# Patient Record
Sex: MALE | Race: WHITE | NOT HISPANIC OR LATINO | Employment: OTHER | URBAN - METROPOLITAN AREA
[De-identification: names, ages, dates, MRNs, and addresses within clinical notes are randomized per-mention and may not be internally consistent; named-entity substitution may affect disease eponyms.]

---

## 2017-01-07 ENCOUNTER — NURSE TRIAGE (OUTPATIENT)
Dept: ADMINISTRATIVE | Facility: CLINIC | Age: 82
End: 2017-01-07

## 2017-01-07 NOTE — TELEPHONE ENCOUNTER
"    Reason for Disposition   [1] Blood glucose > 240 mg/dl (13 mmol/l) AND [2] uses insulin (e.g., insulin-dependent, all type 1 diabetics)   (all triage questions negative)    Answer Assessment - Initial Assessment Questions  1. BLOOD GLUCOSE: "What is your blood glucose level?"       264  2. ONSET: "When did you check the blood glucose?"      11:00 am today  3. USUAL RANGE: "What is your glucose level usually?" (e.g., usual fasting morning value, usual evening value)    100-180  4. KETONES: "Do you check for ketones (urine or blood test strips)?" If yes, ask: "What does the test show now?"       no  5. TYPE 1 or 2:  "Do you know what type of diabetes you have?"  (e.g., Type 1, Type 2, Gestational; doesn't know)       Type 2   6. INSULIN: "Do you take insulin?" If yes, ask: "Have you missed any shots recently?"      Lantus @  7. DIABETES PILLS: "Do you take any pills for your diabetes?" If yes, ask: "Have you missed taking any pills recently?"      Metformin  8. OTHER SYMPTOMS: "Do you have any symptoms?" (e.g., fever, frequent urination, difficulty breathing, dizziness, weakness, vomiting)      none  9. PREGNANCY: "Is there any chance you are pregnant?" "When was your last menstrual period?"      n/a    Protocols used:  DIABETES - HIGH BLOOD SUGAR-[facility]    Patient fasting blood glucose 190, 10AM-303, and .  Changes made to patient's medication regimen for diabetes.  Instructed daughter to check the patient's glucose @ 2pm and 6pm and this nurse will call them back @6:00pm to check the patient's blood glucose readings.    "

## 2017-01-08 NOTE — TELEPHONE ENCOUNTER
2:00 pm blood glucose = 128 and 6:00pm blood glucose = 102.      Reiterated to daughter the importance of including fresh fruit and some carbohydrates in the patient's meals to prevent hypoglycemia.  Daughter verbalized understanding.

## 2017-01-12 RX ORDER — INSULIN GLARGINE 100 [IU]/ML
INJECTION, SOLUTION SUBCUTANEOUS
Qty: 15 ML | Refills: 11 | Status: SHIPPED | OUTPATIENT
Start: 2017-01-12 | End: 2019-02-20 | Stop reason: SDUPTHER

## 2017-01-12 RX ORDER — METFORMIN HYDROCHLORIDE 500 MG/1
TABLET ORAL
Qty: 60 TABLET | Refills: 6 | Status: SHIPPED | OUTPATIENT
Start: 2017-01-12 | End: 2018-12-26 | Stop reason: SINTOL

## 2017-01-12 RX ORDER — INSULIN LISPRO 100 [IU]/ML
INJECTION, SOLUTION INTRAVENOUS; SUBCUTANEOUS
Qty: 15 ML | Refills: 1 | Status: SHIPPED | OUTPATIENT
Start: 2017-01-12 | End: 2017-02-03 | Stop reason: CLARIF

## 2017-01-12 NOTE — TELEPHONE ENCOUNTER
Spoke with Liat and she said that since the pt started trulicity that he has had nausea , even thrown up. He has a lot of gas and when he belches it smells like . This all started after the Trulicity injection on Saturday. They are requesting to go back on the medication they were on before or be changed to something else.

## 2017-01-12 NOTE — TELEPHONE ENCOUNTER
----- Message from Donaldo Martell sent at 1/11/2017  2:07 PM CST -----  Contact: Pt Daughter/Liat  Caller request call from nurse regarding pt having problems with the new insulin and wants to see if he can switch back to his last one, caller does not know the names of the medications, please contact caller at [phone] or [phone]    [employer], [facility] [place], [place] - [address] [place] [id number]  Phone: [phone] Fax: [phone]

## 2017-01-12 NOTE — TELEPHONE ENCOUNTER
Okay to stop trulicity and get back on humalong and lantus as before and metformin and work on diet.

## 2017-02-03 ENCOUNTER — PATIENT MESSAGE (OUTPATIENT)
Dept: INTERNAL MEDICINE | Facility: CLINIC | Age: 82
End: 2017-02-03

## 2017-02-03 RX ORDER — INSULIN ASPART 100 [IU]/ML
INJECTION, SOLUTION INTRAVENOUS; SUBCUTANEOUS
Qty: 1 BOX | Refills: 11 | Status: SHIPPED | OUTPATIENT
Start: 2017-02-03 | End: 2018-02-05 | Stop reason: SDUPTHER

## 2017-02-03 NOTE — TELEPHONE ENCOUNTER
----- Message from Shagufta Zimmerman sent at 2/3/2017 11:32 AM CST -----  Contact: tanisha-daughter  ins isn't paying for humalog, can it be changed to novalog...[phone]

## 2017-02-03 NOTE — TELEPHONE ENCOUNTER
Pt's daughter called to say that insurance not paying for Humalog but will pay Novolog , he injects 5 units before meals    Please look and make sure this is correct

## 2017-02-09 ENCOUNTER — OFFICE VISIT (OUTPATIENT)
Dept: CARDIOLOGY | Facility: CLINIC | Age: 82
End: 2017-02-09
Payer: MEDICARE

## 2017-02-09 VITALS
SYSTOLIC BLOOD PRESSURE: 130 MMHG | WEIGHT: 188.69 LBS | DIASTOLIC BLOOD PRESSURE: 64 MMHG | HEART RATE: 60 BPM | BODY MASS INDEX: 27.95 KG/M2 | HEIGHT: 69 IN

## 2017-02-09 DIAGNOSIS — I77.9 CAROTID ARTERY DISEASE WITHOUT CEREBRAL INFARCTION: Chronic | ICD-10-CM

## 2017-02-09 DIAGNOSIS — R60.0 EDEMA OF BOTH LEGS: Primary | ICD-10-CM

## 2017-02-09 DIAGNOSIS — R94.31 ABNORMAL ECG: ICD-10-CM

## 2017-02-09 DIAGNOSIS — I25.810 CORONARY ARTERY DISEASE INVOLVING CORONARY BYPASS GRAFT OF NATIVE HEART WITHOUT ANGINA PECTORIS: ICD-10-CM

## 2017-02-09 DIAGNOSIS — I48.0 PAROXYSMAL ATRIAL FIBRILLATION: Chronic | ICD-10-CM

## 2017-02-09 DIAGNOSIS — I73.9 PERIPHERAL VASCULAR DISEASE: Chronic | ICD-10-CM

## 2017-02-09 DIAGNOSIS — E78.2 MIXED HYPERLIPIDEMIA: Chronic | ICD-10-CM

## 2017-02-09 DIAGNOSIS — I10 ESSENTIAL HYPERTENSION: ICD-10-CM

## 2017-02-09 DIAGNOSIS — I65.23 OCCLUSION AND STENOSIS OF BILATERAL CAROTID ARTERIES: ICD-10-CM

## 2017-02-09 DIAGNOSIS — I73.9 CLAUDICATION IN PERIPHERAL VASCULAR DISEASE: Chronic | ICD-10-CM

## 2017-02-09 DIAGNOSIS — Z95.0 PACEMAKER: Chronic | ICD-10-CM

## 2017-02-09 DIAGNOSIS — I36.1 NON-RHEUMATIC TRICUSPID VALVE INSUFFICIENCY: ICD-10-CM

## 2017-02-09 PROCEDURE — 99999 PR PBB SHADOW E&M-EST. PATIENT-LVL III: CPT | Mod: PBBFAC,,, | Performed by: INTERNAL MEDICINE

## 2017-02-09 PROCEDURE — 93000 ELECTROCARDIOGRAM COMPLETE: CPT | Mod: S$GLB,,, | Performed by: INTERNAL MEDICINE

## 2017-02-09 PROCEDURE — 1157F ADVNC CARE PLAN IN RCRD: CPT | Mod: S$GLB,,, | Performed by: INTERNAL MEDICINE

## 2017-02-09 PROCEDURE — 99214 OFFICE O/P EST MOD 30 MIN: CPT | Mod: S$GLB,,, | Performed by: INTERNAL MEDICINE

## 2017-02-09 PROCEDURE — 1159F MED LIST DOCD IN RCRD: CPT | Mod: S$GLB,,, | Performed by: INTERNAL MEDICINE

## 2017-02-09 PROCEDURE — 99499 UNLISTED E&M SERVICE: CPT | Mod: S$GLB,,, | Performed by: INTERNAL MEDICINE

## 2017-02-09 PROCEDURE — 1160F RVW MEDS BY RX/DR IN RCRD: CPT | Mod: S$GLB,,, | Performed by: INTERNAL MEDICINE

## 2017-02-09 PROCEDURE — 3078F DIAST BP <80 MM HG: CPT | Mod: S$GLB,,, | Performed by: INTERNAL MEDICINE

## 2017-02-09 PROCEDURE — 3075F SYST BP GE 130 - 139MM HG: CPT | Mod: S$GLB,,, | Performed by: INTERNAL MEDICINE

## 2017-02-09 RX ORDER — MELOXICAM 15 MG/1
1 TABLET ORAL DAILY
Refills: 2 | COMMUNITY
Start: 2016-12-16 | End: 2018-04-26

## 2017-02-09 RX ORDER — AMLODIPINE BESYLATE 5 MG/1
5 TABLET ORAL DAILY
Qty: 30 TABLET | Refills: 11
Start: 2017-02-09 | End: 2017-03-31 | Stop reason: SDUPTHER

## 2017-02-09 NOTE — MR AVS SNAPSHOT
[facility]  [address] [facility] [address]  Phone: [phone]  Fax: [phone]                  Anthony Blair   2017 1:40 PM   Office Visit    Description:  Male : 1933   Provider:  Seven Frazier MD   Department:  [facility]           Reason for Visit     Hyperlipidemia     Hypertension     Coronary Artery Disease     Peripheral Arterial Disease     Atrial Fibrillation     Carotid Artery Disease     Dizziness           Diagnoses this Visit        Comments    Edema of both legs    -  Primary     Non-rheumatic tricuspid valve insufficiency         Paroxysmal atrial fibrillation         Pacemaker         Mixed hyperlipidemia         Peripheral vascular disease         Carotid artery disease without cerebral infarction         Claudication in peripheral vascular disease         Uncontrolled type 2 diabetes mellitus with diabetic peripheral angiopathy without gangrene, with long-term current use of insulin         Coronary artery disease involving coronary bypass graft of native heart without angina pectoris         Essential hypertension         Abnormal ECG         Occlusion and stenosis of bilateral carotid arteries                To Do List           Goals (5 Years of Data)     None      Follow-Up and Disposition     Return in about 6 months (around 2017).       These Medications        Disp Refills Start End    amlodipine (NORVASC) 5 MG tablet 30 tablet 11 2017    Take 1 tablet (5 mg total) by mouth once daily. - Oral    Pharmacy: [facility], [address] #: [phone]         [facility] On Call     [facility] Care Line -  Assistance  Registered nurses in the [facility] Center provide clinical advisement, health education, appointment booking, and other advisory services.  Call for this free service at [phone].             Medications           Message regarding  "Medications     Verify the changes and/or additions to your medication regime listed below are the same as discussed with your clinician today.  If any of these changes or additions are incorrect, please notify your healthcare provider.        START taking these NEW medications        Refills    amlodipine (NORVASC) 5 MG tablet 11    Sig: Take 1 tablet (5 mg total) by mouth once daily.    Class: No Print    Route: Oral      STOP taking these medications     aspirin (ECOTRIN) 81 MG EC tablet Take 1 tablet by mouth every other day.            Verify that the below list of medications is an accurate representation of the medications you are currently taking.  If none reported, the list may be blank. If incorrect, please contact your healthcare provider. Carry this list with you in case of emergency.           Current Medications     BD INSULIN PEN NEEDLE UF SHORT 31 gauge x 5/16" Ndle USE FOUR TIMES DAILY.    blood sugar diagnostic (ACCU-CHEK TOMAS PLUS TEST STRP) Strp 1 strip by Other route 6 (six) times daily.    CARBOXYMETHYLCELLULOSE SODIUM (REFRESH OPHT) Apply to eye.    clopidogrel (PLAVIX) 75 mg tablet Take 1 tablet (75 mg total) by mouth once daily.    fluticasone (FLONASE) 50 mcg/actuation nasal spray 2 sprays by Each Nare route once daily.    furosemide (LASIX) 20 MG tablet TAKE ONE TABLET BY MOUTH DAILY    insulin aspart (NOVOLOG) 100 unit/mL InPn pen Inject 5 units 3 times a day before meals    insulin glargine (LANTUS SOLOSTAR) 100 unit/mL (3 mL) InPn pen INJECT 16 UNITS SUB-Q AT BEDTIME    ipratropium (ATROVENT) 0.06 % nasal spray 2 sprays by Nasal route 4 (four) times daily.    isosorbide mononitrate (IMDUR) 30 MG 24 hr tablet ONE BY MOUTH DAILY    lactobacillus acidophilus (PROBIOTIC) 10 billion cell Cap Take by mouth. 1 Capsule Oral Every day    lancets (ACCU-CHEK MULTICLIX LANCET) Misc TEST 3 TIMES A DAY    lisinopril-hydrochlorothiazide (PRINZIDE,ZESTORETIC) 20-12.5 mg per tablet Take 1 tablet by " "mouth once daily.    meloxicam (MOBIC) 15 MG tablet Take 1 tablet by mouth once daily.    metformin (GLUCOPHAGE) 500 MG tablet TAKE ONE BY MOUTH TWICE A DAY WITH FOOD.    metoprolol succinate (TOPROL-XL) 100 MG 24 hr tablet TAKE ONE TABLET BY MOUTH TWICE DAILY    nitroGLYCERIN (NITROSTAT) 0.4 MG SL tablet Place 1 tablet (0.4 mg total) under the tongue every 5 (five) minutes as needed for Chest pain.    potassium chloride SA (K-DUR,KLOR-CON) 20 MEQ tablet TAKE ONE TABLET BY MOUTH EVERY DAY    ranitidine (ZANTAC) 75 MG tablet Take 75 mg by mouth nightly.    saw palmetto fruit 450 mg Cap Take 1 capsule by mouth Twice daily.    tramadol (ULTRAM) 50 mg tablet Take 1 tablet (50 mg total) by mouth every 6 (six) hours as needed for Pain.    amlodipine (NORVASC) 5 MG tablet Take 1 tablet (5 mg total) by mouth once daily.           Clinical Reference Information           Your Vitals Were     BP Pulse Height Weight BMI    130/64 (BP Location: Left arm, Patient Position: Sitting) 60 5' 9" (1.753 m) 85.6 kg (188 lb 11.4 oz) 27.87 kg/m2      Blood Pressure          Most Recent Value    Right Arm BP - Sitting  138/62    Left Arm BP - Sitting  130/84    BP  130/64      Allergies as of 2/9/2017     Atorvastatin    Codeine    Norvasc [Amlodipine]    Trulicity [Dulaglutide]    Adhesive      Immunizations Administered on Date of Encounter - 2/9/2017     None      Orders Placed During Today's Visit      Normal Orders This Visit    IN OFFICE EKG 12-LEAD (to [place])     Future Labs/Procedures Expected by Expires    2D echo with color flow doppler  8/9/2017 2/9/2018    CAR Ultrasound doppler carotid bliateral  8/9/2017 2/9/2018      Language Assistance Services     ATTENTION: Language assistance services are available, free of charge. Please call [phone].      ATENCIÓN: Si margie wolf, tiene a clifford disposición servicios gratuitos de asistencia lingüística. Llame al [phone].     CHÚ Ý: N?u b?n nói Ti?ng Vi?t, có các d?ch v? h? " tr? diana mejia? mi?n phí dành cho b?n. G?i s? [phone].         [facility] complies with applicable Federal civil rights laws and does not discriminate on the basis of race, color, national origin, age, disability, or sex.

## 2017-02-09 NOTE — PROGRESS NOTES
Subjective:    Patient ID:  Anthony Blair is a 83 y.o. male who presents for evaluation of Hyperlipidemia; Hypertension; Coronary Artery Disease; Peripheral Arterial Disease; Atrial Fibrillation; Carotid Artery Disease; and Dizziness      HPI Mr. Blair returns for fu.  His current medical conditions include PAF/PSVT, CAD (PTCA 1980's), HTN, PPM, DM, atrial septal aneurysm, PAD, dyslipidemia. Nonsmoker.   s/p CABG 7/10 (LIMA-LAD, SVG-OM1, SVG-OM3, SVG-PDA) for increasing OLIVARES and multivessel CAD on [facility].   S/p PPM 10/10 for SSS/PAF. Was hospitalized 1/11 for PSVT (Atrial tachycardia) and was started on Amiodarone but was stopped for GI discomfort. He also did not tolerate Multaq and has not tolerated multiple statins.   s/p EPS/ablation of his atrial tachycardia 6/11.   S/p abd w runoff March 2015 and had significant B LE infrapopliteal disease. Atherectomy/pta performed of critical R tibeoperoneal trunk stenosis. Also has L tibeoperoneal trunk stenosis and his PTA/MIGUEL are occluded bilaterally.  Now here for f/u.  Has had some B LE edema on norvasc, stopped it and improved, restarted and recurred.  No angina.  No unusual dyspnea, no pnd/orthopnea.  Some occasional lightheadedness/dizziness. No syncope.  ecg today shows a-v pacing.  PPM last interrogation showed normal device function.  DM worsening.  Lipids are stable.  No abnormal bleeding on asa, plavix.  No bothersome claudication sxs.     Patient Active Problem List   Diagnosis    Uncontrolled type 2 diabetes mellitus with diabetic peripheral angiopathy without gangrene, with long-term current use of insulin    Coronary artery disease involving coronary bypass graft without angina pectoris    GERD (gastroesophageal reflux disease)    Paroxysmal atrial fibrillation    Pacemaker    Mixed hyperlipidemia    Essential hypertension    Abnormal ECG    Peripheral vascular disease    Carotid artery disease without cerebral infarction    Lumbosacral spondylosis  "   Claudication in peripheral vascular disease    Diabetes mellitus type 2 without retinopathy    Pseudophakia    Right epiretinal membrane    Bilateral dry eyes    Brow ptosis    Dermatochalasis    Arthritis of knee, left    Arthritis of knee, right    Corneal rust ring of right eye    Long-term insulin use    Tricuspid regurgitation    Serum calcium elevated    Blepharitis    Edema of both legs     Past Medical History   Diagnosis Date    A-fib     Anemia     AP (angina pectoris) 1/23/2014    CAD (coronary artery disease)     Carotid artery disease without cerebral infarction 8/22/2014    Cataract     Diabetes mellitus type II      BS didn't check today 09/08/2016  A1C  7.4    GERD (gastroesophageal reflux disease)     GERD (gastroesophageal reflux disease) 7/11/2013    Hyperlipidemia     Hypertension     Lumbosacral spondylosis 12/24/2014    Mixed hyperlipidemia 1/23/2014    Pacemaker 1/23/2014    Paroxysmal atrial fibrillation 1/23/2014    Peripheral vascular disease 8/22/2014    S/P CABG (coronary artery bypass graft) 1/23/2014    Tobacco dependence      resolved     Review of Systems   Constitution: Positive for malaise/fatigue.   HENT: Negative.    Eyes: Negative.    Cardiovascular: Positive for leg swelling.   Respiratory: Negative.    Endocrine: Negative.    Hematologic/Lymphatic: Negative.    Skin: Negative.    Musculoskeletal: Positive for arthritis.   Gastrointestinal: Negative.    Genitourinary: Negative.    Neurological: Positive for light-headedness.   Psychiatric/Behavioral: Negative.    Allergic/Immunologic: Negative.          Visit Vitals    /64 (BP Location: Left arm, Patient Position: Sitting)    Pulse 60    Ht 5' 9" (1.753 m)    Wt 85.6 kg (188 lb 11.4 oz)    BMI 27.87 kg/m2       Wt Readings from Last 3 Encounters:   02/09/17 85.6 kg (188 lb 11.4 oz)   12/14/16 85.4 kg (188 lb 4.4 oz)   09/08/16 84.7 kg (186 lb 11.7 oz)     Temp Readings from Last 3 " Encounters:   12/14/16 97.9 °F (36.6 °C) (Tympanic)   09/08/16 97.6 °F (36.4 °C) (Tympanic)   06/01/16 96.4 °F (35.8 °C) (Tympanic)     BP Readings from Last 3 Encounters:   02/09/17 130/64   12/14/16 138/70   09/08/16 (!) 147/67     Pulse Readings from Last 3 Encounters:   02/09/17 60   12/14/16 65   09/08/16 90       Objective:    Physical Exam   Constitutional: He is oriented to person, place, and time. He appears well-developed and well-nourished.   HENT:   Head: Normocephalic.   Neck: Normal range of motion. Neck supple. Normal carotid pulses, no hepatojugular reflux and no JVD present. Carotid bruit is not present. No thyromegaly present.   Cardiovascular: Normal rate, regular rhythm, S1 normal and S2 normal.  PMI is not displaced.  Exam reveals no S3, no S4, no distant heart sounds, no friction rub, no midsystolic click and no opening snap.    No murmur heard.  Pulses:       Radial pulses are 2+ on the right side, and 2+ on the left side.   Pulmonary/Chest: Effort normal and breath sounds normal. He has no wheezes. He has no rales.   Abdominal: Soft. Bowel sounds are normal. He exhibits no distension, no abdominal bruit, no ascites and no mass. There is no tenderness.   Musculoskeletal: He exhibits no edema.   Neurological: He is alert and oriented to person, place, and time.   Skin: Skin is warm.   Psychiatric: He has a normal mood and affect. His behavior is normal.   Nursing note and vitals reviewed.      I have reviewed all pertinent labs and cardiac studies.    Lab Results   Component Value Date    HGBA1C 7.9 (H) 12/02/2016     Lab Results   Component Value Date    WBC 5.80 12/02/2016    HGB 12.8 (L) 12/02/2016    HCT 40.0 12/02/2016    MCV 91 12/02/2016     12/02/2016     Lab Results   Component Value Date    CHOL 188 12/02/2016    CHOL 178 09/01/2016    CHOL 204 (H) 05/24/2016     Lab Results   Component Value Date    HDL 50 12/02/2016    HDL 50 09/01/2016    HDL 47 05/24/2016     Lab Results    Component Value Date    LDLCALC 119.2 12/02/2016    LDLCALC 111.0 09/01/2016    LDLCALC 131.0 05/24/2016     Lab Results   Component Value Date    TRIG 94 12/02/2016    TRIG 85 09/01/2016    TRIG 130 05/24/2016     Lab Results   Component Value Date    CHOLHDL 26.6 12/02/2016    CHOLHDL 28.1 09/01/2016    CHOLHDL 23.0 05/24/2016       Chemistry        Component Value Date/Time     12/02/2016 0749    K 4.3 12/02/2016 0749     12/02/2016 0749    CO2 27 12/02/2016 0749    BUN 16 12/02/2016 0749    CREATININE 0.9 12/02/2016 0749     (H) 12/02/2016 0749        Component Value Date/Time    CALCIUM 10.5 12/02/2016 0749    ALKPHOS 114 12/02/2016 0749    AST 29 12/02/2016 0749    ALT 20 12/02/2016 0749    BILITOT 0.8 12/02/2016 0749          Lab Results   Component Value Date    WBC 5.80 12/02/2016    HGB 12.8 (L) 12/02/2016    HCT 40.0 12/02/2016    MCV 91 12/02/2016     12/02/2016           Assessment:       1. Edema of both legs    2. Non-rheumatic tricuspid valve insufficiency    3. Paroxysmal atrial fibrillation    4. Pacemaker    5. Mixed hyperlipidemia    6. Peripheral vascular disease    7. Carotid artery disease without cerebral infarction    8. Claudication in peripheral vascular disease    9. Uncontrolled type 2 diabetes mellitus with diabetic peripheral angiopathy without gangrene, with long-term current use of insulin    10. Coronary artery disease involving coronary bypass graft of native heart without angina pectoris    11. Essential hypertension    12. Abnormal ECG         Plan:             STABLE CV CONDITIONS OVERALL BUT HAS HAD ISSUES WITH LE EDEMA DUE TO CALCIUM CHANNEL BLOCKER.  CUT AMLODIPINE BACK TO 5 MG DAILY FROM 10 MG DUE TO SIDE EFFECTS INVOLVING ANKLE EDEMA.  CHRONIC DIZZINESS/LIGHTHEADEDNESS STABLE.  STABLE CHRONIC CAD -- CONTINUE MEDICAL TX.  STABLE CHRONIC PAD -- CONTINUE EXERCISE, MEDICAL TX.  PT COUNSELED ON NEED TO LOWER DM TO GOAL.  CONTINUE OTHER MEDS.  CARDIAC  DIET  EXERCISE  F/U 6 MONTHS WITH CAROTID US, ECHO.

## 2017-02-14 ENCOUNTER — OFFICE VISIT (OUTPATIENT)
Dept: INTERNAL MEDICINE | Facility: CLINIC | Age: 82
End: 2017-02-14
Payer: MEDICARE

## 2017-02-14 ENCOUNTER — HOSPITAL ENCOUNTER (OUTPATIENT)
Dept: RADIOLOGY | Facility: HOSPITAL | Age: 82
Discharge: HOME | End: 2017-02-14
Attending: FAMILY MEDICINE
Payer: MEDICARE

## 2017-02-14 VITALS
DIASTOLIC BLOOD PRESSURE: 76 MMHG | HEIGHT: 69 IN | OXYGEN SATURATION: 98 % | RESPIRATION RATE: 18 BRPM | BODY MASS INDEX: 27.43 KG/M2 | WEIGHT: 185.19 LBS | SYSTOLIC BLOOD PRESSURE: 138 MMHG | TEMPERATURE: 98 F | HEART RATE: 94 BPM

## 2017-02-14 DIAGNOSIS — G89.29 CHRONIC NECK PAIN: ICD-10-CM

## 2017-02-14 DIAGNOSIS — M54.2 CHRONIC NECK PAIN: Primary | ICD-10-CM

## 2017-02-14 DIAGNOSIS — M54.2 CHRONIC NECK PAIN: ICD-10-CM

## 2017-02-14 DIAGNOSIS — G89.29 CHRONIC NECK PAIN: Primary | ICD-10-CM

## 2017-02-14 PROCEDURE — 1157F ADVNC CARE PLAN IN RCRD: CPT | Mod: S$GLB,,, | Performed by: FAMILY MEDICINE

## 2017-02-14 PROCEDURE — 3075F SYST BP GE 130 - 139MM HG: CPT | Mod: S$GLB,,, | Performed by: FAMILY MEDICINE

## 2017-02-14 PROCEDURE — 1160F RVW MEDS BY RX/DR IN RCRD: CPT | Mod: S$GLB,,, | Performed by: FAMILY MEDICINE

## 2017-02-14 PROCEDURE — 1159F MED LIST DOCD IN RCRD: CPT | Mod: S$GLB,,, | Performed by: FAMILY MEDICINE

## 2017-02-14 PROCEDURE — 99213 OFFICE O/P EST LOW 20 MIN: CPT | Mod: S$GLB,,, | Performed by: FAMILY MEDICINE

## 2017-02-14 PROCEDURE — 99999 PR PBB SHADOW E&M-EST. PATIENT-LVL III: CPT | Mod: PBBFAC,,, | Performed by: FAMILY MEDICINE

## 2017-02-14 PROCEDURE — 72050 X-RAY EXAM NECK SPINE 4/5VWS: CPT | Mod: TC,PO

## 2017-02-14 PROCEDURE — 72050 X-RAY EXAM NECK SPINE 4/5VWS: CPT | Mod: 26,,, | Performed by: RADIOLOGY

## 2017-02-14 PROCEDURE — 3078F DIAST BP <80 MM HG: CPT | Mod: S$GLB,,, | Performed by: FAMILY MEDICINE

## 2017-02-14 PROCEDURE — 1125F AMNT PAIN NOTED PAIN PRSNT: CPT | Mod: S$GLB,,, | Performed by: FAMILY MEDICINE

## 2017-02-14 RX ORDER — DICLOFENAC SODIUM 10 MG/G
2 GEL TOPICAL DAILY
Qty: 1 TUBE | Refills: 2 | Status: SHIPPED | OUTPATIENT
Start: 2017-02-14 | End: 2017-10-02

## 2017-02-14 NOTE — PROGRESS NOTES
Chief Complaint:    Chief Complaint   Patient presents with    Neck Pain       History of Present Illness:    Patient presents today complaining of neck pain which is a chronic problem going on for months this worse off and on with cold weather pain mostly localized in the neck and shoulder region does not radiate no tingling numbness or weakness.  ROS:  Review of Systems   Constitutional: Negative for activity change, chills, fatigue, fever and unexpected weight change.   HENT: Negative for congestion, ear discharge, ear pain, hearing loss, postnasal drip and rhinorrhea.    Eyes: Negative for pain and visual disturbance.   Respiratory: Negative for cough, chest tightness and shortness of breath.    Cardiovascular: Negative for chest pain and palpitations.   Gastrointestinal: Negative for abdominal pain, diarrhea and vomiting.   Endocrine: Negative for heat intolerance.   Genitourinary: Negative for dysuria, flank pain, frequency and hematuria.   Musculoskeletal: Negative for back pain, gait problem and neck pain.   Skin: Negative for color change and rash.   Neurological: Negative for dizziness, tremors, seizures, numbness and headaches.   Psychiatric/Behavioral: Negative for agitation, hallucinations, self-injury, sleep disturbance and suicidal ideas. The patient is not nervous/anxious.        Past Medical History   Diagnosis Date    A-fib     Anemia     AP (angina pectoris) 1/23/2014    CAD (coronary artery disease)     Carotid artery disease without cerebral infarction 8/22/2014    Cataract     Diabetes mellitus type II      BS didn't check today 09/08/2016  A1C  7.4    GERD (gastroesophageal reflux disease)     GERD (gastroesophageal reflux disease) 7/11/2013    Hyperlipidemia     Hypertension     Lumbosacral spondylosis 12/24/2014    Mixed hyperlipidemia 1/23/2014    Pacemaker 1/23/2014    Paroxysmal atrial fibrillation 1/23/2014    Peripheral vascular disease 8/22/2014    S/P CABG  "(coronary artery bypass graft) 1/23/2014    Tobacco dependence      resolved       Social History:  Social History     Social History    Marital status:      Spouse name: N/A    Number of children: N/A    Years of education: N/A     Social History Main Topics    Smoking status: Former Smoker     Packs/day: 2.00     Years: 13.00     Types: Cigarettes     Start date: 11/25/1954     Quit date: 6/7/1967    Smokeless tobacco: Never Used    Alcohol use No    Drug use: No    Sexual activity: No     Other Topics Concern    None     Social History Narrative    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       Family History:   family history includes Alcohol abuse in his paternal uncle; Arthritis in his father and mother; Cancer in his daughter and sister; Diabetes in his brother, daughter, father, mother, sister, son, and son; Heart disease in his brother and mother; Kidney disease in his brother and mother; Miscarriages / Stillbirths in his daughter. There is no history of Stroke.    Health Maintenance   Topic Date Due    Hemoglobin A1c  06/02/2017    Foot Exam  06/28/2017    Eye Exam  09/08/2017    Lipid Panel  12/02/2017    TETANUS VACCINE  01/23/2024    Zoster Vaccine  Completed    Pneumococcal (65+)  Completed    Influenza Vaccine  Completed       Physical Exam:    Vital Signs  Temp: 97.9 °F (36.6 °C)  Temp src: Tympanic  Pulse: 94  Resp: 18  SpO2: 98 %  BP: 138/76  BP Location: Left arm  BP Method: Manual  Patient Position: Sitting  Pain Score:   3  Pain Loc: Neck  Height and Weight  Height: 5' 9" (175.3 cm)  Weight: 84 kg (185 lb 3 oz)  BSA (Calculated - sq m): 2.02 sq meters  BMI (Calculated): 27.4  Weight in (lb) to have BMI = 25: 168.9]    Body mass index is 27.35 kg/(m^2).    Physical Exam   HENT:   Left Ear: External ear normal.   Mouth/Throat: Oropharynx is clear and moist.   Neck: Decreased range of motion present.           Lab Results   Component Value Date    CHOL " "188 12/02/2016    CHOL 178 09/01/2016    CHOL 204 (H) 05/24/2016    TRIG 94 12/02/2016    TRIG 85 09/01/2016    TRIG 130 05/24/2016    HDL 50 12/02/2016    HDL 50 09/01/2016    HDL 47 05/24/2016    TOTALCHOLEST 3.8 12/02/2016    TOTALCHOLEST 3.6 09/01/2016    TOTALCHOLEST 4.3 05/24/2016    NONHDLCHOL 138 12/02/2016    NONHDLCHOL 128 09/01/2016    NONHDLCHOL 157 05/24/2016       Lab Results   Component Value Date    HGBA1C 7.9 (H) 12/02/2016       Assessment:      ICD-10-CM ICD-9-CM   1. Chronic neck pain M54.2 723.1    G89.29 338.29         Plan:  Appears to have pain mostly related to the DJD of the cervical spine his range of motion is very limited.  Recommend physical therapy to improve range of motion and alleviate pain.  Orders Placed This Encounter   Procedures    X-Ray Cervical Spine Complete 5 view    Ambulatory Referral to Physical/Occupational Therapy       Current Outpatient Prescriptions   Medication Sig Dispense Refill    amlodipine (NORVASC) 5 MG tablet Take 1 tablet (5 mg total) by mouth once daily. 30 tablet 11    BD INSULIN PEN NEEDLE UF SHORT 31 gauge x 5/16" Ndle USE FOUR TIMES DAILY. 100 each 6    blood sugar diagnostic (ACCU-CHEK TOMAS PLUS TEST STRP) Strp 1 strip by Other route 6 (six) times daily. 300 each 6    CARBOXYMETHYLCELLULOSE SODIUM (REFRESH OPHT) Apply to eye.      clopidogrel (PLAVIX) 75 mg tablet Take 1 tablet (75 mg total) by mouth once daily. 30 tablet 11    fluticasone (FLONASE) 50 mcg/actuation nasal spray 2 sprays by Each Nare route once daily. 16 g 11    furosemide (LASIX) 20 MG tablet TAKE ONE TABLET BY MOUTH DAILY 30 tablet 12    insulin aspart (NOVOLOG) 100 unit/mL InPn pen Inject 5 units 3 times a day before meals 1 Box 11    insulin glargine (LANTUS SOLOSTAR) 100 unit/mL (3 mL) InPn pen INJECT 16 UNITS SUB-Q AT BEDTIME 15 mL 11    ipratropium (ATROVENT) 0.06 % nasal spray 2 sprays by Nasal route 4 (four) times daily. 15 mL 0    isosorbide mononitrate (IMDUR) " 30 MG 24 hr tablet ONE BY MOUTH DAILY 30 tablet 12    lactobacillus acidophilus (PROBIOTIC) 10 billion cell Cap Take by mouth. 1 Capsule Oral Every day      lancets (ACCU-CHEK MULTICLIX LANCET) Misc TEST 3 TIMES A  each 4    lisinopril-hydrochlorothiazide (PRINZIDE,ZESTORETIC) 20-12.5 mg per tablet Take 1 tablet by mouth once daily. 90 tablet 3    meloxicam (MOBIC) 15 MG tablet Take 1 tablet by mouth once daily.  2    metformin (GLUCOPHAGE) 500 MG tablet TAKE ONE BY MOUTH TWICE A DAY WITH FOOD. 60 tablet 6    metoprolol succinate (TOPROL-XL) 100 MG 24 hr tablet TAKE ONE TABLET BY MOUTH TWICE DAILY 60 tablet 12    nitroGLYCERIN (NITROSTAT) 0.4 MG SL tablet Place 1 tablet (0.4 mg total) under the tongue every 5 (five) minutes as needed for Chest pain. 20 tablet 12    potassium chloride SA (K-DUR,KLOR-CON) 20 MEQ tablet TAKE ONE TABLET BY MOUTH EVERY DAY 30 tablet 12    ranitidine (ZANTAC) 75 MG tablet Take 75 mg by mouth nightly.      saw palmetto fruit 450 mg Cap Take 1 capsule by mouth Twice daily.      tramadol (ULTRAM) 50 mg tablet Take 1 tablet (50 mg total) by mouth every 6 (six) hours as needed for Pain. 60 tablet 0    diclofenac sodium (VOLTAREN) 1 % Gel Apply 2 g topically once daily. 1 Tube 2     No current facility-administered medications for this visit.        There are no discontinued medications.    No Follow-up on file.      Dr Abdulaziz Mccabe MD    Disclaimer: This note is prepared using voice recognition system and as such is likely to have errors and is not proof read.

## 2017-02-22 ENCOUNTER — HOSPITAL ENCOUNTER (EMERGENCY)
Facility: HOSPITAL | Age: 82
Discharge: HOME | End: 2017-02-22
Attending: EMERGENCY MEDICINE
Payer: MEDICARE

## 2017-02-22 ENCOUNTER — OFFICE VISIT (OUTPATIENT)
Dept: URGENT CARE | Facility: CLINIC | Age: 82
End: 2017-02-22
Payer: MEDICARE

## 2017-02-22 VITALS
OXYGEN SATURATION: 98 % | SYSTOLIC BLOOD PRESSURE: 154 MMHG | RESPIRATION RATE: 20 BRPM | HEIGHT: 70 IN | TEMPERATURE: 98 F | BODY MASS INDEX: 26.55 KG/M2 | DIASTOLIC BLOOD PRESSURE: 70 MMHG | HEART RATE: 74 BPM | WEIGHT: 185.44 LBS

## 2017-02-22 VITALS
DIASTOLIC BLOOD PRESSURE: 64 MMHG | WEIGHT: 186 LBS | SYSTOLIC BLOOD PRESSURE: 128 MMHG | HEIGHT: 70 IN | OXYGEN SATURATION: 97 % | HEART RATE: 63 BPM | RESPIRATION RATE: 14 BRPM | TEMPERATURE: 98 F | BODY MASS INDEX: 26.63 KG/M2

## 2017-02-22 DIAGNOSIS — R07.9 CHEST PAIN, UNSPECIFIED TYPE: Primary | ICD-10-CM

## 2017-02-22 DIAGNOSIS — R06.02 SOB (SHORTNESS OF BREATH): ICD-10-CM

## 2017-02-22 DIAGNOSIS — R42 DIZZINESS: ICD-10-CM

## 2017-02-22 DIAGNOSIS — I25.10 CORONARY ARTERY DISEASE, ANGINA PRESENCE UNSPECIFIED, UNSPECIFIED VESSEL OR LESION TYPE, UNSPECIFIED WHETHER NATIVE OR TRANSPLANTED HEART: ICD-10-CM

## 2017-02-22 LAB
ALBUMIN SERPL BCP-MCNC: 3.6 G/DL
ALP SERPL-CCNC: 123 U/L
ALT SERPL W/O P-5'-P-CCNC: 23 U/L
ANION GAP SERPL CALC-SCNC: 8 MMOL/L
AST SERPL-CCNC: 29 U/L
BASOPHILS # BLD AUTO: 0.03 K/UL
BASOPHILS NFR BLD: 0.6 %
BILIRUB SERPL-MCNC: 0.3 MG/DL
BNP SERPL-MCNC: 107 PG/ML
BUN SERPL-MCNC: 15 MG/DL
CALCIUM SERPL-MCNC: 10.3 MG/DL
CHLORIDE SERPL-SCNC: 101 MMOL/L
CO2 SERPL-SCNC: 28 MMOL/L
CREAT SERPL-MCNC: 0.8 MG/DL
DIFFERENTIAL METHOD: ABNORMAL
EOSINOPHIL # BLD AUTO: 0.2 K/UL
EOSINOPHIL NFR BLD: 4.1 %
ERYTHROCYTE [DISTWIDTH] IN BLOOD BY AUTOMATED COUNT: 14 %
EST. GFR  (AFRICAN AMERICAN): >60 ML/MIN/1.73 M^2
EST. GFR  (NON AFRICAN AMERICAN): >60 ML/MIN/1.73 M^2
GLUCOSE SERPL-MCNC: 185 MG/DL
HCT VFR BLD AUTO: 35.9 %
HGB BLD-MCNC: 12.2 G/DL
INR PPP: 1.1
LYMPHOCYTES # BLD AUTO: 1.9 K/UL
LYMPHOCYTES NFR BLD: 35.4 %
MCH RBC QN AUTO: 30.2 PG
MCHC RBC AUTO-ENTMCNC: 34 %
MCV RBC AUTO: 89 FL
MONOCYTES # BLD AUTO: 0.4 K/UL
MONOCYTES NFR BLD: 7.7 %
NEUTROPHILS # BLD AUTO: 2.8 K/UL
NEUTROPHILS NFR BLD: 52.2 %
PLATELET # BLD AUTO: 182 K/UL
PMV BLD AUTO: 8.9 FL
POTASSIUM SERPL-SCNC: 4 MMOL/L
PROT SERPL-MCNC: 7 G/DL
PROTHROMBIN TIME: 11.4 SEC
RBC # BLD AUTO: 4.04 M/UL
SODIUM SERPL-SCNC: 137 MMOL/L
TROPONIN I SERPL DL<=0.01 NG/ML-MCNC: <0.006 NG/ML
WBC # BLD AUTO: 5.34 K/UL

## 2017-02-22 PROCEDURE — 99212 OFFICE O/P EST SF 10 MIN: CPT | Mod: S$GLB,,, | Performed by: NURSE PRACTITIONER

## 2017-02-22 PROCEDURE — 93010 ELECTROCARDIOGRAM REPORT: CPT | Mod: ,,, | Performed by: INTERNAL MEDICINE

## 2017-02-22 PROCEDURE — 1160F RVW MEDS BY RX/DR IN RCRD: CPT | Mod: S$GLB,,, | Performed by: NURSE PRACTITIONER

## 2017-02-22 PROCEDURE — 1159F MED LIST DOCD IN RCRD: CPT | Mod: S$GLB,,, | Performed by: NURSE PRACTITIONER

## 2017-02-22 PROCEDURE — 3077F SYST BP >= 140 MM HG: CPT | Mod: S$GLB,,, | Performed by: NURSE PRACTITIONER

## 2017-02-22 PROCEDURE — 1126F AMNT PAIN NOTED NONE PRSNT: CPT | Mod: S$GLB,,, | Performed by: NURSE PRACTITIONER

## 2017-02-22 PROCEDURE — 3078F DIAST BP <80 MM HG: CPT | Mod: S$GLB,,, | Performed by: NURSE PRACTITIONER

## 2017-02-22 PROCEDURE — 99499 UNLISTED E&M SERVICE: CPT | Mod: S$GLB,,, | Performed by: NURSE PRACTITIONER

## 2017-02-22 PROCEDURE — 83880 ASSAY OF NATRIURETIC PEPTIDE: CPT

## 2017-02-22 PROCEDURE — 99999 PR PBB SHADOW E&M-EST. PATIENT-LVL III: CPT | Mod: PBBFAC,,, | Performed by: NURSE PRACTITIONER

## 2017-02-22 PROCEDURE — 99284 EMERGENCY DEPT VISIT MOD MDM: CPT | Mod: 25

## 2017-02-22 PROCEDURE — 85025 COMPLETE CBC W/AUTO DIFF WBC: CPT

## 2017-02-22 PROCEDURE — 93005 ELECTROCARDIOGRAM TRACING: CPT

## 2017-02-22 PROCEDURE — 1157F ADVNC CARE PLAN IN RCRD: CPT | Mod: S$GLB,,, | Performed by: NURSE PRACTITIONER

## 2017-02-22 PROCEDURE — 85610 PROTHROMBIN TIME: CPT

## 2017-02-22 PROCEDURE — 80053 COMPREHEN METABOLIC PANEL: CPT

## 2017-02-22 PROCEDURE — 84484 ASSAY OF TROPONIN QUANT: CPT

## 2017-02-22 RX ORDER — ASPIRIN 325 MG
325 TABLET ORAL
Status: COMPLETED | OUTPATIENT
Start: 2017-02-22 | End: 2017-02-22

## 2017-02-22 RX ADMIN — Medication 325 MG: at 07:02

## 2017-02-22 NOTE — ED AVS SNAPSHOT
[facility]  [id number] [place] [id number]               Anthony Blair   2017  8:21 PM   ED    Description:  Male : 1933   Department:  [facility]           Your Care was Coordinated By:     Provider Role From To    Lizzie Funez MD Attending Provider 17 --      Reason for Visit     Chest Pain           Diagnoses this Visit        Comments    Chest pain, unspecified type    -  Primary       ED Disposition     ED Disposition Condition Comment    Discharge             To Do List           Follow-up Information     Follow up with Abdulaziz Mccabe MD In 2 days.    Specialty:  Family Medicine    Contact information:    [address] [address] [address]  [phone]          Follow up with [facility].    Specialty:  Emergency Medicine    Why:  As needed, If symptoms worsen    Contact information:    [address] [place] [id number] [phone]      [facility]     [facility] -  Assistance  Registered nurses in the [facility] provide clinical advisement, health education, appointment booking, and other advisory services.  Call for this free service at [phone].             Medications           Message regarding Medications     Verify the changes and/or additions to your medication regime listed below are the same as discussed with your clinician today.  If any of these changes or additions are incorrect, please notify your healthcare provider.             Verify that the below list of medications is an accurate representation of the medications you are currently taking.  If none reported, the list may be blank. If incorrect, please contact your healthcare provider. Carry this list with you in case of emergency.           Current Medications     amlodipine (NORVASC) 5 MG tablet Take 1 tablet (5 mg total) by mouth once daily.    BD  "INSULIN PEN NEEDLE UF SHORT 31 gauge x 5/16" Ndle USE FOUR TIMES DAILY.    blood sugar diagnostic (ACCU-CHEK TOMAS PLUS TEST STRP) Strp 1 strip by Other route 6 (six) times daily.    CARBOXYMETHYLCELLULOSE SODIUM (REFRESH OPHT) Apply to eye.    clopidogrel (PLAVIX) 75 mg tablet Take 1 tablet (75 mg total) by mouth once daily.    diclofenac sodium (VOLTAREN) 1 % Gel Apply 2 g topically once daily.    fluticasone (FLONASE) 50 mcg/actuation nasal spray 2 sprays by Each Nare route once daily.    furosemide (LASIX) 20 MG tablet TAKE ONE TABLET BY MOUTH DAILY    insulin aspart (NOVOLOG) 100 unit/mL InPn pen Inject 5 units 3 times a day before meals    insulin glargine (LANTUS SOLOSTAR) 100 unit/mL (3 mL) InPn pen INJECT 16 UNITS SUB-Q AT BEDTIME    ipratropium (ATROVENT) 0.06 % nasal spray 2 sprays by Nasal route 4 (four) times daily.    isosorbide mononitrate (IMDUR) 30 MG 24 hr tablet ONE BY MOUTH DAILY    lactobacillus acidophilus (PROBIOTIC) 10 billion cell Cap Take by mouth. 1 Capsule Oral Every day    lancets (ACCU-CHEK MULTICLIX LANCET) Misc TEST 3 TIMES A DAY    lisinopril-hydrochlorothiazide (PRINZIDE,ZESTORETIC) 20-12.5 mg per tablet Take 1 tablet by mouth once daily.    meloxicam (MOBIC) 15 MG tablet Take 1 tablet by mouth once daily.    metformin (GLUCOPHAGE) 500 MG tablet TAKE ONE BY MOUTH TWICE A DAY WITH FOOD.    metoprolol succinate (TOPROL-XL) 100 MG 24 hr tablet TAKE ONE TABLET BY MOUTH TWICE DAILY    nitroGLYCERIN (NITROSTAT) 0.4 MG SL tablet Place 1 tablet (0.4 mg total) under the tongue every 5 (five) minutes as needed for Chest pain.    potassium chloride SA (K-DUR,KLOR-CON) 20 MEQ tablet TAKE ONE TABLET BY MOUTH EVERY DAY    ranitidine (ZANTAC) 75 MG tablet Take 75 mg by mouth nightly.    saw palmetto fruit 450 mg Cap Take 1 capsule by mouth Twice daily.    tramadol (ULTRAM) 50 mg tablet Take 1 tablet (50 mg total) by mouth every 6 (six) hours as needed for Pain.           Clinical Reference " "Information           Your Vitals Were     BP Pulse Temp Resp Height Weight    135/68 79 98.3 °F (36.8 °C) (Oral) 21 5' 10" (1.778 m) 84.4 kg (186 lb)    SpO2 BMI             97% 26.69 kg/m2         Allergies as of 2/22/2017        Reactions    Atorvastatin     Other reaction(s): Muscle pain  Other reaction(s): Muscle weakness  Other reaction(s): Muscle pain    Codeine     Other reaction(s): Headache  Other reaction(s): Headache    Norvasc [Amlodipine] Edema    Trulicity [Dulaglutide] Nausea And Vomiting    Adhesive Rash    Other reaction(s): rash      Immunizations Administered on Date of Encounter - 2/22/2017     None      ED Micro, Lab, POCT     Start Ordered       Status Ordering Provider    02/22/17 2040 02/22/17 2040  CBC auto differential  STAT      Final result     02/22/17 2040 02/22/17 2040  Comprehensive metabolic panel  STAT      Final result     02/22/17 2040 02/22/17 2040  Protime-INR  STAT      Final result     02/22/17 2040 02/22/17 2040  Troponin I  Now then every 3 hours     Comments:  PLEASE REVIEW ORDER START TIME BEFORE MARKING SPECIMEN COLLECTED.   Start Status   02/22/17 2040 Final result   02/22/17 2340 Scheduled       Acknowledged     02/22/17 2040 02/22/17 2040  B-Type natriuretic peptide (BNP)  STAT      Final result       ED Imaging Orders     Start Ordered       Status Ordering Provider    02/22/17 2040 02/22/17 2040  X-Ray Chest AP Portable  1 time imaging      Final result         Discharge Instructions         Uncertain Causes of Chest Pain    Chest pain can happen for a number of reasons. Sometimes the cause can't be determined. If your condition does not seem serious, and your pain does not appear to be coming from your heart, your healthcare provider may recommend watching it closely. Sometimes the signs of a serious problem take more time to appear. Many problems not related to your heart can cause chest pain.These include:  · Musculoskeletal. Costochondritis, an inflammation of " the tissues around the ribs that can occur from trauma or overuse injuries  · Respiratory. Pneumonia, pneumothorax, or pneumonitis (inflammation of the lining of the chest and lungs)  · Gastrointestinal. Esophageal reflux, heartburn, or gallbladder disease  · Anxiety and panic disorders  · Nerve compression and neuritis  · Miscellaneous problems such as aortic aneurysm or pulmonary embolism (a blood clot in the lungs)  Home care  After your visit, follow these recommendations:  · Rest today and avoid strenuous activity.  · Take any prescribed medicine as directed.  · Be aware of any recurrent chest pain and notice any changes  Follow-up care  Follow up with your healthcare provider if you do not start to feel better within 24 hours, or as advised.  Call 911  Call 911 if any of these occur:  · A change in the type of pain: if it feels different, becomes more severe, lasts longer, or begins to spread into your shoulder, arm, neck, jaw or back  · Shortness of breath or increased pain with breathing  · Weakness, dizziness, or fainting  · Rapid heart beat  · Crushing sensation in your chest  When to seek medical advice  Call your healthcare provider right away if any of the following occur:  · Cough with dark colored sputum (phlegm) or blood  · Fever of 100.4ºF (38ºC) or higher, or as directed by your healthcare provider  · Swelling, pain or redness in one leg  · Shortness of breath  Date Last Reviewed: 12/30/2015  © 6378-4442 [employer]. [address] [place] [address]. All rights reserved. This information is not intended as a substitute for professional medical care. Always follow your healthcare professional's instructions.          Smoking Cessation     If you would like to quit smoking:   You may be eligible for free services if you are a [place] resident and started smoking cigarettes before September 1, 1988.  Call the [employer]oking [employer]essation [employer] ([employer]) toll free at (974) 428-7150 or (461)  881-6410.   Call 1-800-QUIT-NOW if you do not meet the above criteria.             [facility] complies with applicable Federal civil rights laws and does not discriminate on the basis of race, color, national origin, age, disability, or sex.        Language Assistance Services     ATTENTION: Language assistance services are available, free of charge. Please call [phone].      ATENCIÓN: Si habla español, tiene a clifford disposición servicios gratuitos de asistencia lingüística. Llame al [phone].     CHÚ Ý: N?u b?n nói Ti?ng Vi?t, có các d?ch v? h? tr? ngôn ng? mi?n phí dành cho b?n. G?i s? [phone].

## 2017-02-23 NOTE — PROGRESS NOTES
Subjective:       Patient ID: Anthony Blair is a 83 y.o. male.    Chief Complaint: Chest Pain and Facial Pain    Chest Pain    This is a new problem. The current episode started today. The pain is present in the lateral region. The quality of the pain is described as tightness. The pain radiates to the left jaw. Associated symptoms include dizziness, malaise/fatigue and shortness of breath. Pertinent negatives include no abdominal pain, cough, diaphoresis, fever, headaches, irregular heartbeat, nausea, numbness, palpitations, vomiting or weakness.   His past medical history is significant for CAD.   Pertinent negatives for past medical history include no seizures.   Facial Pain   Associated symptoms include chest pain. Pertinent negatives include no abdominal pain, chills, coughing, diaphoresis, fatigue, fever, headaches, nausea, numbness, vomiting or weakness.     Review of Systems   Constitutional: Positive for malaise/fatigue. Negative for chills, diaphoresis, fatigue and fever.   Eyes: Negative for visual disturbance.   Respiratory: Positive for shortness of breath. Negative for cough, chest tightness and wheezing.    Cardiovascular: Positive for chest pain. Negative for palpitations and leg swelling.   Gastrointestinal: Negative for abdominal pain, nausea and vomiting.   Neurological: Positive for dizziness. Negative for tremors, seizures, syncope, facial asymmetry, speech difficulty, weakness, light-headedness, numbness and headaches.   Psychiatric/Behavioral: Positive for confusion. The patient is not nervous/anxious.        Objective:      Physical Exam   Constitutional: He appears well-developed and well-nourished.   Pulmonary/Chest:   Labored breathing    Neurological: No cranial nerve deficit or sensory deficit.   Slight confusion, slow thinking process   Skin: He is not diaphoretic.       Assessment:       1. Chest pain, unspecified type    2. SOB (shortness of breath)    3. Dizziness    4. Coronary  artery disease, angina presence unspecified, unspecified vessel or lesion type, unspecified whether native or transplanted heart        Plan:   Anthony was seen today for chest pain and facial pain.    Diagnoses and all orders for this visit:    Chest pain, unspecified type  -     aspirin tablet 325 mg; Take 1 tablet (325 mg total) by mouth one time.  -     EKG 12-lead; Future    SOB (shortness of breath)    Dizziness    Coronary artery disease, angina presence unspecified, unspecified vessel or lesion type, unspecified whether native or transplanted heart    Patient referred to ER immediately due to need for a higher level of care. Transported via [facility].

## 2017-02-23 NOTE — ED PROVIDER NOTES
SCRIBE #1 NOTE: IStephanie, am scribing for, and in the presence of, Lizzie Funez MD. I have scribed the entire note.      History      Chief Complaint   Patient presents with    Chest Pain       Review of patient's allergies indicates:   Allergen Reactions    Atorvastatin      Other reaction(s): Muscle pain  Other reaction(s): Muscle weakness  Other reaction(s): Muscle pain    Codeine      Other reaction(s): Headache  Other reaction(s): Headache    Norvasc [amlodipine] Edema    Trulicity [dulaglutide] Nausea And Vomiting    Adhesive Rash     Other reaction(s): rash        HPI   HPI    2/22/2017, 8:39 PM   History obtained from the patient      History of Present Illness: Anthony Blair is a 83 y.o. male patient with A-fib, HTN, AP, who presents to the Emergency Department for chest tightness which onset gradually around 11 am this morning while sitting in his recliner. Symptoms are intermittent and moderate in severity. Patient states that he did not feel normal after experiencing a brief episode of L sided facial numbness this morning. At 11 am, he had an episode of SOB that was followed by intermittent R-sided chest tightness. He presented to Urgent Care for the sxs and was told to come here. Patient reports no relief with Nitro Sprays given by EMS. No mitigating or exacerbating factors reported. Pt has mild SOB currently. Patient denies fever, long travel, chills, cough, extremity weakness, palpitations, N/V, diaphoresis, calf pain, dizziness, and all other sxs at this time. No further complaints or concerns at this time.     Arrival mode: EMS    PCP: Abdulaziz Mccabe MD       Past Medical History:  Past Medical History   Diagnosis Date    A-fib     Anemia     AP (angina pectoris) 1/23/2014    CAD (coronary artery disease)     Carotid artery disease without cerebral infarction 8/22/2014    Cataract     Diabetes mellitus type II      BS didn't check today 09/08/2016  A1C  7.4    GERD  (gastroesophageal reflux disease)     GERD (gastroesophageal reflux disease) 7/11/2013    Hyperlipidemia     Hypertension     Lumbosacral spondylosis 12/24/2014    Mixed hyperlipidemia 1/23/2014    Pacemaker 1/23/2014    Paroxysmal atrial fibrillation 1/23/2014    Peripheral vascular disease 8/22/2014    S/P CABG (coronary artery bypass graft) 1/23/2014    Tobacco dependence      resolved       Past Surgical History:  Past Surgical History   Procedure Laterality Date    Vein bypass surgery      Pace maker surgrey  10/2010     reposition in 2011/2012 (approx)    Eye surgery      Cataract extraction      Cataract extraction w/ intraocular lens implant Right     Cataract extraction w/ intraocular lens implant Left     Angioplasty      Coronary artery bypass graft  07/2010     x5         Family History:  Family History   Problem Relation Age of Onset    Arthritis Mother     Diabetes Mother     Kidney disease Mother     Heart disease Mother     Arthritis Father     Diabetes Father     Diabetes Sister     Cancer Sister      breast    Diabetes Brother     Kidney disease Brother     Heart disease Brother     Cancer Daughter      breast    Diabetes Daughter     Miscarriages / Stillbirths Daughter     Diabetes Son     Diabetes Son     Alcohol abuse Paternal Uncle     Stroke Neg Hx        Social History:  Social History     Social History Main Topics    Smoking status: Former Smoker     Packs/day: 2.00     Years: 13.00     Types: Cigarettes     Start date: 11/25/1954     Quit date: 6/7/1967    Smokeless tobacco: Never Used    Alcohol use No    Drug use: No    Sexual activity: No       ROS   Review of Systems   Constitutional: Negative for chills, diaphoresis and fever.        (-) long travel   HENT: Negative for sore throat.    Respiratory: Positive for chest tightness and shortness of breath. Negative for cough.    Cardiovascular: Negative for chest pain and palpitations.        (-)  "leg pain   Gastrointestinal: Negative for nausea and vomiting.   Genitourinary: Negative for dysuria.   Musculoskeletal: Negative for back pain.   Skin: Negative for rash.   Neurological: Positive for numbness (L-sided facial). Negative for dizziness and weakness.   Hematological: Does not bruise/bleed easily.   All other systems reviewed and are negative.      Physical Exam    Initial Vitals   BP Pulse Resp Temp SpO2   02/22/17 2020 02/22/17 2020 02/22/17 2020 02/22/17 2020 02/22/17 2020   142/68 65 15 98.3 °F (36.8 °C) 98 %      Physical Exam  Nursing Notes and Vital Signs Reviewed.  Constitutional: Patient is in no acute distress. Awake and alert. Well-developed and well-nourished.  Head: Atraumatic. Normocephalic.  Eyes: PERRL. EOM intact. Conjunctivae are not pale. No scleral icterus.  ENT: Mucous membranes are moist. Oropharynx is clear and symmetric.    Neck: Supple. Full ROM. No lymphadenopathy.  Cardiovascular: Regular rate. Regular rhythm. No murmurs, rubs, or gallops. Distal pulses are 2+ and symmetric.  Pulmonary/Chest: No respiratory distress. Clear to auscultation bilaterally. No wheezing, rales, or rhonchi.  Abdominal: Soft and non-distended.  There is no tenderness.  No rebound, guarding, or rigidity.    Musculoskeletal: Moves all extremities. No obvious deformities. Trace BLE edema. No calf tenderness.  Skin: Warm and dry.  Neurological:  Alert, awake, and appropriate.  Normal speech.  No acute focal neurological deficits are appreciated.  Psychiatric: Normal affect. Good eye contact. Appropriate in content.    ED Course    Procedures  ED Vital Signs:  Vitals:    02/22/17 2020 02/22/17 2102 02/22/17 2132   BP: (!) 142/68 135/68 128/64   Pulse: 65 79 63   Resp: 15 (!) 21 14   Temp: 98.3 °F (36.8 °C)     TempSrc: Oral     SpO2: 98% 97% 97%   Weight: 84.4 kg (186 lb)     Height: 5' 10" (1.778 m)         Abnormal Lab Results:  Labs Reviewed   CBC W/ AUTO DIFFERENTIAL - Abnormal; Notable for the " following:        Result Value    RBC 4.04 (*)     Hemoglobin 12.2 (*)     Hematocrit 35.9 (*)     MPV 8.9 (*)     All other components within normal limits    Narrative:     PLEASE REVIEW ORDER START TIME BEFORE MARKING SPECIMEN  COLLECTED.   COMPREHENSIVE METABOLIC PANEL - Abnormal; Notable for the following:     Glucose 185 (*)     All other components within normal limits    Narrative:     PLEASE REVIEW ORDER START TIME BEFORE MARKING SPECIMEN  COLLECTED.   B-TYPE NATRIURETIC PEPTIDE - Abnormal; Notable for the following:      (*)     All other components within normal limits    Narrative:     PLEASE REVIEW ORDER START TIME BEFORE MARKING SPECIMEN  COLLECTED.   PROTIME-INR    Narrative:     PLEASE REVIEW ORDER START TIME BEFORE MARKING SPECIMEN  COLLECTED.   TROPONIN I    Narrative:     PLEASE REVIEW ORDER START TIME BEFORE MARKING SPECIMEN  COLLECTED.        All Lab Results:  Results for orders placed or performed during the hospital encounter of 02/22/17   CBC auto differential   Result Value Ref Range    WBC 5.34 3.90 - 12.70 K/uL    RBC 4.04 (L) 4.60 - 6.20 M/uL    Hemoglobin 12.2 (L) 14.0 - 18.0 g/dL    Hematocrit 35.9 (L) 40.0 - 54.0 %    MCV 89 82 - 98 fL    MCH 30.2 27.0 - 31.0 pg    MCHC 34.0 32.0 - 36.0 %    RDW 14.0 11.5 - 14.5 %    Platelets 182 150 - 350 K/uL    MPV 8.9 (L) 9.2 - 12.9 fL    Gran # 2.8 1.8 - 7.7 K/uL    Lymph # 1.9 1.0 - 4.8 K/uL    Mono # 0.4 0.3 - 1.0 K/uL    Eos # 0.2 0.0 - 0.5 K/uL    Baso # 0.03 0.00 - 0.20 K/uL    Gran% 52.2 38.0 - 73.0 %    Lymph% 35.4 18.0 - 48.0 %    Mono% 7.7 4.0 - 15.0 %    Eosinophil% 4.1 0.0 - 8.0 %    Basophil% 0.6 0.0 - 1.9 %    Differential Method Automated    Comprehensive metabolic panel   Result Value Ref Range    Sodium 137 136 - 145 mmol/L    Potassium 4.0 3.5 - 5.1 mmol/L    Chloride 101 95 - 110 mmol/L    CO2 28 23 - 29 mmol/L    Glucose 185 (H) 70 - 110 mg/dL    BUN, Bld 15 8 - 23 mg/dL    Creatinine 0.8 0.5 - 1.4 mg/dL    Calcium 10.3  8.7 - 10.5 mg/dL    Total Protein 7.0 6.0 - 8.4 g/dL    Albumin 3.6 3.5 - 5.2 g/dL    Total Bilirubin 0.3 0.1 - 1.0 mg/dL    Alkaline Phosphatase 123 55 - 135 U/L    AST 29 10 - 40 U/L    ALT 23 10 - 44 U/L    Anion Gap 8 8 - 16 mmol/L    eGFR if African American >60 >60 mL/min/1.73 m^2    eGFR if non African American >60 >60 mL/min/1.73 m^2   Protime-INR   Result Value Ref Range    Prothrombin Time 11.4 9.0 - 12.5 sec    INR 1.1 0.8 - 1.2   Troponin I   Result Value Ref Range    Troponin I <0.006 0.000 - 0.026 ng/mL   B-Type natriuretic peptide (BNP)   Result Value Ref Range     (H) 0 - 99 pg/mL       Imaging Results:  Imaging Results         X-Ray Chest AP Portable (Final result) Result time:  02/22/17 21:13:10    Final result by Deacon Sanchez Jr., MD (02/22/17 21:13:10)    Impression:          No acute findings.  No significant change.      Electronically signed by: DEACON SANCHEZ MD  Date:     02/22/17  Time:    21:13     Narrative:    EXAM:   XR CHEST AP PORTABLE    CLINICAL HISTORY:  Chest Pain , unspecified     COMPARISON:  12/27/2015    FINDINGS:   Heart size and pulmonary vascularity appear normal. Lungs are well aerated and appear clear. No suspicious mass, infiltrate or effusion.  Dual-chamber cardiac pacemaker.  CABG changes.             The EKG was ordered, reviewed, and independently interpreted by the ED provider.  Interpretation time: 20:38  Rate: 94 BPM  Rhythm: atrial-sensed ventricular-paced rhythm  Interpretation: No acute ST changes. No STEMI.    The Emergency Provider reviewed the vital signs and test results, which are outlined above.    ED Discussion     10:03 PM: Reassessed pt at this time.  Pt states his condition has improved at this time. Discussed with pt all pertinent ED information and results. Discussed pt dx and plan of tx. Gave pt all f/u and return to the ED instructions. All questions and concerns were addressed at this time. Pt expresses understanding of information  and instructions, and is comfortable with plan to discharge. Pt is stable for discharge.    I discussed with patient and/or family/caretaker that evaluation in the ED does not suggest any emergent or life threatening medical conditions requiring immediate intervention beyond what was provided in the ED, and I believe patient is safe for discharge.  Regardless, an unremarkable evaluation in the ED does not preclude the development or presence of a serious of life threatening condition. As such, patient was instructed to return immediately for any worsening or change in current symptoms.      ED Medication(s):  Medications - No data to display    Discharge Medication List as of 2/22/2017 10:02 PM          Follow-up Information     Follow up with Abdulaziz Mccabe MD In 2 days.    Specialty:  Family Medicine    Contact information:    [address] [address] [address] [phone]          Follow up with [facility] - .    Specialty:  Emergency Medicine    Why:  As needed, If symptoms worsen    Contact information:    [address]  [facility] [address] [phone]           Medical Decision Making    Medical Decision Making:   Clinical Tests:   Lab Tests: Ordered and Reviewed  Radiological Study: Ordered and Reviewed  Medical Tests: Ordered and Reviewed           Scribe Attestation:   Scribe #1: I performed the above scribed service and the documentation accurately describes the services I performed. I attest to the accuracy of the note.    Attending:   Physician Attestation Statement for Scribe #1: I, Lizzie Funez MD, personally performed the services described in this documentation, as scribed by Stephanie Bond, in my presence, and it is both accurate and complete.          Clinical Impression       ICD-10-CM ICD-9-CM   1. Chest pain, unspecified type R07.9 786.50       Disposition:   Disposition: Discharged  Condition: Stable         Lizzie Funez MD  02/23/17 0454

## 2017-02-23 NOTE — ED TRIAGE NOTES
Pt presents with chest tightness and sob that began today. Pt states pta he was given nitro without relief

## 2017-02-23 NOTE — DISCHARGE INSTRUCTIONS

## 2017-02-24 ENCOUNTER — PATIENT MESSAGE (OUTPATIENT)
Dept: CARDIOLOGY | Facility: CLINIC | Age: 82
End: 2017-02-24

## 2017-03-02 RX ORDER — CLOPIDOGREL BISULFATE 75 MG/1
TABLET ORAL
Qty: 30 TABLET | Refills: 11 | Status: SHIPPED | OUTPATIENT
Start: 2017-03-02 | End: 2017-03-15 | Stop reason: ALTCHOICE

## 2017-03-03 ENCOUNTER — TELEPHONE (OUTPATIENT)
Dept: CARDIOLOGY | Facility: CLINIC | Age: 82
End: 2017-03-03

## 2017-03-03 ENCOUNTER — OFFICE VISIT (OUTPATIENT)
Dept: CARDIOLOGY | Facility: CLINIC | Age: 82
End: 2017-03-03
Payer: MEDICARE

## 2017-03-03 VITALS
DIASTOLIC BLOOD PRESSURE: 64 MMHG | HEIGHT: 70 IN | BODY MASS INDEX: 26.61 KG/M2 | SYSTOLIC BLOOD PRESSURE: 144 MMHG | HEART RATE: 66 BPM | WEIGHT: 185.88 LBS

## 2017-03-03 DIAGNOSIS — R06.02 SOB (SHORTNESS OF BREATH): ICD-10-CM

## 2017-03-03 DIAGNOSIS — Z95.0 PACEMAKER: Chronic | ICD-10-CM

## 2017-03-03 DIAGNOSIS — I25.810 CORONARY ARTERY DISEASE INVOLVING CORONARY BYPASS GRAFT OF NATIVE HEART WITHOUT ANGINA PECTORIS: Primary | ICD-10-CM

## 2017-03-03 DIAGNOSIS — E78.2 MIXED HYPERLIPIDEMIA: Chronic | ICD-10-CM

## 2017-03-03 DIAGNOSIS — I48.0 PAROXYSMAL ATRIAL FIBRILLATION: Chronic | ICD-10-CM

## 2017-03-03 DIAGNOSIS — I77.9 CAROTID ARTERY DISEASE WITHOUT CEREBRAL INFARCTION: Chronic | ICD-10-CM

## 2017-03-03 DIAGNOSIS — R94.31 ABNORMAL ECG: ICD-10-CM

## 2017-03-03 DIAGNOSIS — I73.9 PERIPHERAL VASCULAR DISEASE: Chronic | ICD-10-CM

## 2017-03-03 DIAGNOSIS — I73.9 CLAUDICATION IN PERIPHERAL VASCULAR DISEASE: Chronic | ICD-10-CM

## 2017-03-03 DIAGNOSIS — E11.9 DIABETES MELLITUS TYPE 2 WITHOUT RETINOPATHY: Chronic | ICD-10-CM

## 2017-03-03 DIAGNOSIS — Z79.4 LONG-TERM INSULIN USE: Chronic | ICD-10-CM

## 2017-03-03 PROCEDURE — 3077F SYST BP >= 140 MM HG: CPT | Mod: S$GLB,,, | Performed by: PHYSICIAN ASSISTANT

## 2017-03-03 PROCEDURE — 99214 OFFICE O/P EST MOD 30 MIN: CPT | Mod: S$GLB,,, | Performed by: PHYSICIAN ASSISTANT

## 2017-03-03 PROCEDURE — 99999 PR PBB SHADOW E&M-EST. PATIENT-LVL IV: CPT | Mod: PBBFAC,,, | Performed by: PHYSICIAN ASSISTANT

## 2017-03-03 PROCEDURE — 1159F MED LIST DOCD IN RCRD: CPT | Mod: S$GLB,,, | Performed by: PHYSICIAN ASSISTANT

## 2017-03-03 PROCEDURE — 3078F DIAST BP <80 MM HG: CPT | Mod: S$GLB,,, | Performed by: PHYSICIAN ASSISTANT

## 2017-03-03 PROCEDURE — 1157F ADVNC CARE PLAN IN RCRD: CPT | Mod: S$GLB,,, | Performed by: PHYSICIAN ASSISTANT

## 2017-03-03 PROCEDURE — 99499 UNLISTED E&M SERVICE: CPT | Mod: S$GLB,,, | Performed by: PHYSICIAN ASSISTANT

## 2017-03-03 PROCEDURE — 1160F RVW MEDS BY RX/DR IN RCRD: CPT | Mod: S$GLB,,, | Performed by: PHYSICIAN ASSISTANT

## 2017-03-03 PROCEDURE — 1126F AMNT PAIN NOTED NONE PRSNT: CPT | Mod: S$GLB,,, | Performed by: PHYSICIAN ASSISTANT

## 2017-03-03 RX ORDER — ISOSORBIDE MONONITRATE 30 MG/1
30 TABLET, EXTENDED RELEASE ORAL 2 TIMES DAILY
COMMUNITY
End: 2018-03-15

## 2017-03-03 NOTE — MR AVS SNAPSHOT
"    [facility]  [address]  [place] [address]  Phone: [phone]  Fax: [phone]                  Anthony Blair   3/3/2017 9:00 AM   Office Visit    Description:  Male : 1933   Provider:  DANGELO Olmos   Department:  [facility]           Diagnoses this Visit        Comments    Coronary artery disease involving coronary bypass graft of native heart without angina pectoris    -  Primary     Carotid artery disease without cerebral infarction         Claudication in peripheral vascular disease         Paroxysmal atrial fibrillation         Peripheral vascular disease         Diabetes mellitus type 2 without retinopathy         Mixed hyperlipidemia         Long-term insulin use         Pacemaker         Abnormal ECG         SOB (shortness of breath)                To Do List           Goals (5 Years of Data)     None      [facility]     [facility] -  Assistance  Registered nurses in the [facility] provide clinical advisement, health education, appointment booking, and other advisory services.  Call for this free service at [phone].             Medications           Message regarding Medications     Verify the changes and/or additions to your medication regime listed below are the same as discussed with your clinician today.  If any of these changes or additions are incorrect, please notify your healthcare provider.             Verify that the below list of medications is an accurate representation of the medications you are currently taking.  If none reported, the list may be blank. If incorrect, please contact your healthcare provider. Carry this list with you in case of emergency.           Current Medications     amlodipine (NORVASC) 5 MG tablet Take 1 tablet (5 mg total) by mouth once daily.    BD INSULIN PEN NEEDLE UF SHORT 31 gauge x 5/16" Ndle USE FOUR TIMES DAILY.    blood sugar diagnostic (ACCU-CHEK " TOMAS PLUS TEST STRP) Strp 1 strip by Other route 6 (six) times daily.    CARBOXYMETHYLCELLULOSE SODIUM (REFRESH OPHT) Apply to eye.    clopidogrel (PLAVIX) 75 mg tablet TAKE ONE TABLET BY MOUTH EVERY DAY    diclofenac sodium (VOLTAREN) 1 % Gel Apply 2 g topically once daily.    fluticasone (FLONASE) 50 mcg/actuation nasal spray 2 sprays by Each Nare route once daily.    furosemide (LASIX) 20 MG tablet TAKE ONE TABLET BY MOUTH DAILY    insulin aspart (NOVOLOG) 100 unit/mL InPn pen Inject 5 units 3 times a day before meals    insulin glargine (LANTUS SOLOSTAR) 100 unit/mL (3 mL) InPn pen INJECT 16 UNITS SUB-Q AT BEDTIME    ipratropium (ATROVENT) 0.06 % nasal spray 2 sprays by Nasal route 4 (four) times daily.    isosorbide mononitrate (IMDUR) 30 MG 24 hr tablet ONE BY MOUTH DAILY    lactobacillus acidophilus (PROBIOTIC) 10 billion cell Cap Take by mouth. 1 Capsule Oral Every day    lancets (ACCU-CHEK MULTICLIX LANCET) Misc TEST 3 TIMES A DAY    lisinopril-hydrochlorothiazide (PRINZIDE,ZESTORETIC) 20-12.5 mg per tablet Take 1 tablet by mouth once daily.    meloxicam (MOBIC) 15 MG tablet Take 1 tablet by mouth once daily.    metformin (GLUCOPHAGE) 500 MG tablet TAKE ONE BY MOUTH TWICE A DAY WITH FOOD.    metoprolol succinate (TOPROL-XL) 100 MG 24 hr tablet TAKE ONE TABLET BY MOUTH TWICE DAILY    nitroGLYCERIN (NITROSTAT) 0.4 MG SL tablet Place 1 tablet (0.4 mg total) under the tongue every 5 (five) minutes as needed for Chest pain.    potassium chloride SA (K-DUR,KLOR-CON) 20 MEQ tablet TAKE ONE TABLET BY MOUTH EVERY DAY    ranitidine (ZANTAC) 75 MG tablet Take 75 mg by mouth nightly.    saw palmetto fruit 450 mg Cap Take 1 capsule by mouth Twice daily.    tramadol (ULTRAM) 50 mg tablet Take 1 tablet (50 mg total) by mouth every 6 (six) hours as needed for Pain.           Clinical Reference Information           Your Vitals Were     BP Pulse Height Weight BMI    144/64 (BP Location: Right arm, Patient Position:  "Sitting, BP Method: Manual) 66 5' 10" (1.778 m) 84.3 kg (185 lb 13.6 oz) 26.67 kg/m2      Blood Pressure          Most Recent Value    BP  (!)  144/64      Allergies as of 3/3/2017     Atorvastatin    Codeine    Norvasc [Amlodipine]    Trulicity [Dulaglutide]    Adhesive      Immunizations Administered on Date of Encounter - 3/3/2017     None      Orders Placed During Today's Visit     Future Labs/Procedures Expected by Expires    NM Myocardial Perfusion Spect Multi Pharmacologic  3/3/2017 3/3/2018    NM Multi Pharm Stress Cardiac Component  As directed 3/3/2018      Language Assistance Services     ATTENTION: Language assistance services are available, free of charge. Please call [phone].      ATENCIÓN: Si leela maryann, tiene a clifford disposición servicios gratuitos de asistencia lingüística. Llame al [phone].     CHÚ Ý: N?u b?n nói Ti?ng Vi?t, có các d?ch v? h? tr? ngôn ng? mi?n phí dành cho b?n. G?i s? [phone].         [facility] complies with applicable Federal civil rights laws and does not discriminate on the basis of race, color, national origin, age, disability, or sex.        "

## 2017-03-03 NOTE — TELEPHONE ENCOUNTER
Spoke with pt and samy  notified both pt to take Imdur 30 mg BID. Pt and samy repeated back and verbalized understanding.

## 2017-03-03 NOTE — PROGRESS NOTES
Subjective:    Patient ID:  Anthony Blair is a 83 y.o. male who presents for follow-up of ED follow-up    HPI  Mr. Blair is an 83 year old male patient with a PMHx of PAF/PSVTs/p ablation of atrial tachycardia, CAD (PTCA 1980s, CABG 7/10), HTN, PPM, DM, atrial septal aneurysm, PAD, and dyslipidemia who presents today for ED follow-up. Patient with recent ED visit for chest pain. Troponin and EKG were negative and he was subsequently discharged. He returns today and states he is doing ok. He reports symptoms have somewhat improved since ED visit. He still complains of intermittent epigastric tightness associated with intermittent SOB. Other associated symptoms include tingling/stinging sensation of his face. Denies any associated nausea, vomiting, diaphoresis. States this can occur at anytime, not precipitated by exertion. Reports he received nitroglycerin en route to ED without any improvement in symptoms. Other than the above issue, patient seems to be stable. Denies any palpitations, lightheadedness, dizziness, near syncope, or syncope. BP ok. Patient reports compliance with his medications. EKG from ED reviewed, stable. Troponin stable.     Review of Systems   Constitution: Negative for chills, decreased appetite, fever, weakness and malaise/fatigue.   HENT: Negative for congestion, headaches, hoarse voice and sore throat.    Eyes: Negative for blurred vision and discharge.   Cardiovascular: Positive for dyspnea on exertion. Negative for chest pain, claudication, cyanosis, irregular heartbeat, leg swelling, near-syncope, orthopnea, palpitations and paroxysmal nocturnal dyspnea.   Respiratory: Positive for shortness of breath. Negative for cough, hemoptysis, snoring, sputum production and wheezing.    Endocrine: Negative for cold intolerance and heat intolerance.   Hematologic/Lymphatic: Negative for bleeding problem. Does not bruise/bleed easily.   Skin: Negative for rash.   Musculoskeletal: Negative for  "arthritis, back pain, joint pain, joint swelling, muscle cramps, muscle weakness and myalgias.   Gastrointestinal: Negative for abdominal pain, constipation, diarrhea, heartburn, melena and nausea.        Epigastric tightness     Genitourinary: Negative for hematuria.   Neurological: Positive for dizziness. Negative for focal weakness, light-headedness, loss of balance, numbness, paresthesias and seizures.        Tingling     Psychiatric/Behavioral: Negative for memory loss. The patient does not have insomnia.    Allergic/Immunologic: Negative for hives.       BP (!) 144/64 (BP Location: Right arm, Patient Position: Sitting, BP Method: Manual)  Pulse 66 Comment: radial  Ht 5' 10" (1.778 m)  Wt 84.3 kg (185 lb 13.6 oz)  BMI 26.67 kg/m2    Objective:    Physical Exam   Constitutional: He is oriented to person, place, and time. He appears well-developed and well-nourished. No distress.   HENT:   Head: Normocephalic and atraumatic.   Eyes: Pupils are equal, round, and reactive to light. Right eye exhibits no discharge. Left eye exhibits no discharge.   Neck: Neck supple. No JVD present. No tracheal deviation present. No thyromegaly present.   Cardiovascular: Normal rate, regular rhythm, S1 normal, S2 normal and normal heart sounds.  PMI is not displaced.  Exam reveals no gallop, no S3, no S4 and no friction rub.    No murmur heard.  Pulses:       Radial pulses are 2+ on the right side, and 2+ on the left side.   Pulmonary/Chest: Effort normal and breath sounds normal. No respiratory distress. He has no wheezes. He has no rales.   Abdominal: Soft. He exhibits no distension. There is no tenderness. There is no rebound.   Musculoskeletal: He exhibits no edema.   Neurological: He is alert and oriented to person, place, and time.   Skin: Skin is warm and dry. He is not diaphoretic. No erythema.   Psychiatric: He has a normal mood and affect. His behavior is normal. Thought content normal.   Nursing note and vitals " reviewed.      Impression: NORMAL MYOCARDIAL PERFUSION  1. The perfusion scan is free of evidence for myocardial ischemia or injury.   2. Resting wall motion is physiologic.   3. Resting LV function is normal.   4. The ventricular volumes are normal at rest and stress.   5. The extracardiac distribution of radioactivity is normal.     Assessment:       1. Coronary artery disease involving coronary bypass graft of native heart without angina pectoris    2. Carotid artery disease without cerebral infarction    3. Claudication in peripheral vascular disease    4. Paroxysmal atrial fibrillation    5. Peripheral vascular disease    6. Diabetes mellitus type 2 without retinopathy    7. Mixed hyperlipidemia    8. Long-term insulin use    9. Pacemaker    10. Abnormal ECG    11. SOB (shortness of breath)        Patient with history of CAD with recent episode of epigastric/chest discomfort and SOB, non-exertional, atypical for ischemic pain however given his history and risk factors, will repeat MPI stress test and 2D echo for further evaluation. Will also repeat carotid U/S given intermittent tingling sensation, unclear etiology ?neuropathy. Increase Imdur to 30 mg BID. Continue other meds. ED if severe symptoms.   Plan:     -2D echo, carotid U/S, stress test  -Increase Imdur to 30 mg BID  -Continue other meds  -ED if severe symptoms  -Follow-up with Dr. Frazier afterward    Chart reviewed. Dr. Ramos agrees with plan as outlined above.

## 2017-03-03 NOTE — TELEPHONE ENCOUNTER
----- Message from Olesya Bond sent at 3/3/2017 10:10 AM CST -----  Pt was calling Erendira camcaho. Please call pt back at [phone].

## 2017-03-03 NOTE — TELEPHONE ENCOUNTER
----- Message from DANGELO Olmos sent at 3/3/2017  9:36 AM CST -----  Please update med card and make sure patient knows to take Imdur to 30 mg BID    Thanks

## 2017-03-14 ENCOUNTER — CLINICAL SUPPORT (OUTPATIENT)
Dept: CARDIOLOGY | Facility: CLINIC | Age: 82
End: 2017-03-14
Payer: MEDICARE

## 2017-03-14 ENCOUNTER — HOSPITAL ENCOUNTER (OUTPATIENT)
Dept: RADIOLOGY | Facility: HOSPITAL | Age: 82
Discharge: HOME | End: 2017-03-14
Attending: INTERNAL MEDICINE
Payer: MEDICARE

## 2017-03-14 ENCOUNTER — TELEPHONE (OUTPATIENT)
Dept: CARDIOLOGY | Facility: CLINIC | Age: 82
End: 2017-03-14

## 2017-03-14 ENCOUNTER — HOSPITAL ENCOUNTER (OUTPATIENT)
Dept: RADIOLOGY | Facility: HOSPITAL | Age: 82
Discharge: HOME | End: 2017-03-14
Attending: NURSE PRACTITIONER
Payer: MEDICARE

## 2017-03-14 ENCOUNTER — HOSPITAL ENCOUNTER (OUTPATIENT)
Dept: PULMONOLOGY | Facility: HOSPITAL | Age: 82
Discharge: HOME | End: 2017-03-14
Attending: INTERNAL MEDICINE
Payer: MEDICARE

## 2017-03-14 DIAGNOSIS — I82.890 JUGULAR VEIN THROMBOSIS: ICD-10-CM

## 2017-03-14 DIAGNOSIS — I77.9 CAROTID ARTERY DISEASE WITHOUT CEREBRAL INFARCTION: Chronic | ICD-10-CM

## 2017-03-14 DIAGNOSIS — R60.0 EDEMA OF BOTH LEGS: ICD-10-CM

## 2017-03-14 DIAGNOSIS — R93.89 ABNORMAL CAROTID ULTRASOUND: ICD-10-CM

## 2017-03-14 DIAGNOSIS — E78.2 MIXED HYPERLIPIDEMIA: Chronic | ICD-10-CM

## 2017-03-14 DIAGNOSIS — E11.9 DIABETES MELLITUS TYPE 2 WITHOUT RETINOPATHY: Chronic | ICD-10-CM

## 2017-03-14 DIAGNOSIS — R06.02 SOB (SHORTNESS OF BREATH): ICD-10-CM

## 2017-03-14 DIAGNOSIS — I25.810 CORONARY ARTERY DISEASE INVOLVING CORONARY BYPASS GRAFT OF NATIVE HEART WITHOUT ANGINA PECTORIS: ICD-10-CM

## 2017-03-14 DIAGNOSIS — I36.1 NON-RHEUMATIC TRICUSPID VALVE INSUFFICIENCY: ICD-10-CM

## 2017-03-14 DIAGNOSIS — I10 ESSENTIAL HYPERTENSION: ICD-10-CM

## 2017-03-14 DIAGNOSIS — I48.0 PAROXYSMAL ATRIAL FIBRILLATION: Chronic | ICD-10-CM

## 2017-03-14 DIAGNOSIS — I73.9 CLAUDICATION IN PERIPHERAL VASCULAR DISEASE: Chronic | ICD-10-CM

## 2017-03-14 DIAGNOSIS — I65.23 OCCLUSION AND STENOSIS OF BILATERAL CAROTID ARTERIES: ICD-10-CM

## 2017-03-14 DIAGNOSIS — R94.31 ABNORMAL ECG: ICD-10-CM

## 2017-03-14 LAB
AORTIC VALVE REGURGITATION: NORMAL
DIASTOLIC DYSFUNCTION: NO
DIASTOLIC DYSFUNCTION: NO
ESTIMATED PA SYSTOLIC PRESSURE: 35.95
INTERNAL CAROTID STENOSIS: ABNORMAL
MITRAL VALVE REGURGITATION: NORMAL
RETIRED EF AND QEF - SEE NOTES: 55 (ref 55–65)
TRICUSPID VALVE REGURGITATION: NORMAL

## 2017-03-14 PROCEDURE — 93018 CV STRESS TEST I&R ONLY: CPT | Mod: ,,, | Performed by: NUCLEAR MEDICINE

## 2017-03-14 PROCEDURE — 93306 TTE W/DOPPLER COMPLETE: CPT | Mod: S$GLB,,, | Performed by: INTERNAL MEDICINE

## 2017-03-14 PROCEDURE — 78452 HT MUSCLE IMAGE SPECT MULT: CPT | Mod: 26,,, | Performed by: NUCLEAR MEDICINE

## 2017-03-14 PROCEDURE — 93880 EXTRACRANIAL BILAT STUDY: CPT | Mod: S$GLB,,, | Performed by: INTERNAL MEDICINE

## 2017-03-14 PROCEDURE — 93016 CV STRESS TEST SUPVJ ONLY: CPT | Mod: ,,, | Performed by: NUCLEAR MEDICINE

## 2017-03-14 RX ORDER — REGADENOSON 0.08 MG/ML
0.4 INJECTION, SOLUTION INTRAVENOUS ONCE
Status: COMPLETED | OUTPATIENT
Start: 2017-03-14 | End: 2017-03-14

## 2017-03-14 RX ADMIN — REGADENOSON 0.4 MG: 0.08 INJECTION, SOLUTION INTRAVENOUS at 04:03

## 2017-03-15 DIAGNOSIS — I48.0 PAF (PAROXYSMAL ATRIAL FIBRILLATION): Primary | ICD-10-CM

## 2017-03-15 NOTE — TELEPHONE ENCOUNTER
----- Message from DANGELO Olmos sent at 3/15/2017  9:37 AM CDT -----  Please phone patient. Stress test negative. Please make sure he has f/u with Dr. Frazier in 2 months    Thanks

## 2017-03-15 NOTE — TELEPHONE ENCOUNTER
Spoke with pt daughter Liat notified her the information pt is already scheduled to see Dr. Frazier next in April.

## 2017-03-31 DIAGNOSIS — I25.10 CAD (CORONARY ARTERY DISEASE): ICD-10-CM

## 2017-03-31 RX ORDER — AMLODIPINE BESYLATE 10 MG/1
TABLET ORAL
Qty: 30 TABLET | Refills: 5 | Status: SHIPPED | OUTPATIENT
Start: 2017-03-31 | End: 2017-06-23

## 2017-03-31 RX ORDER — NITROGLYCERIN 0.4 MG/1
TABLET SUBLINGUAL
Qty: 25 TABLET | Refills: 12 | Status: SHIPPED | OUTPATIENT
Start: 2017-03-31 | End: 2018-12-31 | Stop reason: SDUPTHER

## 2017-04-11 ENCOUNTER — OFFICE VISIT (OUTPATIENT)
Dept: FAMILY MEDICINE | Facility: CLINIC | Age: 82
End: 2017-04-11
Payer: MEDICARE

## 2017-04-11 VITALS
DIASTOLIC BLOOD PRESSURE: 72 MMHG | SYSTOLIC BLOOD PRESSURE: 163 MMHG | HEART RATE: 65 BPM | TEMPERATURE: 98 F | HEIGHT: 70 IN | BODY MASS INDEX: 25.15 KG/M2 | RESPIRATION RATE: 18 BRPM | OXYGEN SATURATION: 96 % | WEIGHT: 175.69 LBS

## 2017-04-11 DIAGNOSIS — M50.30 DEGENERATIVE DISC DISEASE, CERVICAL: ICD-10-CM

## 2017-04-11 DIAGNOSIS — I10 ESSENTIAL HYPERTENSION: Primary | ICD-10-CM

## 2017-04-11 DIAGNOSIS — E78.2 MIXED HYPERLIPIDEMIA: Chronic | ICD-10-CM

## 2017-04-11 DIAGNOSIS — E13.51 PERIPHERAL VASCULAR DISEASE DUE TO SECONDARY DIABETES MELLITUS: ICD-10-CM

## 2017-04-11 DIAGNOSIS — I77.9 CAROTID ARTERY DISEASE WITHOUT CEREBRAL INFARCTION: Chronic | ICD-10-CM

## 2017-04-11 DIAGNOSIS — I48.0 PAROXYSMAL ATRIAL FIBRILLATION: Chronic | ICD-10-CM

## 2017-04-11 DIAGNOSIS — I73.9 CLAUDICATION IN PERIPHERAL VASCULAR DISEASE: Chronic | ICD-10-CM

## 2017-04-11 DIAGNOSIS — M17.0 OSTEOARTHRITIS OF BOTH KNEES, UNSPECIFIED OSTEOARTHRITIS TYPE: ICD-10-CM

## 2017-04-11 PROBLEM — R93.89 ABNORMAL CAROTID ULTRASOUND: Status: RESOLVED | Noted: 2017-03-14 | Resolved: 2017-04-11

## 2017-04-11 PROCEDURE — 99999 PR PBB SHADOW E&M-EST. PATIENT-LVL V: CPT | Mod: PBBFAC,,, | Performed by: NURSE PRACTITIONER

## 2017-04-11 PROCEDURE — 96160 PT-FOCUSED HLTH RISK ASSMT: CPT | Mod: S$GLB,,, | Performed by: NURSE PRACTITIONER

## 2017-04-11 RX ORDER — AMOXICILLIN 500 MG
2 CAPSULE ORAL DAILY
COMMUNITY
End: 2017-10-05 | Stop reason: ALTCHOICE

## 2017-04-11 NOTE — MR AVS SNAPSHOT
[facility]  [address]  [facility] [address]  Phone: [phone]  Fax: [phone]                  Anthony Blair   2017 10:00 AM   Office Visit    Description:  Male : 1933   Provider:  AGNES Fillmore   Department:  [facility] Medicine           Reason for Visit     Annual Exam           Diagnoses this Visit        Comments    Essential hypertension    -  Primary     Osteoarthritis of both knees, unspecified osteoarthritis type         Degenerative disc disease, cervical         Carotid artery disease without cerebral infarction         Claudication in peripheral vascular disease         Mixed hyperlipidemia         Peripheral vascular disease due to secondary diabetes mellitus                To Do List           Future Appointments        Provider Department Dept Phone    2017 2:40 PM Seven Frazier MD [facility] [phone]      Goals (5 Years of Data)     None      [facility]     [facility] On Call Nurse Care Line -  Assistance  Unless otherwise directed by your provider, please contact [facility], our nurse care line that is available for  assistance.     Registered nurses in the [facility] On Call Center provide: appointment scheduling, clinical advisement, health education, and other advisory services.  Call: [phone] (toll free)               Medications           Message regarding Medications     Verify the changes and/or additions to your medication regime listed below are the same as discussed with your clinician today.  If any of these changes or additions are incorrect, please notify your healthcare provider.             Verify that the below list of medications is an accurate representation of the medications you are currently taking.  If none reported, the list may be blank. If incorrect, please contact your healthcare provider. Carry this list with you in case of emergency.           Current  "Medications     amlodipine (NORVASC) 10 MG tablet ONE BY MOUTH EVERY DAY    apixaban 5 mg Tab Take 1 tablet (5 mg total) by mouth 2 (two) times daily.    BD INSULIN PEN NEEDLE UF SHORT 31 gauge x 5/16" Ndle USE FOUR TIMES DAILY.    blood sugar diagnostic (ACCU-CHEK TOMAS PLUS TEST STRP) Strp 1 strip by Other route 6 (six) times daily.    CARBOXYMETHYLCELLULOSE SODIUM (REFRESH OPHT) Apply to eye.    diclofenac sodium (VOLTAREN) 1 % Gel Apply 2 g topically once daily.    fish oil-omega-3 fatty acids 300-1,000 mg capsule Take 2 g by mouth once daily.    fluticasone (FLONASE) 50 mcg/actuation nasal spray 2 sprays by Each Nare route once daily.    furosemide (LASIX) 20 MG tablet TAKE ONE TABLET BY MOUTH DAILY    insulin aspart (NOVOLOG) 100 unit/mL InPn pen Inject 5 units 3 times a day before meals    insulin glargine (LANTUS SOLOSTAR) 100 unit/mL (3 mL) InPn pen INJECT 16 UNITS SUB-Q AT BEDTIME    ipratropium (ATROVENT) 0.06 % nasal spray 2 sprays by Nasal route 4 (four) times daily.    isosorbide mononitrate (IMDUR) 30 MG 24 hr tablet Take 30 mg by mouth 2 (two) times daily.    lactobacillus acidophilus (PROBIOTIC) 10 billion cell Cap Take by mouth. 1 Capsule Oral Every day    lancets (ACCU-CHEK MULTICLIX LANCET) Misc TEST 3 TIMES A DAY    lisinopril-hydrochlorothiazide (PRINZIDE,ZESTORETIC) 20-12.5 mg per tablet Take 1 tablet by mouth once daily.    meloxicam (MOBIC) 15 MG tablet Take 1 tablet by mouth once daily.    metformin (GLUCOPHAGE) 500 MG tablet TAKE ONE BY MOUTH TWICE A DAY WITH FOOD.    metoprolol succinate (TOPROL-XL) 100 MG 24 hr tablet TAKE ONE TABLET BY MOUTH TWICE DAILY    NITROSTAT 0.4 mg SL tablet Place 1 tablet (0.4 mg total) under the tongue every 5 (five) minutes as needed for Chest pain.    potassium chloride SA (K-DUR,KLOR-CON) 20 MEQ tablet TAKE ONE TABLET BY MOUTH EVERY DAY    ranitidine (ZANTAC) 75 MG tablet Take 75 mg by mouth nightly.    saw palmetto fruit 450 mg Cap Take 1 capsule by mouth " "Twice daily.    VIT C/VIT E ACETATE/LUTEIN/MIN (OCUVITE LUTEIN ORAL) Take by mouth.    tramadol (ULTRAM) 50 mg tablet Take 1 tablet (50 mg total) by mouth every 6 (six) hours as needed for Pain.           Clinical Reference Information           Your Vitals Were     BP Pulse Temp Resp Height Weight    163/72 65 98 °F (36.7 °C) (Oral) 18 5' 10" (1.778 m) 79.7 kg (175 lb 11.3 oz)    SpO2 BMI             96% 25.21 kg/m2         Blood Pressure          Most Recent Value    BP  (!)  163/72      Allergies as of 4/11/2017     Atorvastatin    Codeine    Norvasc [Amlodipine]    Trulicity [Dulaglutide]    Adhesive      Immunizations Administered on Date of Encounter - 4/11/2017     None      Language Assistance Services     ATTENTION: Language assistance services are available, free of charge. Please call [phone].      ATENCIÓN: Si habla español, tiene a clifford disposición servicios gratuitos de asistencia lingüística. Llame al [phone].     ELIDIA Ý: N?u b?n nói Ti?ng Vi?t, có các d?ch v? h? tr? ngôn ng? mi?n phí dành cho b?n. G?i s? [phone].         [facility] complies with applicable Federal civil rights laws and does not discriminate on the basis of race, color, national origin, age, disability, or sex.        "

## 2017-04-12 DIAGNOSIS — I65.23 ASYMPTOMATIC STENOSIS OF BOTH CAROTID ARTERIES WITHOUT INFARCTION: ICD-10-CM

## 2017-04-12 DIAGNOSIS — I77.9 BILATERAL CAROTID ARTERY DISEASE: Primary | ICD-10-CM

## 2017-04-13 NOTE — PROGRESS NOTES
Subjective:       Patient ID: Anthony Blair is a 84 y.o. male.    Chief Complaint: Annual Exam  Anthony Blair presented for a commprehensive Health Risk Assessment today. The following components were reviewed and updated:     · Medical history  · Family History  · Social history  · Allergies and Current Medications  · Health Risk Assessment  · Health Maintenance  · Care Team      ** See Completed Assessments for Annual Wellness Visit within the encounter summary.**        The following assessments were completed:  · Living Situation  · CAGE  · Depression Screening  · Timed Get Up and Go  · Whisper Test  · Cognitive Function Screening  · Nutrition Screening  · ADL Screening  PAQ Screening  HPI  Review of Systems    Objective:      Physical Exam   Constitutional: He is oriented to person, place, and time. He appears well-developed and well-nourished.   HENT:   Head: Normocephalic.   Eyes: EOM are normal.   Neck: Neck supple.   Cardiovascular: Normal rate.  An irregularly irregular rhythm present.   Pulses:       Radial pulses are 2+ on the right side, and 2+ on the left side.        Dorsalis pedis pulses are 2+ on the right side, and 2+ on the left side.   Pulmonary/Chest: Effort normal and breath sounds normal.   Musculoskeletal:        Right knee: He exhibits bony tenderness.        Left knee: He exhibits bony tenderness.   Neurological: He is alert and oriented to person, place, and time.   Skin: Skin is warm and dry.   Psychiatric: He has a normal mood and affect.   Vitals reviewed.      Assessment:       1. Essential hypertension    2. Osteoarthritis of both knees, unspecified osteoarthritis type    3. Degenerative disc disease, cervical    4. Carotid artery disease without cerebral infarction    5. Claudication in peripheral vascular disease    6. Mixed hyperlipidemia    7. Peripheral vascular disease due to secondary diabetes mellitus    8. Paroxysmal atrial fibrillation        Plan:   Essential  hypertension  Stable. Manual gqrhniosj=303/78. Continue current treatment plan  Osteoarthritis of both knees, unspecified osteoarthritis type  Follow up with ortho  Degenerative disc disease, cervical  stable  Carotid artery disease without cerebral infarction  Internal Carotid Stenosis 40-49% (A)   Resulting Agency [facility]   Narrative   Date of Procedure: 03/14/2017    PRE-TEST DATA   RIGHT             (PSV/EDV)  ICA                 87/24  CCA                 88/15  ECA                 119/9  VERTEBRAL           47/11  BULB                56/11    LEFT             (PSV/EDV)  ICA                 126/23  CCA                 90/14  ECA                 156/14  VERTEBRAL           38/3  BULB                116/23    The right BP is 150/65      TEST DESCRIPTION     RIGHT  The right Proximal Common Carotid Artery is visualized, associated with heterogeneous plaq.   The right carotid bulb artery is visualized, associated with homogeneous plaq.   The right Mid Internal Carotid Artery has 20 - 39% stenosis.   There is acceleration in the right external carotid artery.   The right vertebral artery is visualized, associated with anterograde flow.   The right ICA/CCA ratio is: .99    LEFT  The left Distal Common Carotid Artery is visualized.   There is acceleration in the left carotid bulb artery, associated with homogeneous plaq.   The left Proximal Internal Carotid Artery has 40 - 49% stenosis, associated with homogeneous plaq.   There is acceleration in the left external carotid artery.   The left vertebral artery is visualized, associated with anterograde flow.   The left ICA/CCA ratio is: 1.40      CONCLUSIONS   There is 20 - 39% right Internal Carotid stenosis.  There is 40 - 49% left Internal Carotid stenosis.    Lt jugular vein thrombus noted.          Pt is scheduled for repeat carotid us this week. Follow up with card    Claudication in peripheral vascular disease  Continue antiplatelet therapy with apiaban  Mixed  hyperlipidemia  Low fat diet  Peripheral vascular disease due to secondary diabetes mellitus    Paroxysmal atrial fibrillation  Rate controlled. Continue beta blockers and anticoagulation therapy.     No Follow-up on file.

## 2017-05-16 ENCOUNTER — OFFICE VISIT (OUTPATIENT)
Dept: OPHTHALMOLOGY | Facility: CLINIC | Age: 82
End: 2017-05-16
Payer: MEDICARE

## 2017-05-16 DIAGNOSIS — H52.7 REFRACTIVE ERROR: ICD-10-CM

## 2017-05-16 DIAGNOSIS — Z96.1 PSEUDOPHAKIA: Primary | ICD-10-CM

## 2017-05-16 DIAGNOSIS — E11.9 DIABETES MELLITUS TYPE 2 WITHOUT RETINOPATHY: ICD-10-CM

## 2017-05-16 PROCEDURE — 92014 COMPRE OPH EXAM EST PT 1/>: CPT | Mod: S$GLB,,, | Performed by: OPHTHALMOLOGY

## 2017-05-16 PROCEDURE — 99999 PR PBB SHADOW E&M-EST. PATIENT-LVL II: CPT | Mod: PBBFAC,,, | Performed by: OPHTHALMOLOGY

## 2017-05-16 RX ORDER — PEN NEEDLE, DIABETIC 31 GX5/16"
NEEDLE, DISPOSABLE MISCELLANEOUS
OUTPATIENT
Start: 2017-05-16

## 2017-05-16 NOTE — MR AVS SNAPSHOT
[facility]  [address] [facility] [address]  Phone: [phone]  Fax: [phone]                  Gloriaolivia LAWRENCE Johnnie   2017 8:15 AM   Office Visit    Description:  Male : 1933   Provider:  Peewee Patino MD   Department:  [facility]           Reason for Visit     Diabetic Eye Exam           Diagnoses this Visit        Comments    Pseudophakia    -  Primary     Diabetes mellitus type 2 without retinopathy         Refractive error                To Do List           Future Appointments        Provider Department Dept Phone    2017 11:00 AM [facility]ARDIOVASCULAR, [facility] [facility] Card[facility] [phone]    2017 9:40 AM Seven Frazier MD [facility] Cardiology [phone]      Goals (5 Years of Data)     None      [facility]     [facility] On Call Nurse Care Line -  Assistance  Unless otherwise directed by your provider, please contact [facility], our nurse care line that is available for  assistance.     Registered nurses in the [facility] On Call Center provide: appointment scheduling, clinical advisement, health education, and other advisory services.  Call: [phone] (toll free)               Medications           Message regarding Medications     Verify the changes and/or additions to your medication regime listed below are the same as discussed with your clinician today.  If any of these changes or additions are incorrect, please notify your healthcare provider.             Verify that the below list of medications is an accurate representation of the medications you are currently taking.  If none reported, the list may be blank. If incorrect, please contact your healthcare provider. Carry this list with you in case of emergency.           Current Medications     amlodipine (NORVASC) 10 MG tablet ONE BY MOUTH EVERY DAY    apixaban 5 mg Tab Take 1 tablet (5 mg total) by mouth 2 (two) times daily.    BD INSULIN PEN NEEDLE  "UF SHORT 31 gauge x 5/16" Ndle USE FOUR TIMES DAILY.    blood sugar diagnostic (ACCU-CHEK TOMAS PLUS TEST STRP) Strp 1 strip by Other route 6 (six) times daily.    CARBOXYMETHYLCELLULOSE SODIUM (REFRESH OPHT) Apply to eye.    fish oil-omega-3 fatty acids 300-1,000 mg capsule Take 2 g by mouth once daily.    furosemide (LASIX) 20 MG tablet TAKE ONE TABLET BY MOUTH DAILY    insulin aspart (NOVOLOG) 100 unit/mL InPn pen Inject 5 units 3 times a day before meals    insulin glargine (LANTUS SOLOSTAR) 100 unit/mL (3 mL) InPn pen INJECT 16 UNITS SUB-Q AT BEDTIME    ipratropium (ATROVENT) 0.06 % nasal spray 2 sprays by Nasal route 4 (four) times daily.    isosorbide mononitrate (IMDUR) 30 MG 24 hr tablet Take 30 mg by mouth 2 (two) times daily.    lactobacillus acidophilus (PROBIOTIC) 10 billion cell Cap Take by mouth. 1 Capsule Oral Every day    lancets (ACCU-CHEK MULTICLIX LANCET) Misc TEST 3 TIMES A DAY    metformin (GLUCOPHAGE) 500 MG tablet TAKE ONE BY MOUTH TWICE A DAY WITH FOOD.    metoprolol succinate (TOPROL-XL) 100 MG 24 hr tablet TAKE ONE TABLET BY MOUTH TWICE DAILY    NITROSTAT 0.4 mg SL tablet Place 1 tablet (0.4 mg total) under the tongue every 5 (five) minutes as needed for Chest pain.    potassium chloride SA (K-DUR,KLOR-CON) 20 MEQ tablet TAKE ONE TABLET BY MOUTH EVERY DAY    ranitidine (ZANTAC) 75 MG tablet Take 75 mg by mouth nightly.    saw palmetto fruit 450 mg Cap Take 1 capsule by mouth Twice daily.    VIT C/VIT E ACETATE/LUTEIN/MIN (OCUVITE LUTEIN ORAL) Take by mouth.    diclofenac sodium (VOLTAREN) 1 % Gel Apply 2 g topically once daily.    fluticasone (FLONASE) 50 mcg/actuation nasal spray 2 sprays by Each Nare route once daily.    lisinopril-hydrochlorothiazide (PRINZIDE,ZESTORETIC) 20-12.5 mg per tablet Take 1 tablet by mouth once daily.    meloxicam (MOBIC) 15 MG tablet Take 1 tablet by mouth once daily.    tramadol (ULTRAM) 50 mg tablet Take 1 tablet (50 mg total) by mouth every 6 (six) hours " as needed for Pain.           Clinical Reference Information           Allergies as of 5/16/2017     Atorvastatin    Codeine    Norvasc [Amlodipine]    Trulicity [Dulaglutide]    Adhesive      Immunizations Administered on Date of Encounter - 5/16/2017     None      Language Assistance Services     ATTENTION: Language assistance services are available, free of charge. Please call [phone].      ATENCIÓN: Si habla español, tiene a clifford disposición servicios gratuitos de asistencia lingüística. Llame al [phone].     CHÚ Ý: N?u b?n nói Ti?ng Vi?t, có các d?ch v? h? tr? ngôn ng? mi?n phí dành cho b?n. G?i s? [phone].         [facility] complies with applicable Federal civil rights laws and does not discriminate on the basis of race, color, national origin, age, disability, or sex.

## 2017-05-16 NOTE — PROGRESS NOTES
SUBJECTIVE:   Anthony Blair is a 84 y.o. male   Corrected distance visual acuity was 20/40 in the right eye and 20/30 -1 in the left eye.   Chief Complaint   Patient presents with    Diabetic Eye Exam     pt here for yearly DM exam pt states va has been up and down also  having headaches with glasses        HPI:  HPI     Diabetic Eye Exam    Additional comments: pt here for yearly DM exam pt states va has been up   and down also  having headaches with glasses           Comments   1. PCIOL OU  YAG OD 2014 w/[facility]  YAG OS 2013 w/ [facility], 12/11/14 w/[facility]  2. DM x 1969  3. Dermatochalasis  4. Dry Eyes    Refresh BID OU       Last edited by Simran Bernal MA on 5/16/2017  8:09 AM. (History)        Assessment /Plan :  No diagnosis found.

## 2017-06-01 RX ORDER — PEN NEEDLE, DIABETIC 31 GX5/16"
NEEDLE, DISPOSABLE MISCELLANEOUS
Qty: 100 EACH | Refills: 6 | Status: SHIPPED | OUTPATIENT
Start: 2017-06-01 | End: 2018-01-08 | Stop reason: SDUPTHER

## 2017-06-16 ENCOUNTER — CLINICAL SUPPORT (OUTPATIENT)
Dept: CARDIOLOGY | Facility: CLINIC | Age: 82
End: 2017-06-16
Payer: MEDICARE

## 2017-06-16 DIAGNOSIS — I65.23 ASYMPTOMATIC STENOSIS OF BOTH CAROTID ARTERIES WITHOUT INFARCTION: ICD-10-CM

## 2017-06-16 PROCEDURE — 93880 EXTRACRANIAL BILAT STUDY: CPT | Mod: S$GLB,,, | Performed by: INTERNAL MEDICINE

## 2017-06-17 LAB — INTERNAL CAROTID STENOSIS: NORMAL

## 2017-06-22 ENCOUNTER — PATIENT MESSAGE (OUTPATIENT)
Dept: FAMILY MEDICINE | Facility: CLINIC | Age: 82
End: 2017-06-22

## 2017-06-22 ENCOUNTER — TELEPHONE (OUTPATIENT)
Dept: FAMILY MEDICINE | Facility: CLINIC | Age: 82
End: 2017-06-22

## 2017-06-22 DIAGNOSIS — K21.9 GASTROESOPHAGEAL REFLUX DISEASE, ESOPHAGITIS PRESENCE NOT SPECIFIED: Primary | ICD-10-CM

## 2017-06-23 ENCOUNTER — OFFICE VISIT (OUTPATIENT)
Dept: CARDIOLOGY | Facility: CLINIC | Age: 82
End: 2017-06-23
Payer: MEDICARE

## 2017-06-23 VITALS
OXYGEN SATURATION: 98 % | SYSTOLIC BLOOD PRESSURE: 150 MMHG | HEIGHT: 70 IN | HEART RATE: 74 BPM | DIASTOLIC BLOOD PRESSURE: 76 MMHG | BODY MASS INDEX: 26.92 KG/M2 | WEIGHT: 188.06 LBS

## 2017-06-23 DIAGNOSIS — R06.02 SOB (SHORTNESS OF BREATH): ICD-10-CM

## 2017-06-23 DIAGNOSIS — E78.2 MIXED HYPERLIPIDEMIA: Chronic | ICD-10-CM

## 2017-06-23 DIAGNOSIS — I48.0 PAROXYSMAL ATRIAL FIBRILLATION: Chronic | ICD-10-CM

## 2017-06-23 DIAGNOSIS — I73.9 PERIPHERAL VASCULAR DISEASE: Chronic | ICD-10-CM

## 2017-06-23 DIAGNOSIS — I10 ESSENTIAL HYPERTENSION: ICD-10-CM

## 2017-06-23 DIAGNOSIS — R60.0 EDEMA OF BOTH LEGS: ICD-10-CM

## 2017-06-23 DIAGNOSIS — Z95.0 PACEMAKER: Chronic | ICD-10-CM

## 2017-06-23 DIAGNOSIS — E11.9 DIABETES MELLITUS TYPE 2 WITHOUT RETINOPATHY: Chronic | ICD-10-CM

## 2017-06-23 DIAGNOSIS — I65.23 ASYMPTOMATIC STENOSIS OF BOTH CAROTID ARTERIES WITHOUT INFARCTION: ICD-10-CM

## 2017-06-23 DIAGNOSIS — I36.1 NON-RHEUMATIC TRICUSPID VALVE INSUFFICIENCY: ICD-10-CM

## 2017-06-23 DIAGNOSIS — I73.9 CLAUDICATION IN PERIPHERAL VASCULAR DISEASE: Chronic | ICD-10-CM

## 2017-06-23 DIAGNOSIS — I77.9 CAROTID ARTERY DISEASE WITHOUT CEREBRAL INFARCTION: Chronic | ICD-10-CM

## 2017-06-23 DIAGNOSIS — I25.810 CORONARY ARTERY DISEASE INVOLVING CORONARY BYPASS GRAFT OF NATIVE HEART WITHOUT ANGINA PECTORIS: Primary | ICD-10-CM

## 2017-06-23 PROCEDURE — 1157F ADVNC CARE PLAN IN RCRD: CPT | Mod: S$GLB,,, | Performed by: INTERNAL MEDICINE

## 2017-06-23 PROCEDURE — 1159F MED LIST DOCD IN RCRD: CPT | Mod: S$GLB,,, | Performed by: INTERNAL MEDICINE

## 2017-06-23 PROCEDURE — 1126F AMNT PAIN NOTED NONE PRSNT: CPT | Mod: S$GLB,,, | Performed by: INTERNAL MEDICINE

## 2017-06-23 PROCEDURE — 99214 OFFICE O/P EST MOD 30 MIN: CPT | Mod: S$GLB,,, | Performed by: INTERNAL MEDICINE

## 2017-06-23 PROCEDURE — 99499 UNLISTED E&M SERVICE: CPT | Mod: S$GLB,,, | Performed by: INTERNAL MEDICINE

## 2017-06-23 PROCEDURE — 99999 PR PBB SHADOW E&M-EST. PATIENT-LVL III: CPT | Mod: PBBFAC,,, | Performed by: INTERNAL MEDICINE

## 2017-06-23 RX ORDER — ASPIRIN 81 MG/1
81 TABLET ORAL DAILY
COMMUNITY
End: 2017-10-02

## 2017-06-23 NOTE — PROGRESS NOTES
"Subjective:    Patient ID:  Anthony Blair is a 84 y.o. male who presents for evaluation of Shortness of Breath; Coronary Artery Disease; Atrial Fibrillation; Hyperlipidemia; Hypertension; Carotid Artery Disease; and Peripheral Arterial Disease      HPI Mr. Blair returns for fu.  His current medical conditions include PAF/PSVT, CAD (PTCA 1980's), HTN, PPM, DM, atrial septal aneurysm, PAD, dyslipidemia. Nonsmoker.  Past details pertinent for following:   s/p CABG 7/10 (LIMA-LAD, SVG-OM1, SVG-OM3, SVG-PDA) for increasing OLIVARES and multivessel CAD on [facility].   S/p PPM 10/10 for SSS/PAF. Was hospitalized 1/11 for PSVT (Atrial tachycardia) and was started on Amiodarone but was stopped for GI discomfort. He also did not tolerate Multaq and has not tolerated multiple statins.   s/p EPS/ablation of his atrial tachycardia 6/11.   S/p abd w runoff March 2015 and had significant B LE infrapopliteal disease. Atherectomy/pta performed of critical R tibeoperoneal trunk stenosis. Also has L tibeoperoneal trunk stenosis and his PTA/MIGUEL are occluded bilaterally.  Now here for f/u.  Started on Eliquis Feb 2017 for suspicious left internal jugular vein thrombus.  Stopped amlodipine few weeks ago, had fatigue, and "little worms" on skin.   Stopped it and feels much better.  Less fatigue.  No cp sxs to suggest angina.  Stress MPI 3/17 shows no ischemia.  Dyspnea bending over.  No pnd/orthopnea.  No LE edema at present time.  Echo 3/17 shows normal LV function, left-right atrial shunt.   Carotid u/s 6/17 shows mild bilateral disease, known L IJ thrombus noted.  No tia/cva sxs.  No abnl bleeding on Eliquis.  Lipids controlled.  DM above goal.  He does not seem bothered by claudication sxs right now.  BP overall controlled on current medical tx.    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, with long-term current use of insulin    Coronary artery disease involving coronary " "bypass graft without angina pectoris    GERD (gastroesophageal reflux disease)    Paroxysmal atrial fibrillation    Pacemaker    Mixed hyperlipidemia    Essential hypertension    Peripheral vascular disease    Carotid artery disease without cerebral infarction    Lumbosacral spondylosis    Claudication in peripheral vascular disease    Diabetes mellitus type 2 without retinopathy    Pseudophakia    Right epiretinal membrane    Brow ptosis    Dermatochalasis    Corneal rust ring of right eye    Tricuspid regurgitation    Blepharitis    Edema of both legs    SOB (shortness of breath)    Jugular vein thrombosis    Osteoarthritis of knees, bilateral    Degenerative disc disease, cervical    Peripheral vascular disease due to secondary diabetes mellitus    Asymptomatic stenosis of both carotid arteries without infarction    Refractive error     Past Medical History:   Diagnosis Date    A-fib     Anemia     AP (angina pectoris) 1/23/2014    CAD (coronary artery disease)     Carotid artery disease without cerebral infarction 8/22/2014    Cataract     Diabetes mellitus type II     BS didn't check today 09/08/2016  A1C  7.4    GERD (gastroesophageal reflux disease)     GERD (gastroesophageal reflux disease) 7/11/2013    Hyperlipidemia     Hypertension     Lumbosacral spondylosis 12/24/2014    Mixed hyperlipidemia 1/23/2014    Pacemaker 1/23/2014    Paroxysmal atrial fibrillation 1/23/2014    Peripheral vascular disease 8/22/2014    S/P CABG (coronary artery bypass graft) 1/23/2014    Tobacco dependence     resolved       Current Outpatient Prescriptions:     apixaban 5 mg Tab, Take 1 tablet (5 mg total) by mouth 2 (two) times daily., Disp: 180 tablet, Rfl: 3    aspirin (ECOTRIN) 81 MG EC tablet, Take 81 mg by mouth once daily., Disp: , Rfl:     BD ULTRA-FINE DIRK PEN NEEDLES 32 gauge x 5/32" Ndle, USE FOUR TIMES DAILY., Disp: 100 each, Rfl: 6    blood sugar diagnostic (ACCU-CHEK " TOMAS PLUS TEST STRP) Strp, 1 strip by Other route 6 (six) times daily., Disp: 300 each, Rfl: 6    CARBOXYMETHYLCELLULOSE SODIUM (REFRESH OPHT), Apply to eye., Disp: , Rfl:     diclofenac sodium (VOLTAREN) 1 % Gel, Apply 2 g topically once daily., Disp: 1 Tube, Rfl: 2    fish oil-omega-3 fatty acids 300-1,000 mg capsule, Take 2 g by mouth once daily., Disp: , Rfl:     fluticasone (FLONASE) 50 mcg/actuation nasal spray, 2 sprays by Each Nare route once daily., Disp: 16 g, Rfl: 11    furosemide (LASIX) 20 MG tablet, TAKE ONE TABLET BY MOUTH DAILY, Disp: 30 tablet, Rfl: 12    insulin aspart (NOVOLOG) 100 unit/mL InPn pen, Inject 5 units 3 times a day before meals, Disp: 1 Box, Rfl: 11    insulin glargine (LANTUS SOLOSTAR) 100 unit/mL (3 mL) InPn pen, INJECT 16 UNITS SUB-Q AT BEDTIME, Disp: 15 mL, Rfl: 11    ipratropium (ATROVENT) 0.06 % nasal spray, 2 sprays by Nasal route 4 (four) times daily., Disp: 15 mL, Rfl: 0    isosorbide mononitrate (IMDUR) 30 MG 24 hr tablet, Take 30 mg by mouth 2 (two) times daily., Disp: , Rfl:     lactobacillus acidophilus (PROBIOTIC) 10 billion cell Cap, Take by mouth. 1 Capsule Oral Every day, Disp: , Rfl:     lancets (ACCU-CHEK MULTICLIX LANCET) Misc, TEST 3 TIMES A DAY, Disp: 300 each, Rfl: 4    meloxicam (MOBIC) 15 MG tablet, Take 1 tablet by mouth once daily., Disp: , Rfl: 2    metformin (GLUCOPHAGE) 500 MG tablet, TAKE ONE BY MOUTH TWICE A DAY WITH FOOD., Disp: 60 tablet, Rfl: 6    metoprolol succinate (TOPROL-XL) 100 MG 24 hr tablet, TAKE ONE TABLET BY MOUTH TWICE DAILY, Disp: 60 tablet, Rfl: 12    NITROSTAT 0.4 mg SL tablet, Place 1 tablet (0.4 mg total) under the tongue every 5 (five) minutes as needed for Chest pain., Disp: 25 tablet, Rfl: 12    potassium chloride SA (K-DUR,KLOR-CON) 20 MEQ tablet, TAKE ONE TABLET BY MOUTH EVERY DAY, Disp: 30 tablet, Rfl: 12    ranitidine (ZANTAC) 75 MG tablet, Take 75 mg by mouth nightly., Disp: , Rfl:     saw palmetto fruit  "450 mg Cap, Take 1 capsule by mouth Twice daily., Disp: , Rfl:     tramadol (ULTRAM) 50 mg tablet, Take 1 tablet (50 mg total) by mouth every 6 (six) hours as needed for Pain., Disp: 60 tablet, Rfl: 0    VIT C/VIT E ACETATE/LUTEIN/MIN (OCUVITE LUTEIN ORAL), Take by mouth., Disp: , Rfl:     lisinopril-hydrochlorothiazide (PRINZIDE,ZESTORETIC) 20-12.5 mg per tablet, Take 1 tablet by mouth once daily., Disp: 90 tablet, Rfl: 3      Review of Systems   Constitution: Positive for malaise/fatigue.   HENT: Negative.    Eyes: Negative.    Cardiovascular: Positive for claudication and dyspnea on exertion.   Respiratory: Positive for shortness of breath.    Endocrine: Negative.    Hematologic/Lymphatic: Negative.    Skin: Negative.    Musculoskeletal: Positive for arthritis and joint pain.   Gastrointestinal: Negative.    Genitourinary: Negative.    Neurological: Negative.    Psychiatric/Behavioral: Negative.    Allergic/Immunologic: Negative.        BP (!) 150/76 (BP Location: Right arm, Patient Position: Sitting, BP Method: Manual)   Pulse 74   Ht 5' 10" (1.778 m)   Wt 85.3 kg (188 lb 0.8 oz)   SpO2 98%   BMI 26.98 kg/m²     Wt Readings from Last 3 Encounters:   06/23/17 85.3 kg (188 lb 0.8 oz)   04/11/17 79.7 kg (175 lb 11.3 oz)   03/03/17 84.3 kg (185 lb 13.6 oz)     Temp Readings from Last 3 Encounters:   04/11/17 98 °F (36.7 °C) (Oral)   02/22/17 98.3 °F (36.8 °C) (Oral)   02/22/17 98.4 °F (36.9 °C) (Tympanic)     BP Readings from Last 3 Encounters:   06/23/17 (!) 150/76   04/11/17 (!) 163/72   03/03/17 (!) 144/64     Pulse Readings from Last 3 Encounters:   06/23/17 74   04/11/17 65   03/03/17 66          Objective:    Physical Exam   Constitutional: He is oriented to person, place, and time. He appears well-developed and well-nourished.   HENT:   Head: Normocephalic.   Neck: Normal range of motion. Neck supple. Normal carotid pulses, no hepatojugular reflux and no JVD present. Carotid bruit is not present. No " thyromegaly present.   Cardiovascular: Normal rate, regular rhythm, S1 normal and S2 normal.  PMI is not displaced.  Exam reveals no S3, no S4, no distant heart sounds, no friction rub, no midsystolic click and no opening snap.    No murmur heard.  Pulses:       Radial pulses are 2+ on the right side, and 2+ on the left side.   Pulmonary/Chest: Effort normal and breath sounds normal. He has no wheezes. He has no rales.   Abdominal: Soft. Bowel sounds are normal. He exhibits no distension, no abdominal bruit, no ascites and no mass. There is no tenderness.   Musculoskeletal: He exhibits no edema.   Neurological: He is alert and oriented to person, place, and time.   Skin: Skin is warm.   Psychiatric: He has a normal mood and affect. His behavior is normal.   Nursing note and vitals reviewed.      I have reviewed all pertinent labs and cardiac studies.        Chemistry        Component Value Date/Time     02/22/2017 2048    K 4.0 02/22/2017 2048     02/22/2017 2048    CO2 28 02/22/2017 2048    BUN 15 02/22/2017 2048    CREATININE 0.8 02/22/2017 2048     (H) 02/22/2017 2048        Component Value Date/Time    CALCIUM 10.3 02/22/2017 2048    ALKPHOS 123 02/22/2017 2048    AST 29 02/22/2017 2048    ALT 23 02/22/2017 2048    BILITOT 0.3 02/22/2017 2048        Lab Results   Component Value Date    WBC 5.34 02/22/2017    HGB 12.2 (L) 02/22/2017    HCT 35.9 (L) 02/22/2017    MCV 89 02/22/2017     02/22/2017     Lab Results   Component Value Date    HGBA1C 7.9 (H) 12/02/2016     Lab Results   Component Value Date    CHOL 188 12/02/2016    CHOL 178 09/01/2016    CHOL 204 (H) 05/24/2016     Lab Results   Component Value Date    HDL 50 12/02/2016    HDL 50 09/01/2016    HDL 47 05/24/2016     Lab Results   Component Value Date    LDLCALC 119.2 12/02/2016    LDLCALC 111.0 09/01/2016    LDLCALC 131.0 05/24/2016     Lab Results   Component Value Date    TRIG 94 12/02/2016    TRIG 85 09/01/2016    TRIG 130  05/24/2016     Lab Results   Component Value Date    CHOLHDL 26.6 12/02/2016    CHOLHDL 28.1 09/01/2016    CHOLHDL 23.0 05/24/2016     RIGHT  There is acceleration in the right Proximal Common Carotid Artery.   The right carotid bulb artery is visualized, associated with homogeneous plaque.   The right Mid Internal Carotid Artery has 20 - 39% stenosis.   The right external carotid artery is visualized.   The right vertebral artery is visualized, associated with heterogeneous plaque and anterograde flow.   The right ICA/CCA ratio is: .49    LEFT  The left Distal Common Carotid Artery is visualized.   The left carotid bulb artery is visualized, associated with homogeneous plaque.   The left Proximal Internal Carotid Artery has 20 - 39% stenosis, associated with homogeneous plaque.   There is acceleration in the left external carotid artery, associated with homogeneous plaque.   The left vertebral artery is visualized, associated with anterograde flow.   The left ICA/CCA ratio is: 1.52      CONCLUSIONS   There is 20 - 39% right Internal Carotid stenosis.  There is 20 - 39% left Internal Carotid stenosis.    Known LT jugular thrombus noted, from March 2016 scan.         This document has been electronically    SIGNED BY: Mingo Jenkins MD On: 06/17/2017 16:25      The patient received 0.4 mg of Regadenoson as an IV bolus. Peak heart rate was 82 bpm, which is 60% of the age predicted maximum heart rate. .     EKG Conclusions:    1. The EKG portion of this study is negative for ischemia at a peak heart rate of 82 bpm (60% of predicted).   2. Blood pressure remained stable throughout the protocol  (Presenting BP: 159/75 Peak BP: 159/75).   3. No significant arrhythmias were present.   4. There were no symptoms of chest discomfort or significant dyspnea throughout the protocol.     Nuclear Procedure:  Following a single isotope protocol, 10 mCi of Tc99 labeled Tetrofosmin was given at rest and tomographic imaging was performed.  Regadenoson pharmacologic stress testing was performed as described above. Immediately following the IV bolus of regadenoson,   30 mCi of Tc99 labeled Tetrofosmin was given and tomographic imaging was performed. The site of the IV injection was the left forearm. Images were obtained on a Stitcher camera.     Comments:  This is a technically adequate study. Inspection of the transaxial images demonstrated no significant cranial, caudal, or lateral patient motion in the camera between rest and stress acquisitions. There is homogeneous uptake of radiotracer in all walls   of the myocardium on stress and rest images. The extracardiac distribution of radioactivity is normal. The left ventricular cavity is normal in size and does not increase with stress. On gated SPECT, left ventricular motion is normal at rest.     Nuclear Quantitative Functional Analysis:   LVEF: 60 %  LVED Volume: 84 ml  LVES Volume: 42 ml    Impression: NORMAL MYOCARDIAL PERFUSION  1. The perfusion scan is free of evidence for myocardial ischemia or injury.   2. Resting wall motion is physiologic.   3. Resting LV function is normal.   4. The ventricular volumes are normal at rest and stress.   5. The extracardiac distribution of radioactivity is normal.           This document has been electronically    SIGNED BY: Velasquez Woodson MD On: 03/14/2017 17:10    Date of Procedure: 03/14/2017        TEST DESCRIPTION   Technical Quality: This is a technically challenging study. There is poor endocardial definition.     Aorta: The aortic root is normal in size, measuring 2.6 cm at sinotubular junction and 3.2 cm at Sinuses of Valsalva. The proximal ascending aorta is upper limit of normal, measuring 3.5 cm across.     Left Atrium: The left atrial volume index is normal, measuring 27.74 cc/m2.     Left Ventricle: The left ventricle is normal in size, with an end-diastolic diameter of 4.0 cm, and an end-systolic diameter of 2.5 cm. Septal wall thickness  is increased, with the septum measuring 1.6 cm and the posterior wall measuring 1.1 cm across.   Relative wall thickness was increased at 0.55, and the LV mass index was 115.2 g/m2 consistent with concentric remodeling. Global left ventricular systolic function appears normal. Visually estimated ejection fraction is 55-60%. The LV Doppler derived   stroke volume equals 74.0 ccs.   The E/e'(lat) is 9, consistent with normal diastolic function.     Right Atrium: The right atrium is normal in size, measuring 5.2 cm in length and 3.5 cm in width in the apical view.     Right Ventricle: The right ventricle is normal in size measuring 3.5 cm at the base in the apical right ventricle-focused view. Global right ventricular systolic function appears normal. The estimated PA systolic pressure is 36 mmHg.     Aortic Valve:  The aortic valve is moderately sclerotic with normal leaflet mobility. Additionally, there is mild aortic regurgitation.     Mitral Valve:  The mitral valve is normal in structure. There is mild mitral regurgitation. There is mitral annular calcification.     Tricuspid Valve:  The tricuspid valve is normal in structure. There is mild to moderate tricuspid regurgitation.     Pulmonary Valve:  The pulmonic valve is normal in structure.     IVC: IVC is normal in size and collapses > 50% with a sniff, suggesting normal right atrial pressure of 3 mmHg.     Shunt: By color flow Doppler, there is evidence of a left to right shunt across the atrial septum.     Intracavitary: There is no evidence of pericardial effusion, intracavity mass, thrombi, or vegetation.         CONCLUSIONS     1 - Normal left ventricular systolic function (EF 55-60%).     2 - Normal left ventricular diastolic function.     3 - Normal right ventricular systolic function .     4 - There is evidence of a left to right shunt across the atrial septum.             This document has been electronically    SIGNED BY: Seven Frazier MD On: 03/14/2017  16:49      Assessment:       1. Coronary artery disease involving coronary bypass graft of native heart without angina pectoris    2. Non-rheumatic tricuspid valve insufficiency    3. SOB (shortness of breath)    4. Peripheral vascular disease    5. Paroxysmal atrial fibrillation    6. Pacemaker    7. Mixed hyperlipidemia    8. Edema of both legs    9. Essential hypertension    10. Asymptomatic stenosis of both carotid arteries without infarction    11. Carotid artery disease without cerebral infarction    12. Claudication in peripheral vascular disease    13. Diabetes mellitus type 2 without retinopathy         Plan:             STABLE CV CONDITIONS.  WILL CONTINUE ELIQUIS FOR NOW, REPEAT CAROTID US NEXT APPT IN 6 MONTHS TO REASSESS JUGULAR THROMBUS.  CONTINUE OPTIMAL MEDICAL TX FOR CAD, NO ISCHEMIA ON STRESS MPI.  CONTINUE MEDICAL TX FOR PAD, DAILY WALKING EXERCISES.  NEEDS TO WORK AND GET HIS DM HGAIC TO GOAL.    ALL TEST RESULTS REVIEWED IN DETAIL WITH PT.  CARDIAC DIET  F/U IN [facility] CLIN[facility]    F/U 6 MONTHS WITH CAROTID U/S.

## 2017-09-05 ENCOUNTER — TELEPHONE (OUTPATIENT)
Dept: CARDIOLOGY | Facility: CLINIC | Age: 82
End: 2017-09-05

## 2017-09-05 NOTE — TELEPHONE ENCOUNTER
Spoke with patient--states stopped taking Eliquis a couple weeks ago due to experiencing diarrhea, weakness, and bloating--states also stopped taking Amlodipine one and a half months ago due to experiencing leg weakness sleepiness and tiredness--states symptoms have subsided since he stopped taking medications

## 2017-09-06 NOTE — TELEPHONE ENCOUNTER
Patient notified okay to stay off Amlodipine and Eliquis, but to continue Aspirin 81mg by mouth daily.    Patient repeated instructions back correctly.

## 2017-09-06 NOTE — TELEPHONE ENCOUNTER
Ok for pt to stay off Eliquis and Amlodipine.  Stay on 81 mg ASA daily.  Nursing staff to update epic med list.    Dr Frazier

## 2017-09-14 RX ORDER — POTASSIUM CHLORIDE 20 MEQ/1
TABLET, EXTENDED RELEASE ORAL
Qty: 30 TABLET | Refills: 12 | Status: SHIPPED | OUTPATIENT
Start: 2017-09-14 | End: 2021-11-24

## 2017-09-14 RX ORDER — METOPROLOL SUCCINATE 100 MG/1
TABLET, EXTENDED RELEASE ORAL
Qty: 60 TABLET | Refills: 12 | Status: SHIPPED | OUTPATIENT
Start: 2017-09-14 | End: 2018-09-15 | Stop reason: SDUPTHER

## 2017-09-25 ENCOUNTER — PATIENT MESSAGE (OUTPATIENT)
Dept: FAMILY MEDICINE | Facility: CLINIC | Age: 82
End: 2017-09-25

## 2017-09-28 ENCOUNTER — TELEPHONE (OUTPATIENT)
Dept: FAMILY MEDICINE | Facility: CLINIC | Age: 82
End: 2017-09-28

## 2017-09-28 DIAGNOSIS — Z95.0 CARDIAC PACEMAKER IN SITU: Primary | ICD-10-CM

## 2017-09-28 DIAGNOSIS — I48.0 PAF (PAROXYSMAL ATRIAL FIBRILLATION): ICD-10-CM

## 2017-09-28 DIAGNOSIS — I49.5 SSS (SICK SINUS SYNDROME): ICD-10-CM

## 2017-09-28 NOTE — TELEPHONE ENCOUNTER
----- Message from Rula Kelley sent at 9/28/2017 11:17 AM CDT -----  Contact: tanisha/daughter [phone]  States that pt had a stroke and that pt needs to follow up with Dr Mccabe within 7 days. Does not want to see another provider. Please call back at [phone]//thank you acc

## 2017-10-02 ENCOUNTER — OFFICE VISIT (OUTPATIENT)
Dept: FAMILY MEDICINE | Facility: CLINIC | Age: 82
End: 2017-10-02
Payer: MEDICARE

## 2017-10-02 VITALS
DIASTOLIC BLOOD PRESSURE: 68 MMHG | SYSTOLIC BLOOD PRESSURE: 132 MMHG | OXYGEN SATURATION: 98 % | HEIGHT: 70 IN | HEART RATE: 89 BPM | TEMPERATURE: 98 F | WEIGHT: 179.88 LBS | BODY MASS INDEX: 25.75 KG/M2

## 2017-10-02 DIAGNOSIS — I61.9 NONTRAUMATIC ACUTE HEMORRHAGE OF BASAL GANGLIA: Primary | ICD-10-CM

## 2017-10-02 DIAGNOSIS — I71.21 ANEURYSM OF ASCENDING AORTA: ICD-10-CM

## 2017-10-02 DIAGNOSIS — E11.9 DIABETES MELLITUS TYPE 2 WITHOUT RETINOPATHY: Chronic | ICD-10-CM

## 2017-10-02 PROCEDURE — 3008F BODY MASS INDEX DOCD: CPT | Mod: S$GLB,,, | Performed by: FAMILY MEDICINE

## 2017-10-02 PROCEDURE — 3075F SYST BP GE 130 - 139MM HG: CPT | Mod: S$GLB,,, | Performed by: FAMILY MEDICINE

## 2017-10-02 PROCEDURE — 99214 OFFICE O/P EST MOD 30 MIN: CPT | Mod: S$GLB,,, | Performed by: FAMILY MEDICINE

## 2017-10-02 PROCEDURE — 3078F DIAST BP <80 MM HG: CPT | Mod: S$GLB,,, | Performed by: FAMILY MEDICINE

## 2017-10-02 PROCEDURE — 1159F MED LIST DOCD IN RCRD: CPT | Mod: S$GLB,,, | Performed by: FAMILY MEDICINE

## 2017-10-02 PROCEDURE — 99499 UNLISTED E&M SERVICE: CPT | Mod: S$GLB,,, | Performed by: FAMILY MEDICINE

## 2017-10-02 PROCEDURE — 99999 PR PBB SHADOW E&M-EST. PATIENT-LVL III: CPT | Mod: PBBFAC,,, | Performed by: FAMILY MEDICINE

## 2017-10-02 PROCEDURE — 1126F AMNT PAIN NOTED NONE PRSNT: CPT | Mod: S$GLB,,, | Performed by: FAMILY MEDICINE

## 2017-10-02 PROCEDURE — 1157F ADVNC CARE PLAN IN RCRD: CPT | Mod: S$GLB,,, | Performed by: FAMILY MEDICINE

## 2017-10-02 NOTE — PROGRESS NOTES
Chief Complaint:    Chief Complaint   Patient presents with    Hospital Follow Up       History of Present Illness:  Patient presents today status post hospitalization for an acute hemorrhage of the basal ganglia on the right side thereby causing weakness in the left upper and lower extremity.  He did not have any swallowing deficit.  Patient is able to walk with a cane is currently home undergoing physical therapy.  He was taking his aspirin on a daily basis when he had the CVA.  He had a CTA of the neck done which showed aneurysmal dilatation of the ascending aorta at 4.1 cm, also had mild to moderate stenosis of the right proximal internal carotid artery about 60% in about 20% on the left.  CTA of the neck also showed that there was atherosclerotic disease of the cerebellar artery.  Patient's blood pressure is normal and his diabetes is also doing that his metformin was increased to thousand milligrams in the hospital.  He's been asked to hold his aspirin for 2 weeks.  He will be seeing a neurosurgeon also.  He was discharged after having 3 CAT scans and was found when the bleeding was not getting any worse.      ROS:  Review of Systems   Constitutional: Negative for activity change, chills, fatigue, fever and unexpected weight change.   HENT: Negative for congestion, ear discharge, ear pain, hearing loss, postnasal drip and rhinorrhea.    Eyes: Negative for pain and visual disturbance.   Respiratory: Negative for cough, chest tightness and shortness of breath.    Cardiovascular: Negative for chest pain and palpitations.   Gastrointestinal: Negative for abdominal pain, diarrhea and vomiting.   Endocrine: Negative for heat intolerance.   Genitourinary: Negative for dysuria, flank pain, frequency and hematuria.   Musculoskeletal: Negative for back pain, gait problem and neck pain.   Skin: Negative for color change and rash.   Neurological: Negative for dizziness, tremors, seizures, numbness and headaches.    Psychiatric/Behavioral: Negative for agitation, hallucinations, self-injury, sleep disturbance and suicidal ideas. The patient is not nervous/anxious.        Past Medical History:   Diagnosis Date    A-fib     Anemia     AP (angina pectoris) 1/23/2014    CAD (coronary artery disease)     Carotid artery disease without cerebral infarction 8/22/2014    Cataract     Diabetes mellitus type II     BS didn't check today 09/08/2016  A1C  7.4    GERD (gastroesophageal reflux disease)     GERD (gastroesophageal reflux disease) 7/11/2013    Hyperlipidemia     Hypertension     Lumbosacral spondylosis 12/24/2014    Mixed hyperlipidemia 1/23/2014    Pacemaker 1/23/2014    Paroxysmal atrial fibrillation 1/23/2014    Peripheral vascular disease 8/22/2014    S/P CABG (coronary artery bypass graft) 1/23/2014    Tobacco dependence     resolved       Social History:  Social History     Social History    Marital status:      Spouse name: N/A    Number of children: N/A    Years of education: N/A     Social History Main Topics    Smoking status: Former Smoker     Packs/day: 2.00     Years: 13.00     Types: Cigarettes     Start date: 11/25/1954     Quit date: 6/7/1967    Smokeless tobacco: Never Used    Alcohol use No    Drug use: No    Sexual activity: No     Other Topics Concern    None     Social History Narrative    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       Family History:   family history includes Alcohol abuse in his paternal uncle; Arthritis in his father and mother; Cancer in his daughter and sister; Diabetes in his brother, daughter, father, mother, sister, son, and son; Fibromyalgia in his daughter; Heart disease in his brother, mother, and sister; Kidney disease in his brother and mother; Miscarriages / Stillbirths in his daughter.    Health Maintenance   Topic Date Due    Urine Microalbumin  05/24/2017    Hemoglobin A1c  06/02/2017    Foot Exam  06/28/2017     "Influenza Vaccine  08/01/2017    Lipid Panel  12/02/2017    Eye Exam  05/16/2018    TETANUS VACCINE  01/23/2024    Zoster Vaccine  Completed    Pneumococcal (65+)  Completed       Physical Exam:    Vital Signs  Temp: 97.6 °F (36.4 °C)  Temp src: Tympanic  Pulse: 89  SpO2: 98 %  BP: 132/68  BP Location: Left arm  Patient Position: Sitting  Pain Score: 0-No pain  Height and Weight  Height: 5' 10" (177.8 cm)  Weight: 81.6 kg (179 lb 14.3 oz)  BSA (Calculated - sq m): 2.01 sq meters  BMI (Calculated): 25.9  Weight in (lb) to have BMI = 25: 173.9]    Body mass index is 25.81 kg/m².    Physical Exam   Constitutional: He is oriented to person, place, and time. He appears well-developed.   HENT:   Mouth/Throat: Oropharynx is clear and moist.   Eyes: Conjunctivae are normal. Pupils are equal, round, and reactive to light.   Neck: Normal range of motion. Neck supple.   Cardiovascular: Normal rate, regular rhythm and normal heart sounds.    No murmur heard.  Pulmonary/Chest: Effort normal and breath sounds normal. No respiratory distress. He has no wheezes. He has no rales. He exhibits no tenderness.   Abdominal: Soft. He exhibits no distension and no mass. There is no tenderness. There is no guarding.   Musculoskeletal: He exhibits no edema or tenderness.   Lymphadenopathy:     He has no cervical adenopathy.   Neurological: He is alert and oriented to person, place, and time. He has normal reflexes. Coordination and gait normal.   There is weakness involving the left hand  and the foot plantarflexion and knee extension on the left.   Skin: Skin is warm and dry.   Psychiatric: He has a normal mood and affect. His behavior is normal. Judgment and thought content normal.       Lab Results   Component Value Date    CHOL 188 12/02/2016    CHOL 178 09/01/2016    CHOL 204 (H) 05/24/2016    TRIG 94 12/02/2016    TRIG 85 09/01/2016    TRIG 130 05/24/2016    HDL 50 12/02/2016    HDL 50 09/01/2016    HDL 47 05/24/2016    " "TOTALCHOLEST 3.8 12/02/2016    TOTALCHOLEST 3.6 09/01/2016    TOTALCHOLEST 4.3 05/24/2016    NONHDLCHOL 138 12/02/2016    NONHDLCHOL 128 09/01/2016    NONHDLCHOL 157 05/24/2016       Lab Results   Component Value Date    HGBA1C 7.9 (H) 12/02/2016       Assessment:      ICD-10-CM ICD-9-CM   1. Nontraumatic acute hemorrhage of basal ganglia I61.9 431   2. Aneurysm of ascending aorta I71.2 441.2   3. Diabetes mellitus type 2 without retinopathy E11.9 250.00         Plan:  At this time patient will need active rehabilitation to restore function on the left upper and lower extremity.  He has been asked to discuss with Dr. Frazier about limiting the possible use of aspirin to 2 times a week.  He has been advised to establish care with Dr. Srinivas Sanderson for continued surveillance of the aneurysm of the ascending aorta.  No orders of the defined types were placed in this encounter.      Current Outpatient Prescriptions   Medication Sig Dispense Refill    BD ULTRA-FINE DIRK PEN NEEDLES 32 gauge x 5/32" Ndle USE FOUR TIMES DAILY. 100 each 6    blood sugar diagnostic (ACCU-CHEK TOMAS PLUS TEST STRP) Strp 1 strip by Other route 6 (six) times daily. 300 each 6    CARBOXYMETHYLCELLULOSE SODIUM (REFRESH OPHT) Apply to eye.      fish oil-omega-3 fatty acids 300-1,000 mg capsule Take 2 g by mouth once daily.      fluticasone (FLONASE) 50 mcg/actuation nasal spray 2 sprays by Each Nare route once daily. 16 g 11    furosemide (LASIX) 20 MG tablet TAKE ONE TABLET BY MOUTH DAILY 30 tablet 12    insulin aspart (NOVOLOG) 100 unit/mL InPn pen Inject 5 units 3 times a day before meals 1 Box 11    insulin glargine (LANTUS SOLOSTAR) 100 unit/mL (3 mL) InPn pen INJECT 16 UNITS SUB-Q AT BEDTIME 15 mL 11    isosorbide mononitrate (IMDUR) 30 MG 24 hr tablet Take 30 mg by mouth 2 (two) times daily.      lancets (ACCU-CHEK MULTICLIX LANCET) Misc TEST 3 TIMES A  each 4    meloxicam (MOBIC) 15 MG tablet Take 1 tablet by mouth once " daily.  2    metformin (GLUCOPHAGE) 500 MG tablet TAKE ONE BY MOUTH TWICE A DAY WITH FOOD. (Patient taking differently: Take 1,000 mg by mouth 2 (two) times daily with meals. TAKE ONE BY MOUTH TWICE A DAY WITH FOOD.) 60 tablet 6    metoprolol succinate (TOPROL-XL) 100 MG 24 hr tablet TAKE ONE TABLET BY MOUTH TWICE DAILY 60 tablet 12    NITROSTAT 0.4 mg SL tablet Place 1 tablet (0.4 mg total) under the tongue every 5 (five) minutes as needed for Chest pain. 25 tablet 12    potassium chloride SA (K-DUR,KLOR-CON) 20 MEQ tablet TAKE ONE TABLET BY MOUTH EVERY DAY 30 tablet 12    ranitidine (ZANTAC) 75 MG tablet Take 75 mg by mouth nightly.      saw palmetto fruit 450 mg Cap Take 1 capsule by mouth Twice daily.      tramadol (ULTRAM) 50 mg tablet Take 1 tablet (50 mg total) by mouth every 6 (six) hours as needed for Pain. 60 tablet 0     No current facility-administered medications for this visit.        Medications Discontinued During This Encounter   Medication Reason    aspirin (ECOTRIN) 81 MG EC tablet Patient no longer taking    diclofenac sodium (VOLTAREN) 1 % Gel Patient no longer taking    ipratropium (ATROVENT) 0.06 % nasal spray Patient no longer taking    lactobacillus acidophilus (PROBIOTIC) 10 billion cell Cap Patient no longer taking    lisinopril-hydrochlorothiazide (PRINZIDE,ZESTORETIC) 20-12.5 mg per tablet Patient no longer taking    VIT C/VIT E ACETATE/LUTEIN/MIN (OCUVITE LUTEIN ORAL) Patient no longer taking       No Follow-up on file.      Dr Abdulaziz Mccabe MD    Disclaimer: This note is prepared using voice recognition system and as such is likely to have errors and is not proof read.

## 2017-10-04 RX ORDER — METFORMIN HYDROCHLORIDE 1000 MG/1
TABLET ORAL
Qty: 60 TABLET | Refills: 4 | Status: SHIPPED | OUTPATIENT
Start: 2017-10-04 | End: 2018-02-07 | Stop reason: SDUPTHER

## 2017-10-05 ENCOUNTER — OFFICE VISIT (OUTPATIENT)
Dept: CARDIOLOGY | Facility: CLINIC | Age: 82
End: 2017-10-05
Payer: MEDICARE

## 2017-10-05 VITALS
DIASTOLIC BLOOD PRESSURE: 72 MMHG | SYSTOLIC BLOOD PRESSURE: 110 MMHG | HEIGHT: 71 IN | BODY MASS INDEX: 24.83 KG/M2 | HEART RATE: 72 BPM | WEIGHT: 177.38 LBS

## 2017-10-05 DIAGNOSIS — E13.51 PERIPHERAL VASCULAR DISEASE DUE TO SECONDARY DIABETES MELLITUS: ICD-10-CM

## 2017-10-05 DIAGNOSIS — I25.810 CORONARY ARTERY DISEASE INVOLVING CORONARY BYPASS GRAFT OF NATIVE HEART WITHOUT ANGINA PECTORIS: ICD-10-CM

## 2017-10-05 DIAGNOSIS — I73.9 CLAUDICATION IN PERIPHERAL VASCULAR DISEASE: Chronic | ICD-10-CM

## 2017-10-05 DIAGNOSIS — Z95.0 PACEMAKER: Chronic | ICD-10-CM

## 2017-10-05 DIAGNOSIS — I36.1 NON-RHEUMATIC TRICUSPID VALVE INSUFFICIENCY: ICD-10-CM

## 2017-10-05 DIAGNOSIS — I65.23 ASYMPTOMATIC STENOSIS OF BOTH CAROTID ARTERIES WITHOUT INFARCTION: ICD-10-CM

## 2017-10-05 DIAGNOSIS — I73.9 PERIPHERAL VASCULAR DISEASE: Chronic | ICD-10-CM

## 2017-10-05 DIAGNOSIS — I77.9 CAROTID ARTERY DISEASE WITHOUT CEREBRAL INFARCTION: Chronic | ICD-10-CM

## 2017-10-05 DIAGNOSIS — I71.21 ANEURYSM OF ASCENDING AORTA: ICD-10-CM

## 2017-10-05 DIAGNOSIS — I10 ESSENTIAL HYPERTENSION: ICD-10-CM

## 2017-10-05 DIAGNOSIS — E78.2 MIXED HYPERLIPIDEMIA: Chronic | ICD-10-CM

## 2017-10-05 DIAGNOSIS — I48.0 PAROXYSMAL ATRIAL FIBRILLATION: Primary | Chronic | ICD-10-CM

## 2017-10-05 DIAGNOSIS — I82.890 JUGULAR VEIN THROMBOSIS: ICD-10-CM

## 2017-10-05 PROCEDURE — 99499 UNLISTED E&M SERVICE: CPT | Mod: S$GLB,,, | Performed by: INTERNAL MEDICINE

## 2017-10-05 PROCEDURE — 99999 PR PBB SHADOW E&M-EST. PATIENT-LVL III: CPT | Mod: PBBFAC,,, | Performed by: INTERNAL MEDICINE

## 2017-10-05 PROCEDURE — 99214 OFFICE O/P EST MOD 30 MIN: CPT | Mod: S$GLB,,, | Performed by: INTERNAL MEDICINE

## 2017-10-05 RX ORDER — AMOXICILLIN 500 MG
1 CAPSULE ORAL DAILY
COMMUNITY
Start: 2017-10-05 | End: 2017-11-17

## 2017-10-05 NOTE — PROGRESS NOTES
Subjective:    Patient ID:  Anthony Blair is a 84 y.o. male who presents for evaluation of Peripheral Vascular Disease; Carotid Artery Disease; Atrial Fibrillation; Hypertension; Coronary Artery Disease; and Hyperlipidemia      HPI Mr. Blair returns for fu.  His current medical conditions include PAF/PSVT, CAD (PTCA 1980's), HTN, PPM, DM, atrial septal aneurysm/pfo, PAD, dyslipidemia. Nonsmoker.  Past details pertinent for following:   s/p CABG 7/10 (LIMA-LAD, SVG-OM1, SVG-OM3, SVG-PDA) for increasing OLIVARES and multivessel CAD on [facility].   S/p PPM 10/10 for SSS/PAF. Was hospitalized 1/11 for PSVT (Atrial tachycardia) and was started on Amiodarone but was stopped for GI discomfort. He also did not tolerate Multaq and has not tolerated multiple statins.   s/p EPS/ablation of his atrial tachycardia 6/11.   S/p abd w runoff March 2015 and had significant B LE infrapopliteal disease. Atherectomy/pta performed of critical R tibeoperoneal trunk stenosis. Also has L tibeoperoneal trunk stenosis and his PTA/MIGUEL are occluded bilaterally.  Started on Eliquis Feb 2017 for suspicious left internal jugular vein thrombus.  Stress MPI 3/17 showed no ischemia.  Echo 3/17 showed normal LV function, left-right atrial shunt.   Carotid u/s 6/17 showed mild bilateral disease, known L IJ thrombus noted.  Pt here for f/u.   Pt called clinic Sept 2017 stated he had stopped Eliquis couple weeks before his call due to diarrhea, weakness, bloating and also off Amlodipine due to sleepiness.  He stated sxs subsided when he stopped the meds.   He was advised by cardiology on 9/6/17 to take 81 mg ASA since he stopped his Eliquis.  He recently had acute hemorrhage of basal ganglia right side, causing weakness on left side.  Tx at [facility] 9/24/17. He was on ASA daily when CVA occurred.  Also noted to have mild thoracic aneurysm 4.1 cm, 60% R ICA stenosis, 30% LICA stenosis, patent vertebral arteries but mod-severe distal left vertebral disease,  by CTA  per PCP note.  He was taken off his asa.  Followed by Dr. Weaver, neurosurgery. No surgery was needed.  Echo showed normal LVEF.  Denies cp sxs.  No unusual dyspnea or pnd/orthopnea.  Doing physical therapy s/p cva, some residual deficits improving.  BP stable.  On fish oil for eye condition per pt.  No obvious claudication sxs.  Some fatigue, more sleepy since his CVA.      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, with long-term current use of insulin    Coronary artery disease involving coronary bypass graft without angina pectoris    GERD (gastroesophageal reflux disease)    Paroxysmal atrial fibrillation    Pacemaker    Mixed hyperlipidemia    Essential hypertension    Peripheral vascular disease    Carotid artery disease without cerebral infarction    Lumbosacral spondylosis    Claudication in peripheral vascular disease    Diabetes mellitus type 2 without retinopathy    Pseudophakia    Right epiretinal membrane    Brow ptosis    Dermatochalasis    Corneal rust ring of right eye    Tricuspid regurgitation    Blepharitis    Edema of both legs    SOB (shortness of breath)    Jugular vein thrombosis    Osteoarthritis of knees, bilateral    Degenerative disc disease, cervical    Peripheral vascular disease due to secondary diabetes mellitus    Asymptomatic stenosis of both carotid arteries without infarction    Refractive error    Aneurysm of ascending aorta     Past Medical History:   Diagnosis Date    A-fib     Anemia     AP (angina pectoris) 1/23/2014    CAD (coronary artery disease)     Carotid artery disease without cerebral infarction 8/22/2014    Cataract     Diabetes mellitus type II     BS didn't check today 09/08/2016  A1C  7.4    GERD (gastroesophageal reflux disease)     GERD (gastroesophageal reflux disease) 7/11/2013    Hyperlipidemia     Hypertension     Lumbosacral spondylosis 12/24/2014     Mixed hyperlipidemia 1/23/2014    Pacemaker 1/23/2014    Paroxysmal atrial fibrillation 1/23/2014    Peripheral vascular disease 8/22/2014    S/P CABG (coronary artery bypass graft) 1/23/2014    Tobacco dependence     resolved       Current Outpatient Prescriptions:     CARBOXYMETHYLCELLULOSE SODIUM (REFRESH OPHT), Apply to eye., Disp: , Rfl:     fish oil-omega-3 fatty acids 300-1,000 mg capsule, Take 1 capsule (1 g total) by mouth once daily., Disp: , Rfl:     fluticasone (FLONASE) 50 mcg/actuation nasal spray, 2 sprays by Each Nare route once daily., Disp: 16 g, Rfl: 11    furosemide (LASIX) 20 MG tablet, TAKE ONE TABLET BY MOUTH DAILY, Disp: 30 tablet, Rfl: 12    insulin aspart (NOVOLOG) 100 unit/mL InPn pen, Inject 5 units 3 times a day before meals, Disp: 1 Box, Rfl: 11    insulin glargine (LANTUS SOLOSTAR) 100 unit/mL (3 mL) InPn pen, INJECT 16 UNITS SUB-Q AT BEDTIME, Disp: 15 mL, Rfl: 11    isosorbide mononitrate (IMDUR) 30 MG 24 hr tablet, Take 30 mg by mouth 2 (two) times daily., Disp: , Rfl:     lancets (ACCU-CHEK MULTICLIX LANCET) Misc, TEST 3 TIMES A DAY, Disp: 300 each, Rfl: 4    meloxicam (MOBIC) 15 MG tablet, Take 1 tablet by mouth once daily., Disp: , Rfl: 2    metformin (GLUCOPHAGE) 1000 MG tablet, Take 1 tablet by mouth 2 (two) times daily before meals for 30 days., Disp: 60 tablet, Rfl: 4    metformin (GLUCOPHAGE) 500 MG tablet, TAKE ONE BY MOUTH TWICE A DAY WITH FOOD. (Patient taking differently: Take 1,000 mg by mouth 2 (two) times daily with meals. TAKE ONE BY MOUTH TWICE A DAY WITH FOOD.), Disp: 60 tablet, Rfl: 6    metoprolol succinate (TOPROL-XL) 100 MG 24 hr tablet, TAKE ONE TABLET BY MOUTH TWICE DAILY, Disp: 60 tablet, Rfl: 12    NITROSTAT 0.4 mg SL tablet, Place 1 tablet (0.4 mg total) under the tongue every 5 (five) minutes as needed for Chest pain., Disp: 25 tablet, Rfl: 12    potassium chloride SA (K-DUR,KLOR-CON) 20 MEQ tablet, TAKE ONE TABLET BY MOUTH EVERY  "DAY, Disp: 30 tablet, Rfl: 12    ranitidine (ZANTAC) 75 MG tablet, Take 75 mg by mouth nightly., Disp: , Rfl:     saw palmetto fruit 450 mg Cap, Take 1 capsule by mouth Twice daily., Disp: , Rfl:     tramadol (ULTRAM) 50 mg tablet, Take 1 tablet (50 mg total) by mouth every 6 (six) hours as needed for Pain., Disp: 60 tablet, Rfl: 0    BD ULTRA-FINE DIRK PEN NEEDLES 32 gauge x 5/32" Ndle, USE FOUR TIMES DAILY., Disp: 100 each, Rfl: 6    blood sugar diagnostic (ACCU-CHEK TOMAS PLUS TEST STRP) Strp, 1 strip by Other route 6 (six) times daily., Disp: 300 each, Rfl: 6      Review of Systems   Constitution: Positive for weakness and malaise/fatigue.   HENT: Negative.    Eyes: Negative.    Cardiovascular: Negative.    Respiratory: Negative.    Endocrine: Negative.    Hematologic/Lymphatic: Negative.    Skin: Negative.    Musculoskeletal: Positive for arthritis and joint pain.   Gastrointestinal: Negative.    Genitourinary: Negative.    Neurological: Positive for loss of balance.   Psychiatric/Behavioral: Negative.    Allergic/Immunologic: Negative.            /72 (BP Location: Left arm, Patient Position: Sitting, BP Method: Large (Manual))   Pulse 72   Ht 5' 11" (1.803 m)   Wt 80.4 kg (177 lb 5.8 oz)   BMI 24.74 kg/m²     Wt Readings from Last 3 Encounters:   10/05/17 80.4 kg (177 lb 5.8 oz)   10/02/17 81.6 kg (179 lb 14.3 oz)   06/23/17 85.3 kg (188 lb 0.8 oz)     Temp Readings from Last 3 Encounters:   10/02/17 97.6 °F (36.4 °C) (Tympanic)   04/11/17 98 °F (36.7 °C) (Oral)   02/22/17 98.3 °F (36.8 °C) (Oral)     BP Readings from Last 3 Encounters:   10/05/17 110/72   10/02/17 132/68   06/23/17 (!) 150/76     Pulse Readings from Last 3 Encounters:   10/05/17 72   10/02/17 89   06/23/17 74       Objective:    Physical Exam   Constitutional: He is oriented to person, place, and time. He appears well-developed and well-nourished.   HENT:   Head: Normocephalic.   Neck: Normal range of motion. Neck supple. " Normal carotid pulses, no hepatojugular reflux and no JVD present. Carotid bruit is not present. No thyromegaly present.   Cardiovascular: Normal rate, regular rhythm, S1 normal and S2 normal.  PMI is not displaced.  Exam reveals no S3, no S4, no distant heart sounds, no friction rub, no midsystolic click and no opening snap.    No murmur heard.  Pulses:       Radial pulses are 2+ on the right side, and 2+ on the left side.   Pulmonary/Chest: Effort normal and breath sounds normal. He has no wheezes. He has no rales.   Abdominal: Soft. Bowel sounds are normal. He exhibits no distension, no abdominal bruit, no ascites and no mass. There is no tenderness.   Musculoskeletal: He exhibits no edema.   Neurological: He is alert and oriented to person, place, and time.   Skin: Skin is warm.   Psychiatric: He has a normal mood and affect. His behavior is normal.   Nursing note and vitals reviewed.        I have reviewed all pertinent labs and cardiac studies.      Chemistry        Component Value Date/Time     02/22/2017 2048    K 4.0 02/22/2017 2048     02/22/2017 2048    CO2 28 02/22/2017 2048    BUN 15 02/22/2017 2048    CREATININE 0.8 02/22/2017 2048     (H) 02/22/2017 2048        Component Value Date/Time    CALCIUM 10.3 02/22/2017 2048    ALKPHOS 123 02/22/2017 2048    AST 29 02/22/2017 2048    ALT 23 02/22/2017 2048    BILITOT 0.3 02/22/2017 2048    ESTGFRAFRICA >60 02/22/2017 2048    EGFRNONAA >60 02/22/2017 2048        Lab Results   Component Value Date    WBC 5.34 02/22/2017    HGB 12.2 (L) 02/22/2017    HCT 35.9 (L) 02/22/2017    MCV 89 02/22/2017     02/22/2017     Lab Results   Component Value Date    HGBA1C 7.9 (H) 12/02/2016     Lab Results   Component Value Date    CHOL 188 12/02/2016    CHOL 178 09/01/2016    CHOL 204 (H) 05/24/2016     Lab Results   Component Value Date    HDL 50 12/02/2016    HDL 50 09/01/2016    HDL 47 05/24/2016     Lab Results   Component Value Date    LDLCALC  119.2 2016    LDLCALC 111.0 2016    LDLCALC 131.0 2016     Lab Results   Component Value Date    TRIG 94 2016    TRIG 85 2016    TRIG 130 2016     Lab Results   Component Value Date    CHOLHDL 26.6 2016    CHOLHDL 28.1 2016    CHOLHDL 23.0 2016   ;    Assessment:       1. Paroxysmal atrial fibrillation    2. Pacemaker    3. Mixed hyperlipidemia    4. Peripheral vascular disease    5. Carotid artery disease without cerebral infarction    6. Claudication in peripheral vascular disease    7. Coronary artery disease involving coronary bypass graft of native heart without angina pectoris    8. Essential hypertension    9. Aneurysm of ascending aorta    10. Asymptomatic stenosis of both carotid arteries without infarction    11. Peripheral vascular disease due to secondary diabetes mellitus    12. Jugular vein thrombosis    13. Non-rheumatic tricuspid valve insufficiency         Plan:             - S/p hemorrhagic CVA on asa 81 mg qd.  Pt is very fortunate that he stopped the Eliquis about a month before his hemorrhagic CVA or otherwise likely would have .  - Fu with Neurosurgery.    - Pt is not cleared to resume ASA, very concerning about ever restarting it due to what appears to be spontaneous hemorrhage on low dose ASA.  - Lower fish oil to 1 g daily.  - Continue all other meds.  - Cardiac diet  - F/u with vascular surgery on thoracic aneurysm, which just needs monitoring in future as it is not large enough to require operative repair.  - f/u carotid disease in future.      F/u 2 months.

## 2017-10-13 ENCOUNTER — TELEPHONE (OUTPATIENT)
Dept: FAMILY MEDICINE | Facility: CLINIC | Age: 82
End: 2017-10-13

## 2017-10-13 NOTE — TELEPHONE ENCOUNTER
----- Message from Isabel Shah sent at 10/13/2017  3:23 PM CDT -----  [facility]  Would like to speak to nurse. Please call back at [phone]. thanks

## 2017-10-16 NOTE — TELEPHONE ENCOUNTER
Patient c/o a headache , he did not know what he can take for this since he had a stroke. He did not want to take something that would interfere with his other medication.  Please advise

## 2017-10-18 ENCOUNTER — CLINICAL SUPPORT (OUTPATIENT)
Dept: CARDIOLOGY | Facility: CLINIC | Age: 82
End: 2017-10-18
Payer: MEDICARE

## 2017-10-18 DIAGNOSIS — I48.0 PAF (PAROXYSMAL ATRIAL FIBRILLATION): ICD-10-CM

## 2017-10-18 DIAGNOSIS — Z95.0 CARDIAC PACEMAKER IN SITU: ICD-10-CM

## 2017-10-18 DIAGNOSIS — I49.5 SSS (SICK SINUS SYNDROME): ICD-10-CM

## 2017-10-18 PROCEDURE — 93280 PM DEVICE PROGR EVAL DUAL: CPT | Mod: 26,,, | Performed by: INTERNAL MEDICINE

## 2017-10-26 ENCOUNTER — PATIENT MESSAGE (OUTPATIENT)
Dept: FAMILY MEDICINE | Facility: CLINIC | Age: 82
End: 2017-10-26

## 2017-10-26 DIAGNOSIS — M79.672 PAIN IN BOTH FEET: Primary | ICD-10-CM

## 2017-10-26 DIAGNOSIS — M79.671 PAIN IN BOTH FEET: Primary | ICD-10-CM

## 2017-11-07 ENCOUNTER — OFFICE VISIT (OUTPATIENT)
Dept: OTOLARYNGOLOGY | Facility: CLINIC | Age: 82
End: 2017-11-07
Payer: MEDICARE

## 2017-11-07 ENCOUNTER — CLINICAL SUPPORT (OUTPATIENT)
Dept: AUDIOLOGY | Facility: CLINIC | Age: 82
End: 2017-11-07
Payer: MEDICARE

## 2017-11-07 VITALS
SYSTOLIC BLOOD PRESSURE: 133 MMHG | WEIGHT: 180.75 LBS | DIASTOLIC BLOOD PRESSURE: 75 MMHG | HEART RATE: 95 BPM | TEMPERATURE: 98 F | BODY MASS INDEX: 25.21 KG/M2

## 2017-11-07 DIAGNOSIS — M27.8 JAW MASS: ICD-10-CM

## 2017-11-07 DIAGNOSIS — H81.12 BPPV (BENIGN PAROXYSMAL POSITIONAL VERTIGO), LEFT: Primary | ICD-10-CM

## 2017-11-07 DIAGNOSIS — H81.12 BENIGN PAROXYSMAL POSITIONAL VERTIGO OF LEFT EAR: Primary | ICD-10-CM

## 2017-11-07 PROCEDURE — 92541 SPONTANEOUS NYSTAGMUS TEST: CPT | Mod: S$GLB,,, | Performed by: AUDIOLOGIST-HEARING AID FITTER

## 2017-11-07 PROCEDURE — 92542 POSITIONAL NYSTAGMUS TEST: CPT | Mod: 59,S$GLB,, | Performed by: AUDIOLOGIST-HEARING AID FITTER

## 2017-11-07 PROCEDURE — 99203 OFFICE O/P NEW LOW 30 MIN: CPT | Mod: 25,S$GLB,, | Performed by: PHYSICIAN ASSISTANT

## 2017-11-07 PROCEDURE — 95992 CANALITH REPOSITIONING PROC: CPT | Mod: S$GLB,,, | Performed by: PHYSICIAN ASSISTANT

## 2017-11-07 PROCEDURE — 99999 PR PBB SHADOW E&M-EST. PATIENT-LVL IV: CPT | Mod: PBBFAC,,, | Performed by: PHYSICIAN ASSISTANT

## 2017-11-07 NOTE — PROGRESS NOTES
Anthony LAWRENCE Johnnie was seen 11/07/2017 for a vestibular screen prior to Qian Starkey PA-C.  Patient has history of left BPPV that resolved with repositioning.  He had a stroke in September.  About one week following, onset of dizziness described as lightheadedness rather than spinning.  It is provoked when lying in bed and as of today also upon arising from bed.  He has hearing loss and wears hearing aids.     VNG Screen:  Spontaneous test: Absent for nystagmus  Gaze test: Absent for nystagmus  Static Positional test: Absent for nystagmus  [place] Right: Negative for BPPV  [place] Left: Positive for BPPV - transient slight upward and leftward nystagmus was elicited; fatigued upon repeat.    Summary: BPPV, Left ear    Recommendations:  1. Left Epley maneuver, assisted ENT  2. Head restrictions for one week following Epley   3. Consider diagnostic VNG if dizziness persists due to history of recurrent BPPV and positional nystagmus

## 2017-11-07 NOTE — PROGRESS NOTES
"  Subjective:   Patient: Anthony Blair [id number], :1933   Visit date:2017 11:40 AM    Chief Complaint:  Dizziness (eval for repositioning )    HPI:  Anthony is a 84 y.o. male who is here to see me today for the first time for evaluation of dizziness.  He has had issues with BPPV in the past (here in ; again in 2014 with normal VNG).  He reports having a stroke on 17.  About 7-10 days after that, he started having dizziness again.  He says it feels like it did before.  He has "swimming of the head" that was occurring when he first laid down in bed.  He says it lasts few seconds.  He usually sleeps on his right side but says it happens when he's flat on his back.  This morning was the first time it happened when he got out of bed.  He said he had to hang on to something.  It was better after he laid down again.  Denies ear pain or drainage.  He has hearing aids AU; denies recent change in hearing; tinnitus at baseline.  Denies headaches; says his vision gets blurry when his blood sugar is low.  He quit smoking in .      Review of Systems:  Negative unless checked off.  Gen:  []fever   [x]fatigue (generalized weakness since CVA in 2017)  HENT:  []nosebleeds  []dental problem   Eyes:  []photophobia  [x]visual disturbance (only when sugar is low)  Resp:  []chest tightness []wheezing  Card:  []chest pain  []leg swelling  GI:  []abdominal pain [x]diarrhea  :  []dysuria  []hematuria  Musc:  []joint swelling  [x]gait problem - uses cane  Skin:  []color change  []pallor  Neuro:  []seizures  []numbness  Hem:  []bruise/bleed easily  Psych:  []hallucinations  []behavioral problems  Allergy/Imm: is allergic to atorvastatin; codeine; norvasc [amlodipine]; trulicity [dulaglutide]; and adhesive.    His meds, allergies, medical, surgical, social & family histories were reviewed & updated:  -     He has a current medication list which includes the following prescription(s): bd ultra-fine jeremías pen needles, " blood sugar diagnostic, carboxymethylcellulose sodium, fish oil-omega-3 fatty acids, fluticasone, furosemide, insulin aspart, insulin glargine, isosorbide mononitrate, lancets, meloxicam, metformin, metformin, metoprolol succinate, nitrostat, potassium chloride sa, ranitidine, saw palmetto fruit, and tramadol.  -     He  has a past medical history of A-fib; Anemia; AP (angina pectoris) (1/23/2014); CAD (coronary artery disease); Carotid artery disease without cerebral infarction (8/22/2014); Cataract; Diabetes mellitus type II; GERD (gastroesophageal reflux disease); GERD (gastroesophageal reflux disease) (7/11/2013); Hyperlipidemia; Hypertension; Lumbosacral spondylosis (12/24/2014); Mixed hyperlipidemia (1/23/2014); Pacemaker (1/23/2014); Paroxysmal atrial fibrillation (1/23/2014); Peripheral vascular disease (8/22/2014); S/P CABG (coronary artery bypass graft) (1/23/2014); and Tobacco dependence.   -     He  does not have any pertinent problems on file.   -     He  has a past surgical history that includes Vein bypass surgery; pace maker surgrey (10/2010); Eye surgery; Cataract extraction; Cataract extraction w/ intraocular lens implant (Right); Cataract extraction w/ intraocular lens implant (Left); Angioplasty; Coronary artery bypass graft (07/2010); Atrial ablation surgery (N/A); and Appendectomy.  -     He  reports that he quit smoking about 50 years ago. His smoking use included Cigarettes. He started smoking about 62 years ago. He has a 26.00 pack-year smoking history. He has never used smokeless tobacco. He reports that he does not drink alcohol or use drugs.  -     His family history includes Alcohol abuse in his paternal uncle; Arthritis in his father and mother; Cancer in his daughter and sister; Diabetes in his brother, daughter, father, mother, sister, son, and son; Fibromyalgia in his daughter; Heart disease in his brother, mother, and sister; Kidney disease in his brother and mother; Miscarriages /  Stillbirths in his daughter.  -     He is allergic to atorvastatin; codeine; norvasc [amlodipine]; trulicity [dulaglutide]; and adhesive.    Objective:     Physical Exam:  Vitals:  /75   Pulse 95   Temp 98.2 °F (36.8 °C) (Tympanic)   Wt 82 kg (180 lb 12.4 oz)   BMI 25.21 kg/m²   General appearance:  Well developed, well nourished.  Uses cane when ambulating    Eyes:  Extraocular motions intact, PERRL.  Wears eyeglasses    Communication:  no hoarseness, no dysphonia    Ears:  Otoscopy of external auditory canals and tympanic membranes was normal, clinical speech reception thresholds grossly intact, no mass/lesion of auricle.  Has hearing aids AU.  Nose:  No masses/lesions of external nose, nasal mucosa, septum, and turbinates were within normal limits.  Mouth:  No mass/lesion of lips, teeth, gums, hard/soft palate, tongue, tonsils, or oropharynx.  Firm 3 cm mass palpated along right mid-mandible; fixed; not tender, no erythema or fluctuance    Cardiovascular:  Radial Pulses +2     Neck & Lymphatics:  No cervical lymphadenopathy, no neck mass/crepitus/ asymmetry, trachea is midline, no thyroid enlargement/tenderness/mass.    Psych: Oriented x3,  Alert with normal mood and affect.     Respiration/Chest:  Symmetric expansion during respiration, normal respiratory effort.    Skin:  Warm and intact. No ulcerations of face, scalp, neck.        VNG Screen:  Spontaneous test: Absent for nystagmus  Gaze test: Absent for nystagmus  Static Positional test: Absent for nystagmus  Pinson-Hallpike Right: Negative for BPPV  Brayan-Hallpike Left: Positive for BPPV - transient slight upward and leftward nystagmus was elicited; fatigued upon repeat.     Summary: BPPV, Left ear            Anthony Blair was seen 11/07/2017 for Epley maneuver for BPPV in the left ear.      A 5-position Epley maneuver was performed for the left.  Patient tolerated the maneuver well and was asymptomatic upon discharge.  No nystagmus seen on  post-procedure examination.  Post-Epley home instructions were reviewed and given to the patient.  Understanding was voiced.          Assessment & Plan:   Anthony was seen today for dizziness.    Diagnoses and all orders for this visit:    Benign paroxysmal positional vertigo of left ear    Jaw mass      1.  BPPV, left:  We had a long discussion regarding the relevant anatomy and pathology relevant to BPPV.  We discussed that in the ear there are three semicircular canals that detect rotational movement.  BPPV occurs as a result of otoconia, tiny crystals of calcium carbonate that are a normal part of the inner ears anatomy, detaching from their normal anatomic position and collecting in one of the semicircular canals.  When the head moves, the otoconia shift. This stimulates the cupula to send false signals to the brain, producing vertigo and triggering nystagmus (involuntary eye movements).  He had a left Epley maneuver done today and was given the necessary post-procedure instructions at that time.  Discussed that he should continue head restrictions for one week following Epley.  Recommend diagnostic VNG if dizziness persists due to history of recurrent BPPV and positional nystagmus.  He will call with his progress next week and schedule VNG if still symptomatic.  2.  Right jaw mass:  He says the knot on his right mandible has been present for close to 20 years.  He reports previous workup by his dentist approximately 20 years ago.  He can't remember if biopsy was ever done.  He was told it was nothing to worry about.  He says it's not changed in size in all this time and he has no pain or swallowing difficulties associated with it.  He does not wish to pursue further evaluation at this time.        We discussed his medical conditions, treatments and plan.  Anthony should return to clinic if any issues arise (symptoms worsen or persist), otherwise we will see him back in the clinic only as needed.

## 2017-11-16 DIAGNOSIS — M79.672 BILATERAL FOOT PAIN: Primary | ICD-10-CM

## 2017-11-16 DIAGNOSIS — M79.671 BILATERAL FOOT PAIN: Primary | ICD-10-CM

## 2017-11-17 ENCOUNTER — OFFICE VISIT (OUTPATIENT)
Dept: PODIATRY | Facility: CLINIC | Age: 82
End: 2017-11-17
Payer: MEDICARE

## 2017-11-17 ENCOUNTER — HOSPITAL ENCOUNTER (OUTPATIENT)
Dept: RADIOLOGY | Facility: HOSPITAL | Age: 82
Discharge: HOME | End: 2017-11-17
Attending: PODIATRIST
Payer: MEDICARE

## 2017-11-17 VITALS
SYSTOLIC BLOOD PRESSURE: 156 MMHG | WEIGHT: 181.44 LBS | HEART RATE: 93 BPM | HEIGHT: 69 IN | DIASTOLIC BLOOD PRESSURE: 86 MMHG | BODY MASS INDEX: 26.87 KG/M2

## 2017-11-17 DIAGNOSIS — E11.9 COMPREHENSIVE DIABETIC FOOT EXAMINATION, TYPE 2 DM, ENCOUNTER FOR: ICD-10-CM

## 2017-11-17 DIAGNOSIS — M77.41 METATARSALGIA OF BOTH FEET: Primary | ICD-10-CM

## 2017-11-17 DIAGNOSIS — M79.671 BILATERAL FOOT PAIN: ICD-10-CM

## 2017-11-17 DIAGNOSIS — M79.672 BILATERAL FOOT PAIN: ICD-10-CM

## 2017-11-17 DIAGNOSIS — M77.42 METATARSALGIA OF BOTH FEET: Primary | ICD-10-CM

## 2017-11-17 PROCEDURE — 99999 PR PBB SHADOW E&M-EST. PATIENT-LVL IV: CPT | Mod: PBBFAC,,, | Performed by: PODIATRIST

## 2017-11-17 PROCEDURE — 99499 UNLISTED E&M SERVICE: CPT | Mod: S$GLB,,, | Performed by: PODIATRIST

## 2017-11-17 PROCEDURE — 73630 X-RAY EXAM OF FOOT: CPT | Mod: 50,TC

## 2017-11-17 PROCEDURE — 99203 OFFICE O/P NEW LOW 30 MIN: CPT | Mod: S$GLB,,, | Performed by: PODIATRIST

## 2017-11-17 PROCEDURE — 73630 X-RAY EXAM OF FOOT: CPT | Mod: 26,50,, | Performed by: RADIOLOGY

## 2017-11-17 NOTE — LETTER
November 17, 2017      Abdulaziz Mccabe MD  [id number] [address] [address]           [facility]  [id number] [place] [id number]  Phone: [phone]  Fax: [phone]          Patient: Anthony Blair   MR Number: [id number]   YOB: 1933   Date of Visit: 11/17/2017       Dear Dr. Abdulaziz Mccabe:    Thank you for referring Anthony Blair to me for evaluation. Attached you will find relevant portions of my assessment and plan of care.    If you have questions, please do not hesitate to call me. I look forward to following Anthony Blair along with you.    Sincerely,    Mary Carlton DPM    Enclosure  CC:  No Recipients    If you would like to receive this communication electronically, please contact [email or web address] or [phone] to request more information on [employer] Link access.    For providers and/or their staff who would like to refer a patient to [facility], please contact us through our one-stop-shop provider referral line, [facility], at [phone].    If you feel you have received this communication in error or would no longer like to receive these types of communications, please e-mail [email or web address]

## 2017-11-17 NOTE — PATIENT INSTRUCTIONS
Metatarsalgia     Metarsalgia is irritation/inflammation and/or damage to the supportive connective tissues (i.e. bone, joint capsule, tendon, and/or ligaments) located at the ball of the foot (the metatarsal heads, which may also include the metatarsoophalangeal joints where the toes connect to the foot). This is caused by accumulation of slightly increased but repetitive weightbearing pressure to the ball of the foot, or a one time activity of significant overuse to the foot.  Once this develops, it may take more than 6 weeks and up to 3 months to resolve with diligent treatment measures on a daily basis.  Unfortunately, this is usually not a quick fix type of problem, especially if it's been going on for a month or more.    The goal of treatment is to decrease the weight bearing pressures being transferred to the ball of the foot (the heads of the 2nd, 3rd and 4th metatarsals just behind the respective toes).  This can be done over a shorter period of time (6-8 weeks) by wearing an orthopedic boot daily.  These boots have a rocker bottom to them and transfer weightbearing pressures away from the foot significantly.  Over 1-2 months, the decreased weightbearing pressures allow the connective tissues to heal and regain some structural integrity.    Wearing a metatarsal pad will also help redistribute forces along the ball of your foot and decrease inflammation. These are pads that typically have a tear-drop type of shape and are placed just behind the ball of the foot in a shoe. Often they are made out of felt or silicon. Cushioning style over-the-counter inserts with a built-in metatarsal pad will also help. You may try to wear a motion-control shoes for a couple months or a rocker-bottom shoe ('s Shape Ups or similar). Similar to the orthopedic boot, the rocker-bottom shoe reduces motion of the toe joints and helps control the inflammation.    Examples of good motion-control shoes    Navjot:  Chris  Addiction  New Balance: 587, 927, 1123, 1012  Asics Gel Evolution  [place]: ProGrid Stabil, ProGrid [place]  Mizuno: Wave Alchemy    Shoes in these categories are athletic-type shoes that are well-cushioned, structurally supportive, limit over-pronation, provide a good forefoot rocker, and stabilize the midfoot.  These properties work together to help a lot of foot conditions, including ball of foot pain, plantar fasciitis, midfoot arthritis, and symptomatic flatfeet. Patients are recommended to wear motion-control shoes as their regular footwear for 2-3 months.  It is advisable to have a couple pairs of these and wear them indoors as well.    Because the Achilles tendon is often tight in people with similar foot problems, a daily Achilles stretching regimen is recommended.  This can be done by looping a towel around the ball of your foot and gently pulling back for 30-40 seconds.  This should be done 5 times per day.  When you don't have a towel available, it is helpful to bend your foot back towards your shin to help stretch the Achilles when initiating weight bearing after a period of rest or sitting.    It is also important to rest from any excessive weightbearing activities: long walks, hikes, dancing, high-impact sports (basketball, tennis, racquetball, etc).  Instead, if you regularly exercise, consider low-impact exercises, such as swimming or a stationary bike. Avoid walking barefooted on hard surfaces.  Use of good slippers at home will help reduce the pressures to the ball of your feet.  Avoid use of ladders. Avoid excessive use of stairs    You may also take an anti-inflammatory medication such as Ibuprofen 600 mg three times a day with food for 3 weeks, but only if your stomach can tolerate it and if you are not taking a blood thinner or have a history of stomach / gastrointestinal or significant kidney problems.    This condition may take 8-12 weeks to resolve.  If no improvement is found after 4  weeks, then you may need to consider wearing a short leg walking cast, orthopedic boot, or discuss other treatment options with your doctor.

## 2017-11-17 NOTE — PROGRESS NOTES
[facility]  PODIATRIC MEDICINE AND SURGERY  PROGRESS NOTE  11/17/2017    PODIATRY NOTE  PCP: Dr. Abdulaziz Mccabe MD    CHIEF COMPLAINT   Chief Complaint   Patient presents with    Foot Pain     Ball of Foot pain on both feet. Xrays Prior. Rates pain 2/10    Diabetic Foot Exam     Last visit with PCP Dr. Mccabe 10/02/17       HPI  Anthony Blair is a 84 y.o. male who has a past medical history of A-fib; Anemia; AP (angina pectoris) (1/23/2014); CAD (coronary artery disease); Carotid artery disease without cerebral infarction (8/22/2014); Cataract; Diabetes mellitus type II; GERD (gastroesophageal reflux disease); GERD (gastroesophageal reflux disease) (7/11/2013); Hyperlipidemia; Hypertension; Lumbosacral spondylosis (12/24/2014); Mixed hyperlipidemia (1/23/2014); Pacemaker (1/23/2014); Paroxysmal atrial fibrillation (1/23/2014); Peripheral vascular disease (8/22/2014); S/P CABG (coronary artery bypass graft) (1/23/2014); and Tobacco dependence.   Anthony presents to clinic today complaining of bilateral ball of foot pain. Pain is worst early in the morning. Also C/O pain on toes 2 and 3 on both feet, only at night.    Patient describes pain as:   Location:bilateral  Quality: throbbing, intermittent pain  Severity:2/10  Duration:3 months  Modifying Factors (Aggravating): Walking with certain shoes/ barefoot walking with first step in the morning  Modifying Factors (Alleviating): Hard supportive shoes    Patient denies other pedal complaints at this time.     Hemoglobin A1C   Date Value Ref Range Status   12/02/2016 7.9 (H) 4.5 - 6.2 % Final     Comment:     According to ADA guidelines, hemoglobin A1C <7.0% represents  optimal control in non-pregnant diabetic patients.  Different  metrics may apply to specific populations.   Standards of Medical Care in Diabetes - 2016.  For the purpose of screening for the presence of diabetes:  <5.7%     Consistent with the absence of diabetes  5.7-6.4%  Consistent  with increasing risk for diabetes   (prediabetes)  >or=6.5%  Consistent with diabetes  Currently no consensus exists for use of hemoglobin A1C  for diagnosis of diabetes for children.     09/01/2016 7.4 (H) 4.5 - 6.2 % Final     Comment:     According to ADA guidelines, hemoglobin A1C <7.0% represents  optimal control in non-pregnant diabetic patients.  Different  metrics may apply to specific populations.   Standards of Medical Care in Diabetes - 2016.  For the purpose of screening for the presence of diabetes:  <5.7%     Consistent with the absence of diabetes  5.7-6.4%  Consistent with increasing risk for diabetes   (prediabetes)  >or=6.5%  Consistent with diabetes  Currently no consensus exists for use of hemoglobin A1C  for diagnosis of diabetes for children.     05/24/2016 7.2 (H) 4.5 - 6.2 % Final       PMH  Past Medical History:   Diagnosis Date    A-fib     Anemia     AP (angina pectoris) 1/23/2014    CAD (coronary artery disease)     Carotid artery disease without cerebral infarction 8/22/2014    Cataract     Diabetes mellitus type II     BS didn't check today 09/08/2016  A1C  7.4    GERD (gastroesophageal reflux disease)     GERD (gastroesophageal reflux disease) 7/11/2013    Hyperlipidemia     Hypertension     Lumbosacral spondylosis 12/24/2014    Mixed hyperlipidemia 1/23/2014    Pacemaker 1/23/2014    Paroxysmal atrial fibrillation 1/23/2014    Peripheral vascular disease 8/22/2014    S/P CABG (coronary artery bypass graft) 1/23/2014    Tobacco dependence     resolved       PROBLEM LIST  Patient Active Problem List    Diagnosis Date Noted    Aneurysm of ascending aorta 10/02/2017    Refractive error 05/16/2017    Asymptomatic stenosis of both carotid arteries without infarction 04/12/2017    Osteoarthritis of knees, bilateral 04/11/2017    Degenerative disc disease, cervical 04/11/2017    Peripheral vascular disease due to secondary diabetes mellitus 04/11/2017    Jugular vein  "thrombosis 03/14/2017    SOB (shortness of breath) 03/03/2017    Edema of both legs 02/09/2017    Blepharitis 07/08/2016    Tricuspid regurgitation 06/28/2016    Corneal rust ring of right eye 06/03/2016    Diabetes mellitus type 2 without retinopathy 01/21/2016    Pseudophakia 01/21/2016    Right epiretinal membrane 01/21/2016    Brow ptosis 01/21/2016    Dermatochalasis 01/21/2016    Claudication in peripheral vascular disease 03/25/2015    Lumbosacral spondylosis 12/24/2014     Class: Chronic    Peripheral vascular disease 08/22/2014    Carotid artery disease without cerebral infarction 08/22/2014    Paroxysmal atrial fibrillation 01/23/2014    Pacemaker 01/23/2014    Mixed hyperlipidemia 01/23/2014    Essential hypertension 01/23/2014    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 100 each 6    blood sugar diagnostic (ACCU-CHEK TOMAS PLUS TEST STRP) Strp 1 strip by Other route 6 (six) times daily. 300 each 6    CARBOXYMETHYLCELLULOSE SODIUM (REFRESH OPHT) Apply to eye.      fluticasone (FLONASE) 50 mcg/actuation nasal spray 2 sprays by Each Nare route once daily. 16 g 11    furosemide (LASIX) 20 MG tablet TAKE ONE TABLET BY MOUTH DAILY 30 tablet 12    insulin aspart (NOVOLOG) 100 unit/mL InPn pen Inject 5 units 3 times a day before meals 1 Box 11    insulin glargine (LANTUS SOLOSTAR) 100 unit/mL (3 mL) InPn pen INJECT 16 UNITS SUB-Q AT BEDTIME 15 mL 11    isosorbide mononitrate (IMDUR) 30 MG 24 hr tablet Take 30 mg by mouth 2 (two) times daily.      lancets (ACCU-CHEK MULTICLIX LANCET) Misc " "TEST 3 TIMES A  each 4    meloxicam (MOBIC) 15 MG tablet Take 1 tablet by mouth once daily.  2    metformin (GLUCOPHAGE) 1000 MG tablet Take 1 tablet by mouth 2 (two) times daily before meals for 30 days. 60 tablet 4    metformin (GLUCOPHAGE) 500 MG tablet TAKE ONE BY MOUTH TWICE A DAY WITH FOOD. (Patient taking differently: Take 1,000 mg by mouth 2 (two) times daily with meals. TAKE ONE BY MOUTH TWICE A DAY WITH FOOD.) 60 tablet 6    metoprolol succinate (TOPROL-XL) 100 MG 24 hr tablet TAKE ONE TABLET BY MOUTH TWICE DAILY 60 tablet 12    NITROSTAT 0.4 mg SL tablet Place 1 tablet (0.4 mg total) under the tongue every 5 (five) minutes as needed for Chest pain. 25 tablet 12    potassium chloride SA (K-DUR,KLOR-CON) 20 MEQ tablet TAKE ONE TABLET BY MOUTH EVERY DAY 30 tablet 12    ranitidine (ZANTAC) 75 MG tablet Take 75 mg by mouth nightly.      saw palmetto fruit 450 mg Cap Take 1 capsule by mouth Twice daily.      [DISCONTINUED] fish oil-omega-3 fatty acids 300-1,000 mg capsule Take 1 capsule (1 g total) by mouth once daily.      [DISCONTINUED] tramadol (ULTRAM) 50 mg tablet Take 1 tablet (50 mg total) by mouth every 6 (six) hours as needed for Pain. 60 tablet 0     No current facility-administered medications on file prior to visit.        Medication List with Changes/Refills   Current Medications    BD ULTRA-FINE DIRK PEN NEEDLES 32 GAUGE X 5/32" NDLE    USE FOUR TIMES DAILY.    BLOOD SUGAR DIAGNOSTIC (ACCU-CHEK TOMAS PLUS TEST STRP) STRP    1 strip by Other route 6 (six) times daily.    CARBOXYMETHYLCELLULOSE SODIUM (REFRESH OPHT)    Apply to eye.    FLUTICASONE (FLONASE) 50 MCG/ACTUATION NASAL SPRAY    2 sprays by Each Nare route once daily.    FUROSEMIDE (LASIX) 20 MG TABLET    TAKE ONE TABLET BY MOUTH DAILY    INSULIN ASPART (NOVOLOG) 100 UNIT/ML INPN PEN    Inject 5 units 3 times a day before meals    INSULIN GLARGINE (LANTUS SOLOSTAR) 100 UNIT/ML (3 ML) INPN PEN    INJECT 16 UNITS SUB-Q AT " BEDTIME    ISOSORBIDE MONONITRATE (IMDUR) 30 MG 24 HR TABLET    Take 30 mg by mouth 2 (two) times daily.    LANCETS (ACCU-CHEK MULTICLIX LANCET) MISC    TEST 3 TIMES A DAY    MELOXICAM (MOBIC) 15 MG TABLET    Take 1 tablet by mouth once daily.    METFORMIN (GLUCOPHAGE) 1000 MG TABLET    Take 1 tablet by mouth 2 (two) times daily before meals for 30 days.    METFORMIN (GLUCOPHAGE) 500 MG TABLET    TAKE ONE BY MOUTH TWICE A DAY WITH FOOD.    METOPROLOL SUCCINATE (TOPROL-XL) 100 MG 24 HR TABLET    TAKE ONE TABLET BY MOUTH TWICE DAILY    NITROSTAT 0.4 MG SL TABLET    Place 1 tablet (0.4 mg total) under the tongue every 5 (five) minutes as needed for Chest pain.    POTASSIUM CHLORIDE SA (K-DUR,KLOR-CON) 20 MEQ TABLET    TAKE ONE TABLET BY MOUTH EVERY DAY    RANITIDINE (ZANTAC) 75 MG TABLET    Take 75 mg by mouth nightly.    SAW PALMETTO FRUIT 450 MG CAP    Take 1 capsule by mouth Twice daily.   Discontinued Medications    FISH OIL-OMEGA-3 FATTY ACIDS 300-1,000 MG CAPSULE    Take 1 capsule (1 g total) by mouth once daily.    TRAMADOL (ULTRAM) 50 MG TABLET    Take 1 tablet (50 mg total) by mouth every 6 (six) hours as needed for Pain.       PSH     Past Surgical History:   Procedure Laterality Date    ANGIOPLASTY      APPENDECTOMY      ATRIAL ABLATION SURGERY N/A     CATARACT EXTRACTION      CATARACT EXTRACTION W/ INTRAOCULAR LENS IMPLANT Right     CATARACT EXTRACTION W/ INTRAOCULAR LENS IMPLANT Left     CORONARY ARTERY BYPASS GRAFT  07/2010    x5    EYE SURGERY      pace maker surgrey  10/2010    reposition in 2011/2012 (approx)    VEIN BYPASS SURGERY          ALL  Review of patient's allergies indicates:   Allergen Reactions    Atorvastatin      Other reaction(s): Muscle pain  Other reaction(s): Muscle weakness  Other reaction(s): Muscle pain    Codeine      Other reaction(s): Headache  Other reaction(s): Headache    Norvasc [amlodipine] Edema    Trulicity [dulaglutide] Nausea And Vomiting    Adhesive Rash  "    Other reaction(s): rash       SOC     Social History   Substance Use Topics    Smoking status: Former Smoker     Packs/day: 2.00     Years: 13.00     Types: Cigarettes     Start date: 11/25/1954     Quit date: 6/7/1967    Smokeless tobacco: Never Used    Alcohol use No         FAMILY HX    Family History   Problem Relation Age of Onset    Arthritis Mother     Diabetes Mother     Kidney disease Mother     Heart disease Mother     Arthritis Father     Diabetes Father     Diabetes Sister     Cancer Sister      breast    Heart disease Sister     Diabetes Brother     Kidney disease Brother     Heart disease Brother     Cancer Daughter      breast    Diabetes Daughter     Miscarriages / Stillbirths Daughter     Fibromyalgia Daughter     Diabetes Son     Diabetes Son     Alcohol abuse Paternal Uncle     Stroke Neg Hx             REVIEW OF SYSTEMS  Constitutional: Negative for chills and fever.   Respiratory: Negative for shortness of breath.    Cardiovascular: Negative for chest pain, palpitations, orthopnea, claudication and leg swelling.   Gastrointestinal: Negative for diarrhea, nausea and vomiting.   Musculoskeletal: positive for bilateral foot pain  Skin: Negative for rash.   Neurological: Negative for dizziness, tingling, sensory change, focal weakness and weakness.   Psychiatric/Behavioral: Negative.    PHYSICAL EXAM  Vitals:    11/17/17 1017   BP: (!) 156/86   Pulse: 93   Weight: 82.3 kg (181 lb 7 oz)   Height: 5' 9" (1.753 m)   PainSc:   2   PainLoc: Foot       General: This patient is well-developed, well-nourished and appears stated age, well-oriented to person, place and time, and cooperative and pleasant on today's visit    LOWER EXTREMITY  Vascular exam:   · Dorsalis pedis and posterior tibial pulses palpable 0/4 bilaterally.   · Capillary refill time immediate to the toes.   · Feet are warm to the touch. Skin temperature warm to warm from proximally to distally   · There are " varicosities, telangiectasias noted to bilateral foot and ankle regions.   · There are no ecchymoses noted to bilateral foot and ankle regions.   · There is no gross lower extremity edema.    Dermatologic exam:   · Skin moist with healthy texture and turgor.  · There are no open ulcerations, lacerations, or fissures to bilateral foot and ankle regions. There are no signs of infection as there is no erythema, no proximal-extending lymphangiitis, no fluctuance, or crepitus noted on palpation to bilateral foot and ankle regions.   · There is no interdigital maceration.   · There are hyperkeratotic lesions noted to feet. Nails are thickened and discolored well-trimmed.    Neurologic exam:  · Epicritic sensation is intact as the patient is able to sense light touch to bilateral foot and ankle regions.   · Achilles and patellar deep tendon reflexes intact  · Babinski reflex absent    Musculoskeletal/Orthopedic exam:   · Muscle strength AT/EHL/EDL/PT: 5/5; Achilles/Gastroc/Soleus: 5/5; PB/PL: 5/5 Muscle tone is normal.  · Ankle joint ROM is decreased with knee extended and flexed b/l with mild crepitus  · STJ ROM decreased inv/ev, non crepitus   · MTPJ b/l decreased DF/PF, non crepitus  · Foot type: Pes cavus foot type, no pain with metatarsal heads 1-5 bilateral  · Digital contractures noted 2-4 bilateral    IMAGING   Reviewed by me and I agree with radiologist findings, 3 views of foot/ankle, reveal:  Results for orders placed during the hospital encounter of 11/17/17   X-Ray Foot Complete Bilateral    Narrative Bilateral feet three views.    Findings: There is mild bilateral multi-articular degenerative change.  Calcaneal spurring on both sides.  No periarticular erosions.  No acute fracture or dislocation.  Atherosclerotic vascular calcifications.    Impression  As above.      Electronically signed by: DELMI CANSECO MD  Date:     11/17/17  Time:    10:27            Results for orders placed during the hospital  encounter of 11/17/17   X-Ray Foot Complete Bilateral    Narrative Bilateral feet three views.    Findings: There is mild bilateral multi-articular degenerative change.  Calcaneal spurring on both sides.  No periarticular erosions.  No acute fracture or dislocation.  Atherosclerotic vascular calcifications.    Impression  As above.      Electronically signed by: DELMI CANSECO MD  Date:     11/17/17  Time:    10:27        No results found for this or any previous visit.     No results found for this or any previous visit.        ASSESSMENT  Encounter Diagnoses   Name Primary?    Comprehensive diabetic foot examination, type 2 DM, encounter for     Metatarsalgia of both feet Yes         PLAN  1. Patient was educated about clinical and imaging findings, and verbalizes understanding of above.     Diagnoses and all orders for this visit:  Metatarsalgia of both feet    Comprehensive diabetic foot examination, type 2 DM, encounter for      2. Treatment plan: I discussed xray findings with patient and recommend   Stiff sole shoes  Orthotics Rx   Topical pain cream    Achilles tendon stretching exercises to help alleviate FF pressure   3. RTC  for follow up/evaluation as scheduled       Future Appointments  Date Time Provider Department Center   11/22/2017 9:30 AM SPECIMEN, [facility] SPECLAB [facility]   [facility]   12/22/2017 9:40 AM Seven Frazier MD [facility]       Report Electronically Signed By:  Mary Carlton DPM   Podiatric Medicine & Surgery  [facility]  11/17/2017

## 2017-11-19 ENCOUNTER — NURSE TRIAGE (OUTPATIENT)
Dept: ADMINISTRATIVE | Facility: CLINIC | Age: 82
End: 2017-11-19

## 2017-11-19 NOTE — TELEPHONE ENCOUNTER
"    Reason for Disposition   [1] Tingling in hand (e.g., pins and needles) AND [2] after prolonged laying on arm AND [3]  brief (now gone)    Answer Assessment - Initial Assessment Questions  1. SYMPTOM: "What is the main symptom you are concerned about?" (e.g., weakness, numbness)      Numbness of left arm  2. ONSET: "When did this start?" (minutes, hours, days; while sleeping)      Tonight   3. LAST NORMAL: "When was the last time you were normal (no symptoms)?"      Yesterday  4. PATTERN "Does this come and go, or has it been constant since it started?"  "Is it present now?"      Comes and goes, not present now  5. CARDIAC SYMPTOMS: "Have you had any of the following symptoms: chest pain, difficulty breathing, palpitations?"      Denies  6. NEUROLOGIC SYMPTOMS: "Have you had any of the following symptoms: headache, dizziness, vision loss, double vision, changes in speech, unsteady on your feet?"      Headache 1/10, CVA 9/2017  7. OTHER SYMPTOMS: "Do you have any other symptoms?"      Urinary frequency  8. PREGNANCY: "Is there any chance you are pregnant?" "When was your last menstrual period?"      Na    Protocols used: ST NEUROLOGIC DEFICIT-A-AH      "

## 2017-11-22 ENCOUNTER — LAB VISIT (OUTPATIENT)
Dept: LAB | Facility: HOSPITAL | Age: 82
End: 2017-11-22
Attending: FAMILY MEDICINE
Payer: MEDICARE

## 2017-11-22 DIAGNOSIS — I25.810 CORONARY ARTERY DISEASE INVOLVING CORONARY BYPASS GRAFT OF NATIVE HEART WITHOUT ANGINA PECTORIS: ICD-10-CM

## 2017-11-22 DIAGNOSIS — I48.0 PAROXYSMAL ATRIAL FIBRILLATION: Chronic | ICD-10-CM

## 2017-11-22 DIAGNOSIS — E78.2 MIXED HYPERLIPIDEMIA: Chronic | ICD-10-CM

## 2017-11-22 LAB
ALBUMIN SERPL BCP-MCNC: 3.7 G/DL
ALP SERPL-CCNC: 108 U/L
ALT SERPL W/O P-5'-P-CCNC: 16 U/L
ANION GAP SERPL CALC-SCNC: 9 MMOL/L
AST SERPL-CCNC: 26 U/L
BILIRUB SERPL-MCNC: 0.8 MG/DL
BUN SERPL-MCNC: 17 MG/DL
CALCIUM SERPL-MCNC: 10.8 MG/DL
CHLORIDE SERPL-SCNC: 99 MMOL/L
CHOLEST SERPL-MCNC: 190 MG/DL
CHOLEST/HDLC SERPL: 3.8 {RATIO}
CO2 SERPL-SCNC: 30 MMOL/L
CREAT SERPL-MCNC: 0.9 MG/DL
EST. GFR  (AFRICAN AMERICAN): >60 ML/MIN/1.73 M^2
EST. GFR  (NON AFRICAN AMERICAN): >60 ML/MIN/1.73 M^2
ESTIMATED AVG GLUCOSE: 169 MG/DL
GLUCOSE SERPL-MCNC: 114 MG/DL
HBA1C MFR BLD HPLC: 7.5 %
HDLC SERPL-MCNC: 50 MG/DL
HDLC SERPL: 26.3 %
LDLC SERPL CALC-MCNC: 119.8 MG/DL
NONHDLC SERPL-MCNC: 140 MG/DL
POTASSIUM SERPL-SCNC: 4.3 MMOL/L
PROT SERPL-MCNC: 7.5 G/DL
SODIUM SERPL-SCNC: 138 MMOL/L
TRIGL SERPL-MCNC: 101 MG/DL

## 2017-11-22 PROCEDURE — 36415 COLL VENOUS BLD VENIPUNCTURE: CPT | Mod: PO

## 2017-11-22 PROCEDURE — 80061 LIPID PANEL: CPT

## 2017-11-22 PROCEDURE — 83036 HEMOGLOBIN GLYCOSYLATED A1C: CPT

## 2017-11-22 PROCEDURE — 80053 COMPREHEN METABOLIC PANEL: CPT

## 2017-12-06 RX ORDER — FUROSEMIDE 20 MG/1
TABLET ORAL
Qty: 30 TABLET | Refills: 12 | Status: SHIPPED | OUTPATIENT
Start: 2017-12-06 | End: 2018-11-30 | Stop reason: SDUPTHER

## 2017-12-22 ENCOUNTER — OFFICE VISIT (OUTPATIENT)
Dept: CARDIOLOGY | Facility: CLINIC | Age: 82
End: 2017-12-22
Payer: MEDICARE

## 2017-12-22 VITALS — HEART RATE: 76 BPM | DIASTOLIC BLOOD PRESSURE: 68 MMHG | RESPIRATION RATE: 16 BRPM | SYSTOLIC BLOOD PRESSURE: 130 MMHG

## 2017-12-22 DIAGNOSIS — E78.2 MIXED HYPERLIPIDEMIA: Chronic | ICD-10-CM

## 2017-12-22 DIAGNOSIS — I71.21 ANEURYSM OF ASCENDING AORTA: ICD-10-CM

## 2017-12-22 DIAGNOSIS — E08.51 DIABETES MELLITUS DUE TO UNDERLYING CONDITION WITH DIABETIC PERIPHERAL ANGIOPATHY WITHOUT GANGRENE, WITHOUT LONG-TERM CURRENT USE OF INSULIN: ICD-10-CM

## 2017-12-22 DIAGNOSIS — I36.1 NON-RHEUMATIC TRICUSPID VALVE INSUFFICIENCY: ICD-10-CM

## 2017-12-22 DIAGNOSIS — E13.51 PERIPHERAL VASCULAR DISEASE DUE TO SECONDARY DIABETES MELLITUS: ICD-10-CM

## 2017-12-22 DIAGNOSIS — I73.9 CLAUDICATION IN PERIPHERAL VASCULAR DISEASE: Chronic | ICD-10-CM

## 2017-12-22 DIAGNOSIS — I10 ESSENTIAL HYPERTENSION: ICD-10-CM

## 2017-12-22 DIAGNOSIS — Z95.0 PACEMAKER: Chronic | ICD-10-CM

## 2017-12-22 DIAGNOSIS — I48.0 PAROXYSMAL ATRIAL FIBRILLATION: Primary | Chronic | ICD-10-CM

## 2017-12-22 DIAGNOSIS — I73.9 PERIPHERAL VASCULAR DISEASE: Chronic | ICD-10-CM

## 2017-12-22 DIAGNOSIS — I77.9 CAROTID ARTERY DISEASE WITHOUT CEREBRAL INFARCTION: Chronic | ICD-10-CM

## 2017-12-22 DIAGNOSIS — Z78.9 STATIN INTOLERANCE: ICD-10-CM

## 2017-12-22 DIAGNOSIS — I25.810 CORONARY ARTERY DISEASE INVOLVING CORONARY BYPASS GRAFT OF NATIVE HEART WITHOUT ANGINA PECTORIS: ICD-10-CM

## 2017-12-22 DIAGNOSIS — I65.23 ASYMPTOMATIC STENOSIS OF BOTH CAROTID ARTERIES WITHOUT INFARCTION: ICD-10-CM

## 2017-12-22 PROCEDURE — 99499 UNLISTED E&M SERVICE: CPT | Mod: S$GLB,,, | Performed by: INTERNAL MEDICINE

## 2017-12-22 PROCEDURE — 99999 PR PBB SHADOW E&M-EST. PATIENT-LVL II: CPT | Mod: PBBFAC,,, | Performed by: INTERNAL MEDICINE

## 2017-12-22 PROCEDURE — 99214 OFFICE O/P EST MOD 30 MIN: CPT | Mod: S$GLB,,, | Performed by: INTERNAL MEDICINE

## 2017-12-22 NOTE — PROGRESS NOTES
Subjective:    Patient ID:  Anthony Blair is a 84 y.o. male who presents for evaluation of Hyperlipidemia; Hypertension; Peripheral Arterial Disease; Coronary Artery Disease; Atrial Fibrillation; Carotid Artery Disease; and Risk Factor Management For Atherosclerosis      HPI Mr. Blair returns for fu.  His current medical conditions include PAF/PSVT, CAD (PTCA 1980's), HTN, PPM, DM, atrial septal aneurysm/pfo, PAD, dyslipidemia. Nonsmoker.  Past details pertinent for following:   Statin intolerant.  s/p CABG 7/10 (LIMA-LAD, SVG-OM1, SVG-OM3, SVG-PDA) for increasing OLIVARES and multivessel CAD on [facility].   S/p PPM 10/10 for SSS/PAF. Was hospitalized 1/11 for PSVT (Atrial tachycardia) and was started on Amiodarone but was stopped for GI discomfort. He also did not tolerate Multaq and has not tolerated multiple statins.   s/p EPS/ablation of his atrial tachycardia 6/11.   S/p abd w runoff March 2015 and had significant B LE infrapopliteal disease. Atherectomy/pta performed of critical R tibeoperoneal trunk stenosis. Also has L tibeoperoneal trunk stenosis and his PTA/MIGUEL are occluded bilaterally.  Started on Eliquis Feb 2017 for suspicious left internal jugular vein thrombus.  Stress MPI 3/17 showed no ischemia.  Echo 3/17 showed normal LV function, left-right atrial shunt.   Carotid u/s 6/17 showed mild bilateral disease, known L IJ thrombus noted.  Pt called clinic Sept 2017 stated he had stopped Eliquis couple weeks before his call due to diarrhea, weakness, bloating and also off Amlodipine due to sleepiness.  He stated sxs subsided when he stopped the meds.   He was advised by cardiology on 9/6/17 to take 81 mg ASA since he stopped his Eliquis.  He had acute hemorrhage of basal ganglia right side Sept 2017, causing weakness on left side.  Tx at [facility] 9/24/17. He was on ASA daily when CVA occurred and was not on Eliquis. Also noted to have mild thoracic aneurysm 4.1 cm, 60% R ICA stenosis, 30% LICA stenosis, patent  vertebral arteries but mod-severe distal left vertebral disease,  by CTA per PCP note.  He was taken off his asa.  Followed by Dr. Weaver, neurosurgery. No surgery was needed.  Echo showed normal LVEF.  Now here for f/u.  S/p CVA recovering.   Has residual weakness left hand.  PAD is stable.  He has leg weakness walking, more notable since his CVA.  No rest pain sxs.  Limited on walking after CVA.  No chest pain sxs.  Some occasional OLIVARES.  No pnd/orthopnea.  Not on asa.  He stopped his fish oil.     No neurological sxs to suggest recurrent tia/cva.  BP controlled on current meds, no associated sxs.  Lipids are stable.  Statin intolerant.  DM is controlled, HGAIC trending better.  PPM stable, normal device function on last interrogation.  Compliant with meds.       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, with long-term current use of insulin    Coronary artery disease involving coronary bypass graft without angina pectoris    GERD (gastroesophageal reflux disease)    Paroxysmal atrial fibrillation    Pacemaker    Mixed hyperlipidemia    Essential hypertension    Peripheral vascular disease    Carotid artery disease without cerebral infarction    Lumbosacral spondylosis    Claudication in peripheral vascular disease    Diabetes mellitus type 2 without retinopathy    Pseudophakia    Right epiretinal membrane    Brow ptosis    Dermatochalasis    Corneal rust ring of right eye    Tricuspid regurgitation    Blepharitis    Edema of both legs    SOB (shortness of breath)    Jugular vein thrombosis    Osteoarthritis of knees, bilateral    Degenerative disc disease, cervical    Peripheral vascular disease due to secondary diabetes mellitus    Asymptomatic stenosis of both carotid arteries without infarction    Refractive error    Aneurysm of ascending aorta    Statin intolerance    Diabetes mellitus due to underlying  "condition with diabetic peripheral angiopathy without gangrene       Current Outpatient Prescriptions:     BD ULTRA-FINE DIRK PEN NEEDLES 32 gauge x 5/32" Ndle, USE FOUR TIMES DAILY., Disp: 100 each, Rfl: 6    blood sugar diagnostic (ACCU-CHEK TOMAS PLUS TEST STRP) Strp, 1 strip by Other route 6 (six) times daily., Disp: 300 each, Rfl: 6    CARBOXYMETHYLCELLULOSE SODIUM (REFRESH OPHT), Apply to eye., Disp: , Rfl:     fluticasone (FLONASE) 50 mcg/actuation nasal spray, 2 sprays by Each Nare route once daily., Disp: 16 g, Rfl: 11    furosemide (LASIX) 20 MG tablet, TAKE ONE TABLET BY MOUTH DAILY, Disp: 30 tablet, Rfl: 12    insulin aspart (NOVOLOG) 100 unit/mL InPn pen, Inject 5 units 3 times a day before meals, Disp: 1 Box, Rfl: 11    insulin glargine (LANTUS SOLOSTAR) 100 unit/mL (3 mL) InPn pen, INJECT 16 UNITS SUB-Q AT BEDTIME, Disp: 15 mL, Rfl: 11    isosorbide mononitrate (IMDUR) 30 MG 24 hr tablet, Take 30 mg by mouth 2 (two) times daily., Disp: , Rfl:     lancets (ACCU-CHEK MULTICLIX LANCET) Misc, TEST 3 TIMES A DAY, Disp: 300 each, Rfl: 4    meloxicam (MOBIC) 15 MG tablet, Take 1 tablet by mouth once daily., Disp: , Rfl: 2    metformin (GLUCOPHAGE) 1000 MG tablet, Take 1 tablet by mouth 2 (two) times daily before meals for 30 days., Disp: 60 tablet, Rfl: 4    metformin (GLUCOPHAGE) 500 MG tablet, TAKE ONE BY MOUTH TWICE A DAY WITH FOOD. (Patient taking differently: Take 1,000 mg by mouth 2 (two) times daily with meals. TAKE ONE BY MOUTH TWICE A DAY WITH FOOD.), Disp: 60 tablet, Rfl: 6    metoprolol succinate (TOPROL-XL) 100 MG 24 hr tablet, TAKE ONE TABLET BY MOUTH TWICE DAILY, Disp: 60 tablet, Rfl: 12    NITROSTAT 0.4 mg SL tablet, Place 1 tablet (0.4 mg total) under the tongue every 5 (five) minutes as needed for Chest pain., Disp: 25 tablet, Rfl: 12    potassium chloride SA (K-DUR,KLOR-CON) 20 MEQ tablet, TAKE ONE TABLET BY MOUTH EVERY DAY, Disp: 30 tablet, Rfl: 12    ranitidine (ZANTAC) 75 " MG tablet, Take 75 mg by mouth nightly., Disp: , Rfl:     saw palmetto fruit 450 mg Cap, Take 1 capsule by mouth Twice daily., Disp: , Rfl:       Review of Systems   Constitution: Positive for weakness and malaise/fatigue.   HENT: Negative.    Eyes: Negative.    Cardiovascular: Positive for claudication and dyspnea on exertion.   Respiratory: Positive for shortness of breath.    Endocrine: Negative.    Hematologic/Lymphatic: Negative.    Skin: Negative.    Musculoskeletal: Positive for arthritis.   Gastrointestinal: Negative.    Genitourinary: Negative.    Neurological: Positive for focal weakness.   Psychiatric/Behavioral: Negative.    Allergic/Immunologic: Negative.        /68   Pulse 76   Resp 16     Wt Readings from Last 3 Encounters:   11/17/17 82.3 kg (181 lb 7 oz)   11/07/17 82 kg (180 lb 12.4 oz)   10/05/17 80.4 kg (177 lb 5.8 oz)     Temp Readings from Last 3 Encounters:   11/07/17 98.2 °F (36.8 °C) (Tympanic)   10/02/17 97.6 °F (36.4 °C) (Tympanic)   04/11/17 98 °F (36.7 °C) (Oral)     BP Readings from Last 3 Encounters:   12/22/17 130/68   11/17/17 (!) 156/86   11/07/17 133/75     Pulse Readings from Last 3 Encounters:   12/22/17 76   11/17/17 93   11/07/17 95          Objective:    Physical Exam   Constitutional: He is oriented to person, place, and time. He appears well-developed and well-nourished.   HENT:   Head: Normocephalic.   Neck: Normal range of motion. Neck supple. Normal carotid pulses, no hepatojugular reflux and no JVD present. Carotid bruit is not present. No thyromegaly present.   Cardiovascular: Normal rate, regular rhythm, S1 normal and S2 normal.  PMI is not displaced.  Exam reveals no S3, no S4, no distant heart sounds, no friction rub, no midsystolic click and no opening snap.    No murmur heard.  Pulses:       Radial pulses are 2+ on the right side, and 2+ on the left side.   Pulmonary/Chest: Effort normal and breath sounds normal. He has no wheezes. He has no rales.    Abdominal: Soft. Bowel sounds are normal. He exhibits no distension, no abdominal bruit, no ascites and no mass. There is no tenderness.   Musculoskeletal: He exhibits no edema.   Neurological: He is alert and oriented to person, place, and time.   Skin: Skin is warm.   Psychiatric: He has a normal mood and affect. His behavior is normal.   Nursing note and vitals reviewed.      I have reviewed all pertinent labs and cardiac studies.          Lab Results   Component Value Date    CHOL 190 11/22/2017    CHOL 188 12/02/2016    CHOL 178 09/01/2016     Lab Results   Component Value Date    HDL 50 11/22/2017    HDL 50 12/02/2016    HDL 50 09/01/2016     Lab Results   Component Value Date    LDLCALC 119.8 11/22/2017    LDLCALC 119.2 12/02/2016    LDLCALC 111.0 09/01/2016     Lab Results   Component Value Date    TRIG 101 11/22/2017    TRIG 94 12/02/2016    TRIG 85 09/01/2016     Lab Results   Component Value Date    CHOLHDL 26.3 11/22/2017    CHOLHDL 26.6 12/02/2016    CHOLHDL 28.1 09/01/2016       Chemistry        Component Value Date/Time     11/22/2017 0822    K 4.3 11/22/2017 0822    CL 99 11/22/2017 0822    CO2 30 (H) 11/22/2017 0822    BUN 17 11/22/2017 0822    CREATININE 0.9 11/22/2017 0822     (H) 11/22/2017 0822        Component Value Date/Time    CALCIUM 10.8 (H) 11/22/2017 0822    ALKPHOS 108 11/22/2017 0822    AST 26 11/22/2017 0822    ALT 16 11/22/2017 0822    BILITOT 0.8 11/22/2017 0822    ESTGFRAFRICA >60.0 11/22/2017 0822    EGFRNONAA >60.0 11/22/2017 0822        Lab Results   Component Value Date    WBC 5.34 02/22/2017    HGB 12.2 (L) 02/22/2017    HCT 35.9 (L) 02/22/2017    MCV 89 02/22/2017     02/22/2017     Lab Results   Component Value Date    HGBA1C 7.5 (H) 11/22/2017       Assessment:       1. Paroxysmal atrial fibrillation    2. Pacemaker    3. Mixed hyperlipidemia    4. Peripheral vascular disease    5. Carotid artery disease without cerebral infarction    6. Claudication in  peripheral vascular disease    7. Coronary artery disease involving coronary bypass graft of native heart without angina pectoris    8. Essential hypertension    9. Asymptomatic stenosis of both carotid arteries without infarction    10. Peripheral vascular disease due to secondary diabetes mellitus    11. Non-rheumatic tricuspid valve insufficiency    12. Aneurysm of ascending aorta    13. Statin intolerance    14. Diabetes mellitus due to underlying condition with diabetic peripheral angiopathy without gangrene, without long-term current use of insulin         Plan:             - Stable extensive/complex CV conditions on current medical tx.  - CAD: Stable..  No ischemia on stress MPI this year.  Continue medical tx.   - PAD:  Chronic stable claudication sxs.  Exercise as much as possible.  Not candidate for revascularization as pt cannot take antiplatelet tx now.  - HTN:  Stable.  Continue current HTN meds.  - DM: Stable.  Continue current DM meds.  Pt counseled on need to get HGAIC to goal of < 7.0%  - Lipids:  Statin intolerant.  Will need to consider Repatha if affordable for pt.  - Thoracic aneurysm:  Stable.  F/u imaging in future.   - PAF:  No known recent recurrence.  Not candidate for anticoagulation.  - Carotid disease:  Stable and asymptomatic. F/u carotid u/s in future.  - Continue current medical tx.  - Not candidate for ASA or other anticoagulation due to cerebral hemorrhage on ASA.  - Cardiac diet  - Exercise as tolerated.    F/u 4 months.

## 2018-01-04 RX ORDER — ISOSORBIDE MONONITRATE 30 MG/1
TABLET, EXTENDED RELEASE ORAL
Qty: 30 TABLET | Refills: 12 | Status: SHIPPED | OUTPATIENT
Start: 2018-01-04 | End: 2019-01-07 | Stop reason: SDUPTHER

## 2018-01-08 RX ORDER — PEN NEEDLE, DIABETIC 32GX 5/32"
NEEDLE, DISPOSABLE MISCELLANEOUS
Qty: 100 EACH | Refills: 12 | Status: ON HOLD | OUTPATIENT
Start: 2018-01-08 | End: 2018-10-16 | Stop reason: HOSPADM

## 2018-02-05 RX ORDER — INSULIN ASPART 100 [IU]/ML
INJECTION, SOLUTION INTRAVENOUS; SUBCUTANEOUS
Qty: 15 ML | Refills: 3 | Status: SHIPPED | OUTPATIENT
Start: 2018-02-05 | End: 2019-03-07 | Stop reason: SDUPTHER

## 2018-02-07 RX ORDER — METFORMIN HYDROCHLORIDE 1000 MG/1
TABLET ORAL
Qty: 60 TABLET | Refills: 5 | Status: SHIPPED | OUTPATIENT
Start: 2018-02-07 | End: 2018-03-15

## 2018-02-16 ENCOUNTER — PES CALL (OUTPATIENT)
Dept: ADMINISTRATIVE | Facility: CLINIC | Age: 83
End: 2018-02-16

## 2018-02-19 ENCOUNTER — TELEPHONE (OUTPATIENT)
Dept: CARDIOLOGY | Facility: HOSPITAL | Age: 83
End: 2018-02-19

## 2018-02-19 ENCOUNTER — PATIENT MESSAGE (OUTPATIENT)
Dept: CARDIOLOGY | Facility: CLINIC | Age: 83
End: 2018-02-19

## 2018-02-28 ENCOUNTER — PES CALL (OUTPATIENT)
Dept: ADMINISTRATIVE | Facility: CLINIC | Age: 83
End: 2018-02-28

## 2018-03-07 RX ORDER — BLOOD SUGAR DIAGNOSTIC
STRIP MISCELLANEOUS
Qty: 300 STRIP | Refills: 5 | Status: SHIPPED | OUTPATIENT
Start: 2018-03-07 | End: 2020-04-24

## 2018-03-08 ENCOUNTER — OFFICE VISIT (OUTPATIENT)
Dept: FAMILY MEDICINE | Facility: CLINIC | Age: 83
End: 2018-03-08
Payer: MEDICARE

## 2018-03-08 VITALS
HEART RATE: 98 BPM | OXYGEN SATURATION: 97 % | WEIGHT: 183.56 LBS | DIASTOLIC BLOOD PRESSURE: 86 MMHG | SYSTOLIC BLOOD PRESSURE: 138 MMHG | TEMPERATURE: 97 F | BODY MASS INDEX: 27.19 KG/M2 | HEIGHT: 69 IN

## 2018-03-08 DIAGNOSIS — S05.01XA ABRASION OF RIGHT CORNEA, INITIAL ENCOUNTER: Primary | ICD-10-CM

## 2018-03-08 PROCEDURE — 99999 PR PBB SHADOW E&M-EST. PATIENT-LVL III: CPT | Mod: PBBFAC,,, | Performed by: FAMILY MEDICINE

## 2018-03-08 PROCEDURE — 3075F SYST BP GE 130 - 139MM HG: CPT | Mod: S$GLB,,, | Performed by: FAMILY MEDICINE

## 2018-03-08 PROCEDURE — 3079F DIAST BP 80-89 MM HG: CPT | Mod: S$GLB,,, | Performed by: FAMILY MEDICINE

## 2018-03-08 PROCEDURE — 99213 OFFICE O/P EST LOW 20 MIN: CPT | Mod: S$GLB,,, | Performed by: FAMILY MEDICINE

## 2018-03-08 RX ORDER — SULFACETAMIDE SODIUM 100 MG/ML
1 SOLUTION/ DROPS OPHTHALMIC
Qty: 5 ML | Refills: 0 | Status: SHIPPED | OUTPATIENT
Start: 2018-03-08 | End: 2018-04-23

## 2018-03-08 NOTE — PROGRESS NOTES
Subjective:       Patient ID: Anthony Blair is a 84 y.o. male.    Chief Complaint: Eye Pain (right)      HPI Comments:       Current Outpatient Prescriptions:     ACCU-CHEK TOMAS PLUS TEST STRP Strp, TEST BLOOD SUGAR SIX TIMES DAILY, Disp: 300 strip, Rfl: 5    ACCU-CHEK MULTICLIX LANCET lancets, TEST BLOOD SUGAR THREE TIMES DAILY, Disp: 200 each, Rfl: 5    CARBOXYMETHYLCELLULOSE SODIUM (REFRESH OPHT), Apply to eye., Disp: , Rfl:     fluticasone (FLONASE) 50 mcg/actuation nasal spray, 2 sprays by Each Nare route once daily., Disp: 16 g, Rfl: 11    furosemide (LASIX) 20 MG tablet, TAKE ONE TABLET BY MOUTH DAILY, Disp: 30 tablet, Rfl: 12    insulin glargine (LANTUS SOLOSTAR) 100 unit/mL (3 mL) InPn pen, INJECT 16 UNITS SUB-Q AT BEDTIME, Disp: 15 mL, Rfl: 11    isosorbide mononitrate (IMDUR) 30 MG 24 hr tablet, Take 30 mg by mouth 2 (two) times daily., Disp: , Rfl:     isosorbide mononitrate (IMDUR) 30 MG 24 hr tablet, TAKE ONE TABLET BY MOUTH DAILY, Disp: 30 tablet, Rfl: 12    meloxicam (MOBIC) 15 MG tablet, Take 1 tablet by mouth once daily., Disp: , Rfl: 2    metFORMIN (GLUCOPHAGE) 1000 MG tablet, Take 1 tablet by mouth 2 (two) times daily before meals for 30 days., Disp: 60 tablet, Rfl: 5    metformin (GLUCOPHAGE) 500 MG tablet, TAKE ONE BY MOUTH TWICE A DAY WITH FOOD. (Patient taking differently: Take 1,000 mg by mouth 2 (two) times daily with meals. TAKE ONE BY MOUTH TWICE A DAY WITH FOOD.), Disp: 60 tablet, Rfl: 6    metoprolol succinate (TOPROL-XL) 100 MG 24 hr tablet, TAKE ONE TABLET BY MOUTH TWICE DAILY, Disp: 60 tablet, Rfl: 12    NITROSTAT 0.4 mg SL tablet, Place 1 tablet (0.4 mg total) under the tongue every 5 (five) minutes as needed for Chest pain., Disp: 25 tablet, Rfl: 12    NOVOLOG FLEXPEN 100 unit/mL InPn pen, Inject 5 units 3 times a day before meals, Disp: 15 mL, Rfl: 3    potassium chloride SA (K-DUR,KLOR-CON) 20 MEQ tablet, TAKE ONE TABLET BY MOUTH EVERY DAY, Disp: 30 tablet, Rfl:  "12    ranitidine (ZANTAC) 75 MG tablet, Take 75 mg by mouth nightly., Disp: , Rfl:     saw palmetto fruit 450 mg Cap, Take 1 capsule by mouth Twice daily., Disp: , Rfl:     SURE COMFORT PEN NEEDLE 32 gauge x 5/32" Ndle, USE FOUR TIMES DAILY., Disp: 100 each, Rfl: 12    sulfacetamide sodium 10% (BLEPH-10) 10 % ophthalmic solution, Place 1 drop into the right eye every 3 (three) hours., Disp: 5 mL, Rfl: 0      Was working on [employer] yesterday when he got some sawdust in his right eye.  Felt the foreign body sensation immediately and rinsed his eyes out copiously with eyedrops.  Felt better last night and this morning as well, but started aching around noon today.  Feels like to still might be something in it. Discomfort localized to upper/outer aspect of upper lid on right.  Has not been squinting.        Eye Pain    The right eye is affected. This is a new problem. The current episode started yesterday. The problem occurs intermittently. The problem has been gradually worsening. The injury mechanism was a foreign body. The pain is moderate. There is no known exposure to pink eye. He does not wear contacts. Associated symptoms include a foreign body sensation. Pertinent negatives include no blurred vision, eye discharge, double vision, eye redness, fever, itching, nausea, photophobia, recent URI or vomiting. He has tried eye drops for the symptoms. The treatment provided significant relief.     Review of Systems   Constitutional: Negative for activity change, appetite change and fever.   HENT: Negative for sore throat.    Eyes: Positive for pain. Negative for blurred vision, double vision, photophobia, discharge, redness and itching.   Respiratory: Negative for cough and shortness of breath.    Cardiovascular: Negative for chest pain.   Gastrointestinal: Negative for abdominal pain, diarrhea, nausea and vomiting.   Genitourinary: Negative for difficulty urinating.   Musculoskeletal: Negative for arthralgias and " "myalgias.   Neurological: Negative for dizziness and headaches.       Objective:      Vitals:    03/08/18 1516   BP: 138/86   Pulse: 98   Temp: 97.4 °F (36.3 °C)   TempSrc: Tympanic   SpO2: 97%   Weight: 83.3 kg (183 lb 8.5 oz)   Height: 5' 9" (1.753 m)     Physical Exam   Constitutional: He is oriented to person, place, and time. He appears well-developed and well-nourished. No distress.   HENT:   Head: Normocephalic.   Mouth/Throat: No oropharyngeal exudate.   Eyes: Conjunctivae and EOM are normal. Pupils are equal, round, and reactive to light. Lids are everted and swept, no foreign bodies found. Right eye exhibits chemosis. Right eye exhibits no discharge, no exudate and no hordeolum. No foreign body present in the right eye. Right conjunctiva is not injected. Right conjunctiva has no hemorrhage.   Able to open right eye without blepharospasm.  No FB visualized with lid eversion or sweep.   Neck: Neck supple. No thyromegaly present.   Cardiovascular: Normal rate, regular rhythm and normal heart sounds.    No murmur heard.  Pulmonary/Chest: Effort normal and breath sounds normal. He has no wheezes. He has no rales.   Abdominal: Soft. He exhibits no distension.   Musculoskeletal: He exhibits no edema.   Lymphadenopathy:     He has no cervical adenopathy.   Neurological: He is alert and oriented to person, place, and time.   Skin: Skin is warm and dry. He is not diaphoretic.   Psychiatric: He has a normal mood and affect. His behavior is normal. Judgment and thought content normal.   Nursing note and vitals reviewed.      Assessment:       1. Abrasion of right cornea, initial encounter        Plan:   Abrasion of right cornea, initial encounter  Comments:  Likely due to Previous foreign body and/or vigorous rinsing/rubbing.  No evidence of FB currently.  Bleph-10 for 3 or 4 days.  Follow-up if recurrent symptoms  Orders:  -     sulfacetamide sodium 10% (BLEPH-10) 10 % ophthalmic solution; Place 1 drop into the right " eye every 3 (three) hours.  Dispense: 5 mL; Refill: 0

## 2018-03-15 ENCOUNTER — OFFICE VISIT (OUTPATIENT)
Dept: FAMILY MEDICINE | Facility: CLINIC | Age: 83
End: 2018-03-15
Payer: MEDICARE

## 2018-03-15 ENCOUNTER — PATIENT MESSAGE (OUTPATIENT)
Dept: FAMILY MEDICINE | Facility: CLINIC | Age: 83
End: 2018-03-15

## 2018-03-15 ENCOUNTER — LAB VISIT (OUTPATIENT)
Dept: LAB | Facility: HOSPITAL | Age: 83
End: 2018-03-15
Attending: FAMILY MEDICINE
Payer: MEDICARE

## 2018-03-15 VITALS
WEIGHT: 181.13 LBS | DIASTOLIC BLOOD PRESSURE: 62 MMHG | OXYGEN SATURATION: 98 % | HEART RATE: 66 BPM | TEMPERATURE: 98 F | HEIGHT: 69 IN | SYSTOLIC BLOOD PRESSURE: 137 MMHG | BODY MASS INDEX: 26.83 KG/M2

## 2018-03-15 DIAGNOSIS — I48.0 PAROXYSMAL ATRIAL FIBRILLATION: Chronic | ICD-10-CM

## 2018-03-15 DIAGNOSIS — I10 ESSENTIAL HYPERTENSION: ICD-10-CM

## 2018-03-15 DIAGNOSIS — E08.51 DIABETES MELLITUS DUE TO UNDERLYING CONDITION WITH DIABETIC PERIPHERAL ANGIOPATHY WITHOUT GANGRENE, WITHOUT LONG-TERM CURRENT USE OF INSULIN: ICD-10-CM

## 2018-03-15 DIAGNOSIS — R20.0 ARM NUMBNESS LEFT: Primary | ICD-10-CM

## 2018-03-15 LAB
ALBUMIN SERPL BCP-MCNC: 4.1 G/DL
ALP SERPL-CCNC: 100 U/L
ALT SERPL W/O P-5'-P-CCNC: 19 U/L
ANION GAP SERPL CALC-SCNC: 11 MMOL/L
AST SERPL-CCNC: 27 U/L
BILIRUB SERPL-MCNC: 0.9 MG/DL
BUN SERPL-MCNC: 13 MG/DL
CALCIUM SERPL-MCNC: 10.8 MG/DL
CHLORIDE SERPL-SCNC: 98 MMOL/L
CHOLEST SERPL-MCNC: 224 MG/DL
CHOLEST/HDLC SERPL: 4.2 {RATIO}
CO2 SERPL-SCNC: 27 MMOL/L
CREAT SERPL-MCNC: 0.9 MG/DL
EST. GFR  (AFRICAN AMERICAN): >60 ML/MIN/1.73 M^2
EST. GFR  (NON AFRICAN AMERICAN): >60 ML/MIN/1.73 M^2
ESTIMATED AVG GLUCOSE: 157 MG/DL
GLUCOSE SERPL-MCNC: 200 MG/DL
HBA1C MFR BLD HPLC: 7.1 %
HDLC SERPL-MCNC: 53 MG/DL
HDLC SERPL: 23.7 %
LDLC SERPL CALC-MCNC: 136 MG/DL
NONHDLC SERPL-MCNC: 171 MG/DL
POTASSIUM SERPL-SCNC: 3.7 MMOL/L
PROT SERPL-MCNC: 7.9 G/DL
SODIUM SERPL-SCNC: 136 MMOL/L
TRIGL SERPL-MCNC: 175 MG/DL

## 2018-03-15 PROCEDURE — 83036 HEMOGLOBIN GLYCOSYLATED A1C: CPT

## 2018-03-15 PROCEDURE — 36415 COLL VENOUS BLD VENIPUNCTURE: CPT | Mod: PO

## 2018-03-15 PROCEDURE — 99999 PR PBB SHADOW E&M-EST. PATIENT-LVL III: CPT | Mod: PBBFAC,,, | Performed by: FAMILY MEDICINE

## 2018-03-15 PROCEDURE — 99499 UNLISTED E&M SERVICE: CPT | Mod: S$GLB,,, | Performed by: FAMILY MEDICINE

## 2018-03-15 PROCEDURE — 99214 OFFICE O/P EST MOD 30 MIN: CPT | Mod: S$GLB,,, | Performed by: FAMILY MEDICINE

## 2018-03-15 PROCEDURE — 80061 LIPID PANEL: CPT

## 2018-03-15 PROCEDURE — 3075F SYST BP GE 130 - 139MM HG: CPT | Mod: CPTII,S$GLB,, | Performed by: FAMILY MEDICINE

## 2018-03-15 PROCEDURE — 80053 COMPREHEN METABOLIC PANEL: CPT

## 2018-03-15 PROCEDURE — 3078F DIAST BP <80 MM HG: CPT | Mod: CPTII,S$GLB,, | Performed by: FAMILY MEDICINE

## 2018-03-18 ENCOUNTER — PATIENT MESSAGE (OUTPATIENT)
Dept: FAMILY MEDICINE | Facility: CLINIC | Age: 83
End: 2018-03-18

## 2018-03-29 ENCOUNTER — PATIENT MESSAGE (OUTPATIENT)
Dept: FAMILY MEDICINE | Facility: CLINIC | Age: 83
End: 2018-03-29

## 2018-04-12 ENCOUNTER — OFFICE VISIT (OUTPATIENT)
Dept: GASTROENTEROLOGY | Facility: CLINIC | Age: 83
End: 2018-04-12
Payer: MEDICARE

## 2018-04-12 VITALS
HEIGHT: 69 IN | BODY MASS INDEX: 27.39 KG/M2 | WEIGHT: 184.94 LBS | SYSTOLIC BLOOD PRESSURE: 124 MMHG | DIASTOLIC BLOOD PRESSURE: 66 MMHG | HEART RATE: 77 BPM

## 2018-04-12 DIAGNOSIS — R19.8 IRREGULAR BOWEL HABITS: ICD-10-CM

## 2018-04-12 DIAGNOSIS — R13.14 DYSPHAGIA, PHARYNGOESOPHAGEAL: Primary | ICD-10-CM

## 2018-04-12 PROCEDURE — 99999 PR PBB SHADOW E&M-EST. PATIENT-LVL IV: CPT | Mod: PBBFAC,,, | Performed by: PHYSICIAN ASSISTANT

## 2018-04-12 PROCEDURE — 99214 OFFICE O/P EST MOD 30 MIN: CPT | Mod: S$GLB,,, | Performed by: PHYSICIAN ASSISTANT

## 2018-04-12 PROCEDURE — 3074F SYST BP LT 130 MM HG: CPT | Mod: CPTII,S$GLB,, | Performed by: PHYSICIAN ASSISTANT

## 2018-04-12 PROCEDURE — 3078F DIAST BP <80 MM HG: CPT | Mod: CPTII,S$GLB,, | Performed by: PHYSICIAN ASSISTANT

## 2018-04-12 PROCEDURE — 99499 UNLISTED E&M SERVICE: CPT | Mod: S$PBB,,, | Performed by: PHYSICIAN ASSISTANT

## 2018-04-12 NOTE — PROGRESS NOTES
GI OUTPATIENT NOTE    PCP: Abdulaziz Mccabe [id number] [address] / [facility] [id number]    Chief Complaint   Patient presents with    Dysphagia       HISTORY OF PRESENT ILLNESS:  85 y.o. male presents to the GI clinic today for initial evaluation. The patient complains of dysphagia. He has had this problem a long time but it has gotten worse in the last year or two. It occurs with dry food. No dysphagia with liquids. It stops in his chest. It feels like it takes his breath. He denies nausea or vomiting. He reports acid indigestion but denies heartburn. He treats it with Zantac nightly. He denies weight loss. He has never had an EGD.     He has never had a colonoscopy and doesn't want one. He has at least one BM a day, but sometimes as much as 4. Half the time it is diarrhea and half the time it is formed. He reports urgency. The need to have a BM doesn't wake him. He treats the diarrhea with Pepto bismol. Most of the time, the diarrhea follows a formed. He denies obvious blood in the stool, but it is sometimes dark. Not sure if related to the Pepto.     Past Medical History:   Diagnosis Date    A-fib     Anemia     AP (angina pectoris) 1/23/2014    Carotid artery disease without cerebral infarction 8/22/2014    Cataract     Diabetes mellitus type II     BS didn't check today 09/08/2016  A1C  7.4    GERD (gastroesophageal reflux disease) 7/11/2013    Hyperlipidemia     Hypertension     Lumbosacral spondylosis 12/24/2014    Pacemaker 1/23/2014    Paroxysmal atrial fibrillation 1/23/2014    Peripheral vascular disease 8/22/2014    S/P CABG (coronary artery bypass graft) 1/23/2014    Tobacco dependence     resolved       Past Surgical History:   Procedure Laterality Date    ANGIOPLASTY      APPENDECTOMY      ATRIAL ABLATION SURGERY N/A     CATARACT EXTRACTION      CATARACT EXTRACTION W/ INTRAOCULAR LENS IMPLANT Right     CATARACT EXTRACTION W/ INTRAOCULAR LENS IMPLANT Left     CORONARY ARTERY BYPASS  GRAFT  07/2010    x5    EYE SURGERY      pace maker surgkerry  10/2010    reposition in 2011/2012 (approx)    VEIN BYPASS SURGERY         Social History     Social History    Marital status:      Spouse name: N/A    Number of children: N/A    Years of education: N/A     Occupational History    Not on file.     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       Family History   Problem Relation Age of Onset    Arthritis Mother     Diabetes Mother     Kidney disease Mother     Heart disease Mother     Arthritis Father     Diabetes Father     Diabetes Sister     Cancer Sister      breast    Heart disease Sister     Diabetes Brother     Kidney disease Brother     Heart disease Brother     Cancer Daughter      breast    Diabetes Daughter     Miscarriages / Stillbirths Daughter     Fibromyalgia Daughter     Diabetes Son     Diabetes Son     Alcohol abuse Paternal Uncle     Stroke Neg Hx        MEDS/ALLERGY:  The patient's medications and allergies were reviewed and updated in the EPIC chart.     Review of Systems   Constitutional: Negative for fatigue and fever.   HENT: Positive for hearing loss.    Eyes: Positive for visual disturbance.   Respiratory: Positive for shortness of breath. Negative for cough.    Cardiovascular: Negative for chest pain and palpitations.   Gastrointestinal:        As per HPI.   Genitourinary: Negative for difficulty urinating, dysuria, frequency and hematuria.   Musculoskeletal: Negative for arthralgias and back pain.   Skin: Negative for color change and rash.   Neurological: Negative for dizziness, seizures,  syncope, weakness, numbness and headaches.   Hematological: Bruises/bleeds easily.   Psychiatric/Behavioral: The patient is not nervous/anxious.        Physical Exam   Constitutional: He is oriented to person, place, and time. He appears well-developed and well-nourished.   HENT:   Head: Normocephalic and atraumatic.   Eyes: EOM are normal.   Neck: Normal range of motion. Neck supple. No thyromegaly present.   Cardiovascular: Normal rate, regular rhythm and normal heart sounds.    No murmur heard.  Pulmonary/Chest: Effort normal and breath sounds normal. No respiratory distress. He has no wheezes.   Abdominal: Soft. Bowel sounds are normal. He exhibits no distension and no mass. There is no hepatomegaly. There is no tenderness.   Musculoskeletal: He exhibits no edema.   Neurological: He is alert and oriented to person, place, and time. No cranial nerve deficit. Gait normal.   Skin: Skin is warm and dry. No rash noted.   Psychiatric: He has a normal mood and affect. Thought content normal.       LABS/IMAGING:   Pertinent results were reviewed.     ASSESSMENT:  1. Dysphagia, pharyngoesophageal    2. Irregular bowel habits        PLAN:  We will address the primary complaint first. An EGD will be scheduled. He will likely need a change in reflux treatment but I will wait until EGD results come back. Take Novolog as prescribed the day before the procedure. Do not take the morning of the procedure. Ok to skip Lantus the evening before the procedure. Ok to use a clear liquid like apple juice or soda if blood glucose gets low.     We talked about doing a colonoscopy at the same time, but he really doesn't want to do one. His wife fell during her prep and sustained a fracture which resulted in a prolonged hospital stay. Other colon cancer screening options discussed. We will address it again in follow up. For now, I recommend he start a fiber supplement twice a day to help improve his bowel habits.     Follow-up in about 6  weeks (around 5/24/2018).     Thank you for the opportunity to participate in the care of this patient. This consult was designated to me by my supervising physician. He fully participated in the development of the assessment and recommendations.    Maxwell Altman PA-C.

## 2018-04-12 NOTE — PATIENT INSTRUCTIONS
Start a fiber supplement like Metamucil or Benefiber. Take one tablespoon twice a day.     Take Novolog as prescribed the day before the procedure. Do not take the morning of the procedure. Ok to skip Lantus the evening before the procedure. Ok to use a clear liquid like apple juice or soda if blood glucose gets low.

## 2018-04-12 NOTE — LETTER
April 12, 2018      Abdulaziz Mccabe MD  [address] [address] [address]           [facility]  [id number] [place] [id number]  Phone: [phone]  Fax: [phone]          Patient: Anthony Blair   MR Number: [id number]   YOB: 1933   Date of Visit: 4/12/2018       Dear Dr. Abdulaziz Mccabe:    Thank you for referring Anthony Blair to me for evaluation. Attached you will find relevant portions of my assessment and plan of care.    If you have questions, please do not hesitate to call me. I look forward to following Anthony Blair along with you.    Sincerely,    Maxwell Altman PA-C    Enclosure  CC:  No Recipients    If you would like to receive this communication electronically, please contact [email or web address] or [phone] to request more information on [email or web address] Link access.    For providers and/or their staff who would like to refer a patient to [facility], please contact us through our one-stop-shop provider referral line, [facility] , at [phone].    If you feel you have received this communication in error or would no longer like to receive these types of communications, please e-mail [email or web address]

## 2018-04-23 ENCOUNTER — HOSPITAL ENCOUNTER (OUTPATIENT)
Dept: RADIOLOGY | Facility: HOSPITAL | Age: 83
Discharge: HOME | End: 2018-04-23
Attending: NURSE PRACTITIONER
Payer: MEDICARE

## 2018-04-23 ENCOUNTER — OFFICE VISIT (OUTPATIENT)
Dept: FAMILY MEDICINE | Facility: CLINIC | Age: 83
End: 2018-04-23
Payer: MEDICARE

## 2018-04-23 VITALS
OXYGEN SATURATION: 97 % | HEART RATE: 90 BPM | BODY MASS INDEX: 27.81 KG/M2 | RESPIRATION RATE: 18 BRPM | WEIGHT: 187.75 LBS | SYSTOLIC BLOOD PRESSURE: 162 MMHG | DIASTOLIC BLOOD PRESSURE: 80 MMHG | HEIGHT: 69 IN | TEMPERATURE: 97 F

## 2018-04-23 DIAGNOSIS — M25.532 LEFT WRIST PAIN: Primary | ICD-10-CM

## 2018-04-23 DIAGNOSIS — I10 ESSENTIAL HYPERTENSION: ICD-10-CM

## 2018-04-23 DIAGNOSIS — M25.532 LEFT WRIST PAIN: ICD-10-CM

## 2018-04-23 PROCEDURE — 99499 UNLISTED E&M SERVICE: CPT | Mod: S$PBB,,, | Performed by: NURSE PRACTITIONER

## 2018-04-23 PROCEDURE — 3077F SYST BP >= 140 MM HG: CPT | Mod: CPTII,S$GLB,, | Performed by: NURSE PRACTITIONER

## 2018-04-23 PROCEDURE — 73110 X-RAY EXAM OF WRIST: CPT | Mod: 26,LT,, | Performed by: RADIOLOGY

## 2018-04-23 PROCEDURE — 73110 X-RAY EXAM OF WRIST: CPT | Mod: TC,PO,LT

## 2018-04-23 PROCEDURE — 99999 PR PBB SHADOW E&M-EST. PATIENT-LVL V: CPT | Mod: PBBFAC,,, | Performed by: NURSE PRACTITIONER

## 2018-04-23 PROCEDURE — 99214 OFFICE O/P EST MOD 30 MIN: CPT | Mod: S$GLB,,, | Performed by: NURSE PRACTITIONER

## 2018-04-23 PROCEDURE — 3079F DIAST BP 80-89 MM HG: CPT | Mod: CPTII,S$GLB,, | Performed by: NURSE PRACTITIONER

## 2018-04-23 RX ORDER — DICLOFENAC SODIUM 30 MG/G
GEL TOPICAL
Qty: 100 G | Refills: 0 | Status: SHIPPED | OUTPATIENT
Start: 2018-04-23 | End: 2018-04-26

## 2018-04-23 RX ORDER — VALSARTAN 40 MG/1
40 TABLET ORAL DAILY
Qty: 90 TABLET | Refills: 0 | Status: SHIPPED | OUTPATIENT
Start: 2018-04-23 | End: 2018-07-02 | Stop reason: SDUPTHER

## 2018-04-23 NOTE — PROGRESS NOTES
"Subjective:       Patient ID: Anthony Blair is a 85 y.o. male.    Chief Complaint: Wrist Pain (left)  Pt reports to clinic with chief complaint of left wrist pain.  Onset approx 4 weeks ago. Pt reports falling, "tried to catch fall".  States joint has been swelling intermittently.  Notes difficulty with closing hand. Has not taken anything for pain relief.       Bp noted to be elevated. Pt reports compliance with medication regimen. Denies chest pain, dizziness or palpitations.  Notes having "tiny headache".    Wrist Pain    The pain is present in the left wrist. This is a new problem. The current episode started 1 to 4 weeks ago. There has been a history of trauma. The problem occurs daily. The problem has been unchanged. The quality of the pain is described as aching. The pain is at a severity of 4/10. The symptoms are aggravated by activity. He has tried nothing for the symptoms. His past medical history is significant for osteoarthritis.     Review of Systems   Constitutional: Negative.    HENT: Negative.    Respiratory: Negative.    Cardiovascular: Negative.    Musculoskeletal: Positive for arthralgias and joint swelling.       Objective:      Physical Exam   Constitutional: He is oriented to person, place, and time. He appears well-developed and well-nourished.   HENT:   Head: Normocephalic.   Eyes: EOM are normal.   Neck: Neck supple.   Cardiovascular: Normal rate and normal heart sounds.    Pulmonary/Chest: Effort normal and breath sounds normal.   Musculoskeletal: He exhibits no edema.        Left wrist: He exhibits normal range of motion, no tenderness, no bony tenderness and no swelling.   Pt pinpoints pain radial and ulnar tenderness, no pain at this time   Neurological: He is alert and oriented to person, place, and time.   Skin: Skin is warm and dry.   Vitals reviewed.      Assessment:       1. Left wrist pain    2. Essential hypertension        Plan:   Left wrist pain  -     X-Ray Wrist Complete 3 " views Left; Future; Expected date: 04/23/2018  -     diclofenac sodium (SOLARAZE) 3 % gel; Apply topically bid  Dispense: 100 g; Refill: 0  RICE  Essential hypertension  -     valsartan (DIOVAN) 40 MG tablet; Take 1 tablet (40 mg total) by mouth once daily.  Dispense: 90 tablet; Refill: 0  -uncontrolled.  Pt has appt with cardiologist in 1 week; ;will follow up then    No Follow-up on file.

## 2018-04-26 ENCOUNTER — OFFICE VISIT (OUTPATIENT)
Dept: FAMILY MEDICINE | Facility: CLINIC | Age: 83
End: 2018-04-26
Payer: MEDICARE

## 2018-04-26 VITALS
WEIGHT: 184.63 LBS | DIASTOLIC BLOOD PRESSURE: 55 MMHG | RESPIRATION RATE: 18 BRPM | HEART RATE: 92 BPM | SYSTOLIC BLOOD PRESSURE: 163 MMHG | BODY MASS INDEX: 27.35 KG/M2 | HEIGHT: 69 IN | OXYGEN SATURATION: 100 % | TEMPERATURE: 97 F

## 2018-04-26 DIAGNOSIS — E13.51 PERIPHERAL VASCULAR DISEASE DUE TO SECONDARY DIABETES MELLITUS: ICD-10-CM

## 2018-04-26 DIAGNOSIS — K21.9 GASTROESOPHAGEAL REFLUX DISEASE WITHOUT ESOPHAGITIS: ICD-10-CM

## 2018-04-26 DIAGNOSIS — Z79.4 TYPE 2 DIABETES MELLITUS WITH DIABETIC NEUROPATHY, WITH LONG-TERM CURRENT USE OF INSULIN: Primary | ICD-10-CM

## 2018-04-26 DIAGNOSIS — I71.21 ANEURYSM OF ASCENDING AORTA: ICD-10-CM

## 2018-04-26 DIAGNOSIS — M47.897 OTHER OSTEOARTHRITIS OF SPINE, LUMBOSACRAL REGION: ICD-10-CM

## 2018-04-26 DIAGNOSIS — I73.9 CLAUDICATION IN PERIPHERAL VASCULAR DISEASE: Chronic | ICD-10-CM

## 2018-04-26 DIAGNOSIS — E11.9 DIABETES MELLITUS TYPE 2 WITHOUT RETINOPATHY: Chronic | ICD-10-CM

## 2018-04-26 DIAGNOSIS — I25.810 CORONARY ARTERY DISEASE INVOLVING CORONARY BYPASS GRAFT OF NATIVE HEART WITHOUT ANGINA PECTORIS: ICD-10-CM

## 2018-04-26 DIAGNOSIS — I48.0 PAROXYSMAL ATRIAL FIBRILLATION: Chronic | ICD-10-CM

## 2018-04-26 DIAGNOSIS — E11.40 TYPE 2 DIABETES MELLITUS WITH DIABETIC NEUROPATHY, WITH LONG-TERM CURRENT USE OF INSULIN: Primary | ICD-10-CM

## 2018-04-26 DIAGNOSIS — E78.2 MIXED HYPERLIPIDEMIA: Chronic | ICD-10-CM

## 2018-04-26 DIAGNOSIS — I77.9 CAROTID ARTERY DISEASE WITHOUT CEREBRAL INFARCTION: Chronic | ICD-10-CM

## 2018-04-26 DIAGNOSIS — I73.9 PERIPHERAL VASCULAR DISEASE: Chronic | ICD-10-CM

## 2018-04-26 DIAGNOSIS — I65.23 ASYMPTOMATIC STENOSIS OF BOTH CAROTID ARTERIES WITHOUT INFARCTION: ICD-10-CM

## 2018-04-26 DIAGNOSIS — I10 ESSENTIAL HYPERTENSION: ICD-10-CM

## 2018-04-26 DIAGNOSIS — M50.30 DEGENERATIVE DISC DISEASE, CERVICAL: ICD-10-CM

## 2018-04-26 PROBLEM — I61.9 HEMORRHAGIC CEREBROVASCULAR ACCIDENT (CVA): Status: ACTIVE | Noted: 2018-04-26

## 2018-04-26 PROCEDURE — 99999 PR PBB SHADOW E&M-EST. PATIENT-LVL IV: CPT | Mod: PBBFAC,,, | Performed by: NURSE PRACTITIONER

## 2018-04-26 PROCEDURE — 96160 PT-FOCUSED HLTH RISK ASSMT: CPT | Mod: S$GLB,,, | Performed by: NURSE PRACTITIONER

## 2018-04-26 PROCEDURE — 99499 UNLISTED E&M SERVICE: CPT | Mod: S$PBB,,, | Performed by: NURSE PRACTITIONER

## 2018-04-26 RX ORDER — PERPHENAZINE/AMITRIPTYLINE HCL 4MG-10MG
TABLET ORAL
COMMUNITY
End: 2019-01-09

## 2018-04-26 RX ORDER — NAPROXEN SODIUM 220 MG
220 TABLET ORAL 2 TIMES DAILY WITH MEALS
COMMUNITY
End: 2019-01-09

## 2018-04-26 NOTE — PROGRESS NOTES
"Anthony Blair presented for a  Medicare AWV and comprehensive Health Risk Assessment today. The following components were reviewed and updated:    · Medical history  · Family History  · Social history  · Allergies and Current Medications  · Health Risk Assessment  · Health Maintenance  · Care Team     ** See Completed Assessments for Annual Wellness Visit within the encounter summary.**       The following assessments were completed:  · Living Situation  · CAGE  · Depression Screening  · Timed Get Up and Go  · Whisper Test  · Cognitive Function Screening  · Nutrition Screening  · ADL Screening  · PAQ Screening    Vitals:    04/26/18 1014   BP: (!) 163/55   Pulse: 92   Resp: 18   Temp: 97.3 °F (36.3 °C)   TempSrc: Tympanic   SpO2: 100%   Weight: 83.7 kg (184 lb 10.2 oz)   Height: 5' 9" (1.753 m)     Body mass index is 27.27 kg/m².  Physical Exam      Diagnoses and health risks identified today and associated recommendations/orders:    1. Type 2 diabetes mellitus with diabetic neuropathy, with long-term current use of insulin  3/15/18  Hemoglobin A1C 4.0 - 5.6 % 7.1      -stable. Continue current treatment plan     2. Paroxysmal atrial fibrillation  -stable. Continue current treatment    3. Mixed hyperlipidemia  Cholesterol 120 - 199 mg/dL 224        Triglycerides 30 - 150 mg/dL 175        HDL 40 - 75 mg/dL 53       LDL Cholesterol 63.0 - 159.0 mg/dL 136.0       HDL/Chol Ratio 20.0 - 50.0 % 23.7    Total Cholesterol/HDL Ratio 2.0 - 5.0 4.2    Non-HDL Cholesterol mg/dL 171      -uncontrolled. Can not tolerate statin therapy. Follow up with PCP    4. Peripheral vascular disease  01/04/2016   The right ankle brachial index was 1.36 which is abnormal and indicates non-compressible vessels.     5. Carotid artery disease without cerebral infarction  3/14/17 There is 40 - 49% left Internal Carotid stenosis      6. Claudication in peripheral vascular disease  -has appt with vascular surgeon next week    7. Diabetes mellitus " type 2 without retinopathy  Stable. DM eye exam due    8. Coronary artery disease involving coronary bypass graft of native heart without angina pectoris  -stable    9. Gastroesophageal reflux disease without esophagitis  -stable    10. Essential hypertension  -uncontrolled. Pt has appt with cardiology     11. Peripheral vascular disease due to secondary diabetes mellitus  -stable    12. Asymptomatic stenosis of both carotid arteries without infarction  -stable    13. Other osteoarthritis of spine, lumbosacral region  -stable    14. Degenerative disc disease, cervical  -stable    15. Aneurysm of ascending aorta        Provided [place] with a 5-10 year written screening schedule and personal prevention plan. Recommendations were developed using the USPSTF age appropriate recommendations. Education, counseling, and referrals were provided as needed. After Visit Summary printed and given to patient which includes a list of additional screenings\tests needed.    No Follow-up on file.    Tori Estrada NP  I offered to discuss end of life issues, including information on how to make advance directives that the patient could use to name someone who would make medical decisions on their behalf if they became too ill to make themselves.    _X_Patient declined  ___Patient is interested, I provided paper work and offered to discuss.

## 2018-04-27 ENCOUNTER — OFFICE VISIT (OUTPATIENT)
Dept: CARDIOLOGY | Facility: CLINIC | Age: 83
End: 2018-04-27
Payer: MEDICARE

## 2018-04-27 VITALS
SYSTOLIC BLOOD PRESSURE: 128 MMHG | HEIGHT: 69 IN | DIASTOLIC BLOOD PRESSURE: 72 MMHG | WEIGHT: 186.06 LBS | HEART RATE: 64 BPM | BODY MASS INDEX: 27.56 KG/M2

## 2018-04-27 DIAGNOSIS — I36.1 NON-RHEUMATIC TRICUSPID VALVE INSUFFICIENCY: ICD-10-CM

## 2018-04-27 DIAGNOSIS — I65.23 ASYMPTOMATIC STENOSIS OF BOTH CAROTID ARTERIES WITHOUT INFARCTION: ICD-10-CM

## 2018-04-27 DIAGNOSIS — I10 ESSENTIAL HYPERTENSION: ICD-10-CM

## 2018-04-27 DIAGNOSIS — R06.02 SOB (SHORTNESS OF BREATH): ICD-10-CM

## 2018-04-27 DIAGNOSIS — I25.810 CORONARY ARTERY DISEASE INVOLVING CORONARY BYPASS GRAFT OF NATIVE HEART WITHOUT ANGINA PECTORIS: ICD-10-CM

## 2018-04-27 DIAGNOSIS — I48.0 PAROXYSMAL ATRIAL FIBRILLATION: Chronic | ICD-10-CM

## 2018-04-27 DIAGNOSIS — E08.51 DIABETES MELLITUS DUE TO UNDERLYING CONDITION WITH DIABETIC PERIPHERAL ANGIOPATHY WITHOUT GANGRENE, WITHOUT LONG-TERM CURRENT USE OF INSULIN: ICD-10-CM

## 2018-04-27 DIAGNOSIS — Z78.9 STATIN INTOLERANCE: Primary | ICD-10-CM

## 2018-04-27 DIAGNOSIS — I73.9 CLAUDICATION IN PERIPHERAL VASCULAR DISEASE: Chronic | ICD-10-CM

## 2018-04-27 DIAGNOSIS — I61.9 HEMORRHAGIC CEREBROVASCULAR ACCIDENT (CVA): ICD-10-CM

## 2018-04-27 DIAGNOSIS — Z95.0 PACEMAKER: Chronic | ICD-10-CM

## 2018-04-27 DIAGNOSIS — I77.9 CAROTID ARTERY DISEASE WITHOUT CEREBRAL INFARCTION: Chronic | ICD-10-CM

## 2018-04-27 DIAGNOSIS — I73.9 PERIPHERAL VASCULAR DISEASE: Chronic | ICD-10-CM

## 2018-04-27 DIAGNOSIS — E78.2 MIXED HYPERLIPIDEMIA: Chronic | ICD-10-CM

## 2018-04-27 DIAGNOSIS — I71.21 ANEURYSM OF ASCENDING AORTA: ICD-10-CM

## 2018-04-27 PROCEDURE — 3074F SYST BP LT 130 MM HG: CPT | Mod: CPTII,S$GLB,, | Performed by: INTERNAL MEDICINE

## 2018-04-27 PROCEDURE — 99214 OFFICE O/P EST MOD 30 MIN: CPT | Mod: S$GLB,,, | Performed by: INTERNAL MEDICINE

## 2018-04-27 PROCEDURE — 3078F DIAST BP <80 MM HG: CPT | Mod: CPTII,S$GLB,, | Performed by: INTERNAL MEDICINE

## 2018-04-27 PROCEDURE — 99499 UNLISTED E&M SERVICE: CPT | Mod: S$PBB,,, | Performed by: INTERNAL MEDICINE

## 2018-04-27 PROCEDURE — 99999 PR PBB SHADOW E&M-EST. PATIENT-LVL III: CPT | Mod: PBBFAC,,, | Performed by: INTERNAL MEDICINE

## 2018-04-27 NOTE — PROGRESS NOTES
Subjective:    Patient ID:  Anthony Blair is a 85 y.o. male who presents for evaluation of Hypertension; Coronary Artery Disease; Hyperlipidemia; Peripheral Arterial Disease; Carotid Artery Disease; Atrial Fibrillation; and Risk Factor Management For Atherosclerosis        HPI Mr. Blair returns for fu.  His current medical conditions include PAF/PSVT, CAD (PTCA 1980's), HTN, PPM, DM, atrial septal aneurysm/pfo, PAD, dyslipidemia. Nonsmoker.  Past details pertinent for following:   Statin intolerant.  s/p CABG 7/10 (LIMA-LAD, SVG-OM1, SVG-OM3, SVG-PDA) for increasing OLIVARES and multivessel CAD on [facility].   S/p PPM 10/10 for SSS/PAF. Was hospitalized 1/11 for PSVT (Atrial tachycardia) and was started on Amiodarone but was stopped for GI discomfort. He also did not tolerate Multaq and has not tolerated multiple statins.   s/p EPS/ablation of his atrial tachycardia 6/11.   S/p abd w runoff March 2015 and had significant B LE infrapopliteal disease. Atherectomy/pta performed of critical R tibeoperoneal trunk stenosis. Also has L tibeoperoneal trunk stenosis and his PTA/MIGUEL are occluded bilaterally.  Started on Eliquis Feb 2017 for suspicious left internal jugular vein thrombus.  Stress MPI 3/17 showed no ischemia.  Echo 3/17 showed normal LV function, left-right atrial shunt.   Carotid u/s 6/17 showed mild bilateral disease, known L IJ thrombus noted.  Pt called clinic Sept 2017 stated he had stopped Eliquis couple weeks before his call due to diarrhea, weakness, bloating and also off Amlodipine due to sleepiness.  He stated sxs subsided when he stopped the meds.   He was advised by cardiology on 9/6/17 to take 81 mg ASA since he stopped his Eliquis.  He had acute hemorrhage of basal ganglia right side Sept 2017, causing weakness on left side.  Tx at [facility] 9/24/17. He was on ASA daily when CVA occurred and was not on Eliquis. 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  He was taken off his asa.  Followed by Dr. Weaver, neurosurgery. No surgery was needed.  Echo showed normal LVEF.  Now here.  BP has been running high.  PCP added Valsartan 40 mg daily.  He read side effects of Valsartan and was concerned about it.  BP improving and is good now.  He states his recovery from CVA has gone well and seems almost back to normal.  DM controlled, although HGAIC slightly above goal 7.1%  Lipids above goal, but stable.    Compliant with meds.  CAD is stable.  No active anginal sxs on current medical tx.  No active CHF sx on current medical tx.  PAD is stable.  No worsening claudication sxs.    No recurrent CVA since last visit.  He plans to see Dr. Lucero, Neurology, about hearing changes since CVA and his left arm goes to sleep at times.      Current Outpatient Prescriptions:     ACCU-CHEK TOMAS PLUS TEST STRP Strp, TEST BLOOD SUGAR SIX TIMES DAILY, Disp: 300 strip, Rfl: 5    ACCU-CHEK MULTICLIX LANCET lancets, TEST BLOOD SUGAR THREE TIMES DAILY, Disp: 200 each, Rfl: 5    CARBOXYMETHYLCELLULOSE SODIUM (REFRESH OPHT), Apply to eye., Disp: , Rfl:     coconut oil 1,000 mg Cap, Take by mouth., Disp: , Rfl:     fluticasone (FLONASE) 50 mcg/actuation nasal spray, 2 sprays by Each Nare route once daily., Disp: 16 g, Rfl: 11    furosemide (LASIX) 20 MG tablet, TAKE ONE TABLET BY MOUTH DAILY, Disp: 30 tablet, Rfl: 12    insulin glargine (LANTUS SOLOSTAR) 100 unit/mL (3 mL) InPn pen, INJECT 16 UNITS SUB-Q AT BEDTIME, Disp: 15 mL, Rfl: 11    isosorbide mononitrate (IMDUR) 30 MG 24 hr tablet, TAKE ONE TABLET BY MOUTH DAILY, Disp: 30 tablet, Rfl: 12    metformin (GLUCOPHAGE) 500 MG tablet, TAKE ONE BY MOUTH TWICE A DAY WITH FOOD. (Patient taking differently: Take 1,000 mg by mouth 2 (two) times daily with meals. TAKE ONE BY MOUTH TWICE A DAY WITH FOOD.), Disp: 60 tablet, Rfl: 6    metoprolol succinate  "(TOPROL-XL) 100 MG 24 hr tablet, TAKE ONE TABLET BY MOUTH TWICE DAILY, Disp: 60 tablet, Rfl: 12    naproxen sodium (ANAPROX) 220 MG tablet, Take 220 mg by mouth 2 (two) times daily with meals., Disp: , Rfl:     NITROSTAT 0.4 mg SL tablet, Place 1 tablet (0.4 mg total) under the tongue every 5 (five) minutes as needed for Chest pain., Disp: 25 tablet, Rfl: 12    NOVOLOG FLEXPEN 100 unit/mL InPn pen, Inject 5 units 3 times a day before meals, Disp: 15 mL, Rfl: 3    potassium chloride SA (K-DUR,KLOR-CON) 20 MEQ tablet, TAKE ONE TABLET BY MOUTH EVERY DAY, Disp: 30 tablet, Rfl: 12    ranitidine (ZANTAC) 75 MG tablet, Take 75 mg by mouth nightly., Disp: , Rfl:     saw palmetto fruit 450 mg Cap, Take 1 capsule by mouth Twice daily., Disp: , Rfl:     SURE COMFORT PEN NEEDLE 32 gauge x 5/32" Ndle, USE FOUR TIMES DAILY., Disp: 100 each, Rfl: 12    TURMERIC, BULK, MISC, by Misc.(Non-Drug; Combo Route) route., Disp: , Rfl:     valsartan (DIOVAN) 40 MG tablet, Take 1 tablet (40 mg total) by mouth once daily., Disp: 90 tablet, Rfl: 0      Review of Systems   Constitution: Positive for weakness and malaise/fatigue.   HENT: Negative.    Eyes: Negative.    Cardiovascular: Positive for claudication and dyspnea on exertion.   Respiratory: Positive for shortness of breath.    Endocrine: Negative.    Hematologic/Lymphatic: Negative.    Skin: Negative.    Musculoskeletal: Positive for arthritis and joint pain.   Gastrointestinal: Negative.    Genitourinary: Negative.    Neurological: Positive for numbness.   Psychiatric/Behavioral: Negative.    Allergic/Immunologic: Negative.        /72 (BP Location: Left arm)   Pulse 64   Ht 5' 9" (1.753 m)   Wt 84.4 kg (186 lb 1.1 oz)   BMI 27.48 kg/m²     Wt Readings from Last 3 Encounters:   04/27/18 84.4 kg (186 lb 1.1 oz)   04/26/18 83.7 kg (184 lb 10.2 oz)   04/23/18 85.2 kg (187 lb 11.6 oz)     Temp Readings from Last 3 Encounters:   04/26/18 97.3 °F (36.3 °C) (Tympanic) "   04/23/18 96.5 °F (35.8 °C) (Tympanic)   03/15/18 97.6 °F (36.4 °C) (Tympanic)     BP Readings from Last 3 Encounters:   04/27/18 128/72   04/26/18 (!) 163/55   04/23/18 (!) 162/80     Pulse Readings from Last 3 Encounters:   04/27/18 64   04/26/18 92   04/23/18 90          Objective:    Physical Exam   Constitutional: He is oriented to person, place, and time. He appears well-developed and well-nourished.   HENT:   Head: Normocephalic.   Neck: Normal range of motion. Neck supple. Normal carotid pulses, no hepatojugular reflux and no JVD present. Carotid bruit is not present. No thyromegaly present.   Cardiovascular: Normal rate, regular rhythm, S1 normal and S2 normal.  PMI is not displaced.  Exam reveals no S3, no S4, no distant heart sounds, no friction rub, no midsystolic click and no opening snap.    No murmur heard.  Pulses:       Radial pulses are 2+ on the right side, and 2+ on the left side.   Pulmonary/Chest: Effort normal and breath sounds normal. He has no wheezes. He has no rales.   Abdominal: Soft. Bowel sounds are normal. He exhibits no distension, no abdominal bruit, no ascites and no mass. There is no tenderness.   Musculoskeletal: He exhibits no edema.   Neurological: He is alert and oriented to person, place, and time.   Skin: Skin is warm.   Psychiatric: He has a normal mood and affect. His behavior is normal.   Nursing note and vitals reviewed.      I have reviewed all pertinent labs and cardiac studies.      Chemistry        Component Value Date/Time     03/15/2018 1354    K 3.7 03/15/2018 1354    CL 98 03/15/2018 1354    CO2 27 03/15/2018 1354    BUN 13 03/15/2018 1354    CREATININE 0.9 03/15/2018 1354     (H) 03/15/2018 1354        Component Value Date/Time    CALCIUM 10.8 (H) 03/15/2018 1354    ALKPHOS 100 03/15/2018 1354    AST 27 03/15/2018 1354    ALT 19 03/15/2018 1354    BILITOT 0.9 03/15/2018 1354    ESTGFRAFRICA >60.0 03/15/2018 1354    EGFRNONAA >60.0 03/15/2018 1356         Lab Results   Component Value Date    WBC 5.34 02/22/2017    HGB 12.2 (L) 02/22/2017    HCT 35.9 (L) 02/22/2017    MCV 89 02/22/2017     02/22/2017     Lab Results   Component Value Date    HGBA1C 7.1 (H) 03/15/2018     Lab Results   Component Value Date    CHOL 224 (H) 03/15/2018    CHOL 190 11/22/2017    CHOL 188 12/02/2016     Lab Results   Component Value Date    HDL 53 03/15/2018    HDL 50 11/22/2017    HDL 50 12/02/2016     Lab Results   Component Value Date    LDLCALC 136.0 03/15/2018    LDLCALC 119.8 11/22/2017    LDLCALC 119.2 12/02/2016     Lab Results   Component Value Date    TRIG 175 (H) 03/15/2018    TRIG 101 11/22/2017    TRIG 94 12/02/2016     Lab Results   Component Value Date    CHOLHDL 23.7 03/15/2018    CHOLHDL 26.3 11/22/2017    CHOLHDL 26.6 12/02/2016           Assessment:       1. Statin intolerance    2. Non-rheumatic tricuspid valve insufficiency    3. SOB (shortness of breath)    4. Peripheral vascular disease    5. Paroxysmal atrial fibrillation    6. Pacemaker    7. Essential hypertension    8. Mixed hyperlipidemia    9. Asymptomatic stenosis of both carotid arteries without infarction    10. Aneurysm of ascending aorta    11. Carotid artery disease without cerebral infarction    12. Claudication in peripheral vascular disease    13. Coronary artery disease involving coronary bypass graft of native heart without angina pectoris    14. Diabetes mellitus due to underlying condition with diabetic peripheral angiopathy without gangrene, without long-term current use of insulin    15. Hemorrhagic cerebrovascular accident (CVA)         Plan:             Stable CAD/CV conditions on current medical tx.  Continue medical tx for CAD.  Medical tx for PAD.  Stay off ASA due to hemorrhagic stroke.   Cardiac diet for lipids advised.  F/u with PPM clinic.  Lower DM HGAIC further.  Agree with Valsartan for HTN control, reassurance provided to pt.  F/u with Neurology for post CVA deficits.  No  changes in meds today.  Cardiac diet  Exercise as tolerated    F/u 4 months.

## 2018-05-07 ENCOUNTER — ANESTHESIA EVENT (OUTPATIENT)
Dept: ENDOSCOPY | Facility: HOSPITAL | Age: 83
End: 2018-05-07
Payer: MEDICARE

## 2018-05-07 ENCOUNTER — SURGERY (OUTPATIENT)
Age: 83
End: 2018-05-07

## 2018-05-07 ENCOUNTER — HOSPITAL ENCOUNTER (OUTPATIENT)
Facility: HOSPITAL | Age: 83
Discharge: HOME | End: 2018-05-07
Attending: INTERNAL MEDICINE | Admitting: INTERNAL MEDICINE
Payer: MEDICARE

## 2018-05-07 ENCOUNTER — ANESTHESIA (OUTPATIENT)
Dept: ENDOSCOPY | Facility: HOSPITAL | Age: 83
End: 2018-05-07
Payer: MEDICARE

## 2018-05-07 VITALS
RESPIRATION RATE: 18 BRPM | TEMPERATURE: 98 F | HEART RATE: 82 BPM | WEIGHT: 182 LBS | HEIGHT: 70 IN | DIASTOLIC BLOOD PRESSURE: 92 MMHG | SYSTOLIC BLOOD PRESSURE: 158 MMHG | BODY MASS INDEX: 26.05 KG/M2 | OXYGEN SATURATION: 98 %

## 2018-05-07 DIAGNOSIS — R13.10 DYSPHAGIA: ICD-10-CM

## 2018-05-07 DIAGNOSIS — K31.819 AVM (ARTERIOVENOUS MALFORMATION) OF STOMACH, ACQUIRED: ICD-10-CM

## 2018-05-07 DIAGNOSIS — R13.19 ESOPHAGEAL DYSPHAGIA: Primary | ICD-10-CM

## 2018-05-07 LAB — POCT GLUCOSE: 141 MG/DL (ref 70–110)

## 2018-05-07 PROCEDURE — C1726 CATH, BAL DIL, NON-VASCULAR: HCPCS | Performed by: INTERNAL MEDICINE

## 2018-05-07 PROCEDURE — 43255 EGD CONTROL BLEEDING ANY: CPT

## 2018-05-07 PROCEDURE — 37000008 HC ANESTHESIA 1ST 15 MINUTES: Performed by: INTERNAL MEDICINE

## 2018-05-07 PROCEDURE — 37000009 HC ANESTHESIA EA ADD 15 MINS: Performed by: INTERNAL MEDICINE

## 2018-05-07 PROCEDURE — 25000003 PHARM REV CODE 250: Performed by: NURSE ANESTHETIST, CERTIFIED REGISTERED

## 2018-05-07 PROCEDURE — 43249 ESOPH EGD DILATION <30 MM: CPT | Performed by: INTERNAL MEDICINE

## 2018-05-07 PROCEDURE — 63600175 PHARM REV CODE 636 W HCPCS: Performed by: NURSE ANESTHETIST, CERTIFIED REGISTERED

## 2018-05-07 PROCEDURE — 43270 EGD LESION ABLATION: CPT | Mod: ,,, | Performed by: INTERNAL MEDICINE

## 2018-05-07 PROCEDURE — 27200959 HC CATHETER, ERBE, ARGON PLASMA: Performed by: INTERNAL MEDICINE

## 2018-05-07 PROCEDURE — 25000003 PHARM REV CODE 250: Performed by: INTERNAL MEDICINE

## 2018-05-07 PROCEDURE — 43270 EGD LESION ABLATION: CPT | Performed by: INTERNAL MEDICINE

## 2018-05-07 PROCEDURE — 82962 GLUCOSE BLOOD TEST: CPT | Performed by: INTERNAL MEDICINE

## 2018-05-07 RX ORDER — LIDOCAINE HCL/PF 100 MG/5ML
SYRINGE (ML) INTRAVENOUS
Status: DISCONTINUED | OUTPATIENT
Start: 2018-05-07 | End: 2018-05-07

## 2018-05-07 RX ORDER — SODIUM CHLORIDE, SODIUM LACTATE, POTASSIUM CHLORIDE, CALCIUM CHLORIDE 600; 310; 30; 20 MG/100ML; MG/100ML; MG/100ML; MG/100ML
INJECTION, SOLUTION INTRAVENOUS CONTINUOUS
Status: DISCONTINUED | OUTPATIENT
Start: 2018-05-07 | End: 2018-05-07 | Stop reason: HOSPADM

## 2018-05-07 RX ORDER — PROPOFOL 10 MG/ML
INJECTION, EMULSION INTRAVENOUS
Status: DISCONTINUED | OUTPATIENT
Start: 2018-05-07 | End: 2018-05-07

## 2018-05-07 RX ORDER — GLYCOPYRROLATE 0.2 MG/ML
INJECTION INTRAMUSCULAR; INTRAVENOUS
Status: DISCONTINUED | OUTPATIENT
Start: 2018-05-07 | End: 2018-05-07

## 2018-05-07 RX ORDER — PANTOPRAZOLE SODIUM 40 MG/1
40 TABLET, DELAYED RELEASE ORAL DAILY
Qty: 30 TABLET | Refills: 5 | Status: SHIPPED | OUTPATIENT
Start: 2018-05-07 | End: 2018-10-02 | Stop reason: SDUPTHER

## 2018-05-07 RX ADMIN — PROPOFOL 20 MG: 10 INJECTION, EMULSION INTRAVENOUS at 08:05

## 2018-05-07 RX ADMIN — SODIUM CHLORIDE, SODIUM LACTATE, POTASSIUM CHLORIDE, AND CALCIUM CHLORIDE: .6; .31; .03; .02 INJECTION, SOLUTION INTRAVENOUS at 07:05

## 2018-05-07 RX ADMIN — LIDOCAINE HYDROCHLORIDE 50 MG: 20 INJECTION, SOLUTION INTRAVENOUS at 08:05

## 2018-05-07 RX ADMIN — GLYCOPYRROLATE 0.2 MG: 0.2 INJECTION INTRAMUSCULAR; INTRAVENOUS at 08:05

## 2018-05-07 RX ADMIN — PROPOFOL 30 MG: 10 INJECTION, EMULSION INTRAVENOUS at 08:05

## 2018-05-07 NOTE — ANESTHESIA POSTPROCEDURE EVALUATION
"Anesthesia Post Evaluation    Patient: Anthony Blair    Procedure(s) Performed: Procedure(s) (LRB):  ESOPHAGOGASTRODUODENOSCOPY (EGD) (N/A)    Final Anesthesia Type: MAC  Patient location during evaluation: PACU  Patient participation: Yes- Able to Participate  Level of consciousness: awake, awake and alert and oriented  Post-procedure vital signs: reviewed and stable  Pain management: adequate  Airway patency: patent  PONV status at discharge: No PONV  Anesthetic complications: no      Cardiovascular status: blood pressure returned to baseline  Respiratory status: spontaneous ventilation, unassisted and room air  Hydration status: euvolemic  Follow-up not needed.        Visit Vitals  BP (!) 180/99 (BP Location: Left arm, Patient Position: Lying)   Pulse 78   Temp 36.6 °C (97.8 °F) (Oral)   Resp 18   Ht 5' 10" (1.778 m)   Wt 82.6 kg (182 lb)   SpO2 97%   BMI 26.11 kg/m²       Pain/Josefa Score: Pain Assessment Performed: Yes (5/7/2018  7:50 AM)  Presence of Pain: denies (5/7/2018  7:50 AM)      "

## 2018-05-07 NOTE — PROVATION PATIENT INSTRUCTIONS
Discharge Summary/Instructions after an Endoscopic Procedure  Patient Name: Anthony Blair  Patient MRN: [id number]  Patient YOB: 1933  Monday, May 07, 2018 Betty Leonardo MD  RESTRICTIONS:  During your procedure today, you received medications for sedation.  These   medications may affect your judgment, balance and coordination.  Therefore,   for 24 hours, you have the following restrictions:   - DO NOT drive a car, operate machinery, make legal/financial decisions,   sign important papers or drink alcohol.    ACTIVITY:  The following day: return to full activity including work, except no heavy   lifting, straining or running for 3 days if polyps were removed.  DIET:  Eat and drink normally unless instructed otherwise.     TREATMENT FOR COMMON SIDE EFFECTS:  - Mild abdominal pain, nausea, belching, bloating or excessive gas:  rest,   eat lightly and use a heating pad.  - Sore Throat: treat with throat lozenges and/or gargle with warm salt   water.  - Because air was used during the procedure, expelling large amounts of air   from your rectum or belching is normal.  - If a bowel prep was taken, you may not have a bowel movement for 1-3 days.    This is normal.  SYMPTOMS TO WATCH FOR AND REPORT TO YOUR PHYSICIAN:  1. Abdominal pain or bloating, other than gas cramps.  2. Chest pain.  3. Back pain.  4. Signs of infection such as: chills or fever occurring within 24 hours   after the procedure.  5. Rectal bleeding, which would show as bright red, maroon, or black stools.   (A tablespoon of blood from the rectum is not serious, especially if   hemorrhoids are present.)  6. Vomiting.  7. Weakness or dizziness.  GO DIRECTLY TO THE NEAREST EMERGENCY ROOM IF YOU HAVE ANY OF THE FOLLOWING:      Difficulty breathing              Chills and/or fever over 101 F   Persistent vomiting and/or vomiting blood   Severe abdominal pain   Severe chest pain   Black, tarry stools   Bleeding- more than one tablespoon   Any other  symptom or condition that you feel may need urgent attention  Your doctor recommends these additional instructions:  If any biopsies were taken, your doctors clinic will contact you in 1 to 2   weeks with any results.  - Patient has a contact number available for emergencies.  The signs and   symptoms of potential delayed complications were discussed with the   patient.  Return to normal activities tomorrow.  Written discharge   instructions were provided to the patient.   - Resume previous diet.   - Continue present medications.   - Stop Zantac. Use Protonix (pantoprazole) 40 mg PO daily.   - Return to GI clinic in 2 months.   - Discharge patient to home.  For questions, problems or results please call your physician Betty Leonardo MD at Work:  [phone]  If you have any questions about the above instructions, call the GI   department at [phone] or call the endoscopy unit at [phone]   from 7am until 3 pm.  [facility], EMERGENCY ROOM PHONE NUMBER:   [phone]  IF A COMPLICATION OR EMERGENCY SITUATION ARISES AND YOU ARE UNABLE TO REACH   YOUR PHYSICIAN - GO DIRECTLY TO THE EMERGENCY ROOM.  I have read or have had read to me these discharge instructions for my   procedure and have received a written copy.  I understand these   instructions and will follow-up with my physician if I have any questions.     __________________________________       _____________________________________  Nurse Signature                                          Patient/Designated   Responsible Party Signature  Betty Leonardo MD  5/7/2018 9:04:22 AM  This report has been verified and signed electronically.

## 2018-05-07 NOTE — ANESTHESIA RELEASE NOTE
"Anesthesia Release from PACU Note    Patient: Anthony Blair    Procedure(s) Performed: Procedure(s) (LRB):  ESOPHAGOGASTRODUODENOSCOPY (EGD) (N/A)    Anesthesia type: MAC    Post pain: Adequate analgesia    Post assessment: no apparent anesthetic complications, tolerated procedure well and no evidence of recall    Last Vitals:   Visit Vitals  BP (!) 180/99 (BP Location: Left arm, Patient Position: Lying)   Pulse 78   Temp 36.6 °C (97.8 °F) (Oral)   Resp 18   Ht 5' 10" (1.778 m)   Wt 82.6 kg (182 lb)   SpO2 97%   BMI 26.11 kg/m²       Post vital signs: stable    Level of consciousness: responds to stimulation    Nausea/Vomiting: no nausea/no vomiting    Complications: none    Airway Patency: patent    Respiratory: unassisted    Cardiovascular: stable and blood pressure at baseline    Hydration: euvolemic       "

## 2018-05-07 NOTE — INTERVAL H&P NOTE
The patient has been examined and the H&P has been reviewed:    I concur with the findings and no changes have occurred since H&P was written.    Anesthesia/Surgery risks, benefits and alternative options discussed and understood by patient/family.          Active Hospital Problems    Diagnosis  POA    Dysphagia [R13.10]  Yes      Resolved Hospital Problems    Diagnosis Date Resolved POA   No resolved problems to display.       Proceed with EGD for dysphagia.

## 2018-05-07 NOTE — DISCHARGE INSTRUCTIONS
Esophageal Dilation     A balloon dilator may be used to widen a stricture in the esophagus.   An esophageal dilation is a procedure used to widen a narrowed section of your esophagus. This is the tube that leads from your throat to your stomach. Narrowing (stricture) of the esophagus can cause problems. These include trouble swallowing. This sheet explains what to expect with esophageal dilation.  Why esophageal dilation is needed  Several problems can be treated with esophageal dilation. They include:  · Peptic stricture. This is caused by reflux esophagitis. With this problem, the esophagus is irritated by acid reflux (heartburn). This occurs when acid from your stomach flows back up into the esophagus. Stomach acid damages the lining of the esophagus. This leads to a buildup of scar tissue. As a result, the esophagus is narrowed.  · Schatzkis ring. This is an abnormal ring of tissue. It forms where the esophagus meets the stomach. It can cause trouble swallowing. It can also cause food to get stuck in the esophagus. The cause of this condition is not known.  · Achalasia. This condition stops food and liquids from moving into your stomach from the esophagus. It affects the lower esophageal sphincter (LES). The LES is a muscular ring that opens (relaxes) when you swallow. With achalasia, the LES does not relax. This condition can also cause problems with peristalsis. This is the normal muscular action of the esophagus that moves food into the stomach.  · Eosinophilic esophagitis. This is a redness and swelling (inflammation) in the esophagus. It is caused by an environmental trigger such as a food allergy. It can lead to pain, trouble swallowing, and strictures.  · Less common causes of stricture. Other causes of stricture include radiation treatment and cancer.  Before you have esophageal dilation  · Tell your provider about any medicines you take. This includes prescription medicines, over-the-counter  medicines, herbs, vitamins, and other supplements. Be sure to mention aspirin or any blood thinners youre taking.  · Let your provider know if you need to take antibiotics before dental procedures. You may need to take them before esophageal dilation as well.  · Tell your provider about any health conditions you have, such as heart or lung disease. Also mention any allergies to medicines.  · Youll need to have an empty stomach for the procedure. Follow your providers instructions for not eating and drinking before the procedure.  · Arrange to have a family member or friend drive you home after the procedure.  During the procedure  · You may be given local anesthesia to numb your throat. Youll also likely be given medicine to relax you. The procedure takes about 15 minutes. It does not cause trouble breathing.  · A tube called an endoscope (scope) is used. This is a narrow tube with a tiny light and camera at the end. The scope is inserted through your mouth and into your esophagus. It lets your provider see inside your esophagus. To help guide your provider, an imaging method called fluoroscopy may also be used. This creates a moving X-ray image on a computer screen.   · Next, special tiny tools are carefully guided through your mouth and down into the esophagus. They widen the stricture and are then removed. Different types of instruments are used. The type used depends on the size and cause of the stricture. Types include:  ¨ Balloon dilator. A tiny empty balloon is put into the stricture using an endoscope. The balloon is slowly filled with air. The air is removed from the balloon when the stricture is widened to the right size. Balloon dilators are used to treat many types of strictures.  ¨ Guided wire dilator. A thin wire is eased down the esophagus. A small tube thats wider on one end is guided down the wire. It is put into the stricture to stretch it. These dilators are used to treat all kinds of  strictures.  ¨ Bougies. These are weighted, cone-shaped tubes. Starting with smaller cones, your provider uses increasingly larger cones until the stricture is stretched the right amount. Bougies are often used to treat strictures that are simple (short, straight, and not very narrow).  After the procedure  · Youll be watched closely until your provider says youre ready to go home. Youll need to have a friend or family member drive you home.  · You may have a sore throat for the rest of the day.  · You may have pain behind your breastbone for a short time afterwards.  · You can start drinking fluids again after the numbness in your throat goes away. You can resume eating the same day or the next day.  Risks and possible complications  Risks and possible complications for esophageal dilation include:  · Infection  · A tear or hole in the esophagus lining, causing bleeding and possibly needing surgery to fix  · Risks of anesthesia  Follow-up  You may need to have the procedure repeated one or more times. This depends on the cause and extent of the narrowing. Repeat procedures can allow the dilation to take place more slowly. This reduces the risks of the procedure.  If your stricture was caused by reflux esophagitis, youll likely need to take medicine to treat that condition. Your provider will tell you more.  When to call your provider  Call your healthcare provider right away if you have any of the following after the procedure:  · Fever of 100.4°F (38.0°C)  · Chest pain  · Trouble swallowing  · Vomiting blood or material that looks like coffee grounds  · Bleeding  · Black, tarry, or bloody stools   Date Last Reviewed: 7/1/2016 © 2000-2017 [employer]. [address] [place] [address], [place] [address]. All rights reserved. This information is not intended as a substitute for professional medical care. Always follow your healthcare professional's instructions.

## 2018-05-07 NOTE — H&P (VIEW-ONLY)
GI OUTPATIENT NOTE    PCP: Abdulaziz Mccabe [id number] [facility] [address] / [facility] [id number]    Chief Complaint   Patient presents with    Dysphagia       HISTORY OF PRESENT ILLNESS:  85 y.o. male presents to the GI clinic today for initial evaluation. The patient complains of dysphagia. He has had this problem a long time but it has gotten worse in the last year or two. It occurs with dry food. No dysphagia with liquids. It stops in his chest. It feels like it takes his breath. He denies nausea or vomiting. He reports acid indigestion but denies heartburn. He treats it with Zantac nightly. He denies weight loss. He has never had an EGD.     He has never had a colonoscopy and doesn't want one. He has at least one BM a day, but sometimes as much as 4. Half the time it is diarrhea and half the time it is formed. He reports urgency. The need to have a BM doesn't wake him. He treats the diarrhea with Pepto bismol. Most of the time, the diarrhea follows a formed. He denies obvious blood in the stool, but it is sometimes dark. Not sure if related to the Pepto.     Past Medical History:   Diagnosis Date    A-fib     Anemia     AP (angina pectoris) 1/23/2014    Carotid artery disease without cerebral infarction 8/22/2014    Cataract     Diabetes mellitus type II     BS didn't check today 09/08/2016  A1C  7.4    GERD (gastroesophageal reflux disease) 7/11/2013    Hyperlipidemia     Hypertension     Lumbosacral spondylosis 12/24/2014    Pacemaker 1/23/2014    Paroxysmal atrial fibrillation 1/23/2014    Peripheral vascular disease 8/22/2014    S/P CABG (coronary artery bypass graft) 1/23/2014    Tobacco dependence     resolved       Past Surgical History:   Procedure Laterality Date    ANGIOPLASTY      APPENDECTOMY      ATRIAL ABLATION SURGERY N/A     CATARACT EXTRACTION      CATARACT EXTRACTION W/ INTRAOCULAR LENS IMPLANT Right     CATARACT EXTRACTION W/ INTRAOCULAR LENS IMPLANT Left     CORONARY ARTERY BYPASS  GRAFT  07/2010    x5    EYE SURGERY      pace maker surgkerry  10/2010    reposition in 2011/2012 (approx)    VEIN BYPASS SURGERY         Social History     Social History    Marital status:      Spouse name: N/A    Number of children: N/A    Years of education: N/A     Occupational History    Not on file.     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       Family History   Problem Relation Age of Onset    Arthritis Mother     Diabetes Mother     Kidney disease Mother     Heart disease Mother     Arthritis Father     Diabetes Father     Diabetes Sister     Cancer Sister      breast    Heart disease Sister     Diabetes Brother     Kidney disease Brother     Heart disease Brother     Cancer Daughter      breast    Diabetes Daughter     Miscarriages / Stillbirths Daughter     Fibromyalgia Daughter     Diabetes Son     Diabetes Son     Alcohol abuse Paternal Uncle     Stroke Neg Hx        MEDS/ALLERGY:  The patient's medications and allergies were reviewed and updated in the EPIC chart.     Review of Systems   Constitutional: Negative for fatigue and fever.   HENT: Positive for hearing loss.    Eyes: Positive for visual disturbance.   Respiratory: Positive for shortness of breath. Negative for cough.    Cardiovascular: Negative for chest pain and palpitations.   Gastrointestinal:        As per HPI.   Genitourinary: Negative for difficulty urinating, dysuria, frequency and hematuria.   Musculoskeletal: Negative for arthralgias and back pain.   Skin: Negative for color change and rash.   Neurological: Negative for dizziness, seizures,  syncope, weakness, numbness and headaches.   Hematological: Bruises/bleeds easily.   Psychiatric/Behavioral: The patient is not nervous/anxious.        Physical Exam   Constitutional: He is oriented to person, place, and time. He appears well-developed and well-nourished.   HENT:   Head: Normocephalic and atraumatic.   Eyes: EOM are normal.   Neck: Normal range of motion. Neck supple. No thyromegaly present.   Cardiovascular: Normal rate, regular rhythm and normal heart sounds.    No murmur heard.  Pulmonary/Chest: Effort normal and breath sounds normal. No respiratory distress. He has no wheezes.   Abdominal: Soft. Bowel sounds are normal. He exhibits no distension and no mass. There is no hepatomegaly. There is no tenderness.   Musculoskeletal: He exhibits no edema.   Neurological: He is alert and oriented to person, place, and time. No cranial nerve deficit. Gait normal.   Skin: Skin is warm and dry. No rash noted.   Psychiatric: He has a normal mood and affect. Thought content normal.       LABS/IMAGING:   Pertinent results were reviewed.     ASSESSMENT:  1. Dysphagia, pharyngoesophageal    2. Irregular bowel habits        PLAN:  We will address the primary complaint first. An EGD will be scheduled. He will likely need a change in reflux treatment but I will wait until EGD results come back. Take Novolog as prescribed the day before the procedure. Do not take the morning of the procedure. Ok to skip Lantus the evening before the procedure. Ok to use a clear liquid like apple juice or soda if blood glucose gets low.     We talked about doing a colonoscopy at the same time, but he really doesn't want to do one. His wife fell during her prep and sustained a fracture which resulted in a prolonged hospital stay. Other colon cancer screening options discussed. We will address it again in follow up. For now, I recommend he start a fiber supplement twice a day to help improve his bowel habits.     Follow-up in about 6  weeks (around 5/24/2018).     Thank you for the opportunity to participate in the care of this patient. This consult was designated to me by my supervising physician. He fully participated in the development of the assessment and recommendations.    Maxwell Altman PA-C.

## 2018-05-07 NOTE — TRANSFER OF CARE
"Anesthesia Transfer of Care Note    Patient: Anthony Blair    Procedure(s) Performed: Procedure(s) (LRB):  ESOPHAGOGASTRODUODENOSCOPY (EGD) (N/A)    Patient location: PACU    Anesthesia Type: MAC    Transport from OR: Transported from OR on room air with adequate spontaneous ventilation    Post pain: adequate analgesia    Post assessment: no apparent anesthetic complications    Post vital signs: stable    Level of consciousness: responds to stimulation    Nausea/Vomiting: no nausea/vomiting    Complications: none    Transfer of care protocol was followed      Last vitals:   Visit Vitals  BP (!) 180/99 (BP Location: Left arm, Patient Position: Lying)   Pulse 78   Temp 36.6 °C (97.8 °F) (Oral)   Resp 18   Ht 5' 10" (1.778 m)   Wt 82.6 kg (182 lb)   SpO2 97%   BMI 26.11 kg/m²     "

## 2018-05-07 NOTE — ANESTHESIA PREPROCEDURE EVALUATION
05/07/2018  Anthony Blair is a 85 y.o., male.    Anesthesia Evaluation    I have reviewed the Patient Summary Reports.    I have reviewed the Nursing Notes.   I have reviewed the Medications.     Review of Systems  Anesthesia Hx:  No problems with previous Anesthesia Denies Hx of Anesthetic complications  History of prior surgery of interest to airway management or planning: Previous anesthesia: General Denies Family Hx of Anesthesia complications.   Denies Personal Hx of Anesthesia complications.   Social:  Former Smoker, No Alcohol Use    Hematology/Oncology:  Hematology Normal   Oncology Normal     EENT/Dental:EENT/Dental Normal   Cardiovascular:   Exercise tolerance: poor Pacemaker Hypertension, well controlled Denies MI. CAD  asymptomatic CABG/stent   Denies Angina. hyperlipidemia OLIVARES    Pulmonary:   Shortness of breath    Renal/:  Renal/ Normal     Hepatic/GI:   GERD, well controlled Denies Liver Disease. Denies Hepatitis.    Musculoskeletal:   Arthritis     Neurological:   CVA, residual symptoms    Endocrine:   Diabetes, well controlled, type 2, using insulin    Dermatological:  Skin Normal    Psych:  Psychiatric Normal           Physical Exam  General:  Well nourished    Airway/Jaw/Neck:  Airway Findings: Mouth Opening: Normal Tongue: Normal  General Airway Assessment: Adult  Mallampati: II  TM Distance: Normal, at least 6 cm  Jaw/Neck Findings:         Dental:  Dental Findings: In tact   Chest/Lungs:  Chest/Lungs Findings: Clear to auscultation, Normal Respiratory Rate     Heart/Vascular:  Heart Findings: Rate: Normal  Rhythm: Regular Rhythm  Sounds: Normal        Mental Status:  Mental Status Findings:  Cooperative, Alert and Oriented         Anesthesia Plan  Type of Anesthesia, risks & benefits discussed:  Anesthesia Type:  MAC  Patient's Preference:   Intra-op Monitoring Plan: standard ASA  monitors  Intra-op Monitoring Plan Comments:   Post Op Pain Control Plan:   Post Op Pain Control Plan Comments:   Induction:   IV  Beta Blocker:  Patient is on a Beta-Blocker and has received one dose within the past 24 hours (No further documentation required).       Informed Consent: Patient understands risks and agrees with Anesthesia plan.  Questions answered. Anesthesia consent signed with patient.  ASA Score: 3     Day of Surgery Review of History & Physical: I have interviewed and examined the patient. I have reviewed the patient's H&P dated:            Ready For Surgery From Anesthesia Perspective.

## 2018-05-08 ENCOUNTER — OFFICE VISIT (OUTPATIENT)
Dept: NEUROLOGY | Facility: CLINIC | Age: 83
End: 2018-05-08
Payer: MEDICARE

## 2018-05-08 VITALS
BODY MASS INDEX: 28.1 KG/M2 | HEART RATE: 60 BPM | HEIGHT: 68 IN | DIASTOLIC BLOOD PRESSURE: 62 MMHG | SYSTOLIC BLOOD PRESSURE: 130 MMHG | WEIGHT: 185.44 LBS

## 2018-05-08 DIAGNOSIS — I48.0 PAROXYSMAL ATRIAL FIBRILLATION: Chronic | ICD-10-CM

## 2018-05-08 DIAGNOSIS — R56.9 SEIZURE, LATE EFFECT OF STROKE: Primary | ICD-10-CM

## 2018-05-08 DIAGNOSIS — E13.51 PERIPHERAL VASCULAR DISEASE DUE TO SECONDARY DIABETES MELLITUS: ICD-10-CM

## 2018-05-08 DIAGNOSIS — I69.398 SEIZURE, LATE EFFECT OF STROKE: Primary | ICD-10-CM

## 2018-05-08 PROCEDURE — 99999 PR PBB SHADOW E&M-EST. PATIENT-LVL III: CPT | Mod: PBBFAC,,, | Performed by: PSYCHIATRY & NEUROLOGY

## 2018-05-08 PROCEDURE — 99204 OFFICE O/P NEW MOD 45 MIN: CPT | Mod: S$GLB,,, | Performed by: PSYCHIATRY & NEUROLOGY

## 2018-05-08 PROCEDURE — 99499 UNLISTED E&M SERVICE: CPT | Mod: S$PBB,,, | Performed by: PSYCHIATRY & NEUROLOGY

## 2018-05-08 PROCEDURE — 3075F SYST BP GE 130 - 139MM HG: CPT | Mod: CPTII,S$GLB,, | Performed by: PSYCHIATRY & NEUROLOGY

## 2018-05-08 PROCEDURE — 3078F DIAST BP <80 MM HG: CPT | Mod: CPTII,S$GLB,, | Performed by: PSYCHIATRY & NEUROLOGY

## 2018-05-08 RX ORDER — LEVETIRACETAM 250 MG/1
250 TABLET ORAL 2 TIMES DAILY
Qty: 60 TABLET | Refills: 11 | Status: SHIPPED | OUTPATIENT
Start: 2018-05-08 | End: 2019-04-30 | Stop reason: SDUPTHER

## 2018-05-08 NOTE — PROGRESS NOTES
This is an 85-year-old right-handed patient who has a long-standing history of diabetes mellitus type II with peripheral vascular disease.  In September, 2017, he was performing a [employer] service when he noted that his left leg suddenly felt numb and then rapidly became very heavy to the point that the patient could not stand and bear weight.  At the same time, he noticed the development of numbness and heaviness in the left arm.  He was taken emergently to [facility] [place] where a CT scan demonstrated a very small right basal ganglia hemorrhage.  The patient was hospitalized at that time for the full evaluation.    The patient was then discharged in about 3 weeks after discharge she began to experience intermittent episodes of what now have become more frequent.  The episodes started abruptly with a sensation of numbness in the left arm and left side of his face with associated heavy feeling in the left leg.  The episodes can occur during the day.  Have also awakened him from his sleep.  The patient does not notice any associated involuntary movement of the hand, face, or leg.  He does note that at times the sensation of numbness and tingling becomes very intense and quite painful.    The frequency of the episodes is variable from several in a single week but then sometimes going as long as a week without symptoms.  The patient is of the opinion that these episodes have become more frequent with time.  He feels that he has recovered most of the function of his left arm and left leg otherwise.    Since the stroke, he is also noticed a change in his hearing.  The patient states that it is very difficult for him to perceive sounds such as from his guitar.  He has a pre-existing hearing loss and has hearing aids in place.  Adjustment of the hearing aids has not helped his perception of the sound that he does hear.  He is able to discern voices and is able to understand although he states that he frequently  has to watch the person speaking to understand completely.      ROS:  GENERAL: No fever, chills,  or weight loss.  SKIN: No rashes, itching or changes in color or texture of skin.  HEAD: No headaches or recent head trauma.  EYES: Visual acuity fine. No photophobia, ocular pain or diplopia.  EARS: Denies ear pain, discharge or vertigo.  See also comments above.  NOSE: No loss of smell, no epistaxis or postnasal drip.  MOUTH & THROAT: No hoarseness or change in voice. No excessive gum bleeding.  NODES: Denies swollen glands.  CHEST: Denies OLIVARES, cyanosis, wheezing, cough and sputum production.  CARDIOVASCULAR: Denies chest pain, PND, orthopnea or reduced exercise tolerance.  ABDOMEN: Appetite fine. No weight loss. Denies diarrhea, abdominal pain, hematemesis or blood in stool.  URINARY: No flank pain, dysuria or hematuria.  PERIPHERAL VASCULAR: No claudication or cyanosis.  MUSCULOSKELETAL: No joint stiffness or swelling. Denies back pain.  NEUROLOGIC: See comments in present illness.    The patient's past history, family history and social history were reviewed with the patient and his electronic medical records.  [facility] also reviewed.    PE:   VITAL SIGNS: Blood pressure 130/62, pulse 68 and regular, weight 84.1 kg, height 5 foot 8 inches, BMI 28.19  APPEARANCE: Well nourished, well developed, in no acute distress.    HEAD: Normocephalic, atraumatic.  EYES: Patient is wearing glasses.  Corneas are clear.  EARS: The patient has bilateral hearing aids.  NOSE: Mucosa pink. Airway clear.  MOUTH & THROAT: No tonsillar enlargement. No pharyngeal erythema or exudate. No stridor.  NECK: Supple. No bruits.  CHEST: Lungs clear to auscultation.  CARDIOVASCULAR: Regular rhythm without significant murmurs.  ABDOMEN: Bowel sounds normal. Not distended.   MUSCULOSKELETAL:  No bony deformity seen.  Muscle tone and muscle mass are normal in both upper and both lower extremities.  NEUROLOGIC:    Mental Status:  The patient is well oriented to person, time, place, and situation.  The patient is attentive to the environment and cooperative for the exam.  Cranial Nerves: II-XII grossly intact. Fundoscopic exam is normal.  No hemorrhage, exudate or papilledema is present. The extraocular muscles are intact in the cardinal directions of gaze.  The patient reports that subjectively, cotton wisp is not as intense a sensation in the second and third divisions of the left fifth cranial nerve is compared to that on the right.  Temperature sensation is impaired in the same distribution.  Masseter function is equally strong.  No ptosis is present. Facial features are symmetrical.  Speech is normal in fluency, diction, and phrasing.  Tongue protrudes in the midline.    Gait and Station:  Romberg is negative.  Good alternate armswing with normal gait.  Motor:  No downdrift of either arm when held at shoulder level.  Manual muscle testing of proximal and distal muscles of both upper and lower extremities demonstrates a very slightly decreased  strength in the left hand compared to that on the right.  No other focal weakness is noted on the left however.  Sensory:  Intact both upper and lower extremities to pin prick, touch, and vibration.  Cerebellar:  Finger to nose done well.  Alternating movements intact.  No involuntary movements or tremor seen.  Reflexes:  Stretch reflexes are 2+ both upper and lower extremities.  Plantar stimulation is flexor bilaterally and no pathological reflexes are seen     DISCUSSION:  The history as given suggest that this is sensory seizure that occurs intermittently and would be a consequence of his hemorrhagic stroke.  It is difficult to interpret the changes in his hearing but I suspect that this is also a consequence of the stroke.     ASSESSMENT:  1.  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  Peripheral vascular disease  3.  Diabetes mellitus with peripheral vascular  disease    RECOMMENDATIONS:  1.  Trial of Keppra 250 mg twice a day.  The patient was instructed to monitor frequency of these sensory episodes and report them through the patient portal.  He was advised that Keppra may need to be increased but this will had to be done carefully because of his history of diabetes and mild renal insufficiency.  2.  Routine follow-up in 3 months    This was a 45 minute visit with the patient with over 50% of the time spent counseling the patient and his brother-in-law regarding the findings on examination.  This note is generated with speech recognition software and is subject to transcription error and sound alike phrases that may be missed by proofreading.

## 2018-05-18 ENCOUNTER — PATIENT MESSAGE (OUTPATIENT)
Dept: NEUROLOGY | Facility: CLINIC | Age: 83
End: 2018-05-18

## 2018-05-24 ENCOUNTER — OFFICE VISIT (OUTPATIENT)
Dept: GASTROENTEROLOGY | Facility: CLINIC | Age: 83
End: 2018-05-24
Payer: MEDICARE

## 2018-05-24 VITALS
DIASTOLIC BLOOD PRESSURE: 69 MMHG | WEIGHT: 186.75 LBS | BODY MASS INDEX: 27.66 KG/M2 | SYSTOLIC BLOOD PRESSURE: 122 MMHG | HEIGHT: 69 IN | HEART RATE: 77 BPM

## 2018-05-24 DIAGNOSIS — R13.14 DYSPHAGIA, PHARYNGOESOPHAGEAL: Primary | ICD-10-CM

## 2018-05-24 DIAGNOSIS — R19.8 IRREGULAR BOWEL HABITS: ICD-10-CM

## 2018-05-24 DIAGNOSIS — K31.819 AVM (ARTERIOVENOUS MALFORMATION) OF STOMACH, ACQUIRED: ICD-10-CM

## 2018-05-24 PROCEDURE — 99999 PR PBB SHADOW E&M-EST. PATIENT-LVL IV: CPT | Mod: PBBFAC,,, | Performed by: PHYSICIAN ASSISTANT

## 2018-05-24 PROCEDURE — 3074F SYST BP LT 130 MM HG: CPT | Mod: CPTII,S$GLB,, | Performed by: PHYSICIAN ASSISTANT

## 2018-05-24 PROCEDURE — 3078F DIAST BP <80 MM HG: CPT | Mod: CPTII,S$GLB,, | Performed by: PHYSICIAN ASSISTANT

## 2018-05-24 PROCEDURE — 99213 OFFICE O/P EST LOW 20 MIN: CPT | Mod: S$GLB,,, | Performed by: PHYSICIAN ASSISTANT

## 2018-05-24 NOTE — PROGRESS NOTES
Subjective:       Patient ID: Anthony Blair is a 85 y.o. male.    Chief Complaint: Dysphagia (follow up)    HPI   The patient has a history of dysphagia and irregular bowel habits. He is here today for a follow up visit. Recommendations last visit included EGD and fiber supplement. He was noted to have a web which was dilated and two AVMs which were treated. He reports doing better and only once had indigestion or dysphagia with really dry food. Hasn't started protonix yet. He would rather stay on Zantac. He tried the Metamucil likely discussed last visit. He said it helped the diarrhea but gave him more urgency. He also notes that he was eating bran flakes prior to starting the metamucil. He is now off both and reports improved bowel habits.     Review of Systems    As per HPI.     Objective:      Physical Exam   Constitutional: He is oriented to person, place, and time. He appears well-developed and well-nourished. No distress.   HENT:   Head: Normocephalic and atraumatic.   Eyes: EOM are normal.   Cardiovascular: Normal rate and regular rhythm.    Pulmonary/Chest: Effort normal and breath sounds normal. No respiratory distress. He has no wheezes.   Abdominal: Soft. Bowel sounds are normal. He exhibits no distension. There is no tenderness.   Neurological: He is alert and oriented to person, place, and time. No cranial nerve deficit.   Skin: He is not diaphoretic.   Psychiatric: His behavior is normal.       Assessment:       1. Dysphagia, pharyngoesophageal    2. Irregular bowel habits    3. AVM (arteriovenous malformation) of stomach, acquired        Plan:       Overall, the patient is doing better. Continue Zantac once daily. Alternate liquids with solids at meals. Discussed AVMs as seen on EGD. No further work up indicated at this time.     Follow-up if symptoms worsen or fail to improve.    Thank you for the opportunity to participate in the care of this patient.   Maxwell Altman PA-C.

## 2018-06-11 DIAGNOSIS — I10 ESSENTIAL HYPERTENSION: ICD-10-CM

## 2018-06-11 RX ORDER — VALSARTAN 40 MG/1
TABLET ORAL
Qty: 90 TABLET | OUTPATIENT
Start: 2018-06-11

## 2018-06-15 ENCOUNTER — OFFICE VISIT (OUTPATIENT)
Dept: FAMILY MEDICINE | Facility: CLINIC | Age: 83
End: 2018-06-15
Payer: MEDICARE

## 2018-06-15 VITALS
DIASTOLIC BLOOD PRESSURE: 78 MMHG | WEIGHT: 184.31 LBS | SYSTOLIC BLOOD PRESSURE: 130 MMHG | HEART RATE: 72 BPM | OXYGEN SATURATION: 97 % | BODY MASS INDEX: 27.22 KG/M2 | TEMPERATURE: 98 F

## 2018-06-15 DIAGNOSIS — E08.51 DIABETES MELLITUS DUE TO UNDERLYING CONDITION WITH DIABETIC PERIPHERAL ANGIOPATHY WITHOUT GANGRENE, WITHOUT LONG-TERM CURRENT USE OF INSULIN: Primary | ICD-10-CM

## 2018-06-15 DIAGNOSIS — E78.2 MIXED HYPERLIPIDEMIA: Chronic | ICD-10-CM

## 2018-06-15 DIAGNOSIS — I77.9 CAROTID ARTERY DISEASE WITHOUT CEREBRAL INFARCTION: Chronic | ICD-10-CM

## 2018-06-15 DIAGNOSIS — E11.9 DIABETIC EYE EXAM: ICD-10-CM

## 2018-06-15 DIAGNOSIS — Z01.00 DIABETIC EYE EXAM: ICD-10-CM

## 2018-06-15 PROCEDURE — 99214 OFFICE O/P EST MOD 30 MIN: CPT | Mod: S$GLB,,, | Performed by: FAMILY MEDICINE

## 2018-06-15 PROCEDURE — 3078F DIAST BP <80 MM HG: CPT | Mod: CPTII,S$GLB,, | Performed by: FAMILY MEDICINE

## 2018-06-15 PROCEDURE — 99499 UNLISTED E&M SERVICE: CPT | Mod: S$PBB,,, | Performed by: FAMILY MEDICINE

## 2018-06-15 PROCEDURE — 99999 PR PBB SHADOW E&M-EST. PATIENT-LVL III: CPT | Mod: PBBFAC,,, | Performed by: FAMILY MEDICINE

## 2018-06-15 PROCEDURE — 3075F SYST BP GE 130 - 139MM HG: CPT | Mod: CPTII,S$GLB,, | Performed by: FAMILY MEDICINE

## 2018-06-15 NOTE — PROGRESS NOTES
Chief Complaint:    Chief Complaint   Patient presents with    Follow-up       History of Present Illness:   patient presents today for three-month follow-up,   Is doing okay is A1c 7.1 blood pressure is normal   Lipids chemistries stable   Denies any depression.    He is unable to exercise very much ever since he had a stroke he says little activity and he just gets tired very much.      His daughter had complained about some memory issues.      ROS:  Review of Systems   Constitutional: Negative for activity change, chills, fatigue, fever and unexpected weight change.   HENT: Negative for congestion, ear discharge, ear pain, hearing loss, postnasal drip and rhinorrhea.    Eyes: Negative for pain and visual disturbance.   Respiratory: Negative for cough, chest tightness and shortness of breath.    Cardiovascular: Negative for chest pain and palpitations.   Gastrointestinal: Negative for abdominal pain, diarrhea and vomiting.   Endocrine: Negative for heat intolerance.   Genitourinary: Negative for dysuria, flank pain, frequency and hematuria.   Musculoskeletal: Negative for back pain, gait problem and neck pain.   Skin: Negative for color change and rash.   Neurological: Negative for dizziness, tremors, seizures, numbness and headaches.   Psychiatric/Behavioral: Negative for agitation, hallucinations, self-injury, sleep disturbance and suicidal ideas. The patient is not nervous/anxious.        Past Medical History:   Diagnosis Date    A-fib     Anemia     AP (angina pectoris) 1/23/2014    Carotid artery disease without cerebral infarction 8/22/2014    Cataract     Coronary artery disease     Diabetes mellitus type II     BS didn't check today 09/08/2016  A1C  7.4    GERD (gastroesophageal reflux disease) 7/11/2013    Hemorrhagic cerebrovascular accident (CVA) 4/26/2018    Hyperlipidemia     Hypertension     Intracranial hemorrhage     Lumbosacral spondylosis 12/24/2014    Pacemaker 1/23/2014     Paroxysmal atrial fibrillation 1/23/2014    Peripheral vascular disease 8/22/2014    S/P CABG (coronary artery bypass graft) 1/23/2014    Tobacco dependence     resolved    Type 2 diabetes mellitus with ophthalmic manifestations        Social History:  Social History     Social History    Marital status:      Spouse name: N/A    Number of children: N/A    Years of education: N/A     Social History Main Topics    Smoking status: Former Smoker     Packs/day: 2.00     Years: 13.00     Types: Cigarettes     Start date: 11/25/1954     Quit date: 6/7/1967    Smokeless tobacco: Never Used    Alcohol use No    Drug use: No    Sexual activity: No     Other Topics Concern    None     Social History Narrative    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       Family History:   family history includes Alcohol abuse in his paternal uncle; Arthritis in his father and mother; Cancer in his daughter and sister; Diabetes in his brother, daughter, father, mother, sister, son, and son; Fibromyalgia in his daughter; Heart disease in his brother, mother, and sister; Kidney disease in his brother and mother; Miscarriages / Stillbirths in his daughter.    Health Maintenance   Topic Date Due    Eye Exam  05/16/2018    Influenza Vaccine  08/01/2018    Hemoglobin A1c  09/15/2018    Foot Exam  11/17/2018    Lipid Panel  03/15/2019    TETANUS VACCINE  01/23/2024    Zoster Vaccine  Completed    Pneumococcal (65+)  Completed       Physical Exam:    Vital Signs  Temp: 97.8 °F (36.6 °C)  Temp src: Tympanic  Pulse: 72  SpO2: 97 %  BP: 130/78  BP Location: Left arm  Patient Position: Sitting  Pain Score:   3  Pain Loc: Generalized  Height and Weight  Weight: 83.6 kg (184 lb 4.9 oz)]    Body mass index is 27.22 kg/m².    Physical Exam   Constitutional: He is oriented to person, place, and time. He appears well-developed.   HENT:   Mouth/Throat: Oropharynx is clear and moist.   Eyes: Conjunctivae are  normal. Pupils are equal, round, and reactive to light.   Neck: Normal range of motion. Neck supple.   Cardiovascular: Normal rate, regular rhythm and normal heart sounds.    No murmur heard.  Pulmonary/Chest: Effort normal and breath sounds normal. No respiratory distress. He has no wheezes. He has no rales. He exhibits no tenderness.   Abdominal: Soft. He exhibits no distension and no mass. There is no tenderness. There is no guarding.   Musculoskeletal: He exhibits no edema or tenderness.   Lymphadenopathy:     He has no cervical adenopathy.   Neurological: He is alert and oriented to person, place, and time. He has normal reflexes.     Mini-mental status examination:  30/30   Skin: Skin is warm and dry.   Psychiatric: He has a normal mood and affect. His behavior is normal. Judgment and thought content normal.         Diabetes Management Status    Statin: Not taking  ACE/ARB: Taking    Screening or Prevention Patient's value Goal Complete/Controlled?   HgA1C Testing and Control   Lab Results   Component Value Date    HGBA1C 7.1 (H) 03/15/2018      Annually/Less than 8% Yes   Lipid profile : 03/15/2018 Annually Yes   LDL control Lab Results   Component Value Date    LDLCALC 136.0 03/15/2018    Annually/Less than 100 mg/dl  No   Nephropathy screening Lab Results   Component Value Date    LABMICR 14.0 11/22/2017     Lab Results   Component Value Date    PROTEINUA Negative 08/10/2014    Annually Yes   Blood pressure BP Readings from Last 1 Encounters:   06/15/18 130/78    Less than 140/90 Yes   Dilated retinal exam : 05/16/2017 Annually No   Foot exam   : 11/17/2017 Annually Yes       Assessment:      ICD-10-CM ICD-9-CM   1. Diabetes mellitus due to underlying condition with diabetic peripheral angiopathy without gangrene, without long-term current use of insulin E08.51 249.70     443.81   2. Carotid artery disease without cerebral infarction I77.9 447.9   3. Mixed hyperlipidemia E78.2 272.2   4. Diabetic eye exam  Z01.00 V72.0    E11.9 250.00         Plan:    Patient does not have any evidence of memory loss at this time.    Continue current plan   Please stay active as tolerated.    Schedule a diabetic eye exam   Follow-up 6 months.  Orders Placed This Encounter   Procedures    Comprehensive metabolic panel    Lipid panel    Hemoglobin A1c    Ambulatory referral to Optometry       Current Outpatient Prescriptions   Medication Sig Dispense Refill    ACCU-CHEK TOMAS PLUS TEST STRP Strp TEST BLOOD SUGAR SIX TIMES DAILY 300 strip 5    ACCU-CHEK MULTICLIX LANCET lancets TEST BLOOD SUGAR THREE TIMES DAILY 200 each 5    CARBOXYMETHYLCELLULOSE SODIUM (REFRESH OPHT) Apply to eye.      coconut oil 1,000 mg Cap Take by mouth.      fluticasone (FLONASE) 50 mcg/actuation nasal spray 2 sprays by Each Nare route once daily. 16 g 11    furosemide (LASIX) 20 MG tablet TAKE ONE TABLET BY MOUTH DAILY 30 tablet 12    insulin glargine (LANTUS SOLOSTAR) 100 unit/mL (3 mL) InPn pen INJECT 16 UNITS SUB-Q AT BEDTIME 15 mL 11    isosorbide mononitrate (IMDUR) 30 MG 24 hr tablet TAKE ONE TABLET BY MOUTH DAILY 30 tablet 12    levETIRAcetam (KEPPRA) 250 MG Tab Take 1 tablet (250 mg total) by mouth 2 (two) times daily. 60 tablet 11    metformin (GLUCOPHAGE) 500 MG tablet TAKE ONE BY MOUTH TWICE A DAY WITH FOOD. (Patient taking differently: Take 1,000 mg by mouth 2 (two) times daily with meals. TAKE ONE BY MOUTH TWICE A DAY WITH FOOD.) 60 tablet 6    metoprolol succinate (TOPROL-XL) 100 MG 24 hr tablet TAKE ONE TABLET BY MOUTH TWICE DAILY 60 tablet 12    naproxen sodium (ANAPROX) 220 MG tablet Take 220 mg by mouth 2 (two) times daily with meals.      NITROSTAT 0.4 mg SL tablet Place 1 tablet (0.4 mg total) under the tongue every 5 (five) minutes as needed for Chest pain. 25 tablet 12    NOVOLOG FLEXPEN 100 unit/mL InPn pen Inject 5 units 3 times a day before meals 15 mL 3    pantoprazole (PROTONIX) 40 MG tablet Take 1 tablet (40 mg  "total) by mouth once daily. 30 tablet 5    potassium chloride SA (K-DUR,KLOR-CON) 20 MEQ tablet TAKE ONE TABLET BY MOUTH EVERY DAY 30 tablet 12    saw palmetto fruit 450 mg Cap Take 1 capsule by mouth Twice daily.      SURE COMFORT PEN NEEDLE 32 gauge x 5/32" Ndle USE FOUR TIMES DAILY. 100 each 12    TURMERIC, BULK, MISC by Misc.(Non-Drug; Combo Route) route.      valsartan (DIOVAN) 40 MG tablet Take 1 tablet (40 mg total) by mouth once daily. 90 tablet 0     No current facility-administered medications for this visit.        There are no discontinued medications.    Follow-up in about 6 months (around 12/15/2018).      Abdulaziz Mccabe MD                "

## 2018-06-19 ENCOUNTER — PATIENT MESSAGE (OUTPATIENT)
Dept: FAMILY MEDICINE | Facility: CLINIC | Age: 83
End: 2018-06-19

## 2018-06-19 DIAGNOSIS — K52.9 CHRONIC DIARRHEA: Primary | ICD-10-CM

## 2018-06-20 ENCOUNTER — LAB VISIT (OUTPATIENT)
Dept: LAB | Facility: HOSPITAL | Age: 83
End: 2018-06-20
Attending: FAMILY MEDICINE
Payer: MEDICARE

## 2018-06-20 DIAGNOSIS — K52.9 CHRONIC DIARRHEA: ICD-10-CM

## 2018-06-20 LAB
C DIFF GDH STL QL: NEGATIVE
C DIFF TOX A+B STL QL IA: NEGATIVE

## 2018-06-20 PROCEDURE — 87209 SMEAR COMPLEX STAIN: CPT

## 2018-06-20 PROCEDURE — 87449 NOS EACH ORGANISM AG IA: CPT

## 2018-06-20 PROCEDURE — 87328 CRYPTOSPORIDIUM AG IA: CPT

## 2018-06-21 LAB
CRYPTOSP AG STL QL IA: NEGATIVE
G LAMBLIA AG STL QL IA: NEGATIVE
O+P STL TRI STN: NORMAL

## 2018-06-25 ENCOUNTER — OFFICE VISIT (OUTPATIENT)
Dept: OPHTHALMOLOGY | Facility: CLINIC | Age: 83
End: 2018-06-25
Payer: MEDICARE

## 2018-06-25 DIAGNOSIS — Z96.1 PSEUDOPHAKIA: ICD-10-CM

## 2018-06-25 DIAGNOSIS — H52.7 REFRACTIVE ERROR: ICD-10-CM

## 2018-06-25 DIAGNOSIS — E11.9 DIABETES MELLITUS TYPE 2 WITHOUT RETINOPATHY: Primary | ICD-10-CM

## 2018-06-25 PROCEDURE — 99999 PR PBB SHADOW E&M-EST. PATIENT-LVL II: CPT | Mod: PBBFAC,,, | Performed by: OPTOMETRIST

## 2018-06-25 PROCEDURE — 92015 DETERMINE REFRACTIVE STATE: CPT | Mod: S$GLB,,, | Performed by: OPTOMETRIST

## 2018-06-25 PROCEDURE — 92014 COMPRE OPH EXAM EST PT 1/>: CPT | Mod: S$GLB,,, | Performed by: OPTOMETRIST

## 2018-06-25 PROCEDURE — 99499 UNLISTED E&M SERVICE: CPT | Mod: S$PBB,,, | Performed by: OPTOMETRIST

## 2018-06-25 NOTE — PATIENT INSTRUCTIONS
Diabetes mellitus type 2 without retinopathy     Pseudophakia     Refractive error        No Background Diabetic Retinopathy     Stable IOL OU.     Dispense Final Rx for glasses.  RTC 1 year  Discussed above and answered questions.

## 2018-06-25 NOTE — LETTER
June 25, 2018      Abdulaziz Mccabe MD  [address] [address] [address]           [facility]  [address] [place] [id number]  Phone: [phone]  Fax: [phone]          Patient: Anthony Blair   MR Number: [id number]   YOB: 1933   Date of Visit: 6/25/2018       Dear Dr. Abdulaziz Mccabe:    Thank you for referring Anthony Blair to me for evaluation. Attached you will find relevant portions of my assessment and plan of care.    If you have questions, please do not hesitate to call me. I look forward to following Anthony Blair along with you.    Sincerely,    Varun Mason, OD    Enclosure  CC:  No Recipients    If you would like to receive this communication electronically, please contact [email or web address] or [phone] to request more information on [employer] access.    For providers and/or their staff who would like to refer a patient to [facility], please contact us through our one-stop-shop provider referral line, Brent Mccoy, at [phone].    If you feel you have received this communication in error or would no longer like to receive these types of communications, please e-mail [email or web address]

## 2018-06-25 NOTE — PROGRESS NOTES
HPI     NIDDM exam.   Decrease visual acuity with low BS count.   Last eye exam 05/16/2017 [facility]  Last eye visit with TR[facility] 09/08/2016.  Update glasses RX.  Lab Results       Component                Value               Date                       HGBA1C                   7.1 (H)             03/15/2018                Last edited by Varun Mason, OD on 6/25/2018  2:39 PM. (History)            Assessment /Plan     For exam results, see Encounter Report.    Diabetes mellitus type 2 without retinopathy    Pseudophakia    Refractive error      No Background Diabetic Retinopathy    Stable IOL OU.    Dispense Final Rx for glasses.  RTC 1 year  Discussed above and answered questions.

## 2018-06-28 ENCOUNTER — OFFICE VISIT (OUTPATIENT)
Dept: GASTROENTEROLOGY | Facility: CLINIC | Age: 83
End: 2018-06-28
Payer: MEDICARE

## 2018-06-28 ENCOUNTER — HOSPITAL ENCOUNTER (OUTPATIENT)
Dept: RADIOLOGY | Facility: HOSPITAL | Age: 83
Discharge: HOME | End: 2018-06-28
Attending: PHYSICIAN ASSISTANT
Payer: MEDICARE

## 2018-06-28 VITALS
SYSTOLIC BLOOD PRESSURE: 126 MMHG | HEART RATE: 64 BPM | HEIGHT: 69 IN | BODY MASS INDEX: 27.11 KG/M2 | WEIGHT: 183 LBS | DIASTOLIC BLOOD PRESSURE: 60 MMHG

## 2018-06-28 DIAGNOSIS — R19.8 IRREGULAR BOWEL HABITS: Primary | ICD-10-CM

## 2018-06-28 DIAGNOSIS — R19.7 DIARRHEA, UNSPECIFIED TYPE: ICD-10-CM

## 2018-06-28 DIAGNOSIS — R11.10 VOMITING, INTRACTABILITY OF VOMITING NOT SPECIFIED, PRESENCE OF NAUSEA NOT SPECIFIED, UNSPECIFIED VOMITING TYPE: ICD-10-CM

## 2018-06-28 DIAGNOSIS — R19.8 IRREGULAR BOWEL HABITS: ICD-10-CM

## 2018-06-28 PROCEDURE — 74019 RADEX ABDOMEN 2 VIEWS: CPT | Mod: TC

## 2018-06-28 PROCEDURE — 99214 OFFICE O/P EST MOD 30 MIN: CPT | Mod: S$GLB,,, | Performed by: PHYSICIAN ASSISTANT

## 2018-06-28 PROCEDURE — 74019 RADEX ABDOMEN 2 VIEWS: CPT | Mod: 26,,, | Performed by: RADIOLOGY

## 2018-06-28 PROCEDURE — 3078F DIAST BP <80 MM HG: CPT | Mod: CPTII,S$GLB,, | Performed by: PHYSICIAN ASSISTANT

## 2018-06-28 PROCEDURE — 3074F SYST BP LT 130 MM HG: CPT | Mod: CPTII,S$GLB,, | Performed by: PHYSICIAN ASSISTANT

## 2018-06-28 PROCEDURE — 99999 PR PBB SHADOW E&M-EST. PATIENT-LVL V: CPT | Mod: PBBFAC,,, | Performed by: PHYSICIAN ASSISTANT

## 2018-06-28 RX ORDER — SODIUM, POTASSIUM,MAG SULFATES 17.5-3.13G
SOLUTION, RECONSTITUTED, ORAL ORAL
Qty: 354 ML | Refills: 0 | Status: SHIPPED | OUTPATIENT
Start: 2018-06-28 | End: 2019-01-09

## 2018-06-28 NOTE — PROGRESS NOTES
Subjective:       Patient ID: Anthony Blair is a 85 y.o. male.    Chief Complaint: Bloated; Diarrhea; and Emesis (approx twice a month only at night)    HPI   The patient has a history of dysphagia, irregular bowel habits and AVM. He is here for a follow up visit. Last visit he was doing well, but today he reports continued loose stools. It starts formed and gets more liquid as the day goes. He thinks metamucil made the diarrhea worse. He has intermittent bloating which is better today.  September 2017 had a stroke and metformin was increased to 1000mg but now back to 500 mg bid. He denies hematochezia or melena.   He has been using Zantac but needs extra coverage in the evenings.     Review of Systems    As per HPI.     Objective:      Physical Exam   Constitutional: He is oriented to person, place, and time. He appears well-developed and well-nourished. No distress.   HENT:   Head: Normocephalic and atraumatic.   Eyes: EOM are normal.   Cardiovascular: Normal rate and regular rhythm.    Pulmonary/Chest: Effort normal and breath sounds normal. No respiratory distress. He has no wheezes.   Abdominal: Soft. Bowel sounds are normal. He exhibits no distension. There is no tenderness.   Neurological: He is alert and oriented to person, place, and time. No cranial nerve deficit.   Skin: He is not diaphoretic.   Psychiatric: His behavior is normal.       Assessment:       1. Irregular bowel habits    2. Diarrhea, unspecified type    3. Vomiting, intractability of vomiting not specified, presence of nausea not specified, unspecified vomiting type        Plan:         Add zantac 150 mg pm. We discussed elevating his headboard vs sleeping on a wedge.   We had a long discussion about is bowel habits. It initially sounded like his issues were related to constipation, but he says fiber made it worse. Colonoscopy with biopsies would likely be the next step in evaluating his symptoms, but he has been reluctant. I discussed the  difference between screening and dx colonoscopy. Today he has agreed to consider this.     Medications Ordered This Encounter      SUPREP BOWEL PREP KIT 17.5-3.13-1.6 gram SolR          Sig: Use as directed          Dispense:  354 mL          Refill:  0     Orders Placed This Encounter   Procedures    X-Ray Abdomen Flat And Erect     Follow-up if symptoms worsen or fail to improve.    Thank you for the opportunity to participate in the care of this patient. This consult was designated to me by my supervising physician. A report will be sent to the referring provider.   Maxwell Altman PA-C.

## 2018-06-28 NOTE — PATIENT INSTRUCTIONS
Take Zantac twice a day with the last dose being before bedtime.   Make sure you don't eat a snack or drink within two hours of bedtime.

## 2018-07-02 DIAGNOSIS — I10 ESSENTIAL HYPERTENSION: ICD-10-CM

## 2018-07-02 RX ORDER — VALSARTAN 40 MG/1
TABLET ORAL
Qty: 90 TABLET | Refills: 0 | Status: SHIPPED | OUTPATIENT
Start: 2018-07-02 | End: 2018-07-25 | Stop reason: CLARIF

## 2018-07-06 ENCOUNTER — OFFICE VISIT (OUTPATIENT)
Dept: URGENT CARE | Facility: CLINIC | Age: 83
End: 2018-07-06
Payer: MEDICARE

## 2018-07-06 VITALS
DIASTOLIC BLOOD PRESSURE: 93 MMHG | SYSTOLIC BLOOD PRESSURE: 153 MMHG | HEIGHT: 69 IN | WEIGHT: 183 LBS | TEMPERATURE: 99 F | HEART RATE: 86 BPM | BODY MASS INDEX: 27.11 KG/M2

## 2018-07-06 DIAGNOSIS — R11.2 NAUSEA AND VOMITING IN ADULT: ICD-10-CM

## 2018-07-06 DIAGNOSIS — I10 ESSENTIAL HYPERTENSION: ICD-10-CM

## 2018-07-06 DIAGNOSIS — R42 VERTIGO: ICD-10-CM

## 2018-07-06 DIAGNOSIS — R42 DIZZINESS: Primary | ICD-10-CM

## 2018-07-06 PROCEDURE — 3080F DIAST BP >= 90 MM HG: CPT | Mod: CPTII,S$GLB,, | Performed by: NURSE PRACTITIONER

## 2018-07-06 PROCEDURE — 99999 PR PBB SHADOW E&M-EST. PATIENT-LVL IV: CPT | Mod: PBBFAC,,, | Performed by: NURSE PRACTITIONER

## 2018-07-06 PROCEDURE — 3077F SYST BP >= 140 MM HG: CPT | Mod: CPTII,S$GLB,, | Performed by: NURSE PRACTITIONER

## 2018-07-06 PROCEDURE — 99214 OFFICE O/P EST MOD 30 MIN: CPT | Mod: S$GLB,,, | Performed by: NURSE PRACTITIONER

## 2018-07-06 RX ORDER — MECLIZINE HYDROCHLORIDE 25 MG/1
25 TABLET ORAL NIGHTLY
Qty: 10 TABLET | Refills: 0 | Status: SHIPPED | OUTPATIENT
Start: 2018-07-06 | End: 2018-08-10 | Stop reason: SDUPTHER

## 2018-07-06 NOTE — PROGRESS NOTES
CHIEF COMPLAINT/REASON FOR VISIT:  Dizziness upon awakening    HISTORY OF PRESENT ILLNESS:  85-year-old male complains of dizziness upon awakening & getting out of bed yesterday morning.  Complains of increased dizziness with nausea & vomiting on getting out bed this morning.  Denies any specific injury or trauma.  Complains of walking like a drunk man.  Denies seeking emergency room treatment.  Patient denies trying any over-the-counter medications.  Discussed further evaluation at the emergency room.  Patient deferred emergency room for present time.  Patient denies chest pain, LOC, shortness of breath, congestion, fever, cough, back pain and urinary discomfort.       Past Medical History:   Diagnosis Date    A-fib     Anemia     AP (angina pectoris) 1/23/2014    Carotid artery disease without cerebral infarction 8/22/2014    Cataract     Coronary artery disease     Diabetes mellitus type II     BS didn't check today 09/08/2016  A1C  7.4    DM (diabetes mellitus) 1970    BS 90 am 06/24/2018    GERD (gastroesophageal reflux disease) 7/11/2013    Hemorrhagic cerebrovascular accident (CVA) 4/26/2018    Hyperlipidemia     Hypertension     Intracranial hemorrhage     Lumbosacral spondylosis 12/24/2014    Pacemaker 1/23/2014    Paroxysmal atrial fibrillation 1/23/2014    Peripheral vascular disease 8/22/2014    S/P CABG (coronary artery bypass graft) 1/23/2014    Tobacco dependence     resolved    Type 2 diabetes mellitus with ophthalmic manifestations           Social History     Social History    Marital status:      Spouse name: N/A    Number of children: N/A    Years of education: N/A     Occupational History    Not on file.     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          Family History   Problem Relation Age of Onset    Arthritis Mother     Diabetes Mother     Kidney disease Mother     Heart disease Mother     Arthritis Father     Diabetes Father     Diabetes Sister     Cancer Sister         breast    Heart disease Sister     Diabetes Brother     Kidney disease Brother     Heart disease Brother     Cancer Daughter         breast    Diabetes Daughter     Miscarriages / Stillbirths Daughter     Fibromyalgia Daughter     Diabetes Son     Diabetes Son     Alcohol abuse Paternal Uncle     Stroke Neg Hx        ROS:  GENERAL: No fever, chills, fatigability or weight loss.  SKIN: No rashes, itching or changes in color or texture of skin.  HEENT:  Dizziness, No headaches or recent head trauma.  Denies ear pain, discharge or vertigo. No loss of smell, no epistaxis or postnasal drip. No hoarseness or change in voice.   CHEST: Denies cyanosis, wheezing, cough and sputum production.  CARDIOVASCULAR: Denies chest pain, PND, orthopnea or reduced exercise tolerance.  ABDOMEN:  Nausea and vomiting with dizziness. No weight loss. Denies diarrhea, abdominal pain,   MUSCULOSKELETAL: No joint stiffness or swelling. Denies back pain.  NEUROLOGIC: No history of seizures, paralysis, alteration of gait or coordination.  PSYCHIATRIC: Denies mood swings, depression or suicidal thoughts.    PE:   APPEARANCE: Well nourished, well developed, in mild distress. [place], ears hearing aids, RICARDO  V/S: Reviewed.  SKIN: Normal skin turgor, no lesions.  HEENT: turbinates pink, pink pharynx, TM's poor light reflex bilateral.  CHEST: Lungs clear to auscultation.no wheezing  CARDIOVASCULAR: Regular rate and rhythm   MUSCULOSKELETAL: Motor: 5/5 strength major flexors/extensors. URIBE  NEUROLOGIC: No sensory deficits. Gait & Posture:  In a wheelchair. No cerebellar  signs.  MENTAL STATUS: Patient alert, oriented x 3 & conversant.    PLAN:   Consult ENT  Advised to go to ER for further evaluation   Advise increase p.o. fluids-- water/juice & rest  Med's:  Antivert  Advise follow up with PCP  Advise go to ER if nausea, vomiting, fever, increased dizziness, weakness, slurred speech, or fail to improve with treatment.  AVS provided and reviewed with patient including supportive care, follow up, and red flag symptoms.   Patient verbalizes understanding and agrees with treatment plan. Discharged from Urgent Care in stable condition.      DIAGNOSIS:   Dizziness/vertigo  Nausea & vomiting  Hypertension  Diabetes mellitus with long-term use of insulin

## 2018-07-06 NOTE — PATIENT INSTRUCTIONS
PLAN:   Consult ENT  Advised to go to ER for further evaluation if no improvement  Advise increase p.o. fluids-- water/juice & rest  Meds:  Antivert  Advise follow up with PCP  Advise go to ER if nausea, vomiting, fever, increased dizziness, weakness, slurred speech, or fail to improve with treatment.  AVS provided and reviewed with patient including supportive care, follow up, and red flag symptoms.   Patient verbalizes understanding and agrees with treatment plan. Discharged from Urgent Care in stable condition.

## 2018-07-24 ENCOUNTER — OFFICE VISIT (OUTPATIENT)
Dept: OTOLARYNGOLOGY | Facility: CLINIC | Age: 83
End: 2018-07-24
Payer: MEDICARE

## 2018-07-24 VITALS
BODY MASS INDEX: 27.85 KG/M2 | WEIGHT: 188.06 LBS | HEIGHT: 69 IN | SYSTOLIC BLOOD PRESSURE: 141 MMHG | TEMPERATURE: 97 F | HEART RATE: 79 BPM | DIASTOLIC BLOOD PRESSURE: 69 MMHG

## 2018-07-24 DIAGNOSIS — M27.8 JAW MASS: ICD-10-CM

## 2018-07-24 DIAGNOSIS — R42 DIZZINESS: Primary | ICD-10-CM

## 2018-07-24 PROCEDURE — 99999 PR PBB SHADOW E&M-EST. PATIENT-LVL IV: CPT | Mod: PBBFAC,,, | Performed by: PHYSICIAN ASSISTANT

## 2018-07-24 PROCEDURE — 3077F SYST BP >= 140 MM HG: CPT | Mod: CPTII,S$GLB,, | Performed by: PHYSICIAN ASSISTANT

## 2018-07-24 PROCEDURE — 3078F DIAST BP <80 MM HG: CPT | Mod: CPTII,S$GLB,, | Performed by: PHYSICIAN ASSISTANT

## 2018-07-24 PROCEDURE — 99214 OFFICE O/P EST MOD 30 MIN: CPT | Mod: S$GLB,,, | Performed by: PHYSICIAN ASSISTANT

## 2018-07-24 NOTE — PROGRESS NOTES
"Subjective:       Patient ID: Anthony Blair is a 85 y.o. male.    Chief Complaint: Dizziness    Patient is a very pleasant 85 year old male who is here to see me today for evaluation of dizziness.  He has had issues with BPPV in the past.  He was last here in 11/2017 with LEFT BPPV.  He says he did well after that visit until just recently.  He was seen at  on 7/6/18 with severe dizziness and associated nausea and vomiting.  Episode lasted about one hour.  He was told at  that he had fluid in his ears.  He was prescribed Meclizine which he's completed.  He still complains of feeling off balance and "swimming."  At times he feels lightheaded and this morning had associated nausea.  Usually happens with standing up or ambulating.  Says he veers off center; denies any recent falls.  Denies any spinning.  Denies headaches or visual changes.  Denies ear pain or drainage.  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  He has hearing aids AU; has tinnitus at baseline.        Review of Systems   Constitutional: Negative for fever.   HENT: Positive for hearing loss (wears aids AU), sore throat and tinnitus. Negative for congestion, ear discharge, ear pain, postnasal drip, rhinorrhea, sinus pain and trouble swallowing.    Respiratory: Negative for cough.    Cardiovascular: Negative for chest pain.   Musculoskeletal: Positive for gait problem (feels unsteady).   Neurological: Positive for dizziness and light-headedness. Negative for headaches.   Psychiatric/Behavioral: Negative for confusion.       Objective:      Physical Exam   Constitutional: He is oriented to person, place, and time. Vital signs are normal. He appears well-developed and well-nourished. No distress.   HENT:   Head: Normocephalic and atraumatic.   Right Ear: External ear and ear canal normal. Tympanic membrane is retracted. Tympanic membrane is not erythematous. No middle ear effusion. Decreased hearing is noted.   Left Ear: External ear and ear " canal normal. Tympanic membrane is retracted. Tympanic membrane is not erythematous.  No middle ear effusion. Decreased hearing is noted.   Nose: No mucosal edema, rhinorrhea, nasal deformity or septal deviation.   Mouth/Throat: Uvula is midline, oropharynx is clear and moist and mucous membranes are normal. No trismus in the jaw. Normal dentition. No uvula swelling. No oropharyngeal exudate or posterior oropharyngeal edema.   Firm 3 cm mass palpated along right mid-mandible; fixed; not tender, no erythema or fluctuance   Eyes: Conjunctivae and EOM are normal. Pupils are equal, round, and reactive to light. Right eye exhibits no chemosis. Left eye exhibits no chemosis. Right conjunctiva is not injected. Left conjunctiva is not injected. No scleral icterus.   eyeglasses   Neck: Trachea normal and phonation normal. No tracheal tenderness present. No tracheal deviation present. No thyroid mass and no thyromegaly present.   Cardiovascular: Intact distal pulses.    Pulmonary/Chest: Effort normal. No accessory muscle usage or stridor. No respiratory distress.   Lymphadenopathy:        Head (right side): No submental, no submandibular, no preauricular and no posterior auricular adenopathy present.        Head (left side): No submental, no submandibular, no preauricular and no posterior auricular adenopathy present.     He has no cervical adenopathy.        Right cervical: No superficial cervical and no deep cervical adenopathy present.       Left cervical: No superficial cervical and no deep cervical adenopathy present.   Neurological: He is alert and oriented to person, place, and time. No cranial nerve deficit.   Skin: Skin is warm and dry. No rash noted. No erythema.   Psychiatric: He has a normal mood and affect. His behavior is normal. Thought content normal.         Endeavor-Hallpike:  Negative AU      Assessment:       1. Dizziness    2. Jaw mass        Plan:         1.  Dizziness:  I had a long discussion with the  patient regarding their symptoms.  Dizziness, vertigo and disequilibrium are common symptoms reported by adults during visits to their doctors. They are all symptoms that can result from a peripheral vestibular disorder (a dysfunction of the balance organs of the inner ear) or central vestibular disorder (a dysfunction of one or more parts of the central nervous system that help process balance and spatial information).  There are also non-vestibular causes of dizziness, and dizziness can be linked to a wide array of problems such as blood-flow irregularities from cardiovascular problems and blood pressure fluctuations.  Discussed with him that his Brayan-Hallpike is ngative today for BPPV.   I would recommend a VNG with audiogram for further diagnostic testing, and will contact the patient with further recommendations when that is complete.  He recommends that we call his daughter Liat with any recommendations.  Her cell phone # is in his chart.  If normal, he needs to followup with cardiology given his complaints of lightheadedness.  2.  Right jaw mass:  He says the knot on his right mandible has been present for close to 20 years.  He reports previous workup by his dentist approximately 20 years ago.  He can't remember if biopsy was ever done.  He was told it was nothing to worry about.  He says it's not changed in size in all this time and he has no pain or swallowing difficulties associated with it.  He does not wish to pursue further evaluation at this time

## 2018-07-25 ENCOUNTER — TELEPHONE (OUTPATIENT)
Dept: FAMILY MEDICINE | Facility: CLINIC | Age: 83
End: 2018-07-25

## 2018-07-25 RX ORDER — LOSARTAN POTASSIUM 50 MG/1
50 TABLET ORAL DAILY
Qty: 90 TABLET | Refills: 0 | Status: SHIPPED | OUTPATIENT
Start: 2018-07-25 | End: 2018-08-06 | Stop reason: SDUPTHER

## 2018-07-25 NOTE — TELEPHONE ENCOUNTER
Valsartan changed to losartan due to recall.  Schedule pt for nurse visit in 2 weeks to re evaluate bp on medication change

## 2018-07-30 ENCOUNTER — CLINICAL SUPPORT (OUTPATIENT)
Dept: AUDIOLOGY | Facility: CLINIC | Age: 83
End: 2018-07-30
Payer: MEDICARE

## 2018-07-30 DIAGNOSIS — R42 DIZZINESS: Primary | ICD-10-CM

## 2018-07-30 PROCEDURE — 92540 BASIC VESTIBULAR EVALUATION: CPT | Mod: 26,S$PBB,, | Performed by: AUDIOLOGIST-HEARING AID FITTER

## 2018-07-30 PROCEDURE — 92540 BASIC VESTIBULAR EVALUATION: CPT | Mod: PBBFAC | Performed by: AUDIOLOGIST-HEARING AID FITTER

## 2018-07-30 PROCEDURE — 92537 CALORIC VSTBLR TEST W/REC: CPT | Mod: 26,S$PBB,, | Performed by: AUDIOLOGIST-HEARING AID FITTER

## 2018-07-30 PROCEDURE — 92567 TYMPANOMETRY: CPT | Mod: PBBFAC | Performed by: AUDIOLOGIST-HEARING AID FITTER

## 2018-07-30 PROCEDURE — 92557 COMPREHENSIVE HEARING TEST: CPT | Mod: PBBFAC | Performed by: AUDIOLOGIST-HEARING AID FITTER

## 2018-07-30 PROCEDURE — 92537 CALORIC VSTBLR TEST W/REC: CPT | Mod: PBBFAC | Performed by: AUDIOLOGIST-HEARING AID FITTER

## 2018-07-30 NOTE — PROGRESS NOTES
"Referring provider: HARLEEN Kunz MELINDA Blair was seen 07/30/2018 for an audiological and vestibular evaluation.  Patient complains of dizziness that feels like "spinning". Onset was 3 weeks ago. He has had issues with BPPV in the past.  He was last here in 11/2017 with LEFT BPPV.  He says he did well after that visit until just recently.  He was seen at  on 7/6/18 with severe dizziness and associated nausea and vomiting.  Episode lasted about one hour.  He was told at  that he had fluid in his ears.  He was prescribed Meclizine which he's completed.  He still complains of feeling off balance and "swimming."  At times he feels lightheaded and this morning had associated nausea.  Usually happens with standing up or ambulating.  Says he veers off center; denies any recent falls.  Denies any spinning.  Denies headaches or visual changes.  Denies ear pain or drainage.  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  He has hearing aids AU; has tinnitus at baseline.  He currently wears EVERARDO hearing aids with AP molds from [employer] bilaterally. He reports that his blood pressure medication dosage was increased 2 months ago.    Audiology Report:  Results reveal a moderate-to-profound sensorineural hearing loss 250-8000 Hz for the right ear, and  moderate-to-severe sensorineural hearing loss 250-8000 Hz for the left ear.   Speech Reception Thresholds were  65 dBHL for the right ear and 50 dBHL for the left ear.   Word recognition scores were poor for the right ear and poor for the left ear.   Tympanograms were Type As, shallow for the right ear and Type As, shallow for the left ear.      Videonystagmography Report (VNG):  Oculomotor function tests (sinusoidal tracking, saccade, optokinetic) were normal and symmetric.  Spontaneous test was absent for nystagmus.  Gaze test was absent for nystagmus.  Head-shake test was absent for after head-shake nystagmus.  Static Positional test was absent for " nystagmus.  Brayan-Hallpike Right was negative for BPPV.  Brayan-Hallpike Left was negative for BPPV.  Bi-thermal caloric test was Normal.  Fixation suppression following caloric irrigations was normal.  The following values were obtained:  Unilateral weakness (UW): 11% left ear  Directional preponderance (DP): 4% left beating  RC: 22 d/s   d/s  RW: 23 d/s  LW: 20 d/s    Summary: Normal VNG.    Recommendations:  1. ENT review.  2. Follow-up with primary care and cardiology to further assess causes of dizziness.   3. Continue use of binaural hearing aids. Copy of audiogram was provided to the patient to have his hearing aids adjusted with his hearing healthcare provider.     Patient was counseled on the above findings.  Tracings are to be scanned.

## 2018-08-06 RX ORDER — LOSARTAN POTASSIUM 50 MG/1
50 TABLET ORAL DAILY
Qty: 90 TABLET | Refills: 0 | Status: SHIPPED | OUTPATIENT
Start: 2018-08-06 | End: 2018-08-21

## 2018-08-08 ENCOUNTER — CLINICAL SUPPORT (OUTPATIENT)
Dept: FAMILY MEDICINE | Facility: CLINIC | Age: 83
End: 2018-08-08
Payer: MEDICARE

## 2018-08-08 VITALS — OXYGEN SATURATION: 96 % | DIASTOLIC BLOOD PRESSURE: 78 MMHG | SYSTOLIC BLOOD PRESSURE: 155 MMHG | HEART RATE: 75 BPM

## 2018-08-08 PROCEDURE — 99999 PR PBB SHADOW E&M-EST. PATIENT-LVL II: CPT | Mod: PBBFAC,,,

## 2018-08-08 NOTE — Clinical Note
Pt here for BP check, checked in right arm. First reading was 143/71, second reading done 15 minutes later. Second reading was 155/78. Pt is asymptomatic at this time.

## 2018-08-10 ENCOUNTER — OFFICE VISIT (OUTPATIENT)
Dept: FAMILY MEDICINE | Facility: CLINIC | Age: 83
End: 2018-08-10
Payer: MEDICARE

## 2018-08-10 VITALS
DIASTOLIC BLOOD PRESSURE: 70 MMHG | SYSTOLIC BLOOD PRESSURE: 124 MMHG | HEART RATE: 88 BPM | TEMPERATURE: 98 F | OXYGEN SATURATION: 97 % | WEIGHT: 182.63 LBS | BODY MASS INDEX: 27.05 KG/M2 | HEIGHT: 69 IN

## 2018-08-10 DIAGNOSIS — I10 ESSENTIAL HYPERTENSION: ICD-10-CM

## 2018-08-10 DIAGNOSIS — R11.2 NAUSEA AND VOMITING IN ADULT: ICD-10-CM

## 2018-08-10 DIAGNOSIS — R42 DIZZINESS: Primary | ICD-10-CM

## 2018-08-10 PROCEDURE — 99214 OFFICE O/P EST MOD 30 MIN: CPT | Mod: S$GLB,,, | Performed by: FAMILY MEDICINE

## 2018-08-10 PROCEDURE — 99999 PR PBB SHADOW E&M-EST. PATIENT-LVL III: CPT | Mod: PBBFAC,,, | Performed by: FAMILY MEDICINE

## 2018-08-10 PROCEDURE — 3078F DIAST BP <80 MM HG: CPT | Mod: CPTII,S$GLB,, | Performed by: FAMILY MEDICINE

## 2018-08-10 PROCEDURE — 3074F SYST BP LT 130 MM HG: CPT | Mod: CPTII,S$GLB,, | Performed by: FAMILY MEDICINE

## 2018-08-10 RX ORDER — MECLIZINE HCL 12.5 MG 12.5 MG/1
12.5 TABLET ORAL 2 TIMES DAILY PRN
Qty: 20 TABLET | Refills: 1 | Status: SHIPPED | OUTPATIENT
Start: 2018-08-10 | End: 2019-03-31 | Stop reason: HOSPADM

## 2018-08-10 NOTE — PROGRESS NOTES
Chief Complaint:    Chief Complaint   Patient presents with    Follow-up       History of Present Illness:    He presents today he said he went to ENT for dizziness he had a VNG which was essentially normal.  He took meclizine for few days the worse part of the dizziness has improved he has had slight dizziness when he stands up or walks around he realizes that it could be a side effect from his seizure medicine Keppra so he stopped it a while back and there has been some improvement in his dizziness.  His blood pressure is normal right now but he says at home sometimes it goes high as high as 140-150 systolic he is taking his medications.      ROS:  Review of Systems   Constitutional: Negative for activity change, chills, fatigue, fever and unexpected weight change.   HENT: Negative for congestion, ear discharge, ear pain, hearing loss, postnasal drip and rhinorrhea.    Eyes: Negative for pain and visual disturbance.   Respiratory: Negative for cough, chest tightness and shortness of breath.    Cardiovascular: Negative for chest pain and palpitations.   Gastrointestinal: Negative for abdominal pain, diarrhea and vomiting.   Endocrine: Negative for heat intolerance.   Genitourinary: Negative for dysuria, flank pain, frequency and hematuria.   Musculoskeletal: Negative for back pain, gait problem and neck pain.   Skin: Negative for color change and rash.   Neurological: Negative for dizziness, tremors, seizures, numbness and headaches.   Psychiatric/Behavioral: Negative for agitation, hallucinations, self-injury, sleep disturbance and suicidal ideas. The patient is not nervous/anxious.        Past Medical History:   Diagnosis Date    A-fib     Anemia     AP (angina pectoris) 1/23/2014    Carotid artery disease without cerebral infarction 8/22/2014    Cataract     Coronary artery disease     Diabetes mellitus type II     BS didn't check today 09/08/2016  A1C  7.4    DM (diabetes mellitus) 1970    BS 90 am  "06/24/2018    GERD (gastroesophageal reflux disease) 7/11/2013    Hemorrhagic cerebrovascular accident (CVA) 4/26/2018    Hyperlipidemia     Hypertension     Intracranial hemorrhage     Lumbosacral spondylosis 12/24/2014    Pacemaker 1/23/2014    Paroxysmal atrial fibrillation 1/23/2014    Peripheral vascular disease 8/22/2014    S/P CABG (coronary artery bypass graft) 1/23/2014    Tobacco dependence     resolved    Type 2 diabetes mellitus with ophthalmic manifestations        Social History:  Social History     Social History    Marital status:      Spouse name: N/A    Number of children: N/A    Years of education: N/A     Social History Main Topics    Smoking status: Former Smoker     Packs/day: 2.00     Years: 13.00     Types: Cigarettes     Start date: 11/25/1954     Quit date: 6/7/1967    Smokeless tobacco: Never Used    Alcohol use No    Drug use: No    Sexual activity: No     Other Topics Concern    None     Social History Narrative    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       Family History:   family history includes Alcohol abuse in his paternal uncle; Arthritis in his father and mother; Cancer in his daughter and sister; Diabetes in his brother, daughter, father, mother, sister, son, and son; Fibromyalgia in his daughter; Heart disease in his brother, mother, and sister; Kidney disease in his brother and mother; Miscarriages / Stillbirths in his daughter.    Health Maintenance   Topic Date Due    Influenza Vaccine  08/01/2018    Hemoglobin A1c  09/15/2018    Foot Exam  11/17/2018    Lipid Panel  03/15/2019    Eye Exam  06/25/2019    TETANUS VACCINE  01/23/2024    Zoster Vaccine  Completed    Pneumococcal (65+)  Completed       Physical Exam:    Vital Signs  Temp: 97.9 °F (36.6 °C)  Temp src: Tympanic  Pulse: 88  SpO2: 97 %  BP: 124/70  BP Location: Left arm  Patient Position: Sitting  Height and Weight  Height: 5' 9" (175.3 cm)  Weight: 82.9 " kg (182 lb 10.4 oz)  BSA (Calculated - sq m): 2.01 sq meters  BMI (Calculated): 27  Weight in (lb) to have BMI = 25: 168.9]    Body mass index is 26.97 kg/m².    Physical Exam   Constitutional: He is oriented to person, place, and time. He appears well-developed.   HENT:   Mouth/Throat: Oropharynx is clear and moist.   Eyes: Conjunctivae are normal. Pupils are equal, round, and reactive to light.   Neck: Normal range of motion. Neck supple.   Cardiovascular: Normal rate, regular rhythm and normal heart sounds.    No murmur heard.  Pulmonary/Chest: Effort normal and breath sounds normal. No respiratory distress. He has no wheezes. He has no rales. He exhibits no tenderness.   Abdominal: Soft. He exhibits no distension and no mass. There is no tenderness. There is no guarding.   Musculoskeletal: He exhibits no edema or tenderness.   Lymphadenopathy:     He has no cervical adenopathy.   Neurological: He is alert and oriented to person, place, and time. He has normal reflexes.   Skin: Skin is warm and dry.   Psychiatric: He has a normal mood and affect. His behavior is normal. Judgment and thought content normal.       Results for orders placed or performed in visit on 06/20/18   CLOSTRIDIUM DIFFICILE   Result Value Ref Range    C. diff Antigen Negative Negative    C difficile Toxins A+B, EIA Negative Negative   Stool Exam-Ova,Cysts,Parasites   Result Value Ref Range    Stool Exam-Ova,Cysts,Parasites No ova or parasites seen    Giardia / Cryptosporidum, EIA   Result Value Ref Range    Giardia Antigen - EIA Negative Negative    Cryptosporidium Antigen Negative Negative         Lab Results   Component Value Date    HGBA1C 7.1 (H) 03/15/2018       Assessment:      ICD-10-CM ICD-9-CM   1. Dizziness R42 780.4   2. Essential hypertension I10 401.9   3. Nausea and vomiting in adult R11.2 787.01         Plan:    Dizziness appears to be improving he may have to get back on another seizure medication he has been recommended to  discuss this with his neurologist  Please continue to monitor home blood pressure readings  Explained to patient that he dizziness could be multifactorial sometimes orthostatic versus medication side effects versus combination.  The lower prescription for low-dose meclizine has been given that he can take as needed.      No orders of the defined types were placed in this encounter.      Current Outpatient Prescriptions   Medication Sig Dispense Refill    ACCU-CHEK TOMAS PLUS TEST STRP Strp TEST BLOOD SUGAR SIX TIMES DAILY 300 strip 5    ACCU-CHEK MULTICLIX LANCET lancets TEST BLOOD SUGAR THREE TIMES DAILY 200 each 5    CARBOXYMETHYLCELLULOSE SODIUM (REFRESH OPHT) Apply to eye.      coconut oil 1,000 mg Cap Take by mouth.      fluticasone (FLONASE) 50 mcg/actuation nasal spray 2 sprays by Each Nare route once daily. 16 g 11    furosemide (LASIX) 20 MG tablet TAKE ONE TABLET BY MOUTH DAILY 30 tablet 12    insulin glargine (LANTUS SOLOSTAR) 100 unit/mL (3 mL) InPn pen INJECT 16 UNITS SUB-Q AT BEDTIME 15 mL 11    isosorbide mononitrate (IMDUR) 30 MG 24 hr tablet TAKE ONE TABLET BY MOUTH DAILY 30 tablet 12    levETIRAcetam (KEPPRA) 250 MG Tab Take 1 tablet (250 mg total) by mouth 2 (two) times daily. 60 tablet 11    losartan (COZAAR) 50 MG tablet Take 1 tablet (50 mg total) by mouth once daily. 90 tablet 0    metformin (GLUCOPHAGE) 500 MG tablet TAKE ONE BY MOUTH TWICE A DAY WITH FOOD. (Patient taking differently: Take 1,000 mg by mouth 2 (two) times daily with meals. TAKE ONE BY MOUTH TWICE A DAY WITH FOOD.) 60 tablet 6    metoprolol succinate (TOPROL-XL) 100 MG 24 hr tablet TAKE ONE TABLET BY MOUTH TWICE DAILY 60 tablet 12    naproxen sodium (ANAPROX) 220 MG tablet Take 220 mg by mouth 2 (two) times daily with meals.      NITROSTAT 0.4 mg SL tablet Place 1 tablet (0.4 mg total) under the tongue every 5 (five) minutes as needed for Chest pain. 25 tablet 12    NOVOLOG FLEXPEN 100 unit/mL InPn pen Inject 5  "units 3 times a day before meals 15 mL 3    pantoprazole (PROTONIX) 40 MG tablet Take 1 tablet (40 mg total) by mouth once daily. 30 tablet 5    potassium chloride SA (K-DUR,KLOR-CON) 20 MEQ tablet TAKE ONE TABLET BY MOUTH EVERY DAY 30 tablet 12    saw palmetto fruit 450 mg Cap Take 1 capsule by mouth Twice daily.      SUPREP BOWEL PREP KIT 17.5-3.13-1.6 gram SolR Use as directed 354 mL 0    SURE COMFORT PEN NEEDLE 32 gauge x 5/32" Ndle USE FOUR TIMES DAILY. 100 each 12    TURMERIC, BULK, MISC by Misc.(Non-Drug; Combo Route) route.      meclizine (ANTIVERT) 12.5 mg tablet Take 1 tablet (12.5 mg total) by mouth 2 (two) times daily as needed. 20 tablet 1     No current facility-administered medications for this visit.        Medications Discontinued During This Encounter   Medication Reason    meclizine (ANTIVERT) 25 mg tablet Reorder       No Follow-up on file.      Abdulaziz Mccabe MD              "

## 2018-08-17 ENCOUNTER — OFFICE VISIT (OUTPATIENT)
Dept: URGENT CARE | Facility: CLINIC | Age: 83
End: 2018-08-17
Payer: MEDICARE

## 2018-08-17 ENCOUNTER — HOSPITAL ENCOUNTER (OUTPATIENT)
Facility: HOSPITAL | Age: 83
Discharge: HOME | End: 2018-08-18
Attending: EMERGENCY MEDICINE | Admitting: INTERNAL MEDICINE
Payer: MEDICARE

## 2018-08-17 VITALS
BODY MASS INDEX: 27.43 KG/M2 | WEIGHT: 185.19 LBS | RESPIRATION RATE: 17 BRPM | SYSTOLIC BLOOD PRESSURE: 142 MMHG | HEART RATE: 77 BPM | OXYGEN SATURATION: 99 % | HEIGHT: 69 IN | DIASTOLIC BLOOD PRESSURE: 84 MMHG | TEMPERATURE: 97 F

## 2018-08-17 DIAGNOSIS — I25.10 CORONARY ARTERY DISEASE, ANGINA PRESENCE UNSPECIFIED, UNSPECIFIED VESSEL OR LESION TYPE, UNSPECIFIED WHETHER NATIVE OR TRANSPLANTED HEART: ICD-10-CM

## 2018-08-17 DIAGNOSIS — R07.9 CHEST PAIN, UNSPECIFIED TYPE: Primary | ICD-10-CM

## 2018-08-17 DIAGNOSIS — R07.9 CHEST PAIN: Primary | ICD-10-CM

## 2018-08-17 LAB
ALBUMIN SERPL BCP-MCNC: 4.2 G/DL
ALP SERPL-CCNC: 116 U/L
ALT SERPL W/O P-5'-P-CCNC: 13 U/L
ANION GAP SERPL CALC-SCNC: 9 MMOL/L
AST SERPL-CCNC: 24 U/L
BASOPHILS # BLD AUTO: 0.02 K/UL
BASOPHILS NFR BLD: 0.4 %
BILIRUB SERPL-MCNC: 0.7 MG/DL
BILIRUB UR QL STRIP: NEGATIVE
BNP SERPL-MCNC: 104 PG/ML
BUN SERPL-MCNC: 15 MG/DL
CALCIUM SERPL-MCNC: 10.7 MG/DL
CHLORIDE SERPL-SCNC: 100 MMOL/L
CK SERPL-CCNC: 101 U/L
CLARITY UR: CLEAR
CO2 SERPL-SCNC: 30 MMOL/L
COLOR UR: YELLOW
CREAT SERPL-MCNC: 0.9 MG/DL
DIFFERENTIAL METHOD: ABNORMAL
EOSINOPHIL # BLD AUTO: 0.2 K/UL
EOSINOPHIL NFR BLD: 3.4 %
ERYTHROCYTE [DISTWIDTH] IN BLOOD BY AUTOMATED COUNT: 13.8 %
EST. GFR  (AFRICAN AMERICAN): >60 ML/MIN/1.73 M^2
EST. GFR  (NON AFRICAN AMERICAN): >60 ML/MIN/1.73 M^2
GLUCOSE SERPL-MCNC: 160 MG/DL
GLUCOSE UR QL STRIP: NEGATIVE
HCT VFR BLD AUTO: 42.6 %
HGB BLD-MCNC: 14.5 G/DL
HGB UR QL STRIP: NEGATIVE
KETONES UR QL STRIP: NEGATIVE
LEUKOCYTE ESTERASE UR QL STRIP: NEGATIVE
LYMPHOCYTES # BLD AUTO: 2.2 K/UL
LYMPHOCYTES NFR BLD: 42.3 %
MCH RBC QN AUTO: 31.3 PG
MCHC RBC AUTO-ENTMCNC: 34 G/DL
MCV RBC AUTO: 92 FL
MONOCYTES # BLD AUTO: 0.5 K/UL
MONOCYTES NFR BLD: 9.1 %
NEUTROPHILS # BLD AUTO: 2.4 K/UL
NEUTROPHILS NFR BLD: 44.8 %
NITRITE UR QL STRIP: NEGATIVE
PH UR STRIP: 6 [PH] (ref 5–8)
PLATELET # BLD AUTO: 141 K/UL
PMV BLD AUTO: 9.7 FL
POTASSIUM SERPL-SCNC: 3.7 MMOL/L
PROT SERPL-MCNC: 7.7 G/DL
PROT UR QL STRIP: NEGATIVE
RBC # BLD AUTO: 4.64 M/UL
SODIUM SERPL-SCNC: 139 MMOL/L
SP GR UR STRIP: <=1.005 (ref 1–1.03)
TROPONIN I SERPL DL<=0.01 NG/ML-MCNC: 0.01 NG/ML
TROPONIN I SERPL DL<=0.01 NG/ML-MCNC: 0.01 NG/ML
URN SPEC COLLECT METH UR: ABNORMAL
UROBILINOGEN UR STRIP-ACNC: NEGATIVE EU/DL
WBC # BLD AUTO: 5.27 K/UL

## 2018-08-17 PROCEDURE — 93010 ELECTROCARDIOGRAM REPORT: CPT | Mod: ,,, | Performed by: INTERNAL MEDICINE

## 2018-08-17 PROCEDURE — 25000003 PHARM REV CODE 250: Performed by: EMERGENCY MEDICINE

## 2018-08-17 PROCEDURE — 36000 PLACE NEEDLE IN VEIN: CPT

## 2018-08-17 PROCEDURE — 80053 COMPREHEN METABOLIC PANEL: CPT

## 2018-08-17 PROCEDURE — 99214 OFFICE O/P EST MOD 30 MIN: CPT | Mod: S$GLB,,, | Performed by: NURSE PRACTITIONER

## 2018-08-17 PROCEDURE — 3079F DIAST BP 80-89 MM HG: CPT | Mod: CPTII,S$GLB,, | Performed by: NURSE PRACTITIONER

## 2018-08-17 PROCEDURE — 83880 ASSAY OF NATRIURETIC PEPTIDE: CPT

## 2018-08-17 PROCEDURE — 81003 URINALYSIS AUTO W/O SCOPE: CPT

## 2018-08-17 PROCEDURE — 99999 PR PBB SHADOW E&M-EST. PATIENT-LVL III: CPT | Mod: PBBFAC,,, | Performed by: NURSE PRACTITIONER

## 2018-08-17 PROCEDURE — 80061 LIPID PANEL: CPT

## 2018-08-17 PROCEDURE — G0378 HOSPITAL OBSERVATION PER HR: HCPCS

## 2018-08-17 PROCEDURE — 25000003 PHARM REV CODE 250: Performed by: PHYSICIAN ASSISTANT

## 2018-08-17 PROCEDURE — 82550 ASSAY OF CK (CPK): CPT

## 2018-08-17 PROCEDURE — 84484 ASSAY OF TROPONIN QUANT: CPT | Mod: 91

## 2018-08-17 PROCEDURE — 85025 COMPLETE CBC W/AUTO DIFF WBC: CPT

## 2018-08-17 PROCEDURE — A4216 STERILE WATER/SALINE, 10 ML: HCPCS | Performed by: EMERGENCY MEDICINE

## 2018-08-17 PROCEDURE — 3074F SYST BP LT 130 MM HG: CPT | Mod: CPTII,S$GLB,, | Performed by: NURSE PRACTITIONER

## 2018-08-17 PROCEDURE — 99285 EMERGENCY DEPT VISIT HI MDM: CPT | Mod: 25

## 2018-08-17 RX ORDER — HYDROCODONE BITARTRATE AND ACETAMINOPHEN 5; 325 MG/1; MG/1
1 TABLET ORAL EVERY 6 HOURS PRN
Status: DISCONTINUED | OUTPATIENT
Start: 2018-08-17 | End: 2018-08-18 | Stop reason: HOSPADM

## 2018-08-17 RX ORDER — MECLIZINE HCL 12.5 MG 12.5 MG/1
12.5 TABLET ORAL 2 TIMES DAILY PRN
Status: DISCONTINUED | OUTPATIENT
Start: 2018-08-17 | End: 2018-08-18 | Stop reason: HOSPADM

## 2018-08-17 RX ORDER — POTASSIUM CHLORIDE 20 MEQ/1
20 TABLET, EXTENDED RELEASE ORAL DAILY
Status: DISCONTINUED | OUTPATIENT
Start: 2018-08-18 | End: 2018-08-18 | Stop reason: HOSPADM

## 2018-08-17 RX ORDER — ACETAMINOPHEN 325 MG/1
650 TABLET ORAL EVERY 4 HOURS PRN
Status: DISCONTINUED | OUTPATIENT
Start: 2018-08-17 | End: 2018-08-17 | Stop reason: SDUPTHER

## 2018-08-17 RX ORDER — LEVETIRACETAM 250 MG/1
250 TABLET ORAL 2 TIMES DAILY
Status: DISCONTINUED | OUTPATIENT
Start: 2018-08-17 | End: 2018-08-18 | Stop reason: HOSPADM

## 2018-08-17 RX ORDER — ACETAMINOPHEN 325 MG/1
650 TABLET ORAL EVERY 6 HOURS PRN
Status: DISCONTINUED | OUTPATIENT
Start: 2018-08-17 | End: 2018-08-18 | Stop reason: HOSPADM

## 2018-08-17 RX ORDER — PANTOPRAZOLE SODIUM 40 MG/1
40 TABLET, DELAYED RELEASE ORAL DAILY
Status: DISCONTINUED | OUTPATIENT
Start: 2018-08-18 | End: 2018-08-18 | Stop reason: HOSPADM

## 2018-08-17 RX ORDER — NITROGLYCERIN 0.4 MG/1
0.4 TABLET SUBLINGUAL EVERY 5 MIN PRN
Status: DISCONTINUED | OUTPATIENT
Start: 2018-08-17 | End: 2018-08-18 | Stop reason: HOSPADM

## 2018-08-17 RX ORDER — MORPHINE SULFATE 4 MG/ML
2 INJECTION, SOLUTION INTRAMUSCULAR; INTRAVENOUS EVERY 4 HOURS PRN
Status: DISCONTINUED | OUTPATIENT
Start: 2018-08-17 | End: 2018-08-17

## 2018-08-17 RX ORDER — FUROSEMIDE 20 MG/1
20 TABLET ORAL DAILY
Status: DISCONTINUED | OUTPATIENT
Start: 2018-08-18 | End: 2018-08-18 | Stop reason: HOSPADM

## 2018-08-17 RX ORDER — FLUTICASONE PROPIONATE 50 MCG
2 SPRAY, SUSPENSION (ML) NASAL DAILY
Status: DISCONTINUED | OUTPATIENT
Start: 2018-08-18 | End: 2018-08-18 | Stop reason: HOSPADM

## 2018-08-17 RX ORDER — SODIUM CHLORIDE 0.9 % (FLUSH) 0.9 %
3 SYRINGE (ML) INJECTION EVERY 8 HOURS
Status: DISCONTINUED | OUTPATIENT
Start: 2018-08-17 | End: 2018-08-18 | Stop reason: HOSPADM

## 2018-08-17 RX ORDER — ASPIRIN 325 MG
325 TABLET ORAL DAILY
Status: DISCONTINUED | OUTPATIENT
Start: 2018-08-17 | End: 2018-08-17

## 2018-08-17 RX ORDER — LOSARTAN POTASSIUM 50 MG/1
50 TABLET ORAL DAILY
Status: DISCONTINUED | OUTPATIENT
Start: 2018-08-18 | End: 2018-08-18 | Stop reason: HOSPADM

## 2018-08-17 RX ORDER — ONDANSETRON 2 MG/ML
4 INJECTION INTRAMUSCULAR; INTRAVENOUS EVERY 12 HOURS PRN
Status: DISCONTINUED | OUTPATIENT
Start: 2018-08-17 | End: 2018-08-18 | Stop reason: HOSPADM

## 2018-08-17 RX ORDER — METOPROLOL SUCCINATE 50 MG/1
100 TABLET, EXTENDED RELEASE ORAL 2 TIMES DAILY
Status: DISCONTINUED | OUTPATIENT
Start: 2018-08-17 | End: 2018-08-18 | Stop reason: HOSPADM

## 2018-08-17 RX ORDER — ISOSORBIDE MONONITRATE 30 MG/1
30 TABLET, EXTENDED RELEASE ORAL DAILY
Status: DISCONTINUED | OUTPATIENT
Start: 2018-08-18 | End: 2018-08-18 | Stop reason: HOSPADM

## 2018-08-17 RX ADMIN — LEVETIRACETAM 250 MG: 250 TABLET ORAL at 09:08

## 2018-08-17 RX ADMIN — METOPROLOL SUCCINATE 100 MG: 50 TABLET, EXTENDED RELEASE ORAL at 09:08

## 2018-08-17 RX ADMIN — Medication 3 ML: at 10:08

## 2018-08-17 RX ADMIN — ASPIRIN 325 MG ORAL TABLET 325 MG: 325 PILL ORAL at 04:08

## 2018-08-17 NOTE — H&P
[facility] Medicine  History & Physical    Patient Name: Anthony Blair  MRN: [id number]  Admission Date: 8/17/2018  Attending Physician: Josh Nagel MD   Primary Care Provider: Abdulaziz Mccabe MD         Patient information was obtained from patient, past medical records and ER records.     Subjective:     Principal Problem:Chest pain    Chief Complaint:   Chief Complaint   Patient presents with    Chest Pain     mild headache for 2 days and chest pain for the past 24 hours. sent by urgent care for further evaluation.         HPI: Anthony Blair is an 85 year old male with hemorrhagic stroke, CAD, hypertension, hyperlipidemia and DM II who presented to the ED with complaints of chest pain that began yesterday. The patient reports several episodes of intermittent chest pain. The pain is located in his left chest. It is described as an ache. The pain was worse with laying flat. Today, while at Urgent Care, the pain briefly became more sharp. The patient denies any alleviating factors. He denies associated symptoms including nausea, vomiting, diaphoresis and SOB. The patient does not take a daily ASA due to a history of hemorrhagic stoke approximately one year ago. In the ED, the patient was found to have a mildly elevated BNP of 104. His troponin was normal and his EKG was unremarkable. Code status was discussed with the patient. He is a full code. His daughter, Liat Oliva, and his sons, Dave and Jerry Blair, are his surrogate decision makers.     Past Medical History:   Diagnosis Date    A-fib     Anemia     AP (angina pectoris) 1/23/2014    Carotid artery disease without cerebral infarction 8/22/2014    Cataract     Coronary artery disease     GERD (gastroesophageal reflux disease) 7/11/2013    Hemorrhagic cerebrovascular accident (CVA) 4/26/2018    Hyperlipidemia     Hypertension     Intracranial hemorrhage     Lumbosacral spondylosis 12/24/2014    Pacemaker  1/23/2014    Paroxysmal atrial fibrillation 1/23/2014    Peripheral vascular disease 8/22/2014    Type 2 diabetes mellitus with ophthalmic manifestations        Past Surgical History:   Procedure Laterality Date    ANGIOPLASTY      APPENDECTOMY      ATRIAL ABLATION SURGERY N/A     CATARACT EXTRACTION Bilateral     [facility]    CATARACT EXTRACTION W/ INTRAOCULAR LENS IMPLANT Right     CATARACT EXTRACTION W/ INTRAOCULAR LENS IMPLANT Left     CORONARY ARTERY BYPASS GRAFT  07/2010    x5    EYE SURGERY      pace maker surgrey  10/2010    reposition in 2011/2012 (approx)    VEIN BYPASS SURGERY         Review of patient's allergies indicates:   Allergen Reactions    Atorvastatin      Other reaction(s): Muscle pain  Other reaction(s): Muscle weakness  Other reaction(s): Muscle pain    Codeine      Other reaction(s): Headache  Other reaction(s): Headache    Eliquis [apixaban]     Norvasc [amlodipine] Edema    Trulicity [dulaglutide] Nausea And Vomiting    Adhesive Rash     Other reaction(s): rash       No current facility-administered medications on file prior to encounter.      Current Outpatient Medications on File Prior to Encounter   Medication Sig    CARBOXYMETHYLCELLULOSE SODIUM (REFRESH OPHT) Apply to eye.    coconut oil 1,000 mg Cap Take by mouth.    fluticasone (FLONASE) 50 mcg/actuation nasal spray 2 sprays by Each Nare route once daily.    furosemide (LASIX) 20 MG tablet TAKE ONE TABLET BY MOUTH DAILY    insulin glargine (LANTUS SOLOSTAR) 100 unit/mL (3 mL) InPn pen INJECT 16 UNITS SUB-Q AT BEDTIME    isosorbide mononitrate (IMDUR) 30 MG 24 hr tablet TAKE ONE TABLET BY MOUTH DAILY    levETIRAcetam (KEPPRA) 250 MG Tab Take 1 tablet (250 mg total) by mouth 2 (two) times daily.    losartan (COZAAR) 50 MG tablet Take 1 tablet (50 mg total) by mouth once daily.    meclizine (ANTIVERT) 12.5 mg tablet Take 1 tablet (12.5 mg total) by mouth 2 (two) times daily as needed.     "metformin (GLUCOPHAGE) 500 MG tablet TAKE ONE BY MOUTH TWICE A DAY WITH FOOD. (Patient taking differently: Take 1,000 mg by mouth 2 (two) times daily with meals. TAKE ONE BY MOUTH TWICE A DAY WITH FOOD.)    metoprolol succinate (TOPROL-XL) 100 MG 24 hr tablet TAKE ONE TABLET BY MOUTH TWICE DAILY    naproxen sodium (ANAPROX) 220 MG tablet Take 220 mg by mouth 2 (two) times daily with meals.    NOVOLOG FLEXPEN 100 unit/mL InPn pen Inject 5 units 3 times a day before meals    pantoprazole (PROTONIX) 40 MG tablet Take 1 tablet (40 mg total) by mouth once daily.    potassium chloride SA (K-DUR,KLOR-CON) 20 MEQ tablet TAKE ONE TABLET BY MOUTH EVERY DAY    saw palmetto fruit 450 mg Cap Take 1 capsule by mouth Twice daily.    TURMERIC, BULK, MISC by Misc.(Non-Drug; Combo Route) route.    ACCU-CHEK TOMAS PLUS TEST STRP Strp TEST BLOOD SUGAR SIX TIMES DAILY    ACCU-CHEK MULTICLIX LANCET lancets TEST BLOOD SUGAR THREE TIMES DAILY    NITROSTAT 0.4 mg SL tablet Place 1 tablet (0.4 mg total) under the tongue every 5 (five) minutes as needed for Chest pain.    SUPREP BOWEL PREP KIT 17.5-3.13-1.6 gram SolR Use as directed    SURE COMFORT PEN NEEDLE 32 gauge x 5/32" Ndle USE FOUR TIMES DAILY.     Family History     Problem Relation (Age of Onset)    Alcohol abuse Paternal Uncle    Arthritis Mother, Father    Cancer Sister, Daughter    Diabetes Mother, Father, Sister, Brother, Daughter, Son, Son    Fibromyalgia Daughter    Heart disease Mother, Sister, Brother    Kidney disease Mother, Brother    Miscarriages / Stillbirths Daughter        Tobacco Use    Smoking status: Former Smoker     Packs/day: 2.00     Years: 13.00     Pack years: 26.00     Types: Cigarettes     Start date: 1954     Last attempt to quit: 1967     Years since quittin.2    Smokeless tobacco: Never Used   Substance and Sexual Activity    Alcohol use: No    Drug use: No    Sexual activity: No     Partners: Female     Birth " control/protection: None     Review of Systems   Constitutional: Negative for appetite change, chills, diaphoresis, fatigue and fever.   HENT: Negative for congestion, ear pain, mouth sores, sore throat and trouble swallowing.    Eyes: Negative for pain and visual disturbance.   Respiratory: Negative for cough, chest tightness and shortness of breath.    Cardiovascular: Positive for chest pain. Negative for palpitations and leg swelling.   Gastrointestinal: Negative for abdominal pain, constipation, diarrhea and nausea.   Endocrine: Negative for cold intolerance, heat intolerance, polydipsia and polyuria.   Genitourinary: Negative for dysuria, frequency and hematuria.   Musculoskeletal: Negative for arthralgias, back pain, myalgias and neck pain.   Skin: Negative for pallor, rash and wound.   Allergic/Immunologic: Negative for environmental allergies and immunocompromised state.   Neurological: Negative for dizziness, seizures, syncope, weakness, numbness and headaches.   Hematological: Negative for adenopathy. Does not bruise/bleed easily.   Psychiatric/Behavioral: Negative for agitation, confusion and sleep disturbance.     Objective:     Vital Signs (Most Recent):  Temp: 97.9 °F (36.6 °C) (08/17/18 1611)  Pulse: 79 (08/17/18 1611)  Resp: 15 (08/17/18 1611)  BP: (!) 168/82 (08/17/18 1611)  SpO2: 97 % (08/17/18 1611) Vital Signs (24h Range):  Temp:  [97.4 °F (36.3 °C)-97.9 °F (36.6 °C)] 97.9 °F (36.6 °C)  Pulse:  [74-79] 79  Resp:  [15-18] 15  SpO2:  [97 %-99 %] 97 %  BP: (142-174)/(82-90) 168/82        There is no height or weight on file to calculate BMI.    Physical Exam   Constitutional: He is oriented to person, place, and time. He appears well-developed and well-nourished.   HENT:   Head: Normocephalic and atraumatic.   Eyes: Conjunctivae are normal.   Neck: Neck supple. No JVD present.   Cardiovascular: Normal rate, regular rhythm and normal heart sounds.   Pulmonary/Chest: Effort normal and breath sounds  normal. He has no wheezes.   Abdominal: Soft. Bowel sounds are normal. He exhibits no distension. There is no tenderness.   Musculoskeletal: Normal range of motion. He exhibits edema (+1 bilateral lower extremity edema ).   Neurological: He is alert and oriented to person, place, and time.   Skin: Skin is warm and dry. No rash noted.   Bruising to abdomen at insulin injection sites.    Psychiatric: He has a normal mood and affect. His behavior is normal. Thought content normal.   Nursing note and vitals reviewed.          Significant Labs:   CBC:   Recent Labs   Lab  08/17/18   1422   WBC  5.27   HGB  14.5   HCT  42.6   PLT  141*     CMP:   Recent Labs   Lab  08/17/18   1422   NA  139   K  3.7   CL  100   CO2  30*   GLU  160*   BUN  15   CREATININE  0.9   CALCIUM  10.7*   PROT  7.7   ALBUMIN  4.2   BILITOT  0.7   ALKPHOS  116   AST  24   ALT  13   ANIONGAP  9   EGFRNONAA  >60     Troponin:   Recent Labs   Lab  08/17/18   1422   TROPONINI  0.013     All pertinent labs within the past 24 hours have been reviewed.    Significant Imaging: I have reviewed all pertinent imaging results/findings within the past 24 hours.   Imaging Results          X-Ray Chest AP Portable (Final result)  Result time 08/17/18 15:03:23    Final result by Robbin Up MD (08/17/18 15:03:23)                 Impression:      Coarsening of bronchovascular markings/COPD.  Cardiomegaly with remote median sternotomy.  Multi lead pacemaker.  No evidence of overt pulmonary edema.  Chest x-ray similar to 02/22/2017.      Electronically signed by: Robbin Up MD  Date:    08/17/2018  Time:    15:03             Narrative:    EXAMINATION:  XR CHEST AP PORTABLE    CLINICAL HISTORY:  chest pain;    TECHNIQUE:  Single frontal view of the chest was performed.    COMPARISON:  02/22/2017.    FINDINGS:  Coarsening of bronchovascular markings/COPD.  No pleural effusion or pneumothorax.    The cardiac silhouette is upper limits of normal..  Multi lead  pacemaker.                                  Assessment/Plan:     * Chest pain    -Rule out ACS.   -Trend troponin.   -Continue metoprolol and losartan.   -Hold ASA due to patient's hemorrhagic CVA.   -Hold statin due to patient's intolerance.   -Check 2D echo and lipid panel.           Seizure, late effect of stroke    Continue Keppra.           Hemorrhagic cerebrovascular accident (CVA)    Hold ASA.           Diabetes mellitus due to underlying condition with diabetic peripheral angiopathy without gangrene    -NISS.   -ADA diet.   -Hemoglobin A1C was 7.1 on 3/15/18.           Essential hypertension    Continue metoprolol and losartan.             VTE Risk Mitigation (From admission, onward)        Ordered     IP VTE HIGH RISK PATIENT  Once      08/17/18 1706     Place sequential compression device  Until discontinued      08/17/18 1706     IP VTE HIGH RISK PATIENT  Once      08/17/18 1706             DANGELO Harper  Department of Hospital Medicine   [facility]

## 2018-08-17 NOTE — ED NOTES
Pt resting in bed. NAD, VSS, RR equal and unlabored. Will continue to monitor. No complaints at this time.

## 2018-08-17 NOTE — SUBJECTIVE & OBJECTIVE
Past Medical History:   Diagnosis Date    A-fib     Anemia     AP (angina pectoris) 1/23/2014    Carotid artery disease without cerebral infarction 8/22/2014    Cataract     Coronary artery disease     GERD (gastroesophageal reflux disease) 7/11/2013    Hemorrhagic cerebrovascular accident (CVA) 4/26/2018    Hyperlipidemia     Hypertension     Intracranial hemorrhage     Lumbosacral spondylosis 12/24/2014    Pacemaker 1/23/2014    Paroxysmal atrial fibrillation 1/23/2014    Peripheral vascular disease 8/22/2014    Type 2 diabetes mellitus with ophthalmic manifestations        Past Surgical History:   Procedure Laterality Date    ANGIOPLASTY      APPENDECTOMY      ATRIAL ABLATION SURGERY N/A     CATARACT EXTRACTION Bilateral     [facility]    CATARACT EXTRACTION W/ INTRAOCULAR LENS IMPLANT Right     CATARACT EXTRACTION W/ INTRAOCULAR LENS IMPLANT Left     CORONARY ARTERY BYPASS GRAFT  07/2010    x5    EYE SURGERY      pace maker surgrey  10/2010    reposition in 2011/2012 (approx)    VEIN BYPASS SURGERY         Review of patient's allergies indicates:   Allergen Reactions    Atorvastatin      Other reaction(s): Muscle pain  Other reaction(s): Muscle weakness  Other reaction(s): Muscle pain    Codeine      Other reaction(s): Headache  Other reaction(s): Headache    Eliquis [apixaban]     Norvasc [amlodipine] Edema    Trulicity [dulaglutide] Nausea And Vomiting    Adhesive Rash     Other reaction(s): rash       No current facility-administered medications on file prior to encounter.      Current Outpatient Medications on File Prior to Encounter   Medication Sig    CARBOXYMETHYLCELLULOSE SODIUM (REFRESH OPHT) Apply to eye.    coconut oil 1,000 mg Cap Take by mouth.    fluticasone (FLONASE) 50 mcg/actuation nasal spray 2 sprays by Each Nare route once daily.    furosemide (LASIX) 20 MG tablet TAKE ONE TABLET BY MOUTH DAILY    insulin glargine (LANTUS SOLOSTAR) 100 unit/mL  "(3 mL) InPn pen INJECT 16 UNITS SUB-Q AT BEDTIME    isosorbide mononitrate (IMDUR) 30 MG 24 hr tablet TAKE ONE TABLET BY MOUTH DAILY    levETIRAcetam (KEPPRA) 250 MG Tab Take 1 tablet (250 mg total) by mouth 2 (two) times daily.    losartan (COZAAR) 50 MG tablet Take 1 tablet (50 mg total) by mouth once daily.    meclizine (ANTIVERT) 12.5 mg tablet Take 1 tablet (12.5 mg total) by mouth 2 (two) times daily as needed.    metformin (GLUCOPHAGE) 500 MG tablet TAKE ONE BY MOUTH TWICE A DAY WITH FOOD. (Patient taking differently: Take 1,000 mg by mouth 2 (two) times daily with meals. TAKE ONE BY MOUTH TWICE A DAY WITH FOOD.)    metoprolol succinate (TOPROL-XL) 100 MG 24 hr tablet TAKE ONE TABLET BY MOUTH TWICE DAILY    naproxen sodium (ANAPROX) 220 MG tablet Take 220 mg by mouth 2 (two) times daily with meals.    NOVOLOG FLEXPEN 100 unit/mL InPn pen Inject 5 units 3 times a day before meals    pantoprazole (PROTONIX) 40 MG tablet Take 1 tablet (40 mg total) by mouth once daily.    potassium chloride SA (K-DUR,KLOR-CON) 20 MEQ tablet TAKE ONE TABLET BY MOUTH EVERY DAY    saw palmetto fruit 450 mg Cap Take 1 capsule by mouth Twice daily.    TURMERIC, BULK, MISC by Misc.(Non-Drug; Combo Route) route.    ACCU-CHEK TOMAS PLUS TEST STRP Strp TEST BLOOD SUGAR SIX TIMES DAILY    ACCU-CHEK MULTICLIX LANCET lancets TEST BLOOD SUGAR THREE TIMES DAILY    NITROSTAT 0.4 mg SL tablet Place 1 tablet (0.4 mg total) under the tongue every 5 (five) minutes as needed for Chest pain.    SUPREP BOWEL PREP KIT 17.5-3.13-1.6 gram SolR Use as directed    SURE COMFORT PEN NEEDLE 32 gauge x 5/32" Ndle USE FOUR TIMES DAILY.     Family History     Problem Relation (Age of Onset)    Alcohol abuse Paternal Uncle    Arthritis Mother, Father    Cancer Sister, Daughter    Diabetes Mother, Father, Sister, Brother, Daughter, Son, Son    Fibromyalgia Daughter    Heart disease Mother, Sister, Brother    Kidney disease Mother, Brother    " Miscarriages / Stillbirths Daughter        Tobacco Use    Smoking status: Former Smoker     Packs/day: 2.00     Years: 13.00     Pack years: 26.00     Types: Cigarettes     Start date: 1954     Last attempt to quit: 1967     Years since quittin.2    Smokeless tobacco: Never Used   Substance and Sexual Activity    Alcohol use: No    Drug use: No    Sexual activity: No     Partners: Female     Birth control/protection: None     Review of Systems   Constitutional: Negative for appetite change, chills, diaphoresis, fatigue and fever.   HENT: Negative for congestion, ear pain, mouth sores, sore throat and trouble swallowing.    Eyes: Negative for pain and visual disturbance.   Respiratory: Negative for cough, chest tightness and shortness of breath.    Cardiovascular: Positive for chest pain. Negative for palpitations and leg swelling.   Gastrointestinal: Negative for abdominal pain, constipation, diarrhea and nausea.   Endocrine: Negative for cold intolerance, heat intolerance, polydipsia and polyuria.   Genitourinary: Negative for dysuria, frequency and hematuria.   Musculoskeletal: Negative for arthralgias, back pain, myalgias and neck pain.   Skin: Negative for pallor, rash and wound.   Allergic/Immunologic: Negative for environmental allergies and immunocompromised state.   Neurological: Negative for dizziness, seizures, syncope, weakness, numbness and headaches.   Hematological: Negative for adenopathy. Does not bruise/bleed easily.   Psychiatric/Behavioral: Negative for agitation, confusion and sleep disturbance.     Objective:     Vital Signs (Most Recent):  Temp: 97.9 °F (36.6 °C) (18)  Pulse: 79 (18)  Resp: 15 (18)  BP: (!) 168/82 (18)  SpO2: 97 % (18) Vital Signs (24h Range):  Temp:  [97.4 °F (36.3 °C)-97.9 °F (36.6 °C)] 97.9 °F (36.6 °C)  Pulse:  [74-79] 79  Resp:  [15-18] 15  SpO2:  [97 %-99 %] 97 %  BP: (142-174)/(82-90) 168/82         There is no height or weight on file to calculate BMI.    Physical Exam   Constitutional: He is oriented to person, place, and time. He appears well-developed and well-nourished.   HENT:   Head: Normocephalic and atraumatic.   Eyes: Conjunctivae are normal.   Neck: Neck supple. No JVD present.   Cardiovascular: Normal rate, regular rhythm and normal heart sounds.   Pulmonary/Chest: Effort normal and breath sounds normal. He has no wheezes.   Abdominal: Soft. Bowel sounds are normal. He exhibits no distension. There is no tenderness.   Musculoskeletal: Normal range of motion. He exhibits edema (+1 bilateral lower extremity edema ).   Neurological: He is alert and oriented to person, place, and time.   Skin: Skin is warm and dry. No rash noted.   Bruising to abdomen at insulin injection sites.    Psychiatric: He has a normal mood and affect. His behavior is normal. Thought content normal.   Nursing note and vitals reviewed.          Significant Labs:   CBC:   Recent Labs   Lab  08/17/18   1422   WBC  5.27   HGB  14.5   HCT  42.6   PLT  141*     CMP:   Recent Labs   Lab  08/17/18   1422   NA  139   K  3.7   CL  100   CO2  30*   GLU  160*   BUN  15   CREATININE  0.9   CALCIUM  10.7*   PROT  7.7   ALBUMIN  4.2   BILITOT  0.7   ALKPHOS  116   AST  24   ALT  13   ANIONGAP  9   EGFRNONAA  >60     Troponin:   Recent Labs   Lab  08/17/18   1422   TROPONINI  0.013     All pertinent labs within the past 24 hours have been reviewed.    Significant Imaging: I have reviewed all pertinent imaging results/findings within the past 24 hours.   Imaging Results          X-Ray Chest AP Portable (Final result)  Result time 08/17/18 15:03:23    Final result by Robbin Up MD (08/17/18 15:03:23)                 Impression:      Coarsening of bronchovascular markings/COPD.  Cardiomegaly with remote median sternotomy.  Multi lead pacemaker.  No evidence of overt pulmonary edema.  Chest x-ray similar to 02/22/2017.      Electronically  signed by: Robbin Up MD  Date:    08/17/2018  Time:    15:03             Narrative:    EXAMINATION:  XR CHEST AP PORTABLE    CLINICAL HISTORY:  chest pain;    TECHNIQUE:  Single frontal view of the chest was performed.    COMPARISON:  02/22/2017.    FINDINGS:  Coarsening of bronchovascular markings/COPD.  No pleural effusion or pneumothorax.    The cardiac silhouette is upper limits of normal..  Multi lead pacemaker.

## 2018-08-17 NOTE — ED PROVIDER NOTES
SCRIBE #1 NOTE: I, Anabell Funez, am scribing for, and in the presence of, Josh Nagel MD. I have scribed the entire note.      History      Chief Complaint   Patient presents with    Chest Pain     mild headache for 2 days and chest pain for the past 24 hours. sent by urgent care for further evaluation.        Review of patient's allergies indicates:   Allergen Reactions    Atorvastatin      Other reaction(s): Muscle pain  Other reaction(s): Muscle weakness  Other reaction(s): Muscle pain    Codeine      Other reaction(s): Headache  Other reaction(s): Headache    Eliquis [apixaban]     Norvasc [amlodipine] Edema    Trulicity [dulaglutide] Nausea And Vomiting    Adhesive Rash     Other reaction(s): rash        HPI   HPI    8/17/2018, 2:05 PM   History obtained from the patient      History of Present Illness: nAthony Blair is a 85 y.o. male patient who presents to the Emergency Department for Cp which onset gradually 4 hours ago. Pt reports he had similar sxs yesterday but they resolved. Today when pt was lying flat to nap the chest pain returned. Pt states the pain is more of a discomfort and it does not radiate anywhere. Symptoms are intermittent and moderate in severity. No mitigating factors reported. Pain exacerbated by lying flat. Associated sxs include SOB. Patient denies any fever, chills, N/V, abd pain, leg swelling, palpitations, weakness, numbness, and all other sxs at this time. No prior Tx. No further complaints or concerns at this time.     Arrival mode: Personal vehicle     PCP: Abdulaziz Mccabe MD       Past Medical History:  Past Medical History:   Diagnosis Date    A-fib     Anemia     AP (angina pectoris) 1/23/2014    Carotid artery disease without cerebral infarction 8/22/2014    Cataract     Coronary artery disease     Diabetes mellitus type II     BS didn't check today 09/08/2016  A1C  7.4    DM (diabetes mellitus) 1970    BS 90 am 06/24/2018    GERD (gastroesophageal reflux  disease) 2013    Hemorrhagic cerebrovascular accident (CVA) 2018    Hyperlipidemia     Hypertension     Intracranial hemorrhage     Lumbosacral spondylosis 2014    Pacemaker 2014    Paroxysmal atrial fibrillation 2014    Peripheral vascular disease 2014    S/P CABG (coronary artery bypass graft) 2014    Tobacco dependence     resolved    Type 2 diabetes mellitus with ophthalmic manifestations        Past Surgical History:  Past Surgical History:   Procedure Laterality Date    ANGIOPLASTY      APPENDECTOMY      ATRIAL ABLATION SURGERY N/A     CATARACT EXTRACTION Bilateral     [facility]    CATARACT EXTRACTION W/ INTRAOCULAR LENS IMPLANT Right     CATARACT EXTRACTION W/ INTRAOCULAR LENS IMPLANT Left     CORONARY ARTERY BYPASS GRAFT  07/2010    x5    EYE SURGERY      pace maker surgrey  10/2010    reposition in  (approx)    VEIN BYPASS SURGERY           Family History:  Family History   Problem Relation Age of Onset    Arthritis Mother     Diabetes Mother     Kidney disease Mother     Heart disease Mother     Arthritis Father     Diabetes Father     Diabetes Sister     Cancer Sister         breast    Heart disease Sister     Diabetes Brother     Kidney disease Brother     Heart disease Brother     Cancer Daughter         breast    Diabetes Daughter     Miscarriages / Stillbirths Daughter     Fibromyalgia Daughter     Diabetes Son     Diabetes Son     Alcohol abuse Paternal Uncle     Stroke Neg Hx        Social History:  Social History     Tobacco Use    Smoking status: Former Smoker     Packs/day: 2.00     Years: 13.00     Pack years: 26.00     Types: Cigarettes     Start date: 1954     Last attempt to quit: 1967     Years since quittin.2    Smokeless tobacco: Never Used   Substance and Sexual Activity    Alcohol use: No    Drug use: No    Sexual activity: No     Partners: Female     Birth  control/protection: None       ROS   Review of Systems   Constitutional: Negative for chills, diaphoresis and fever.   HENT: Negative for congestion, rhinorrhea and sore throat.    Respiratory: Positive for shortness of breath. Negative for cough and choking.    Cardiovascular: Positive for chest pain. Negative for palpitations and leg swelling.   Gastrointestinal: Negative for abdominal pain, diarrhea, nausea and vomiting.   Genitourinary: Negative for dysuria, frequency and hematuria.   Musculoskeletal: Negative for back pain and neck pain.   Skin: Negative for rash.   Neurological: Negative for dizziness, syncope, weakness, numbness and headaches.   Hematological: Does not bruise/bleed easily.       Physical Exam      Initial Vitals [08/17/18 1401]   BP Pulse Resp Temp SpO2   (!) 174/90 74 18 97.8 °F (36.6 °C) 97 %      MAP       --          Physical Exam  Nursing Notes and Vital Signs Reviewed.  Constitutional: Patient is in no acute distress. Elderly.  Head: Atraumatic. Normocephalic.  Eyes: PERRL. EOM intact. Conjunctivae are not pale. No scleral icterus.   ENT: Mucous membranes are moist. Oropharynx is clear and symmetric.    Neck: Supple. Full ROM. No lymphadenopathy.  Cardiovascular: Regular rate. Regular rhythm. No murmurs, rubs, or gallops. Distal pulses are 2+ and symmetric.  Pulmonary/Chest: No respiratory distress. Clear to auscultation bilaterally. No wheezing or rales. Midline sternotomy scar.  Abdominal: Soft and non-distended.  There is no tenderness.  No rebound, guarding, or rigidity.   Musculoskeletal: Moves all extremities. No obvious deformities. No edema.  Skin: Warm and dry.  Neurological:  Alert, awake, and appropriate.  Normal speech.  No acute focal neurological deficits are appreciated.  Psychiatric: Normal affect. Good eye contact. Appropriate in content.    ED Course    Procedures  ED Vital Signs:  Vitals:    08/17/18 1401 08/17/18 1413   BP: (!) 174/90    Pulse: 74 75   Resp: 18     Temp: 97.8 °F (36.6 °C)    TempSrc: Oral    SpO2: 97%        Abnormal Lab Results:  Labs Reviewed   CBC W/ AUTO DIFFERENTIAL - Abnormal; Notable for the following components:       Result Value    MCH 31.3 (*)     Platelets 141 (*)     All other components within normal limits   COMPREHENSIVE METABOLIC PANEL - Abnormal; Notable for the following components:    CO2 30 (*)     Glucose 160 (*)     Calcium 10.7 (*)     All other components within normal limits   URINALYSIS, REFLEX TO URINE CULTURE - Abnormal; Notable for the following components:    Specific Gravity, UA <=1.005 (*)     All other components within normal limits    Narrative:     Preferred Collection Type->Urine, Clean Catch   B-TYPE NATRIURETIC PEPTIDE - Abnormal; Notable for the following components:     (*)     All other components within normal limits   CK   TROPONIN I        All Lab Results:  Results for orders placed or performed during the hospital encounter of 08/17/18   CBC auto differential   Result Value Ref Range    WBC 5.27 3.90 - 12.70 K/uL    RBC 4.64 4.60 - 6.20 M/uL    Hemoglobin 14.5 14.0 - 18.0 g/dL    Hematocrit 42.6 40.0 - 54.0 %    MCV 92 82 - 98 fL    MCH 31.3 (H) 27.0 - 31.0 pg    MCHC 34.0 32.0 - 36.0 g/dL    RDW 13.8 11.5 - 14.5 %    Platelets 141 (L) 150 - 350 K/uL    MPV 9.7 9.2 - 12.9 fL    Gran # (ANC) 2.4 1.8 - 7.7 K/uL    Lymph # 2.2 1.0 - 4.8 K/uL    Mono # 0.5 0.3 - 1.0 K/uL    Eos # 0.2 0.0 - 0.5 K/uL    Baso # 0.02 0.00 - 0.20 K/uL    Gran% 44.8 38.0 - 73.0 %    Lymph% 42.3 18.0 - 48.0 %    Mono% 9.1 4.0 - 15.0 %    Eosinophil% 3.4 0.0 - 8.0 %    Basophil% 0.4 0.0 - 1.9 %    Differential Method Automated    Comprehensive metabolic panel   Result Value Ref Range    Sodium 139 136 - 145 mmol/L    Potassium 3.7 3.5 - 5.1 mmol/L    Chloride 100 95 - 110 mmol/L    CO2 30 (H) 23 - 29 mmol/L    Glucose 160 (H) 70 - 110 mg/dL    BUN, Bld 15 8 - 23 mg/dL    Creatinine 0.9 0.5 - 1.4 mg/dL    Calcium 10.7 (H) 8.7 - 10.5  mg/dL    Total Protein 7.7 6.0 - 8.4 g/dL    Albumin 4.2 3.5 - 5.2 g/dL    Total Bilirubin 0.7 0.1 - 1.0 mg/dL    Alkaline Phosphatase 116 55 - 135 U/L    AST 24 10 - 40 U/L    ALT 13 10 - 44 U/L    Anion Gap 9 8 - 16 mmol/L    eGFR if African American >60 >60 mL/min/1.73 m^2    eGFR if non African American >60 >60 mL/min/1.73 m^2   Urinalysis, Reflex to Urine Culture Urine, Clean Catch   Result Value Ref Range    Specimen UA Urine, Clean Catch     Color, UA Yellow Yellow, Straw, Ale    Appearance, UA Clear Clear    pH, UA 6.0 5.0 - 8.0    Specific Gravity, UA <=1.005 (A) 1.005 - 1.030    Protein, UA Negative Negative    Glucose, UA Negative Negative    Ketones, UA Negative Negative    Bilirubin (UA) Negative Negative    Occult Blood UA Negative Negative    Nitrite, UA Negative Negative    Urobilinogen, UA Negative <2.0 EU/dL    Leukocytes, UA Negative Negative   Brain natriuretic peptide   Result Value Ref Range     (H) 0 - 99 pg/mL   CK   Result Value Ref Range     20 - 200 U/L   Troponin I   Result Value Ref Range    Troponin I 0.013 0.000 - 0.026 ng/mL         Imaging Results:  Imaging Results          X-Ray Chest AP Portable (Final result)  Result time 08/17/18 15:03:23    Final result by Robbin Up MD (08/17/18 15:03:23)                 Impression:      Coarsening of bronchovascular markings/COPD.  Cardiomegaly with remote median sternotomy.  Multi lead pacemaker.  No evidence of overt pulmonary edema.  Chest x-ray similar to 02/22/2017.      Electronically signed by: Robbin Up MD  Date:    08/17/2018  Time:    15:03             Narrative:    EXAMINATION:  XR CHEST AP PORTABLE    CLINICAL HISTORY:  chest pain;    TECHNIQUE:  Single frontal view of the chest was performed.    COMPARISON:  02/22/2017.    FINDINGS:  Coarsening of bronchovascular markings/COPD.  No pleural effusion or pneumothorax.    The cardiac silhouette is upper limits of normal..  Multi lead pacemaker.                                         The EKG was ordered, reviewed, and independently interpreted by the ED provider.  Interpretation time: 14:13  Rate: 75 BPM  Rhythm: normal sinus rhythm  Interpretation: Left axis deviation. Left ventricular hypertrophy with QRS widening and repolarization. No STEMI.      The Emergency Provider reviewed the vital signs and test results, which are outlined above.    ED Discussion     3:20 PM: Re-evaluated pt. I have discussed test results, shared treatment plan, and the need for admission with patient and family at bedside. Pt and family express understanding at this time and agree with all information. All questions answered. Pt and family have no further questions or concerns at this time. Pt is ready for admit.    3:22 PM: Discussed case with DANGELO Harper ([facility]). Dr. Monaco agrees with current care and management of pt and accepts admission.   Admitting Service: Hospital medicine   Admitting Physician: Dr. Monaco  Admit to: Obs/Tele      ED Medication(s):  Medications   sodium chloride 0.9% flush 3 mL (not administered)   acetaminophen tablet 650 mg (not administered)   morphine injection 2 mg (not administered)   promethazine (PHENERGAN) 6.25 mg in dextrose 5 % 50 mL IVPB (not administered)   ondansetron injection 4 mg (not administered)   nitroGLYCERIN SL tablet 0.4 mg (not administered)   aspirin tablet 325 mg (not administered)       New Prescriptions    No medications on file             Medical Decision Making    Medical Decision Making:   Clinical Tests:   Lab Tests: Reviewed and Ordered  Radiological Study: Reviewed and Ordered  Medical Tests: Ordered and Reviewed           Scribe Attestation:   Scribe #1: I performed the above scribed service and the documentation accurately describes the services I performed. I attest to the accuracy of the note.    Attending:   Physician Attestation Statement for Scribe #1: I, Josh Nagel MD, personally performed the  services described in this documentation, as scribed by Anabell Funez, in my presence, and it is both accurate and complete.          Clinical Impression       ICD-10-CM ICD-9-CM   1. Chest pain R07.9 786.50   2. Coronary artery disease, angina presence unspecified, unspecified vessel or lesion type, unspecified whether native or transplanted heart I25.10 414.00       Disposition:   Disposition: Placed in Observation  Condition: Fair         Josh Nagel MD  08/17/18 1536

## 2018-08-17 NOTE — ASSESSMENT & PLAN NOTE
-Rule out ACS.   -Trend troponin.   -Continue metoprolol and losartan.   -Hold ASA due to patient's hemorrhagic CVA.   -Hold statin due to patient's intolerance.   -Check 2D echo and lipid panel.

## 2018-08-17 NOTE — HPI
Anthony Blair is an 85 year old male with hemorrhagic stroke, CAD, hypertension, hyperlipidemia and DM II who presented to the ED with complaints of chest pain that began yesterday. The patient reports several episodes of intermittent chest pain. The pain is located in his left chest. It is described as an ache. The pain was worse with laying flat. Today, while at Urgent Care, the pain briefly became more sharp. The patient denies any alleviating factors. He denies associated symptoms including nausea, vomiting, diaphoresis and SOB. The patient does not take a daily ASA due to a history of hemorrhagic stoke approximately one year ago. In the ED, the patient was found to have a mildly elevated BNP of 104. His troponin was normal and his EKG was unremarkable. Code status was discussed with the patient. He is a full code. His daughter, Liat Oliva, and his sons, Dave and Jerry Blair, are his surrogate decision makers.

## 2018-08-17 NOTE — PLAN OF CARE
08/17/18 1726   SANCHEZ Message   Medicare Outpatient and Observation Notification regarding financial responsibility Given to patient/caregiver;Explained to patient/caregiver;Signed/date by patient/caregiver   Date SANCHEZ was signed 08/17/18   Time SANCHEZ was signed 1726

## 2018-08-18 VITALS
BODY MASS INDEX: 27.43 KG/M2 | DIASTOLIC BLOOD PRESSURE: 67 MMHG | OXYGEN SATURATION: 95 % | RESPIRATION RATE: 20 BRPM | HEART RATE: 75 BPM | WEIGHT: 185.19 LBS | TEMPERATURE: 98 F | SYSTOLIC BLOOD PRESSURE: 131 MMHG | HEIGHT: 69 IN

## 2018-08-18 LAB
ANION GAP SERPL CALC-SCNC: 8 MMOL/L
BASOPHILS # BLD AUTO: 0.02 K/UL
BASOPHILS NFR BLD: 0.4 %
BUN SERPL-MCNC: 15 MG/DL
CALCIUM SERPL-MCNC: 9.8 MG/DL
CHLORIDE SERPL-SCNC: 103 MMOL/L
CHOLEST SERPL-MCNC: 179 MG/DL
CHOLEST/HDLC SERPL: 4.5 {RATIO}
CO2 SERPL-SCNC: 29 MMOL/L
CREAT SERPL-MCNC: 0.8 MG/DL
DIASTOLIC DYSFUNCTION: NO
DIFFERENTIAL METHOD: ABNORMAL
EOSINOPHIL # BLD AUTO: 0.2 K/UL
EOSINOPHIL NFR BLD: 3.5 %
ERYTHROCYTE [DISTWIDTH] IN BLOOD BY AUTOMATED COUNT: 13.8 %
EST. GFR  (AFRICAN AMERICAN): >60 ML/MIN/1.73 M^2
EST. GFR  (NON AFRICAN AMERICAN): >60 ML/MIN/1.73 M^2
ESTIMATED PA SYSTOLIC PRESSURE: 33.25
GLUCOSE SERPL-MCNC: 133 MG/DL
HCT VFR BLD AUTO: 37.8 %
HDLC SERPL-MCNC: 40 MG/DL
HDLC SERPL: 22.3 %
HGB BLD-MCNC: 12.7 G/DL
LDLC SERPL CALC-MCNC: 100.6 MG/DL
LYMPHOCYTES # BLD AUTO: 2 K/UL
LYMPHOCYTES NFR BLD: 42.6 %
MAGNESIUM SERPL-MCNC: 1.5 MG/DL
MCH RBC QN AUTO: 30.7 PG
MCHC RBC AUTO-ENTMCNC: 33.6 G/DL
MCV RBC AUTO: 91 FL
MITRAL VALVE REGURGITATION: NORMAL
MONOCYTES # BLD AUTO: 0.4 K/UL
MONOCYTES NFR BLD: 9.6 %
NEUTROPHILS # BLD AUTO: 2 K/UL
NEUTROPHILS NFR BLD: 43.9 %
NONHDLC SERPL-MCNC: 139 MG/DL
PHOSPHATE SERPL-MCNC: 3.1 MG/DL
PLATELET # BLD AUTO: 132 K/UL
PMV BLD AUTO: 9.4 FL
POTASSIUM SERPL-SCNC: 3.6 MMOL/L
RBC # BLD AUTO: 4.14 M/UL
RETIRED EF AND QEF - SEE NOTES: 55 (ref 55–65)
SODIUM SERPL-SCNC: 140 MMOL/L
TRICUSPID VALVE REGURGITATION: NORMAL
TRIGL SERPL-MCNC: 192 MG/DL
TROPONIN I SERPL DL<=0.01 NG/ML-MCNC: 0.01 NG/ML
TROPONIN I SERPL DL<=0.01 NG/ML-MCNC: 0.02 NG/ML
WBC # BLD AUTO: 4.6 K/UL

## 2018-08-18 PROCEDURE — 84100 ASSAY OF PHOSPHORUS: CPT

## 2018-08-18 PROCEDURE — 80048 BASIC METABOLIC PNL TOTAL CA: CPT

## 2018-08-18 PROCEDURE — 83735 ASSAY OF MAGNESIUM: CPT

## 2018-08-18 PROCEDURE — 25000003 PHARM REV CODE 250: Performed by: PHYSICIAN ASSISTANT

## 2018-08-18 PROCEDURE — 99220 PR INITIAL OBSERVATION CARE,LEVL III: CPT | Mod: ,,, | Performed by: INTERNAL MEDICINE

## 2018-08-18 PROCEDURE — 93306 TTE W/DOPPLER COMPLETE: CPT

## 2018-08-18 PROCEDURE — 93306 TTE W/DOPPLER COMPLETE: CPT | Mod: 26,,, | Performed by: INTERNAL MEDICINE

## 2018-08-18 PROCEDURE — 85025 COMPLETE CBC W/AUTO DIFF WBC: CPT

## 2018-08-18 PROCEDURE — G0378 HOSPITAL OBSERVATION PER HR: HCPCS

## 2018-08-18 PROCEDURE — A4216 STERILE WATER/SALINE, 10 ML: HCPCS | Performed by: EMERGENCY MEDICINE

## 2018-08-18 PROCEDURE — 84484 ASSAY OF TROPONIN QUANT: CPT | Mod: 91

## 2018-08-18 PROCEDURE — 25000003 PHARM REV CODE 250: Performed by: EMERGENCY MEDICINE

## 2018-08-18 PROCEDURE — 36415 COLL VENOUS BLD VENIPUNCTURE: CPT

## 2018-08-18 RX ADMIN — POTASSIUM CHLORIDE 20 MEQ: 1500 TABLET, EXTENDED RELEASE ORAL at 08:08

## 2018-08-18 RX ADMIN — LEVETIRACETAM 250 MG: 250 TABLET ORAL at 08:08

## 2018-08-18 RX ADMIN — FUROSEMIDE 20 MG: 20 TABLET ORAL at 08:08

## 2018-08-18 RX ADMIN — ISOSORBIDE MONONITRATE 30 MG: 30 TABLET, EXTENDED RELEASE ORAL at 08:08

## 2018-08-18 RX ADMIN — Medication 3 ML: at 06:08

## 2018-08-18 RX ADMIN — METOPROLOL SUCCINATE 100 MG: 50 TABLET, EXTENDED RELEASE ORAL at 08:08

## 2018-08-18 RX ADMIN — LOSARTAN POTASSIUM 50 MG: 50 TABLET, FILM COATED ORAL at 08:08

## 2018-08-18 RX ADMIN — PANTOPRAZOLE SODIUM 40 MG: 40 TABLET, DELAYED RELEASE ORAL at 08:08

## 2018-08-18 NOTE — DISCHARGE SUMMARY
[facility] Hospital Medicine  Discharge Summary      Patient Name: Anthony Blair  MRN: [id number]  Admission Date: 8/17/2018  Hospital Length of Stay: 0 days  Discharge Date and Time:  08/18/2018 12:11 PM  Attending Physician: Srinivas Ledezma MD   Discharging Provider: Ratna Schofield NP  Primary Care Provider: Abdulzaiz Mccabe MD      HPI:   Anthony Blair is an 85 year old male with hemorrhagic stroke, CAD, hypertension, hyperlipidemia and DM II who presented to the ED with complaints of chest pain that began yesterday. The patient reports several episodes of intermittent chest pain. The pain is located in his left chest. It is described as an ache. The pain was worse with laying flat. Today, while at Urgent Care, the pain briefly became more sharp. The patient denies any alleviating factors. He denies associated symptoms including nausea, vomiting, diaphoresis and SOB. The patient does not take a daily ASA due to a history of hemorrhagic stoke approximately one year ago. In the ED, the patient was found to have a mildly elevated BNP of 104. His troponin was normal and his EKG was unremarkable. Code status was discussed with the patient. He is a full code. His daughter, Liat Oliva, and his sons, Dave and Jerry Blair, are his surrogate decision makers.     * No surgery found *      Hospital Course:   Patient admitted for chest pain. Serial troponin's negative. ED echo showed normal EF. Continue BB and ARB. No ASA due to history of hemorrhagic CVA. Case discussed with cardiology, ok to discharge from cardiology consult. Patient to follow-up with cardiology nest week  for outpatient stress test. Symptoms resolved. Patient also to follow-up with PCP in 3 days for hospital follow-up. Patient seen and examined on the date of discharge and found suitable for discharge.       Consults:   Consults (From admission, onward)        Status Ordering Provider     Inpatient consult to Cardiology  Once      Provider:  Kyle Aguilera MD    Completed NICOLAS JEWELL          No new Assessment & Plan notes have been filed under this hospital service since the last note was generated.  Service: Hospital Medicine    Final Active Diagnoses:    Diagnosis Date Noted POA    PRINCIPAL PROBLEM:  Chest pain [R07.9] 08/17/2018 Yes    Seizure, late effect of stroke [I69.398, R56.9] 05/08/2018 Not Applicable    Hemorrhagic cerebrovascular accident (CVA) [I61.9] 04/26/2018 Yes    Diabetes mellitus due to underlying condition with diabetic peripheral angiopathy without gangrene [E08.51] 12/22/2017 Yes    Essential hypertension [I10] 01/23/2014 Yes      Problems Resolved During this Admission:       Discharged Condition: stable    Disposition: Home or Self Care    Follow Up:  Follow-up Information     Abdulaziz Mccabe MD In 3 days.    Specialty:  Family Medicine  Why:  hospital follow-up   Contact information:  [address] [address] [address] [phone]             Chika Campo PA-C.    Specialty:  Cardiology  Why:  hospital follow-up   Contact information:  [address] [place] [address] [address] [place]anna [place] [address] [phone]                 Patient Instructions:      Notify your health care provider if you experience any of the following:  severe uncontrolled pain     Notify your health care provider if you experience any of the following:  difficulty breathing or increased cough     Notify your health care provider if you experience any of the following:  persistent dizziness, light-headedness, or visual disturbances     Notify your health care provider if you experience any of the following:  increased confusion or weakness     Activity as tolerated       Significant Diagnostic Studies: Labs:   BMP:   Recent Labs   Lab  08/17/18   1422  08/18/18   0356   GLU  160*  133*   NA  139  140   K  3.7  3.6   CL  100  103   CO2  30*  29   BUN  15  15   CREATININE  0.9  0.8   CALCIUM  10.7*  9.8   MG   --   1.5*   , CMP    Recent Labs   Lab  08/17/18   1422  08/18/18   0356   NA  139  140   K  3.7  3.6   CL  100  103   CO2  30*  29   GLU  160*  133*   BUN  15  15   CREATININE  0.9  0.8   CALCIUM  10.7*  9.8   PROT  7.7   --    ALBUMIN  4.2   --    BILITOT  0.7   --    ALKPHOS  116   --    AST  24   --    ALT  13   --    ANIONGAP  9  8   ESTGFRAFRICA  >60  >60   EGFRNONAA  >60  >60   , CBC   Recent Labs   Lab  08/17/18   1422  08/18/18   0356   WBC  5.27  4.60   HGB  14.5  12.7*   HCT  42.6  37.8*   PLT  141*  132*    and Troponin   Recent Labs   Lab  08/18/18   0814   TROPONINI  0.008       Pending Diagnostic Studies:     None         Medications:  Reconciled Home Medications:      Medication List      CHANGE how you take these medications    metFORMIN 500 MG tablet  Commonly known as:  GLUCOPHAGE  TAKE ONE BY MOUTH TWICE A DAY WITH FOOD.  What changed:    · how much to take  · how to take this  · when to take this  · additional instructions        CONTINUE taking these medications    ACCU-CHEK TOMAS PLUS TEST STRP Strp  Generic drug:  blood sugar diagnostic  TEST BLOOD SUGAR SIX TIMES DAILY     ACCU-CHEK MULTICLIX LANCET Misc  Generic drug:  lancets  TEST BLOOD SUGAR THREE TIMES DAILY     coconut oil 1,000 mg Cap  Take by mouth.     fluticasone 50 mcg/actuation nasal spray  Commonly known as:  FLONASE  2 sprays by Each Nare route once daily.     furosemide 20 MG tablet  Commonly known as:  LASIX  TAKE ONE TABLET BY MOUTH DAILY     insulin glargine 100 unit/mL (3 mL) Inpn pen  Commonly known as:  LANTUS SOLOSTAR U-100 INSULIN  INJECT 16 UNITS SUB-Q AT BEDTIME     isosorbide mononitrate 30 MG 24 hr tablet  Commonly known as:  IMDUR  TAKE ONE TABLET BY MOUTH DAILY     levETIRAcetam 250 MG Tab  Commonly known as:  KEPPRA  Take 1 tablet (250 mg total) by mouth 2 (two) times daily.     losartan 50 MG tablet  Commonly known as:  COZAAR  Take 1 tablet (50 mg total) by mouth once daily.     meclizine 12.5 mg tablet  Commonly known as:   "ANTIVERT  Take 1 tablet (12.5 mg total) by mouth 2 (two) times daily as needed.     metoprolol succinate 100 MG 24 hr tablet  Commonly known as:  TOPROL-XL  TAKE ONE TABLET BY MOUTH TWICE DAILY     naproxen sodium 220 MG tablet  Commonly known as:  ANAPROX  Take 220 mg by mouth 2 (two) times daily with meals.     NITROSTAT 0.4 MG SL tablet  Generic drug:  nitroGLYCERIN  Place 1 tablet (0.4 mg total) under the tongue every 5 (five) minutes as needed for Chest pain.     NovoLOG Flexpen U-100 Insulin 100 unit/mL Inpn pen  Generic drug:  insulin aspart U-100  Inject 5 units 3 times a day before meals     pantoprazole 40 MG tablet  Commonly known as:  PROTONIX  Take 1 tablet (40 mg total) by mouth once daily.     potassium chloride SA 20 MEQ tablet  Commonly known as:  K-DUR,KLOR-CON  TAKE ONE TABLET BY MOUTH EVERY DAY     REFRESH OPHT  Apply to eye.     saw palmetto fruit 450 mg Cap  Take 1 capsule by mouth Twice daily.     SUPREP BOWEL PREP KIT 17.5-3.13-1.6 gram Solr  Generic drug:  sodium,potassium,mag sulfates  Use as directed     SURE COMFORT PEN NEEDLE 32 gauge x 5/32" Ndle  Generic drug:  pen needle, diabetic  USE FOUR TIMES DAILY.     TURMERIC (BULK) MISC  by Misc.(Non-Drug; Combo Route) route.            Indwelling Lines/Drains at time of discharge:   Lines/Drains/Airways          None          Time spent on the discharge of patient: 35 minutes  Patient was seen and examined on the date of discharge and determined to be suitable for discharge.         Ratna Schofield NP  Department of Hospital Medicine  [facility]  "

## 2018-08-18 NOTE — ASSESSMENT & PLAN NOTE
-Patient with history of CAD who presents with atypical chest pain that has since resolved  -Serial troponin negative and EKG without acute ischemic changes  -Continue ARB, BB  -Intolerant to statins  -No ASA due to history of hemorrhagic CVA  -If EF normal on echo and patient able to ambulate without symptoms, ok to d/c home with follow-up in clinic next week with MPI stress test

## 2018-08-18 NOTE — PLAN OF CARE
Problem: Patient Care Overview  Goal: Plan of Care Review  Outcome: Ongoing (interventions implemented as appropriate)  Pt alert and oriented. POC reviewed. Monitor labs. Pt remained free of falls during shift, no skin breakdown noted at this time. Bed alarm set , call light in reach, room free of clutter, side rails  up x2, pt on telemetry monitor Paced, will continue to monitor.

## 2018-08-18 NOTE — SUBJECTIVE & OBJECTIVE
Past Medical History:   Diagnosis Date    A-fib     Anemia     AP (angina pectoris) 1/23/2014    Carotid artery disease without cerebral infarction 8/22/2014    Cataract     Coronary artery disease     GERD (gastroesophageal reflux disease) 7/11/2013    Hemorrhagic cerebrovascular accident (CVA) 4/26/2018    Hyperlipidemia     Hypertension     Intracranial hemorrhage     Lumbosacral spondylosis 12/24/2014    Pacemaker 1/23/2014    Paroxysmal atrial fibrillation 1/23/2014    Peripheral vascular disease 8/22/2014    Type 2 diabetes mellitus with ophthalmic manifestations        Past Surgical History:   Procedure Laterality Date    ANGIOPLASTY      APPENDECTOMY      ATRIAL ABLATION SURGERY N/A     CATARACT EXTRACTION Bilateral     [facility]    CATARACT EXTRACTION W/ INTRAOCULAR LENS IMPLANT Right     CATARACT EXTRACTION W/ INTRAOCULAR LENS IMPLANT Left     CORONARY ARTERY BYPASS GRAFT  07/2010    x5    EYE SURGERY      pace maker surgrey  10/2010    reposition in 2011/2012 (approx)    VEIN BYPASS SURGERY         Review of patient's allergies indicates:   Allergen Reactions    Atorvastatin      Other reaction(s): Muscle pain  Other reaction(s): Muscle weakness  Other reaction(s): Muscle pain    Codeine      Other reaction(s): Headache  Other reaction(s): Headache    Eliquis [apixaban]     Norvasc [amlodipine] Edema    Trulicity [dulaglutide] Nausea And Vomiting    Adhesive Rash     Other reaction(s): rash       No current facility-administered medications on file prior to encounter.      Current Outpatient Medications on File Prior to Encounter   Medication Sig    CARBOXYMETHYLCELLULOSE SODIUM (REFRESH OPHT) Apply to eye.    coconut oil 1,000 mg Cap Take by mouth.    fluticasone (FLONASE) 50 mcg/actuation nasal spray 2 sprays by Each Nare route once daily.    furosemide (LASIX) 20 MG tablet TAKE ONE TABLET BY MOUTH DAILY    insulin glargine (LANTUS SOLOSTAR) 100 unit/mL  "(3 mL) InPn pen INJECT 16 UNITS SUB-Q AT BEDTIME    isosorbide mononitrate (IMDUR) 30 MG 24 hr tablet TAKE ONE TABLET BY MOUTH DAILY    levETIRAcetam (KEPPRA) 250 MG Tab Take 1 tablet (250 mg total) by mouth 2 (two) times daily.    losartan (COZAAR) 50 MG tablet Take 1 tablet (50 mg total) by mouth once daily.    meclizine (ANTIVERT) 12.5 mg tablet Take 1 tablet (12.5 mg total) by mouth 2 (two) times daily as needed.    metformin (GLUCOPHAGE) 500 MG tablet TAKE ONE BY MOUTH TWICE A DAY WITH FOOD. (Patient taking differently: Take 1,000 mg by mouth 2 (two) times daily with meals. TAKE ONE BY MOUTH TWICE A DAY WITH FOOD.)    metoprolol succinate (TOPROL-XL) 100 MG 24 hr tablet TAKE ONE TABLET BY MOUTH TWICE DAILY    naproxen sodium (ANAPROX) 220 MG tablet Take 220 mg by mouth 2 (two) times daily with meals.    NOVOLOG FLEXPEN 100 unit/mL InPn pen Inject 5 units 3 times a day before meals    pantoprazole (PROTONIX) 40 MG tablet Take 1 tablet (40 mg total) by mouth once daily.    potassium chloride SA (K-DUR,KLOR-CON) 20 MEQ tablet TAKE ONE TABLET BY MOUTH EVERY DAY    saw palmetto fruit 450 mg Cap Take 1 capsule by mouth Twice daily.    TURMERIC, BULK, MISC by Misc.(Non-Drug; Combo Route) route.    ACCU-CHEK TOMAS PLUS TEST STRP Strp TEST BLOOD SUGAR SIX TIMES DAILY    ACCU-CHEK MULTICLIX LANCET lancets TEST BLOOD SUGAR THREE TIMES DAILY    NITROSTAT 0.4 mg SL tablet Place 1 tablet (0.4 mg total) under the tongue every 5 (five) minutes as needed for Chest pain.    SUPREP BOWEL PREP KIT 17.5-3.13-1.6 gram SolR Use as directed    SURE COMFORT PEN NEEDLE 32 gauge x 5/32" Ndle USE FOUR TIMES DAILY.     Family History     Problem Relation (Age of Onset)    Alcohol abuse Paternal Uncle    Arthritis Mother, Father    Cancer Sister, Daughter    Diabetes Mother, Father, Sister, Brother, Daughter, Son, Son    Fibromyalgia Daughter    Heart disease Mother, Sister, Brother    Kidney disease Mother, Brother    " Miscarriages / Stillbirths Daughter        Tobacco Use    Smoking status: Former Smoker     Packs/day: 2.00     Years: 13.00     Pack years: 26.00     Types: Cigarettes     Start date: 1954     Last attempt to quit: 1967     Years since quittin.2    Smokeless tobacco: Never Used   Substance and Sexual Activity    Alcohol use: No    Drug use: No    Sexual activity: No     Partners: Female     Birth control/protection: None     Review of Systems   Constitution: Negative.   HENT: Negative.    Eyes: Negative.    Cardiovascular: Positive for chest pain (now resolved).   Respiratory: Positive for shortness of breath (now resolved).    Endocrine: Negative.    Hematologic/Lymphatic: Negative.    Skin: Negative.    Musculoskeletal: Negative.    Gastrointestinal: Negative.    Genitourinary: Negative.    Neurological: Negative.    Psychiatric/Behavioral: Negative.    Allergic/Immunologic: Negative.      Objective:     Vital Signs (Most Recent):  Temp: 97.1 °F (36.2 °C) (18 07)  Pulse: 73 (18 0900)  Resp: 20 (18 07)  BP: (!) 140/67 (18 07)  SpO2: 98 % (18) Vital Signs (24h Range):  Temp:  [97.1 °F (36.2 °C)-98.2 °F (36.8 °C)] 97.1 °F (36.2 °C)  Pulse:  [73-79] 73  Resp:  [15-21] 20  SpO2:  [95 %-99 %] 98 %  BP: (117-183)/(60-90) 140/67     Weight: 84 kg (185 lb 3 oz)  Body mass index is 27.35 kg/m².    SpO2: 98 %  O2 Device (Oxygen Therapy): room air    No intake or output data in the 24 hours ending 18 1047    Lines/Drains/Airways     Peripheral Intravenous Line                 Peripheral IV - Single Lumen 18 Right Antecubital less than 1 day                Physical Exam   Constitutional: He is oriented to person, place, and time. He appears well-developed and well-nourished. No distress.   HENT:   Head: Normocephalic and atraumatic.   Eyes: Pupils are equal, round, and reactive to light. Right eye exhibits no discharge. Left eye exhibits no  discharge.   Neck: Neck supple. No JVD present. No thyromegaly present.   Cardiovascular: Normal rate, regular rhythm, S1 normal, S2 normal and normal heart sounds.   No murmur heard.  Pulmonary/Chest: Effort normal and breath sounds normal. No respiratory distress. He has no wheezes. He has no rales.   PPM site well-healed   Abdominal: Soft. He exhibits no distension. There is no tenderness. There is no rebound.   Musculoskeletal: He exhibits no edema.   Neurological: He is alert and oriented to person, place, and time.   Skin: Skin is warm and dry. He is not diaphoretic. No erythema.   Psychiatric: He has a normal mood and affect. His behavior is normal. Thought content normal.   Nursing note and vitals reviewed.      Significant Labs:   CMP   Recent Labs   Lab  08/17/18   1422  08/18/18   0356   NA  139  140   K  3.7  3.6   CL  100  103   CO2  30*  29   GLU  160*  133*   BUN  15  15   CREATININE  0.9  0.8   CALCIUM  10.7*  9.8   PROT  7.7   --    ALBUMIN  4.2   --    BILITOT  0.7   --    ALKPHOS  116   --    AST  24   --    ALT  13   --    ANIONGAP  9  8   ESTGFRAFRICA  >60  >60   EGFRNONAA  >60  >60   , CBC   Recent Labs   Lab  08/17/18   1422  08/18/18   0356   WBC  5.27  4.60   HGB  14.5  12.7*   HCT  42.6  37.8*   PLT  141*  132*   , Troponin   Recent Labs   Lab  08/17/18 2010 08/18/18   0152  08/18/18   0814   TROPONINI  0.010  0.016  0.008    and All pertinent lab results from the last 24 hours have been reviewed.    Significant Imaging: Echocardiogram:   2D echo with color flow doppler:   Results for orders placed or performed in visit on 03/14/17   2D echo with color flow doppler   Result Value Ref Range    EF 55 55 - 65    Mitral Valve Regurgitation MILD     Diastolic Dysfunction No     Aortic Valve Regurgitation MILD     Est. PA Systolic Pressure 35.95     Tricuspid Valve Regurgitation MILD TO MODERATE    , EKG: Reviewed and X-Ray: CXR: X-Ray Chest 1 View (CXR): No results found for this visit on  08/17/18. and X-Ray Chest PA and Lateral (CXR): No results found for this visit on 08/17/18.

## 2018-08-18 NOTE — CONSULTS
[facility]  Cardiology  Consult Note    Patient Name: Anthony Blair  MRN: [id number]  Admission Date: 8/17/2018  Hospital Length of Stay: 0 days  Code Status: Full Code   Attending Provider: Srinivas Ledezma MD   Consulting Provider: Chika Campo PA-C  Primary Care Physician: Abdulaziz Mccabe MD  Principal Problem:Chest pain    Patient information was obtained from patient, past medical records and ER records.     Inpatient consult to Cardiology  Consult performed by: Chika Campo PA-C  Consult ordered by: Ratna Schofield NP        Subjective:     Chief Complaint:  Chest pain    HPI:   Mr. Blair is an 85 year old male patient with a PMHx of PAF/PSVT, CAD (PTCA 1980's) s/p CABG in 7/10, HTN, PPM, DM atrial septal aneurysm/PFO, PAD, dyslipidemia, and hemorrhagic CVA who presented to [facility] ED yesterday with a chief complaint of intermittent left-sided mild chest discomfort that began on Thursday. Patient reported the symptoms waxed and waned but became associated with SOB on Friday, which prompted him to seek further evaluation and treatment. He denied any associated palpitations, near syncope, syncope, PND, or increased lower extremity edema. Initial workup in ED negative and patient subsequently admitted for further evaluation and treatment. Cardiology consulted to assist with management. Patient seen and examined today, resting in bed. Feels well, states discomfort has resolved. No complaints. States pain was non-exertional in nature and did not feel like angina. He reports compliance with his medications, off of ASA due to history of hemorrhagic CVA. Chart reviewed. Serial troponin negative. CXR showed NAF. EKG showed paced rhythm. 2D echo pending.    Past Medical History:   Diagnosis Date    A-fib     Anemia     AP (angina pectoris) 1/23/2014    Carotid artery disease without cerebral infarction 8/22/2014    Cataract     Coronary artery disease     GERD (gastroesophageal  reflux disease) 7/11/2013    Hemorrhagic cerebrovascular accident (CVA) 4/26/2018    Hyperlipidemia     Hypertension     Intracranial hemorrhage     Lumbosacral spondylosis 12/24/2014    Pacemaker 1/23/2014    Paroxysmal atrial fibrillation 1/23/2014    Peripheral vascular disease 8/22/2014    Type 2 diabetes mellitus with ophthalmic manifestations        Past Surgical History:   Procedure Laterality Date    ANGIOPLASTY      APPENDECTOMY      ATRIAL ABLATION SURGERY N/A     CATARACT EXTRACTION Bilateral     [facility]    CATARACT EXTRACTION W/ INTRAOCULAR LENS IMPLANT Right     CATARACT EXTRACTION W/ INTRAOCULAR LENS IMPLANT Left     CORONARY ARTERY BYPASS GRAFT  07/2010    x5    EYE SURGERY      pace maker surgrey  10/2010    reposition in 2011/2012 (approx)    VEIN BYPASS SURGERY         Review of patient's allergies indicates:   Allergen Reactions    Atorvastatin      Other reaction(s): Muscle pain  Other reaction(s): Muscle weakness  Other reaction(s): Muscle pain    Codeine      Other reaction(s): Headache  Other reaction(s): Headache    Eliquis [apixaban]     Norvasc [amlodipine] Edema    Trulicity [dulaglutide] Nausea And Vomiting    Adhesive Rash     Other reaction(s): rash       No current facility-administered medications on file prior to encounter.      Current Outpatient Medications on File Prior to Encounter   Medication Sig    CARBOXYMETHYLCELLULOSE SODIUM (REFRESH OPHT) Apply to eye.    coconut oil 1,000 mg Cap Take by mouth.    fluticasone (FLONASE) 50 mcg/actuation nasal spray 2 sprays by Each Nare route once daily.    furosemide (LASIX) 20 MG tablet TAKE ONE TABLET BY MOUTH DAILY    insulin glargine (LANTUS SOLOSTAR) 100 unit/mL (3 mL) InPn pen INJECT 16 UNITS SUB-Q AT BEDTIME    isosorbide mononitrate (IMDUR) 30 MG 24 hr tablet TAKE ONE TABLET BY MOUTH DAILY    levETIRAcetam (KEPPRA) 250 MG Tab Take 1 tablet (250 mg total) by mouth 2 (two) times daily.  "   losartan (COZAAR) 50 MG tablet Take 1 tablet (50 mg total) by mouth once daily.    meclizine (ANTIVERT) 12.5 mg tablet Take 1 tablet (12.5 mg total) by mouth 2 (two) times daily as needed.    metformin (GLUCOPHAGE) 500 MG tablet TAKE ONE BY MOUTH TWICE A DAY WITH FOOD. (Patient taking differently: Take 1,000 mg by mouth 2 (two) times daily with meals. TAKE ONE BY MOUTH TWICE A DAY WITH FOOD.)    metoprolol succinate (TOPROL-XL) 100 MG 24 hr tablet TAKE ONE TABLET BY MOUTH TWICE DAILY    naproxen sodium (ANAPROX) 220 MG tablet Take 220 mg by mouth 2 (two) times daily with meals.    NOVOLOG FLEXPEN 100 unit/mL InPn pen Inject 5 units 3 times a day before meals    pantoprazole (PROTONIX) 40 MG tablet Take 1 tablet (40 mg total) by mouth once daily.    potassium chloride SA (K-DUR,KLOR-CON) 20 MEQ tablet TAKE ONE TABLET BY MOUTH EVERY DAY    saw palmetto fruit 450 mg Cap Take 1 capsule by mouth Twice daily.    TURMERIC, BULK, MISC by Misc.(Non-Drug; Combo Route) route.    ACCU-CHEK TOMAS PLUS TEST STRP Strp TEST BLOOD SUGAR SIX TIMES DAILY    ACCU-CHEK MULTICLIX LANCET lancets TEST BLOOD SUGAR THREE TIMES DAILY    NITROSTAT 0.4 mg SL tablet Place 1 tablet (0.4 mg total) under the tongue every 5 (five) minutes as needed for Chest pain.    SUPREP BOWEL PREP KIT 17.5-3.13-1.6 gram SolR Use as directed    SURE COMFORT PEN NEEDLE 32 gauge x 5/32" Ndle USE FOUR TIMES DAILY.     Family History     Problem Relation (Age of Onset)    Alcohol abuse Paternal Uncle    Arthritis Mother, Father    Cancer Sister, Daughter    Diabetes Mother, Father, Sister, Brother, Daughter, Son, Son    Fibromyalgia Daughter    Heart disease Mother, Sister, Brother    Kidney disease Mother, Brother    Miscarriages / Stillbirths Daughter        Tobacco Use    Smoking status: Former Smoker     Packs/day: 2.00     Years: 13.00     Pack years: 26.00     Types: Cigarettes     Start date: 11/25/1954     Last attempt to quit: 6/7/1967     " Years since quittin.2    Smokeless tobacco: Never Used   Substance and Sexual Activity    Alcohol use: No    Drug use: No    Sexual activity: No     Partners: Female     Birth control/protection: None     Review of Systems   Constitution: Negative.   HENT: Negative.    Eyes: Negative.    Cardiovascular: Positive for chest pain (now resolved).   Respiratory: Positive for shortness of breath (now resolved).    Endocrine: Negative.    Hematologic/Lymphatic: Negative.    Skin: Negative.    Musculoskeletal: Negative.    Gastrointestinal: Negative.    Genitourinary: Negative.    Neurological: Negative.    Psychiatric/Behavioral: Negative.    Allergic/Immunologic: Negative.      Objective:     Vital Signs (Most Recent):  Temp: 97.1 °F (36.2 °C) (18 07)  Pulse: 73 (18 0900)  Resp: 20 (18 07)  BP: (!) 140/67 (18)  SpO2: 98 % (18) Vital Signs (24h Range):  Temp:  [97.1 °F (36.2 °C)-98.2 °F (36.8 °C)] 97.1 °F (36.2 °C)  Pulse:  [73-79] 73  Resp:  [15-21] 20  SpO2:  [95 %-99 %] 98 %  BP: (117-183)/(60-90) 140/67     Weight: 84 kg (185 lb 3 oz)  Body mass index is 27.35 kg/m².    SpO2: 98 %  O2 Device (Oxygen Therapy): room air    No intake or output data in the 24 hours ending 18 1047    Lines/Drains/Airways     Peripheral Intravenous Line                 Peripheral IV - Single Lumen 18 Right Antecubital less than 1 day                Physical Exam   Constitutional: He is oriented to person, place, and time. He appears well-developed and well-nourished. No distress.   HENT:   Head: Normocephalic and atraumatic.   Eyes: Pupils are equal, round, and reactive to light. Right eye exhibits no discharge. Left eye exhibits no discharge.   Neck: Neck supple. No JVD present. No thyromegaly present.   Cardiovascular: Normal rate, regular rhythm, S1 normal, S2 normal and normal heart sounds.   No murmur heard.  Pulmonary/Chest: Effort normal and breath sounds normal. No  respiratory distress. He has no wheezes. He has no rales.   PPM site well-healed   Abdominal: Soft. He exhibits no distension. There is no tenderness. There is no rebound.   Musculoskeletal: He exhibits no edema.   Neurological: He is alert and oriented to person, place, and time.   Skin: Skin is warm and dry. He is not diaphoretic. No erythema.   Psychiatric: He has a normal mood and affect. His behavior is normal. Thought content normal.   Nursing note and vitals reviewed.      Significant Labs:   CMP   Recent Labs   Lab  08/17/18   1422  08/18/18   0356   NA  139  140   K  3.7  3.6   CL  100  103   CO2  30*  29   GLU  160*  133*   BUN  15  15   CREATININE  0.9  0.8   CALCIUM  10.7*  9.8   PROT  7.7   --    ALBUMIN  4.2   --    BILITOT  0.7   --    ALKPHOS  116   --    AST  24   --    ALT  13   --    ANIONGAP  9  8   ESTGFRAFRICA  >60  >60   EGFRNONAA  >60  >60   , CBC   Recent Labs   Lab  08/17/18   1422  08/18/18   0356   WBC  5.27  4.60   HGB  14.5  12.7*   HCT  42.6  37.8*   PLT  141*  132*   , Troponin   Recent Labs   Lab  08/17/18 2010 08/18/18   0152  08/18/18   0814   TROPONINI  0.010  0.016  0.008    and All pertinent lab results from the last 24 hours have been reviewed.    Significant Imaging: Echocardiogram:   2D echo with color flow doppler:   Results for orders placed or performed in visit on 03/14/17   2D echo with color flow doppler   Result Value Ref Range    EF 55 55 - 65    Mitral Valve Regurgitation MILD     Diastolic Dysfunction No     Aortic Valve Regurgitation MILD     Est. PA Systolic Pressure 35.95     Tricuspid Valve Regurgitation MILD TO MODERATE    , EKG: Reviewed and X-Ray: CXR: X-Ray Chest 1 View (CXR): No results found for this visit on 08/17/18. and X-Ray Chest PA and Lateral (CXR): No results found for this visit on 08/17/18.    Assessment and Plan:   Patient with CAD history who presents with atypical chest pain that has since resolved. Workup negative. If EF normal and he can  ambulate without symptoms, ok to d/c home with stress test as OP.    * Chest pain    -Patient with history of CAD who presents with atypical chest pain that has since resolved  -Serial troponin negative and EKG without acute ischemic changes  -Continue ARB, BB  -Intolerant to statins  -No ASA due to history of hemorrhagic CVA  -If EF normal on echo and patient able to ambulate without symptoms, ok to d/c home with follow-up in clinic next week with MPI stress test        Hemorrhagic cerebrovascular accident (CVA)    -Not on any antiplatelet therapy due to history of hemorrhagic CVA  -Followed by Dr. Lucero        Essential hypertension    -Continue Toprol, ARB            VTE Risk Mitigation (From admission, onward)        Ordered     IP VTE HIGH RISK PATIENT  Once      08/17/18 1706     Place sequential compression device  Until discontinued      08/17/18 1706     IP VTE HIGH RISK PATIENT  Once      08/17/18 1706          Thank you for your consult. I will follow-up with patient. Please contact us if you have any additional questions.    Chika Campo PA-C  Cardiology   [facility]    Chart reviewed. Dr. Aguilera examined patient and agrees with plan as outlined above.

## 2018-08-18 NOTE — ED NOTES
Received report from Siri SHIPLEY. Pt. Resting in strecher, eyes closed, respirations even, call light in reach, bed in lowest position and visitor at bed side. No request at this time.

## 2018-08-18 NOTE — HOSPITAL COURSE
Patient admitted for chest pain. Serial troponin's negative. ED echo showed normal EF. Continue BB and ARB. No ASA due to history of hemorrhagic CVA. Case discussed with cardiology, ok to discharge from cardiology consult. Patient to follow-up with cardiology nest week  for outpatient stress test. Symptoms resolved. Patient also to follow-up with PCP in 3 days for hospital follow-up. Patient seen and examined on the date of discharge and found suitable for discharge.

## 2018-08-18 NOTE — HPI
Mr. Blair is an 85 year old male patient with a PMHx of PAF/PSVT, CAD (PTCA 1980's) s/p CABG in 7/10, HTN, PPM, DM atrial septal aneurysm/PFO, PAD, dyslipidemia, and hemorrhagic CVA who presented to [facility] ED yesterday with a chief complaint of intermittent left-sided mild chest discomfort that began on Thursday. Patient reported the symptoms waxed and waned but became associated with SOB on Friday, which prompted him to seek further evaluation and treatment. He denied any associated palpitations, near syncope, syncope, PND, or increased lower extremity edema. Initial workup in ED negative and patient subsequently admitted for further evaluation and treatment. Cardiology consulted to assist with management. Patient seen and examined today, resting in bed. Feels well, states discomfort has resolved. No complaints. States pain was non-exertional in nature and did not feel like angina. He reports compliance with his medications, off of ASA due to history of hemorrhagic CVA. Chart reviewed. Serial troponin negative. CXR showed NAF. EKG showed paced rhythm. 2D echo pending.    nclude PAF/PSVT, CAD (PTCA 1980's), HTN, PPM, DM, atrial septal aneurysm/pfo, PAD, dyslipidemia. Nonsmoker.  Past details pertinent for following:   Statin intolerant.  s/p CABG 7/10 (LIMA-LAD, SVG-OM1, SVG-OM3, SVG-PDA) for increasing OLIVARES and multivessel CAD on [facility].   S/p PPM 10/10 for SSS/PAF. Was hospitalized 1/11 for PSVT (Atrial tachycardia) and was started on Amiodarone but was stopped for GI discomfort. He also did not tolerate Multaq and has not tolerated multiple statins.   s/p EPS/ablation of his atrial tachycardia 6/11.   S/p abd w runoff March 2015 and had significant B LE infrapopliteal disease. Atherectomy/pta performed of critical R tibeoperoneal trunk stenosis. Also has L tibeoperoneal trunk stenosis and his PTA/MIGUEL are occluded bilaterally.  Started on Eliquis Feb 2017 for suspicious left internal jugular vein thrombus.  Stress  MPI 3/17 showed no ischemia.  Echo 3/17 showed normal LV function, left-right atrial shunt.   Carotid u/s 6/17 showed mild bilateral disease, known L IJ thrombus noted.  Pt called clinic Sept 2017 stated he had stopped Eliquis couple weeks before his call due to diarrhea, weakness, bloating and also off Amlodipine due to sleepiness.  He stated sxs subsided when he stopped the meds.   He was advised by cardiology on 9/6/17 to take 81 mg ASA since he stopped his Eliquis.  He had acute hemorrhage of basal ganglia right side Sept 2017, causing weakness on left side.  Tx at [facility] 9/24/17. He was on ASA daily when CVA occurred and was not on Eliquis. 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  He was taken off his asa.  Followed by Dr. Weaver, neurosurgery. No surgery was needed.  Echo showed normal LVEF.  Now here.  BP has been running high.  PCP added Valsartan 40 mg daily.  He read side effects of Valsartan and was concerned about it.  BP improving and is good now.  He states his recovery from CVA has gone well and seems almost back to normal.  DM controlled, although HGAIC slightly above goal 7.1%  Lipids above goal, but stable.    Compliant with meds.  CAD is stable.  No active anginal sxs on current medical tx.  No active CHF sx on current medical tx.  PAD is stable.  No worsening claudication sxs.    No recurrent CVA since last visit.  He plans to see Dr. Lucero, Neurology, about hearing changes since CVA and his left arm goes to sleep at times.

## 2018-08-18 NOTE — PROGRESS NOTES
Patient arrived to room via stretcher from ED accompanied by Maribell SHIPLEY. Transferred to bed 224. Patient is aaox4 with no distress noted.  Assessment as charted. Patient has been orientated to room, call light, fall precautions, rounding sheet, and board. Heart monitor in place, vital signs taken, no questions or concerns at this time.

## 2018-08-20 ENCOUNTER — TELEPHONE (OUTPATIENT)
Dept: CARDIOLOGY | Facility: CLINIC | Age: 83
End: 2018-08-20

## 2018-08-20 DIAGNOSIS — I25.810 CORONARY ARTERY DISEASE INVOLVING CORONARY BYPASS GRAFT OF NATIVE HEART WITHOUT ANGINA PECTORIS: Primary | ICD-10-CM

## 2018-08-20 NOTE — TELEPHONE ENCOUNTER
Left voicemail message appointment with Mattie RASMUSSEN on Wednesday for 11AM    ----- Message from Olesya Goins sent at 8/20/2018 11:57 AM CDT -----  Contact: Daughter, Liat  Calling for an appt for a hospital follow up for chest pains.  Appt desk unable to make an appt. Please call Liat [phone]

## 2018-08-21 ENCOUNTER — OFFICE VISIT (OUTPATIENT)
Dept: FAMILY MEDICINE | Facility: CLINIC | Age: 83
End: 2018-08-21
Payer: MEDICARE

## 2018-08-21 VITALS
OXYGEN SATURATION: 98 % | TEMPERATURE: 98 F | DIASTOLIC BLOOD PRESSURE: 86 MMHG | HEIGHT: 69 IN | SYSTOLIC BLOOD PRESSURE: 150 MMHG | BODY MASS INDEX: 27.49 KG/M2 | HEART RATE: 78 BPM | WEIGHT: 185.63 LBS

## 2018-08-21 DIAGNOSIS — R07.9 CHEST PAIN, UNSPECIFIED TYPE: Primary | ICD-10-CM

## 2018-08-21 DIAGNOSIS — I10 HYPERTENSION NOT AT GOAL: ICD-10-CM

## 2018-08-21 PROCEDURE — 99999 PR PBB SHADOW E&M-EST. PATIENT-LVL III: CPT | Mod: PBBFAC,,, | Performed by: FAMILY MEDICINE

## 2018-08-21 PROCEDURE — 3077F SYST BP >= 140 MM HG: CPT | Mod: CPTII,S$GLB,, | Performed by: FAMILY MEDICINE

## 2018-08-21 PROCEDURE — 99214 OFFICE O/P EST MOD 30 MIN: CPT | Mod: S$GLB,,, | Performed by: FAMILY MEDICINE

## 2018-08-21 PROCEDURE — 3079F DIAST BP 80-89 MM HG: CPT | Mod: CPTII,S$GLB,, | Performed by: FAMILY MEDICINE

## 2018-08-21 RX ORDER — LOSARTAN POTASSIUM 100 MG/1
100 TABLET ORAL DAILY
Qty: 90 TABLET | Refills: 0 | Status: SHIPPED | OUTPATIENT
Start: 2018-08-21 | End: 2018-12-10 | Stop reason: SDUPTHER

## 2018-08-21 NOTE — PROGRESS NOTES
Chief Complaint:    Chief Complaint   Patient presents with    Follow-up     hospital       History of Present Illness:    Patient presents today status post hospital discharge she was placed in observation when he presented with shortness of breath and chest pain.  His workup was unrevealing cardiac enzymes are normal EKG chest x-ray did not show any acute changes patient has coronary artery disease.  He says he still gets chest pain not necessarily related to physical exertion.  He has an appointment tomorrow with Cardiology he was told that they will probably be scheduling a stress test for him.  He has nitroglycerin with him that he forgets to take I have advised him to start taking it more often  His home blood pressure readings are elevated on recheck his blood pressure continues to be high.    ROS:  Review of Systems   Constitutional: Negative for activity change, chills, fatigue, fever and unexpected weight change.   HENT: Negative for congestion, ear discharge, ear pain, hearing loss, postnasal drip and rhinorrhea.    Eyes: Negative for pain and visual disturbance.   Respiratory: Negative for cough, chest tightness and shortness of breath.    Cardiovascular: Positive for chest pain. Negative for palpitations.   Gastrointestinal: Negative for abdominal pain, diarrhea and vomiting.   Endocrine: Negative for heat intolerance.   Genitourinary: Negative for dysuria, flank pain, frequency and hematuria.   Musculoskeletal: Negative for back pain, gait problem and neck pain.   Skin: Negative for color change and rash.   Neurological: Negative for dizziness, tremors, seizures, numbness and headaches.   Psychiatric/Behavioral: Negative for agitation, hallucinations, self-injury, sleep disturbance and suicidal ideas. The patient is not nervous/anxious.        Past Medical History:   Diagnosis Date    A-fib     Anemia     AP (angina pectoris) 1/23/2014    Carotid artery disease without cerebral infarction  2014    Cataract     Coronary artery disease     GERD (gastroesophageal reflux disease) 2013    Hemorrhagic cerebrovascular accident (CVA) 2018    Hyperlipidemia     Hypertension     Intracranial hemorrhage     Lumbosacral spondylosis 2014    Pacemaker 2014    Paroxysmal atrial fibrillation 2014    Peripheral vascular disease 2014    Type 2 diabetes mellitus with ophthalmic manifestations        Social History:  Social History     Socioeconomic History    Marital status:      Spouse name: None    Number of children: None    Years of education: None    Highest education level: None   Social Needs    Financial resource strain: None    Food insecurity - worry: None    Food insecurity - inability: None    Transportation needs - medical: None    Transportation needs - non-medical: None   Occupational History    None   Tobacco Use    Smoking status: Former Smoker     Packs/day: 2.00     Years: 13.00     Pack years: 26.00     Types: Cigarettes     Start date: 1954     Last attempt to quit: 1967     Years since quittin.2    Smokeless tobacco: Never Used   Substance and Sexual Activity    Alcohol use: No    Drug use: No    Sexual activity: No     Partners: Female     Birth control/protection: None   Other Topics Concern    None   Social History Narrative    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       Family History:   family history includes Alcohol abuse in his paternal uncle; Arthritis in his father and mother; Cancer in his daughter and sister; Diabetes in his brother, daughter, father, mother, sister, son, and son; Fibromyalgia in his daughter; Heart disease in his brother, mother, and sister; Kidney disease in his brother and mother; Miscarriages / Stillbirths in his daughter.    Health Maintenance   Topic Date Due    Influenza Vaccine  2018    Hemoglobin A1c  09/15/2018    Foot Exam  2018     "Eye Exam  06/25/2019    Lipid Panel  08/17/2019    TETANUS VACCINE  01/23/2024    Zoster Vaccine  Completed    Pneumococcal (65+)  Completed       Physical Exam:    Vital Signs  Temp: 97.7 °F (36.5 °C)  Temp src: Oral  Pulse: 78  SpO2: 98 %  BP: (!) 150/86  BP Location: Left arm  Patient Position: Sitting  Pain Score: 0-No pain  Height and Weight  Height: 5' 9" (175.3 cm)  Weight: 84.2 kg (185 lb 10 oz)  BSA (Calculated - sq m): 2.02 sq meters  BMI (Calculated): 27.5  Weight in (lb) to have BMI = 25: 168.9]    Body mass index is 27.41 kg/m².    Physical Exam   Constitutional: He is oriented to person, place, and time. He appears well-developed.   HENT:   Mouth/Throat: Oropharynx is clear and moist.   Eyes: Conjunctivae are normal. Pupils are equal, round, and reactive to light.   Neck: Normal range of motion. Neck supple.   Cardiovascular: Normal rate, regular rhythm and normal heart sounds.   No murmur heard.  Pulmonary/Chest: Effort normal and breath sounds normal. No respiratory distress. He has no wheezes. He has no rales. He exhibits no tenderness.   Abdominal: Soft. He exhibits no distension and no mass. There is no tenderness. There is no guarding.   Musculoskeletal: He exhibits no edema or tenderness.   Lymphadenopathy:     He has no cervical adenopathy.   Neurological: He is alert and oriented to person, place, and time. He has normal reflexes.   Skin: Skin is warm and dry.   Psychiatric: He has a normal mood and affect. His behavior is normal. Judgment and thought content normal.         Assessment:      ICD-10-CM ICD-9-CM   1. Chest pain, unspecified type R07.9 786.50   2. Hypertension not at goal I10 401.9         Plan:    Continue current medications and please take nitroglycerin for another episode of chest pain and keep appointment with Cardiology tomorrow and try to do the stress test a sap he will probably need a heart catheterization as well with significant coronary artery disease  Increase " losartan to 100 mg keep monitoring blood pressure readings and follow up in 2 weeks.      No orders of the defined types were placed in this encounter.      Current Outpatient Medications   Medication Sig Dispense Refill    ACCU-CHEK TOMAS PLUS TEST STRP Strp TEST BLOOD SUGAR SIX TIMES DAILY 300 strip 5    ACCU-CHEK MULTICLIX LANCET lancets TEST BLOOD SUGAR THREE TIMES DAILY 200 each 5    CARBOXYMETHYLCELLULOSE SODIUM (REFRESH OPHT) Apply to eye.      coconut oil 1,000 mg Cap Take by mouth.      fluticasone (FLONASE) 50 mcg/actuation nasal spray 2 sprays by Each Nare route once daily. 16 g 11    furosemide (LASIX) 20 MG tablet TAKE ONE TABLET BY MOUTH DAILY 30 tablet 12    insulin glargine (LANTUS SOLOSTAR) 100 unit/mL (3 mL) InPn pen INJECT 16 UNITS SUB-Q AT BEDTIME 15 mL 11    isosorbide mononitrate (IMDUR) 30 MG 24 hr tablet TAKE ONE TABLET BY MOUTH DAILY 30 tablet 12    levETIRAcetam (KEPPRA) 250 MG Tab Take 1 tablet (250 mg total) by mouth 2 (two) times daily. 60 tablet 11    losartan (COZAAR) 100 MG tablet Take 1 tablet (100 mg total) by mouth once daily. 90 tablet 0    meclizine (ANTIVERT) 12.5 mg tablet Take 1 tablet (12.5 mg total) by mouth 2 (two) times daily as needed. 20 tablet 1    metformin (GLUCOPHAGE) 500 MG tablet TAKE ONE BY MOUTH TWICE A DAY WITH FOOD. (Patient taking differently: Take 1,000 mg by mouth 2 (two) times daily with meals. TAKE ONE BY MOUTH TWICE A DAY WITH FOOD.) 60 tablet 6    metoprolol succinate (TOPROL-XL) 100 MG 24 hr tablet TAKE ONE TABLET BY MOUTH TWICE DAILY 60 tablet 12    naproxen sodium (ANAPROX) 220 MG tablet Take 220 mg by mouth 2 (two) times daily with meals.      NOVOLOG FLEXPEN 100 unit/mL InPn pen Inject 5 units 3 times a day before meals 15 mL 3    pantoprazole (PROTONIX) 40 MG tablet Take 1 tablet (40 mg total) by mouth once daily. 30 tablet 5    potassium chloride SA (K-DUR,KLOR-CON) 20 MEQ tablet TAKE ONE TABLET BY MOUTH EVERY DAY 30 tablet 12     "saw palmetto fruit 450 mg Cap Take 1 capsule by mouth Twice daily.      SUPREP BOWEL PREP KIT 17.5-3.13-1.6 gram SolR Use as directed 354 mL 0    SURE COMFORT PEN NEEDLE 32 gauge x 5/32" Ndle USE FOUR TIMES DAILY. 100 each 12    TURMERIC, BULK, MISC by Misc.(Non-Drug; Combo Route) route.      NITROSTAT 0.4 mg SL tablet Place 1 tablet (0.4 mg total) under the tongue every 5 (five) minutes as needed for Chest pain. 25 tablet 12     No current facility-administered medications for this visit.        Medications Discontinued During This Encounter   Medication Reason    losartan (COZAAR) 50 MG tablet        No Follow-up on file.      Abdulaziz Mccabe MD              "

## 2018-08-22 ENCOUNTER — OFFICE VISIT (OUTPATIENT)
Dept: CARDIOLOGY | Facility: CLINIC | Age: 83
End: 2018-08-22
Payer: MEDICARE

## 2018-08-22 VITALS
HEART RATE: 75 BPM | DIASTOLIC BLOOD PRESSURE: 82 MMHG | BODY MASS INDEX: 27.13 KG/M2 | HEIGHT: 69 IN | SYSTOLIC BLOOD PRESSURE: 144 MMHG | WEIGHT: 183.19 LBS

## 2018-08-22 DIAGNOSIS — E08.51 DIABETES MELLITUS DUE TO UNDERLYING CONDITION WITH DIABETIC PERIPHERAL ANGIOPATHY WITHOUT GANGRENE, WITHOUT LONG-TERM CURRENT USE OF INSULIN: ICD-10-CM

## 2018-08-22 DIAGNOSIS — E13.51 PERIPHERAL VASCULAR DISEASE DUE TO SECONDARY DIABETES MELLITUS: ICD-10-CM

## 2018-08-22 DIAGNOSIS — Z78.9 STATIN INTOLERANCE: ICD-10-CM

## 2018-08-22 DIAGNOSIS — Z95.0 PACEMAKER: Chronic | ICD-10-CM

## 2018-08-22 DIAGNOSIS — I25.810 CORONARY ARTERY DISEASE INVOLVING CORONARY BYPASS GRAFT OF NATIVE HEART WITHOUT ANGINA PECTORIS: ICD-10-CM

## 2018-08-22 DIAGNOSIS — I61.9 HEMORRHAGIC CEREBROVASCULAR ACCIDENT (CVA): ICD-10-CM

## 2018-08-22 DIAGNOSIS — I71.21 ANEURYSM OF ASCENDING AORTA: ICD-10-CM

## 2018-08-22 DIAGNOSIS — I10 ESSENTIAL HYPERTENSION: Primary | ICD-10-CM

## 2018-08-22 DIAGNOSIS — I73.9 CLAUDICATION IN PERIPHERAL VASCULAR DISEASE: Chronic | ICD-10-CM

## 2018-08-22 DIAGNOSIS — I77.9 CAROTID ARTERY DISEASE WITHOUT CEREBRAL INFARCTION: Chronic | ICD-10-CM

## 2018-08-22 DIAGNOSIS — E11.9 DIABETES MELLITUS TYPE 2 WITHOUT RETINOPATHY: Chronic | ICD-10-CM

## 2018-08-22 DIAGNOSIS — I20.9 ANGINA PECTORIS: ICD-10-CM

## 2018-08-22 DIAGNOSIS — I65.23 ASYMPTOMATIC STENOSIS OF BOTH CAROTID ARTERIES WITHOUT INFARCTION: ICD-10-CM

## 2018-08-22 DIAGNOSIS — I36.1 NON-RHEUMATIC TRICUSPID VALVE INSUFFICIENCY: ICD-10-CM

## 2018-08-22 DIAGNOSIS — E78.2 MIXED HYPERLIPIDEMIA: Chronic | ICD-10-CM

## 2018-08-22 DIAGNOSIS — I73.9 PERIPHERAL VASCULAR DISEASE: Chronic | ICD-10-CM

## 2018-08-22 PROCEDURE — 93000 ELECTROCARDIOGRAM COMPLETE: CPT | Mod: S$GLB,,, | Performed by: INTERNAL MEDICINE

## 2018-08-22 PROCEDURE — 99499 UNLISTED E&M SERVICE: CPT | Mod: HCNC,S$GLB,, | Performed by: NURSE PRACTITIONER

## 2018-08-22 PROCEDURE — 99999 PR PBB SHADOW E&M-EST. PATIENT-LVL III: CPT | Mod: PBBFAC,,, | Performed by: NURSE PRACTITIONER

## 2018-08-22 PROCEDURE — 3077F SYST BP >= 140 MM HG: CPT | Mod: CPTII,S$GLB,, | Performed by: NURSE PRACTITIONER

## 2018-08-22 PROCEDURE — 3079F DIAST BP 80-89 MM HG: CPT | Mod: CPTII,S$GLB,, | Performed by: NURSE PRACTITIONER

## 2018-08-22 PROCEDURE — 99213 OFFICE O/P EST LOW 20 MIN: CPT | Mod: S$GLB,,, | Performed by: NURSE PRACTITIONER

## 2018-08-22 NOTE — PROGRESS NOTES
Subjective:    Patient ID:  Anthony Blair is a 85 y.o. male who presents for follow-up of Chest Pain and Hospital Follow Up      HPI  Mr. Blair is an 85 year old male with a PMHx of CAD s/p CABG (2010), DM, angina, GERD, PVD, HTN, HLP, S/p PPM who presents to clinic for hospital follow up. He was recently admitted to [facility] due to chest pain, serial troponin negative, 2D ECHO normal LVEF and discharged home. He returns today and states that he is doing well. He did have another episode of chest pain last PM while sitting on sofa which lasted a few moments but stopped after 1 NTG. Denies any radiation of chest pain last PM. No chest pain or anginal equivalents today in office. Denies SOB, OLIVARES, palpitations. No leg swelling. Reports that chronic leg discomfort restricts ambulation. Denies syncope or near syncopal events. No signs of decompensated HF on exam today. Compliant with medications and diet. He is not on ASA or OAC therapy due to hemorrhagic CVA.   Review of Systems   Constitution: Negative for weakness.   HENT: Negative for hearing loss and hoarse voice.    Eyes: Negative for visual disturbance.   Cardiovascular: Positive for chest pain (intermittent) and claudication. Negative for dyspnea on exertion, irregular heartbeat, leg swelling, near-syncope, orthopnea, palpitations, paroxysmal nocturnal dyspnea and syncope.   Respiratory: Negative for cough, hemoptysis, shortness of breath, sleep disturbances due to breathing, snoring and wheezing.    Endocrine: Negative for cold intolerance and heat intolerance.   Hematologic/Lymphatic: Does not bruise/bleed easily.   Skin: Negative for color change, dry skin and nail changes.   Musculoskeletal: Positive for arthritis and back pain. Negative for joint pain and myalgias.   Gastrointestinal: Negative for bloating, abdominal pain, constipation, nausea and vomiting.   Genitourinary: Negative for dysuria, flank pain, hematuria and hesitancy.   Neurological: Negative  "for headaches, light-headedness, loss of balance, numbness and paresthesias.   Psychiatric/Behavioral: Negative for altered mental status.   Allergic/Immunologic: Negative for environmental allergies.   BP (!) 144/82   Pulse 75   Ht 5' 9" (1.753 m)   Wt 83.1 kg (183 lb 3.2 oz)   BMI 27.05 kg/m²        Objective:    Physical Exam   Constitutional: He is oriented to person, place, and time. He appears well-developed and well-nourished. No distress.   HENT:   Head: Normocephalic and atraumatic.   Eyes: Pupils are equal, round, and reactive to light.   Neck: Normal range of motion and full passive range of motion without pain. Neck supple. No JVD present.   Cardiovascular: Normal rate, regular rhythm, S1 normal, S2 normal and intact distal pulses. PMI is not displaced. Exam reveals no distant heart sounds.   No murmur heard.  Pulses:       Radial pulses are 2+ on the right side, and 2+ on the left side.        Dorsalis pedis pulses are 2+ on the right side, and 2+ on the left side.   PPM site well healed.    Pulmonary/Chest: Effort normal and breath sounds normal. No accessory muscle usage. No respiratory distress. He has no decreased breath sounds. He has no wheezes.   Abdominal: Soft. Bowel sounds are normal. He exhibits no distension. There is no tenderness.   Musculoskeletal: Normal range of motion. He exhibits no edema.        Right ankle: He exhibits no swelling.        Left ankle: He exhibits no swelling.   Neurological: He is alert and oriented to person, place, and time.   Skin: Skin is warm and dry. He is not diaphoretic. No cyanosis. Nails show no clubbing.   Psychiatric: He has a normal mood and affect. His speech is normal and behavior is normal. Judgment and thought content normal. Cognition and memory are normal.   Nursing note and vitals reviewed.    2D Echo CONCLUSIONS     1 - Biatrial enlargement.     2 - Concentric remodeling.     3 - No wall motion abnormalities.     4 - Normal left ventricular " systolic function (EF 55-60%).     5 - Normal left ventricular diastolic function.     6 - Normal right ventricular systolic function .     7 - The estimated PA systolic pressure is 33 mmHg.     8 - Trivial mitral regurgitation.     9 - Trivial tricuspid regurgitation.             This document has been electronically    SIGNED BY: Kyle Aguilera MD On: 08/18/2018 10:49      Chemistry        Component Value Date/Time     08/18/2018 0356    K 3.6 08/18/2018 0356     08/18/2018 0356    CO2 29 08/18/2018 0356    BUN 15 08/18/2018 0356    CREATININE 0.8 08/18/2018 0356     (H) 08/18/2018 0356        Component Value Date/Time    CALCIUM 9.8 08/18/2018 0356    ALKPHOS 116 08/17/2018 1422    AST 24 08/17/2018 1422    ALT 13 08/17/2018 1422    BILITOT 0.7 08/17/2018 1422    ESTGFRAFRICA >60 08/18/2018 0356    EGFRNONAA >60 08/18/2018 0356        Lab Results   Component Value Date    CHOL 179 08/17/2018    CHOL 224 (H) 03/15/2018    CHOL 190 11/22/2017     Lab Results   Component Value Date    HDL 40 08/17/2018    HDL 53 03/15/2018    HDL 50 11/22/2017     Lab Results   Component Value Date    LDLCALC 100.6 08/17/2018    LDLCALC 136.0 03/15/2018    LDLCALC 119.8 11/22/2017     Lab Results   Component Value Date    TRIG 192 (H) 08/17/2018    TRIG 175 (H) 03/15/2018    TRIG 101 11/22/2017     Lab Results   Component Value Date    CHOLHDL 22.3 08/17/2018    CHOLHDL 23.7 03/15/2018    CHOLHDL 26.3 11/22/2017     Lab Results   Component Value Date    HGBA1C 7.1 (H) 03/15/2018         Assessment:       1. Essential hypertension    2. Non-rheumatic tricuspid valve insufficiency    3. Asymptomatic stenosis of both carotid arteries without infarction    4. Aneurysm of ascending aorta    5. Coronary artery disease involving coronary bypass graft of native heart without angina pectoris    6. Claudication in peripheral vascular disease    7. Carotid artery disease without cerebral infarction    8. Peripheral vascular  disease    9. Mixed hyperlipidemia    10. Pacemaker    11. Statin intolerance    12. Diabetes mellitus type 2 without retinopathy    13. Peripheral vascular disease due to secondary diabetes mellitus    14. Diabetes mellitus due to underlying condition with diabetic peripheral angiopathy without gangrene, without long-term current use of insulin    15. Angina pectoris       Doing clinically well today.  Denies chest pain or anginal equivalents today.   Lipids not at goal today but intolerant to statins.   No ASA due to hemorrhagic CVA in the past.  No CNS complaints to suggest TIA or CVA today.  No signs of abnormal bleeding.   Plan:   MPI stress test soon with phone review  Follow up with me in 4 weeks to review test results.     Heart Healthy Diet  Increase physical activity as tolerated  Fluid restriction of 1-2 liters/day  Continue same meds for now  Most recent A1C 7.1, attempt to reach A1C of 6.5  Losartan increased to 100 mg daily per PCP

## 2018-08-23 DIAGNOSIS — I20.9 AP (ANGINA PECTORIS): Primary | ICD-10-CM

## 2018-08-28 ENCOUNTER — HOSPITAL ENCOUNTER (EMERGENCY)
Facility: HOSPITAL | Age: 83
Discharge: HOME | End: 2018-08-28
Attending: EMERGENCY MEDICINE
Payer: MEDICARE

## 2018-08-28 VITALS
HEIGHT: 69 IN | DIASTOLIC BLOOD PRESSURE: 81 MMHG | WEIGHT: 189 LBS | TEMPERATURE: 99 F | BODY MASS INDEX: 27.99 KG/M2 | OXYGEN SATURATION: 97 % | RESPIRATION RATE: 18 BRPM | HEART RATE: 74 BPM | SYSTOLIC BLOOD PRESSURE: 148 MMHG

## 2018-08-28 DIAGNOSIS — S09.90XA TRAUMATIC INJURY OF HEAD, INITIAL ENCOUNTER: ICD-10-CM

## 2018-08-28 DIAGNOSIS — M25.511 RIGHT SHOULDER PAIN: ICD-10-CM

## 2018-08-28 DIAGNOSIS — S40.211A ABRASION OF RIGHT SHOULDER, INITIAL ENCOUNTER: ICD-10-CM
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
## 2018-08-28 PROCEDURE — 25000003 PHARM REV CODE 250: Performed by: EMERGENCY MEDICINE

## 2018-08-28 PROCEDURE — 99284 EMERGENCY DEPT VISIT MOD MDM: CPT | Mod: 25

## 2018-08-28 RX ORDER — ACETAMINOPHEN 325 MG/1
650 TABLET ORAL
Status: COMPLETED | OUTPATIENT
Start: 2018-08-28 | End: 2018-08-28

## 2018-08-28 RX ADMIN — ACETAMINOPHEN 650 MG: 325 TABLET ORAL at 03:08

## 2018-08-28 NOTE — ED PROVIDER NOTES
SCRIBE #1 NOTE: IStephanie, am scribing for, and in the presence of, Emelyn Macias MD. I have scribed the HPI, ROS, PEx.    SCRIBE #2 NOTE: I, Corinne Mack, am scribing for, and in the presence of,  Emelyn Macias MD. I have scribed the remaining portions of the note not scribed by Scribe #1.     History      Chief Complaint   Patient presents with    Fall     walking outside and fell. reports hitting head with no LOC. complaining of pain to bilateral wrists and right shoulder. denies headache. reports feeling light headed       Review of patient's allergies indicates:   Allergen Reactions    Atorvastatin      Other reaction(s): Muscle pain  Other reaction(s): Muscle weakness  Other reaction(s): Muscle pain    Codeine      Other reaction(s): Headache  Other reaction(s): Headache    Eliquis [apixaban]     Norvasc [amlodipine] Edema    Trulicity [dulaglutide] Nausea And Vomiting    Adhesive Rash     Other reaction(s): rash        HPI   HPI    8/28/2018, 2:41 PM   History obtained from the patient      History of Present Illness: Anthony Blair is a 85 y.o. male patient with HTN, paroxysmal A-fib, angina, CAD, DM who presents to the Emergency Department for evaluation after falling outside [facility]. Pt reports that he tripped and fell and that he caught himself on bilateral hands. He was having bilateral wrist pain but is not having wrist pain anymore. Pt reports that he hit his head; he denies any LOC and is not on blood thinners. He c/o R shoulder pain, HA, and nausea. Pt states that he also fell 3 days ago and believes that he is falling so much because of his gait. He states that he shuffles and does not use his walker often. Symptoms are constant and moderate in severity. No mitigating or exacerbating factors reported. Patient denies neck pain, back pain, visual disturbances, vomiting, joint swelling, lacerations, bruising, extremity weakness/numbness, paresthesias, CP, SOB, dizziness,  lightheadedness, and all other sxs at this time. No further complaints or concerns at this time.       Arrival mode: Personal vehicle    PCP: Abdulaziz Mccabe MD       Past Medical History:  Past Medical History:   Diagnosis Date    A-fib     Anemia     AP (angina pectoris) 1/23/2014    Carotid artery disease without cerebral infarction 8/22/2014    Cataract     Coronary artery disease     GERD (gastroesophageal reflux disease) 7/11/2013    Hemorrhagic cerebrovascular accident (CVA) 4/26/2018    Hyperlipidemia     Hypertension     Intracranial hemorrhage     Lumbosacral spondylosis 12/24/2014    Pacemaker 1/23/2014    Paroxysmal atrial fibrillation 1/23/2014    Peripheral vascular disease 8/22/2014    Type 2 diabetes mellitus with ophthalmic manifestations        Past Surgical History:  Past Surgical History:   Procedure Laterality Date    ANGIOPLASTY      APPENDECTOMY      ATRIAL ABLATION SURGERY N/A     CATARACT EXTRACTION Bilateral     [facility]    CATARACT EXTRACTION W/ INTRAOCULAR LENS IMPLANT Right     CATARACT EXTRACTION W/ INTRAOCULAR LENS IMPLANT Left     CORONARY ARTERY BYPASS GRAFT  07/2010    x5    EYE SURGERY      pace maker surgrey  10/2010    reposition in 2011/2012 (approx)    VEIN BYPASS SURGERY           Family History:  Family History   Problem Relation Age of Onset    Arthritis Mother     Diabetes Mother     Kidney disease Mother     Heart disease Mother     Arthritis Father     Diabetes Father     Diabetes Sister     Cancer Sister         breast    Heart disease Sister     Diabetes Brother     Kidney disease Brother     Heart disease Brother     Cancer Daughter         breast    Diabetes Daughter     Miscarriages / Stillbirths Daughter     Fibromyalgia Daughter     Diabetes Son     Diabetes Son     Alcohol abuse Paternal Uncle     Stroke Neg Hx        Social History:  Social History     Tobacco Use    Smoking status: Former Smoker      Packs/day: 2.00     Years: 13.00     Pack years: 26.00     Types: Cigarettes     Start date: 1954     Last attempt to quit: 1967     Years since quittin.2    Smokeless tobacco: Never Used   Substance and Sexual Activity    Alcohol use: No    Drug use: No    Sexual activity: No     Partners: Female     Birth control/protection: None       ROS   Review of Systems   Constitutional: Negative for chills and fever.   HENT: Negative for sore throat.    Eyes: Negative for visual disturbance.   Respiratory: Negative for shortness of breath.    Cardiovascular: Negative for chest pain.   Gastrointestinal: Positive for nausea. Negative for abdominal pain, diarrhea and vomiting.   Genitourinary: Negative for dysuria.   Musculoskeletal: Positive for arthralgias (R shoulder). Negative for back pain, joint swelling and neck pain.        (+) head trauma   Skin: Negative for color change and rash.   Neurological: Positive for headaches. Negative for dizziness, seizures, syncope, weakness, light-headedness and numbness.   Hematological: Does not bruise/bleed easily.   All other systems reviewed and are negative.      Physical Exam      Initial Vitals [18 1436]   BP Pulse Resp Temp SpO2   (!) 148/81 74 18 98.5 °F (36.9 °C) 97 %      MAP       --          Physical Exam  Nursing Notes and Vital Signs Reviewed.  Constitutional: Patient is in no acute distress. Awake and alert. Well-developed and well-nourished.  Family at bedside.   Head: Atraumatic. Normocephalic. No obvious head trauma.   Eyes: PERRL. EOM intact. Conjunctivae are not pale. No scleral icterus.  ENT: Mucous membranes are moist. Oropharynx is clear and symmetric.    Neck: Supple. Full ROM. No midline C-spine TTP.  Cardiovascular: Regular rate. Regular rhythm. No murmurs, rubs, or gallops. Distal pulses are 2+ and symmetric.  Pulmonary/Chest: No respiratory distress. Clear to auscultation bilaterally. No wheezing, rales, or rhonchi.  Abdominal: Soft  "and non-distended.  There is no tenderness.  No rebound, guarding, or rigidity.  Good bowel sounds.    Musculoskeletal: Moves all extremities. No obvious deformities to wrists, no tenderness. No edema. No calf tenderness.  Back: No midline bony tenderness, deformities, or step-offs of the T-spine or L-spine. Skin appears normal without abrasions or bruising. No erythema, induration, or fluctuance.   R shoulder: Has no evident deformity. Negative for swelling. No significant TTP. ROM is normal. Cap refill distally is <2 seconds. Radial pulses are equal and 2+ bilaterally. No motor deficit. No distal sensory deficit.  Skin: Warm and dry. Abrasion to the lateral aspect of the R shoulder.  Neurological:  Alert, awake, and appropriate. Oriented to person, place and time. Speech is clear and normal. Cranial nerves II-XII intact. Normal strength. Pupils ERRL and EOM normal. Cranial nerves II-XII are intact. No acute focal neurological deficits are appreciated.  Psychiatric: Normal affect. Good eye contact. Appropriate in content.    ED Course    Procedures  ED Vital Signs:  Vitals:    08/28/18 1436   BP: (!) 148/81   Pulse: 74   Resp: 18   Temp: 98.5 °F (36.9 °C)   SpO2: 97%   Weight: 85.7 kg (189 lb)   Height: 5' 9" (1.753 m)       Abnormal Lab Results:  Labs Reviewed - No data to display     All Lab Results:  None    Imaging Results:  Imaging Results          X-Ray Shoulder Trauma Right (Final result)  Result time 08/28/18 16:04:21    Final result by Robbin Preciado MD (08/28/18 16:04:21)                 Impression:      No acute fracture or dislocation.      Electronically signed by: Robbin Preciado MD  Date:    08/28/2018  Time:    16:04             Narrative:    EXAMINATION:  XR SHOULDER TRAUMA 3 VIEW RIGHT    CLINICAL HISTORY:  Pain in right shoulder    FINDINGS:  No evidence of acute fracture or dislocation.  Bony mineralization is normal.  Soft tissues are unremarkable. Visualized lung fields are clear.    Moderate " AC joint degenerative changes with inferior spurring.                               CT Head Without Contrast (Final result)  Result time 08/28/18 16:03:04    Final result by Robbin Preciado MD (08/28/18 16:03:04)                 Impression:      Chronic microvascular ischemic changes.      Electronically signed by: Robbin Preciado MD  Date:    08/28/2018  Time:    16:03             Narrative:    EXAMINATION:  CT HEAD WITHOUT CONTRAST    CLINICAL HISTORY:  fall;    TECHNIQUE:  Low dose axial CT images obtained throughout the head without intravenous contrast. Sagittal and coronal reconstructions were performed.  All CT scans at this facility use dose modulation, iterative reconstruction and/or weight based dosing when appropriate to reduce radiation dose to as low as reasonably achievable.    COMPARISON:  08/10/2014.    FINDINGS:  Intracranial compartment: Remote appearing right basal ganglia lacunar infarct.  Small left basal ganglia lacunar infarct suspected.    The brain parenchyma demonstrates areas of decreased attenuation with mild to moderate periventricular white matter consistent with chronic microvascular ischemic changes..  No parenchymal mass, hemorrhage, edema or major vascular distribution infarct.  Vascular calcifications are noted.    Mild prominence of the sulci and ventricles are consistent with age-related involutional changes.    No extra-axial blood or fluid collections.    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  No fracture. Mastoid air cells and paranasal sinuses are essentially clear.                                 The Emergency Provider reviewed the vital signs and test results, which are outlined above.    ED Discussion     4:12 PM: Reassessed pt at this time. Pt is awake, alert, and in no distress. Discussed with pt all pertinent ED information and results. Discussed pt dx and plan of tx. Pt is encouraged  to use his walker at home, that he is prescribed, to ambulate around the house in order to prevent any future falls. Gave pt all f/u and return to the ED instructions. Pt should f/u with his PCP in 2 days. Pt should return to the ED if he experiences any new or worsening sxs such as N/V, dizziness, and numbness. All questions and concerns were addressed at this time. Pt expresses understanding of information and instructions, and is comfortable with plan to discharge. Pt is stable for discharge.    I discussed with patient and/or family/caretaker that evaluation in the ED does not suggest any emergent or life threatening medical conditions requiring immediate intervention beyond what was provided in the ED, and I believe patient is safe for discharge.  Regardless, an unremarkable evaluation in the ED does not preclude the development or presence of a serious of life threatening condition. As such, patient was instructed to return immediately for any worsening or change in current symptoms.    I discussed with patient and/or family/caretaker that negative X-ray does not rule out occult fracture or other soft tissue injury.  Persistent pain greater than 7-10 days or increased pain requires follow up, specifically with orthopedics.     Trauma precautions were discussed with patient and/or family/caretaker; I do not specifically detect any abdominal, thoracic, CNS, orthopedic, or other emergent or life threatening condition and that patient is safe to be discharged.  It was also discussed that despite an unrevealing examination and negative radiographic examination for serious or life threatening injury, these conditions may still exist.  As such, patient should return to ED immediately should they experience, severe or worsening pain, shortness of breath, abdominal pain, headache, vomiting, or any other concern.  It was also discussed that not infrequently, injuries may not be diagnosed during the initial ED visit  (such as fractures) and that if the patient discovers a new area of concern, a new area of injury that was not evaluated in the ED, they should return for evaluation as they may have an injury that requires treatment.      ED Medication(s):  Medications   acetaminophen tablet 650 mg (650 mg Oral Given 8/28/18 1511)       Discharge Medication List as of 8/28/2018  4:14 PM          Follow-up Information     Abdulaziz Mccabe MD. Schedule an appointment as soon as possible for a visit in 2 days.    Specialty:  Family Medicine  Why:  REturn to the Emergency Room, If symptoms worsen  Contact information:  [id number] [address] [address]  [phone]                    Medical Decision Making    Medical Decision Making:   Clinical Tests:   Radiological Study: Ordered and Reviewed           Scribe Attestation:   Scribe #1: I performed the above scribed service and the documentation accurately describes the services I performed. I attest to the accuracy of the note.    Attending:   Physician Attestation Statement for Scribe #1: I, Emelyn Macias MD, personally performed the services described in this documentation, as scribed by Stephanie Bond, in my presence, and it is both accurate and complete.       Scribe Attestation:   Scribe #2: I performed the above scribed service and the documentation accurately describes the services I performed. I attest to the accuracy of the note.    Attending Attestation:           Physician Attestation for Scribe:    Physician Attestation Statement for Scribe #2: I, Emelyn Macias MD, reviewed documentation, as scribed by Corinne Mack in my presence, and it is both accurate and complete. I also acknowledge and confirm the content of the note done by Scribe #1.          Clinical Impression       ICD-10-CM ICD-9-CM   1. Fall at home, initial encounter W19.XXXA E888.9    Y92.009 E849.0   2. Right shoulder pain M25.511 719.41   3. Abrasion of right shoulder, initial  encounter S40.211A 912.0   4. Traumatic injury of head, initial encounter S09.90XA 959.01       Disposition:   Disposition: Discharged  Condition: Stable         Emelyn Macias MD  08/29/18 2057

## 2018-08-30 ENCOUNTER — HOSPITAL ENCOUNTER (OUTPATIENT)
Dept: RADIOLOGY | Facility: HOSPITAL | Age: 83
Discharge: HOME | End: 2018-08-30
Attending: NURSE PRACTITIONER
Payer: MEDICARE

## 2018-08-30 ENCOUNTER — HOSPITAL ENCOUNTER (OUTPATIENT)
Dept: PULMONOLOGY | Facility: HOSPITAL | Age: 83
Discharge: HOME | End: 2018-08-30
Attending: NURSE PRACTITIONER
Payer: MEDICARE

## 2018-08-30 DIAGNOSIS — I20.9 ANGINA PECTORIS: ICD-10-CM

## 2018-08-30 DIAGNOSIS — E78.2 MIXED HYPERLIPIDEMIA: Chronic | ICD-10-CM

## 2018-08-30 DIAGNOSIS — I25.810 CORONARY ARTERY DISEASE INVOLVING CORONARY BYPASS GRAFT OF NATIVE HEART WITHOUT ANGINA PECTORIS: ICD-10-CM

## 2018-08-30 LAB — DIASTOLIC DYSFUNCTION: NO

## 2018-08-30 PROCEDURE — A9502 TC99M TETROFOSMIN: HCPCS

## 2018-08-30 PROCEDURE — 93017 CV STRESS TEST TRACING ONLY: CPT

## 2018-08-30 PROCEDURE — 78452 HT MUSCLE IMAGE SPECT MULT: CPT | Mod: 26,,, | Performed by: INTERNAL MEDICINE

## 2018-08-30 PROCEDURE — 63600175 PHARM REV CODE 636 W HCPCS: Performed by: NURSE PRACTITIONER

## 2018-08-30 PROCEDURE — 93016 CV STRESS TEST SUPVJ ONLY: CPT | Mod: ,,, | Performed by: INTERNAL MEDICINE

## 2018-08-30 PROCEDURE — 93018 CV STRESS TEST I&R ONLY: CPT | Mod: ,,, | Performed by: INTERNAL MEDICINE

## 2018-08-30 RX ORDER — REGADENOSON 0.08 MG/ML
0.4 INJECTION, SOLUTION INTRAVENOUS ONCE
Status: COMPLETED | OUTPATIENT
Start: 2018-08-30 | End: 2018-08-30

## 2018-08-30 RX ADMIN — REGADENOSON 0.4 MG: 0.08 INJECTION, SOLUTION INTRAVENOUS at 01:08

## 2018-08-31 ENCOUNTER — TELEPHONE (OUTPATIENT)
Dept: CARDIOLOGY | Facility: CLINIC | Age: 83
End: 2018-08-31

## 2018-08-31 NOTE — TELEPHONE ENCOUNTER
----- Message from Jennie Mane NP sent at 8/31/2018  8:35 AM CDT -----  Please notify patient that stress test was negative. Keep follow up. Thanks, Jennie

## 2018-09-15 RX ORDER — METOPROLOL SUCCINATE 100 MG/1
TABLET, EXTENDED RELEASE ORAL
Qty: 60 TABLET | Refills: 12 | Status: SHIPPED | OUTPATIENT
Start: 2018-09-15 | End: 2019-09-16 | Stop reason: SDUPTHER

## 2018-09-21 ENCOUNTER — OFFICE VISIT (OUTPATIENT)
Dept: CARDIOLOGY | Facility: CLINIC | Age: 83
End: 2018-09-21
Payer: MEDICARE

## 2018-09-21 VITALS
BODY MASS INDEX: 26.87 KG/M2 | DIASTOLIC BLOOD PRESSURE: 80 MMHG | HEART RATE: 80 BPM | WEIGHT: 181.44 LBS | SYSTOLIC BLOOD PRESSURE: 126 MMHG | HEIGHT: 69 IN

## 2018-09-21 DIAGNOSIS — I82.890 JUGULAR VEIN THROMBOSIS: ICD-10-CM

## 2018-09-21 DIAGNOSIS — I36.1 NON-RHEUMATIC TRICUSPID VALVE INSUFFICIENCY: ICD-10-CM

## 2018-09-21 DIAGNOSIS — I73.9 CLAUDICATION IN PERIPHERAL VASCULAR DISEASE: Chronic | ICD-10-CM

## 2018-09-21 DIAGNOSIS — R06.02 SOB (SHORTNESS OF BREATH): ICD-10-CM

## 2018-09-21 DIAGNOSIS — I48.0 PAROXYSMAL ATRIAL FIBRILLATION: Primary | Chronic | ICD-10-CM

## 2018-09-21 DIAGNOSIS — I65.23 ASYMPTOMATIC STENOSIS OF BOTH CAROTID ARTERIES WITHOUT INFARCTION: ICD-10-CM

## 2018-09-21 DIAGNOSIS — I25.810 CORONARY ARTERY DISEASE INVOLVING CORONARY BYPASS GRAFT OF NATIVE HEART WITHOUT ANGINA PECTORIS: ICD-10-CM

## 2018-09-21 DIAGNOSIS — I71.21 ANEURYSM OF ASCENDING AORTA: ICD-10-CM

## 2018-09-21 DIAGNOSIS — Z78.9 STATIN INTOLERANCE: ICD-10-CM

## 2018-09-21 DIAGNOSIS — Z95.0 PACEMAKER: Chronic | ICD-10-CM

## 2018-09-21 DIAGNOSIS — E11.9 DIABETES MELLITUS TYPE 2 WITHOUT RETINOPATHY: Chronic | ICD-10-CM

## 2018-09-21 DIAGNOSIS — I73.9 PERIPHERAL VASCULAR DISEASE: Chronic | ICD-10-CM

## 2018-09-21 DIAGNOSIS — E78.2 MIXED HYPERLIPIDEMIA: Chronic | ICD-10-CM

## 2018-09-21 DIAGNOSIS — I10 ESSENTIAL HYPERTENSION: ICD-10-CM

## 2018-09-21 DIAGNOSIS — I61.9 HEMORRHAGIC CEREBROVASCULAR ACCIDENT (CVA): ICD-10-CM

## 2018-09-21 DIAGNOSIS — I20.9 AP (ANGINA PECTORIS): ICD-10-CM

## 2018-09-21 DIAGNOSIS — E13.51 PERIPHERAL VASCULAR DISEASE DUE TO SECONDARY DIABETES MELLITUS: ICD-10-CM

## 2018-09-21 DIAGNOSIS — I77.9 CAROTID ARTERY DISEASE WITHOUT CEREBRAL INFARCTION: Chronic | ICD-10-CM

## 2018-09-21 PROCEDURE — 99214 OFFICE O/P EST MOD 30 MIN: CPT | Mod: S$PBB,,, | Performed by: INTERNAL MEDICINE

## 2018-09-21 PROCEDURE — 1101F PT FALLS ASSESS-DOCD LE1/YR: CPT | Mod: CPTII,,, | Performed by: INTERNAL MEDICINE

## 2018-09-21 PROCEDURE — 3079F DIAST BP 80-89 MM HG: CPT | Mod: CPTII,,, | Performed by: INTERNAL MEDICINE

## 2018-09-21 PROCEDURE — 3074F SYST BP LT 130 MM HG: CPT | Mod: CPTII,,, | Performed by: INTERNAL MEDICINE

## 2018-09-21 PROCEDURE — 99499 UNLISTED E&M SERVICE: CPT | Mod: HCNC,S$GLB,, | Performed by: INTERNAL MEDICINE

## 2018-09-21 PROCEDURE — 99213 OFFICE O/P EST LOW 20 MIN: CPT | Mod: PBBFAC | Performed by: INTERNAL MEDICINE

## 2018-09-21 PROCEDURE — 99999 PR PBB SHADOW E&M-EST. PATIENT-LVL III: CPT | Mod: PBBFAC,,, | Performed by: INTERNAL MEDICINE

## 2018-09-21 NOTE — PROGRESS NOTES
Subjective:    Patient ID:  Anthony Blair is a 85 y.o. male who presents for evaluation of Dizziness (all the time); Hyperlipidemia; Hypertension; Coronary Artery Disease; Peripheral Arterial Disease; Risk Factor Management For Atherosclerosis; Carotid Artery Disease; and Atrial Fibrillation      HPI Mr. Blair returns for fu.  His current medical conditions include PAF/PSVT, CAD (PTCA 1980's), HTN, PPM, DM, atrial septal aneurysm/pfo, PAD, dyslipidemia. Nonsmoker.  Past details pertinent for following:   Statin intolerant.  s/p CABG 7/10 (LIMA-LAD, SVG-OM1, SVG-OM3, SVG-PDA) for increasing OLIVARES and multivessel CAD on [facility].   S/p PPM 10/10 for SSS/PAF. Was hospitalized 1/11 for PSVT (Atrial tachycardia) and was started on Amiodarone but was stopped for GI discomfort. He also did not tolerate Multaq and has not tolerated multiple statins.   s/p EPS/ablation of his atrial tachycardia 6/11.   S/p abd w runoff March 2015 and had significant B LE infrapopliteal disease. Atherectomy/pta performed of critical R tibeoperoneal trunk stenosis. Also has L tibeoperoneal trunk stenosis and his PTA/MIGUEL are occluded bilaterally.  Started on Eliquis Feb 2017 for suspicious left internal jugular vein thrombus.  Stress MPI 3/17 showed no ischemia.  Echo 3/17 showed normal LV function, left-right atrial shunt.   Carotid u/s 6/17 showed mild bilateral disease, known L IJ thrombus noted.  Pt called clinic Sept 2017 stated he had stopped Eliquis couple weeks before his call due to diarrhea, weakness, bloating and also off Amlodipine due to sleepiness.  He stated sxs subsided when he stopped the meds.   He was advised by cardiology on 9/6/17 to take 81 mg ASA since he stopped his Eliquis.  He had acute hemorrhage of basal ganglia right side Sept 2017, causing weakness on left side.  Tx at [facility] [address] 9/24/17. He was on ASA daily when CVA occurred and was not on Eliquis. 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  He was taken off his asa.  Followed by Dr. Weaver, neurosurgery. No surgery was needed.  Echo showed normal LVEF.  Now here.  Has been more lightheaded recently.  Most days.  No syncope.  Some palpitations.  ecgs reviewed recently, and seems he is probably having atrial arrhythmias again.  No chest pain sxs since ER visit last month.  Stress test showed no ischemia, apical scar.  Did not take sl ntg.  Chronic OLIVARES, stable.  No pnd/orthopnea.  BP controlled.  No recurrent TIA/CVA sxs.  Echo 8/18 normal EF.  Has chronic leg weakness/fatigue walking, unchanged chronic problem.  DM/HTN/lipids controlled overall.       Current Outpatient Medications:     ACCU-CHEK TOMAS PLUS TEST STRP Strp, TEST BLOOD SUGAR SIX TIMES DAILY, Disp: 300 strip, Rfl: 5    ACCU-CHEK MULTICLIX LANCET lancets, TEST BLOOD SUGAR THREE TIMES DAILY, Disp: 200 each, Rfl: 5    CARBOXYMETHYLCELLULOSE SODIUM (REFRESH OPHT), Apply to eye., Disp: , Rfl:     fluticasone (FLONASE) 50 mcg/actuation nasal spray, 2 sprays by Each Nare route once daily., Disp: 16 g, Rfl: 11    furosemide (LASIX) 20 MG tablet, TAKE ONE TABLET BY MOUTH DAILY, Disp: 30 tablet, Rfl: 12    insulin glargine (LANTUS SOLOSTAR) 100 unit/mL (3 mL) InPn pen, INJECT 16 UNITS SUB-Q AT BEDTIME, Disp: 15 mL, Rfl: 11    isosorbide mononitrate (IMDUR) 30 MG 24 hr tablet, TAKE ONE TABLET BY MOUTH DAILY, Disp: 30 tablet, Rfl: 12    levETIRAcetam (KEPPRA) 250 MG Tab, Take 1 tablet (250 mg total) by mouth 2 (two) times daily., Disp: 60 tablet, Rfl: 11    losartan (COZAAR) 100 MG tablet, Take 1 tablet (100 mg total) by mouth once daily., Disp: 90 tablet, Rfl: 0    meclizine (ANTIVERT) 12.5 mg tablet, Take 1 tablet (12.5 mg total) by mouth 2 (two) times daily as needed., Disp: 20 tablet, Rfl: 1    metformin (GLUCOPHAGE) 500 MG tablet, TAKE ONE BY MOUTH TWICE A DAY WITH FOOD.  "(Patient taking differently: Take 1,000 mg by mouth 2 (two) times daily with meals. TAKE ONE BY MOUTH TWICE A DAY WITH FOOD.), Disp: 60 tablet, Rfl: 6    metoprolol succinate (TOPROL-XL) 100 MG 24 hr tablet, TAKE ONE TABLET BY MOUTH TWICE DAILY, Disp: 60 tablet, Rfl: 12    naproxen sodium (ANAPROX) 220 MG tablet, Take 220 mg by mouth 2 (two) times daily with meals., Disp: , Rfl:     NOVOLOG FLEXPEN 100 unit/mL InPn pen, Inject 5 units 3 times a day before meals, Disp: 15 mL, Rfl: 3    pantoprazole (PROTONIX) 40 MG tablet, Take 1 tablet (40 mg total) by mouth once daily., Disp: 30 tablet, Rfl: 5    potassium chloride SA (K-DUR,KLOR-CON) 20 MEQ tablet, TAKE ONE TABLET BY MOUTH EVERY DAY, Disp: 30 tablet, Rfl: 12    saw palmetto fruit 450 mg Cap, Take 1 capsule by mouth Twice daily., Disp: , Rfl:     SURE COMFORT PEN NEEDLE 32 gauge x 5/32" Ndle, USE FOUR TIMES DAILY., Disp: 100 each, Rfl: 12    TURMERIC, BULK, MISC, by Misc.(Non-Drug; Combo Route) route., Disp: , Rfl:     coconut oil 1,000 mg Cap, Take by mouth., Disp: , Rfl:     NITROSTAT 0.4 mg SL tablet, Place 1 tablet (0.4 mg total) under the tongue every 5 (five) minutes as needed for Chest pain., Disp: 25 tablet, Rfl: 12    SUPREP BOWEL PREP KIT 17.5-3.13-1.6 gram SolR, Use as directed, Disp: 354 mL, Rfl: 0      Review of Systems   Constitution: Positive for weakness.   HENT: Negative.    Eyes: Negative.    Cardiovascular: Positive for dyspnea on exertion.   Respiratory: Positive for shortness of breath.    Endocrine: Negative.    Hematologic/Lymphatic: Negative.    Skin: Negative.    Musculoskeletal: Positive for arthritis and joint pain.   Gastrointestinal: Negative.    Genitourinary: Negative.    Neurological: Positive for dizziness and light-headedness.   Psychiatric/Behavioral: Negative.    Allergic/Immunologic: Negative.        /80 (BP Location: Left arm, Patient Position: Sitting, BP Method: Medium (Manual))   Pulse 80   Ht 5' 9" (1.753 " m)   Wt 82.3 kg (181 lb 7 oz)   BMI 26.79 kg/m²     Wt Readings from Last 3 Encounters:   09/21/18 82.3 kg (181 lb 7 oz)   08/28/18 85.7 kg (189 lb)   08/22/18 83.1 kg (183 lb 3.2 oz)     Temp Readings from Last 3 Encounters:   08/28/18 98.5 °F (36.9 °C)   08/21/18 97.7 °F (36.5 °C) (Oral)   08/18/18 97.9 °F (36.6 °C) (Oral)     BP Readings from Last 3 Encounters:   09/21/18 126/80   08/28/18 (!) 148/81   08/22/18 (!) 144/82     Pulse Readings from Last 3 Encounters:   09/21/18 80   08/28/18 74   08/22/18 75          Objective:    Physical Exam   Constitutional: He is oriented to person, place, and time. He appears well-developed and well-nourished.   HENT:   Head: Normocephalic.   Neck: Normal range of motion. Neck supple. Normal carotid pulses, no hepatojugular reflux and no JVD present. Carotid bruit is not present. No thyromegaly present.   Cardiovascular: Normal rate, regular rhythm, S1 normal and S2 normal. PMI is not displaced. Exam reveals no S3, no S4, no distant heart sounds, no friction rub, no midsystolic click and no opening snap.   No murmur heard.  Pulses:       Radial pulses are 2+ on the right side, and 2+ on the left side.   Pulmonary/Chest: Effort normal and breath sounds normal. He has no wheezes. He has no rales.   Abdominal: Soft. Bowel sounds are normal. He exhibits no distension, no abdominal bruit, no ascites and no mass. There is no tenderness.   Musculoskeletal: He exhibits no edema.   Neurological: He is alert and oriented to person, place, and time.   Skin: Skin is warm.   Psychiatric: He has a normal mood and affect. His behavior is normal.   Nursing note and vitals reviewed.      I have reviewed all pertinent labs and cardiac studies.  Component      Latest Ref Rng & Units 8/17/2018   BNP      0 - 99 pg/mL 104 (H)             Chemistry        Component Value Date/Time     08/18/2018 0356    K 3.6 08/18/2018 0356     08/18/2018 0356    CO2 29 08/18/2018 0356    BUN 15  08/18/2018 0356    CREATININE 0.8 08/18/2018 0356     (H) 08/18/2018 0356        Component Value Date/Time    CALCIUM 9.8 08/18/2018 0356    ALKPHOS 116 08/17/2018 1422    AST 24 08/17/2018 1422    ALT 13 08/17/2018 1422    BILITOT 0.7 08/17/2018 1422    ESTGFRAFRICA >60 08/18/2018 0356    EGFRNONAA >60 08/18/2018 0356          Lab Results   Component Value Date    WBC 4.60 08/18/2018    HGB 12.7 (L) 08/18/2018    HCT 37.8 (L) 08/18/2018    MCV 91 08/18/2018     (L) 08/18/2018     Lab Results   Component Value Date    HGBA1C 7.1 (H) 03/15/2018     Lab Results   Component Value Date    CHOL 179 08/17/2018    CHOL 224 (H) 03/15/2018    CHOL 190 11/22/2017     Lab Results   Component Value Date    HDL 40 08/17/2018    HDL 53 03/15/2018    HDL 50 11/22/2017     Lab Results   Component Value Date    LDLCALC 100.6 08/17/2018    LDLCALC 136.0 03/15/2018    LDLCALC 119.8 11/22/2017     Lab Results   Component Value Date    TRIG 192 (H) 08/17/2018    TRIG 175 (H) 03/15/2018    TRIG 101 11/22/2017     Lab Results   Component Value Date    CHOLHDL 22.3 08/17/2018    CHOLHDL 23.7 03/15/2018    CHOLHDL 26.3 11/22/2017     TEST DESCRIPTION   The patient received 0.4 mg of Regadenoson as an IV bolus. Peak heart rate was 83 bpm, which is 61% of the age predicted maximum heart rate. .     EKG Conclusions:    1. The EKG portion of this study is uninterpretable for ischemia at a peak heart rate of 83 bpm (61% of predicted).   2. Blood pressure remained stable throughout the protocol  (Presenting BP: 154/101 Peak BP: 154/101).   3. No significant arrhythmias were present.   4. There were no symptoms of chest discomfort or significant dyspnea throughout the protocol.     Nuclear Procedure:  Following a single isotope protocol, 10 mCi of Tc99 labeled Tetrofosmin was given at rest and tomographic imaging was performed. Regadenoson pharmacologic stress testing was performed as described above. Immediately following the IV  "bolus of regadenoson,   30 mCi of Tc99 labeled Tetrofosmin was given and tomographic imaging was performed. The site of the IV injection was the right AC. Images were obtained on a SHARKMARX camera.     Comparison:  Study from 3/14/2017 demonstrated, "NORMAL MYOCARDIAL PERFUSION  1. The perfusion scan is free of evidence for myocardial ischemia or injury.   2. Resting wall motion is physiologic.   3. Resting LV function is normal.   4. The ventricular volumes are normal at rest and stress.   5. The extracardiac distribution of radioactivity is normal.   ."     Comments:  This is a technically adequate study. Inspection of the transaxial images demonstrated no significant cranial, caudal, or lateral patient motion in the camera between rest and stress acquisitions. On stress images, there is mild to moderate decreased   uptake of radiotracer in the apical wall of the left ventricle which does not reverse on resting images suggestive of injury. The inferior wall is obscured by bowel. The remainder of the myocardium demonstrates normal radiotracer uptake. The extracardiac   distribution of radioactivity is normal. The left ventricular cavity is normal in size and does not increase with stress. The left ventricular wall motion is abnormal at rest showing mild global hypokinesis of the left ventricle.     Nuclear Quantitative Functional Analysis:   LVEF: 46 %  LVED Volume: 120 ml  LVES Volume: 64 ml    Impression: ABNORMAL MYOCARDIAL PERFUSION  1. The perfusion scan is free of evidence for myocardial ischemia. The inferior wall is obscured by bowel.  2. There is mild to moderate intensity fixed defect in the apical wall of the left ventricle, consistent with myocardial injury.   3. There is abnormal wall motion at rest showing mild global hypokinesis of the left ventricle.   4. There is resting LV dysfunction with a reduced ejection fraction of 46 %.   5. The ventricular volumes are normal at rest and stress.   6. The " extracardiac distribution of radioactivity is normal.   7. When compared to the previous study from 03/14/2017, mild to moderate apical scar without ischemia.           This document has been electronically    SIGNED BY: Jose Ramon Smith MD On: 08/30/2018 15:46    Date of Procedure: 08/18/2018        TEST DESCRIPTION   Technical Quality: This is a portable study performed at the patient's bedside. This is a technically challenging study.     Aorta: The aortic root is normal in size, measuring 3.3 cm at sinotubular junction and 2.7 cm at Sinuses of Valsalva. The proximal ascending aorta is normal in size, measuring 3.4 cm across.     Left Atrium: The left atrial volume index is severely enlarged, measuring 62.91 cc/m2.     Left Ventricle: The left ventricle is normal in size, with an end-diastolic diameter of 3.4 cm, and an end-systolic diameter of 3.0 cm. Wall thickness is mildly increased, with the septum measuring 1.3 cm and the posterior wall measuring 1.2 cm across.   Relative wall thickness was increased at 0.71, and the LV mass index was 79.6 g/m2 consistent with concentric remodeling. There are no regional wall motion abnormalities. Left ventricular systolic function appears normal. Visually estimated ejection   fraction is 55-60%. The LV Doppler derived stroke volume equals 57.0 ccs.     Diastolic indices: Diastolic function is normal.     Right Atrium: The right atrium is moderately enlarged, measuring 5.8 cm in length and 4.0 cm in width in the apical view.     Right Ventricle: The right ventricle is normal in size measuring 4.2 cm at the base in the apical right ventricle-focused view. Global right ventricular systolic function appears normal. The estimated PA systolic pressure is 33 mmHg.     Aortic Valve:  The aortic valve is moderately sclerotic with normal leaflet mobility. The peak velocity obtained across the aortic valve is 1.07 m/s, which translates to a peak gradient of 5 mmHg. The mean gradient is 2  mmHg. Using a left ventricular   outflow tract diameter of 2.4 cm, a left ventricular outflow tract velocity time integral of 12 cm, and a peak instantaneous transvalvular velocity time integral of 19 cm, the calculated aortic valve area is 2.92 cm2(AVAi is 1.46 cm2/m2).     Mitral Valve:  There is trivial mitral regurgitation. Mitral valve is normal in structure with normal leaflet mobility.     Tricuspid Valve:  There is trivial tricuspid regurgitation. Tricuspid valve is normal in structure with normal leaflet mobility.     Pulmonary Valve:  Pulmonary valve is normal in structure with normal leaflet mobility.     IVC: IVC is normal in size and collapses > 50% with a sniff, suggesting normal right atrial pressure of 3 mmHg.     Atrial Septum: The atrial septum is intact.     Intracavitary: There is no evidence of pericardial effusion, intracavity mass, thrombi, or vegetation.         CONCLUSIONS     1 - Biatrial enlargement.     2 - Concentric remodeling.     3 - No wall motion abnormalities.     4 - Normal left ventricular systolic function (EF 55-60%).     5 - Normal left ventricular diastolic function.     6 - Normal right ventricular systolic function .     7 - The estimated PA systolic pressure is 33 mmHg.     8 - Trivial mitral regurgitation.     9 - Trivial tricuspid regurgitation.             This document has been electronically    SIGNED BY: Kyle Aguilera MD On: 08/18/2018 10:49          Assessment:       1. Paroxysmal atrial fibrillation    2. Pacemaker    3. Mixed hyperlipidemia    4. Peripheral vascular disease    5. Carotid artery disease without cerebral infarction    6. Claudication in peripheral vascular disease    7. Diabetes mellitus type 2 without retinopathy    8. Coronary artery disease involving coronary bypass graft of native heart without angina pectoris    9. AP (angina pectoris)    10. Essential hypertension    11. SOB (shortness of breath)    12. Non-rheumatic tricuspid valve  insufficiency    13. Jugular vein thrombosis    14. Hemorrhagic cerebrovascular accident (CVA)    15. Statin intolerance    16. Aneurysm of ascending aorta    17. Asymptomatic stenosis of both carotid arteries without infarction    18. Peripheral vascular disease due to secondary diabetes mellitus         Plan:             Lightheadedness/dizziness, chronic but worse.  May be from a fib/arrhythmias.  Stable CAD overall.  EP consultation with Dr. Grajeda for PPM interrogation, PAF.  Reviewed all recent tests with pt.  Sl ntg prn for any anginal sxs.  Medical tx advised for CAD/PAD.  Not a candidate in my view for asa or other anticoagulation since he had hemorrhagic CVA 9/17 on 81 mg ASA.  No changes in meds today.  Cardiac diet.  Exercise as tolerated.    F/u 3 months.

## 2018-10-02 RX ORDER — PANTOPRAZOLE SODIUM 40 MG/1
TABLET, DELAYED RELEASE ORAL
Qty: 30 TABLET | Refills: 5 | Status: SHIPPED | OUTPATIENT
Start: 2018-10-02 | End: 2019-09-14 | Stop reason: SDUPTHER

## 2018-10-03 ENCOUNTER — OFFICE VISIT (OUTPATIENT)
Dept: NEUROLOGY | Facility: CLINIC | Age: 83
End: 2018-10-03
Payer: MEDICARE

## 2018-10-03 VITALS
HEIGHT: 68 IN | DIASTOLIC BLOOD PRESSURE: 66 MMHG | HEART RATE: 78 BPM | BODY MASS INDEX: 27.59 KG/M2 | SYSTOLIC BLOOD PRESSURE: 126 MMHG

## 2018-10-03 DIAGNOSIS — R56.9 SEIZURE, LATE EFFECT OF STROKE: ICD-10-CM

## 2018-10-03 DIAGNOSIS — I61.9 HEMORRHAGIC CEREBROVASCULAR ACCIDENT (CVA): Primary | ICD-10-CM

## 2018-10-03 DIAGNOSIS — I69.398 SEIZURE, LATE EFFECT OF STROKE: ICD-10-CM

## 2018-10-03 PROCEDURE — 99999 PR PBB SHADOW E&M-EST. PATIENT-LVL II: CPT | Mod: PBBFAC,,, | Performed by: PSYCHIATRY & NEUROLOGY

## 2018-10-03 PROCEDURE — 99212 OFFICE O/P EST SF 10 MIN: CPT | Mod: PBBFAC | Performed by: PSYCHIATRY & NEUROLOGY

## 2018-10-03 PROCEDURE — 1101F PT FALLS ASSESS-DOCD LE1/YR: CPT | Mod: CPTII,,, | Performed by: PSYCHIATRY & NEUROLOGY

## 2018-10-03 PROCEDURE — 3078F DIAST BP <80 MM HG: CPT | Mod: CPTII,,, | Performed by: PSYCHIATRY & NEUROLOGY

## 2018-10-03 PROCEDURE — 3074F SYST BP LT 130 MM HG: CPT | Mod: CPTII,,, | Performed by: PSYCHIATRY & NEUROLOGY

## 2018-10-03 PROCEDURE — 99213 OFFICE O/P EST LOW 20 MIN: CPT | Mod: S$PBB,,, | Performed by: PSYCHIATRY & NEUROLOGY

## 2018-10-03 NOTE — PROGRESS NOTES
Subjective:      Patient ID: Anthony Blair is a 85 y.o. male.    Chief Complaint:   Follow-up before hemorrhagic stroke and focal motor seizure    The patient returns today indicating that he continues to perform very well and independently.  The patient had an episode of and hemorrhagic stroke that occurred in September, 2017. 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  The patient had recurrent episodes of a very similar nature until he was placed on Keppra 250 mg twice a day.  Since that medication was started, the patient states that he has not had any further recognized seizure.  He denies any recent loss of consciousness.  However, he recently had an episode of a feeling of numbness in both arms and both legs that lasted less than 10 min but was not associated with any involuntary movement and was not associated with any loss of consciousness.  The patient did not experience any headache nor did he have any obvious postictal symptoms.  The patient states that this was just an unusual feeling for him.    The patient indicates that he is doing well otherwise.  He has a slight degree of dyspnea on exertion and occasional mild shortness of breath at rest but does not experience any chest pain.  He denies any palpitations.  The patient is of the opinion that he is able to perform activities of daily living without any difficulty.          ROS:  GENERAL: NO FEVER, CHILLS, FATIGABILITY OR WEIGHT LOSS.  SKIN: NO RASHES, ITCHING OR CHANGES IN COLOR OR TEXTURE OF SKIN.  HEAD: NO HEADACHES OR RECENT HEAD TRAUMA.  EYES: VISUAL ACUITY FINE. NO PHOTOPHOBIA, OCULAR PAIN OR DIPLOPIA.  EARS: DENIES EAR PAIN, DISCHARGE OR VERTIGO.  NOSE: NO LOSS OF SMELL, NO EPISTAXIS OR POSTNASAL DRIP.  MOUTH & THROAT: NO HOARSENESS OR CHANGE IN VOICE. NO EXCESSIVE GUM BLEEDING.  NODES: DENIES SWOLLEN GLANDS.  CHEST:   REPORTSDOE,  BUT  DENIESCYANOSIS, WHEEZING, COUGH AND SPUTUM PRODUCTION.  CARDIOVASCULAR: DENIES CHEST PAIN, PND, ORTHOPNEA , BUT DOES HAVE REDUCED EXERCISE TOLERANCE.  ABDOMEN: APPETITE FINE. NO WEIGHT LOSS. DENIES DIARRHEA, ABDOMINAL PAIN, HEMATEMESIS OR BLOOD IN STOOL.  URINARY: NO FLANK PAIN, DYSURIA OR HEMATURIA.  PERIPHERAL VASCULAR: NO CLAUDICATION OR CYANOSIS.  MUSCULOSKELETAL: NO JOINT STIFFNESS OR SWELLING. DENIES BACK PAIN.  NEUROLOGIC:   SEE COMMENTS IN PRESENT ILLNESS    Past Medical History:   Diagnosis Date    A-fib     Anemia     AP (angina pectoris) 1/23/2014    Carotid artery disease without cerebral infarction 8/22/2014    Cataract     Coronary artery disease     GERD (gastroesophageal reflux disease) 7/11/2013    Hemorrhagic cerebrovascular accident (CVA) 4/26/2018    Hyperlipidemia     Hypertension     Intracranial hemorrhage     Lumbosacral spondylosis 12/24/2014    Pacemaker 1/23/2014    Paroxysmal atrial fibrillation 1/23/2014    Peripheral vascular disease 8/22/2014    Type 2 diabetes mellitus with ophthalmic manifestations      Past Surgical History:   Procedure Laterality Date    ANGIOPLASTY      APPENDECTOMY      ATRIAL ABLATION SURGERY N/A     CATARACT EXTRACTION Bilateral     [facility]    CATARACT EXTRACTION W/ INTRAOCULAR LENS IMPLANT Right     CATARACT EXTRACTION W/ INTRAOCULAR LENS IMPLANT Left     CORONARY ARTERY BYPASS GRAFT  07/2010    x5    ESOPHAGOGASTRODUODENOSCOPY (EGD) N/A 5/7/2018    Performed [facility] ENDO    EYE SURGERY      pace maker surgrey  10/2010    reposition in 2011/2012 (approx)    VEIN BYPASS SURGERY       Family History   Problem Relation Age of Onset    Arthritis Mother     Diabetes Mother     Kidney disease Mother     Heart disease Mother     Arthritis Father     Diabetes Father     Diabetes Sister     Cancer Sister         breast    Heart disease Sister     Diabetes Brother     Kidney disease Brother      Heart disease Brother     Cancer Daughter         breast    Diabetes Daughter     Miscarriages / Stillbirths Daughter     Fibromyalgia Daughter     Diabetes Son     Diabetes Son     Alcohol abuse Paternal Uncle     Stroke Neg Hx      Social History     Socioeconomic History    Marital status:      Spouse name: Not on file    Number of children: Not on file    Years of education: Not on file    Highest education level: Not on file   Social Needs    Financial resource strain: Not on file    Food insecurity - worry: Not on file    Food insecurity - inability: Not on file    Transportation needs - medical: Not on file    Transportation needs - non-medical: Not on file   Occupational History    Not on file   Tobacco Use    Smoking status: Former Smoker     Packs/day: 2.00     Years: 13.00     Pack years: 26.00     Types: Cigarettes     Start date: 1954     Last attempt to quit: 1967     Years since quittin.3    Smokeless tobacco: Never Used   Substance and Sexual Activity    Alcohol use: No    Drug use: No    Sexual activity: No     Partners: Female     Birth control/protection: None   Other Topics Concern    Not on file   Social History Narrative    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         Objective:   PE:   VITAL SIGNS:   Vitals:    10/03/18 1408   BP: 126/66   Pulse: 78       APPEARANCE: WELL NOURISHED, WELL DEVELOPED, IN NO ACUTE DISTRESS.    HEAD: NORMOCEPHALIC, ATRAUMATIC.  EYES: PERRL. EOMI.  NON-ICTERIC SCLERAE.    EARS: TM'S INTACT. LIGHT REFLEX NORMAL. NO RETRACTION OR PERFORATION.    NOSE: MUCOSA PINK. AIRWAY CLEAR.  MOUTH & THROAT: NO TONSILLAR ENLARGEMENT. NO PHARYNGEAL ERYTHEMA OR EXUDATE. NO STRIDOR.  NECK: SUPPLE. NO BRUITS.  CHEST: LUNGS CLEAR TO AUSCULTATION.  CARDIOVASCULAR: REGULAR RHYTHM WITHOUT SIGNIFICANT MURMURS.  ABDOMEN: BOWEL SOUNDS NORMAL. NOT DISTENDED.   MUSCULOSKELETAL:  NO BONY DEFORMITY SEEN.  MUSCLE TONE  AND  MUSCLE MASS ARE NORMAL IN BOTH UPPER AND LOWER EXTREMITIES.    NEUROLOGIC:   MENTAL STATUS:  THE PATIENT IS WELL ORIENTED TO PERSON, TIME, PLACE, AND SITUATION.  THE PATIENT IS ATTENTIVE TO THE ENVIRONMENT AND COOPERATIVE FOR THE EXAM.  CRANIAL NERVES: II-XII GROSSLY INTACT. FUNDOSCOPIC EXAM IS NORMAL.  NO HEMORRHAGE, EXUDATE OR PAPILLEDEMA IS PRESENT. THE EXTRAOCULAR MUSCLES ARE INTACT IN THE CARDINAL DIRECTIONS OF GAZE.  NO PTOSIS IS PRESENT. FACIAL FEATURES ARE SYMMETRICAL.  SPEECH IS NORMAL IN FLUENCY, DICTION, AND PHRASING.  TONGUE PROTRUDES IN THE MIDLINE.    GAIT AND STATION:  ROMBERG IS NEGATIVE.  GOOD ALTERNATE ARMSWING WITH NORMAL GAIT.  MOTOR:  NO DOWNDRIFT OF EITHER ARM WHEN HELD AT SHOULDER LEVEL.  MANUAL MUSCLE TESTING OF PROXIMAL AND DISTAL MUSCLES OF BOTH UPPER AND LOWER EXTREMITIES IS NORMAL.   SENSORY:  INTACT BOTH UPPER AND LOWER EXTREMITIES TO PIN PRICK, TOUCH, AND VIBRATION.  CEREBELLAR:  FINGER TO NOSE DONE WELL.  ALTERNATING MOVEMENTS INTACT.  NO INVOLUNTARY MOVEMENTS OR TREMOR SEEN.  REFLEXES:  STRETCH REFLEXES ARE 2+ BOTH UPPER AND LOWER EXTREMITIES.  PLANTAR STIMULATION IS FLEXOR BILATERALLY AND NO PATHOLOGICAL REFLEXES ARE SEEN              Assessment:     Encounter Diagnoses   Name Primary?    Hemorrhagic cerebrovascular accident (CVA) Yes    Seizure, late effect of stroke                    Plan:     1.  Continue Keppra 250 mg twice a day.  The patient is at increased risk of stroke because of the hemorrhagic injury to the brain.  He is now asymptomatic and doing extremely well regarding the stroke as well as the history of the seizure.  2.  Return to Neurology for follow-up in 1 year.    This was a 25 min face-to-face visit with the patient with over 50% of the time spent counseling the patient regarding the treatment of seizure  This note is generated with speech recognition software and is subject to transcription error and sound alike phrases that may be missed by proofreading.

## 2018-10-16 ENCOUNTER — ANESTHESIA EVENT (OUTPATIENT)
Dept: ENDOSCOPY | Facility: HOSPITAL | Age: 83
End: 2018-10-16
Payer: MEDICARE

## 2018-10-16 ENCOUNTER — ANESTHESIA (OUTPATIENT)
Dept: ENDOSCOPY | Facility: HOSPITAL | Age: 83
End: 2018-10-16
Payer: MEDICARE

## 2018-10-16 ENCOUNTER — HOSPITAL ENCOUNTER (OUTPATIENT)
Facility: HOSPITAL | Age: 83
Discharge: HOME | End: 2018-10-16
Attending: INTERNAL MEDICINE | Admitting: INTERNAL MEDICINE
Payer: MEDICARE

## 2018-10-16 DIAGNOSIS — R19.7 DIARRHEA, UNSPECIFIED TYPE: ICD-10-CM

## 2018-10-16 DIAGNOSIS — R19.7 DIARRHEA: Primary | ICD-10-CM

## 2018-10-16 LAB — POCT GLUCOSE: 114 MG/DL (ref 70–110)

## 2018-10-16 PROCEDURE — 25000003 PHARM REV CODE 250: Performed by: NURSE ANESTHETIST, CERTIFIED REGISTERED

## 2018-10-16 PROCEDURE — 88305 TISSUE EXAM BY PATHOLOGIST: CPT | Performed by: PATHOLOGY

## 2018-10-16 PROCEDURE — 88305 TISSUE EXAM BY PATHOLOGIST: CPT | Mod: 26,,, | Performed by: PATHOLOGY

## 2018-10-16 PROCEDURE — 45380 COLONOSCOPY AND BIOPSY: CPT | Performed by: INTERNAL MEDICINE

## 2018-10-16 PROCEDURE — 63600175 PHARM REV CODE 636 W HCPCS: Performed by: NURSE ANESTHETIST, CERTIFIED REGISTERED

## 2018-10-16 PROCEDURE — 37000009 HC ANESTHESIA EA ADD 15 MINS: Performed by: INTERNAL MEDICINE

## 2018-10-16 PROCEDURE — 45378 DIAGNOSTIC COLONOSCOPY: CPT | Mod: ,,, | Performed by: INTERNAL MEDICINE

## 2018-10-16 PROCEDURE — 27201012 HC FORCEPS, HOT/COLD, DISP: Performed by: INTERNAL MEDICINE

## 2018-10-16 PROCEDURE — 37000008 HC ANESTHESIA 1ST 15 MINUTES: Performed by: INTERNAL MEDICINE

## 2018-10-16 PROCEDURE — 25000003 PHARM REV CODE 250: Performed by: INTERNAL MEDICINE

## 2018-10-16 RX ORDER — PROPOFOL 10 MG/ML
VIAL (ML) INTRAVENOUS
Status: DISCONTINUED | OUTPATIENT
Start: 2018-10-16 | End: 2018-10-16

## 2018-10-16 RX ORDER — SODIUM CHLORIDE, SODIUM LACTATE, POTASSIUM CHLORIDE, CALCIUM CHLORIDE 600; 310; 30; 20 MG/100ML; MG/100ML; MG/100ML; MG/100ML
INJECTION, SOLUTION INTRAVENOUS CONTINUOUS
Status: DISCONTINUED | OUTPATIENT
Start: 2018-10-16 | End: 2018-10-16 | Stop reason: HOSPADM

## 2018-10-16 RX ORDER — LIDOCAINE HYDROCHLORIDE 20 MG/ML
INJECTION, SOLUTION EPIDURAL; INFILTRATION; INTRACAUDAL; PERINEURAL
Status: DISCONTINUED | OUTPATIENT
Start: 2018-10-16 | End: 2018-10-16

## 2018-10-16 RX ADMIN — PROPOFOL 25 MG: 10 INJECTION, EMULSION INTRAVENOUS at 08:10

## 2018-10-16 RX ADMIN — PROPOFOL 50 MG: 10 INJECTION, EMULSION INTRAVENOUS at 08:10

## 2018-10-16 RX ADMIN — LIDOCAINE HYDROCHLORIDE 40 MG: 20 INJECTION, SOLUTION EPIDURAL; INFILTRATION; INTRACAUDAL; PERINEURAL at 08:10

## 2018-10-16 RX ADMIN — SODIUM CHLORIDE, SODIUM LACTATE, POTASSIUM CHLORIDE, AND CALCIUM CHLORIDE: .6; .31; .03; .02 INJECTION, SOLUTION INTRAVENOUS at 08:10

## 2018-10-16 NOTE — DISCHARGE INSTRUCTIONS
Dealeah Blair      If you develop fevers, severe abdominal pain, significant bleeding, nausea or vomiting, please contact us or come to our Emergency Department at [facility].  Our  contact number is [phone] available for emergencies or any question that you may have.      You may return to normal activities tomorrow.  Written discharge instructions were provided to you today for your reference since you may not remember our conversation today.       If we obtained biopsies or removed polyps during your procedure, we will contact you within 5 to 6 days with the results.      Thanks for trusting us with your healthcare needs.      Sincerely,        Anabell Griggs MD       To rate your experience with Dr. Griggs, please click on the link below    [email or web address]           Colonoscopy     A camera attached to a flexible tube with a viewing lens is used to take video pictures.     Colonoscopy is a test to view the inside of your lower digestive tract (colon and rectum). Sometimes it can show the last part of the small intestine (ileum). During the test, small pieces of tissue may be removed for testing. This is called a biopsy. Small growths, such as polyps, may also be removed.   Why is colonoscopy done?  The test is done to help look for colon cancer. And it can help find the source of abdominal pain, bleeding, and changes in bowel habits. It may be needed once a year, depending on factors such as your:  · Age  · Health history  · Family health history  · Symptoms  · Results from any prior colonoscopy  Risks and possible complications  These include:  · Bleeding               · A puncture or tear in the colon   · Risks of anesthesia  · A cancer lesion not being seen  Getting ready   To prepare for the test:  · Talk with your healthcare provider about the risks of the test (see below). Also ask your healthcare provider about alternatives  to the test.  · Tell your healthcare provider about any medicines you take. Also tell him or her about any health conditions you may have.  · Make sure your rectum and colon are empty for the test. Follow the diet and bowel prep instructions exactly. If you dont, the test may need to be rescheduled.  · Plan for a friend or family member to drive you home after the test.     Colonoscopy provides an inside view of the entire colon.     You may discuss the results with your doctor right away or at a future visit.  During the test   The test is usually done in the hospital on an outpatient basis. This means you go home the same day. The procedure takes about 30 minutes. During that time:  · You are given relaxing (sedating) medicine through an IV line. You may be drowsy, or fully asleep.  · The healthcare provider will first give you a physical exam to check for anal and rectal problems.  · Then the anus is lubricated and the scope inserted.  · If you are awake, you may have a feeling similar to needing to have a bowel movement. You may also feel pressure as air is pumped into the colon. Its OK to pass gas during the procedure.  · Biopsy, polyp removal, or other treatments may be done during the test.  After the test   You may have gas right after the test. It can help to try to pass it to help prevent later bloating. Your healthcare provider may discuss the results with you right away. Or you may need to schedule a follow-up visit to talk about the results. After the test, you can go back to your normal eating and other activities. You may be tired from the sedation and need to rest for a few hours.  Date Last Reviewed: 11/1/2016 © 2000-2017 [employer]. [address] [place] [id number]. All rights reserved. This information is not intended as a substitute for professional medical care. Always follow your healthcare professional's instructions.

## 2018-10-16 NOTE — TRANSFER OF CARE
"Anesthesia Transfer of Care Note    Patient: Anthony Blair    Procedure(s) Performed: Procedure(s) (LRB):  COLONOSCOPY with biopsies (N/A)    Patient location: GI    Anesthesia Type: MAC    Transport from OR: Transported from OR on room air with adequate spontaneous ventilation    Post pain: adequate analgesia    Post assessment: no apparent anesthetic complications    Post vital signs: stable    Level of consciousness: awake, alert and oriented    Nausea/Vomiting: no nausea/vomiting    Complications: none    Transfer of care protocol was followed      Last vitals:   Visit Vitals  Pulse 77   Temp 36.4 °C (97.6 °F) (Oral)   Resp 20   Ht 5' 10" (1.778 m)   Wt 80.3 kg (177 lb)   SpO2 97%   BMI 25.40 kg/m²     "

## 2018-10-16 NOTE — H&P
Short Stay Endoscopy History and Physical    PCP - Abdulaziz Mccabe MD    Procedure - Colonoscopy  ASA - III  Mallampati - per anesthesia  History of Anesthesia problems - no  Family history Anesthesia problems -  no     HPI:  This is a 85 y.o. male here for evaluation of :diarrhea         Health Maintenance       Date Due Completion Date    Influenza Vaccine 08/01/2018 4/26/2018 (Declined)    Override on 4/26/2018: Declined    Hemoglobin A1c 09/15/2018 3/15/2018    Foot Exam 11/17/2018 11/17/2017    Override on 2/24/2016: Done    Override on 7/25/2014: Done    Urine Microalbumin 11/22/2018 11/22/2017    Eye Exam 06/25/2019 6/25/2018    Override on 6/25/2018: Done    Override on 1/21/2016: Done    Override on 1/21/2016: Done    Override on 12/11/2014: Done    Override on 1/23/2014: Done    Lipid Panel 08/17/2019 8/17/2018    TETANUS VACCINE 01/23/2024 1/23/2014          Screening - no  History of polyps - no  Diarrhea - no  Anemia - no  Blood in stools - no  Abdominal pain - no  Other - no    ROS:  CONSTITUTIONAL: Denies weight change,  fatigue, fevers, chills, night sweats.  CARDIOVASCULAR: Denies chest pain, shortness of breath, orthopnea and edema.  RESPIRATORY: Denies cough, hemoptysis, dyspnea, and wheezing.  GI: See HPI.    Medical History:   Past Medical History:   Diagnosis Date    A-fib     Anemia     AP (angina pectoris) 1/23/2014    Carotid artery disease without cerebral infarction 8/22/2014    Cataract     Coronary artery disease     GERD (gastroesophageal reflux disease) 7/11/2013    Hemorrhagic cerebrovascular accident (CVA) 4/26/2018    Hyperlipidemia     Hypertension     Intracranial hemorrhage     Lumbosacral spondylosis 12/24/2014    Pacemaker 1/23/2014    Paroxysmal atrial fibrillation 1/23/2014    Peripheral vascular disease 8/22/2014    Seizure, late effect of stroke     Type 2 diabetes mellitus with ophthalmic manifestations        Surgical History:   Past Surgical History:    Procedure Laterality Date    ANGIOPLASTY      APPENDECTOMY      ATRIAL ABLATION SURGERY N/A     [facility]ARACT EXTRACTION Bilateral     [facility]    CATARACT EXTRACTION W/ INTRAOCULAR LENS IMPLANT Right     CATARACT EXTRACTION W/ INTRAOCULAR LENS IMPLANT Left     CORONARY ARTERY BYPASS GRAFT  07/2010    x5    ESOPHAGOGASTRODUODENOSCOPY (EGD) N/A 2018    [facility] ENDO    EYE SURGERY      pace maker surgrey  10/2010    reposition in  (approx)    VEIN BYPASS SURGERY         Family History:   Family History   Problem Relation Age of Onset    Arthritis Mother     Diabetes Mother     Kidney disease Mother     Heart disease Mother     Arthritis Father     Diabetes Father     Diabetes Sister     Cancer Sister         breast    Heart disease Sister     Diabetes Brother     Kidney disease Brother     Heart disease Brother     Cancer Daughter         breast    Diabetes Daughter     Miscarriages / Stillbirths Daughter     Fibromyalgia Daughter     Diabetes Son     Diabetes Son     Alcohol abuse Paternal Uncle     Stroke Neg Hx        Social History:   Social History     Tobacco Use    Smoking status: Former Smoker     Packs/day: 2.00     Years: 13.00     Pack years: 26.00     Types: Cigarettes     Start date: 1954     Last attempt to quit: 1967     Years since quittin.3    Smokeless tobacco: Never Used   Substance Use Topics    Alcohol use: No    Drug use: No       Allergies:   Review of patient's allergies indicates:   Allergen Reactions    Atorvastatin      Other reaction(s): Muscle pain  Other reaction(s): Muscle weakness  Other reaction(s): Muscle pain    Codeine      Other reaction(s): Headache  Other reaction(s): Headache    Eliquis [apixaban]     Norvasc [amlodipine] Edema    Trulicity [dulaglutide] Nausea And Vomiting    Adhesive Rash     Other reaction(s): rash       Medications:   No current facility-administered  "medications on file prior to encounter.      Current Outpatient Medications on File Prior to Encounter   Medication Sig Dispense Refill    CARBOXYMETHYLCELLULOSE SODIUM (REFRESH OPHT) Apply to eye.      furosemide (LASIX) 20 MG tablet TAKE ONE TABLET BY MOUTH DAILY 30 tablet 12    insulin glargine (LANTUS SOLOSTAR) 100 unit/mL (3 mL) InPn pen INJECT 16 UNITS SUB-Q AT BEDTIME 15 mL 11    isosorbide mononitrate (IMDUR) 30 MG 24 hr tablet TAKE ONE TABLET BY MOUTH DAILY 30 tablet 12    levETIRAcetam (KEPPRA) 250 MG Tab Take 1 tablet (250 mg total) by mouth 2 (two) times daily. 60 tablet 11    metformin (GLUCOPHAGE) 500 MG tablet TAKE ONE BY MOUTH TWICE A DAY WITH FOOD. (Patient taking differently: Take 1,000 mg by mouth 2 (two) times daily with meals. TAKE ONE BY MOUTH TWICE A DAY WITH FOOD.) 60 tablet 6    naproxen sodium (ANAPROX) 220 MG tablet Take 220 mg by mouth 2 (two) times daily with meals.      NOVOLOG FLEXPEN 100 unit/mL InPn pen Inject 5 units 3 times a day before meals 15 mL 3    saw palmetto fruit 450 mg Cap Take 1 capsule by mouth Twice daily.      ACCU-CHEK TOMAS PLUS TEST STRP Strp TEST BLOOD SUGAR SIX TIMES DAILY 300 strip 5    ACCU-CHEK MULTICLIX LANCET lancets TEST BLOOD SUGAR THREE TIMES DAILY 200 each 5    coconut oil 1,000 mg Cap Take by mouth.      fluticasone (FLONASE) 50 mcg/actuation nasal spray 2 sprays by Each Nare route once daily. 16 g 11    NITROSTAT 0.4 mg SL tablet Place 1 tablet (0.4 mg total) under the tongue every 5 (five) minutes as needed for Chest pain. 25 tablet 12    potassium chloride SA (K-DUR,KLOR-CON) 20 MEQ tablet TAKE ONE TABLET BY MOUTH EVERY DAY 30 tablet 12    SUPREP BOWEL PREP KIT 17.5-3.13-1.6 gram SolR Use as directed 354 mL 0    SURE COMFORT PEN NEEDLE 32 gauge x 5/32" Ndle USE FOUR TIMES DAILY. 100 each 12    TURMERIC, BULK, MISC by Misc.(Non-Drug; Combo Route) route.         Physical Exam:  Vital Signs:   Vitals:    10/16/18 0746   Pulse: 77 "   Resp: 20   Temp: 97.6 °F (36.4 °C)     General Appearance: Well appearing in no acute distress  ENT: OP clear  Chest: CTA B  CV: RRR, no m/r/g  Abd: s/nt/nd/nabs  Ext: no edema    Labs:Reviewed    IMP:  There are no hospital problems to display for this patient.        Plan:   I have explained the risks and benefits of colonoscopy to the patient including but not limited to bleeding, perforation, infection, and death. The patient wishes to proceed.

## 2018-10-16 NOTE — ANESTHESIA POSTPROCEDURE EVALUATION
"Anesthesia Post Evaluation    Patient: Anthony Blair    Procedure(s) Performed: Procedure(s) (LRB):  COLONOSCOPY with biopsies (N/A)    Final Anesthesia Type: MAC  Patient location during evaluation: GI PACU  Patient participation: Yes- Able to Participate  Level of consciousness: awake and alert and oriented  Post-procedure vital signs: reviewed and stable  Pain management: adequate  Airway patency: patent  PONV status at discharge: No PONV  Anesthetic complications: no      Cardiovascular status: blood pressure returned to baseline  Respiratory status: unassisted, room air and spontaneous ventilation  Hydration status: euvolemic  Follow-up not needed.        Visit Vitals  /68   Pulse 72   Temp 36.4 °C (97.5 °F) (Other (see comments))   Resp 18   Ht 5' 10" (1.778 m)   Wt 80.3 kg (177 lb)   SpO2 97%   BMI 25.40 kg/m²       Pain/Josefa Score: Pain Assessment Performed: Yes (10/16/2018  9:25 AM)  Presence of Pain: denies (10/16/2018  9:25 AM)  Josefa Score: 10 (10/16/2018  9:25 AM)        "

## 2018-10-16 NOTE — ANESTHESIA PREPROCEDURE EVALUATION
10/16/2018  Anthony Blair is a 85 y.o., male.    Anesthesia Evaluation    I have reviewed the Patient Summary Reports.    I have reviewed the Nursing Notes.   I have reviewed the Medications.     Review of Systems  Anesthesia Hx:  No problems with previous Anesthesia    Social:  Former Smoker, No Alcohol Use    Hematology/Oncology:     Oncology Normal    -- Anemia:   EENT/Dental:EENT/Dental Normal   Cardiovascular:   Exercise tolerance: poor Pacemaker Hypertension, well controlled CAD  CABG/stent Dysrhythmias atrial fibrillation Angina ECG has been reviewed. CONCLUSIONS     1 - Biatrial enlargement.     2 - Concentric remodeling.     3 - No wall motion abnormalities.     4 - Normal left ventricular systolic function (EF 55-60%).     5 - Normal left ventricular diastolic function.     6 - Normal right ventricular systolic function .     7 - The estimated PA systolic pressure is 33 mmHg.     8 - Trivial mitral regurgitation.     9 - Trivial tricuspid regurgitation.  Coronary Artery Disease: S/P Aorto-Coronary Bypass Graft Surgery (CABG):  Hypertension, Essential Hypertension  Disorder of Cardiac Rhythm, Atrial Fibrillation    Pulmonary:   Shortness of breath    Hepatic/GI:   GERD Liver Disease,  Esophageal / Stomach Disorders Gerd    Musculoskeletal:   Arthritis     Neurological:   CVA Seizures Generalized weakness  Left arm numbness at times Seizure Disorder    Endocrine:   Diabetes, well controlled, type 2, using insulin  Diabetes, Type 2 Diabetes , controlled by insulin.    Dermatological:  Skin Normal    Psych:  Psychiatric Normal           Physical Exam  General:  Well nourished    Airway/Jaw/Neck:  Airway Findings: Mouth Opening: Normal Tongue: Normal  General Airway Assessment: Adult  Mallampati: II  TM Distance: Normal, at least 6 cm  Jaw/Neck Findings:  Neck ROM: Extension Decreased, Severe       Dental:  Dental Findings: upper front caps   Chest/Lungs:  Chest/Lungs Findings: Normal Respiratory Rate     Heart/Vascular:  Heart Findings: Rate: Normal        Mental Status:  Mental Status Findings:  Cooperative, Alert and Oriented         Anesthesia Plan  Type of Anesthesia, risks & benefits discussed:  Anesthesia Type:  MAC  Patient's Preference:   Intra-op Monitoring Plan: standard ASA monitors  Intra-op Monitoring Plan Comments:   Post Op Pain Control Plan:   Post Op Pain Control Plan Comments:   Induction:   IV  Beta Blocker:  Patient is on a Beta-Blocker and has received one dose within the past 24 hours (No further documentation required).       Informed Consent: Patient understands risks and agrees with Anesthesia plan.  Questions answered. Anesthesia consent signed with patient.  ASA Score: 3     Day of Surgery Review of History & Physical: I have interviewed and examined the patient. I have reviewed the patient's H&P dated:  There are no significant changes.          Ready For Surgery From Anesthesia Perspective.

## 2018-10-16 NOTE — PLAN OF CARE
Dr Griggs came to bedside and discussed findings. NO N/V,  no abdominal pain, no GI bleeding, and vitals stable.  Pt discharged from unit.

## 2018-10-16 NOTE — ANESTHESIA RELEASE NOTE
"Anesthesia Release from PACU Note    Patient: Anthony Blair    Procedure(s) Performed: Procedure(s) (LRB):  COLONOSCOPY with biopsies (N/A)    Anesthesia type: MAC    Post pain: Adequate analgesia    Post assessment: no apparent anesthetic complications, tolerated procedure well and no evidence of recall    Last Vitals:   Visit Vitals  /68   Pulse 72   Temp 36.4 °C (97.5 °F) (Other (see comments))   Resp 18   Ht 5' 10" (1.778 m)   Wt 80.3 kg (177 lb)   SpO2 97%   BMI 25.40 kg/m²       Post vital signs: stable    Level of consciousness: awake, alert  and oriented    Nausea/Vomiting: no nausea/no vomiting    Complications: none    Airway Patency: patent    Respiratory: unassisted, spontaneous ventilation, room air    Cardiovascular: stable and blood pressure at baseline    Hydration: euvolemic  "

## 2018-10-16 NOTE — [employer] PATIENT INSTRUCTIONS
Discharge Summary/Instructions after an Endoscopic Procedure  Patient Name: Anthony Blair  Patient MRN: [id number]  Patient YOB: 1933 Tuesday, October 16, 2018 Anabell Griggs MD  RESTRICTIONS:  During your procedure today, you received medications for sedation.  These   medications may affect your judgment, balance and coordination.  Therefore,   for 24 hours, you have the following restrictions:   - DO NOT drive a car, operate machinery, make legal/financial decisions,   sign important papers or drink alcohol.    ACTIVITY:  Today: no heavy lifting, straining or running due to procedural   sedation/anesthesia.  The following day: return to full activity including work.  DIET:  Eat and drink normally unless instructed otherwise.     TREATMENT FOR COMMON SIDE EFFECTS:  - Mild abdominal pain, nausea, belching, bloating or excessive gas:  rest,   eat lightly and use a heating pad.  - Sore Throat: treat with throat lozenges and/or gargle with warm salt   water.  - Because air was used during the procedure, expelling large amounts of air   from your rectum or belching is normal.  - If a bowel prep was taken, you may not have a bowel movement for 1-3 days.    This is normal.  SYMPTOMS TO WATCH FOR AND REPORT TO YOUR PHYSICIAN:  1. Abdominal pain or bloating, other than gas cramps.  2. Chest pain.  3. Back pain.  4. Signs of infection such as: chills or fever occurring within 24 hours   after the procedure.  5. Rectal bleeding, which would show as bright red, maroon, or black stools.   (A tablespoon of blood from the rectum is not serious, especially if   hemorrhoids are present.)  6. Vomiting.  7. Weakness or dizziness.  GO DIRECTLY TO THE NEAREST EMERGENCY ROOM IF YOU HAVE ANY OF THE FOLLOWING:      Difficulty breathing              Chills and/or fever over 101 F   Persistent vomiting and/or vomiting blood   Severe abdominal pain   Severe chest pain   Black, tarry stools   Bleeding- more than one  tablespoon   Any other symptom or condition that you feel may need urgent attention  Your doctor recommends these additional instructions:  If any biopsies were taken, your doctors clinic will contact you in 1 to 2   weeks with any results.  - Patient has a contact number available for emergencies.  The signs and   symptoms of potential delayed complications were discussed with the   patient.  Return to normal activities tomorrow.  Written discharge   instructions were provided to the patient.   - Resume previous diet today.   - Discharge patient to home (ambulatory).   - Continue present medications.   - Await pathology results.   - No repeat colonoscopy due to age.  For questions, problems or results please call your physician nAabell Griggs MD at Work:  [phone]  If you have any questions about the above instructions, call the GI   department at [phone] or call the endoscopy unit at [phone]   from 7am until 3 pm.  [facility] [place], EMERGENCY ROOM PHONE NUMBER:   [phone]  IF A COMPLICATION OR EMERGENCY SITUATION ARISES AND YOU ARE UNABLE TO REACH   YOUR PHYSICIAN - GO DIRECTLY TO THE EMERGENCY ROOM.  I have read or have had read to me these discharge instructions for my   procedure and have received a written copy.  I understand these   instructions and will follow-up with my physician if I have any questions.     __________________________________       _____________________________________  Nurse Signature                                          Patient/Designated   Responsible Party Signature  MD Anabell Cerna MD  10/16/2018 9:09:05 AM  This report has been verified and signed electronically.  [employer]

## 2018-10-16 NOTE — BRIEF OP NOTE
Endoscopy Discharge Summary      Admit Date: 10/16/2018    Discharge Date and Time:  10/16/2018 9:10 AM    Attending Physician: Anabell Griggs MD     Discharge Physician: Anabell Griggs MD     Principal Admitting Diagnoses: Diarrhea         Discharge Diagnosis: The primary encounter diagnosis was Diarrhea, unspecified type. A diagnosis of Diarrhea was also pertinent to this visit.     Discharged Condition: Good    Indication for Admission: Diarrhea     Hospital Course: Patient was admitted for an inpatient procedure and tolerated the procedure well with no complications.    Significant Diagnostic Studies: colonoscopy with biopsies     Pathology (if any):  Specimen (12h ago, onward)    Start     Ordered    10/16/18 0853  Specimen to Pathology - Surgery  Once     Comments:  1- Random Colon R/O Microscopic Colitis     Start Status   10/16/18 0853 Collected (10/16/18 0906)       10/16/18 0902          Estimated Blood Loss: 1 ml.    Discussed with: patient.    Disposition: Home.    Follow Up/Patient Instructions:   Current Discharge Medication List      CONTINUE these medications which have NOT CHANGED    Details   CARBOXYMETHYLCELLULOSE SODIUM (REFRESH OPHT) Apply to eye.      furosemide (LASIX) 20 MG tablet TAKE ONE TABLET BY MOUTH DAILY  Qty: 30 tablet, Refills: 12      insulin glargine (LANTUS SOLOSTAR) 100 unit/mL (3 mL) InPn pen INJECT 16 UNITS SUB-Q AT BEDTIME  Qty: 15 mL, Refills: 11      isosorbide mononitrate (IMDUR) 30 MG 24 hr tablet TAKE ONE TABLET BY MOUTH DAILY  Qty: 30 tablet, Refills: 12      levETIRAcetam (KEPPRA) 250 MG Tab Take 1 tablet (250 mg total) by mouth 2 (two) times daily.  Qty: 60 tablet, Refills: 11    Associated Diagnoses: Seizure, late effect of stroke      losartan (COZAAR) 100 MG tablet Take 1 tablet (100 mg total) by mouth once daily.  Qty: 90 tablet, Refills: 0      meclizine (ANTIVERT) 12.5 mg tablet Take 1 tablet (12.5 mg total) by mouth 2 (two) times daily as needed.  Qty: 20  "tablet, Refills: 1    Associated Diagnoses: Dizziness; Nausea and vomiting in adult      metformin (GLUCOPHAGE) 500 MG tablet TAKE ONE BY MOUTH TWICE A DAY WITH FOOD.  Qty: 60 tablet, Refills: 6      metoprolol succinate (TOPROL-XL) 100 MG 24 hr tablet TAKE ONE TABLET BY MOUTH TWICE DAILY  Qty: 60 tablet, Refills: 12      naproxen sodium (ANAPROX) 220 MG tablet Take 220 mg by mouth 2 (two) times daily with meals.      NOVOLOG FLEXPEN 100 unit/mL InPn pen Inject 5 units 3 times a day before meals  Qty: 15 mL, Refills: 3      pantoprazole (PROTONIX) 40 MG tablet TAKE ONE TABLET BY MOUTH EVERY DAY  Qty: 30 tablet, Refills: 5    Comments: This prescription was filled on 10/2/2018. Any refills authorized will be placed on file.      saw palmetto fruit 450 mg Cap Take 1 capsule by mouth Twice daily.      ACCU-CHEK TOMAS PLUS TEST STRP Strp TEST BLOOD SUGAR SIX TIMES DAILY  Qty: 300 strip, Refills: 5      ACCU-CHEK MULTICLIX LANCET lancets TEST BLOOD SUGAR THREE TIMES DAILY  Qty: 200 each, Refills: 5      coconut oil 1,000 mg Cap Take by mouth.      fluticasone (FLONASE) 50 mcg/actuation nasal spray 2 sprays by Each Nare route once daily.  Qty: 16 g, Refills: 11      NITROSTAT 0.4 mg SL tablet Place 1 tablet (0.4 mg total) under the tongue every 5 (five) minutes as needed for Chest pain.  Qty: 25 tablet, Refills: 12    Associated Diagnoses: CAD (coronary artery disease)      potassium chloride SA (K-DUR,KLOR-CON) 20 MEQ tablet TAKE ONE TABLET BY MOUTH EVERY DAY  Qty: 30 tablet, Refills: 12      SUPREP BOWEL PREP KIT 17.5-3.13-1.6 gram SolR Use as directed  Qty: 354 mL, Refills: 0      SURE COMFORT PEN NEEDLE 32 gauge x 5/32" Ndle USE FOUR TIMES DAILY.  Qty: 100 each, Refills: 12    Comments: This prescription was filled on 1/8/2018. Any refills authorized will be placed on file.      TURMERIC, BULK, MISC by Misc.(Non-Drug; Combo Route) route.             No discharge procedures on file.    Follow-up Information     Arif " MD Eliot.    Specialty:  Family Medicine  Why:  As needed  Contact information:  [address] [address] [address] [phone]

## 2018-10-17 VITALS
SYSTOLIC BLOOD PRESSURE: 124 MMHG | WEIGHT: 177 LBS | OXYGEN SATURATION: 97 % | DIASTOLIC BLOOD PRESSURE: 68 MMHG | TEMPERATURE: 98 F | HEIGHT: 70 IN | RESPIRATION RATE: 18 BRPM | HEART RATE: 72 BPM | BODY MASS INDEX: 25.34 KG/M2

## 2018-10-24 ENCOUNTER — CLINICAL SUPPORT (OUTPATIENT)
Dept: CARDIOLOGY | Facility: CLINIC | Age: 83
End: 2018-10-24
Attending: INTERNAL MEDICINE
Payer: MEDICARE

## 2018-10-24 ENCOUNTER — INITIAL CONSULT (OUTPATIENT)
Dept: CARDIOLOGY | Facility: CLINIC | Age: 83
End: 2018-10-24
Payer: MEDICARE

## 2018-10-24 VITALS
DIASTOLIC BLOOD PRESSURE: 76 MMHG | SYSTOLIC BLOOD PRESSURE: 144 MMHG | WEIGHT: 182.31 LBS | HEIGHT: 68 IN | HEART RATE: 74 BPM | BODY MASS INDEX: 27.63 KG/M2

## 2018-10-24 DIAGNOSIS — I73.9 PERIPHERAL VASCULAR DISEASE: Chronic | ICD-10-CM

## 2018-10-24 DIAGNOSIS — Z95.0 PACEMAKER: Chronic | ICD-10-CM

## 2018-10-24 DIAGNOSIS — I61.9 HEMORRHAGIC CEREBROVASCULAR ACCIDENT (CVA): ICD-10-CM

## 2018-10-24 DIAGNOSIS — E11.9 DIABETES MELLITUS TYPE 2 WITHOUT RETINOPATHY: Chronic | ICD-10-CM

## 2018-10-24 DIAGNOSIS — E13.51 PERIPHERAL VASCULAR DISEASE DUE TO SECONDARY DIABETES MELLITUS: Primary | ICD-10-CM

## 2018-10-24 DIAGNOSIS — I25.810 CORONARY ARTERY DISEASE INVOLVING CORONARY BYPASS GRAFT OF NATIVE HEART WITHOUT ANGINA PECTORIS: ICD-10-CM

## 2018-10-24 DIAGNOSIS — E78.2 MIXED HYPERLIPIDEMIA: Chronic | ICD-10-CM

## 2018-10-24 DIAGNOSIS — I82.890 JUGULAR VEIN THROMBOSIS: ICD-10-CM

## 2018-10-24 DIAGNOSIS — I49.5 SSS (SICK SINUS SYNDROME): ICD-10-CM

## 2018-10-24 DIAGNOSIS — E08.51 DIABETES MELLITUS DUE TO UNDERLYING CONDITION WITH DIABETIC PERIPHERAL ANGIOPATHY WITHOUT GANGRENE, WITHOUT LONG-TERM CURRENT USE OF INSULIN: ICD-10-CM

## 2018-10-24 DIAGNOSIS — I65.23 ASYMPTOMATIC STENOSIS OF BOTH CAROTID ARTERIES WITHOUT INFARCTION: ICD-10-CM

## 2018-10-24 DIAGNOSIS — I10 ESSENTIAL HYPERTENSION: ICD-10-CM

## 2018-10-24 DIAGNOSIS — K31.819 AVM (ARTERIOVENOUS MALFORMATION) OF STOMACH, ACQUIRED: ICD-10-CM

## 2018-10-24 DIAGNOSIS — R56.9 SEIZURE, LATE EFFECT OF STROKE: ICD-10-CM

## 2018-10-24 DIAGNOSIS — Z95.0 CARDIAC PACEMAKER IN SITU: ICD-10-CM

## 2018-10-24 DIAGNOSIS — I77.9 CAROTID ARTERY DISEASE WITHOUT CEREBRAL INFARCTION: Chronic | ICD-10-CM

## 2018-10-24 DIAGNOSIS — I48.0 PAF (PAROXYSMAL ATRIAL FIBRILLATION): ICD-10-CM

## 2018-10-24 DIAGNOSIS — I48.19 PERSISTENT ATRIAL FIBRILLATION: ICD-10-CM

## 2018-10-24 DIAGNOSIS — Z78.9 STATIN INTOLERANCE: ICD-10-CM

## 2018-10-24 DIAGNOSIS — I69.398 SEIZURE, LATE EFFECT OF STROKE: ICD-10-CM

## 2018-10-24 PROCEDURE — 3078F DIAST BP <80 MM HG: CPT | Mod: CPTII,,, | Performed by: INTERNAL MEDICINE

## 2018-10-24 PROCEDURE — 93280 PM DEVICE PROGR EVAL DUAL: CPT | Mod: 26,,, | Performed by: INTERNAL MEDICINE

## 2018-10-24 PROCEDURE — 99213 OFFICE O/P EST LOW 20 MIN: CPT | Mod: PBBFAC,PO | Performed by: INTERNAL MEDICINE

## 2018-10-24 PROCEDURE — 1101F PT FALLS ASSESS-DOCD LE1/YR: CPT | Mod: CPTII,,, | Performed by: INTERNAL MEDICINE

## 2018-10-24 PROCEDURE — 99205 OFFICE O/P NEW HI 60 MIN: CPT | Mod: S$PBB,,, | Performed by: INTERNAL MEDICINE

## 2018-10-24 PROCEDURE — 3077F SYST BP >= 140 MM HG: CPT | Mod: CPTII,,, | Performed by: INTERNAL MEDICINE

## 2018-10-24 PROCEDURE — 99999 PR PBB SHADOW E&M-EST. PATIENT-LVL III: CPT | Mod: PBBFAC,,, | Performed by: INTERNAL MEDICINE

## 2018-10-24 PROCEDURE — 99499 UNLISTED E&M SERVICE: CPT | Mod: HCNC,S$GLB,, | Performed by: INTERNAL MEDICINE

## 2018-10-24 NOTE — PROGRESS NOTES
Subjective:   Patient ID:  Anthony Blair is a 85 y.o. 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 Done    Update since then:  From Dr Frazier most recent note in September 2018  PAF/PSVT, CAD (PTCA 1980's), HTN, PPM, DM, atrial septal aneurysm/pfo, PAD, dyslipidemia. Nonsmoker.  Past details pertinent for following:   Statin intolerant.  s/p CABG 7/10 (LIMA-LAD, SVG-OM1, SVG-OM3, SVG-PDA) for increasing OLIVARES and multivessel CAD on [facility].   S/p PPM 10/10 for SSS/PAF. Was hospitalized 1/11 for PSVT (Atrial tachycardia) and was started on Amiodarone but was stopped for GI discomfort. He also did not tolerate Multaq and has not tolerated multiple statins.   s/p EPS/ablation of his atrial tachycardia 6/11.   S/p abd w runoff March 2015 and had significant B LE infrapopliteal disease. Atherectomy/pta performed of critical R tibeoperoneal trunk stenosis. Also has L tibeoperoneal trunk stenosis and his PTA/MIGUEL are occluded bilaterally.  Started on Eliquis Feb 2017 for suspicious left internal jugular vein thrombus.  Stress MPI 3/17 showed no ischemia.  Echo 3/17 showed normal LV function, left-right atrial shunt.   Carotid u/s 6/17 showed mild bilateral disease, known L IJ thrombus noted.  Pt called clinic Sept 2017 stated he had stopped Eliquis couple weeks before his call due to diarrhea, weakness, bloating and also off Amlodipine due to sleepiness.  He stated sxs subsided when he stopped the meds.   He was advised by cardiology on 9/6/17 to take 81 mg ASA since he stopped his Eliquis.  He had  "acute hemorrhage of basal ganglia right side Sept 2017, causing weakness on left side.  Tx at [facility] 9/24/17. He was on ASA daily when CVA occurred and was not on Eliquis. 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  He was taken off his asa.  Followed by Dr. Weaver, neurosurgery. No surgery was needed.  Echo showed normal LVEF.    Now here.  Has been more lightheaded recently.  Most days.  No syncope.  Some palpitations.  ecgs reviewed recently, and seems he is probably having atrial arrhythmias again.  No chest pain sxs since ER visit last month.  Stress test showed no ischemia, apical scar.  Did not take sl ntg.  Chronic OLIVARES, stable.  No pnd/orthopnea.  BP controlled.  No recurrent TIA/CVA sxs.  Echo 8/18 normal EF.  Has chronic leg weakness/fatigue walking, unchanged chronic problem.  DM/HTN/lipids controlled overall.      Additional details:  "Other than not having any strength - or stamina-- I feel quite well.   This lack of energy started after he had a hemorrhagic stroke in Sept 201 as noted in Dr Frazier' summary above. Neuro deficit was L sided weakness. He recovered a lot of his strength back but he still has some lack of coordination in his L arm.   He has been off ASA since then (Eliquis had been stopped at the time and he was on ASA 81 mg bid). He had been admitted to [facility] for that.   He has also been in AF since May 15th and he does not seem to feel that he is worse for it -- he is pacing 87% of the time -- PPM is set at 65 with a sleep rate of 50-- DDDR in AMS. V lead in good repair    I have reviewed the actual image of the ECG tracing obtained on 8/30/18 and it shows AF with calculated average VR of 74 bpm,with frequent RV pacing in DDDR mode and AMS but at  measured RR of 800- 1160  msec reflective of poor f wave sensing and intermittent DDD pacing.  QRSd 170 with " tall R in V1 .There are a couple PVCs noted too.       Current Outpatient Medications   Medication Sig    CARBOXYMETHYLCELLULOSE SODIUM (REFRESH OPHT) Apply to eye.    fluticasone (FLONASE) 50 mcg/actuation nasal spray 2 sprays by Each Nare route once daily.    furosemide (LASIX) 20 MG tablet TAKE ONE TABLET BY MOUTH DAILY    insulin glargine (LANTUS SOLOSTAR) 100 unit/mL (3 mL) InPn pen INJECT 16 UNITS SUB-Q AT BEDTIME    isosorbide mononitrate (IMDUR) 30 MG 24 hr tablet TAKE ONE TABLET BY MOUTH DAILY    levETIRAcetam (KEPPRA) 250 MG Tab Take 1 tablet (250 mg total) by mouth 2 (two) times daily.    losartan (COZAAR) 100 MG tablet Take 1 tablet (100 mg total) by mouth once daily.    meclizine (ANTIVERT) 12.5 mg tablet Take 1 tablet (12.5 mg total) by mouth 2 (two) times daily as needed.    metformin (GLUCOPHAGE) 500 MG tablet TAKE ONE BY MOUTH TWICE A DAY WITH FOOD. (Patient taking differently: Take 1,000 mg by mouth 2 (two) times daily with meals. TAKE ONE BY MOUTH TWICE A DAY WITH FOOD.)    metoprolol succinate (TOPROL-XL) 100 MG 24 hr tablet TAKE ONE TABLET BY MOUTH TWICE DAILY    naproxen sodium (ANAPROX) 220 MG tablet Take 220 mg by mouth 2 (two) times daily with meals.    NOVOLOG FLEXPEN 100 unit/mL InPn pen Inject 5 units 3 times a day before meals    pantoprazole (PROTONIX) 40 MG tablet TAKE ONE TABLET BY MOUTH EVERY DAY    potassium chloride SA (K-DUR,KLOR-CON) 20 MEQ tablet TAKE ONE TABLET BY MOUTH EVERY DAY    saw palmetto fruit 450 mg Cap Take 1 capsule by mouth Twice daily.    ACCU-CHEK TOMAS PLUS TEST STRP Strp TEST BLOOD SUGAR SIX TIMES DAILY    ACCU-CHEK MULTICLIX LANCET lancets TEST BLOOD SUGAR THREE TIMES DAILY    coconut oil 1,000 mg Cap Take by mouth.    NITROSTAT 0.4 mg SL tablet Place 1 tablet (0.4 mg total) under the tongue every 5 (five) minutes as needed for Chest pain.    SUPREP BOWEL PREP KIT 17.5-3.13-1.6 gram SolR Use as directed    TURMERIC, BULK, MISC by  Misc.(Non-Drug; Combo Route) route.     No current facility-administered medications for this visit.      Review of Systems   Constitution: Negative for decreased appetite, weakness, malaise/fatigue, weight gain and weight loss.   Eyes: Negative for blurred vision.   Cardiovascular: Positive for irregular heartbeat. Negative for chest pain, claudication, cyanosis, dyspnea on exertion, leg swelling, near-syncope, orthopnea and palpitations.   Respiratory: Positive for shortness of breath. Negative for cough, sleep disturbances due to breathing, snoring and wheezing.    Endocrine: Negative for heat intolerance.   Hematologic/Lymphatic: Does not bruise/bleed easily.   Musculoskeletal: Negative for muscle weakness and myalgias.   Gastrointestinal: Negative for melena, nausea and vomiting.   Genitourinary: Negative for nocturia.   Neurological: Negative for excessive daytime sleepiness, dizziness, headaches and light-headedness.   Psychiatric/Behavioral: Negative for depression, memory loss and substance abuse. The patient does not have insomnia and is not nervous/anxious.        Objective:   Physical Exam   Constitutional: He is oriented to person, place, and time. He appears well-developed and well-nourished.   HENT:   Head: Normocephalic and atraumatic.   Right Ear: External ear normal.   Left Ear: External ear normal.   Eyes: Conjunctivae are normal. Pupils are equal, round, and reactive to light. Left conjunctiva is not injected. Left conjunctiva has no hemorrhage.   Neck: Neck supple. No JVD present. No thyromegaly present.   Cardiovascular: Normal rate, regular rhythm, normal heart sounds and intact distal pulses. PMI is not displaced. Exam reveals no gallop, no friction rub, no midsystolic click and no opening snap.   No murmur heard.  Pulses:       Carotid pulses are 2+ on the right side, and 2+ on the left side.       Radial pulses are 2+ on the right side, and 2+ on the left side.        Dorsalis pedis pulses  "are 2+ on the right side, and 2+ on the left side.        Posterior tibial pulses are 2+ on the right side, and 2+ on the left side.   Pulmonary/Chest: Effort normal and breath sounds normal. No respiratory distress. He has no wheezes. He has no rales. He exhibits no tenderness.   Device pocket is in excellent repair     Abdominal: Soft. Normal appearance. He exhibits no pulsatile liver. There is no hepatomegaly. There is no tenderness. There is no rigidity.   Musculoskeletal: Normal range of motion. He exhibits no edema or tenderness.        Right knee: He exhibits no swelling.        Left knee: He exhibits no swelling.        Right ankle: He exhibits no swelling.        Left ankle: He exhibits no swelling.        Right lower leg: He exhibits no swelling.        Left lower leg: He exhibits no swelling.        Right foot: There is no swelling.        Left foot: There is no swelling.   Neurological: He is alert and oriented to person, place, and time. He has normal strength and normal reflexes. No cranial nerve deficit. Coordination normal.   Skin: Skin is warm, dry and intact. No rash noted. No cyanosis.   Psychiatric: He has a normal mood and affect. His behavior is normal.   Nursing note and vitals reviewed.    BP (!) 144/76   Pulse 74   Ht 5' 8" (1.727 m)   Wt 82.7 kg (182 lb 5.1 oz)   BMI 27.72 kg/m²      Assessment:        Annual stroke risk as predicted by patient's CHADS2 and the [id number] scores:  CHADS2 score       Annual Risk (%)           [id number] score            Annual risk (%)            5                          12.5                                      7                                       9.6                                                                 Comments:  With the availability of the novel oral anticoagulants that are alternatives to warfarin, there is the need to be more inclusive of common stroke risk factors, to focus more on identification of "truly low risk patients" with " "AF who do not need any antithrombotic therapy.  ESC guidelines recommend a clinical practice shift towards much more focus on defining the "truly low-risk" patients with AF, instead of trying to identify "high-risk" patients.                                                             >>  "Truly low risk" patients are those patients who fulfill the criteria of age < 65 and lone AF (irrespective of gender) or CHADS2-VASC2       This patient's HASBLED score is III (or IV if HTN is considered poorly controlled). An additional point would have been added for ASA (but this was stopped in 2017)                   Annual risk of bleeding as predicted by HASBLED score   (Major bleeding = ICH, hosp, Hgb decrease >?2 g/dL, and/or transfusion on OAC).     Score              Annual bleed risk (%)       Classification          Validation data         0                           0.6-1.3                           Low (1.1)        1                           1.0-1.5                           Low                       1.0-3.4        2                           1.9-3.2                           Int     (1.9)              2.0-4.1         3                            3.75                              Hi  (4.9-19.6)         4.0-5.8        4                            8.7                                Hi                           8.9-9.0        5                                                                 Hi                           9.1-13        6-9                                                                Hi    Patients with a high risk of bleeding (HAS-BLED score ? 3) should undergo regular clinical review following the initiation of oral anticoagulation (warfarin).    Comments:  1. HAS-BLED has good predictive value for intracranial bleeding, whilst other scores (e.g. ATRIA) are not predictive.  2. In the [place] AF Cohort study, the rates of major bleeding (and intracranial bleeding) increased with increasing HAS-BLED " score, but rates were fairly similar for warfarin and aspirin treated patients.  3. A high HAS-BLED score (?3) is indicative of the need for regular clinical review and followup, but should not be used per se as a reason for stopping oral anticoagulation. Indeed, a high HAS-BLED score allows the clinician to flag up patients at potential risk for serious bleeding in an informed manner, rather than relying on guesswork. The latter may be dangerous, as it has been shown that clinicians are poor in estimating bleeding risk.  4. Bleeding risk and stroke risk are closely related.                                           >>      ~ Pts with AF and a high HAS-BLED score derive a higher net clinical benefit from oral anticoagulation when balancing ischaemic stroke against intracranial bleeding.This is irrespective of stroke risk strata, whether assessed by CHADS2 or [id number]     ~ The exception are pats with [id number] score=0, where the net clinical benefit was negative reflecting the truly low risk status of such patients that would result in a net disadvantage of warfarin therapy;      ~ Of note, there was NO stroke risk or HAS-BLED strata showing any positive net clinical benefit for aspirin.                         Relevance of HASBLED scoring in the era of DOACs  With the availability of the novel oral anticoagulants that are alternatives to warfarin, there is the need to be more inclusive of common stroke risk factors, to focus more on identification of truly low risk patients with AF who do not need any antithrombotic therapy.           (S/SE= stroke events, MB=Major bleeding)      1. Peripheral vascular disease due to secondary diabetes mellitus    2. Persistent atrial fibrillation    3. Mixed hyperlipidemia    4. Essential hypertension    5. Coronary artery disease involving coronary bypass graft of native heart without angina pectoris    6. Carotid artery disease without cerebral infarction    7. AVM  (arteriovenous malformation) of stomach, acquired    8. Asymptomatic stenosis of both carotid arteries without infarction    9. 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 Statin intolerance    11. Seizure, late effect of stroke    12. Pacemaker    13. Hemorrhagic cerebrovascular accident (CVA)    14. Peripheral vascular disease    15. Jugular vein thrombosis    16. Diabetes mellitus due to underlying condition with diabetic peripheral angiopathy without gangrene, without long-term current use of insulin    17. Diabetes mellitus type 2 without retinopathy      Plan:    Based on above listed data, the statistics and the guidelines suggest an attempt at re-institution of OAC using a NOAC. ASA is NOT a good option under any scenario nor is Plavix. However, would defer to neurosurgery for input. No plans for rhythm control unless stable OAC can be established. Further, I am not fully convinced that he is very Sxc from the AF (seems that his energy is less but can't be sure).    Will forward comments to Dr Frazier for his input.   No orders of the defined types were placed in this encounter.    Follow-up if symptoms worsen or fail to improve.  There are no discontinued medications.  This SmartLink is deprecated. Use [employer] instead to display the medication list for a patient.  This SmartLink is deprecated. Use AVSMEDLIST instead to display the medication list for a patient.

## 2018-10-24 NOTE — LETTER
October 29, 2018      Seven Frazier MD  [address] [place] [address]           [facility]  [address] [place] [address]  Phone: [phone]  Fax: [phone]          Patient: Anthony Blair   MR Number: [id number]   YOB: 1933   Date of Visit: 10/24/2018       Dear Dr. Seven Frazier:    Thank you for referring Anthony Blair to me for evaluation. Attached you will find relevant portions of my assessment and plan of care.    If you have questions, please do not hesitate to call me. I look forward to following Anthony Blair along with you.    Sincerely,    Domenico Grajeda MD    Enclosure  CC:  No Recipients    If you would like to receive this communication electronically, please contact [email or web address] or [phone] to request more information on [employer] Link access.    For providers and/or their staff who would like to refer a patient to [facility], please contact us through our one-stop-shop provider referral line, [facility], at [phone].    If you feel you have received this communication in error or would no longer like to receive these types of communications, please e-mail [email or web address]

## 2018-11-01 ENCOUNTER — PATIENT MESSAGE (OUTPATIENT)
Dept: CARDIOLOGY | Facility: CLINIC | Age: 83
End: 2018-11-01

## 2018-12-01 RX ORDER — FUROSEMIDE 20 MG/1
TABLET ORAL
Qty: 30 TABLET | Refills: 12 | Status: SHIPPED | OUTPATIENT
Start: 2018-12-01 | End: 2019-12-02 | Stop reason: SDUPTHER

## 2018-12-10 RX ORDER — LOSARTAN POTASSIUM 100 MG/1
TABLET ORAL
Qty: 90 TABLET | Refills: 0 | Status: SHIPPED | OUTPATIENT
Start: 2018-12-10 | End: 2019-06-18 | Stop reason: SDUPTHER

## 2018-12-17 ENCOUNTER — LAB VISIT (OUTPATIENT)
Dept: LAB | Facility: HOSPITAL | Age: 83
End: 2018-12-17
Attending: FAMILY MEDICINE
Payer: MEDICARE

## 2018-12-17 DIAGNOSIS — E78.2 MIXED HYPERLIPIDEMIA: Chronic | ICD-10-CM

## 2018-12-17 DIAGNOSIS — E08.51 DIABETES MELLITUS DUE TO UNDERLYING CONDITION WITH DIABETIC PERIPHERAL ANGIOPATHY WITHOUT GANGRENE, WITHOUT LONG-TERM CURRENT USE OF INSULIN: ICD-10-CM

## 2018-12-17 LAB
ALBUMIN SERPL BCP-MCNC: 3.6 G/DL
ALP SERPL-CCNC: 117 U/L
ALT SERPL W/O P-5'-P-CCNC: 17 U/L
ANION GAP SERPL CALC-SCNC: 9 MMOL/L
AST SERPL-CCNC: 30 U/L
BILIRUB SERPL-MCNC: 0.5 MG/DL
BUN SERPL-MCNC: 15 MG/DL
CALCIUM SERPL-MCNC: 10.4 MG/DL
CHLORIDE SERPL-SCNC: 101 MMOL/L
CHOLEST SERPL-MCNC: 179 MG/DL
CHOLEST/HDLC SERPL: 4.6 {RATIO}
CO2 SERPL-SCNC: 30 MMOL/L
CREAT SERPL-MCNC: 0.9 MG/DL
EST. GFR  (AFRICAN AMERICAN): >60 ML/MIN/1.73 M^2
EST. GFR  (NON AFRICAN AMERICAN): >60 ML/MIN/1.73 M^2
ESTIMATED AVG GLUCOSE: 157 MG/DL
GLUCOSE SERPL-MCNC: 107 MG/DL
HBA1C MFR BLD HPLC: 7.1 %
HDLC SERPL-MCNC: 39 MG/DL
HDLC SERPL: 21.8 %
LDLC SERPL CALC-MCNC: 113 MG/DL
NONHDLC SERPL-MCNC: 140 MG/DL
POTASSIUM SERPL-SCNC: 4.2 MMOL/L
PROT SERPL-MCNC: 6.9 G/DL
SODIUM SERPL-SCNC: 140 MMOL/L
TRIGL SERPL-MCNC: 135 MG/DL

## 2018-12-17 PROCEDURE — 83036 HEMOGLOBIN GLYCOSYLATED A1C: CPT | Mod: HCNC

## 2018-12-17 PROCEDURE — 36415 COLL VENOUS BLD VENIPUNCTURE: CPT | Mod: HCNC,PO

## 2018-12-17 PROCEDURE — 80061 LIPID PANEL: CPT | Mod: HCNC

## 2018-12-17 PROCEDURE — 80053 COMPREHEN METABOLIC PANEL: CPT | Mod: HCNC

## 2018-12-26 ENCOUNTER — OFFICE VISIT (OUTPATIENT)
Dept: FAMILY MEDICINE | Facility: CLINIC | Age: 83
End: 2018-12-26
Payer: MEDICARE

## 2018-12-26 ENCOUNTER — TELEPHONE (OUTPATIENT)
Dept: RADIOLOGY | Facility: HOSPITAL | Age: 83
End: 2018-12-26

## 2018-12-26 ENCOUNTER — TELEPHONE (OUTPATIENT)
Dept: FAMILY MEDICINE | Facility: CLINIC | Age: 83
End: 2018-12-26

## 2018-12-26 VITALS
OXYGEN SATURATION: 99 % | DIASTOLIC BLOOD PRESSURE: 60 MMHG | HEIGHT: 69 IN | TEMPERATURE: 99 F | BODY MASS INDEX: 26.77 KG/M2 | HEART RATE: 73 BPM | WEIGHT: 180.75 LBS | SYSTOLIC BLOOD PRESSURE: 104 MMHG

## 2018-12-26 DIAGNOSIS — Z78.9 STATIN INTOLERANCE: ICD-10-CM

## 2018-12-26 DIAGNOSIS — E78.2 MIXED HYPERLIPIDEMIA: Chronic | ICD-10-CM

## 2018-12-26 DIAGNOSIS — I10 ESSENTIAL HYPERTENSION: ICD-10-CM

## 2018-12-26 DIAGNOSIS — I48.19 PERSISTENT ATRIAL FIBRILLATION: ICD-10-CM

## 2018-12-26 DIAGNOSIS — Z00.00 WELL ADULT EXAM: Primary | ICD-10-CM

## 2018-12-26 DIAGNOSIS — R19.7 DIARRHEA, UNSPECIFIED TYPE: ICD-10-CM

## 2018-12-26 DIAGNOSIS — I71.21 ANEURYSM OF ASCENDING AORTA: ICD-10-CM

## 2018-12-26 PROCEDURE — 99397 PER PM REEVAL EST PAT 65+ YR: CPT | Mod: HCNC,S$GLB,, | Performed by: FAMILY MEDICINE

## 2018-12-26 PROCEDURE — 3074F SYST BP LT 130 MM HG: CPT | Mod: CPTII,HCNC,S$GLB, | Performed by: FAMILY MEDICINE

## 2018-12-26 PROCEDURE — 99999 PR PBB SHADOW E&M-EST. PATIENT-LVL III: CPT | Mod: PBBFAC,HCNC,, | Performed by: FAMILY MEDICINE

## 2018-12-26 PROCEDURE — 3078F DIAST BP <80 MM HG: CPT | Mod: CPTII,HCNC,S$GLB, | Performed by: FAMILY MEDICINE

## 2018-12-26 RX ORDER — PEN NEEDLE, DIABETIC 32GX 5/32"
NEEDLE, DISPOSABLE MISCELLANEOUS
Refills: 12 | Status: ON HOLD | COMMUNITY
Start: 2018-11-30 | End: 2019-03-31 | Stop reason: HOSPADM

## 2018-12-26 NOTE — TELEPHONE ENCOUNTER
Spoke with patients daughter, informed that he should continue the Rafaela log, daughter voiced understanding,

## 2018-12-26 NOTE — TELEPHONE ENCOUNTER
Pt says that he forgot to tell you during today's visit that he is taking Rafaela log and wants to know should he continue to take it.

## 2018-12-26 NOTE — PROGRESS NOTES
Chief Complaint:    Chief Complaint   Patient presents with    Follow-up       History of Present Illness:    He presents today for six-month follow-up doing okay his diabetes is stable he complains of diarrhea seems to think that is the metformin.  Is lipids chemistries all stable blood pressure is normal.  He has coronary artery disease is essentially asymptomatic at this time.  No formal exercise.    ROS:  Review of Systems   Constitutional: Negative for activity change, chills, fatigue, fever and unexpected weight change.   HENT: Negative for congestion, ear discharge, ear pain, hearing loss, postnasal drip and rhinorrhea.    Eyes: Negative for pain and visual disturbance.   Respiratory: Negative for cough, chest tightness and shortness of breath.    Cardiovascular: Negative for chest pain and palpitations.   Gastrointestinal: Negative for abdominal pain, diarrhea and vomiting.   Endocrine: Negative for heat intolerance.   Genitourinary: Negative for dysuria, flank pain, frequency and hematuria.   Musculoskeletal: Negative for back pain, gait problem and neck pain.   Skin: Negative for color change and rash.   Neurological: Negative for dizziness, tremors, seizures, numbness and headaches.   Psychiatric/Behavioral: Negative for agitation, hallucinations, self-injury, sleep disturbance and suicidal ideas. The patient is not nervous/anxious.        Past Medical History:   Diagnosis Date    A-fib     Anemia     AP (angina pectoris) 1/23/2014    Carotid artery disease without cerebral infarction 8/22/2014    Cataract     Coronary artery disease     GERD (gastroesophageal reflux disease) 7/11/2013    Hemorrhagic cerebrovascular accident (CVA) 4/26/2018    Hyperlipidemia     Hypertension     Intracranial hemorrhage     Lumbosacral spondylosis 12/24/2014    Pacemaker 1/23/2014    Paroxysmal atrial fibrillation 1/23/2014    Peripheral vascular disease 8/22/2014    Seizure, late effect of stroke      "Stroke     Type 2 diabetes mellitus with ophthalmic manifestations        Social History:  Social History     Socioeconomic History    Marital status:      Spouse name: None    Number of children: None    Years of education: None    Highest education level: None   Social Needs    Financial resource strain: None    Food insecurity - worry: None    Food insecurity - inability: None    Transportation needs - medical: None    Transportation needs - non-medical: None   Occupational History    None   Tobacco Use    Smoking status: Former Smoker     Packs/day: 2.00     Years: 13.00     Pack years: 26.00     Types: Cigarettes     Start date: 1954     Last attempt to quit: 1967     Years since quittin.5    Smokeless tobacco: Never Used   Substance and Sexual Activity    Alcohol use: No    Drug use: No    Sexual activity: No     Partners: Female     Birth control/protection: None   Other Topics Concern    None   Social History Narrative    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       Family History:   family history includes Alcohol abuse in his paternal uncle; Arthritis in his father and mother; Cancer in his daughter and sister; Diabetes in his brother, daughter, father, mother, sister, son, and son; Fibromyalgia in his daughter; Heart disease in his brother, mother, and sister; Kidney disease in his brother and mother; Miscarriages / Stillbirths in his daughter.    Health Maintenance   Topic Date Due    Influenza Vaccine  2018    Foot Exam  2018    Hemoglobin A1c  2019    Eye Exam  2019    Lipid Panel  2019    TETANUS VACCINE  2024    Zoster Vaccine  Completed    Pneumococcal Vaccine (65+ Low/Medium Risk)  Completed       Physical Exam:    Vital Signs  Temp: 98.6 °F (37 °C)  Pulse: 73  SpO2: 99 %  BP: 104/60  BP Location: Left arm  Patient Position: Sitting  Height and Weight  Height: 5' 9" (175.3 cm)  Weight: 82 kg " (180 lb 12.4 oz)  BSA (Calculated - sq m): 2 sq meters  BMI (Calculated): 26.8  Weight in (lb) to have BMI = 25: 168.9]    Body mass index is 26.7 kg/m².    Physical Exam   Constitutional: He is oriented to person, place, and time. He appears well-developed.   HENT:   Mouth/Throat: Oropharynx is clear and moist.   Eyes: Conjunctivae are normal. Pupils are equal, round, and reactive to light.   Neck: Normal range of motion. Neck supple.   Cardiovascular: Normal rate, regular rhythm and normal heart sounds.   No murmur heard.  Pulmonary/Chest: Effort normal and breath sounds normal. No respiratory distress. He has no wheezes. He has no rales. He exhibits no tenderness.   Abdominal: Soft. He exhibits no distension and no mass. There is no tenderness. There is no guarding.   Musculoskeletal: He exhibits no edema or tenderness.   Lymphadenopathy:     He has no cervical adenopathy.   Neurological: He is alert and oriented to person, place, and time. He has normal reflexes.   Skin: Skin is warm and dry.   Psychiatric: He has a normal mood and affect. His behavior is normal. Judgment and thought content normal.         Diabetes Management Status    Statin: Not taking  ACE/ARB: Taking    Screening or Prevention Patient's value Goal Complete/Controlled?   HgA1C Testing and Control   Lab Results   Component Value Date    HGBA1C 7.1 (H) 12/17/2018      Annually/Less than 8% Yes   Lipid profile : 12/17/2018 Annually Yes   LDL control Lab Results   Component Value Date    LDLCALC 113.0 12/17/2018    Annually/Less than 100 mg/dl  No   Nephropathy screening Lab Results   Component Value Date    LABMICR 14.0 11/22/2017     Lab Results   Component Value Date    PROTEINUA Negative 08/17/2018    Annually Yes   Blood pressure BP Readings from Last 1 Encounters:   12/26/18 104/60    Less than 140/90 Yes   Dilated retinal exam : 06/25/2018 Annually Yes   Foot exam   : 11/17/2017 Annually No       Assessment:      ICD-10-CM ICD-9-CM   1.  Well adult exam Z00.00 V70.0   2. Uncontrolled type 2 diabetes mellitus with ophthalmic complication, with long-term current use of insulin E11.39 250.52    Z79.4 V58.67    E11.65    3. Statin intolerance Z78.9 995.27   4. Mixed hyperlipidemia E78.2 272.2   5. Essential hypertension I10 401.9   6. Persistent atrial fibrillation I48.1 427.31   7. Diarrhea, unspecified type R19.7 787.91   8. Aneurysm of ascending aorta I71.2 441.2         Plan:  Will get CTA to evaluate the ascending thoracic aneurysm.  Please discontinue metformin to see if diarrhea improves.  Other medical problems as above stable continue current meds and plan  Follow-up if not improve otherwise see him back in 6 months  Orders Placed This Encounter   Procedures    CTA Cardiac    Hemoglobin A1c    Comprehensive metabolic panel    Lipid panel       Current Outpatient Medications   Medication Sig Dispense Refill    ACCU-CHEK TOMAS PLUS TEST STRP Strp TEST BLOOD SUGAR SIX TIMES DAILY 300 strip 5    ACCU-CHEK MULTICLIX LANCET lancets TEST BLOOD SUGAR THREE TIMES DAILY 200 each 5    CARBOXYMETHYLCELLULOSE SODIUM (REFRESH OPHT) Apply to eye.      coconut oil 1,000 mg Cap Take by mouth.      fluticasone (FLONASE) 50 mcg/actuation nasal spray 2 sprays by Each Nare route once daily. 16 g 11    furosemide (LASIX) 20 MG tablet TAKE ONE TABLET BY MOUTH DAILY 30 tablet 12    insulin glargine (LANTUS SOLOSTAR) 100 unit/mL (3 mL) InPn pen INJECT 16 UNITS SUB-Q AT BEDTIME 15 mL 11    isosorbide mononitrate (IMDUR) 30 MG 24 hr tablet TAKE ONE TABLET BY MOUTH DAILY 30 tablet 12    levETIRAcetam (KEPPRA) 250 MG Tab Take 1 tablet (250 mg total) by mouth 2 (two) times daily. 60 tablet 11    losartan (COZAAR) 100 MG tablet TAKE ONE TABLET BY MOUTH DAILY 90 tablet 0    meclizine (ANTIVERT) 12.5 mg tablet Take 1 tablet (12.5 mg total) by mouth 2 (two) times daily as needed. 20 tablet 1    metoprolol succinate (TOPROL-XL) 100 MG 24 hr tablet TAKE ONE TABLET  "BY MOUTH TWICE DAILY 60 tablet 12    naproxen sodium (ANAPROX) 220 MG tablet Take 220 mg by mouth 2 (two) times daily with meals.      NITROSTAT 0.4 mg SL tablet Place 1 tablet (0.4 mg total) under the tongue every 5 (five) minutes as needed for Chest pain. 25 tablet 12    NOVOLOG FLEXPEN 100 unit/mL InPn pen Inject 5 units 3 times a day before meals 15 mL 3    pantoprazole (PROTONIX) 40 MG tablet TAKE ONE TABLET BY MOUTH EVERY DAY 30 tablet 5    potassium chloride SA (K-DUR,KLOR-CON) 20 MEQ tablet TAKE ONE TABLET BY MOUTH EVERY DAY 30 tablet 12    saw palmetto fruit 450 mg Cap Take 1 capsule by mouth Twice daily.      SUPREP BOWEL PREP KIT 17.5-3.13-1.6 gram SolR Use as directed 354 mL 0    TURMERIC, BULK, MISC by Misc.(Non-Drug; Combo Route) route.      SURE COMFORT PEN NEEDLE 32 gauge x 5/32" Ndle USE FOUR TIMES DAILY.  12     No current facility-administered medications for this visit.        Medications Discontinued During This Encounter   Medication Reason    metformin (GLUCOPHAGE) 500 MG tablet Side effects       Follow-up in about 6 months (around 6/26/2019).      Abdulaziz Mccabe MD                "

## 2018-12-31 ENCOUNTER — OFFICE VISIT (OUTPATIENT)
Dept: URGENT CARE | Facility: CLINIC | Age: 83
End: 2018-12-31
Payer: MEDICARE

## 2018-12-31 ENCOUNTER — HOSPITAL ENCOUNTER (OUTPATIENT)
Dept: RADIOLOGY | Facility: HOSPITAL | Age: 83
Discharge: HOME | End: 2018-12-31
Attending: NURSE PRACTITIONER
Payer: MEDICARE

## 2018-12-31 VITALS
HEART RATE: 74 BPM | OXYGEN SATURATION: 98 % | HEIGHT: 69 IN | WEIGHT: 183 LBS | RESPIRATION RATE: 18 BRPM | TEMPERATURE: 98 F | BODY MASS INDEX: 27.11 KG/M2 | SYSTOLIC BLOOD PRESSURE: 132 MMHG | DIASTOLIC BLOOD PRESSURE: 66 MMHG

## 2018-12-31 DIAGNOSIS — E11.9 DIABETES MELLITUS TYPE 2 WITHOUT RETINOPATHY: ICD-10-CM

## 2018-12-31 DIAGNOSIS — R10.9 ABDOMINAL CRAMPING: ICD-10-CM

## 2018-12-31 DIAGNOSIS — R07.9 CHEST PAIN, UNSPECIFIED TYPE: Primary | ICD-10-CM

## 2018-12-31 DIAGNOSIS — R07.9 CHEST PAIN, UNSPECIFIED TYPE: ICD-10-CM

## 2018-12-31 DIAGNOSIS — R06.02 SOB (SHORTNESS OF BREATH): ICD-10-CM

## 2018-12-31 DIAGNOSIS — K59.09 OTHER CONSTIPATION: ICD-10-CM

## 2018-12-31 DIAGNOSIS — I25.10 CAD (CORONARY ARTERY DISEASE): ICD-10-CM

## 2018-12-31 PROCEDURE — 99999 PR PBB SHADOW E&M-EST. PATIENT-LVL V: CPT | Mod: PBBFAC,HCNC,, | Performed by: NURSE PRACTITIONER

## 2018-12-31 PROCEDURE — 74018 RADEX ABDOMEN 1 VIEW: CPT | Mod: TC,HCNC,PO

## 2018-12-31 PROCEDURE — 93010 ELECTROCARDIOGRAM REPORT: CPT | Mod: HCNC,S$GLB,, | Performed by: INTERNAL MEDICINE

## 2018-12-31 PROCEDURE — 1101F PT FALLS ASSESS-DOCD LE1/YR: CPT | Mod: CPTII,HCNC,S$GLB, | Performed by: NURSE PRACTITIONER

## 2018-12-31 PROCEDURE — 74018 RADEX ABDOMEN 1 VIEW: CPT | Mod: 26,HCNC,, | Performed by: RADIOLOGY

## 2018-12-31 PROCEDURE — 74018 XR ABDOMEN AP 1 VIEW: ICD-10-PCS | Mod: 26,HCNC,, | Performed by: RADIOLOGY

## 2018-12-31 PROCEDURE — 3078F DIAST BP <80 MM HG: CPT | Mod: CPTII,HCNC,S$GLB, | Performed by: NURSE PRACTITIONER

## 2018-12-31 PROCEDURE — 3075F SYST BP GE 130 - 139MM HG: CPT | Mod: CPTII,HCNC,S$GLB, | Performed by: NURSE PRACTITIONER

## 2018-12-31 PROCEDURE — 93005 ELECTROCARDIOGRAM TRACING: CPT | Mod: HCNC,S$GLB,, | Performed by: NURSE PRACTITIONER

## 2018-12-31 PROCEDURE — 99214 OFFICE O/P EST MOD 30 MIN: CPT | Mod: HCNC,S$GLB,, | Performed by: NURSE PRACTITIONER

## 2018-12-31 RX ORDER — NITROGLYCERIN 0.4 MG/1
0.4 TABLET SUBLINGUAL EVERY 5 MIN PRN
Qty: 25 TABLET | Refills: 12 | Status: SHIPPED | OUTPATIENT
Start: 2018-12-31 | End: 2020-06-19 | Stop reason: SDUPTHER

## 2018-12-31 NOTE — PROGRESS NOTES
CHIEF COMPLAINT/REASON FOR VISIT:  Shortness of breath and chest tightness    HISTORY OF PRESENT ILLNESS:  85-year-old male with daughter complains of slight shortness of breath and chest tightness on and off 4-5 days ago.  Patient admits has been on metformin for years with diarrhea.  Seen and treated per primary care and was told to stop taking metformin.  Patient admit  has not had bowel movement since Thursday, 4-5 days ago.  Discussed with patient the possibility of constipation.  Discussed with patient will perform EKG and KUB.  Discussed further evaluation at the emergency room due to chest tightness and shortness of breath/patient deferred. Patient denies nausea, vomiting, congestion, fever, cough, back pain and urinary discomfort.       Past Medical History:   Diagnosis Date    A-fib     Anemia     AP (angina pectoris) 1/23/2014    Carotid artery disease without cerebral infarction 8/22/2014    Cataract     Coronary artery disease     GERD (gastroesophageal reflux disease) 7/11/2013    Hemorrhagic cerebrovascular accident (CVA) 4/26/2018    Hyperlipidemia     Hypertension     Intracranial hemorrhage     Lumbosacral spondylosis 12/24/2014    Pacemaker 1/23/2014    Paroxysmal atrial fibrillation 1/23/2014    Peripheral vascular disease 8/22/2014    Seizure, late effect of stroke     Stroke     Type 2 diabetes mellitus with ophthalmic manifestations           Social History     Socioeconomic History    Marital status:      Spouse name: Not on file    Number of children: Not on file    Years of education: Not on file    Highest education level: Not on file   Social Needs    Financial resource strain: Not on file    Food insecurity - worry: Not on file    Food insecurity - inability: Not on file    Transportation needs - medical: Not on file    Transportation needs - non-medical: Not on file   Occupational History    Not on file   Tobacco Use    Smoking status: Former Smoker      Packs/day: 2.00     Years: 13.00     Pack years: 26.00     Types: Cigarettes     Start date: 1954     Last attempt to quit: 1967     Years since quittin.6    Smokeless tobacco: Never Used   Substance and Sexual Activity    Alcohol use: No    Drug use: No    Sexual activity: No     Partners: Female     Birth control/protection: None   Other Topics Concern    Not on file   Social History Narrative    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          Family History   Problem Relation Age of Onset    Arthritis Mother     Diabetes Mother     Kidney disease Mother     Heart disease Mother     Arthritis Father     Diabetes Father     Diabetes Sister     Cancer Sister         breast    Heart disease Sister     Diabetes Brother     Kidney disease Brother     Heart disease Brother     Cancer Daughter         breast    Diabetes Daughter     Miscarriages / Stillbirths Daughter     Fibromyalgia Daughter     Diabetes Son     Diabetes Son     Alcohol abuse Paternal Uncle     Stroke Neg Hx        ROS:  GENERAL: No fever, chills, fatigability or weight loss.  SKIN: No rashes, itching or changes in color or texture of skin.  HEENT: No headaches or recent head trauma. . Denies ear pain, discharge or vertigo. No loss of smell, no epistaxis or postnasal drip. No hoarseness .   NODES: No masses or lesions. Denies swollen glands.  CHEST: Denies cyanosis, wheezing, cough and sputum production.  CARDIOVASCULAR:  Chest tightness, shortness of breath.  ABDOMEN: Appetite fine. No weight loss. Denies diarrhea, abdominal pain  MUSCULOSKELETAL: No joint stiffness or swelling. Denies back pain.  NEUROLOGIC: No history of seizures, paralysis, alteration of gait or coordination.  PSYCHIATRIC: Denies mood swings, depression or suicidal thoughts.    PE:   APPEARANCE: Well nourished, well developed, in mild distress. PULSE OX 98%  V/S: Reviewed.  SKIN: Normal skin turgor, no  lesions.  HEENT:  turbinates pink, mucus membranes okay, pink pharynx, TMs clear bilateral.  CHEST: Lungs clear to auscultation.  CARDIOVASCULAR: Regular rate and rhythm   ABDOMEN:. Soft. No tenderness or masses. Active bowel sounds  MUSCULOSKELETAL: Motor: 5/5 strength major flexors/extensors.  NEUROLOGIC: No sensory deficits. Gait & Posture: Normal gait and fine motion. No cerebellar signs.  MENTAL STATUS: Patient alert, oriented x 3 & conversant.  Patient states prior to KUB had a large bowel movement and feels much better.  Denies shortness of breath and or chest pain.    PLAN:  EKG stat & KUB  Advise go to ER for further evaluation/ patient deferred  Advise increase p.o. fluids-- water/juice & rest  Advise over-the-counter stool softener laxative  Normal saline nasal wash  to irrigate sinuses and for congestion/runny nose.  Cool mist humidifier/vaporizer.  Practice good handwashing.  Mucinex for cough and chest congestion.  Tylenol or Ibuprofen for fever, headache and body aches.  Advise follow up with PCP  Advise go to ER if nausea, vomiting, fever, increased pain, or fail to improve with treatment.  AVS provided and reviewed with patient including supportive care, follow up, and red flag symptoms.   Patient verbalizes understanding and agrees with treatment plan. Discharged from Urgent Care in stable condition.      DIAGNOSIS:  SOB  Chest pain  Constipation  Abdominal pain  Type 2 diabetes mellitus retinopathy

## 2018-12-31 NOTE — PATIENT INSTRUCTIONS
PLAN:  EKG stat & KUB  Advise go to ER for further evaluation now  Advise increase p.o. fluids-- water/juice & rest  Advise over-the-counter stool softener & laxative  Normal saline nasal wash  to irrigate sinuses and for congestion/runny nose.  Cool mist humidifier/vaporizer.  Practice good handwashing.  Mucinex for cough and chest congestion.  Tylenol or Ibuprofen for fever, headache and body aches.  Advise follow up with PCP  Advise go to ER if nausea, vomiting, fever, increased pain, or fail to improve with treatment.  AVS provided and reviewed with patient including supportive care, follow up, and red flag symptoms.   Patient verbalizes understanding and agrees with treatment plan. Discharged from Urgent Care in stable condition.

## 2019-01-07 RX ORDER — ISOSORBIDE MONONITRATE 30 MG/1
TABLET, EXTENDED RELEASE ORAL
Qty: 30 TABLET | Refills: 12 | Status: SHIPPED | OUTPATIENT
Start: 2019-01-07 | End: 2019-02-08 | Stop reason: ALTCHOICE

## 2019-01-09 ENCOUNTER — OFFICE VISIT (OUTPATIENT)
Dept: FAMILY MEDICINE | Facility: CLINIC | Age: 84
End: 2019-01-09
Payer: MEDICARE

## 2019-01-09 VITALS
DIASTOLIC BLOOD PRESSURE: 72 MMHG | SYSTOLIC BLOOD PRESSURE: 128 MMHG | HEIGHT: 70 IN | TEMPERATURE: 98 F | WEIGHT: 182.19 LBS | BODY MASS INDEX: 26.08 KG/M2 | HEART RATE: 80 BPM | OXYGEN SATURATION: 98 %

## 2019-01-09 DIAGNOSIS — R07.9 CHEST PAIN, UNSPECIFIED TYPE: Primary | ICD-10-CM

## 2019-01-09 PROCEDURE — 99999 PR PBB SHADOW E&M-EST. PATIENT-LVL IV: ICD-10-PCS | Mod: PBBFAC,HCNC,, | Performed by: FAMILY MEDICINE

## 2019-01-09 PROCEDURE — 3074F SYST BP LT 130 MM HG: CPT | Mod: CPTII,HCNC,S$GLB, | Performed by: FAMILY MEDICINE

## 2019-01-09 PROCEDURE — 99999 PR PBB SHADOW E&M-EST. PATIENT-LVL IV: CPT | Mod: PBBFAC,HCNC,, | Performed by: FAMILY MEDICINE

## 2019-01-09 PROCEDURE — 3078F PR MOST RECENT DIASTOLIC BLOOD PRESSURE < 80 MM HG: ICD-10-PCS | Mod: CPTII,HCNC,S$GLB, | Performed by: FAMILY MEDICINE

## 2019-01-09 PROCEDURE — 99213 OFFICE O/P EST LOW 20 MIN: CPT | Mod: HCNC,S$GLB,, | Performed by: FAMILY MEDICINE

## 2019-01-09 PROCEDURE — 3074F PR MOST RECENT SYSTOLIC BLOOD PRESSURE < 130 MM HG: ICD-10-PCS | Mod: CPTII,HCNC,S$GLB, | Performed by: FAMILY MEDICINE

## 2019-01-09 PROCEDURE — 1101F PR PT FALLS ASSESS DOC 0-1 FALLS W/OUT INJ PAST YR: ICD-10-PCS | Mod: CPTII,HCNC,S$GLB, | Performed by: FAMILY MEDICINE

## 2019-01-09 PROCEDURE — 99213 PR OFFICE/OUTPT VISIT, EST, LEVL III, 20-29 MIN: ICD-10-PCS | Mod: HCNC,S$GLB,, | Performed by: FAMILY MEDICINE

## 2019-01-09 PROCEDURE — 3078F DIAST BP <80 MM HG: CPT | Mod: CPTII,HCNC,S$GLB, | Performed by: FAMILY MEDICINE

## 2019-01-09 PROCEDURE — 1101F PT FALLS ASSESS-DOCD LE1/YR: CPT | Mod: CPTII,HCNC,S$GLB, | Performed by: FAMILY MEDICINE

## 2019-01-09 RX ORDER — PEN NEEDLE, DIABETIC 32GX 5/32"
NEEDLE, DISPOSABLE MISCELLANEOUS
Qty: 100 EACH | Refills: 6 | Status: SHIPPED | OUTPATIENT
Start: 2019-01-09 | End: 2019-09-24 | Stop reason: SDUPTHER

## 2019-01-09 RX ORDER — FLUTICASONE PROPIONATE 50 MCG
2 SPRAY, SUSPENSION (ML) NASAL DAILY
Qty: 16 G | Refills: 6 | Status: SHIPPED | OUTPATIENT
Start: 2019-01-09 | End: 2020-03-13

## 2019-01-09 NOTE — PROGRESS NOTES
Chief Complaint:    Chief Complaint   Patient presents with    Follow-up     Urgent Care       History of Present Illness:    He is here follow-up from Urgent Care was seen for shortness of breath and chest pain which EKG showed paced rhythm he is feeling okay is symptoms have improved.  He has an upcoming appointment with Dr. Frazier.  He has prescription for nitroglycerin that he can take.    ROS:  Review of Systems   Constitutional: Negative for activity change, chills, fatigue, fever and unexpected weight change.   HENT: Negative for congestion, ear discharge, ear pain, hearing loss, postnasal drip and rhinorrhea.    Eyes: Negative for pain and visual disturbance.   Respiratory: Negative for cough, chest tightness and shortness of breath.    Cardiovascular: Negative for chest pain and palpitations.   Gastrointestinal: Negative for abdominal pain, diarrhea and vomiting.   Endocrine: Negative for heat intolerance.   Genitourinary: Negative for dysuria, flank pain, frequency and hematuria.   Musculoskeletal: Negative for back pain, gait problem and neck pain.   Skin: Negative for color change and rash.   Neurological: Negative for dizziness, tremors, seizures, numbness and headaches.   Psychiatric/Behavioral: Negative for agitation, hallucinations, self-injury, sleep disturbance and suicidal ideas. The patient is not nervous/anxious.        Past Medical History:   Diagnosis Date    A-fib     Anemia     AP (angina pectoris) 1/23/2014    Carotid artery disease without cerebral infarction 8/22/2014    Cataract     Coronary artery disease     GERD (gastroesophageal reflux disease) 7/11/2013    Hemorrhagic cerebrovascular accident (CVA) 4/26/2018    Hyperlipidemia     Hypertension     Intracranial hemorrhage     Lumbosacral spondylosis 12/24/2014    Pacemaker 1/23/2014    Paroxysmal atrial fibrillation 1/23/2014    Peripheral vascular disease 8/22/2014    Seizure, late effect of stroke     Stroke      Type 2 diabetes mellitus with ophthalmic manifestations        Social History:  Social History     Socioeconomic History    Marital status:      Spouse name: None    Number of children: None    Years of education: None    Highest education level: None   Social Needs    Financial resource strain: None    Food insecurity - worry: None    Food insecurity - inability: None    Transportation needs - medical: None    Transportation needs - non-medical: None   Occupational History    None   Tobacco Use    Smoking status: Former Smoker     Packs/day: 2.00     Years: 13.00     Pack years: 26.00     Types: Cigarettes     Start date: 1954     Last attempt to quit: 1967     Years since quittin.6    Smokeless tobacco: Never Used   Substance and Sexual Activity    Alcohol use: No    Drug use: No    Sexual activity: No     Partners: Female     Birth control/protection: None   Other Topics Concern    None   Social History Narrative    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       Family History:   family history includes Alcohol abuse in his paternal uncle; Arthritis in his father and mother; Cancer in his daughter and sister; Diabetes in his brother, daughter, father, mother, sister, son, and son; Fibromyalgia in his daughter; Heart disease in his brother, mother, and sister; Kidney disease in his brother and mother; Miscarriages / Stillbirths in his daughter.    Health Maintenance   Topic Date Due    Influenza Vaccine  2018    Foot Exam  2018    Hemoglobin A1c  2019    Eye Exam  2019    Lipid Panel  2019    TETANUS VACCINE  2024    Zoster Vaccine  Completed    Pneumococcal Vaccine (65+ Low/Medium Risk)  Completed       Physical Exam:    Vital Signs  Temp: 98 °F (36.7 °C)  Temp src: Tympanic  Pulse: 80  SpO2: 98 %  BP: 128/72  BP Location: Left arm  Patient Position: Sitting  Pain Score: 0-No pain  Height and Weight  Height: 5'  "10" (177.8 cm)  Weight: 82.6 kg (182 lb 3.4 oz)  BSA (Calculated - sq m): 2.02 sq meters  BMI (Calculated): 26.2  Weight in (lb) to have BMI = 25: 173.9]    Body mass index is 26.14 kg/m².    Physical Exam   Constitutional: He is oriented to person, place, and time. He appears well-developed.   HENT:   Mouth/Throat: Oropharynx is clear and moist.   Eyes: Conjunctivae are normal. Pupils are equal, round, and reactive to light.   Neck: Normal range of motion. Neck supple.   Cardiovascular: Normal rate, regular rhythm and normal heart sounds.   No murmur heard.  Pulmonary/Chest: Effort normal and breath sounds normal. No respiratory distress. He has no wheezes. He has no rales. He exhibits no tenderness.   Abdominal: Soft. He exhibits no distension and no mass. There is no tenderness. There is no guarding.   Musculoskeletal: He exhibits no edema or tenderness.   Lymphadenopathy:     He has no cervical adenopathy.   Neurological: He is alert and oriented to person, place, and time. He has normal reflexes.   Skin: Skin is warm and dry.   Psychiatric: He has a normal mood and affect. His behavior is normal. Judgment and thought content normal.         Assessment:      ICD-10-CM ICD-9-CM   1. Chest pain, unspecified type R07.9 786.50         Plan:    Chest pain resolved please keep appointment with Dr. Frazier      No orders of the defined types were placed in this encounter.      Current Outpatient Medications   Medication Sig Dispense Refill    ACCU-CHEK TOMAS PLUS TEST STRP Strp TEST BLOOD SUGAR SIX TIMES DAILY 300 strip 5    ACCU-CHEK MULTICLIX LANCET lancets TEST BLOOD SUGAR THREE TIMES DAILY 200 each 5    CARBOXYMETHYLCELLULOSE SODIUM (REFRESH OPHT) Apply to eye.      fluticasone (FLONASE) 50 mcg/actuation nasal spray 2 sprays by Each Nare route once daily. 16 g 11    furosemide (LASIX) 20 MG tablet TAKE ONE TABLET BY MOUTH DAILY 30 tablet 12    insulin glargine (LANTUS SOLOSTAR) 100 unit/mL (3 mL) InPn pen " "INJECT 16 UNITS SUB-Q AT BEDTIME 15 mL 11    isosorbide mononitrate (IMDUR) 30 MG 24 hr tablet TAKE ONE TABLET BY MOUTH EVERY DAY 30 tablet 12    levETIRAcetam (KEPPRA) 250 MG Tab Take 1 tablet (250 mg total) by mouth 2 (two) times daily. 60 tablet 11    losartan (COZAAR) 100 MG tablet TAKE ONE TABLET BY MOUTH DAILY 90 tablet 0    meclizine (ANTIVERT) 12.5 mg tablet Take 1 tablet (12.5 mg total) by mouth 2 (two) times daily as needed. 20 tablet 1    metoprolol succinate (TOPROL-XL) 100 MG 24 hr tablet TAKE ONE TABLET BY MOUTH TWICE DAILY 60 tablet 12    nitroGLYCERIN (NITROSTAT) 0.4 MG SL tablet Place 1 tablet (0.4 mg total) under the tongue every 5 (five) minutes as needed for Chest pain. 25 tablet 12    NOVOLOG FLEXPEN 100 unit/mL InPn pen Inject 5 units 3 times a day before meals 15 mL 3    pantoprazole (PROTONIX) 40 MG tablet TAKE ONE TABLET BY MOUTH EVERY DAY 30 tablet 5    potassium chloride SA (K-DUR,KLOR-CON) 20 MEQ tablet TAKE ONE TABLET BY MOUTH EVERY DAY 30 tablet 12    saw palmetto fruit 450 mg Cap Take 1 capsule by mouth Twice daily.      SURE COMFORT PEN NEEDLE 32 gauge x 5/32" Ndle USE FOUR TIMES DAILY.  12     No current facility-administered medications for this visit.        Medications Discontinued During This Encounter   Medication Reason    coconut oil 1,000 mg Cap Patient no longer taking    naproxen sodium (ANAPROX) 220 MG tablet Patient no longer taking    TURMERIC, BULK, MISC Patient no longer taking    SUPREP BOWEL PREP KIT 17.5-3.13-1.6 gram SolR Patient no longer taking       No Follow-up on file.      Abdulaziz Mccabe MD              "

## 2019-01-18 DIAGNOSIS — I49.5 SSS (SICK SINUS SYNDROME): ICD-10-CM

## 2019-01-18 DIAGNOSIS — I48.0 PAF (PAROXYSMAL ATRIAL FIBRILLATION): ICD-10-CM

## 2019-01-18 DIAGNOSIS — Z95.0 CARDIAC PACEMAKER IN SITU: Primary | ICD-10-CM

## 2019-01-18 NOTE — ED NOTES
Radiology History & Physical      SUBJECTIVE:     Chief Complaint: abdominal distention    History of Present Illness:  Kenneth Sheikh is a 80 y.o. male who presents for ultrasound guided paracentesis  Past Medical History:   Diagnosis Date    Anticoagulant long-term use     Bipolar 1 disorder     Bipolar 1 disorder 11/24/2016    bipolar    Cancer     history of skin cancer/sinus cancer & rectal cancer    Depressed bipolar affective disorder     Diabetes mellitus     type 2    EBV infection 12/7/2016    EBV DNA, PCR Latest Ref Range: None detected  None detected EBV DNA-Copies/mL Unknown Test Not Performed EBV Early Antigen Ab, IgG Latest Ref Range: <1:10 Titer 1:40 (A) EBV Nuclear Ag Ab Latest Ref Range: <1:5 Titer >=1:80 (A) EBV VCA IgG Latest Ref Range: <1:10 Titer 1:160 (A) EBV VCA IgM Latest Ref Range: <1:10 Titer <1:10     History of TIA (transient ischemic attack)     several-taking Plavix    Hx of gallstones     Wife denies    Hypertension     Hyperthyroidism 11/30/2016    Low HDL (under 40) 12/7/2016    Mixed hyperlipidemia 11/23/2016    JUAN MANUEL (obstructive sleep apnea)     Pneumonia     Skin disease     Squamous cell carcinoma 08/2018    right temple    Stroke     Transient cerebral ischemia 7/22/2016    Type 2 diabetes mellitus      Past Surgical History:   Procedure Laterality Date    BIOPSY-BONE MARROW Left 11/3/2016    Performed by Bud Bob MD at [facility] OR 2ND FLR    ESOPHAGOGASTRODUODENOSCOPY (EGD) N/A 2/3/2017    Performed by Domenico oFx MD at [facility] ENDO (4TH FLR)    EXCISION-MASS RECTAL  6/28/2016    Performed by Haris Talavera MD at [facility] OR 2ND FLR    Moh's procedure x4      rectal cancer      Sinus surgery for sinus cancer      sinus sx for cancer      skin cancer removed-several times-nose & ear      TONSILLECTOMY      ULTRASOUND-ENDOSCOPIC-UPPER/glue coil of gastric varices N/A 4/19/2017    Performed by Aneudy Perla MD at [facility] ENDO (2ND FLR)     Report given to debbie saldana   Undescened testicle sx      UVULOPALATOPHARYNGOPLASTY      for sleep apnea       Home Meds:   Prior to Admission medications    Medication Sig Start Date End Date Taking? Authorizing Provider   ACCU-CHEK NEYDA PLUS TEST STRP Strp 1 strip by Misc.(Non-Drug; Combo Route) route once daily. 11/5/18   Prisca Espinoza MD   ciprofloxacin HCl (CIPRO) 500 MG tablet Take 1 tablet (500 mg total) by mouth every Monday. 5/14/18   Renato Womack MD   divalproex (DEPAKOTE) 250 MG EC tablet Take 2 tablets (500 mg total) by mouth every evening. Do not fill prescription until patient calls and requests. 12/10/18   Prisca Espinoza MD   furosemide (LASIX) 20 MG tablet Take 2 tablets (40 mg total) by mouth once daily. 10/9/18 10/9/19  Renato Womack MD   lactulose (CHRONULAC) 10 gram/15 mL solution TAKE 15 MLS BY MOUTH TWICE DAILY 3/8/18   Renato Womack MD   lancets [facility] To check blood sugar once daily, to use with Accu-Chek meter. 11/5/18   Prisca Espinoza MD   rifAXIMin (XIFAXAN) 550 mg Tab Take 1 tablet (550 mg total) by mouth 2 (two) times daily. 10/3/17   Renato Womack MD   SITagliptan (JANUVIA) 50 MG Tab Take 1 tablet (50 mg total) by mouth once daily. 4/6/17   Prisca Espinoza MD     Anticoagulants/Antiplatelets: no anticoagulation    Allergies:   Review of patient's allergies indicates:   Allergen Reactions    Abilify [aripiprazole] Other (See Comments)     Sleepy, could not function.     Sedation History:  no adverse reactions    Review of Systems:   Hematological: no known coagulopathies  Respiratory: no shortness of breath  Cardiovascular: no chest pain  Gastrointestinal: no abdominal pain  Genito-Urinary: no dysuria  Musculoskeletal: negative  Neurological: no TIA or stroke symptoms         OBJECTIVE:     Vital Signs (Most Recent)  Pulse: (!) 53 (01/18/19 1155)  Resp: 16 (01/18/19 1155)  BP: 135/71 (01/18/19 1155)  SpO2: 100 % (01/18/19 1155)    Physical Exam:  ASA: 2  Mallampati:  n/a    General: no acute distress  Mental Status: alert and oriented to person, place and time  HEENT: normocephalic, atraumatic  Chest: unlabored breathing  Heart: regular heart rate  Abdomen: distended  Extremity: moves all extremities    Laboratory  Lab Results   Component Value Date    INR 1.1 08/10/2018       Lab Results   Component Value Date    WBC 2.93 (L) 12/04/2018    HGB 10.1 (L) 12/04/2018    HCT 32.7 (L) 12/04/2018    MCV 87 12/04/2018     (L) 12/04/2018      Lab Results   Component Value Date     (H) 12/04/2018     12/04/2018    K 3.6 12/04/2018     12/04/2018    CO2 29 12/04/2018    BUN 17 12/04/2018    CREATININE 1.2 12/04/2018    CALCIUM 8.8 12/04/2018    MG 1.7 05/15/2017    ALT 5 (L) 12/04/2018    AST 18 12/04/2018    ALBUMIN 2.5 (L) 12/04/2018    BILITOT 0.7 12/04/2018       ASSESSMENT/PLAN:     Sedation Plan: local  Patient will undergo ultrasound guided paracentesis.    MATT Mauricio, FNP  Interventional Radiology  [phone] [employer]

## 2019-01-21 ENCOUNTER — PATIENT MESSAGE (OUTPATIENT)
Dept: FAMILY MEDICINE | Facility: CLINIC | Age: 84
End: 2019-01-21

## 2019-01-21 DIAGNOSIS — I71.20 THORACIC AORTIC ANEURYSM WITHOUT RUPTURE: Primary | ICD-10-CM

## 2019-01-21 NOTE — TELEPHONE ENCOUNTER
Need CTA Chest non coronary not CTA Cardiac. This was in your note from 12/26/18.  Will get CTA to evaluate the ascending thoracic aneurysm.

## 2019-01-24 ENCOUNTER — HOSPITAL ENCOUNTER (EMERGENCY)
Facility: HOSPITAL | Age: 84
Discharge: HOME | End: 2019-01-24
Attending: EMERGENCY MEDICINE
Payer: MEDICARE

## 2019-01-24 VITALS
SYSTOLIC BLOOD PRESSURE: 165 MMHG | TEMPERATURE: 98 F | OXYGEN SATURATION: 97 % | HEART RATE: 74 BPM | RESPIRATION RATE: 18 BRPM | DIASTOLIC BLOOD PRESSURE: 76 MMHG

## 2019-01-24 DIAGNOSIS — R07.9 CHEST PAIN: ICD-10-CM

## 2019-01-24 DIAGNOSIS — J18.9 PNEUMONIA OF LEFT LOWER LOBE DUE TO INFECTIOUS ORGANISM: Primary | ICD-10-CM

## 2019-01-24 LAB
ALBUMIN SERPL BCP-MCNC: 3.7 G/DL
ALP SERPL-CCNC: 121 U/L
ALT SERPL W/O P-5'-P-CCNC: 17 U/L
ANION GAP SERPL CALC-SCNC: 10 MMOL/L
AST SERPL-CCNC: 25 U/L
BASOPHILS # BLD AUTO: 0.02 K/UL
BASOPHILS NFR BLD: 0.3 %
BILIRUB SERPL-MCNC: 0.6 MG/DL
BNP SERPL-MCNC: 110 PG/ML
BUN SERPL-MCNC: 12 MG/DL
CALCIUM SERPL-MCNC: 10.4 MG/DL
CHLORIDE SERPL-SCNC: 102 MMOL/L
CO2 SERPL-SCNC: 25 MMOL/L
CREAT SERPL-MCNC: 0.8 MG/DL
DIFFERENTIAL METHOD: ABNORMAL
EOSINOPHIL # BLD AUTO: 0.2 K/UL
EOSINOPHIL NFR BLD: 3.1 %
ERYTHROCYTE [DISTWIDTH] IN BLOOD BY AUTOMATED COUNT: 14 %
EST. GFR  (AFRICAN AMERICAN): >60 ML/MIN/1.73 M^2
EST. GFR  (NON AFRICAN AMERICAN): >60 ML/MIN/1.73 M^2
GLUCOSE SERPL-MCNC: 210 MG/DL
HCT VFR BLD AUTO: 38.9 %
HGB BLD-MCNC: 13 G/DL
INR PPP: 1.1
LACTATE SERPL-SCNC: 1.9 MMOL/L
LYMPHOCYTES # BLD AUTO: 2.2 K/UL
LYMPHOCYTES NFR BLD: 34.8 %
MCH RBC QN AUTO: 30.8 PG
MCHC RBC AUTO-ENTMCNC: 33.4 G/DL
MCV RBC AUTO: 92 FL
MONOCYTES # BLD AUTO: 0.7 K/UL
MONOCYTES NFR BLD: 10.1 %
NEUTROPHILS # BLD AUTO: 3.3 K/UL
NEUTROPHILS NFR BLD: 51.7 %
PLATELET # BLD AUTO: 162 K/UL
PMV BLD AUTO: 9.6 FL
POTASSIUM SERPL-SCNC: 3.8 MMOL/L
PROT SERPL-MCNC: 7 G/DL
PROTHROMBIN TIME: 11.4 SEC
RBC # BLD AUTO: 4.22 M/UL
SODIUM SERPL-SCNC: 137 MMOL/L
TROPONIN I SERPL DL<=0.01 NG/ML-MCNC: 0.01 NG/ML
TROPONIN I SERPL DL<=0.01 NG/ML-MCNC: 0.01 NG/ML
WBC # BLD AUTO: 6.41 K/UL

## 2019-01-24 PROCEDURE — 93005 ELECTROCARDIOGRAM TRACING: CPT | Mod: HCNC

## 2019-01-24 PROCEDURE — 63600175 PHARM REV CODE 636 W HCPCS: Mod: HCNC | Performed by: EMERGENCY MEDICINE

## 2019-01-24 PROCEDURE — 83880 ASSAY OF NATRIURETIC PEPTIDE: CPT | Mod: HCNC

## 2019-01-24 PROCEDURE — 80053 COMPREHEN METABOLIC PANEL: CPT | Mod: HCNC

## 2019-01-24 PROCEDURE — 83605 ASSAY OF LACTIC ACID: CPT | Mod: HCNC

## 2019-01-24 PROCEDURE — 25000003 PHARM REV CODE 250: Mod: HCNC | Performed by: EMERGENCY MEDICINE

## 2019-01-24 PROCEDURE — 87040 BLOOD CULTURE FOR BACTERIA: CPT | Mod: 59,HCNC

## 2019-01-24 PROCEDURE — 96365 THER/PROPH/DIAG IV INF INIT: CPT | Mod: HCNC

## 2019-01-24 PROCEDURE — 93010 ELECTROCARDIOGRAM REPORT: CPT | Mod: HCNC,,, | Performed by: INTERNAL MEDICINE

## 2019-01-24 PROCEDURE — 84484 ASSAY OF TROPONIN QUANT: CPT | Mod: HCNC

## 2019-01-24 PROCEDURE — 85610 PROTHROMBIN TIME: CPT | Mod: HCNC

## 2019-01-24 PROCEDURE — 85025 COMPLETE CBC W/AUTO DIFF WBC: CPT | Mod: HCNC

## 2019-01-24 PROCEDURE — 36415 COLL VENOUS BLD VENIPUNCTURE: CPT | Mod: HCNC

## 2019-01-24 PROCEDURE — 99285 EMERGENCY DEPT VISIT HI MDM: CPT | Mod: 25,HCNC

## 2019-01-24 PROCEDURE — 93010 EKG 12-LEAD: ICD-10-PCS | Mod: HCNC,,, | Performed by: INTERNAL MEDICINE

## 2019-01-24 RX ORDER — LEVOFLOXACIN 750 MG/1
750 TABLET ORAL DAILY
Qty: 4 TABLET | Refills: 0 | Status: SHIPPED | OUTPATIENT
Start: 2019-01-24 | End: 2019-01-28

## 2019-01-24 RX ORDER — LEVOFLOXACIN 750 MG/1
750 TABLET ORAL
Status: COMPLETED | OUTPATIENT
Start: 2019-01-24 | End: 2019-01-24

## 2019-01-24 RX ORDER — LEVOFLOXACIN 5 MG/ML
750 INJECTION, SOLUTION INTRAVENOUS
Status: DISCONTINUED | OUTPATIENT
Start: 2019-01-24 | End: 2019-01-24

## 2019-01-24 RX ORDER — ASPIRIN 325 MG
325 TABLET ORAL
Status: DISCONTINUED | OUTPATIENT
Start: 2019-01-24 | End: 2019-01-24 | Stop reason: HOSPADM

## 2019-01-24 RX ADMIN — LEVOFLOXACIN 750 MG: 750 INJECTION, SOLUTION INTRAVENOUS at 08:01

## 2019-01-24 RX ADMIN — LEVOFLOXACIN 750 MG: 750 TABLET, FILM COATED ORAL at 09:01

## 2019-01-24 RX ADMIN — NITROGLYCERIN 0.5 INCH: 20 OINTMENT TOPICAL at 05:01

## 2019-01-24 NOTE — ED NOTES
Bed: 14  Expected date:   Expected time:   Means of arrival:   Comments:  JEFERSON   Alert and oriented to person, place and time

## 2019-01-25 RX ORDER — METFORMIN HYDROCHLORIDE 1000 MG/1
TABLET ORAL
Qty: 60 TABLET | Refills: 5 | Status: SHIPPED | OUTPATIENT
Start: 2019-01-25 | End: 2019-02-08

## 2019-01-25 NOTE — ED NOTES
Levoflaxin stopped - pt c/o burning and itching at site. No redness, hives, swelling noted to IV site.  Dr Funez aware with new orders noted.

## 2019-01-25 NOTE — ED NOTES
Pt took po Levoflaxin without difficulty.  Son at bedside. Discharge instructions explained to patient with verbalization of understanding of instructions.  Pt escorted to registration desk by RN. Discharge paperwork given to registration for completion of discharge.

## 2019-01-29 ENCOUNTER — HOSPITAL ENCOUNTER (OUTPATIENT)
Dept: RADIOLOGY | Facility: HOSPITAL | Age: 84
Discharge: HOME | End: 2019-01-29
Attending: FAMILY MEDICINE
Payer: MEDICARE

## 2019-01-29 ENCOUNTER — OFFICE VISIT (OUTPATIENT)
Dept: FAMILY MEDICINE | Facility: CLINIC | Age: 84
End: 2019-01-29
Payer: MEDICARE

## 2019-01-29 VITALS
HEIGHT: 70 IN | TEMPERATURE: 98 F | HEART RATE: 81 BPM | SYSTOLIC BLOOD PRESSURE: 110 MMHG | BODY MASS INDEX: 26.22 KG/M2 | WEIGHT: 183.19 LBS | DIASTOLIC BLOOD PRESSURE: 60 MMHG | OXYGEN SATURATION: 98 %

## 2019-01-29 DIAGNOSIS — I71.20 THORACIC AORTIC ANEURYSM WITHOUT RUPTURE: ICD-10-CM

## 2019-01-29 DIAGNOSIS — J18.9 PNEUMONIA OF LEFT LUNG DUE TO INFECTIOUS ORGANISM, UNSPECIFIED PART OF LUNG: Primary | ICD-10-CM

## 2019-01-29 PROCEDURE — 25500020 PHARM REV CODE 255: Mod: HCNC | Performed by: FAMILY MEDICINE

## 2019-01-29 PROCEDURE — 3074F SYST BP LT 130 MM HG: CPT | Mod: HCNC,CPTII,S$GLB, | Performed by: FAMILY MEDICINE

## 2019-01-29 PROCEDURE — 3074F PR MOST RECENT SYSTOLIC BLOOD PRESSURE < 130 MM HG: ICD-10-PCS | Mod: HCNC,CPTII,S$GLB, | Performed by: FAMILY MEDICINE

## 2019-01-29 PROCEDURE — 1101F PR PT FALLS ASSESS DOC 0-1 FALLS W/OUT INJ PAST YR: ICD-10-PCS | Mod: HCNC,CPTII,S$GLB, | Performed by: FAMILY MEDICINE

## 2019-01-29 PROCEDURE — 3078F DIAST BP <80 MM HG: CPT | Mod: HCNC,CPTII,S$GLB, | Performed by: FAMILY MEDICINE

## 2019-01-29 PROCEDURE — 74175 CTA ABDOMEN W/CONTRAST: CPT | Mod: TC,HCNC

## 2019-01-29 PROCEDURE — 99213 PR OFFICE/OUTPT VISIT, EST, LEVL III, 20-29 MIN: ICD-10-PCS | Mod: HCNC,S$GLB,, | Performed by: FAMILY MEDICINE

## 2019-01-29 PROCEDURE — 99213 OFFICE O/P EST LOW 20 MIN: CPT | Mod: HCNC,S$GLB,, | Performed by: FAMILY MEDICINE

## 2019-01-29 PROCEDURE — 99999 PR PBB SHADOW E&M-EST. PATIENT-LVL III: ICD-10-PCS | Mod: PBBFAC,HCNC,, | Performed by: FAMILY MEDICINE

## 2019-01-29 PROCEDURE — 3078F PR MOST RECENT DIASTOLIC BLOOD PRESSURE < 80 MM HG: ICD-10-PCS | Mod: HCNC,CPTII,S$GLB, | Performed by: FAMILY MEDICINE

## 2019-01-29 PROCEDURE — 1101F PT FALLS ASSESS-DOCD LE1/YR: CPT | Mod: HCNC,CPTII,S$GLB, | Performed by: FAMILY MEDICINE

## 2019-01-29 PROCEDURE — 99999 PR PBB SHADOW E&M-EST. PATIENT-LVL III: CPT | Mod: PBBFAC,HCNC,, | Performed by: FAMILY MEDICINE

## 2019-01-29 RX ADMIN — IOHEXOL 100 ML: 350 INJECTION, SOLUTION INTRAVENOUS at 11:01

## 2019-01-29 NOTE — PROGRESS NOTES
Chief Complaint:    Chief Complaint   Patient presents with    Follow-up       History of Present Illness:    Here for follow-up of pneumonia was seen in the ED with complaints of cough congestion chest pain sent to have left-sided pneumonia.  Taking Levaquin no side effects does have slight cough no few    ROS:  Review of Systems   Constitutional: Negative for appetite change, chills and fever.   HENT: Negative for congestion, ear discharge, ear pain, facial swelling, mouth sores, postnasal drip, rhinorrhea, sinus pressure, sneezing, sore throat, trouble swallowing and voice change.    Eyes: Negative for discharge, redness and itching.   Respiratory: Positive for cough. Negative for chest tightness, shortness of breath and wheezing.    Cardiovascular: Negative for chest pain.       Past Medical History:   Diagnosis Date    A-fib     Anemia     AP (angina pectoris) 1/23/2014    Carotid artery disease without cerebral infarction 8/22/2014    Cataract     Coronary artery disease     GERD (gastroesophageal reflux disease) 7/11/2013    Hemorrhagic cerebrovascular accident (CVA) 4/26/2018    Hyperlipidemia     Hypertension     Intracranial hemorrhage     Lumbosacral spondylosis 12/24/2014    Pacemaker 1/23/2014    Paroxysmal atrial fibrillation 1/23/2014    Peripheral vascular disease 8/22/2014    Seizure, late effect of stroke     Stroke     Type 2 diabetes mellitus with ophthalmic manifestations        Social History:  Social History     Socioeconomic History    Marital status:      Spouse name: None    Number of children: None    Years of education: None    Highest education level: None   Social Needs    Financial resource strain: None    Food insecurity - worry: None    Food insecurity - inability: None    Transportation needs - medical: None    Transportation needs - non-medical: None   Occupational History    None   Tobacco Use    Smoking status: Former Smoker     Packs/day:  "2.00     Years: 13.00     Pack years: 26.00     Types: Cigarettes     Start date: 1954     Last attempt to quit: 1967     Years since quittin.6    Smokeless tobacco: Never Used   Substance and Sexual Activity    Alcohol use: No    Drug use: No    Sexual activity: No     Partners: Female     Birth control/protection: None   Other Topics Concern    None   Social History Narrative    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       Family History:   family history includes Alcohol abuse in his paternal uncle; Arthritis in his father and mother; Cancer in his daughter and sister; Diabetes in his brother, daughter, father, mother, sister, son, and son; Fibromyalgia in his daughter; Heart disease in his brother, mother, and sister; Kidney disease in his brother and mother; Miscarriages / Stillbirths in his daughter.    Health Maintenance   Topic Date Due    Influenza Vaccine  2018    Foot Exam  2018    Hemoglobin A1c  2019    Eye Exam  2019    Lipid Panel  2019    TETANUS VACCINE  2024    Zoster Vaccine  Completed    Pneumococcal Vaccine (65+ Low/Medium Risk)  Completed       Physical Exam:    Vital Signs  Temp: 98.2 °F (36.8 °C)  Temp src: Tympanic  Pulse: 81  SpO2: 98 %  BP: 110/60  BP Location: Left arm  Patient Position: Sitting  Pain Score: 0-No pain  Height and Weight  Height: 5' 10" (177.8 cm)  Weight: 83.1 kg (183 lb 3.2 oz)  BSA (Calculated - sq m): 2.03 sq meters  BMI (Calculated): 26.3  Weight in (lb) to have BMI = 25: 173.9]    Body mass index is 26.29 kg/m².    Physical Exam   Constitutional: He appears well-developed and well-nourished.   HENT:   Head: Normocephalic and atraumatic.   Right Ear: Tympanic membrane is not bulging.   Left Ear: Tympanic membrane is not bulging.   Nose: No rhinorrhea. Right sinus exhibits no maxillary sinus tenderness and no frontal sinus tenderness. Left sinus exhibits no maxillary sinus " tenderness and no frontal sinus tenderness.   Mouth/Throat: Mucous membranes are normal. No posterior oropharyngeal erythema or tonsillar abscesses.   Eyes: Conjunctivae are normal. Pupils are equal, round, and reactive to light.   Neck: Normal range of motion.   Cardiovascular: Normal rate and normal heart sounds.   Pulmonary/Chest: Effort normal and breath sounds normal. No stridor. No respiratory distress. He has no wheezes. He has no rales. He exhibits no tenderness.   Abdominal: Soft. There is no tenderness.   Lymphadenopathy:     He has no cervical adenopathy.         Assessment:      ICD-10-CM ICD-9-CM   1. Pneumonia of left lung due to infectious organism, unspecified part of lung J18.9 486         Plan:    Patient improving continue current plan recheck x-ray in 2 weeks      Orders Placed This Encounter   Procedures    X-Ray Chest PA And Lateral       Current Outpatient Medications   Medication Sig Dispense Refill    ACCU-CHEK TOMAS PLUS TEST STRP Strp TEST BLOOD SUGAR SIX TIMES DAILY 300 strip 5    ACCU-CHEK MULTICLIX LANCET lancets TEST BLOOD SUGAR THREE TIMES DAILY 200 each 5    CARBOXYMETHYLCELLULOSE SODIUM (REFRESH OPHT) Apply to eye.      fluticasone (FLONASE) 50 mcg/actuation nasal spray 2 sprays (100 mcg total) by Each Nare route once daily. 16 g 6    furosemide (LASIX) 20 MG tablet TAKE ONE TABLET BY MOUTH DAILY 30 tablet 12    insulin glargine (LANTUS SOLOSTAR) 100 unit/mL (3 mL) InPn pen INJECT 16 UNITS SUB-Q AT BEDTIME 15 mL 11    isosorbide mononitrate (IMDUR) 30 MG 24 hr tablet TAKE ONE TABLET BY MOUTH EVERY DAY 30 tablet 12    levETIRAcetam (KEPPRA) 250 MG Tab Take 1 tablet (250 mg total) by mouth 2 (two) times daily. 60 tablet 11    losartan (COZAAR) 100 MG tablet TAKE ONE TABLET BY MOUTH DAILY 90 tablet 0    meclizine (ANTIVERT) 12.5 mg tablet Take 1 tablet (12.5 mg total) by mouth 2 (two) times daily as needed. 20 tablet 1    metFORMIN (GLUCOPHAGE) 1000 MG tablet Take 1 tablet  "by mouth 2 (two) times daily before meals for 30 days. 60 tablet 5    metoprolol succinate (TOPROL-XL) 100 MG 24 hr tablet TAKE ONE TABLET BY MOUTH TWICE DAILY 60 tablet 12    nitroGLYCERIN (NITROSTAT) 0.4 MG SL tablet Place 1 tablet (0.4 mg total) under the tongue every 5 (five) minutes as needed for Chest pain. 25 tablet 12    NOVOLOG FLEXPEN 100 unit/mL InPn pen Inject 5 units 3 times a day before meals 15 mL 3    pantoprazole (PROTONIX) 40 MG tablet TAKE ONE TABLET BY MOUTH EVERY DAY 30 tablet 5    potassium chloride SA (K-DUR,KLOR-CON) 20 MEQ tablet TAKE ONE TABLET BY MOUTH EVERY DAY 30 tablet 12    saw palmetto fruit 450 mg Cap Take 1 capsule by mouth Twice daily.      SURE COMFORT PEN NEEDLE 32 gauge x 5/32" Ndle USE FOUR TIMES DAILY.  12    SURE COMFORT PEN NEEDLE 32 gauge x 5/32" Ndle USE FOUR TIMES DAILY. 100 each 6     No current facility-administered medications for this visit.        There are no discontinued medications.    No Follow-up on file.      Abdulaziz Mccabe MD              "

## 2019-01-30 LAB
BACTERIA BLD CULT: NORMAL
BACTERIA BLD CULT: NORMAL

## 2019-02-04 ENCOUNTER — CLINICAL SUPPORT (OUTPATIENT)
Dept: CARDIOLOGY | Facility: CLINIC | Age: 84
End: 2019-02-04
Payer: MEDICARE

## 2019-02-04 ENCOUNTER — TELEPHONE (OUTPATIENT)
Dept: FAMILY MEDICINE | Facility: CLINIC | Age: 84
End: 2019-02-04

## 2019-02-04 DIAGNOSIS — I49.5 SSS (SICK SINUS SYNDROME): ICD-10-CM

## 2019-02-04 DIAGNOSIS — I48.0 PAF (PAROXYSMAL ATRIAL FIBRILLATION): ICD-10-CM

## 2019-02-04 DIAGNOSIS — N28.1 RENAL CYST: Primary | ICD-10-CM

## 2019-02-04 DIAGNOSIS — Z95.0 CARDIAC PACEMAKER IN SITU: ICD-10-CM

## 2019-02-04 PROCEDURE — 93294 REM INTERROG EVL PM/LDLS PM: CPT | Mod: HCNC,S$GLB,, | Performed by: INTERNAL MEDICINE

## 2019-02-04 PROCEDURE — 93296 REM INTERROG EVL PM/IDS: CPT | Mod: HCNC,S$GLB,, | Performed by: INTERNAL MEDICINE

## 2019-02-04 PROCEDURE — 93296 PACEMAKER REMOTE: ICD-10-PCS | Mod: HCNC,S$GLB,, | Performed by: INTERNAL MEDICINE

## 2019-02-04 PROCEDURE — 93294 PACEMAKER REMOTE: ICD-10-PCS | Mod: HCNC,S$GLB,, | Performed by: INTERNAL MEDICINE

## 2019-02-08 ENCOUNTER — OFFICE VISIT (OUTPATIENT)
Dept: CARDIOLOGY | Facility: CLINIC | Age: 84
End: 2019-02-08
Payer: MEDICARE

## 2019-02-08 VITALS
HEART RATE: 72 BPM | WEIGHT: 188.63 LBS | SYSTOLIC BLOOD PRESSURE: 164 MMHG | DIASTOLIC BLOOD PRESSURE: 86 MMHG | HEIGHT: 70 IN | BODY MASS INDEX: 27 KG/M2

## 2019-02-08 DIAGNOSIS — I48.19 PERSISTENT ATRIAL FIBRILLATION: ICD-10-CM

## 2019-02-08 DIAGNOSIS — I77.9 CAROTID ARTERY DISEASE WITHOUT CEREBRAL INFARCTION: Chronic | ICD-10-CM

## 2019-02-08 DIAGNOSIS — I10 ESSENTIAL HYPERTENSION: ICD-10-CM

## 2019-02-08 DIAGNOSIS — I36.1 NON-RHEUMATIC TRICUSPID VALVE INSUFFICIENCY: ICD-10-CM

## 2019-02-08 DIAGNOSIS — Z78.9 STATIN INTOLERANCE: ICD-10-CM

## 2019-02-08 DIAGNOSIS — I61.9 HEMORRHAGIC CEREBROVASCULAR ACCIDENT (CVA): ICD-10-CM

## 2019-02-08 DIAGNOSIS — I20.9 AP (ANGINA PECTORIS): ICD-10-CM

## 2019-02-08 DIAGNOSIS — R06.02 SOB (SHORTNESS OF BREATH): ICD-10-CM

## 2019-02-08 DIAGNOSIS — E13.51 PERIPHERAL VASCULAR DISEASE DUE TO SECONDARY DIABETES MELLITUS: ICD-10-CM

## 2019-02-08 DIAGNOSIS — I25.810 CORONARY ARTERY DISEASE INVOLVING CORONARY BYPASS GRAFT OF NATIVE HEART WITHOUT ANGINA PECTORIS: ICD-10-CM

## 2019-02-08 DIAGNOSIS — R60.0 EDEMA OF BOTH LEGS: ICD-10-CM

## 2019-02-08 DIAGNOSIS — I48.0 PAROXYSMAL ATRIAL FIBRILLATION: Chronic | ICD-10-CM

## 2019-02-08 DIAGNOSIS — I73.9 CLAUDICATION IN PERIPHERAL VASCULAR DISEASE: Chronic | ICD-10-CM

## 2019-02-08 DIAGNOSIS — E78.2 MIXED HYPERLIPIDEMIA: Chronic | ICD-10-CM

## 2019-02-08 DIAGNOSIS — E08.51 DIABETES MELLITUS DUE TO UNDERLYING CONDITION WITH DIABETIC PERIPHERAL ANGIOPATHY WITHOUT GANGRENE, WITHOUT LONG-TERM CURRENT USE OF INSULIN: ICD-10-CM

## 2019-02-08 DIAGNOSIS — R07.89 ATYPICAL CHEST PAIN: Primary | ICD-10-CM

## 2019-02-08 DIAGNOSIS — E11.9 DIABETES MELLITUS TYPE 2 WITHOUT RETINOPATHY: Chronic | ICD-10-CM

## 2019-02-08 DIAGNOSIS — I73.9 PERIPHERAL VASCULAR DISEASE: Chronic | ICD-10-CM

## 2019-02-08 DIAGNOSIS — Z95.0 PACEMAKER: Chronic | ICD-10-CM

## 2019-02-08 PROCEDURE — 99214 OFFICE O/P EST MOD 30 MIN: CPT | Mod: HCNC,S$GLB,, | Performed by: INTERNAL MEDICINE

## 2019-02-08 PROCEDURE — 99999 PR PBB SHADOW E&M-EST. PATIENT-LVL III: ICD-10-PCS | Mod: PBBFAC,HCNC,, | Performed by: INTERNAL MEDICINE

## 2019-02-08 PROCEDURE — 3077F SYST BP >= 140 MM HG: CPT | Mod: HCNC,CPTII,S$GLB, | Performed by: INTERNAL MEDICINE

## 2019-02-08 PROCEDURE — 1101F PR PT FALLS ASSESS DOC 0-1 FALLS W/OUT INJ PAST YR: ICD-10-PCS | Mod: HCNC,CPTII,S$GLB, | Performed by: INTERNAL MEDICINE

## 2019-02-08 PROCEDURE — 3079F PR MOST RECENT DIASTOLIC BLOOD PRESSURE 80-89 MM HG: ICD-10-PCS | Mod: HCNC,CPTII,S$GLB, | Performed by: INTERNAL MEDICINE

## 2019-02-08 PROCEDURE — 3077F PR MOST RECENT SYSTOLIC BLOOD PRESSURE >= 140 MM HG: ICD-10-PCS | Mod: HCNC,CPTII,S$GLB, | Performed by: INTERNAL MEDICINE

## 2019-02-08 PROCEDURE — 99214 PR OFFICE/OUTPT VISIT, EST, LEVL IV, 30-39 MIN: ICD-10-PCS | Mod: HCNC,S$GLB,, | Performed by: INTERNAL MEDICINE

## 2019-02-08 PROCEDURE — 3079F DIAST BP 80-89 MM HG: CPT | Mod: HCNC,CPTII,S$GLB, | Performed by: INTERNAL MEDICINE

## 2019-02-08 PROCEDURE — 99999 PR PBB SHADOW E&M-EST. PATIENT-LVL III: CPT | Mod: PBBFAC,HCNC,, | Performed by: INTERNAL MEDICINE

## 2019-02-08 PROCEDURE — 1101F PT FALLS ASSESS-DOCD LE1/YR: CPT | Mod: HCNC,CPTII,S$GLB, | Performed by: INTERNAL MEDICINE

## 2019-02-08 RX ORDER — ISOSORBIDE MONONITRATE 60 MG/1
60 TABLET, EXTENDED RELEASE ORAL DAILY
Qty: 30 TABLET | Refills: 11 | Status: SHIPPED | OUTPATIENT
Start: 2019-02-08 | End: 2020-01-23 | Stop reason: SDUPTHER

## 2019-02-08 NOTE — PROGRESS NOTES
Subjective:    Patient ID:  Anthony Blair is a 85 y.o. male who presents for evaluation of Peripheral Arterial Disease; Hyperlipidemia; Hypertension; Atrial Fibrillation; Carotid Artery Disease; Coronary Artery Disease; and Risk Factor Management For Atherosclerosis        HPI pt presents for f/u.  His current medical conditions include PAF/PSVT, CAD (PTCA 1980's), HTN, PPM, DM, atrial septal aneurysm/pfo, PAD, dyslipidemia. Nonsmoker.  Past details pertinent for following:   Statin intolerant.  s/p CABG 7/10 (LIMA-LAD, SVG-OM1, SVG-OM3, SVG-PDA) for increasing OLIVARES and multivessel CAD on [facility].   S/p PPM 10/10 for SSS/PAF. Was hospitalized 1/11 for PSVT (Atrial tachycardia) and was started on Amiodarone but was stopped for GI discomfort. He also did not tolerate Multaq and has not tolerated multiple statins.   s/p EPS/ablation of his atrial tachycardia 6/11.   S/p abd w runoff March 2015 and had significant B LE infrapopliteal disease. Atherectomy/pta performed of critical R tibeoperoneal trunk stenosis. Also has L tibeoperoneal trunk stenosis and his PTA/MIGUEL are occluded bilaterally.  Started on Eliquis Feb 2017 for suspicious left internal jugular vein thrombus.  Stress MPI 3/17 showed no ischemia.  Echo 3/17 showed normal LV function, left-right atrial shunt.   Carotid u/s 6/17 showed mild bilateral disease, known L IJ thrombus noted.  Pt called clinic Sept 2017 stated he had stopped Eliquis couple weeks before his call due to diarrhea, weakness, bloating and also off Amlodipine due to sleepiness.  He stated sxs subsided when he stopped the meds.   He was advised by cardiology on 9/6/17 to take 81 mg ASA since he stopped his Eliquis.  He had acute hemorrhage of basal ganglia right side Sept 2017, causing weakness on left side.  Tx at [facility] 9/24/17. He was on ASA daily when CVA occurred and was not on Eliquis. Also noted to have mild thoracic aneurysm 4.1 cm, 60% R ICA stenosis, 30% LICA stenosis, patent vertebral  arteries but mod-severe distal left vertebral disease,  by CTA per PCP note.  He was taken off his asa.  Followed by Dr. Weaver, neurosurgery. No surgery was needed.  Echo showed normal LVEF.  Stress test 8/18 showed no ischemia, apical scar.    Echo 8/18 normal EF.  He was seen by Dr. Grajeda, EP, for a fib 10/18 who contemplated starting pt back on NOAC.  Seen in ER for CP 1/24/19 when wife passed. Dx with pneumonia, put on abs.  Chart reviewed.  - troponin.  No acute ecg changes.  BNP trivial elevation.  Has cp sxs, right sided, can be hours at a time.  Variable frequency.  He might get some cp sxs every week or two, usually 30 minutes duration and resolves on its own.  No fevers.  No dyspnea, no cough.    Chronic leg fatigue walking.  No syncope.   BP above goal.  Cholesterol numbers improved, TG now normal.        Current Outpatient Medications:     CARBOXYMETHYLCELLULOSE SODIUM (REFRESH OPHT), Apply to eye., Disp: , Rfl:     fluticasone (FLONASE) 50 mcg/actuation nasal spray, 2 sprays (100 mcg total) by Each Nare route once daily., Disp: 16 g, Rfl: 6    furosemide (LASIX) 20 MG tablet, TAKE ONE TABLET BY MOUTH DAILY, Disp: 30 tablet, Rfl: 12    insulin glargine (LANTUS SOLOSTAR) 100 unit/mL (3 mL) InPn pen, INJECT 16 UNITS SUB-Q AT BEDTIME, Disp: 15 mL, Rfl: 11    levETIRAcetam (KEPPRA) 250 MG Tab, Take 1 tablet (250 mg total) by mouth 2 (two) times daily., Disp: 60 tablet, Rfl: 11    losartan (COZAAR) 100 MG tablet, TAKE ONE TABLET BY MOUTH DAILY, Disp: 90 tablet, Rfl: 0    meclizine (ANTIVERT) 12.5 mg tablet, Take 1 tablet (12.5 mg total) by mouth 2 (two) times daily as needed., Disp: 20 tablet, Rfl: 1    metoprolol succinate (TOPROL-XL) 100 MG 24 hr tablet, TAKE ONE TABLET BY MOUTH TWICE DAILY, Disp: 60 tablet, Rfl: 12    nitroGLYCERIN (NITROSTAT) 0.4 MG SL tablet, Place 1 tablet (0.4 mg total) under the tongue every 5 (five) minutes as needed for Chest pain., Disp: 25 tablet, Rfl: 12     "NOVOLOG FLEXPEN 100 unit/mL InPn pen, Inject 5 units 3 times a day before meals, Disp: 15 mL, Rfl: 3    pantoprazole (PROTONIX) 40 MG tablet, TAKE ONE TABLET BY MOUTH EVERY DAY, Disp: 30 tablet, Rfl: 5    potassium chloride SA (K-DUR,KLOR-CON) 20 MEQ tablet, TAKE ONE TABLET BY MOUTH EVERY DAY, Disp: 30 tablet, Rfl: 12    saw palmetto fruit 450 mg Cap, Take 1 capsule by mouth Twice daily., Disp: , Rfl:     ACCU-CHEK TOMAS PLUS TEST STRP Strp, TEST BLOOD SUGAR SIX TIMES DAILY, Disp: 300 strip, Rfl: 5    ACCU-CHEK MULTICLIX LANCET lancets, TEST BLOOD SUGAR THREE TIMES DAILY, Disp: 200 each, Rfl: 5    isosorbide mononitrate (IMDUR) 60 MG 24 hr tablet, Take 1 tablet (60 mg total) by mouth once daily., Disp: 30 tablet, Rfl: 11    SURE COMFORT PEN NEEDLE 32 gauge x 5/32" Ndle, USE FOUR TIMES DAILY., Disp: , Rfl: 12    SURE COMFORT PEN NEEDLE 32 gauge x 5/32" Ndle, USE FOUR TIMES DAILY., Disp: 100 each, Rfl: 6      Review of Systems   Constitution: Positive for weakness.   HENT: Negative.    Eyes: Negative.    Cardiovascular: Positive for chest pain, claudication and leg swelling.   Respiratory: Negative.    Endocrine: Negative.    Hematologic/Lymphatic: Negative.    Skin: Negative.    Musculoskeletal: Positive for arthritis and joint pain.   Gastrointestinal: Negative.    Genitourinary: Negative.    Psychiatric/Behavioral: Negative.    Allergic/Immunologic: Negative.        BP (!) 164/86 (BP Location: Right arm, Patient Position: Sitting, BP Method: Medium (Manual))   Pulse 72   Ht 5' 10" (1.778 m)   Wt 85.6 kg (188 lb 9.7 oz)   BMI 27.06 kg/m²     Wt Readings from Last 3 Encounters:   02/08/19 85.6 kg (188 lb 9.7 oz)   01/29/19 83.1 kg (183 lb 3.2 oz)   01/09/19 82.6 kg (182 lb 3.4 oz)     Temp Readings from Last 3 Encounters:   01/29/19 98.2 °F (36.8 °C) (Tympanic)   01/24/19 98.1 °F (36.7 °C) (Oral)   01/09/19 98 °F (36.7 °C) (Tympanic)     BP Readings from Last 3 Encounters:   02/08/19 (!) 164/86   01/29/19 " 110/60   01/24/19 (!) 165/76     Pulse Readings from Last 3 Encounters:   02/08/19 72   01/29/19 81   01/24/19 74          Objective:    Physical Exam   Constitutional: He is oriented to person, place, and time. He appears well-developed and well-nourished.   HENT:   Head: Normocephalic.   Neck: Normal range of motion. Neck supple. Normal carotid pulses, no hepatojugular reflux and no JVD present. Carotid bruit is not present. No thyromegaly present.   Cardiovascular: Normal rate, regular rhythm, S1 normal and S2 normal. PMI is not displaced. Exam reveals no S3, no S4, no distant heart sounds, no friction rub, no midsystolic click and no opening snap.   No murmur heard.  Pulses:       Radial pulses are 2+ on the right side, and 2+ on the left side.   Pulmonary/Chest: Effort normal and breath sounds normal. He has no wheezes. He has no rales.   Abdominal: Soft. Bowel sounds are normal. He exhibits no distension, no abdominal bruit, no ascites and no mass. There is no tenderness.   Musculoskeletal: He exhibits edema.   Neurological: He is alert and oriented to person, place, and time.   Skin: Skin is warm.   Psychiatric: He has a normal mood and affect. His behavior is normal.   Nursing note and vitals reviewed.      I have reviewed all pertinent labs and cardiac studies.    Component      Latest Ref Rng & Units 1/24/2019   BNP      0 - 99 pg/mL 110 (H)         Chemistry        Component Value Date/Time     01/24/2019 1720    K 3.8 01/24/2019 1720     01/24/2019 1720    CO2 25 01/24/2019 1720    BUN 12 01/24/2019 1720    CREATININE 0.8 01/24/2019 1720     (H) 01/24/2019 1720        Component Value Date/Time    CALCIUM 10.4 01/24/2019 1720    ALKPHOS 121 01/24/2019 1720    AST 25 01/24/2019 1720    ALT 17 01/24/2019 1720    BILITOT 0.6 01/24/2019 1720    ESTGFRAFRICA >60 01/24/2019 1720    EGFRNONAA >60 01/24/2019 1720        Lab Results   Component Value Date    WBC 6.41 01/24/2019    HGB 13.0 (L)  01/24/2019    HCT 38.9 (L) 01/24/2019    MCV 92 01/24/2019     01/24/2019       Lab Results   Component Value Date    HGBA1C 7.1 (H) 12/17/2018     Lab Results   Component Value Date    CHOL 179 12/17/2018    CHOL 179 08/17/2018    CHOL 224 (H) 03/15/2018     Lab Results   Component Value Date    HDL 39 (L) 12/17/2018    HDL 40 08/17/2018    HDL 53 03/15/2018     Lab Results   Component Value Date    LDLCALC 113.0 12/17/2018    LDLCALC 100.6 08/17/2018    LDLCALC 136.0 03/15/2018     Lab Results   Component Value Date    TRIG 135 12/17/2018    TRIG 192 (H) 08/17/2018    TRIG 175 (H) 03/15/2018     Lab Results   Component Value Date    CHOLHDL 21.8 12/17/2018    CHOLHDL 22.3 08/17/2018    CHOLHDL 23.7 03/15/2018           Assessment:       1. Atypical chest pain    2. Paroxysmal atrial fibrillation    3. Pacemaker    4. Mixed hyperlipidemia    5. Peripheral vascular disease    6. Carotid artery disease without cerebral infarction    7. Claudication in peripheral vascular disease    8. Diabetes mellitus type 2 without retinopathy    9. Coronary artery disease involving coronary bypass graft of native heart without angina pectoris    10. AP (angina pectoris)    11. Essential hypertension    12. SOB (shortness of breath)    13. Edema of both legs    14. Non-rheumatic tricuspid valve insufficiency    15. Peripheral vascular disease due to secondary diabetes mellitus    16. Statin intolerance    17. Diabetes mellitus due to underlying condition with diabetic peripheral angiopathy without gangrene, without long-term current use of insulin    18. Hemorrhagic cerebrovascular accident (CVA)    19. Persistent atrial fibrillation         Plan:             Chronic stable extensive CV conditions.  Atypical cp sxs, poss MSK or due to recent possible pneumonia.  Chronic stable angina.  Will increase Imdur to 60 mg qd for CAD/angina and HTN control.  F/u with PCP on f/u CXR for pneumonia.  Discussed indications for  anticoagulation for a fib, risks/benefits.  I don't think he is good candidate for anticoagulation nor asa either since he had hemorrhagic CVA that was significant while on only ASA qd.  Continue DM well controlled.  Cardiac diet.  Continue medical tx for CAD/PAD.  Continue other meds.  F/u in PPM clinic.  F/u 4 months.

## 2019-02-12 ENCOUNTER — HOSPITAL ENCOUNTER (OUTPATIENT)
Dept: RADIOLOGY | Facility: HOSPITAL | Age: 84
Discharge: HOME | End: 2019-02-12
Attending: FAMILY MEDICINE
Payer: MEDICARE

## 2019-02-12 DIAGNOSIS — J18.9 PNEUMONIA OF LEFT LUNG DUE TO INFECTIOUS ORGANISM, UNSPECIFIED PART OF LUNG: ICD-10-CM

## 2019-02-12 PROCEDURE — 71046 X-RAY EXAM CHEST 2 VIEWS: CPT | Mod: 26,HCNC,, | Performed by: RADIOLOGY

## 2019-02-12 PROCEDURE — 71046 XR CHEST PA AND LATERAL: ICD-10-PCS | Mod: 26,HCNC,, | Performed by: RADIOLOGY

## 2019-02-12 PROCEDURE — 71046 X-RAY EXAM CHEST 2 VIEWS: CPT | Mod: TC,HCNC,FY,PO

## 2019-02-13 ENCOUNTER — PATIENT MESSAGE (OUTPATIENT)
Dept: FAMILY MEDICINE | Facility: CLINIC | Age: 84
End: 2019-02-13

## 2019-02-20 RX ORDER — INSULIN GLARGINE 100 [IU]/ML
INJECTION, SOLUTION SUBCUTANEOUS
Qty: 15 ML | Refills: 11 | Status: SHIPPED | OUTPATIENT
Start: 2019-02-20 | End: 2020-02-23

## 2019-02-25 ENCOUNTER — OFFICE VISIT (OUTPATIENT)
Dept: FAMILY MEDICINE | Facility: CLINIC | Age: 84
End: 2019-02-25
Payer: MEDICARE

## 2019-02-25 ENCOUNTER — PATIENT MESSAGE (OUTPATIENT)
Dept: FAMILY MEDICINE | Facility: CLINIC | Age: 84
End: 2019-02-25

## 2019-02-25 VITALS
BODY MASS INDEX: 27.07 KG/M2 | OXYGEN SATURATION: 98 % | HEART RATE: 79 BPM | HEIGHT: 70 IN | SYSTOLIC BLOOD PRESSURE: 150 MMHG | DIASTOLIC BLOOD PRESSURE: 70 MMHG | TEMPERATURE: 98 F | WEIGHT: 189.06 LBS

## 2019-02-25 DIAGNOSIS — I10 ESSENTIAL HYPERTENSION: ICD-10-CM

## 2019-02-25 DIAGNOSIS — R42 DIZZINESS: Primary | ICD-10-CM

## 2019-02-25 PROBLEM — I69.398 SEIZURE, LATE EFFECT OF STROKE: Status: RESOLVED | Noted: 2018-05-08 | Resolved: 2019-02-25

## 2019-02-25 PROBLEM — R56.9 SEIZURE, LATE EFFECT OF STROKE: Status: RESOLVED | Noted: 2018-05-08 | Resolved: 2019-02-25

## 2019-02-25 PROCEDURE — 3078F PR MOST RECENT DIASTOLIC BLOOD PRESSURE < 80 MM HG: ICD-10-PCS | Mod: HCNC,CPTII,S$GLB, | Performed by: FAMILY MEDICINE

## 2019-02-25 PROCEDURE — 3077F SYST BP >= 140 MM HG: CPT | Mod: HCNC,CPTII,S$GLB, | Performed by: FAMILY MEDICINE

## 2019-02-25 PROCEDURE — 3078F DIAST BP <80 MM HG: CPT | Mod: HCNC,CPTII,S$GLB, | Performed by: FAMILY MEDICINE

## 2019-02-25 PROCEDURE — 99214 PR OFFICE/OUTPT VISIT, EST, LEVL IV, 30-39 MIN: ICD-10-PCS | Mod: HCNC,S$GLB,, | Performed by: FAMILY MEDICINE

## 2019-02-25 PROCEDURE — 99999 PR PBB SHADOW E&M-EST. PATIENT-LVL III: CPT | Mod: PBBFAC,HCNC,, | Performed by: FAMILY MEDICINE

## 2019-02-25 PROCEDURE — 1101F PR PT FALLS ASSESS DOC 0-1 FALLS W/OUT INJ PAST YR: ICD-10-PCS | Mod: HCNC,CPTII,S$GLB, | Performed by: FAMILY MEDICINE

## 2019-02-25 PROCEDURE — 1101F PT FALLS ASSESS-DOCD LE1/YR: CPT | Mod: HCNC,CPTII,S$GLB, | Performed by: FAMILY MEDICINE

## 2019-02-25 PROCEDURE — 3077F PR MOST RECENT SYSTOLIC BLOOD PRESSURE >= 140 MM HG: ICD-10-PCS | Mod: HCNC,CPTII,S$GLB, | Performed by: FAMILY MEDICINE

## 2019-02-25 PROCEDURE — 99214 OFFICE O/P EST MOD 30 MIN: CPT | Mod: HCNC,S$GLB,, | Performed by: FAMILY MEDICINE

## 2019-02-25 PROCEDURE — 99999 PR PBB SHADOW E&M-EST. PATIENT-LVL III: ICD-10-PCS | Mod: PBBFAC,HCNC,, | Performed by: FAMILY MEDICINE

## 2019-02-25 NOTE — PROGRESS NOTES
Chief Complaint:    Chief Complaint   Patient presents with    Dizziness       History of Present Illness:    Presents today complaining of dizziness and he walks he feels wobbly sometimes symptoms do not last very long.  A come and go.  No trouble with hearing or ear pain.  Blood pressure is elevated today no home readings available    ROS:  Review of Systems   Constitutional: Negative for activity change, chills, fatigue, fever and unexpected weight change.   HENT: Negative for congestion, ear discharge, ear pain, hearing loss, postnasal drip and rhinorrhea.    Eyes: Negative for pain and visual disturbance.   Respiratory: Negative for cough, chest tightness and shortness of breath.    Cardiovascular: Negative for chest pain and palpitations.   Gastrointestinal: Negative for abdominal pain, diarrhea and vomiting.   Endocrine: Negative for heat intolerance.   Genitourinary: Negative for dysuria, flank pain, frequency and hematuria.   Musculoskeletal: Negative for back pain, gait problem and neck pain.   Skin: Negative for color change and rash.   Neurological: Positive for dizziness. Negative for tremors, seizures, numbness and headaches.   Psychiatric/Behavioral: Negative for agitation, hallucinations, self-injury, sleep disturbance and suicidal ideas. The patient is not nervous/anxious.        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        Social History:  Social History     Socioeconomic History    Marital status:      Spouse name: None    Number of children: None    Years of education: None    Highest education level: None   Social Needs    Financial resource strain: None    Food insecurity - worry: None    Food insecurity - inability: None    Transportation needs - medical: None    Transportation needs - non-medical: None   Occupational History    None   Tobacco Use    Smoking status: Former Smoker     Packs/day: 2.00     Years: 13.00     Pack years: 26.00     Types: Cigarettes     Start date: 1954     Last attempt to quit: 1967     Years since quittin.7    Smokeless tobacco: Never Used   Substance and Sexual Activity    Alcohol use: No    Drug use: No    Sexual activity: No     Partners: Female     Birth control/protection: None   Other Topics Concern    None   Social History Narrative    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       Family History:   family history includes Alcohol abuse in his paternal uncle; Arthritis in his father and mother; Cancer in his daughter and sister; Diabetes in his brother, daughter, father, mother, sister, son, and son; Fibromyalgia in his daughter; Heart disease in his brother, mother, and sister; Kidney disease in his brother and mother; Miscarriages / Stillbirths in his daughter.    Health Maintenance   Topic Date Due    Influenza Vaccine  2018    Foot Exam  2018    Hemoglobin A1c  2019    Eye Exam  2019    Lipid Panel  2019    TETANUS VACCINE  2024    Zoster Vaccine  Completed    Pneumococcal Vaccine (65+ Low/Medium Risk)  Completed       Physical Exam:    Vital Signs  Temp: 97.7 °F (36.5 °C)  Temp src: Tympanic  Pulse: 79  SpO2: 98 %  BP: (!) 150/70  BP Location: Left arm  Patient Position: Sitting  Height and Weight  Height: 5' 10" (177.8 cm)  Weight: 85.7 kg (189 lb 0.7 oz)  BSA " (Calculated - sq m): 2.06 sq meters  BMI (Calculated): 27.2  Weight in (lb) to have BMI = 25: 173.9]    Body mass index is 27.13 kg/m².    Physical Exam   Constitutional: He is oriented to person, place, and time. He appears well-developed.   HENT:   Mouth/Throat: Oropharynx is clear and moist.   Eyes: Conjunctivae are normal. Pupils are equal, round, and reactive to light.   Neck: Normal range of motion. Neck supple.   Cardiovascular: Normal rate, regular rhythm and normal heart sounds.   No murmur heard.  Pulmonary/Chest: Effort normal and breath sounds normal. No respiratory distress. He has no wheezes. He has no rales. He exhibits no tenderness.   Abdominal: Soft. He exhibits no distension and no mass. There is no tenderness. There is no guarding.   Musculoskeletal: He exhibits no edema or tenderness.   Lymphadenopathy:     He has no cervical adenopathy.   Neurological: He is alert and oriented to person, place, and time. He has normal reflexes.   Brayan-Hallpike maneuver is positive on the left, she gait is normal   Skin: Skin is warm and dry.   Psychiatric: He has a normal mood and affect. His behavior is normal. Judgment and thought content normal.         Assessment:      ICD-10-CM ICD-9-CM   1. Dizziness R42 780.4   2. Essential hypertension I10 401.9         Plan:    Patient given handout on Epley maneuver he has been to ENT before  Please monitor home blood pressure readings carefully      No orders of the defined types were placed in this encounter.      Current Outpatient Medications   Medication Sig Dispense Refill    ACCU-CHEK TOMAS PLUS TEST STRP Strp TEST BLOOD SUGAR SIX TIMES DAILY 300 strip 5    ACCU-CHEK MULTICLIX LANCET lancets TEST BLOOD SUGAR THREE TIMES DAILY 200 each 5    CARBOXYMETHYLCELLULOSE SODIUM (REFRESH OPHT) Apply to eye.      fluticasone (FLONASE) 50 mcg/actuation nasal spray 2 sprays (100 mcg total) by Each Nare route once daily. 16 g 6    furosemide (LASIX) 20 MG tablet TAKE ONE  "TABLET BY MOUTH DAILY 30 tablet 12    insulin (LANTUS SOLOSTAR U-100 INSULIN) glargine 100 units/mL (3mL) SubQ pen INJECT 16 UNITS SUB-Q AT BEDTIME 15 mL 11    isosorbide mononitrate (IMDUR) 60 MG 24 hr tablet Take 1 tablet (60 mg total) by mouth once daily. 30 tablet 11    levETIRAcetam (KEPPRA) 250 MG Tab Take 1 tablet (250 mg total) by mouth 2 (two) times daily. 60 tablet 11    losartan (COZAAR) 100 MG tablet TAKE ONE TABLET BY MOUTH DAILY 90 tablet 0    meclizine (ANTIVERT) 12.5 mg tablet Take 1 tablet (12.5 mg total) by mouth 2 (two) times daily as needed. 20 tablet 1    metoprolol succinate (TOPROL-XL) 100 MG 24 hr tablet TAKE ONE TABLET BY MOUTH TWICE DAILY 60 tablet 12    nitroGLYCERIN (NITROSTAT) 0.4 MG SL tablet Place 1 tablet (0.4 mg total) under the tongue every 5 (five) minutes as needed for Chest pain. 25 tablet 12    NOVOLOG FLEXPEN 100 unit/mL InPn pen Inject 5 units 3 times a day before meals 15 mL 3    pantoprazole (PROTONIX) 40 MG tablet TAKE ONE TABLET BY MOUTH EVERY DAY 30 tablet 5    potassium chloride SA (K-DUR,KLOR-CON) 20 MEQ tablet TAKE ONE TABLET BY MOUTH EVERY DAY 30 tablet 12    saw palmetto fruit 450 mg Cap Take 1 capsule by mouth Twice daily.      SURE COMFORT PEN NEEDLE 32 gauge x 5/32" Ndle USE FOUR TIMES DAILY.  12    SURE COMFORT PEN NEEDLE 32 gauge x 5/32" Ndle USE FOUR TIMES DAILY. 100 each 6     No current facility-administered medications for this visit.        There are no discontinued medications.    No Follow-up on file.      Abdulaziz Mccabe MD              "

## 2019-03-07 RX ORDER — INSULIN ASPART 100 [IU]/ML
INJECTION, SOLUTION INTRAVENOUS; SUBCUTANEOUS
Qty: 15 ML | Refills: 3 | Status: SHIPPED | OUTPATIENT
Start: 2019-03-07 | End: 2019-08-09 | Stop reason: SDUPTHER

## 2019-03-12 ENCOUNTER — OFFICE VISIT (OUTPATIENT)
Dept: FAMILY MEDICINE | Facility: CLINIC | Age: 84
End: 2019-03-12
Payer: MEDICARE

## 2019-03-12 VITALS
OXYGEN SATURATION: 97 % | HEART RATE: 95 BPM | BODY MASS INDEX: 27.09 KG/M2 | DIASTOLIC BLOOD PRESSURE: 78 MMHG | SYSTOLIC BLOOD PRESSURE: 137 MMHG | WEIGHT: 188.81 LBS

## 2019-03-12 DIAGNOSIS — I25.810 CORONARY ARTERY DISEASE INVOLVING CORONARY BYPASS GRAFT OF NATIVE HEART WITHOUT ANGINA PECTORIS: ICD-10-CM

## 2019-03-12 DIAGNOSIS — Z79.4 TYPE 2 DIABETES MELLITUS WITH DIABETIC NEUROPATHY, WITH LONG-TERM CURRENT USE OF INSULIN: Primary | ICD-10-CM

## 2019-03-12 DIAGNOSIS — I65.23 ASYMPTOMATIC STENOSIS OF BOTH CAROTID ARTERIES WITHOUT INFARCTION: ICD-10-CM

## 2019-03-12 DIAGNOSIS — K21.9 GASTROESOPHAGEAL REFLUX DISEASE WITHOUT ESOPHAGITIS: ICD-10-CM

## 2019-03-12 DIAGNOSIS — E11.40 TYPE 2 DIABETES MELLITUS WITH DIABETIC NEUROPATHY, WITH LONG-TERM CURRENT USE OF INSULIN: Primary | ICD-10-CM

## 2019-03-12 DIAGNOSIS — I48.19 PERSISTENT ATRIAL FIBRILLATION: ICD-10-CM

## 2019-03-12 DIAGNOSIS — E11.9 DIABETES MELLITUS TYPE 2 WITHOUT RETINOPATHY: Chronic | ICD-10-CM

## 2019-03-12 DIAGNOSIS — I77.9 CAROTID ARTERY DISEASE WITHOUT CEREBRAL INFARCTION: Chronic | ICD-10-CM

## 2019-03-12 DIAGNOSIS — Z95.0 PACEMAKER: Chronic | ICD-10-CM

## 2019-03-12 DIAGNOSIS — E78.2 MIXED HYPERLIPIDEMIA: Chronic | ICD-10-CM

## 2019-03-12 DIAGNOSIS — K31.819 AVM (ARTERIOVENOUS MALFORMATION) OF STOMACH, ACQUIRED: ICD-10-CM

## 2019-03-12 DIAGNOSIS — I73.9 PERIPHERAL VASCULAR DISEASE: Chronic | ICD-10-CM

## 2019-03-12 DIAGNOSIS — M50.30 DEGENERATIVE DISC DISEASE, CERVICAL: ICD-10-CM

## 2019-03-12 DIAGNOSIS — I71.21 ANEURYSM OF ASCENDING AORTA: ICD-10-CM

## 2019-03-12 DIAGNOSIS — I73.9 CLAUDICATION IN PERIPHERAL VASCULAR DISEASE: Chronic | ICD-10-CM

## 2019-03-12 DIAGNOSIS — I48.0 PAROXYSMAL ATRIAL FIBRILLATION: Chronic | ICD-10-CM

## 2019-03-12 DIAGNOSIS — I10 ESSENTIAL HYPERTENSION: ICD-10-CM

## 2019-03-12 DIAGNOSIS — E13.51 PERIPHERAL VASCULAR DISEASE DUE TO SECONDARY DIABETES MELLITUS: ICD-10-CM

## 2019-03-12 DIAGNOSIS — I11.0 HYPERTENSIVE HEART DISEASE WITH HEART FAILURE: ICD-10-CM

## 2019-03-12 PROCEDURE — 99999 PR PBB SHADOW E&M-EST. PATIENT-LVL III: CPT | Mod: PBBFAC,HCNC,, | Performed by: NURSE PRACTITIONER

## 2019-03-12 PROCEDURE — 99499 RISK ADDL DX/OHS AUDIT: ICD-10-PCS | Mod: HCNC,S$GLB,, | Performed by: NURSE PRACTITIONER

## 2019-03-12 PROCEDURE — 99499 UNLISTED E&M SERVICE: CPT | Mod: HCNC,S$GLB,, | Performed by: NURSE PRACTITIONER

## 2019-03-12 PROCEDURE — 96160 PR PT FOCUSED HLTH RISK ASSMT: ICD-10-PCS | Mod: HCNC,S$GLB,, | Performed by: NURSE PRACTITIONER

## 2019-03-12 PROCEDURE — 96160 PT-FOCUSED HLTH RISK ASSMT: CPT | Mod: HCNC,S$GLB,, | Performed by: NURSE PRACTITIONER

## 2019-03-12 PROCEDURE — 99999 PR PBB SHADOW E&M-EST. PATIENT-LVL III: ICD-10-PCS | Mod: PBBFAC,HCNC,, | Performed by: NURSE PRACTITIONER

## 2019-03-12 NOTE — PROGRESS NOTES
Serinachelle Blair presented for a  Medicare AWV and comprehensive Health Risk Assessment today. The following components were reviewed and updated:    · Medical history  · Family History  · Social history  · Allergies and Current Medications  · Health Risk Assessment  · Health Maintenance  · Care Team     ** See Completed Assessments for Annual Wellness Visit within the encounter summary.**       The following assessments were completed:  · Living Situation  · CAGE  · Depression Screening  · Timed Get Up and Go  · Whisper Test  · Cognitive Function Screening  · Nutrition Screening  · ADL Screening  · PAQ Screening    Vitals:    03/12/19 1059   BP: 137/78   Pulse: 95   SpO2: 97%   Weight: 85.6 kg (188 lb 13.2 oz)     Body mass index is 27.09 kg/m².  Physical Exam      Diagnoses and health risks identified today and associated recommendations/orders:    1. Hypertensive heart disease with heart failure  -stable. Managed per cardiology continue current treatment plan    2. Type 2 diabetes mellitus with diabetic neuropathy, with long-term current use of insulin  12/17/18   Hemoglobin A1C 4.0 - 5.6 % 7.1 Abnormally high     Stable continue current treatment plan    3. Paroxysmal atrial fibrillation  Stable continue current treatment plan    4. Pacemaker  Stable managed per cardiology continue current treatment plan    5. Mixed hyperlipidemia  Stable low fat diet. Statin intolerance    6. Peripheral vascular disease  Stable continue current treatment plan    7. Carotid artery disease without cerebral infarction  Stable managed per cardiology continue current treatment plan    8. Claudication in peripheral vascular disease  Managed per cardiology. Continue current treatment plan    9. Diabetes mellitus type 2 without retinopathy  Stable continue current treatment.  DM eye exam current    10. 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 Coronary artery disease involving coronary bypass graft of native heart without angina pectoris  -stable continue current treatment plan    12. Gastroesophageal reflux disease without esophagitis  Stable continue current treatment plan    13. Essential hypertension  Stable continue current treatment plan    14. Degenerative disc disease, cervical  -stable continue current treatment plan    15. Peripheral vascular disease due to secondary diabetes mellitus  Stable continue current treatment plan    16. Asymptomatic stenosis of both carotid arteries without infarction  -stable continue current treatmetn plan    17. Aneurysm of ascending aorta  Stable continue current treatment plan    18. AVM (arteriovenous malformation) of stomach, acquired  -stable continue current treatment plan    19. Persistent atrial fibrillation  Stable continue treatment plan      Provided [place] with a 5-10 year written screening schedule and personal prevention plan. Recommendations were developed using the USPSTF age appropriate recommendations. Education, counseling, and referrals were provided as needed. After Visit Summary printed and given to patient which includes a list of additional screenings\tests needed.    No Follow-up on file.    Tori Estrada NP  I offered to discuss end of life issues, including information on how to make advance directives that the patient could use to name someone who would make medical decisions on their behalf if they became too ill to make themselves.    _X_Patient declined  ___Patient is interested, I provided paper work and offered to discuss.

## 2019-03-27 ENCOUNTER — PATIENT MESSAGE (OUTPATIENT)
Dept: ADMINISTRATIVE | Facility: OTHER | Age: 84
End: 2019-03-27

## 2019-03-27 ENCOUNTER — HOSPITAL ENCOUNTER (OUTPATIENT)
Facility: HOSPITAL | Age: 84
Discharge: HOME | End: 2019-03-31
Attending: EMERGENCY MEDICINE | Admitting: FAMILY MEDICINE
Payer: MEDICARE

## 2019-03-27 DIAGNOSIS — A41.9 SEPSIS: ICD-10-CM

## 2019-03-27 DIAGNOSIS — J18.9 PNEUMONIA OF RIGHT UPPER LOBE DUE TO INFECTIOUS ORGANISM: Primary | ICD-10-CM

## 2019-03-27 DIAGNOSIS — R00.0 TACHYCARDIA: ICD-10-CM

## 2019-03-27 DIAGNOSIS — R06.02 SOB (SHORTNESS OF BREATH): ICD-10-CM

## 2019-03-27 PROBLEM — I50.42 CHRONIC COMBINED SYSTOLIC AND DIASTOLIC CONGESTIVE HEART FAILURE: Status: ACTIVE | Noted: 2019-03-27

## 2019-03-27 LAB
ALBUMIN SERPL BCP-MCNC: 3.8 G/DL (ref 3.5–5.2)
ALP SERPL-CCNC: 136 U/L (ref 55–135)
ALT SERPL W/O P-5'-P-CCNC: 14 U/L (ref 10–44)
ANION GAP SERPL CALC-SCNC: 12 MMOL/L (ref 8–16)
ANION GAP SERPL CALC-SCNC: 12 MMOL/L (ref 8–16)
APTT BLDCRRT: 28.8 SEC (ref 21–32)
AST SERPL-CCNC: 27 U/L (ref 10–40)
BASOPHILS # BLD AUTO: 0.01 K/UL (ref 0–0.2)
BASOPHILS # BLD AUTO: 0.01 K/UL (ref 0–0.2)
BASOPHILS NFR BLD: 0.1 % (ref 0–1.9)
BASOPHILS NFR BLD: 0.1 % (ref 0–1.9)
BILIRUB SERPL-MCNC: 0.7 MG/DL (ref 0.1–1)
BILIRUB UR QL STRIP: NEGATIVE
BNP SERPL-MCNC: 108 PG/ML (ref 0–99)
BUN SERPL-MCNC: 18 MG/DL (ref 8–23)
BUN SERPL-MCNC: 19 MG/DL (ref 8–23)
CALCIUM SERPL-MCNC: 10.3 MG/DL (ref 8.7–10.5)
CALCIUM SERPL-MCNC: 9.1 MG/DL (ref 8.7–10.5)
CHLORIDE SERPL-SCNC: 104 MMOL/L (ref 95–110)
CHLORIDE SERPL-SCNC: 108 MMOL/L (ref 95–110)
CLARITY UR: CLEAR
CO2 SERPL-SCNC: 19 MMOL/L (ref 23–29)
CO2 SERPL-SCNC: 25 MMOL/L (ref 23–29)
COLOR UR: YELLOW
CREAT SERPL-MCNC: 0.9 MG/DL (ref 0.5–1.4)
CREAT SERPL-MCNC: 1.1 MG/DL (ref 0.5–1.4)
DIFFERENTIAL METHOD: ABNORMAL
DIFFERENTIAL METHOD: ABNORMAL
EOSINOPHIL # BLD AUTO: 0 K/UL (ref 0–0.5)
EOSINOPHIL # BLD AUTO: 0.1 K/UL (ref 0–0.5)
EOSINOPHIL NFR BLD: 0.1 % (ref 0–8)
EOSINOPHIL NFR BLD: 0.7 % (ref 0–8)
ERYTHROCYTE [DISTWIDTH] IN BLOOD BY AUTOMATED COUNT: 13.8 % (ref 11.5–14.5)
ERYTHROCYTE [DISTWIDTH] IN BLOOD BY AUTOMATED COUNT: 14 % (ref 11.5–14.5)
EST. GFR  (AFRICAN AMERICAN): >60 ML/MIN/1.73 M^2
EST. GFR  (AFRICAN AMERICAN): >60 ML/MIN/1.73 M^2
EST. GFR  (NON AFRICAN AMERICAN): >60 ML/MIN/1.73 M^2
EST. GFR  (NON AFRICAN AMERICAN): >60 ML/MIN/1.73 M^2
GLUCOSE SERPL-MCNC: 134 MG/DL (ref 70–110)
GLUCOSE SERPL-MCNC: 97 MG/DL (ref 70–110)
GLUCOSE UR QL STRIP: NEGATIVE
HCT VFR BLD AUTO: 35.7 % (ref 40–54)
HCT VFR BLD AUTO: 40.5 % (ref 40–54)
HGB BLD-MCNC: 11.7 G/DL (ref 14–18)
HGB BLD-MCNC: 13.4 G/DL (ref 14–18)
HGB UR QL STRIP: NEGATIVE
INFLUENZA A, MOLECULAR: NEGATIVE
INFLUENZA B, MOLECULAR: NEGATIVE
INR PPP: 1.1 (ref 0.8–1.2)
KETONES UR QL STRIP: NEGATIVE
LACTATE SERPL-SCNC: 1.6 MMOL/L (ref 0.5–2.2)
LACTATE SERPL-SCNC: 1.8 MMOL/L (ref 0.5–2.2)
LEUKOCYTE ESTERASE UR QL STRIP: NEGATIVE
LYMPHOCYTES # BLD AUTO: 0.7 K/UL (ref 1–4.8)
LYMPHOCYTES # BLD AUTO: 1.2 K/UL (ref 1–4.8)
LYMPHOCYTES NFR BLD: 12.6 % (ref 18–48)
LYMPHOCYTES NFR BLD: 4.4 % (ref 18–48)
MAGNESIUM SERPL-MCNC: 1.6 MG/DL (ref 1.6–2.6)
MCH RBC QN AUTO: 30.5 PG (ref 27–31)
MCH RBC QN AUTO: 30.9 PG (ref 27–31)
MCHC RBC AUTO-ENTMCNC: 32.8 G/DL (ref 32–36)
MCHC RBC AUTO-ENTMCNC: 33.1 G/DL (ref 32–36)
MCV RBC AUTO: 93 FL (ref 82–98)
MCV RBC AUTO: 93 FL (ref 82–98)
MONOCYTES # BLD AUTO: 0.3 K/UL (ref 0.3–1)
MONOCYTES # BLD AUTO: 0.8 K/UL (ref 0.3–1)
MONOCYTES NFR BLD: 3.2 % (ref 4–15)
MONOCYTES NFR BLD: 5.5 % (ref 4–15)
NEUTROPHILS # BLD AUTO: 13.5 K/UL (ref 1.8–7.7)
NEUTROPHILS # BLD AUTO: 8.1 K/UL (ref 1.8–7.7)
NEUTROPHILS NFR BLD: 83.4 % (ref 38–73)
NEUTROPHILS NFR BLD: 90.1 % (ref 38–73)
NITRITE UR QL STRIP: NEGATIVE
PH UR STRIP: 6 [PH] (ref 5–8)
PHOSPHATE SERPL-MCNC: 3.4 MG/DL (ref 2.7–4.5)
PLATELET # BLD AUTO: 145 K/UL (ref 150–350)
PLATELET # BLD AUTO: 173 K/UL (ref 150–350)
PMV BLD AUTO: 10.6 FL (ref 9.2–12.9)
PMV BLD AUTO: 9.6 FL (ref 9.2–12.9)
POCT GLUCOSE: 142 MG/DL (ref 70–110)
POCT GLUCOSE: 236 MG/DL (ref 70–110)
POCT GLUCOSE: 313 MG/DL (ref 70–110)
POTASSIUM SERPL-SCNC: 3.7 MMOL/L (ref 3.5–5.1)
POTASSIUM SERPL-SCNC: 3.9 MMOL/L (ref 3.5–5.1)
PROCALCITONIN SERPL IA-MCNC: 0.03 NG/ML
PROT SERPL-MCNC: 7.2 G/DL (ref 6–8.4)
PROT UR QL STRIP: ABNORMAL
PROTHROMBIN TIME: 12 SEC (ref 9–12.5)
RBC # BLD AUTO: 3.84 M/UL (ref 4.6–6.2)
RBC # BLD AUTO: 4.34 M/UL (ref 4.6–6.2)
SODIUM SERPL-SCNC: 139 MMOL/L (ref 136–145)
SODIUM SERPL-SCNC: 141 MMOL/L (ref 136–145)
SP GR UR STRIP: 1.02 (ref 1–1.03)
SPECIMEN SOURCE: NORMAL
TROPONIN I SERPL DL<=0.01 NG/ML-MCNC: 0.01 NG/ML (ref 0–0.03)
URN SPEC COLLECT METH UR: ABNORMAL
UROBILINOGEN UR STRIP-ACNC: NEGATIVE EU/DL
WBC # BLD AUTO: 14.99 K/UL (ref 3.9–12.7)
WBC # BLD AUTO: 9.73 K/UL (ref 3.9–12.7)

## 2019-03-27 PROCEDURE — 25500020 PHARM REV CODE 255: Mod: HCNC | Performed by: EMERGENCY MEDICINE

## 2019-03-27 PROCEDURE — 87502 INFLUENZA DNA AMP PROBE: CPT | Mod: HCNC

## 2019-03-27 PROCEDURE — 82962 GLUCOSE BLOOD TEST: CPT | Mod: HCNC

## 2019-03-27 PROCEDURE — 63600175 PHARM REV CODE 636 W HCPCS: Mod: HCNC | Performed by: EMERGENCY MEDICINE

## 2019-03-27 PROCEDURE — 25000242 PHARM REV CODE 250 ALT 637 W/ HCPCS: Mod: HCNC | Performed by: NURSE PRACTITIONER

## 2019-03-27 PROCEDURE — 94761 N-INVAS EAR/PLS OXIMETRY MLT: CPT | Mod: HCNC

## 2019-03-27 PROCEDURE — 96361 HYDRATE IV INFUSION ADD-ON: CPT | Mod: HCNC

## 2019-03-27 PROCEDURE — 83605 ASSAY OF LACTIC ACID: CPT | Mod: HCNC

## 2019-03-27 PROCEDURE — 25000003 PHARM REV CODE 250: Mod: HCNC | Performed by: EMERGENCY MEDICINE

## 2019-03-27 PROCEDURE — 96374 THER/PROPH/DIAG INJ IV PUSH: CPT | Mod: HCNC

## 2019-03-27 PROCEDURE — 83880 ASSAY OF NATRIURETIC PEPTIDE: CPT | Mod: HCNC

## 2019-03-27 PROCEDURE — 25000003 PHARM REV CODE 250: Mod: HCNC | Performed by: NURSE PRACTITIONER

## 2019-03-27 PROCEDURE — G0378 HOSPITAL OBSERVATION PER HR: HCPCS | Mod: HCNC

## 2019-03-27 PROCEDURE — 80053 COMPREHEN METABOLIC PANEL: CPT | Mod: HCNC

## 2019-03-27 PROCEDURE — 63600175 PHARM REV CODE 636 W HCPCS: Mod: HCNC | Performed by: NURSE PRACTITIONER

## 2019-03-27 PROCEDURE — 81003 URINALYSIS AUTO W/O SCOPE: CPT | Mod: HCNC

## 2019-03-27 PROCEDURE — 96375 TX/PRO/DX INJ NEW DRUG ADDON: CPT

## 2019-03-27 PROCEDURE — 84100 ASSAY OF PHOSPHORUS: CPT | Mod: HCNC

## 2019-03-27 PROCEDURE — 25000003 PHARM REV CODE 250: Mod: HCNC | Performed by: FAMILY MEDICINE

## 2019-03-27 PROCEDURE — 94640 AIRWAY INHALATION TREATMENT: CPT | Mod: HCNC

## 2019-03-27 PROCEDURE — 27000221 HC OXYGEN, UP TO 24 HOURS: Mod: HCNC

## 2019-03-27 PROCEDURE — 93010 EKG 12-LEAD: ICD-10-PCS | Mod: HCNC,,, | Performed by: INTERNAL MEDICINE

## 2019-03-27 PROCEDURE — 96372 THER/PROPH/DIAG INJ SC/IM: CPT | Mod: 59

## 2019-03-27 PROCEDURE — 85730 THROMBOPLASTIN TIME PARTIAL: CPT | Mod: HCNC

## 2019-03-27 PROCEDURE — 93005 ELECTROCARDIOGRAM TRACING: CPT | Mod: HCNC

## 2019-03-27 PROCEDURE — 99900035 HC TECH TIME PER 15 MIN (STAT): Mod: HCNC

## 2019-03-27 PROCEDURE — 83735 ASSAY OF MAGNESIUM: CPT | Mod: HCNC

## 2019-03-27 PROCEDURE — 84484 ASSAY OF TROPONIN QUANT: CPT | Mod: HCNC

## 2019-03-27 PROCEDURE — 80048 BASIC METABOLIC PNL TOTAL CA: CPT | Mod: HCNC

## 2019-03-27 PROCEDURE — 87040 BLOOD CULTURE FOR BACTERIA: CPT | Mod: 59,HCNC

## 2019-03-27 PROCEDURE — 99285 EMERGENCY DEPT VISIT HI MDM: CPT | Mod: 25,HCNC

## 2019-03-27 PROCEDURE — 84145 PROCALCITONIN (PCT): CPT | Mod: HCNC

## 2019-03-27 PROCEDURE — 93010 ELECTROCARDIOGRAM REPORT: CPT | Mod: HCNC,,, | Performed by: INTERNAL MEDICINE

## 2019-03-27 PROCEDURE — 85610 PROTHROMBIN TIME: CPT | Mod: HCNC

## 2019-03-27 PROCEDURE — 85025 COMPLETE CBC W/AUTO DIFF WBC: CPT | Mod: HCNC

## 2019-03-27 PROCEDURE — 94799 UNLISTED PULMONARY SVC/PX: CPT | Mod: HCNC

## 2019-03-27 RX ORDER — ACETAMINOPHEN 325 MG/1
650 TABLET ORAL
Status: COMPLETED | OUTPATIENT
Start: 2019-03-27 | End: 2019-03-27

## 2019-03-27 RX ORDER — LEVETIRACETAM 250 MG/1
250 TABLET ORAL 2 TIMES DAILY
Status: DISCONTINUED | OUTPATIENT
Start: 2019-03-27 | End: 2019-03-31 | Stop reason: HOSPADM

## 2019-03-27 RX ORDER — INSULIN ASPART 100 [IU]/ML
0-5 INJECTION, SOLUTION INTRAVENOUS; SUBCUTANEOUS
Status: DISCONTINUED | OUTPATIENT
Start: 2019-03-27 | End: 2019-03-31 | Stop reason: HOSPADM

## 2019-03-27 RX ORDER — SODIUM CHLORIDE 9 MG/ML
INJECTION, SOLUTION INTRAVENOUS CONTINUOUS
Status: ACTIVE | OUTPATIENT
Start: 2019-03-27 | End: 2019-03-27

## 2019-03-27 RX ORDER — IBUPROFEN 200 MG
16 TABLET ORAL
Status: DISCONTINUED | OUTPATIENT
Start: 2019-03-27 | End: 2019-03-31 | Stop reason: HOSPADM

## 2019-03-27 RX ORDER — ONDANSETRON 2 MG/ML
4 INJECTION INTRAMUSCULAR; INTRAVENOUS EVERY 8 HOURS PRN
Status: DISCONTINUED | OUTPATIENT
Start: 2019-03-27 | End: 2019-03-31 | Stop reason: HOSPADM

## 2019-03-27 RX ORDER — ACETAMINOPHEN 325 MG/1
650 TABLET ORAL EVERY 8 HOURS PRN
Status: DISCONTINUED | OUTPATIENT
Start: 2019-03-27 | End: 2019-03-31 | Stop reason: HOSPADM

## 2019-03-27 RX ORDER — AZITHROMYCIN 250 MG/1
1000 TABLET, FILM COATED ORAL
Status: COMPLETED | OUTPATIENT
Start: 2019-03-27 | End: 2019-03-27

## 2019-03-27 RX ORDER — SODIUM CHLORIDE 9 MG/ML
INJECTION, SOLUTION INTRAVENOUS CONTINUOUS
Status: DISCONTINUED | OUTPATIENT
Start: 2019-03-27 | End: 2019-03-27

## 2019-03-27 RX ORDER — AZITHROMYCIN 250 MG/1
500 TABLET, FILM COATED ORAL DAILY
Status: DISCONTINUED | OUTPATIENT
Start: 2019-03-28 | End: 2019-03-27

## 2019-03-27 RX ORDER — GLUCAGON 1 MG
1 KIT INJECTION
Status: DISCONTINUED | OUTPATIENT
Start: 2019-03-27 | End: 2019-03-31 | Stop reason: HOSPADM

## 2019-03-27 RX ORDER — GUAIFENESIN 600 MG/1
600 TABLET, EXTENDED RELEASE ORAL 2 TIMES DAILY
Status: DISCONTINUED | OUTPATIENT
Start: 2019-03-27 | End: 2019-03-31 | Stop reason: HOSPADM

## 2019-03-27 RX ORDER — SODIUM CHLORIDE 0.9 % (FLUSH) 0.9 %
5 SYRINGE (ML) INJECTION
Status: DISCONTINUED | OUTPATIENT
Start: 2019-03-27 | End: 2019-03-31 | Stop reason: HOSPADM

## 2019-03-27 RX ORDER — IPRATROPIUM BROMIDE AND ALBUTEROL SULFATE 2.5; .5 MG/3ML; MG/3ML
3 SOLUTION RESPIRATORY (INHALATION) EVERY 8 HOURS
Status: DISCONTINUED | OUTPATIENT
Start: 2019-03-27 | End: 2019-03-31 | Stop reason: HOSPADM

## 2019-03-27 RX ORDER — IPRATROPIUM BROMIDE AND ALBUTEROL SULFATE 2.5; .5 MG/3ML; MG/3ML
3 SOLUTION RESPIRATORY (INHALATION) EVERY 6 HOURS PRN
Status: DISCONTINUED | OUTPATIENT
Start: 2019-03-27 | End: 2019-03-31 | Stop reason: HOSPADM

## 2019-03-27 RX ORDER — IBUPROFEN 200 MG
24 TABLET ORAL
Status: DISCONTINUED | OUTPATIENT
Start: 2019-03-27 | End: 2019-03-31 | Stop reason: HOSPADM

## 2019-03-27 RX ORDER — SODIUM CHLORIDE 9 MG/ML
INJECTION, SOLUTION INTRAVENOUS CONTINUOUS
Status: ACTIVE | OUTPATIENT
Start: 2019-03-27 | End: 2019-03-28

## 2019-03-27 RX ADMIN — IPRATROPIUM BROMIDE AND ALBUTEROL SULFATE 3 ML: .5; 3 SOLUTION RESPIRATORY (INHALATION) at 07:03

## 2019-03-27 RX ADMIN — LEVETIRACETAM 250 MG: 250 TABLET ORAL at 08:03

## 2019-03-27 RX ADMIN — SODIUM CHLORIDE 2535 ML: 0.9 INJECTION, SOLUTION INTRAVENOUS at 03:03

## 2019-03-27 RX ADMIN — SODIUM CHLORIDE: 0.9 INJECTION, SOLUTION INTRAVENOUS at 06:03

## 2019-03-27 RX ADMIN — IOHEXOL 75 ML: 350 INJECTION, SOLUTION INTRAVENOUS at 05:03

## 2019-03-27 RX ADMIN — DOXYCYCLINE 100 MG: 100 INJECTION, POWDER, LYOPHILIZED, FOR SOLUTION INTRAVENOUS at 08:03

## 2019-03-27 RX ADMIN — GUAIFENESIN 600 MG: 600 TABLET, EXTENDED RELEASE ORAL at 08:03

## 2019-03-27 RX ADMIN — PIPERACILLIN SODIUM AND TAZOBACTAM SODIUM 4.5 G: 4; .5 INJECTION, POWDER, LYOPHILIZED, FOR SOLUTION INTRAVENOUS at 06:03

## 2019-03-27 RX ADMIN — LEVETIRACETAM 250 MG: 250 TABLET ORAL at 01:03

## 2019-03-27 RX ADMIN — AZITHROMYCIN 1000 MG: 250 TABLET, FILM COATED ORAL at 06:03

## 2019-03-27 RX ADMIN — INSULIN ASPART 2 UNITS: 100 INJECTION, SOLUTION INTRAVENOUS; SUBCUTANEOUS at 08:03

## 2019-03-27 RX ADMIN — VANCOMYCIN HYDROCHLORIDE 1250 MG: 100 INJECTION, POWDER, LYOPHILIZED, FOR SOLUTION INTRAVENOUS at 07:03

## 2019-03-27 RX ADMIN — ACETAMINOPHEN 650 MG: 325 TABLET ORAL at 04:03

## 2019-03-27 RX ADMIN — IPRATROPIUM BROMIDE AND ALBUTEROL SULFATE 3 ML: .5; 3 SOLUTION RESPIRATORY (INHALATION) at 04:03

## 2019-03-27 RX ADMIN — INSULIN ASPART 2 UNITS: 100 INJECTION, SOLUTION INTRAVENOUS; SUBCUTANEOUS at 06:03

## 2019-03-27 RX ADMIN — GUAIFENESIN 600 MG: 600 TABLET, EXTENDED RELEASE ORAL at 09:03

## 2019-03-27 RX ADMIN — CEFTRIAXONE 1 G: 1 INJECTION, SOLUTION INTRAVENOUS at 06:03

## 2019-03-27 NOTE — H&P
[facility] Hospital Medicine  History & Physical    Patient Name: Anthony Blair  MRN: [id number]  Admission Date: 3/27/2019  Attending Physician: Romelia Andre MD   Primary Care Provider: Abdulaziz Mccabe MD         Patient information was obtained from patient, past medical records and ER records.     Subjective:     Principal Problem:Sepsis    Chief Complaint:   Chief Complaint   Patient presents with    Shortness of Breath     Patient reports shortness of breath and cough        HPI:   85-year-old male patient who presents to the emergency room with complaints of  Cough that started this morning which woke in him.  No modifying factors.  Associated symptoms include shortness of breath.  Patient reports improvement in symptoms since arrival and treatment in the emergency room.  On arrival patient febrile 103.2.   Chest x-ray with right lung infiltrate.  Lab results from emergency room unremarkable.  Blood cultures pending.  Patient received Rocephin and azithromycin in emergency room.  Patient's curb score 1.   Hospital Medicine consult.Patient placed in observation.    Past Medical History:   Diagnosis Date    A-fib     Anemia     AP (angina pectoris) 1/23/2014    Carotid artery disease without cerebral infarction 8/22/2014    Cataract     Coronary artery disease     GERD (gastroesophageal reflux disease) 7/11/2013    Hemorrhagic cerebrovascular accident (CVA) 4/26/2018    Hyperlipidemia     Hypertension     Hypertensive heart disease with heart failure 3/12/2019    Intracranial hemorrhage     Lumbosacral spondylosis 12/24/2014    Pacemaker 1/23/2014    Paroxysmal atrial fibrillation 1/23/2014    Peripheral vascular disease 8/22/2014    Seizure, late effect of stroke     Stroke     Type 2 diabetes mellitus with ophthalmic manifestations        Past Surgical History:   Procedure Laterality Date    ANGIOPLASTY      ATRIAL ABLATION SURGERY N/A     CATARACT EXTRACTION Bilateral      [facility]    CATARACT EXTRACTION W/ INTRAOCULAR LENS IMPLANT Right     CATARACT EXTRACTION W/ INTRAOCULAR LENS IMPLANT Left     COLONOSCOPY with biopsies N/A 10/16/2018    Performed [facility]ell [facility]gs [facility]    CORONARY ARTERY BYPASS GRAFT  07/2010    x5    ESOPHAGOGASTRODUODENOSCOPY (EGD) N/A 5/7/2018    Performed by Betty Leonardo MD at [facility] ENDO    EYE SURGERY      pace maker surgrey  10/2010    reposition in 2011/2012 (approx)    VEIN BYPASS SURGERY         Review of patient's allergies indicates:   Allergen Reactions    Atorvastatin      Other reaction(s): Muscle pain  Other reaction(s): Muscle weakness  Other reaction(s): Muscle pain    Codeine      Other reaction(s): Headache  Other reaction(s): Headache    Eliquis [apixaban]     Norvasc [amlodipine] Edema    Trulicity [dulaglutide] Nausea And Vomiting    Adhesive Rash     Other reaction(s): rash       No current facility-administered medications on file prior to encounter.      Current Outpatient Medications on File Prior to Encounter   Medication Sig    CARBOXYMETHYLCELLULOSE SODIUM (REFRESH OPHT) Apply to eye.    furosemide (LASIX) 20 MG tablet TAKE ONE TABLET BY MOUTH DAILY    insulin (LANTUS SOLOSTAR U-100 INSULIN) glargine 100 units/mL (3mL) SubQ pen INJECT 16 UNITS SUB-Q AT BEDTIME    isosorbide mononitrate (IMDUR) 60 MG 24 hr tablet Take 1 tablet (60 mg total) by mouth once daily.    levETIRAcetam (KEPPRA) 250 MG Tab Take 1 tablet (250 mg total) by mouth 2 (two) times daily.    losartan (COZAAR) 100 MG tablet TAKE ONE TABLET BY MOUTH DAILY    meclizine (ANTIVERT) 12.5 mg tablet Take 1 tablet (12.5 mg total) by mouth 2 (two) times daily as needed.    metoprolol succinate (TOPROL-XL) 100 MG 24 hr tablet TAKE ONE TABLET BY MOUTH TWICE DAILY    nitroGLYCERIN (NITROSTAT) 0.4 MG SL tablet Place 1 tablet (0.4 mg total) under the tongue every 5 (five) minutes as needed for Chest pain.    pantoprazole (PROTONIX) 40  "MG tablet TAKE ONE TABLET BY MOUTH EVERY DAY    potassium chloride SA (K-DUR,KLOR-CON) 20 MEQ tablet TAKE ONE TABLET BY MOUTH EVERY DAY    saw palmetto fruit 450 mg Cap Take 1 capsule by mouth Twice daily.    ACCU-CHEK TOMAS PLUS TEST STRP Strp TEST BLOOD SUGAR SIX TIMES DAILY    ACCU-CHEK MULTICLIX LANCET lancets TEST BLOOD SUGAR THREE TIMES DAILY    fluticasone (FLONASE) 50 mcg/actuation nasal spray 2 sprays (100 mcg total) by Each Nare route once daily.    NOVOLOG FLEXPEN U-100 INSULIN 100 unit/mL InPn pen Inject 5 units 3 times a day before meals    SURE COMFORT PEN NEEDLE 32 gauge x 5/32" Ndle USE FOUR TIMES DAILY.    SURE COMFORT PEN NEEDLE 32 gauge x 5/32" Ndle USE FOUR TIMES DAILY.     Family History     Problem Relation (Age of Onset)    Alcohol abuse Paternal Uncle    Arthritis Mother, Father    Cancer Sister, Daughter    Diabetes Mother, Father, Sister, Brother, Daughter, Son, Son    Fibromyalgia Daughter    Heart disease Mother, Sister, Brother    Kidney disease Mother, Brother    Miscarriages / Stillbirths Daughter        Tobacco Use    Smoking status: Former Smoker     Packs/day: 2.00     Years: 13.00     Pack years: 26.00     Types: Cigarettes     Start date: 1954     Last attempt to quit: 1967     Years since quittin.8    Smokeless tobacco: Never Used   Substance and Sexual Activity    Alcohol use: No    Drug use: No    Sexual activity: Never     Partners: Female     Birth control/protection: None     Review of Systems   Constitutional: Positive for fatigue and fever. Negative for chills and diaphoresis.   HENT: Negative for congestion, sore throat and voice change.    Eyes: Negative for photophobia and visual disturbance.   Respiratory: Positive for cough and shortness of breath. Negative for wheezing and stridor.    Cardiovascular: Negative for chest pain and leg swelling.   Gastrointestinal: Negative for abdominal distention, abdominal pain, constipation, diarrhea, " nausea and vomiting.   Endocrine: Negative for polydipsia, polyphagia and polyuria.   Genitourinary: Negative for difficulty urinating, dysuria, flank pain, testicular pain and urgency.   Musculoskeletal: Negative for back pain, joint swelling, neck pain and neck stiffness.   Skin: Negative for color change and rash.   Allergic/Immunologic: Negative for immunocompromised state.   Neurological: Negative for dizziness, syncope, weakness, numbness and headaches.   Hematological: Does not bruise/bleed easily.   Psychiatric/Behavioral: Negative for agitation, behavioral problems and confusion.     Objective:     Vital Signs (Most Recent):  Temp: (!) 103.2 °F (39.6 °C) (03/27/19 0317)  Pulse: 63 (03/27/19 0531)  Resp: 16 (03/27/19 0531)  BP: (!) 110/57 (03/27/19 0531)  SpO2: (!) 93 % (03/27/19 0531) Vital Signs (24h Range):  Temp:  [103.2 °F (39.6 °C)] 103.2 °F (39.6 °C)  Pulse:  [63-94] 63  Resp:  [16-24] 16  SpO2:  [92 %-97 %] 93 %  BP: (110-187)/(54-87) 110/57     Weight: 84.5 kg (186 lb 4.8 oz)  Body mass index is 27.51 kg/m².    Physical Exam   Constitutional: He is oriented to person, place, and time. He appears well-developed and well-nourished. No distress.   Pleasant elderly male with family at bedside.   HENT:   Head: Normocephalic and atraumatic.   Nose: Nose normal.   Eyes: Pupils are equal, round, and reactive to light. Conjunctivae and EOM are normal. No scleral icterus.   Neck: Normal range of motion. Neck supple. No tracheal deviation present.   Cardiovascular: Normal rate, regular rhythm, normal heart sounds and intact distal pulses.   No murmur heard.  Pulmonary/Chest: Effort normal. No stridor. No respiratory distress. He has no wheezes. He has no rales.   Right mid and upper lung rhonchi.   Abdominal: Soft. Bowel sounds are normal. He exhibits no distension. There is no tenderness. There is no guarding.   Genitourinary:   Genitourinary Comments:   UA negative   Musculoskeletal: Normal range of motion.  He exhibits no edema or deformity.   Neurological: He is alert and oriented to person, place, and time. No cranial nerve deficit.   Skin: Skin is warm and dry. Capillary refill takes less than 2 seconds. No rash noted. He is not diaphoretic. No pallor.   Psychiatric: He has a normal mood and affect. His behavior is normal. Judgment and thought content normal.   Nursing note and vitals reviewed.        CRANIAL NERVES     CN III, IV, VI   Pupils are equal, round, and reactive to light.  Extraocular motions are normal.        Significant Labs: All pertinent labs within the past 24 hours have been reviewed.  Results for orders placed or performed during the hospital encounter of 03/27/19   Influenza A & B by Molecular   Result Value Ref Range    Influenza A, Molecular Negative Negative    Influenza B, Molecular Negative Negative    Flu A & B Source Nasal swab    CBC auto differential   Result Value Ref Range    WBC 9.73 3.90 - 12.70 K/uL    RBC 4.34 (L) 4.60 - 6.20 M/uL    Hemoglobin 13.4 (L) 14.0 - 18.0 g/dL    Hematocrit 40.5 40.0 - 54.0 %    MCV 93 82 - 98 fL    MCH 30.9 27.0 - 31.0 pg    MCHC 33.1 32.0 - 36.0 g/dL    RDW 13.8 11.5 - 14.5 %    Platelets 173 150 - 350 K/uL    MPV 9.6 9.2 - 12.9 fL    Gran # (ANC) 8.1 (H) 1.8 - 7.7 K/uL    Lymph # 1.2 1.0 - 4.8 K/uL    Mono # 0.3 0.3 - 1.0 K/uL    Eos # 0.1 0.0 - 0.5 K/uL    Baso # 0.01 0.00 - 0.20 K/uL    Gran% 83.4 (H) 38.0 - 73.0 %    Lymph% 12.6 (L) 18.0 - 48.0 %    Mono% 3.2 (L) 4.0 - 15.0 %    Eosinophil% 0.7 0.0 - 8.0 %    Basophil% 0.1 0.0 - 1.9 %    Differential Method Automated    Comprehensive metabolic panel   Result Value Ref Range    Sodium 141 136 - 145 mmol/L    Potassium 3.9 3.5 - 5.1 mmol/L    Chloride 104 95 - 110 mmol/L    CO2 25 23 - 29 mmol/L    Glucose 97 70 - 110 mg/dL    BUN, Bld 18 8 - 23 mg/dL    Creatinine 1.1 0.5 - 1.4 mg/dL    Calcium 10.3 8.7 - 10.5 mg/dL    Total Protein 7.2 6.0 - 8.4 g/dL    Albumin 3.8 3.5 - 5.2 g/dL    Total Bilirubin  0.7 0.1 - 1.0 mg/dL    Alkaline Phosphatase 136 (H) 55 - 135 U/L    AST 27 10 - 40 U/L    ALT 14 10 - 44 U/L    Anion Gap 12 8 - 16 mmol/L    eGFR if African American >60 >60 mL/min/1.73 m^2    eGFR if non African American >60 >60 mL/min/1.73 m^2   Lactic acid, plasma #1   Result Value Ref Range    Lactate (Lactic Acid) 1.8 0.5 - 2.2 mmol/L   Procalcitonin   Result Value Ref Range    Procalcitonin 0.03 <0.25 ng/mL   Troponin I   Result Value Ref Range    Troponin I 0.014 0.000 - 0.026 ng/mL   Brain natriuretic peptide   Result Value Ref Range     (H) 0 - 99 pg/mL   APTT   Result Value Ref Range    aPTT 28.8 21.0 - 32.0 sec   Protime-INR   Result Value Ref Range    Prothrombin Time 12.0 9.0 - 12.5 sec    INR 1.1 0.8 - 1.2       Significant Imaging: I have reviewed all pertinent imaging results/findings within the past 24 hours.   Imaging Results          X-Ray Chest AP Portable (In process)      Independent preliminary soft read:  Right mid upper infiltrate.  Pacemaker and sternal wires noted.              I have personally reviewed the patients labs, imaging, and discussed the patient case in detail with the Er provider    Assessment/Plan:     * Sepsis    No severe sepsis.  Blood cultures  Pending..    Related to pneumonia of right   Lung.   continue IV antibiotic therapy with Rocephin and azithromycin.  Further evaluation/diagnostics/interventions/consults pending course       Pneumonia of right lung due to infectious organism    IV antibiotics.  See sepsis.    Oxygen as needed.      Chronic combined systolic and diastolic congestive heart failure   BNP stable.  Hold   Diuretics at this time. Monitor hydration status.  Optimization pending course.    Further evaluation/diagnostics/interventions/consults pending course         Essential hypertension    Hold blood pressure medicines at this time given patient presents with sepsis.    Monitor trends and continue IV hydration/  Bolus ordered by emergency room.    monitor trends and resume home meds once able.  Further evaluation/diagnostics/interventions/consults pending course         Coronary artery disease involving coronary bypass graft without angina pectoris    BP meds on hold at this time.  In lieu of sepsis.  Monitor closely and resume once able.  Optimization pending course.    Not on statin given allergy.    Not on aspirin and medication profile consider daily pending course  Further evaluation/diagnostics/interventions/consults pending course       VTE Risk Mitigation (From admission, onward)        Ordered     IP VTE HIGH RISK PATIENT  Once      03/27/19 0552     Place TABBY hose  Until discontinued      03/27/19 0552     Place sequential compression device  Until discontinued      03/27/19 0552     Reason for No Pharmacological VTE Prophylaxis  Once      03/27/19 0552        ADVANCE DIRECTIVES: The topic of advance directives was discussed with the patient and family at bedside. Patient reports has advance directives on file and wishes to be full code.       **Portions of this note has been dictated using speech recognition software, M*Modal Fluency Direct; although, time has been taken to proof read and revise it may still contain misspellings, grammatical and or other errors.**          Robbin Gonzalez NP  Department of Hospital Medicine   [facility]

## 2019-03-27 NOTE — ASSESSMENT & PLAN NOTE
Hold blood pressure medicines at this time given patient presents with sepsis.    Monitor trends and continue IV hydration/  Bolus ordered by emergency room.   monitor trends and resume home meds once able.  Further evaluation/diagnostics/interventions/consults pending course

## 2019-03-27 NOTE — PROGRESS NOTES
[facility] Hospital Medicine  Progress Note    Patient Name: Anthony Blair  MRN: [id number]  Patient Class: OP- Observation   Admission Date: 3/27/2019  Length of Stay: 0 days  Attending Physician: Perfecto England MD  Primary Care Provider: Abdulaziz Mccabe MD        Subjective:     Principal Problem:Sepsis    HPI:    85-year-old male patient who presents to the emergency room with complaints of  Cough that started this morning which woke in him.  No modifying factors.  Associated symptoms include shortness of breath.  Patient reports improvement in symptoms since arrival and treatment in the emergency room.  On arrival patient febrile 103.2.   Chest x-ray with right lung infiltrate.  Lab results from emergency room unremarkable.  Blood cultures pending.  Patient received Rocephin and azithromycin in emergency room.  Patient's curb score 1.   Hospital Medicine consult.Patient placed in observation.    Hospital Course:  The pt is a 84 yo male with Afib, CAD, hemorrhagic stroke, HTN, s/p PPM, seizures, DM who was placed in observation for Sepsis due to right sided pneumonia. Fever 103.2F on arrival. WBC 14. Procalcitonin normal. CXR showed possible necrotizing PNA RUL. Will obtain CT chest with contrast.     Interval History:+ generalized weakness, SOB,cough     Review of Systems   Constitutional: Positive for fatigue. Negative for appetite change, chills, diaphoresis and fever.   HENT: Negative for congestion, nosebleeds, sore throat and trouble swallowing.    Eyes: Negative for pain, discharge and visual disturbance.   Respiratory: Positive for cough and shortness of breath. Negative for apnea, chest tightness, wheezing and stridor.    Cardiovascular: Negative for chest pain, palpitations and leg swelling.   Gastrointestinal: Negative for abdominal distention, abdominal pain, blood in stool, constipation, diarrhea, nausea and vomiting.   Endocrine: Negative for cold intolerance and heat intolerance.    Genitourinary: Negative for difficulty urinating, dysuria, flank pain, frequency and urgency.   Musculoskeletal: Negative for arthralgias, back pain, joint swelling, myalgias, neck pain and neck stiffness.   Skin: Negative for rash and wound.   Allergic/Immunologic: Negative for food allergies and immunocompromised state.   Neurological: Positive for weakness. Negative for dizziness, seizures, syncope, facial asymmetry, light-headedness and headaches.   Hematological: Negative for adenopathy.   Psychiatric/Behavioral: Negative for agitation, behavioral problems and confusion. The patient is not nervous/anxious.      Objective:     Vital Signs (Most Recent):  Temp: 98.5 °F (36.9 °C) (03/27/19 1611)  Pulse: 69 (03/27/19 1613)  Resp: 16 (03/27/19 1613)  BP: 125/60 (03/27/19 1611)  SpO2: 98 % (03/27/19 1613) Vital Signs (24h Range):  Temp:  [97.3 °F (36.3 °C)-103.2 °F (39.6 °C)] 98.5 °F (36.9 °C)  Pulse:  [63-94] 69  Resp:  [12-24] 16  SpO2:  [92 %-98 %] 98 %  BP: (108-187)/(54-87) 125/60     Weight: 86.2 kg (190 lb 0.6 oz)  Body mass index is 28.06 kg/m².    Intake/Output Summary (Last 24 hours) at 3/27/2019 1703  Last data filed at 3/27/2019 1649  Gross per 24 hour   Intake 765.83 ml   Output --   Net 765.83 ml      Physical Exam   Constitutional: He is oriented to person, place, and time. He appears well-developed and well-nourished.   HENT:   Head: Normocephalic and atraumatic.   Nose: Nose normal.   Mouth/Throat: Oropharynx is clear and moist.   Eyes: Pupils are equal, round, and reactive to light. EOM are normal. No scleral icterus.   Neck: Normal range of motion. Neck supple.   Cardiovascular: Normal rate, regular rhythm, normal heart sounds and intact distal pulses. Exam reveals no gallop and no friction rub.   No murmur heard.  Pulmonary/Chest: Effort normal. No respiratory distress. He has no wheezes.   Rhonchi right mid to upper lung fields    Abdominal: Soft. Bowel sounds are normal. He exhibits no  distension. There is no tenderness.   Musculoskeletal: Normal range of motion. He exhibits no edema.   Neurological: He is alert and oriented to person, place, and time. No cranial nerve deficit.   Skin: Skin is warm and dry. No rash noted.   Psychiatric: He has a normal mood and affect. His behavior is normal.   Nursing note and vitals reviewed.      Significant Labs:   BMP:   Recent Labs   Lab 03/27/19  0345 03/27/19  0634   GLU 97 134*    139   K 3.9 3.7    108   CO2 25 19*   BUN 18 19   CREATININE 1.1 0.9   CALCIUM 10.3 9.1   MG 1.6  --      CBC:   Recent Labs   Lab 03/27/19  0345 03/27/19  0634   WBC 9.73 14.99*   HGB 13.4* 11.7*   HCT 40.5 35.7*    145*     CMP:   Recent Labs   Lab 03/27/19 0345 03/27/19  0634    139   K 3.9 3.7    108   CO2 25 19*   GLU 97 134*   BUN 18 19   CREATININE 1.1 0.9   CALCIUM 10.3 9.1   PROT 7.2  --    ALBUMIN 3.8  --    BILITOT 0.7  --    ALKPHOS 136*  --    AST 27  --    ALT 14  --    ANIONGAP 12 12   EGFRNONAA >60 >60     All pertinent labs within the past 24 hours have been reviewed.    Significant Imaging:  Imaging Results          X-Ray Chest AP Portable (Final result)  Result time 03/27/19 08:04:55    Final result by Robbin Preciado MD (03/27/19 08:04:55)                 Impression:      Area of consolidation with central lucency within the right upper lobe suggests airspace disease with possible necrotizing pneumonia. Additional areas of patchy consolidation suspected within the right lower lobe.   Recommend follow-up noncontrast chest CT for further evaluation.      Electronically signed by: Robbin Preciado MD  Date:    03/27/2019  Time:    08:04             Narrative:    EXAMINATION:  XR CHEST AP PORTABLE    CLINICAL HISTORY:  Sepsis;    FINDINGS:  Single view of the chest.  Aorta demonstrates atherosclerotic disease.  Sternotomy wires are intact.  Left-sided pacing device demonstrates similar configuration.    Cardiac silhouette is  enlarged.  Area of consolidation with central lucency within the right upper lobe suggests airspace disease with possible necrotizing pneumonia.  Additional areas of patchy consolidation suspected within the right lower lobe.  Recommend follow-up noncontrast chest CT for further evaluation.  Remaining lung fields are clear..  Bones demonstate small effusion.                              Assessment/Plan:      * Sepsis due to pneumonia   Given  IV Rocephin and Doxycycline   Blood cultures pending  Abnormal CXR showing RUL necrotizing PNA- CT chest with contrast  Change IV antibiotics to IV Vanc/Zosyn       Pneumonia of right lung due to infectious organism   see above     Oxygen as needed.      Chronic combined systolic and diastolic congestive heart failure  Compensated   Hold LAsix and Cozaar for now     Essential hypertension    Hold losartan, Lasix, Toprol     Coronary artery disease involving coronary bypass graft without angina pectoris   No asa due to hemorrhagic stroke       VTE Risk Mitigation (From admission, onward)        Ordered     IP VTE HIGH RISK PATIENT  Once      03/27/19 0552     Place TABBY hose  Until discontinued      03/27/19 0552     Place sequential compression device  Until discontinued      03/27/19 0552     Reason for No Pharmacological VTE Prophylaxis  Once      03/27/19 0552              Qian Balderrama NP  Department of Hospital Medicine   [facility]

## 2019-03-27 NOTE — PLAN OF CARE
Problem: Adult Inpatient Plan of Care  Goal: Plan of Care Review  Outcome: Ongoing (interventions implemented as appropriate)  Received to room 563 from ED. Dx: Pneumonia. AAOx4. Wears glasses and hearing aids. Tele monitor [id number]. No s/s acute distress.

## 2019-03-27 NOTE — ASSESSMENT & PLAN NOTE
BNP stable.  Hold   Diuretics at this time. Monitor hydration status.  Optimization pending course.    Further evaluation/diagnostics/interventions/consults pending course

## 2019-03-27 NOTE — ASSESSMENT & PLAN NOTE
BP meds on hold at this time.  In lieu of sepsis.  Monitor closely and resume once able.  Optimization pending course.    Not on statin given allergy.    Not on aspirin and medication profile consider daily pending course  Further evaluation/diagnostics/interventions/consults pending course

## 2019-03-27 NOTE — ASSESSMENT & PLAN NOTE
Cont IV Rocephin and Doxycycline   Blood cultures pending  Abnormal CXR showing RUL necrotizing PNA- CT chest with contrast

## 2019-03-27 NOTE — SUBJECTIVE & OBJECTIVE
Past Medical History:   Diagnosis Date    A-fib     Anemia     AP (angina pectoris) 1/23/2014    Carotid artery disease without cerebral infarction 8/22/2014    Cataract     Coronary artery disease     GERD (gastroesophageal reflux disease) 7/11/2013    Hemorrhagic cerebrovascular accident (CVA) 4/26/2018    Hyperlipidemia     Hypertension     Hypertensive heart disease with heart failure 3/12/2019    Intracranial hemorrhage     Lumbosacral spondylosis 12/24/2014    Pacemaker 1/23/2014    Paroxysmal atrial fibrillation 1/23/2014    Peripheral vascular disease 8/22/2014    Seizure, late effect of stroke     Stroke     Type 2 diabetes mellitus with ophthalmic manifestations        Past Surgical History:   Procedure Laterality Date    ANGIOPLASTY      ATRIAL ABLATION SURGERY N/A     CATARACT EXTRACTION Bilateral     [facility]    CATARACT EXTRACTION W/ INTRAOCULAR LENS IMPLANT Right     CATARACT EXTRACTION W/ INTRAOCULAR LENS IMPLANT Left     COLONOSCOPY with biopsies N/A 10/16/2018    Performed by Anabell Griggs MD at [facility]    CORONARY ARTERY BYPASS GRAFT  07/2010    x5    ESOPHAGOGASTRODUODENOSCOPY (EGD) N/A 5/7/2018    Performed by Betty Leonardo MD at [facility]    EYE SURGERY      pace maker surgrey  10/2010    reposition in 2011/2012 (approx)    VEIN BYPASS SURGERY         Review of patient's allergies indicates:   Allergen Reactions    Atorvastatin      Other reaction(s): Muscle pain  Other reaction(s): Muscle weakness  Other reaction(s): Muscle pain    Codeine      Other reaction(s): Headache  Other reaction(s): Headache    Eliquis [apixaban]     Norvasc [amlodipine] Edema    Trulicity [dulaglutide] Nausea And Vomiting    Adhesive Rash     Other reaction(s): rash       No current facility-administered medications on file prior to encounter.      Current Outpatient Medications on File Prior to Encounter   Medication Sig    CARBOXYMETHYLCELLULOSE SODIUM (REFRESH  "OPHT) Apply to eye.    furosemide (LASIX) 20 MG tablet TAKE ONE TABLET BY MOUTH DAILY    insulin (LANTUS SOLOSTAR U-100 INSULIN) glargine 100 units/mL (3mL) SubQ pen INJECT 16 UNITS SUB-Q AT BEDTIME    isosorbide mononitrate (IMDUR) 60 MG 24 hr tablet Take 1 tablet (60 mg total) by mouth once daily.    levETIRAcetam (KEPPRA) 250 MG Tab Take 1 tablet (250 mg total) by mouth 2 (two) times daily.    losartan (COZAAR) 100 MG tablet TAKE ONE TABLET BY MOUTH DAILY    meclizine (ANTIVERT) 12.5 mg tablet Take 1 tablet (12.5 mg total) by mouth 2 (two) times daily as needed.    metoprolol succinate (TOPROL-XL) 100 MG 24 hr tablet TAKE ONE TABLET BY MOUTH TWICE DAILY    nitroGLYCERIN (NITROSTAT) 0.4 MG SL tablet Place 1 tablet (0.4 mg total) under the tongue every 5 (five) minutes as needed for Chest pain.    pantoprazole (PROTONIX) 40 MG tablet TAKE ONE TABLET BY MOUTH EVERY DAY    potassium chloride SA (K-DUR,KLOR-CON) 20 MEQ tablet TAKE ONE TABLET BY MOUTH EVERY DAY    saw palmetto fruit 450 mg Cap Take 1 capsule by mouth Twice daily.    ACCU-CHEK TOMAS PLUS TEST STRP Strp TEST BLOOD SUGAR SIX TIMES DAILY    ACCU-CHEK MULTICLIX LANCET lancets TEST BLOOD SUGAR THREE TIMES DAILY    fluticasone (FLONASE) 50 mcg/actuation nasal spray 2 sprays (100 mcg total) by Each Nare route once daily.    NOVOLOG FLEXPEN U-100 INSULIN 100 unit/mL InPn pen Inject 5 units 3 times a day before meals    SURE COMFORT PEN NEEDLE 32 gauge x 5/32" Ndle USE FOUR TIMES DAILY.    SURE COMFORT PEN NEEDLE 32 gauge x 5/32" Ndle USE FOUR TIMES DAILY.     Family History     Problem Relation (Age of Onset)    Alcohol abuse Paternal Uncle    Arthritis Mother, Father    Cancer Sister, Daughter    Diabetes Mother, Father, Sister, Brother, Daughter, Son, Son    Fibromyalgia Daughter    Heart disease Mother, Sister, Brother    Kidney disease Mother, Brother    Miscarriages / Stillbirths Daughter        Tobacco Use    Smoking status: Former " Smoker     Packs/day: 2.00     Years: 13.00     Pack years: 26.00     Types: Cigarettes     Start date: 1954     Last attempt to quit: 1967     Years since quittin.8    Smokeless tobacco: Never Used   Substance and Sexual Activity    Alcohol use: No    Drug use: No    Sexual activity: Never     Partners: Female     Birth control/protection: None     Review of Systems   Constitutional: Positive for fatigue and fever. Negative for chills and diaphoresis.   HENT: Negative for congestion, sore throat and voice change.    Eyes: Negative for photophobia and visual disturbance.   Respiratory: Positive for cough and shortness of breath. Negative for wheezing and stridor.    Cardiovascular: Negative for chest pain and leg swelling.   Gastrointestinal: Negative for abdominal distention, abdominal pain, constipation, diarrhea, nausea and vomiting.   Endocrine: Negative for polydipsia, polyphagia and polyuria.   Genitourinary: Negative for difficulty urinating, dysuria, flank pain, testicular pain and urgency.   Musculoskeletal: Negative for back pain, joint swelling, neck pain and neck stiffness.   Skin: Negative for color change and rash.   Allergic/Immunologic: Negative for immunocompromised state.   Neurological: Negative for dizziness, syncope, weakness, numbness and headaches.   Hematological: Does not bruise/bleed easily.   Psychiatric/Behavioral: Negative for agitation, behavioral problems and confusion.     Objective:     Vital Signs (Most Recent):  Temp: (!) 103.2 °F (39.6 °C) (19)  Pulse: 63 (19)  Resp: 16 (19)  BP: (!) 110/57 (19)  SpO2: (!) 93 % (19) Vital Signs (24h Range):  Temp:  [103.2 °F (39.6 °C)] 103.2 °F (39.6 °C)  Pulse:  [63-94] 63  Resp:  [16-24] 16  SpO2:  [92 %-97 %] 93 %  BP: (110-187)/(54-87) 110/57     Weight: 84.5 kg (186 lb 4.8 oz)  Body mass index is 27.51 kg/m².    Physical Exam   Constitutional: He is oriented to person,  place, and time. He appears well-developed and well-nourished. No distress.   Pleasant elderly male with family at bedside.   HENT:   Head: Normocephalic and atraumatic.   Nose: Nose normal.   Eyes: Pupils are equal, round, and reactive to light. Conjunctivae and EOM are normal. No scleral icterus.   Neck: Normal range of motion. Neck supple. No tracheal deviation present.   Cardiovascular: Normal rate, regular rhythm, normal heart sounds and intact distal pulses.   No murmur heard.  Pulmonary/Chest: Effort normal. No stridor. No respiratory distress. He has no wheezes. He has no rales.   Right mid and upper lung rhonchi.   Abdominal: Soft. Bowel sounds are normal. He exhibits no distension. There is no tenderness. There is no guarding.   Genitourinary:   Genitourinary Comments:   UA negative   Musculoskeletal: Normal range of motion. He exhibits no edema or deformity.   Neurological: He is alert and oriented to person, place, and time. No cranial nerve deficit.   Skin: Skin is warm and dry. Capillary refill takes less than 2 seconds. No rash noted. He is not diaphoretic. No pallor.   Psychiatric: He has a normal mood and affect. His behavior is normal. Judgment and thought content normal.   Nursing note and vitals reviewed.        CRANIAL NERVES     CN III, IV, VI   Pupils are equal, round, and reactive to light.  Extraocular motions are normal.        Significant Labs: All pertinent labs within the past 24 hours have been reviewed.  Results for orders placed or performed during the hospital encounter of 03/27/19   Influenza A & B by Molecular   Result Value Ref Range    Influenza A, Molecular Negative Negative    Influenza B, Molecular Negative Negative    Flu A & B Source Nasal swab    CBC auto differential   Result Value Ref Range    WBC 9.73 3.90 - 12.70 K/uL    RBC 4.34 (L) 4.60 - 6.20 M/uL    Hemoglobin 13.4 (L) 14.0 - 18.0 g/dL    Hematocrit 40.5 40.0 - 54.0 %    MCV 93 82 - 98 fL    MCH 30.9 27.0 - 31.0 pg     MCHC 33.1 32.0 - 36.0 g/dL    RDW 13.8 11.5 - 14.5 %    Platelets 173 150 - 350 K/uL    MPV 9.6 9.2 - 12.9 fL    Gran # (ANC) 8.1 (H) 1.8 - 7.7 K/uL    Lymph # 1.2 1.0 - 4.8 K/uL    Mono # 0.3 0.3 - 1.0 K/uL    Eos # 0.1 0.0 - 0.5 K/uL    Baso # 0.01 0.00 - 0.20 K/uL    Gran% 83.4 (H) 38.0 - 73.0 %    Lymph% 12.6 (L) 18.0 - 48.0 %    Mono% 3.2 (L) 4.0 - 15.0 %    Eosinophil% 0.7 0.0 - 8.0 %    Basophil% 0.1 0.0 - 1.9 %    Differential Method Automated    Comprehensive metabolic panel   Result Value Ref Range    Sodium 141 136 - 145 mmol/L    Potassium 3.9 3.5 - 5.1 mmol/L    Chloride 104 95 - 110 mmol/L    CO2 25 23 - 29 mmol/L    Glucose 97 70 - 110 mg/dL    BUN, Bld 18 8 - 23 mg/dL    Creatinine 1.1 0.5 - 1.4 mg/dL    Calcium 10.3 8.7 - 10.5 mg/dL    Total Protein 7.2 6.0 - 8.4 g/dL    Albumin 3.8 3.5 - 5.2 g/dL    Total Bilirubin 0.7 0.1 - 1.0 mg/dL    Alkaline Phosphatase 136 (H) 55 - 135 U/L    AST 27 10 - 40 U/L    ALT 14 10 - 44 U/L    Anion Gap 12 8 - 16 mmol/L    eGFR if African American >60 >60 mL/min/1.73 m^2    eGFR if non African American >60 >60 mL/min/1.73 m^2   Lactic acid, plasma #1   Result Value Ref Range    Lactate (Lactic Acid) 1.8 0.5 - 2.2 mmol/L   Procalcitonin   Result Value Ref Range    Procalcitonin 0.03 <0.25 ng/mL   Troponin I   Result Value Ref Range    Troponin I 0.014 0.000 - 0.026 ng/mL   Brain natriuretic peptide   Result Value Ref Range     (H) 0 - 99 pg/mL   APTT   Result Value Ref Range    aPTT 28.8 21.0 - 32.0 sec   Protime-INR   Result Value Ref Range    Prothrombin Time 12.0 9.0 - 12.5 sec    INR 1.1 0.8 - 1.2       Significant Imaging: I have reviewed all pertinent imaging results/findings within the past 24 hours.   Imaging Results          X-Ray Chest AP Portable (In process)      Independent preliminary soft read:  Right mid upper infiltrate.  Pacemaker and sternal wires noted.

## 2019-03-27 NOTE — ED PROVIDER NOTES
SCRIBE #1 NOTE: I, Sasha Velarde, am scribing for, and in the presence of, Lizzie Funez MD. I have scribed the entire note.      History      Chief Complaint   Patient presents with    Shortness of Breath     Patient reports shortness of breath and cough       Review of patient's allergies indicates:   Allergen Reactions    Atorvastatin      Other reaction(s): Muscle pain  Other reaction(s): Muscle weakness  Other reaction(s): Muscle pain    Codeine      Other reaction(s): Headache  Other reaction(s): Headache    Eliquis [apixaban]     Norvasc [amlodipine] Edema    Trulicity [dulaglutide] Nausea And Vomiting    Adhesive Rash     Other reaction(s): rash        HPI   HPI    3/27/2019, 3:33 AM   History obtained from the patient      History of Present Illness: Anthony Blair is a 85 y.o. male patient who presents to the Emergency Department for shortness of breath which onset around 1 am this morning. Symptoms are constant and moderate in severity. No mitigating or exacerbating factors reported. Pt denies any recent long distance traveling. Associated sxs include cough, chills, and fever. Patient denies any  lightheadedness, diaphoresis, CP, nausea, emesis, leg swelling, and all other sxs at this time. No further complaints or concerns at this time.         Arrival mode:  AASI    PCP: Abdulaziz Mccabe MD       Past Medical History:  Past Medical History:   Diagnosis Date    A-fib     Anemia     AP (angina pectoris) 1/23/2014    Carotid artery disease without cerebral infarction 8/22/2014    Cataract     Coronary artery disease     GERD (gastroesophageal reflux disease) 7/11/2013    Hemorrhagic cerebrovascular accident (CVA) 4/26/2018    Hyperlipidemia     Hypertension     Hypertensive heart disease with heart failure 3/12/2019    Intracranial hemorrhage     Lumbosacral spondylosis 12/24/2014    Pacemaker 1/23/2014    Paroxysmal atrial fibrillation 1/23/2014    Peripheral vascular disease 8/22/2014     Pneumonia of right lung due to infectious organism 3/27/2019    Seizure, late effect of stroke     Stroke     Type 2 diabetes mellitus with ophthalmic manifestations        Past Surgical History:  Past Surgical History:   Procedure Laterality Date    ANGIOPLASTY      ATRIAL ABLATION SURGERY N/A     CATARACT EXTRACTION Bilateral     [facility]    CATARACT EXTRACTION W/ INTRAOCULAR LENS IMPLANT Right     CATARACT EXTRACTION W/ INTRAOCULAR LENS IMPLANT Left     COLONOSCOPY with biopsies N/A 10/16/2018    Performed by Anabell Griggs MD at [facility]    CORONARY ARTERY BYPASS GRAFT  07/2010    x5    ESOPHAGOGASTRODUODENOSCOPY (EGD) N/A 2018    Performed by Betty Leonardo MD at [facility] ENDO    EYE SURGERY      pace maker surgrey  10/2010    reposition in  (approx)    VEIN BYPASS SURGERY           Family History:  Family History   Problem Relation Age of Onset    Arthritis Mother     Diabetes Mother     Kidney disease Mother     Heart disease Mother     Arthritis Father     Diabetes Father     Diabetes Sister     Cancer Sister         breast    Heart disease Sister     Diabetes Brother     Kidney disease Brother     Heart disease Brother     Cancer Daughter         breast    Diabetes Daughter     Miscarriages / Stillbirths Daughter     Fibromyalgia Daughter     Diabetes Son     Diabetes Son     Alcohol abuse Paternal Uncle     Stroke Neg Hx        Social History:  Social History     Tobacco Use    Smoking status: Former Smoker     Packs/day: 2.00     Years: 13.00     Pack years: 26.00     Types: Cigarettes     Start date: 1954     Last attempt to quit: 1967     Years since quittin.8    Smokeless tobacco: Never Used   Substance and Sexual Activity    Alcohol use: No    Drug use: No    Sexual activity: Never     Partners: Female     Birth control/protection: None       ROS   Review of Systems   Constitutional: Positive for chills and fever.  Negative for diaphoresis and fatigue.   HENT: Negative for congestion, rhinorrhea and sore throat.    Respiratory: Positive for cough and shortness of breath. Negative for chest tightness.    Cardiovascular: Negative for chest pain, palpitations and leg swelling.   Gastrointestinal: Negative for abdominal pain, diarrhea, nausea and vomiting.   Musculoskeletal: Negative for joint swelling and neck pain.   Skin: Negative for rash.   Neurological: Negative for dizziness, syncope, weakness, numbness and headaches.   All other systems reviewed and are negative.    Physical Exam      Initial Vitals [03/27/19 0317]   BP Pulse Resp Temp SpO2   (!) 187/87 94 20 (!) 103.2 °F (39.6 °C) 97 %      MAP       --          Physical Exam  Nursing Notes and Vital Signs Reviewed.  Constitutional: Patient is in no acute distress. Well-developed and well-nourished.  Head: Atraumatic. Normocephalic.  Eyes: PERRL. EOM intact. Conjunctivae are not pale. No scleral icterus.  ENT: Mucous membranes are moist. Oropharynx is clear and symmetric.    Neck: Supple. Full ROM. No lymphadenopathy.  Cardiovascular: Regular rate. Regular rhythm. No murmurs, rubs, or gallops. Distal pulses are 2+ and symmetric.  Pulmonary/Chest: No respiratory distress. Crackles to left lung base  Abdominal: Soft and non-distended.  There is no tenderness.  No rebound, guarding, or rigidity. Good bowel sounds.  Genitourinary: No CVA tenderness  Musculoskeletal: Moves all extremities. No obvious deformities. No edema. No calf tenderness.  Skin: Warm and dry.  Neurological:  Alert, awake, and appropriate.  Normal speech.  No acute focal neurological deficits are appreciated.  Psychiatric: Normal affect. Good eye contact. Appropriate in content.    ED Course    Procedures  ED Vital Signs:  Vitals:    03/27/19 0317 03/27/19 0343 03/27/19 0501 03/27/19 0531   BP: (!) 187/87  (!) 110/54 (!) 110/57   Pulse: 94  63 63   Resp: 20  (!) 24 16   Temp: (!) 103.2 °F (39.6 °C)     "  TempSrc: Oral      SpO2: 97%  (!) 92% (!) 93%   Weight:  84.5 kg (186 lb 4.8 oz)     Height: 5' 9" (1.753 m)       03/27/19 0546 03/27/19 0601 03/27/19 0616 03/27/19 0712   BP:  (!) 115/57 (!) 108/54    Pulse:  65 65 65   Resp:  12 20 18   Temp: 99 °F (37.2 °C)      TempSrc: Oral      SpO2:  (!) 93% (!) 94% 96%   Weight:       Height:        03/27/19 0819 03/27/19 1207 03/27/19 1611 03/27/19 1613   BP: (!) 113/56 (!) 121/58 125/60    Pulse: 72 65 69 69   Resp: 18 18 18 16   Temp: 98.9 °F (37.2 °C) 97.3 °F (36.3 °C) 98.5 °F (36.9 °C)    TempSrc:  Oral Oral    SpO2: 97% 97% 98% 98%   Weight: 86.2 kg (190 lb 0.6 oz)      Height: 5' 9" (1.753 m)       03/27/19 1920   BP: (!) 141/64   Pulse: 75   Resp: 16   Temp: 97.7 °F (36.5 °C)   TempSrc: Oral   SpO2: 99%   Weight:    Height:        Abnormal Lab Results:  Labs Reviewed   CBC W/ AUTO DIFFERENTIAL - Abnormal; Notable for the following components:       Result Value    RBC 4.34 (*)     Hemoglobin 13.4 (*)     Gran # (ANC) 8.1 (*)     Gran% 83.4 (*)     Lymph% 12.6 (*)     Mono% 3.2 (*)     All other components within normal limits   COMPREHENSIVE METABOLIC PANEL - Abnormal; Notable for the following components:    Alkaline Phosphatase 136 (*)     All other components within normal limits   B-TYPE NATRIURETIC PEPTIDE - Abnormal; Notable for the following components:     (*)     All other components within normal limits   BASIC METABOLIC PANEL - Abnormal; Notable for the following components:    CO2 19 (*)     Glucose 134 (*)     All other components within normal limits   POCT GLUCOSE - Abnormal; Notable for the following components:    POCT Glucose 142 (*)     All other components within normal limits   INFLUENZA A & B BY MOLECULAR   LACTIC ACID, PLASMA   PROCALCITONIN   TROPONIN I   APTT   PROTIME-INR   MAGNESIUM   PHOSPHORUS   PHOSPHORUS   MAGNESIUM   POCT GLUCOSE, HAND-HELD DEVICE   POCT GLUCOSE, HAND-HELD DEVICE        All Lab Results:  Results for orders " placed or performed during the hospital encounter of 03/27/19   Blood culture x two cultures. Draw prior to antibiotics.   Result Value Ref Range    Blood Culture, Routine No Growth to date    Blood culture x two cultures. Draw prior to antibiotics.   Result Value Ref Range    Blood Culture, Routine No Growth to date    Influenza A & B by Molecular   Result Value Ref Range    Influenza A, Molecular Negative Negative    Influenza B, Molecular Negative Negative    Flu A & B Source Nasal swab    CBC auto differential   Result Value Ref Range    WBC 9.73 3.90 - 12.70 K/uL    RBC 4.34 (L) 4.60 - 6.20 M/uL    Hemoglobin 13.4 (L) 14.0 - 18.0 g/dL    Hematocrit 40.5 40.0 - 54.0 %    MCV 93 82 - 98 fL    MCH 30.9 27.0 - 31.0 pg    MCHC 33.1 32.0 - 36.0 g/dL    RDW 13.8 11.5 - 14.5 %    Platelets 173 150 - 350 K/uL    MPV 9.6 9.2 - 12.9 fL    Gran # (ANC) 8.1 (H) 1.8 - 7.7 K/uL    Lymph # 1.2 1.0 - 4.8 K/uL    Mono # 0.3 0.3 - 1.0 K/uL    Eos # 0.1 0.0 - 0.5 K/uL    Baso # 0.01 0.00 - 0.20 K/uL    Gran% 83.4 (H) 38.0 - 73.0 %    Lymph% 12.6 (L) 18.0 - 48.0 %    Mono% 3.2 (L) 4.0 - 15.0 %    Eosinophil% 0.7 0.0 - 8.0 %    Basophil% 0.1 0.0 - 1.9 %    Differential Method Automated    Comprehensive metabolic panel   Result Value Ref Range    Sodium 141 136 - 145 mmol/L    Potassium 3.9 3.5 - 5.1 mmol/L    Chloride 104 95 - 110 mmol/L    CO2 25 23 - 29 mmol/L    Glucose 97 70 - 110 mg/dL    BUN, Bld 18 8 - 23 mg/dL    Creatinine 1.1 0.5 - 1.4 mg/dL    Calcium 10.3 8.7 - 10.5 mg/dL    Total Protein 7.2 6.0 - 8.4 g/dL    Albumin 3.8 3.5 - 5.2 g/dL    Total Bilirubin 0.7 0.1 - 1.0 mg/dL    Alkaline Phosphatase 136 (H) 55 - 135 U/L    AST 27 10 - 40 U/L    ALT 14 10 - 44 U/L    Anion Gap 12 8 - 16 mmol/L    eGFR if African American >60 >60 mL/min/1.73 m^2    eGFR if non African American >60 >60 mL/min/1.73 m^2   Lactic acid, plasma #1   Result Value Ref Range    Lactate (Lactic Acid) 1.8 0.5 - 2.2 mmol/L   Lactic acid, plasma #2    Result Value Ref Range    Lactate (Lactic Acid) 1.6 0.5 - 2.2 mmol/L   Urinalysis, Reflex to Urine Culture Urine, Clean Catch   Result Value Ref Range    Specimen UA Urine, Clean Catch     Color, UA Yellow Yellow, Straw, Ale    Appearance, UA Clear Clear    pH, UA 6.0 5.0 - 8.0    Specific Gravity, UA 1.025 1.005 - 1.030    Protein, UA Trace (A) Negative    Glucose, UA Negative Negative    Ketones, UA Negative Negative    Bilirubin (UA) Negative Negative    Occult Blood UA Negative Negative    Nitrite, UA Negative Negative    Urobilinogen, UA Negative <2.0 EU/dL    Leukocytes, UA Negative Negative   Procalcitonin   Result Value Ref Range    Procalcitonin 0.03 <0.25 ng/mL   Troponin I   Result Value Ref Range    Troponin I 0.014 0.000 - 0.026 ng/mL   Brain natriuretic peptide   Result Value Ref Range     (H) 0 - 99 pg/mL   APTT   Result Value Ref Range    aPTT 28.8 21.0 - 32.0 sec   Protime-INR   Result Value Ref Range    Prothrombin Time 12.0 9.0 - 12.5 sec    INR 1.1 0.8 - 1.2   Basic Metabolic Panel (BMP)   Result Value Ref Range    Sodium 139 136 - 145 mmol/L    Potassium 3.7 3.5 - 5.1 mmol/L    Chloride 108 95 - 110 mmol/L    CO2 19 (L) 23 - 29 mmol/L    Glucose 134 (H) 70 - 110 mg/dL    BUN, Bld 19 8 - 23 mg/dL    Creatinine 0.9 0.5 - 1.4 mg/dL    Calcium 9.1 8.7 - 10.5 mg/dL    Anion Gap 12 8 - 16 mmol/L    eGFR if African American >60 >60 mL/min/1.73 m^2    eGFR if non African American >60 >60 mL/min/1.73 m^2   CBC with Automated Differential   Result Value Ref Range    WBC 14.99 (H) 3.90 - 12.70 K/uL    RBC 3.84 (L) 4.60 - 6.20 M/uL    Hemoglobin 11.7 (L) 14.0 - 18.0 g/dL    Hematocrit 35.7 (L) 40.0 - 54.0 %    MCV 93 82 - 98 fL    MCH 30.5 27.0 - 31.0 pg    MCHC 32.8 32.0 - 36.0 g/dL    RDW 14.0 11.5 - 14.5 %    Platelets 145 (L) 150 - 350 K/uL    MPV 10.6 9.2 - 12.9 fL    Gran # (ANC) 13.5 (H) 1.8 - 7.7 K/uL    Lymph # 0.7 (L) 1.0 - 4.8 K/uL    Mono # 0.8 0.3 - 1.0 K/uL    Eos # 0.0 0.0 - 0.5  K/uL    Baso # 0.01 0.00 - 0.20 K/uL    Gran% 90.1 (H) 38.0 - 73.0 %    Lymph% 4.4 (L) 18.0 - 48.0 %    Mono% 5.5 4.0 - 15.0 %    Eosinophil% 0.1 0.0 - 8.0 %    Basophil% 0.1 0.0 - 1.9 %    Differential Method Automated    Phosphorus   Result Value Ref Range    Phosphorus 3.4 2.7 - 4.5 mg/dL   Magnesium   Result Value Ref Range    Magnesium 1.6 1.6 - 2.6 mg/dL   POCT glucose   Result Value Ref Range    POCT Glucose 142 (H) 70 - 110 mg/dL   POCT glucose   Result Value Ref Range    POCT Glucose 236 (H) 70 - 110 mg/dL         Imaging Results:  Imaging Results          X-Ray Chest AP Portable (Final result)  Result time 03/27/19 08:04:55    Final result by Robbin Preciado MD (03/27/19 08:04:55)                 Impression:      Area of consolidation with central lucency within the right upper lobe suggests airspace disease with possible necrotizing pneumonia. Additional areas of patchy consolidation suspected within the right lower lobe.   Recommend follow-up noncontrast chest CT for further evaluation.      Electronically signed by: Robbin Preciado MD  Date:    03/27/2019  Time:    08:04             Narrative:    EXAMINATION:  XR CHEST AP PORTABLE    CLINICAL HISTORY:  Sepsis;    FINDINGS:  Single view of the chest.  Aorta demonstrates atherosclerotic disease.  Sternotomy wires are intact.  Left-sided pacing device demonstrates similar configuration.    Cardiac silhouette is enlarged.  Area of consolidation with central lucency within the right upper lobe suggests airspace disease with possible necrotizing pneumonia.  Additional areas of patchy consolidation suspected within the right lower lobe.  Recommend follow-up noncontrast chest CT for further evaluation.  Remaining lung fields are clear..  Bones demonstate small effusion.                             Per ED physician, pt's CXR results right upper lobe consolidation       The EKG was ordered, reviewed, and independently interpreted by the ED  provider.  Interpretation time: 3:51  Rate: 73 BPM  Rhythm: Atrial flutter with variable AV block with frequent ventricular-paced complexes  Interpretation: Left ventricular hypertrophy with QRS widening and repolarization abnormality. No STEMI.        The Emergency Provider reviewed the vital signs and test results, which are outlined above.    ED Discussion     5:45 AM: Discussed case with Robbin Gonzalez NP ([facility]). Dr. Andre agrees with current care and management of pt and accepts admission.   Admitting Service: Hospital medicine   Admitting Physician: Dr. Andre  Admit to: Obs/Tele    5:45 AM: Re-evaluated pt. I have discussed test results, shared treatment plan, and the need for admission with patient and family at bedside. Pt and family express understanding at this time and agree with all information. All questions answered. Pt and family have no further questions or concerns at this time. Pt is ready for admit.      ED Medication(s):  Medications   levETIRAcetam tablet 250 mg (250 mg Oral Given 3/27/19 1355)   0.9%  NaCl infusion ( Intravenous Stopped 3/27/19 1649)   sodium chloride 0.9% flush 5 mL (has no administration in time range)   acetaminophen tablet 650 mg (has no administration in time range)   ondansetron injection 4 mg (has no administration in time range)   insulin aspart U-100 pen 0-5 Units (2 Units Subcutaneous Given 3/27/19 1802)   glucose chewable tablet 16 g (has no administration in time range)   glucose chewable tablet 24 g (has no administration in time range)   dextrose 50% injection 12.5 g (has no administration in time range)   dextrose 50% injection 25 g (has no administration in time range)   glucagon (human recombinant) injection 1 mg (has no administration in time range)   albuterol-ipratropium 2.5 mg-0.5 mg/3 mL nebulizer solution 3 mL (has no administration in time range)   albuterol-ipratropium 2.5 mg-0.5 mg/3 mL nebulizer solution 3 mL (3 mLs Nebulization Given 3/27/19  1613)   guaiFENesin 12 hr tablet 600 mg (600 mg Oral Given 3/27/19 0929)   piperacillin-tazobactam 4.5 g in dextrose 5 % 100 mL IVPB (ready to mix system) (4.5 g Intravenous New Bag 3/27/19 1800)   0.9%  NaCl infusion ( Intravenous New Bag 3/27/19 1801)   vancomycin (VANCOCIN) 1,250 mg in dextrose 5 % 250 mL IVPB (1,250 mg Intravenous New Bag 3/27/19 1956)   sodium chloride 0.9% bolus 30 mL/kg (0 mL/kg Intravenous Stopped 3/27/19 0536)   acetaminophen tablet 650 mg (650 mg Oral Given 3/27/19 0449)   cefTRIAXone (ROCEPHIN) 1 g in dextrose 5 % 50 mL IVPB (0 g Intravenous Stopped 3/27/19 0717)   azithromycin tablet 1,000 mg (1,000 mg Oral Given 3/27/19 0614)   iohexol (OMNIPAQUE 350) injection 100 mL (75 mLs Intravenous Given 3/27/19 1728)       Current Discharge Medication List                Medical Decision Making    Medical Decision Making:   Clinical Tests:   Lab Tests: Ordered and Reviewed  Radiological Study: Ordered and Reviewed  Medical Tests: Ordered and Reviewed         Scribe Attestation:   Scribe #1: I performed the above scribed service and the documentation accurately describes the services I performed. I attest to the accuracy of the note.    Attending:   Physician Attestation Statement for Scribe #1: I, Lizzie Funez MD, personally performed the services described in this documentation, as scribed by Sasha Velarde, in my presence, and it is both accurate and complete.          Clinical Impression       ICD-10-CM ICD-9-CM   1. Pneumonia of right upper lobe due to infectious organism J18.1 486   2. SOB (shortness of breath) R06.02 786.05       Disposition:   Disposition: Discharged  Condition: Stable         Lizzie Funez MD  03/27/19 2038

## 2019-03-27 NOTE — HPI
85-year-old male patient who presents to the emergency room with complaints of  Cough that started this morning which woke in him.  No modifying factors.  Associated symptoms include shortness of breath.  Patient reports improvement in symptoms since arrival and treatment in the emergency room.  On arrival patient febrile 103.2.   Chest x-ray with right lung infiltrate.  Lab results from emergency room unremarkable.  Blood cultures pending.  Patient received Rocephin and azithromycin in emergency room.  Patient's curb score 1.   Hospital Medicine consult.Patient placed in observation.

## 2019-03-27 NOTE — HOSPITAL COURSE
The pt is a 86 yo male with Afib, CAD, hemorrhagic stroke, HTN, s/p PPM, seizures, DM who was placed in observation for Sepsis due to right sided pneumonia on IV Doxycycline and Rocephin. Fever 103.2F on arrival. WBC 14. Procalcitonin normal. CXR showed possible necrotizing PNA RUL.  IV Zosyn and Vancomycin initiated.  CT chest with contrast showed RUL and RLL atypical PNA. Low-grade pneumonia or subsegmental atelectasis in the lingula.  Suspected mild superimposed pulmonary edema.  Leukocytosis continued with increase noted.  Blood cultures show NGTD.  IV antibiotics adjusted.  Pt SOB improved post administration of IV lasix x one dose.  Cardiac echo showed EF 60% with diastolic dysfunction and elevated PAP. Repeat EGG showed ventricular paced rhythm.  Ambulatory pulse ox WNL. As of 3/30, pt reports mild SOB with exertion.  Current plan of care continued.  Pt afebrile with leukocytosis resolved and stable vital signs.  IV antibiotics transitioned to oral administration.  CM consulted for discharge planning. On 3/31/19, pt verbalized symptom relief and eager for discharge.  Pt seen and examined on the date of discharge and deemed suitable for discharge to home with home health.  Pt counseled to maintain dietary restrictions with understanding verbalized.  Current medications resumed with Doxycycline and Mucinex prescribed.  Home health with [facility] established.  Pt instructed to follow up with PCP.

## 2019-03-27 NOTE — ED NOTES
Patient ambulated down [place] and Spo2 before was 96% on room air, while walking Spo2 dropped to 90%

## 2019-03-27 NOTE — ASSESSMENT & PLAN NOTE
No severe sepsis.  Blood cultures  Pending..    Related to pneumonia of right   Lung.   continue IV antibiotic therapy with Rocephin and azithromycin.  Further evaluation/diagnostics/interventions/consults pending course

## 2019-03-28 PROBLEM — I50.43 ACUTE ON CHRONIC COMBINED SYSTOLIC AND DIASTOLIC CONGESTIVE HEART FAILURE: Status: ACTIVE | Noted: 2019-03-27

## 2019-03-28 LAB
ANION GAP SERPL CALC-SCNC: 10 MMOL/L (ref 8–16)
APTT BLDCRRT: 32.5 SEC (ref 21–32)
BASOPHILS # BLD AUTO: 0 K/UL (ref 0–0.2)
BASOPHILS NFR BLD: 0 % (ref 0–1.9)
BUN SERPL-MCNC: 22 MG/DL (ref 8–23)
CALCIUM SERPL-MCNC: 9.4 MG/DL (ref 8.7–10.5)
CHLORIDE SERPL-SCNC: 103 MMOL/L (ref 95–110)
CO2 SERPL-SCNC: 23 MMOL/L (ref 23–29)
CREAT SERPL-MCNC: 1 MG/DL (ref 0.5–1.4)
DIFFERENTIAL METHOD: ABNORMAL
EOSINOPHIL # BLD AUTO: 0 K/UL (ref 0–0.5)
EOSINOPHIL NFR BLD: 0 % (ref 0–8)
ERYTHROCYTE [DISTWIDTH] IN BLOOD BY AUTOMATED COUNT: 14.1 % (ref 11.5–14.5)
EST. GFR  (AFRICAN AMERICAN): >60 ML/MIN/1.73 M^2
EST. GFR  (NON AFRICAN AMERICAN): >60 ML/MIN/1.73 M^2
ESTIMATED AVG GLUCOSE: 163 MG/DL (ref 68–131)
GLUCOSE SERPL-MCNC: 246 MG/DL (ref 70–110)
HBA1C MFR BLD HPLC: 7.3 % (ref 4–5.6)
HCT VFR BLD AUTO: 34.5 % (ref 40–54)
HGB BLD-MCNC: 11.3 G/DL (ref 14–18)
INR PPP: 1.2 (ref 0.8–1.2)
LYMPHOCYTES # BLD AUTO: 1.8 K/UL (ref 1–4.8)
LYMPHOCYTES NFR BLD: 9.8 % (ref 18–48)
MCH RBC QN AUTO: 30.4 PG (ref 27–31)
MCHC RBC AUTO-ENTMCNC: 32.8 G/DL (ref 32–36)
MCV RBC AUTO: 93 FL (ref 82–98)
MONOCYTES # BLD AUTO: 1.4 K/UL (ref 0.3–1)
MONOCYTES NFR BLD: 7.5 % (ref 4–15)
NEUTROPHILS # BLD AUTO: 15.6 K/UL (ref 1.8–7.7)
NEUTROPHILS NFR BLD: 82.7 % (ref 38–73)
PLATELET # BLD AUTO: 139 K/UL (ref 150–350)
PMV BLD AUTO: 10.1 FL (ref 9.2–12.9)
POCT GLUCOSE: 230 MG/DL (ref 70–110)
POCT GLUCOSE: 264 MG/DL (ref 70–110)
POCT GLUCOSE: 270 MG/DL (ref 70–110)
POCT GLUCOSE: 303 MG/DL (ref 70–110)
POTASSIUM SERPL-SCNC: 3.7 MMOL/L (ref 3.5–5.1)
PROTHROMBIN TIME: 13 SEC (ref 9–12.5)
RBC # BLD AUTO: 3.72 M/UL (ref 4.6–6.2)
SODIUM SERPL-SCNC: 136 MMOL/L (ref 136–145)
TSH SERPL DL<=0.005 MIU/L-ACNC: 0.77 UIU/ML (ref 0.4–4)
WBC # BLD AUTO: 18.82 K/UL (ref 3.9–12.7)

## 2019-03-28 PROCEDURE — G0378 HOSPITAL OBSERVATION PER HR: HCPCS

## 2019-03-28 PROCEDURE — 25000003 PHARM REV CODE 250: Performed by: NURSE PRACTITIONER

## 2019-03-28 PROCEDURE — 85025 COMPLETE CBC W/AUTO DIFF WBC: CPT

## 2019-03-28 PROCEDURE — 63600175 PHARM REV CODE 636 W HCPCS: Performed by: NURSE PRACTITIONER

## 2019-03-28 PROCEDURE — 85610 PROTHROMBIN TIME: CPT

## 2019-03-28 PROCEDURE — 94761 N-INVAS EAR/PLS OXIMETRY MLT: CPT

## 2019-03-28 PROCEDURE — 96372 THER/PROPH/DIAG INJ SC/IM: CPT

## 2019-03-28 PROCEDURE — 80048 BASIC METABOLIC PNL TOTAL CA: CPT

## 2019-03-28 PROCEDURE — 96375 TX/PRO/DX INJ NEW DRUG ADDON: CPT

## 2019-03-28 PROCEDURE — 96376 TX/PRO/DX INJ SAME DRUG ADON: CPT

## 2019-03-28 PROCEDURE — 25000003 PHARM REV CODE 250: Performed by: EMERGENCY MEDICINE

## 2019-03-28 PROCEDURE — 25000242 PHARM REV CODE 250 ALT 637 W/ HCPCS: Performed by: NURSE PRACTITIONER

## 2019-03-28 PROCEDURE — 94640 AIRWAY INHALATION TREATMENT: CPT

## 2019-03-28 PROCEDURE — 36415 COLL VENOUS BLD VENIPUNCTURE: CPT

## 2019-03-28 PROCEDURE — 84443 ASSAY THYROID STIM HORMONE: CPT

## 2019-03-28 PROCEDURE — 85730 THROMBOPLASTIN TIME PARTIAL: CPT

## 2019-03-28 PROCEDURE — 63600175 PHARM REV CODE 636 W HCPCS: Performed by: EMERGENCY MEDICINE

## 2019-03-28 PROCEDURE — 94799 UNLISTED PULMONARY SVC/PX: CPT

## 2019-03-28 PROCEDURE — 83036 HEMOGLOBIN GLYCOSYLATED A1C: CPT

## 2019-03-28 RX ORDER — SODIUM CHLORIDE 9 MG/ML
INJECTION, SOLUTION INTRAVENOUS CONTINUOUS
Status: DISCONTINUED | OUTPATIENT
Start: 2019-03-28 | End: 2019-03-29

## 2019-03-28 RX ORDER — FUROSEMIDE 10 MG/ML
20 INJECTION INTRAMUSCULAR; INTRAVENOUS ONCE
Status: COMPLETED | OUTPATIENT
Start: 2019-03-28 | End: 2019-03-28

## 2019-03-28 RX ADMIN — INSULIN ASPART 3 UNITS: 100 INJECTION, SOLUTION INTRAVENOUS; SUBCUTANEOUS at 05:03

## 2019-03-28 RX ADMIN — GUAIFENESIN 600 MG: 600 TABLET, EXTENDED RELEASE ORAL at 09:03

## 2019-03-28 RX ADMIN — Medication 500 MG: at 06:03

## 2019-03-28 RX ADMIN — PIPERACILLIN SODIUM AND TAZOBACTAM SODIUM 4.5 G: 4; .5 INJECTION, POWDER, LYOPHILIZED, FOR SOLUTION INTRAVENOUS at 10:03

## 2019-03-28 RX ADMIN — SODIUM CHLORIDE: 0.9 INJECTION, SOLUTION INTRAVENOUS at 04:03

## 2019-03-28 RX ADMIN — FUROSEMIDE 20 MG: 10 INJECTION, SOLUTION INTRAMUSCULAR; INTRAVENOUS at 04:03

## 2019-03-28 RX ADMIN — INSULIN ASPART 3 UNITS: 100 INJECTION, SOLUTION INTRAVENOUS; SUBCUTANEOUS at 12:03

## 2019-03-28 RX ADMIN — IPRATROPIUM BROMIDE AND ALBUTEROL SULFATE 3 ML: .5; 3 SOLUTION RESPIRATORY (INHALATION) at 03:03

## 2019-03-28 RX ADMIN — IPRATROPIUM BROMIDE AND ALBUTEROL SULFATE 3 ML: .5; 3 SOLUTION RESPIRATORY (INHALATION) at 07:03

## 2019-03-28 RX ADMIN — INSULIN ASPART 2 UNITS: 100 INJECTION, SOLUTION INTRAVENOUS; SUBCUTANEOUS at 09:03

## 2019-03-28 RX ADMIN — INSULIN ASPART 2 UNITS: 100 INJECTION, SOLUTION INTRAVENOUS; SUBCUTANEOUS at 05:03

## 2019-03-28 RX ADMIN — SODIUM CHLORIDE: 0.9 INJECTION, SOLUTION INTRAVENOUS at 03:03

## 2019-03-28 RX ADMIN — IPRATROPIUM BROMIDE AND ALBUTEROL SULFATE 3 ML: .5; 3 SOLUTION RESPIRATORY (INHALATION) at 12:03

## 2019-03-28 RX ADMIN — VANCOMYCIN HYDROCHLORIDE 1250 MG: 100 INJECTION, POWDER, LYOPHILIZED, FOR SOLUTION INTRAVENOUS at 02:03

## 2019-03-28 RX ADMIN — CEFTRIAXONE 1 G: 1 INJECTION, SOLUTION INTRAVENOUS at 04:03

## 2019-03-28 RX ADMIN — PIPERACILLIN SODIUM AND TAZOBACTAM SODIUM 4.5 G: 4; .5 INJECTION, POWDER, LYOPHILIZED, FOR SOLUTION INTRAVENOUS at 03:03

## 2019-03-28 RX ADMIN — LEVETIRACETAM 250 MG: 250 TABLET ORAL at 09:03

## 2019-03-28 NOTE — PROGRESS NOTES
[facility] Hospital Medicine  Progress Note    Patient Name: Anthony Blair  MRN: [id number]  Patient Class: OP- Observation   Admission Date: 3/27/2019  Length of Stay: 0 days  Attending Physician: Perfecto England MD  Primary Care Provider: Abdulaziz Mccabe MD        Subjective:     Principal Problem:Sepsis    HPI:    85-year-old male patient who presents to the emergency room with complaints of  Cough that started this morning which woke in him.  No modifying factors.  Associated symptoms include shortness of breath.  Patient reports improvement in symptoms since arrival and treatment in the emergency room.  On arrival patient febrile 103.2.   Chest x-ray with right lung infiltrate.  Lab results from emergency room unremarkable.  Blood cultures pending.  Patient received Rocephin and azithromycin in emergency room.  Patient's curb score 1.   Hospital Medicine consult.Patient placed in observation.    Hospital Course:  The pt is a 84 yo male with Afib, CAD, hemorrhagic stroke, HTN, s/p PPM, seizures, DM who was placed in observation for Sepsis due to right sided pneumonia. Fever 103.2F on arrival. WBC 14. Procalcitonin normal. CXR showed possible necrotizing PNA RUL. CT chest with contrast showed RUL and RLL atypical PNA. Low-grade pneumonia or subsegmental atelectasis in the lingula.  Suspected mild superimposed pulmonary edema.  Overnight, WBC 14>18. Pt reports he does not feel improved. IV lasix x one dose given. Cardiac echo ordered     Interval History:+ generalized weakness, SOB,cough     Review of Systems   Constitutional: Positive for fatigue. Negative for appetite change, chills, diaphoresis and fever.   HENT: Negative for congestion, nosebleeds, sore throat and trouble swallowing.    Eyes: Negative for pain, discharge and visual disturbance.   Respiratory: Positive for cough and shortness of breath. Negative for apnea, chest tightness, wheezing and stridor.    Cardiovascular: Negative for  chest pain, palpitations and leg swelling.   Gastrointestinal: Negative for abdominal distention, abdominal pain, blood in stool, constipation, diarrhea, nausea and vomiting.   Endocrine: Negative for cold intolerance and heat intolerance.   Genitourinary: Negative for difficulty urinating, dysuria, flank pain, frequency and urgency.   Musculoskeletal: Negative for arthralgias, back pain, joint swelling, myalgias, neck pain and neck stiffness.   Skin: Negative for rash and wound.   Allergic/Immunologic: Negative for food allergies and immunocompromised state.   Neurological: Positive for weakness. Negative for dizziness, seizures, syncope, facial asymmetry, light-headedness and headaches.   Hematological: Negative for adenopathy.   Psychiatric/Behavioral: Negative for agitation, behavioral problems and confusion. The patient is not nervous/anxious.      Objective:     Vital Signs (Most Recent):  Temp: 98.8 °F (37.1 °C) (03/28/19 1124)  Pulse: 75 (03/28/19 1529)  Resp: 18 (03/28/19 1529)  BP: 129/62 (03/28/19 1124)  SpO2: 97 % (03/28/19 1529) Vital Signs (24h Range):  Temp:  [97.6 °F (36.4 °C)-98.8 °F (37.1 °C)] 98.8 °F (37.1 °C)  Pulse:  [69-87] 75  Resp:  [15-19] 18  SpO2:  [95 %-99 %] 97 %  BP: (122-141)/(56-65) 129/62     Weight: 90.6 kg (199 lb 11.8 oz)  Body mass index is 29.5 kg/m².    Intake/Output Summary (Last 24 hours) at 3/28/2019 1549  Last data filed at 3/28/2019 0842  Gross per 24 hour   Intake 1493.33 ml   Output 200 ml   Net 1293.33 ml      Physical Exam   Constitutional: He is oriented to person, place, and time. He appears well-developed and well-nourished.   HENT:   Head: Normocephalic and atraumatic.   Nose: Nose normal.   Mouth/Throat: Oropharynx is clear and moist.   Eyes: Pupils are equal, round, and reactive to light. EOM are normal. No scleral icterus.   Neck: Normal range of motion. Neck supple.   Cardiovascular: Normal rate, regular rhythm, normal heart sounds and intact distal pulses.  Exam reveals no gallop and no friction rub.   No murmur heard.  Pulmonary/Chest: Effort normal. No respiratory distress. He has no wheezes.   Rhonchi right mid to upper lung fields    Abdominal: Soft. Bowel sounds are normal. He exhibits no distension. There is no tenderness.   Musculoskeletal: Normal range of motion. He exhibits no edema.   Neurological: He is alert and oriented to person, place, and time. No cranial nerve deficit.   Skin: Skin is warm and dry. No rash noted.   Psychiatric: He has a normal mood and affect. His behavior is normal.   Nursing note and vitals reviewed.      Significant Labs:   BMP:   Recent Labs   Lab 03/27/19 0345 03/28/19 0442   GLU 97   < > 246*      < > 136   K 3.9   < > 3.7      < > 103   CO2 25   < > 23   BUN 18   < > 22   CREATININE 1.1   < > 1.0   CALCIUM 10.3   < > 9.4   MG 1.6  --   --     < > = values in this interval not displayed.     CBC:   Recent Labs   Lab 03/27/19 0345 03/27/19  0634 03/28/19 0442   WBC 9.73 14.99* 18.82*   HGB 13.4* 11.7* 11.3*   HCT 40.5 35.7* 34.5*    145* 139*     CMP:   Recent Labs   Lab 03/27/19 0345 03/27/19  0634 03/28/19  0442    139 136   K 3.9 3.7 3.7    108 103   CO2 25 19* 23   GLU 97 134* 246*   BUN 18 19 22   CREATININE 1.1 0.9 1.0   CALCIUM 10.3 9.1 9.4   PROT 7.2  --   --    ALBUMIN 3.8  --   --    BILITOT 0.7  --   --    ALKPHOS 136*  --   --    AST 27  --   --    ALT 14  --   --    ANIONGAP 12 12 10   EGFRNONAA >60 >60 >60     All pertinent labs within the past 24 hours have been reviewed.    Significant Imaging:  Imaging Results          X-Ray Chest AP Portable (Final result)  Result time 03/27/19 08:04:55    Final result by Robbin Preciado MD (03/27/19 08:04:55)                 Impression:      Area of consolidation with central lucency within the right upper lobe suggests airspace disease with possible necrotizing pneumonia. Additional areas of patchy consolidation suspected within the right  lower lobe.   Recommend follow-up noncontrast chest CT for further evaluation.      Electronically signed by: Robbin Preciado MD  Date:    03/27/2019  Time:    08:04             Narrative:    EXAMINATION:  XR CHEST AP PORTABLE    CLINICAL HISTORY:  Sepsis;    FINDINGS:  Single view of the chest.  Aorta demonstrates atherosclerotic disease.  Sternotomy wires are intact.  Left-sided pacing device demonstrates similar configuration.    Cardiac silhouette is enlarged.  Area of consolidation with central lucency within the right upper lobe suggests airspace disease with possible necrotizing pneumonia.  Additional areas of patchy consolidation suspected within the right lower lobe.  Recommend follow-up noncontrast chest CT for further evaluation.  Remaining lung fields are clear..  Bones demonstate small effusion.                              Assessment/Plan:      * Sepsis due to pneumonia   Cont IV Rocephin and Doxycycline   Blood cultures show NGTD  Abnormal CXR showing RUL necrotizing PNA  CT chest with contrast showed RUP and RLL atypical PNA,  Low-grade pneumonia or subsegmental atelectasis in the lingula.  Small bilateral pleural effusions.  Suspected mild superimposed pulmonary edema.      Pneumonia of right lung due to infectious organism   see above     Oxygen as needed.      Acute on chronic combined systolic and diastolic congestive heart failure  Appears Compensated   Hold  Cozaar for now   Echo   IV lasix x one dose   I/O  Weights     Essential hypertension    Hold losartan, Lasix, Toprol     Coronary artery disease involving coronary bypass graft without angina pectoris   No asa due to hemorrhagic stroke       VTE Risk Mitigation (From admission, onward)        Ordered     IP VTE HIGH RISK PATIENT  Once      03/27/19 0552     Place TABBY hose  Until discontinued      03/27/19 0552     Place sequential compression device  Until discontinued      03/27/19 0552     Reason for No Pharmacological VTE Prophylaxis   Once      03/27/19 0552              Qian Balderrama NP  Department of Hospital Medicine   [facility]

## 2019-03-28 NOTE — CONSULTS
Clinical Pharmacy: Vancomycin Consult Note    Pharmacy consulted to dose Vancomycin by Qian Balderrama  86 y/o male with PMH of DM, Afib, CAD, and stroke     Indication: Sepsis 2/2 Pneumonia  Goal trough: 15-20 mcg/ml     WBC = 14.99  Tmax = 103.2  SCr = 0.9, CrCl = 65.3 ml/min   Blood cultures: pending  Dosing weight: 86.2 kg    Pt received a 1250mg dose in ER & will be continued on 1250mg every 18 hours. A trough will be ordered prior to the 3rd dose. Pharmacy will continue to follow patient?s clinical status, renal function, C&S, and adjust dose as necessary to maintain trough levels between 15 and 20 mcg/ml.   Trough due : 03/29 at 0700    Thank you for allowing us to participate in this patient's care.   Kavita Aragon, PharmD 3/27/2019 9:16 PM

## 2019-03-28 NOTE — PLAN OF CARE
Problem: Adult Inpatient Plan of Care  Goal: Plan of Care Review  Outcome: Ongoing (interventions implemented as appropriate)  IV ABX/ IVF infusing as ordered. Pt remained afebrile throughout the night. Cardiac monitor in place, paced rhythm 73. Accuchecks performed. VSS. Chart reviewed. Will monitor.

## 2019-03-28 NOTE — PROGRESS NOTES
Vancomycin Progress :     Indication : Sepsis 2/2 Pneumonia, 84 y/o male, DW 78.7kg. Zosyn/Vancomycin , Goal 15-20.   Blood culture x 2 - NGTD, negative for Influenza,   CrCl 60.1, SCR 1.0 (fluctuations from  1.1 to 0.9 to current 1.0) , WBC 18.82, Tmax 98.8.   Dose Vanc 1250mg q18h, 1st dose 3/27 @ 20:00,   trough before 3rd dose.     Sulema Castillo john. 3/28/2019 11:50 AM

## 2019-03-28 NOTE — SUBJECTIVE & OBJECTIVE
Interval History:+ generalized weakness, SOB,cough     Review of Systems   Constitutional: Positive for fatigue. Negative for appetite change, chills, diaphoresis and fever.   HENT: Negative for congestion, nosebleeds, sore throat and trouble swallowing.    Eyes: Negative for pain, discharge and visual disturbance.   Respiratory: Positive for cough and shortness of breath. Negative for apnea, chest tightness, wheezing and stridor.    Cardiovascular: Negative for chest pain, palpitations and leg swelling.   Gastrointestinal: Negative for abdominal distention, abdominal pain, blood in stool, constipation, diarrhea, nausea and vomiting.   Endocrine: Negative for cold intolerance and heat intolerance.   Genitourinary: Negative for difficulty urinating, dysuria, flank pain, frequency and urgency.   Musculoskeletal: Negative for arthralgias, back pain, joint swelling, myalgias, neck pain and neck stiffness.   Skin: Negative for rash and wound.   Allergic/Immunologic: Negative for food allergies and immunocompromised state.   Neurological: Positive for weakness. Negative for dizziness, seizures, syncope, facial asymmetry, light-headedness and headaches.   Hematological: Negative for adenopathy.   Psychiatric/Behavioral: Negative for agitation, behavioral problems and confusion. The patient is not nervous/anxious.      Objective:     Vital Signs (Most Recent):  Temp: 98.8 °F (37.1 °C) (03/28/19 1124)  Pulse: 75 (03/28/19 1529)  Resp: 18 (03/28/19 1529)  BP: 129/62 (03/28/19 1124)  SpO2: 97 % (03/28/19 1529) Vital Signs (24h Range):  Temp:  [97.6 °F (36.4 °C)-98.8 °F (37.1 °C)] 98.8 °F (37.1 °C)  Pulse:  [69-87] 75  Resp:  [15-19] 18  SpO2:  [95 %-99 %] 97 %  BP: (122-141)/(56-65) 129/62     Weight: 90.6 kg (199 lb 11.8 oz)  Body mass index is 29.5 kg/m².    Intake/Output Summary (Last 24 hours) at 3/28/2019 1549  Last data filed at 3/28/2019 0842  Gross per 24 hour   Intake 1493.33 ml   Output 200 ml   Net 1293.33 ml       Physical Exam   Constitutional: He is oriented to person, place, and time. He appears well-developed and well-nourished.   HENT:   Head: Normocephalic and atraumatic.   Nose: Nose normal.   Mouth/Throat: Oropharynx is clear and moist.   Eyes: Pupils are equal, round, and reactive to light. EOM are normal. No scleral icterus.   Neck: Normal range of motion. Neck supple.   Cardiovascular: Normal rate, regular rhythm, normal heart sounds and intact distal pulses. Exam reveals no gallop and no friction rub.   No murmur heard.  Pulmonary/Chest: Effort normal. No respiratory distress. He has no wheezes.   Rhonchi right mid to upper lung fields    Abdominal: Soft. Bowel sounds are normal. He exhibits no distension. There is no tenderness.   Musculoskeletal: Normal range of motion. He exhibits no edema.   Neurological: He is alert and oriented to person, place, and time. No cranial nerve deficit.   Skin: Skin is warm and dry. No rash noted.   Psychiatric: He has a normal mood and affect. His behavior is normal.   Nursing note and vitals reviewed.      Significant Labs:   BMP:   Recent Labs   Lab 03/27/19  0345  03/28/19  0442   GLU 97   < > 246*      < > 136   K 3.9   < > 3.7      < > 103   CO2 25   < > 23   BUN 18   < > 22   CREATININE 1.1   < > 1.0   CALCIUM 10.3   < > 9.4   MG 1.6  --   --     < > = values in this interval not displayed.     CBC:   Recent Labs   Lab 03/27/19  0345 03/27/19  0634 03/28/19  0442   WBC 9.73 14.99* 18.82*   HGB 13.4* 11.7* 11.3*   HCT 40.5 35.7* 34.5*    145* 139*     CMP:   Recent Labs   Lab 03/27/19  0345 03/27/19  0634 03/28/19  0442    139 136   K 3.9 3.7 3.7    108 103   CO2 25 19* 23   GLU 97 134* 246*   BUN 18 19 22   CREATININE 1.1 0.9 1.0   CALCIUM 10.3 9.1 9.4   PROT 7.2  --   --    ALBUMIN 3.8  --   --    BILITOT 0.7  --   --    ALKPHOS 136*  --   --    AST 27  --   --    ALT 14  --   --    ANIONGAP 12 12 10   EGFRNONAA >60 >60 >60     All  pertinent labs within the past 24 hours have been reviewed.    Significant Imaging:  Imaging Results          X-Ray Chest AP Portable (Final result)  Result time 03/27/19 08:04:55    Final result by Robbin Preciado MD (03/27/19 08:04:55)                 Impression:      Area of consolidation with central lucency within the right upper lobe suggests airspace disease with possible necrotizing pneumonia. Additional areas of patchy consolidation suspected within the right lower lobe.   Recommend follow-up noncontrast chest CT for further evaluation.      Electronically signed by: Robbin Preciado MD  Date:    03/27/2019  Time:    08:04             Narrative:    EXAMINATION:  XR CHEST AP PORTABLE    CLINICAL HISTORY:  Sepsis;    FINDINGS:  Single view of the chest.  Aorta demonstrates atherosclerotic disease.  Sternotomy wires are intact.  Left-sided pacing device demonstrates similar configuration.    Cardiac silhouette is enlarged.  Area of consolidation with central lucency within the right upper lobe suggests airspace disease with possible necrotizing pneumonia.  Additional areas of patchy consolidation suspected within the right lower lobe.  Recommend follow-up noncontrast chest CT for further evaluation.  Remaining lung fields are clear..  Bones demonstate small effusion.

## 2019-03-28 NOTE — NURSING
Patient assessed for diabetes educational needs following chart review  He reports was diagnosed in 1969  He has a home glucose monitor   He is consistent with administering his insulin using the insulin pen  He does not identify any diabetes educational needs at this time

## 2019-03-28 NOTE — PLAN OF CARE
03/28/19 1639   SANCHEZ Message   Medicare Outpatient and Observation Notification regarding financial responsibility Given to patient/caregiver;Explained to patient/caregiver;Signed/date by patient/caregiver   Date SANCHEZ was signed 03/28/19   Time SANCHEZ was signed [id number]

## 2019-03-28 NOTE — PLAN OF CARE
Patient stated he feels exhausted and requested we complete assessment tomorrow. SANCHEZ signed, copy given to patient, and placed in chart.

## 2019-03-28 NOTE — PLAN OF CARE
Problem: Adult Inpatient Plan of Care  Goal: Absence of Hospital-Acquired Illness or Injury  Outcome: Ongoing (interventions implemented as appropriate)  Intervention: Prevent Skin Injury  Skin dry intact. No new skin issues noted at this time.  Intervention: Prevent Infection  Aseptic technique in place    Goal: Optimal Comfort and Wellbeing  Outcome: Ongoing (interventions implemented as appropriate)  Intervention: Monitor Pain and Promote Comfort  Repositioned client for comfort and used pillows to elevate extremities.      Problem: Diabetes Comorbidity  Goal: Blood Glucose Level Within Desired Range  Outcome: Ongoing (interventions implemented as appropriate)  Intervention: Maintain Glycemic Control  Blood glucose levels monitored every four hours.      Comments: No changes noted in condition at this time. Chart reviewed.

## 2019-03-28 NOTE — ASSESSMENT & PLAN NOTE
Cont IV Rocephin and Doxycycline   Blood cultures show NGTD  Abnormal CXR showing RUL necrotizing PNA  CT chest with contrast showed RUP and RLL atypical PNA,  Low-grade pneumonia or subsegmental atelectasis in the lingula.  Small bilateral pleural effusions.  Suspected mild superimposed pulmonary edema.

## 2019-03-29 LAB
ANION GAP SERPL CALC-SCNC: 12 MMOL/L (ref 8–16)
ANISOCYTOSIS BLD QL SMEAR: SLIGHT
BASOPHILS # BLD AUTO: 0.02 K/UL (ref 0–0.2)
BASOPHILS NFR BLD: 0.1 % (ref 0–1.9)
BNP SERPL-MCNC: 103 PG/ML (ref 0–99)
BUN SERPL-MCNC: 16 MG/DL (ref 8–23)
CALCIUM SERPL-MCNC: 9.7 MG/DL (ref 8.7–10.5)
CHLORIDE SERPL-SCNC: 103 MMOL/L (ref 95–110)
CO2 SERPL-SCNC: 22 MMOL/L (ref 23–29)
CREAT SERPL-MCNC: 0.8 MG/DL (ref 0.5–1.4)
DACRYOCYTES BLD QL SMEAR: ABNORMAL
DIASTOLIC DYSFUNCTION: YES
DIFFERENTIAL METHOD: ABNORMAL
EOSINOPHIL # BLD AUTO: 0 K/UL (ref 0–0.5)
EOSINOPHIL NFR BLD: 0.1 % (ref 0–8)
ERYTHROCYTE [DISTWIDTH] IN BLOOD BY AUTOMATED COUNT: 14.4 % (ref 11.5–14.5)
EST. GFR  (AFRICAN AMERICAN): >60 ML/MIN/1.73 M^2
EST. GFR  (NON AFRICAN AMERICAN): >60 ML/MIN/1.73 M^2
ESTIMATED PA SYSTOLIC PRESSURE: 76.15
GLUCOSE SERPL-MCNC: 169 MG/DL (ref 70–110)
HCT VFR BLD AUTO: 34.3 % (ref 40–54)
HGB BLD-MCNC: 11.3 G/DL (ref 14–18)
HYPOCHROMIA BLD QL SMEAR: ABNORMAL
LYMPHOCYTES # BLD AUTO: 2.2 K/UL (ref 1–4.8)
LYMPHOCYTES NFR BLD: 16.1 % (ref 18–48)
MCH RBC QN AUTO: 30.5 PG (ref 27–31)
MCHC RBC AUTO-ENTMCNC: 32.9 G/DL (ref 32–36)
MCV RBC AUTO: 93 FL (ref 82–98)
MITRAL VALVE REGURGITATION: ABNORMAL
MONOCYTES # BLD AUTO: 1.4 K/UL (ref 0.3–1)
MONOCYTES NFR BLD: 10.5 % (ref 4–15)
NEUTROPHILS # BLD AUTO: 10.1 K/UL (ref 1.8–7.7)
NEUTROPHILS NFR BLD: 73.5 % (ref 38–73)
PLATELET # BLD AUTO: 153 K/UL (ref 150–350)
PLATELET BLD QL SMEAR: ABNORMAL
PMV BLD AUTO: 11.5 FL (ref 9.2–12.9)
POCT GLUCOSE: 183 MG/DL (ref 70–110)
POCT GLUCOSE: 224 MG/DL (ref 70–110)
POCT GLUCOSE: 242 MG/DL (ref 70–110)
POCT GLUCOSE: 275 MG/DL (ref 70–110)
POIKILOCYTOSIS BLD QL SMEAR: SLIGHT
POLYCHROMASIA BLD QL SMEAR: ABNORMAL
POTASSIUM SERPL-SCNC: 3.6 MMOL/L (ref 3.5–5.1)
RBC # BLD AUTO: 3.7 M/UL (ref 4.6–6.2)
RETIRED EF AND QEF - SEE NOTES: 60 (ref 55–65)
SODIUM SERPL-SCNC: 137 MMOL/L (ref 136–145)
SPHEROCYTES BLD QL SMEAR: ABNORMAL
STOMATOCYTES BLD QL SMEAR: PRESENT
TRICUSPID VALVE REGURGITATION: ABNORMAL
WBC # BLD AUTO: 13.76 K/UL (ref 3.9–12.7)

## 2019-03-29 PROCEDURE — 94761 N-INVAS EAR/PLS OXIMETRY MLT: CPT

## 2019-03-29 PROCEDURE — 80048 BASIC METABOLIC PNL TOTAL CA: CPT

## 2019-03-29 PROCEDURE — 85025 COMPLETE CBC W/AUTO DIFF WBC: CPT

## 2019-03-29 PROCEDURE — 93005 ELECTROCARDIOGRAM TRACING: CPT

## 2019-03-29 PROCEDURE — 93041 RHYTHM ECG TRACING: CPT

## 2019-03-29 PROCEDURE — 25000003 PHARM REV CODE 250: Performed by: EMERGENCY MEDICINE

## 2019-03-29 PROCEDURE — 99900035 HC TECH TIME PER 15 MIN (STAT)

## 2019-03-29 PROCEDURE — 93010 EKG 12-LEAD: ICD-10-PCS | Mod: HCNC,,, | Performed by: INTERNAL MEDICINE

## 2019-03-29 PROCEDURE — 93306 TTE W/DOPPLER COMPLETE: CPT | Mod: 26,HCNC,, | Performed by: INTERNAL MEDICINE

## 2019-03-29 PROCEDURE — 94799 UNLISTED PULMONARY SVC/PX: CPT

## 2019-03-29 PROCEDURE — 94640 AIRWAY INHALATION TREATMENT: CPT

## 2019-03-29 PROCEDURE — 96376 TX/PRO/DX INJ SAME DRUG ADON: CPT | Mod: 59

## 2019-03-29 PROCEDURE — 27000221 HC OXYGEN, UP TO 24 HOURS

## 2019-03-29 PROCEDURE — 93010 ELECTROCARDIOGRAM REPORT: CPT | Mod: HCNC,,, | Performed by: INTERNAL MEDICINE

## 2019-03-29 PROCEDURE — 25000242 PHARM REV CODE 250 ALT 637 W/ HCPCS: Performed by: NURSE PRACTITIONER

## 2019-03-29 PROCEDURE — 83880 ASSAY OF NATRIURETIC PEPTIDE: CPT

## 2019-03-29 PROCEDURE — 96372 THER/PROPH/DIAG INJ SC/IM: CPT

## 2019-03-29 PROCEDURE — 25000003 PHARM REV CODE 250: Performed by: NURSE PRACTITIONER

## 2019-03-29 PROCEDURE — G0378 HOSPITAL OBSERVATION PER HR: HCPCS

## 2019-03-29 PROCEDURE — 93306 2D ECHO WITH COLOR FLOW DOPPLER: ICD-10-PCS | Mod: 26,HCNC,, | Performed by: INTERNAL MEDICINE

## 2019-03-29 PROCEDURE — 36415 COLL VENOUS BLD VENIPUNCTURE: CPT

## 2019-03-29 PROCEDURE — 93306 TTE W/DOPPLER COMPLETE: CPT | Mod: HCNC

## 2019-03-29 PROCEDURE — 63600175 PHARM REV CODE 636 W HCPCS: Performed by: NURSE PRACTITIONER

## 2019-03-29 RX ORDER — METOPROLOL SUCCINATE 50 MG/1
100 TABLET, EXTENDED RELEASE ORAL 2 TIMES DAILY
Status: DISCONTINUED | OUTPATIENT
Start: 2019-03-29 | End: 2019-03-31 | Stop reason: HOSPADM

## 2019-03-29 RX ORDER — PANTOPRAZOLE SODIUM 40 MG/1
40 TABLET, DELAYED RELEASE ORAL DAILY
Status: DISCONTINUED | OUTPATIENT
Start: 2019-03-29 | End: 2019-03-31 | Stop reason: HOSPADM

## 2019-03-29 RX ORDER — FUROSEMIDE 20 MG/1
20 TABLET ORAL DAILY
Status: DISCONTINUED | OUTPATIENT
Start: 2019-03-29 | End: 2019-03-31 | Stop reason: HOSPADM

## 2019-03-29 RX ORDER — METOPROLOL SUCCINATE 50 MG/1
100 TABLET, EXTENDED RELEASE ORAL 2 TIMES DAILY
Status: DISCONTINUED | OUTPATIENT
Start: 2019-03-29 | End: 2019-03-29

## 2019-03-29 RX ORDER — ISOSORBIDE MONONITRATE 60 MG/1
60 TABLET, EXTENDED RELEASE ORAL DAILY
Status: DISCONTINUED | OUTPATIENT
Start: 2019-03-29 | End: 2019-03-31 | Stop reason: HOSPADM

## 2019-03-29 RX ADMIN — LEVETIRACETAM 250 MG: 250 TABLET ORAL at 09:03

## 2019-03-29 RX ADMIN — IPRATROPIUM BROMIDE AND ALBUTEROL SULFATE 3 ML: .5; 3 SOLUTION RESPIRATORY (INHALATION) at 12:03

## 2019-03-29 RX ADMIN — DOXYCYCLINE 100 MG: 100 INJECTION, POWDER, LYOPHILIZED, FOR SOLUTION INTRAVENOUS at 02:03

## 2019-03-29 RX ADMIN — METOPROLOL SUCCINATE 100 MG: 50 TABLET, EXTENDED RELEASE ORAL at 09:03

## 2019-03-29 RX ADMIN — CEFTRIAXONE 1 G: 1 INJECTION, SOLUTION INTRAVENOUS at 05:03

## 2019-03-29 RX ADMIN — SODIUM CHLORIDE: 0.9 INJECTION, SOLUTION INTRAVENOUS at 05:03

## 2019-03-29 RX ADMIN — INSULIN ASPART 2 UNITS: 100 INJECTION, SOLUTION INTRAVENOUS; SUBCUTANEOUS at 05:03

## 2019-03-29 RX ADMIN — INSULIN ASPART 3 UNITS: 100 INJECTION, SOLUTION INTRAVENOUS; SUBCUTANEOUS at 11:03

## 2019-03-29 RX ADMIN — FUROSEMIDE 20 MG: 20 TABLET ORAL at 12:03

## 2019-03-29 RX ADMIN — GUAIFENESIN 600 MG: 600 TABLET, EXTENDED RELEASE ORAL at 09:03

## 2019-03-29 RX ADMIN — INSULIN ASPART 1 UNITS: 100 INJECTION, SOLUTION INTRAVENOUS; SUBCUTANEOUS at 09:03

## 2019-03-29 RX ADMIN — IPRATROPIUM BROMIDE AND ALBUTEROL SULFATE 3 ML: .5; 3 SOLUTION RESPIRATORY (INHALATION) at 07:03

## 2019-03-29 RX ADMIN — ISOSORBIDE MONONITRATE 60 MG: 60 TABLET, EXTENDED RELEASE ORAL at 12:03

## 2019-03-29 RX ADMIN — PANTOPRAZOLE SODIUM 40 MG: 40 TABLET, DELAYED RELEASE ORAL at 12:03

## 2019-03-29 RX ADMIN — METOPROLOL SUCCINATE 100 MG: 50 TABLET, EXTENDED RELEASE ORAL at 12:03

## 2019-03-29 RX ADMIN — IPRATROPIUM BROMIDE AND ALBUTEROL SULFATE 3 ML: .5; 3 SOLUTION RESPIRATORY (INHALATION) at 04:03

## 2019-03-29 RX ADMIN — IPRATROPIUM BROMIDE AND ALBUTEROL SULFATE 3 ML: .5; 3 SOLUTION RESPIRATORY (INHALATION) at 01:03

## 2019-03-29 NOTE — PLAN OF CARE
Problem: Adult Inpatient Plan of Care  Goal: Plan of Care Review  Outcome: Ongoing (interventions implemented as appropriate)  No acute events over night. IVF infusing as ordered. Cardiac monitor in place, paced rhythm and afib. Accuchecks performed, coverage given as needed. VSS. Chart reviewed. Will monitor.

## 2019-03-29 NOTE — ASSESSMENT & PLAN NOTE
Cont IV Rocephin and Doxycycline   Blood cultures show NGTD  Abnormal CXR showing RUL necrotizing PNA but CT chest with contrast showed RUP and RLL atypical PNA,  Low-grade pneumonia or subsegmental atelectasis in the lingula.  Small bilateral pleural effusions.  Suspected mild superimposed pulmonary edema.

## 2019-03-29 NOTE — PROGRESS NOTES
Home Oxygen Evaluation    Date Performed: 3/29/2019    1) Patient's Home O2 Sat on room air, while at rest: 92        If O2 sats on room air at rest are 88% or below, patient qualifies. No additional testing needed. Document N/A in steps 2 and 3. If 89% or above, complete steps 2.      2) Patient's O2 Sat on room air while exercisin        If O2 sats on room air while exercising remain 89% or above patient does not qualify, no further testing needed Document N/A in step 3. If O2 sats on room air while exercising are 88% or below, continue to step 3.      3) Patient's O2 Sat while exercising on O2: na at na LPM         (Must show improvement from #2 for patients to qualify)    If O2 sats improve on oxygen, patient qualifies for portable oxygen. If not, the patient does not qualify.

## 2019-03-29 NOTE — SUBJECTIVE & OBJECTIVE
Interval History:+ generalized weakness, SOB,cough     Review of Systems   Constitutional: Positive for fatigue. Negative for activity change, appetite change, chills, diaphoresis and fever.   HENT: Negative for congestion, nosebleeds, sore throat and trouble swallowing.    Eyes: Negative for pain, discharge and visual disturbance.   Respiratory: Positive for cough and shortness of breath. Negative for apnea, chest tightness, wheezing and stridor.    Cardiovascular: Negative for chest pain, palpitations and leg swelling.   Gastrointestinal: Negative for abdominal distention, abdominal pain, blood in stool, constipation, diarrhea, nausea and vomiting.   Endocrine: Negative for cold intolerance and heat intolerance.   Genitourinary: Negative for difficulty urinating, dysuria, flank pain, frequency and urgency.   Musculoskeletal: Negative for arthralgias, back pain, joint swelling, myalgias, neck pain and neck stiffness.   Skin: Negative for rash and wound.   Allergic/Immunologic: Negative for food allergies and immunocompromised state.   Neurological: Positive for weakness. Negative for dizziness, seizures, syncope, facial asymmetry, light-headedness and headaches.   Hematological: Negative for adenopathy.   Psychiatric/Behavioral: Negative for agitation, behavioral problems and confusion. The patient is not nervous/anxious.      Objective:     Vital Signs (Most Recent):  Temp: 98.5 °F (36.9 °C) (03/29/19 0748)  Pulse: 103 (03/29/19 0748)  Resp: 16 (03/29/19 0748)  BP: (!) 150/67 (03/29/19 0748)  SpO2: (!) 92 % (03/29/19 0748) Vital Signs (24h Range):  Temp:  [97.9 °F (36.6 °C)-98.9 °F (37.2 °C)] 98.5 °F (36.9 °C)  Pulse:  [] 103  Resp:  [15-20] 16  SpO2:  [92 %-98 %] 92 %  BP: (132-159)/(60-71) 150/67     Weight: 91 kg (200 lb 9.9 oz)  Body mass index is 29.63 kg/m².    Intake/Output Summary (Last 24 hours) at 3/29/2019 1203  Last data filed at 3/29/2019 0800  Gross per 24 hour   Intake 1542.5 ml   Output 2300  ml   Net -757.5 ml      Physical Exam   Constitutional: He is oriented to person, place, and time. He appears well-developed and well-nourished.   HENT:   Head: Normocephalic and atraumatic.   Nose: Nose normal.   Mouth/Throat: Oropharynx is clear and moist.   Eyes: Pupils are equal, round, and reactive to light. EOM are normal. No scleral icterus.   Neck: Normal range of motion. Neck supple.   Cardiovascular: Normal rate, regular rhythm, normal heart sounds and intact distal pulses. Exam reveals no gallop and no friction rub.   No murmur heard.  Pulmonary/Chest: Effort normal. No respiratory distress. He has no wheezes.   Rhonchi right mid to upper lung fields    Abdominal: Soft. Bowel sounds are normal. He exhibits no distension. There is no tenderness.   Musculoskeletal: Normal range of motion. He exhibits no edema.   Neurological: He is alert and oriented to person, place, and time. No cranial nerve deficit.   Skin: Skin is warm and dry. No rash noted.   Psychiatric: He has a normal mood and affect. His behavior is normal.   Nursing note and vitals reviewed.      Significant Labs:   BMP:   Recent Labs   Lab 03/29/19  0509   *      K 3.6      CO2 22*   BUN 16   CREATININE 0.8   CALCIUM 9.7     CBC:   Recent Labs   Lab 03/28/19  0442 03/29/19  0509   WBC 18.82* 13.76*   HGB 11.3* 11.3*   HCT 34.5* 34.3*   * 153     CMP:   Recent Labs   Lab 03/28/19  0442 03/29/19  0509    137   K 3.7 3.6    103   CO2 23 22*   * 169*   BUN 22 16   CREATININE 1.0 0.8   CALCIUM 9.4 9.7   ANIONGAP 10 12   EGFRNONAA >60 >60     All pertinent labs within the past 24 hours have been reviewed.    Significant Imaging:  Imaging Results          X-Ray Chest AP Portable (Final result)  Result time 03/27/19 08:04:55    Final result by Robbin Preciado MD (03/27/19 08:04:55)                 Impression:      Area of consolidation with central lucency within the right upper lobe suggests airspace  disease with possible necrotizing pneumonia. Additional areas of patchy consolidation suspected within the right lower lobe.   Recommend follow-up noncontrast chest CT for further evaluation.      Electronically signed by: Robbin Preciado MD  Date:    03/27/2019  Time:    08:04             Narrative:    EXAMINATION:  XR CHEST AP PORTABLE    CLINICAL HISTORY:  Sepsis;    FINDINGS:  Single view of the chest.  Aorta demonstrates atherosclerotic disease.  Sternotomy wires are intact.  Left-sided pacing device demonstrates similar configuration.    Cardiac silhouette is enlarged.  Area of consolidation with central lucency within the right upper lobe suggests airspace disease with possible necrotizing pneumonia.  Additional areas of patchy consolidation suspected within the right lower lobe.  Recommend follow-up noncontrast chest CT for further evaluation.  Remaining lung fields are clear..  Bones demonstate small effusion.

## 2019-03-29 NOTE — PLAN OF CARE
Problem: Adult Inpatient Plan of Care  Goal: Absence of Hospital-Acquired Illness or Injury  Outcome: Ongoing (interventions implemented as appropriate)  Intervention: Prevent Skin Injury  Reposition encouraged every two hours.  Intervention: Prevent Infection  Hand washing and aseptic technique.    Goal: Optimal Comfort and Wellbeing  Outcome: Ongoing (interventions implemented as appropriate)  Intervention: Monitor Pain and Promote Comfort  Client voice no complaints of pain or discomfort.      Comments: Glucose monitoring in progress. Chart reviewed.

## 2019-03-29 NOTE — PROGRESS NOTES
[facility] Hospital Medicine  Progress Note    Patient Name: Anthony Blair  MRN: [id number]  Patient Class: OP- Observation   Admission Date: 3/27/2019  Length of Stay: 0 days  Attending Physician: Perfecto England MD  Primary Care Provider: Abdulaziz Mccabe MD        Subjective:     Principal Problem:Sepsis    HPI:    85-year-old male patient who presents to the emergency room with complaints of  Cough that started this morning which woke in him.  No modifying factors.  Associated symptoms include shortness of breath.  Patient reports improvement in symptoms since arrival and treatment in the emergency room.  On arrival patient febrile 103.2.   Chest x-ray with right lung infiltrate.  Lab results from emergency room unremarkable.  Blood cultures pending.  Patient received Rocephin and azithromycin in emergency room.  Patient's curb score 1.   Hospital Medicine consult.Patient placed in observation.    Hospital Course:  The pt is a 84 yo male with Afib, CAD, hemorrhagic stroke, HTN, s/p PPM, seizures, DM who was placed in observation for Sepsis due to right sided pneumonia on IV Doxycycline and Rocephin. Fever 103.2F on arrival. WBC 14. Procalcitonin normal. CXR showed possible necrotizing PNA RUL. Antibiotics switched to IV Zosyn and Vanc,   CT chest with contrast showed RUL and RLL atypical PNA. Low-grade pneumonia or subsegmental atelectasis in the lingula.  Suspected mild superimposed pulmonary edema.  Overnight, WBC 14>18. Blood cultures show NGTD. Pt switched back to IV Doxycycline and Rocephin.  WBC improved to 13.   Pt reports he does not feel improved. He reports SOB woke him up in the night.  IV lasix x one dose given. Cardiac echo ordered. Repeat EKG  Ambulatory pulse ox WNL.     Interval History:+ generalized weakness, SOB,cough     Review of Systems   Constitutional: Positive for fatigue. Negative for activity change, appetite change, chills, diaphoresis and fever.   HENT: Negative for  congestion, nosebleeds, sore throat and trouble swallowing.    Eyes: Negative for pain, discharge and visual disturbance.   Respiratory: Positive for cough and shortness of breath. Negative for apnea, chest tightness, wheezing and stridor.    Cardiovascular: Negative for chest pain, palpitations and leg swelling.   Gastrointestinal: Negative for abdominal distention, abdominal pain, blood in stool, constipation, diarrhea, nausea and vomiting.   Endocrine: Negative for cold intolerance and heat intolerance.   Genitourinary: Negative for difficulty urinating, dysuria, flank pain, frequency and urgency.   Musculoskeletal: Negative for arthralgias, back pain, joint swelling, myalgias, neck pain and neck stiffness.   Skin: Negative for rash and wound.   Allergic/Immunologic: Negative for food allergies and immunocompromised state.   Neurological: Positive for weakness. Negative for dizziness, seizures, syncope, facial asymmetry, light-headedness and headaches.   Hematological: Negative for adenopathy.   Psychiatric/Behavioral: Negative for agitation, behavioral problems and confusion. The patient is not nervous/anxious.      Objective:     Vital Signs (Most Recent):  Temp: 98.5 °F (36.9 °C) (03/29/19 0748)  Pulse: 103 (03/29/19 0748)  Resp: 16 (03/29/19 0748)  BP: (!) 150/67 (03/29/19 0748)  SpO2: (!) 92 % (03/29/19 0748) Vital Signs (24h Range):  Temp:  [97.9 °F (36.6 °C)-98.9 °F (37.2 °C)] 98.5 °F (36.9 °C)  Pulse:  [] 103  Resp:  [15-20] 16  SpO2:  [92 %-98 %] 92 %  BP: (132-159)/(60-71) 150/67     Weight: 91 kg (200 lb 9.9 oz)  Body mass index is 29.63 kg/m².    Intake/Output Summary (Last 24 hours) at 3/29/2019 1203  Last data filed at 3/29/2019 0800  Gross per 24 hour   Intake 1542.5 ml   Output 2300 ml   Net -757.5 ml      Physical Exam   Constitutional: He is oriented to person, place, and time. He appears well-developed and well-nourished.   HENT:   Head: Normocephalic and atraumatic.   Nose: Nose normal.    Mouth/Throat: Oropharynx is clear and moist.   Eyes: Pupils are equal, round, and reactive to light. EOM are normal. No scleral icterus.   Neck: Normal range of motion. Neck supple.   Cardiovascular: Normal rate, regular rhythm, normal heart sounds and intact distal pulses. Exam reveals no gallop and no friction rub.   No murmur heard.  Pulmonary/Chest: Effort normal. No respiratory distress. He has no wheezes.   Rhonchi right mid to upper lung fields    Abdominal: Soft. Bowel sounds are normal. He exhibits no distension. There is no tenderness.   Musculoskeletal: Normal range of motion. He exhibits no edema.   Neurological: He is alert and oriented to person, place, and time. No cranial nerve deficit.   Skin: Skin is warm and dry. No rash noted.   Psychiatric: He has a normal mood and affect. His behavior is normal.   Nursing note and vitals reviewed.      Significant Labs:   BMP:   Recent Labs   Lab 03/29/19  0509   *      K 3.6      CO2 22*   BUN 16   CREATININE 0.8   CALCIUM 9.7     CBC:   Recent Labs   Lab 03/28/19  0442 03/29/19  0509   WBC 18.82* 13.76*   HGB 11.3* 11.3*   HCT 34.5* 34.3*   * 153     CMP:   Recent Labs   Lab 03/28/19  0442 03/29/19  0509    137   K 3.7 3.6    103   CO2 23 22*   * 169*   BUN 22 16   CREATININE 1.0 0.8   CALCIUM 9.4 9.7   ANIONGAP 10 12   EGFRNONAA >60 >60     All pertinent labs within the past 24 hours have been reviewed.    Significant Imaging:  Imaging Results          X-Ray Chest AP Portable (Final result)  Result time 03/27/19 08:04:55    Final result by Robbin Preciado MD (03/27/19 08:04:55)                 Impression:      Area of consolidation with central lucency within the right upper lobe suggests airspace disease with possible necrotizing pneumonia. Additional areas of patchy consolidation suspected within the right lower lobe.   Recommend follow-up noncontrast chest CT for further evaluation.      Electronically  signed by: Robbin Preciado MD  Date:    03/27/2019  Time:    08:04             Narrative:    EXAMINATION:  XR CHEST AP PORTABLE    CLINICAL HISTORY:  Sepsis;    FINDINGS:  Single view of the chest.  Aorta demonstrates atherosclerotic disease.  Sternotomy wires are intact.  Left-sided pacing device demonstrates similar configuration.    Cardiac silhouette is enlarged.  Area of consolidation with central lucency within the right upper lobe suggests airspace disease with possible necrotizing pneumonia.  Additional areas of patchy consolidation suspected within the right lower lobe.  Recommend follow-up noncontrast chest CT for further evaluation.  Remaining lung fields are clear..  Bones demonstate small effusion.                              Assessment/Plan:      * Sepsis due to pneumonia   Cont IV Rocephin and Doxycycline   Blood cultures show NGTD  Abnormal CXR showing RUL necrotizing PNA but CT chest with contrast showed RUP and RLL atypical PNA,  Low-grade pneumonia or subsegmental atelectasis in the lingula.  Small bilateral pleural effusions.  Suspected mild superimposed pulmonary edema.      Pneumonia of right lung due to infectious organism   see above     Oxygen as needed.      Acute on chronic combined systolic and diastolic congestive heart failure  Hold  Cozaar for now   Echo   IV lasix x one dose   I/O  Weights     Diabetes mellitus type 2 without retinopathy  NISS   Diabetic diet       Essential hypertension   BP was low on admit- improving   Cont Toprol and Lasix   Hold losartan    Paroxysmal atrial fibrillation  Cont Toprol   Not on OAC due to hx hemorrhagic stroke       Coronary artery disease involving coronary bypass graft without angina pectoris   No asa due to hemorrhagic stroke       VTE Risk Mitigation (From admission, onward)        Ordered     IP VTE HIGH RISK PATIENT  Once      03/27/19 0552     Place TABBY hose  Until discontinued      03/27/19 0552     Place sequential compression device  Until  discontinued      03/27/19 0552     Reason for No Pharmacological VTE Prophylaxis  Once      03/27/19 0552              Qian Balderrama NP  Department of Hospital Medicine   [facility]

## 2019-03-30 LAB
ANION GAP SERPL CALC-SCNC: 9 MMOL/L (ref 8–16)
BASOPHILS # BLD AUTO: 0.02 K/UL (ref 0–0.2)
BASOPHILS NFR BLD: 0.2 % (ref 0–1.9)
BUN SERPL-MCNC: 13 MG/DL (ref 8–23)
CALCIUM SERPL-MCNC: 9.8 MG/DL (ref 8.7–10.5)
CHLORIDE SERPL-SCNC: 103 MMOL/L (ref 95–110)
CO2 SERPL-SCNC: 24 MMOL/L (ref 23–29)
CREAT SERPL-MCNC: 0.8 MG/DL (ref 0.5–1.4)
DIFFERENTIAL METHOD: ABNORMAL
EOSINOPHIL # BLD AUTO: 0.2 K/UL (ref 0–0.5)
EOSINOPHIL NFR BLD: 2.4 % (ref 0–8)
ERYTHROCYTE [DISTWIDTH] IN BLOOD BY AUTOMATED COUNT: 14 % (ref 11.5–14.5)
EST. GFR  (AFRICAN AMERICAN): >60 ML/MIN/1.73 M^2
EST. GFR  (NON AFRICAN AMERICAN): >60 ML/MIN/1.73 M^2
GLUCOSE SERPL-MCNC: 179 MG/DL (ref 70–110)
HCT VFR BLD AUTO: 34.3 % (ref 40–54)
HGB BLD-MCNC: 11.3 G/DL (ref 14–18)
LYMPHOCYTES # BLD AUTO: 1.7 K/UL (ref 1–4.8)
LYMPHOCYTES NFR BLD: 19 % (ref 18–48)
MCH RBC QN AUTO: 30.5 PG (ref 27–31)
MCHC RBC AUTO-ENTMCNC: 32.9 G/DL (ref 32–36)
MCV RBC AUTO: 93 FL (ref 82–98)
MONOCYTES # BLD AUTO: 0.9 K/UL (ref 0.3–1)
MONOCYTES NFR BLD: 9.6 % (ref 4–15)
NEUTROPHILS # BLD AUTO: 6.2 K/UL (ref 1.8–7.7)
NEUTROPHILS NFR BLD: 68.8 % (ref 38–73)
PLATELET # BLD AUTO: 172 K/UL (ref 150–350)
PMV BLD AUTO: 9.7 FL (ref 9.2–12.9)
POCT GLUCOSE: 181 MG/DL (ref 70–110)
POCT GLUCOSE: 183 MG/DL (ref 70–110)
POCT GLUCOSE: 211 MG/DL (ref 70–110)
POCT GLUCOSE: 216 MG/DL (ref 70–110)
POTASSIUM SERPL-SCNC: 3.7 MMOL/L (ref 3.5–5.1)
RBC # BLD AUTO: 3.71 M/UL (ref 4.6–6.2)
SODIUM SERPL-SCNC: 136 MMOL/L (ref 136–145)
WBC # BLD AUTO: 9.04 K/UL (ref 3.9–12.7)

## 2019-03-30 PROCEDURE — 80048 BASIC METABOLIC PNL TOTAL CA: CPT

## 2019-03-30 PROCEDURE — G0378 HOSPITAL OBSERVATION PER HR: HCPCS

## 2019-03-30 PROCEDURE — 25000003 PHARM REV CODE 250: Performed by: NURSE PRACTITIONER

## 2019-03-30 PROCEDURE — 94640 AIRWAY INHALATION TREATMENT: CPT

## 2019-03-30 PROCEDURE — 25000242 PHARM REV CODE 250 ALT 637 W/ HCPCS: Performed by: NURSE PRACTITIONER

## 2019-03-30 PROCEDURE — 99900035 HC TECH TIME PER 15 MIN (STAT)

## 2019-03-30 PROCEDURE — 96372 THER/PROPH/DIAG INJ SC/IM: CPT

## 2019-03-30 PROCEDURE — 96376 TX/PRO/DX INJ SAME DRUG ADON: CPT

## 2019-03-30 PROCEDURE — 27000221 HC OXYGEN, UP TO 24 HOURS

## 2019-03-30 PROCEDURE — 97165 OT EVAL LOW COMPLEX 30 MIN: CPT

## 2019-03-30 PROCEDURE — 63600175 PHARM REV CODE 636 W HCPCS: Performed by: NURSE PRACTITIONER

## 2019-03-30 PROCEDURE — 25000003 PHARM REV CODE 250: Performed by: EMERGENCY MEDICINE

## 2019-03-30 PROCEDURE — 97530 THERAPEUTIC ACTIVITIES: CPT

## 2019-03-30 PROCEDURE — 36415 COLL VENOUS BLD VENIPUNCTURE: CPT

## 2019-03-30 PROCEDURE — 85025 COMPLETE CBC W/AUTO DIFF WBC: CPT

## 2019-03-30 PROCEDURE — 94799 UNLISTED PULMONARY SVC/PX: CPT

## 2019-03-30 RX ADMIN — DOXYCYCLINE 100 MG: 100 INJECTION, POWDER, LYOPHILIZED, FOR SOLUTION INTRAVENOUS at 01:03

## 2019-03-30 RX ADMIN — INSULIN ASPART 2 UNITS: 100 INJECTION, SOLUTION INTRAVENOUS; SUBCUTANEOUS at 05:03

## 2019-03-30 RX ADMIN — IPRATROPIUM BROMIDE AND ALBUTEROL SULFATE 3 ML: .5; 3 SOLUTION RESPIRATORY (INHALATION) at 04:03

## 2019-03-30 RX ADMIN — FUROSEMIDE 20 MG: 20 TABLET ORAL at 08:03

## 2019-03-30 RX ADMIN — IPRATROPIUM BROMIDE AND ALBUTEROL SULFATE 3 ML: .5; 3 SOLUTION RESPIRATORY (INHALATION) at 12:03

## 2019-03-30 RX ADMIN — LEVETIRACETAM 250 MG: 250 TABLET ORAL at 08:03

## 2019-03-30 RX ADMIN — DOXYCYCLINE 100 MG: 100 INJECTION, POWDER, LYOPHILIZED, FOR SOLUTION INTRAVENOUS at 04:03

## 2019-03-30 RX ADMIN — METOPROLOL SUCCINATE 100 MG: 50 TABLET, EXTENDED RELEASE ORAL at 09:03

## 2019-03-30 RX ADMIN — IPRATROPIUM BROMIDE AND ALBUTEROL SULFATE 3 ML: .5; 3 SOLUTION RESPIRATORY (INHALATION) at 11:03

## 2019-03-30 RX ADMIN — PANTOPRAZOLE SODIUM 40 MG: 40 TABLET, DELAYED RELEASE ORAL at 08:03

## 2019-03-30 RX ADMIN — INSULIN ASPART 2 UNITS: 100 INJECTION, SOLUTION INTRAVENOUS; SUBCUTANEOUS at 12:03

## 2019-03-30 RX ADMIN — GUAIFENESIN 600 MG: 600 TABLET, EXTENDED RELEASE ORAL at 09:03

## 2019-03-30 RX ADMIN — METOPROLOL SUCCINATE 100 MG: 50 TABLET, EXTENDED RELEASE ORAL at 08:03

## 2019-03-30 RX ADMIN — ISOSORBIDE MONONITRATE 60 MG: 60 TABLET, EXTENDED RELEASE ORAL at 08:03

## 2019-03-30 RX ADMIN — IPRATROPIUM BROMIDE AND ALBUTEROL SULFATE 3 ML: .5; 3 SOLUTION RESPIRATORY (INHALATION) at 07:03

## 2019-03-30 RX ADMIN — CEFTRIAXONE 1 G: 1 INJECTION, SOLUTION INTRAVENOUS at 05:03

## 2019-03-30 RX ADMIN — LEVETIRACETAM 250 MG: 250 TABLET ORAL at 09:03

## 2019-03-30 RX ADMIN — GUAIFENESIN 600 MG: 600 TABLET, EXTENDED RELEASE ORAL at 08:03

## 2019-03-30 NOTE — PLAN OF CARE
Problem: Adult Inpatient Plan of Care  Goal: Plan of Care Review  Outcome: Ongoing (interventions implemented as appropriate)  No acute events overnight. IVF/abx infused as ordered. Sats kepts > 2L NC. Pt tolerating well, NAD. Cbg corrected with SSI. Daily weights, seizure precautions. Paced rhythm on the monitor. VSS. Turning independently. Chart reviewed. Will monitor.

## 2019-03-30 NOTE — ASSESSMENT & PLAN NOTE
Cont IV Rocephin and Doxycycline   Blood cultures show NGTD  Abnormal CXR showing RUL necrotizing PNA but CT chest with contrast showed RUP and RLL atypical PNA,  Low-grade pneumonia or subsegmental atelectasis in the lingula.  Small bilateral pleural effusions.    -leukocytosis resolved   -Incentive spirometry   -supplemental oxygen as needed   -Marcellonesamnata prn

## 2019-03-30 NOTE — SUBJECTIVE & OBJECTIVE
Interval History: pt stable and states he is still feeling weak with SOB on exertion while walking to the bathroom.  IV antibiotics continued.  Leukocytosis resolved.  Blood cultures with NGTD.  Pt afebrile with stable vital signs.      Review of Systems   Constitutional: Positive for fatigue. Negative for activity change, appetite change, chills, diaphoresis and fever.   HENT: Negative for congestion, nosebleeds, sore throat and trouble swallowing.    Eyes: Negative for pain, discharge and visual disturbance.   Respiratory: Positive for cough and shortness of breath. Negative for apnea, chest tightness, wheezing and stridor.    Cardiovascular: Negative for chest pain, palpitations and leg swelling.   Gastrointestinal: Negative for abdominal distention, abdominal pain, blood in stool, constipation, diarrhea, nausea and vomiting.   Endocrine: Negative for cold intolerance and heat intolerance.   Genitourinary: Negative for difficulty urinating, dysuria, flank pain, frequency and urgency.   Musculoskeletal: Negative for arthralgias, back pain, joint swelling, myalgias, neck pain and neck stiffness.   Skin: Negative for rash and wound.   Allergic/Immunologic: Negative for food allergies and immunocompromised state.   Neurological: Positive for weakness. Negative for dizziness, seizures, syncope, facial asymmetry, light-headedness and headaches.   Hematological: Negative for adenopathy.   Psychiatric/Behavioral: Negative for agitation, behavioral problems and confusion. The patient is not nervous/anxious.      Objective:     Vital Signs (Most Recent):  Temp: 97.9 °F (36.6 °C) (03/30/19 0837)  Pulse: 73 (03/30/19 0837)  Resp: 18 (03/30/19 0837)  BP: 137/62 (03/30/19 0837)  SpO2: (!) 93 % (03/30/19 0837) Vital Signs (24h Range):  Temp:  [97.9 °F (36.6 °C)-99 °F (37.2 °C)] 97.9 °F (36.6 °C)  Pulse:  [69-75] 73  Resp:  [18-20] 18  SpO2:  [91 %-98 %] 93 %  BP: (130-139)/(61-68) 137/62     Weight: 89.1 kg (196 lb 6.9  oz)  Body mass index is 29.01 kg/m².    Intake/Output Summary (Last 24 hours) at 3/30/2019 1530  Last data filed at 3/30/2019 0600  Gross per 24 hour   Intake 540 ml   Output 500 ml   Net 40 ml      Physical Exam   Constitutional: He is oriented to person, place, and time. He appears well-developed and well-nourished.   HENT:   Head: Normocephalic and atraumatic.   Nose: Nose normal.   Mouth/Throat: Oropharynx is clear and moist.   Eyes: Pupils are equal, round, and reactive to light. EOM are normal. No scleral icterus.   Neck: Normal range of motion. Neck supple.   Cardiovascular: Normal rate, regular rhythm, normal heart sounds and intact distal pulses. Exam reveals no gallop and no friction rub.   No murmur heard.  Pulmonary/Chest: Effort normal. No respiratory distress. He has no wheezes.   Rhonchi right mid to upper lung fields    Abdominal: Soft. Bowel sounds are normal. He exhibits no distension. There is no tenderness.   Musculoskeletal: Normal range of motion. He exhibits no edema.   Neurological: He is alert and oriented to person, place, and time. No cranial nerve deficit.   Skin: Skin is warm and dry. No rash noted.   Psychiatric: He has a normal mood and affect. His behavior is normal.   Nursing note and vitals reviewed.      Significant Labs:   CBC:   Recent Labs   Lab 03/29/19  0509 03/30/19  0451   WBC 13.76* 9.04   HGB 11.3* 11.3*   HCT 34.3* 34.3*    172     CMP:   Recent Labs   Lab 03/29/19  0509 03/30/19  0451    136   K 3.6 3.7    103   CO2 22* 24   * 179*   BUN 16 13   CREATININE 0.8 0.8   CALCIUM 9.7 9.8   ANIONGAP 12 9   EGFRNONAA >60 >60       Significant Imaging:   Imaging Results          X-Ray Chest AP Portable (Final result)  Result time 03/27/19 08:04:55    Final result by Robbin Preciado MD (03/27/19 08:04:55)                 Impression:      Area of consolidation with central lucency within the right upper lobe suggests airspace disease with possible  necrotizing pneumonia. Additional areas of patchy consolidation suspected within the right lower lobe.   Recommend follow-up noncontrast chest CT for further evaluation.      Electronically signed by: Robbin Preciado MD  Date:    03/27/2019  Time:    08:04             Narrative:    EXAMINATION:  XR CHEST AP PORTABLE    CLINICAL HISTORY:  Sepsis;    FINDINGS:  Single view of the chest.  Aorta demonstrates atherosclerotic disease.  Sternotomy wires are intact.  Left-sided pacing device demonstrates similar configuration.    Cardiac silhouette is enlarged.  Area of consolidation with central lucency within the right upper lobe suggests airspace disease with possible necrotizing pneumonia.  Additional areas of patchy consolidation suspected within the right lower lobe.  Recommend follow-up noncontrast chest CT for further evaluation.  Remaining lung fields are clear..  Bones demonstate small effusion.

## 2019-03-30 NOTE — PROGRESS NOTES
[facility] Hospital Medicine  Progress Note    Patient Name: Anthony Blair  MRN: [id number]  Patient Class: OP- Observation   Admission Date: 3/27/2019  Length of Stay: 0 days  Attending Physician: Apple Garza MD  Primary Care Provider: Abdulaziz Mccabe MD        Subjective:     Principal Problem:Sepsis    HPI:    85-year-old male patient who presents to the emergency room with complaints of  Cough that started this morning which woke in him.  No modifying factors.  Associated symptoms include shortness of breath.  Patient reports improvement in symptoms since arrival and treatment in the emergency room.  On arrival patient febrile 103.2.   Chest x-ray with right lung infiltrate.  Lab results from emergency room unremarkable.  Blood cultures pending.  Patient received Rocephin and azithromycin in emergency room.  Patient's curb score 1.   Hospital Medicine consult.Patient placed in observation.    Hospital Course:  The pt is a 84 yo male with Afib, CAD, hemorrhagic stroke, HTN, s/p PPM, seizures, DM who was placed in observation for Sepsis due to right sided pneumonia on IV Doxycycline and Rocephin. Fever 103.2F on arrival. WBC 14. Procalcitonin normal. CXR showed possible necrotizing PNA RUL. Antibiotics switched to IV Zosyn and Vanc,   CT chest with contrast showed RUL and RLL atypical PNA. Low-grade pneumonia or subsegmental atelectasis in the lingula.  Suspected mild superimposed pulmonary edema.  Overnight, WBC 14>18. Blood cultures show NGTD. Pt switched back to IV Doxycycline and Rocephin.  WBC improved to 13.   Pt reports he does not feel improved. He reports SOB woke him up in the night.  IV lasix x one dose given. Cardiac echo ordered. Repeat EKG  Ambulatory pulse ox WNL.   -As of 3/30, pt reports SOB with exertion.  Current plan of care continued.  Pt afebrile with leukocytosis resolved and stable vital signs.  IV antibiotics to be transitioned to oral administration.  Pt likely to  discharge tomorrow.  CM consulted for discharge planning.     Interval History: pt stable and states he is still feeling weak with SOB on exertion while walking to the bathroom.  IV antibiotics continued.  Leukocytosis resolved.  Blood cultures with NGTD.  Pt afebrile with stable vital signs.      Review of Systems   Constitutional: Positive for fatigue. Negative for activity change, appetite change, chills, diaphoresis and fever.   HENT: Negative for congestion, nosebleeds, sore throat and trouble swallowing.    Eyes: Negative for pain, discharge and visual disturbance.   Respiratory: Positive for cough and shortness of breath. Negative for apnea, chest tightness, wheezing and stridor.    Cardiovascular: Negative for chest pain, palpitations and leg swelling.   Gastrointestinal: Negative for abdominal distention, abdominal pain, blood in stool, constipation, diarrhea, nausea and vomiting.   Endocrine: Negative for cold intolerance and heat intolerance.   Genitourinary: Negative for difficulty urinating, dysuria, flank pain, frequency and urgency.   Musculoskeletal: Negative for arthralgias, back pain, joint swelling, myalgias, neck pain and neck stiffness.   Skin: Negative for rash and wound.   Allergic/Immunologic: Negative for food allergies and immunocompromised state.   Neurological: Positive for weakness. Negative for dizziness, seizures, syncope, facial asymmetry, light-headedness and headaches.   Hematological: Negative for adenopathy.   Psychiatric/Behavioral: Negative for agitation, behavioral problems and confusion. The patient is not nervous/anxious.      Objective:     Vital Signs (Most Recent):  Temp: 97.9 °F (36.6 °C) (03/30/19 0837)  Pulse: 73 (03/30/19 0837)  Resp: 18 (03/30/19 0837)  BP: 137/62 (03/30/19 0837)  SpO2: (!) 93 % (03/30/19 0837) Vital Signs (24h Range):  Temp:  [97.9 °F (36.6 °C)-99 °F (37.2 °C)] 97.9 °F (36.6 °C)  Pulse:  [69-75] 73  Resp:  [18-20] 18  SpO2:  [91 %-98 %] 93 %  BP:  (130-139)/(61-68) 137/62     Weight: 89.1 kg (196 lb 6.9 oz)  Body mass index is 29.01 kg/m².    Intake/Output Summary (Last 24 hours) at 3/30/2019 1530  Last data filed at 3/30/2019 0600  Gross per 24 hour   Intake 540 ml   Output 500 ml   Net 40 ml      Physical Exam   Constitutional: He is oriented to person, place, and time. He appears well-developed and well-nourished.   HENT:   Head: Normocephalic and atraumatic.   Nose: Nose normal.   Mouth/Throat: Oropharynx is clear and moist.   Eyes: Pupils are equal, round, and reactive to light. EOM are normal. No scleral icterus.   Neck: Normal range of motion. Neck supple.   Cardiovascular: Normal rate, regular rhythm, normal heart sounds and intact distal pulses. Exam reveals no gallop and no friction rub.   No murmur heard.  Pulmonary/Chest: Effort normal. No respiratory distress. He has no wheezes.   Rhonchi right mid to upper lung fields    Abdominal: Soft. Bowel sounds are normal. He exhibits no distension. There is no tenderness.   Musculoskeletal: Normal range of motion. He exhibits no edema.   Neurological: He is alert and oriented to person, place, and time. No cranial nerve deficit.   Skin: Skin is warm and dry. No rash noted.   Psychiatric: He has a normal mood and affect. His behavior is normal.   Nursing note and vitals reviewed.      Significant Labs:   CBC:   Recent Labs   Lab 03/29/19  0509 03/30/19  0451   WBC 13.76* 9.04   HGB 11.3* 11.3*   HCT 34.3* 34.3*    172     CMP:   Recent Labs   Lab 03/29/19  0509 03/30/19  0451    136   K 3.6 3.7    103   CO2 22* 24   * 179*   BUN 16 13   CREATININE 0.8 0.8   CALCIUM 9.7 9.8   ANIONGAP 12 9   EGFRNONAA >60 >60       Significant Imaging:   Imaging Results          X-Ray Chest AP Portable (Final result)  Result time 03/27/19 08:04:55    Final result by Robbin Preciado MD (03/27/19 08:04:55)                 Impression:      Area of consolidation with central lucency within the right  upper lobe suggests airspace disease with possible necrotizing pneumonia. Additional areas of patchy consolidation suspected within the right lower lobe.   Recommend follow-up noncontrast chest CT for further evaluation.      Electronically signed by: Robbin Preciado MD  Date:    03/27/2019  Time:    08:04             Narrative:    EXAMINATION:  XR CHEST AP PORTABLE    CLINICAL HISTORY:  Sepsis;    FINDINGS:  Single view of the chest.  Aorta demonstrates atherosclerotic disease.  Sternotomy wires are intact.  Left-sided pacing device demonstrates similar configuration.    Cardiac silhouette is enlarged.  Area of consolidation with central lucency within the right upper lobe suggests airspace disease with possible necrotizing pneumonia.  Additional areas of patchy consolidation suspected within the right lower lobe.  Recommend follow-up noncontrast chest CT for further evaluation.  Remaining lung fields are clear..  Bones demonstate small effusion.                              Assessment/Plan:      * Sepsis due to pneumonia   Cont IV Rocephin and Doxycycline   Blood cultures show NGTD  Abnormal CXR showing RUL necrotizing PNA but CT chest with contrast showed RUP and RLL atypical PNA,  Low-grade pneumonia or subsegmental atelectasis in the lingula.  Small bilateral pleural effusions.    -leukocytosis resolved   -Incentive spirometry   -supplemental oxygen as needed   -Duonebs prn       Pneumonia of right lung due to infectious organism   see above     Oxygen as needed.      Acute on chronic combined systolic and diastolic congestive heart failure  Hold  Cozaar for now   -imaging supports suspected mild superimposed pulmonary edema.  Echo results showed EF 60% with DD, trivial MVR, PAP 76  IV lasix x one dose   I/O  Weights   BB continued     Diabetes mellitus type 2 without retinopathy  NISS   Diabetic diet       Essential hypertension   BP was low on admit- improving   Cont Toprol and Lasix   3/30- BP stable- will  resume losartan    Paroxysmal atrial fibrillation  Cont Toprol   Not on OAC due to hx hemorrhagic stroke       Coronary artery disease involving coronary bypass graft without angina pectoris   No asa due to hemorrhagic stroke   -home medications resumed       VTE Risk Mitigation (From admission, onward)        Ordered     IP VTE HIGH RISK PATIENT  Once      03/27/19 0552     Place TABBY hose  Until discontinued      03/27/19 0552     Place sequential compression device  Until discontinued      03/27/19 0552     Reason for No Pharmacological VTE Prophylaxis  Once      03/27/19 0552              Rica Rodriguez NP  Department of Hospital Medicine   [facility]

## 2019-03-30 NOTE — PT/OT/SLP EVAL
Occupational Therapy   Evaluation and Discharge Note    Name: Anthony Blair  MRN: [id number]  Admitting Diagnosis:  Sepsis      Recommendations:     Discharge Recommendations: home  Discharge Equipment Recommendations:  none  Barriers to discharge:  None    Assessment:     Anthony Blair is a 85 y.o. male with a medical diagnosis of Sepsis. At this time, patient is functioning at their prior level of function and does not require further acute OT services.     Plan:     During this hospitalization, patient does not require further acute OT services.  Please re-consult if situation changes.    · Plan of Care Reviewed with: patient    Subjective     Chief Complaint:   Patient/Family Comments/goals:     Occupational Profile:  Living Environment: lives with daughter and son in law in 1 story house with 1 step to enter    Previous level of function: (I) with adl's and functional mobility  Roles and Routines: occupational therapy  Equipment Used at home:  cane, straight, walker, rolling, bath bench, bedside commode  Assistance upon Discharge:     Pain/Comfort:  · Pain Rating 1: 0/10    Patients cultural, spiritual, [employer] conflicts given the current situation:      Objective:     Communicated with: nurse and epic chart review prior to session.  Patient found HOB elevated with telemetry upon OT entry to room.    General Precautions: Standard,     Orthopedic Precautions:N/A   Braces: N/A     Occupational Performance:    Bed Mobility:    · Patient completed Rolling/Turning to Left with  independence  · Patient completed Scooting/Bridging with independence  · Patient completed Supine to Sit with independence    Functional Mobility/Transfers:  · Patient completed Sit <> Stand Transfer with independence  with  no assistive device   · Patient completed Bed <> Chair Transfer using Step Transfer technique with supervision with no assistive device  · Functional Mobility: pt ambulated 75 feet with s     Activities of Daily  Living:  · Lower Body Dressing: supervision .  · Toileting: independence per pt report    Cognitive/Visual Perceptual:  Cognitive/Psychosocial Skills:     -       Oriented to: Person, Place, Time and Situation   -       Follows Commands/attention:Follows multistep  commands  -       Communication: clear/fluent  -       Memory: No Deficits noted  -       Safety awareness/insight to disability: intact   Visual/Perceptual:      -Intact .    Physical Exam:  Upper Extremity Range of Motion:     -       Right Upper Extremity: WFL  -       Left Upper Extremity: WFL  Upper Extremity Strength:    -       Right Upper Extremity: WFL  -       Left Upper Extremity: WFL   Strength:    -       Right Upper Extremity: WFL  -       Left Upper Extremity: WFL    AMPAC 6 Click ADL:  AMPAC Total Score: 24    Treatment & Education:    Education:    Patient left up in chair with all lines intact, call button in reach, nurse Noman notified and nurse noman present    GOALS:   Multidisciplinary Problems     Occupational Therapy Goals     Not on file                History:     Past Medical History:   Diagnosis Date    A-fib     Anemia     AP (angina pectoris) 1/23/2014    Carotid artery disease without cerebral infarction 8/22/2014    Cataract     Coronary artery disease     GERD (gastroesophageal reflux disease) 7/11/2013    Hemorrhagic cerebrovascular accident (CVA) 4/26/2018    Hyperlipidemia     Hypertension     Hypertensive heart disease with heart failure 3/12/2019    Intracranial hemorrhage     Lumbosacral spondylosis 12/24/2014    Pacemaker 1/23/2014    Paroxysmal atrial fibrillation 1/23/2014    Peripheral vascular disease 8/22/2014    Pneumonia of right lung due to infectious organism 3/27/2019    Seizure, late effect of stroke     Stroke     Type 2 diabetes mellitus with ophthalmic manifestations        Past Surgical History:   Procedure Laterality Date    ANGIOPLASTY      ATRIAL ABLATION SURGERY N/A      CATARACT EXTRACTION Bilateral     [facility]    CATARACT EXTRACTION W/ INTRAOCULAR LENS IMPLANT Right     CATARACT EXTRACTION W/ INTRAOCULAR LENS IMPLANT Left     COLONOSCOPY with biopsies N/A 10/16/2018    Performed by [facility]gs [facility] at [facility]    CORONARY ARTERY BYPASS GRAFT  07/2010    x5    ESOPHAGOGASTRODUODENOSCOPY (EGD) N/A 5/7/2018    Performed by Betty Leonardo MD at [facility]    EYE SURGERY      pace maker surgrey  10/2010    reposition in 2011/2012 (approx)    VEIN BYPASS SURGERY         Time Tracking:     OT Date of Treatment: 03/30/19  OT Start Time: 1605  OT Stop Time: 1630  OT Total Time (min): 25 min    Billable Minutes:Evaluation 10 minutes  Therapeutic Activity 15 minutes    Bia Cason, OT  3/30/2019

## 2019-03-30 NOTE — ASSESSMENT & PLAN NOTE
Hold  Cozaar for now   -imaging supports suspected mild superimposed pulmonary edema.  Echo results showed EF 60% with DD, trivial MVR, PAP 76  IV lasix x one dose   I/O  Weights   BB continued

## 2019-03-31 VITALS
HEART RATE: 74 BPM | DIASTOLIC BLOOD PRESSURE: 77 MMHG | RESPIRATION RATE: 16 BRPM | BODY MASS INDEX: 28.47 KG/M2 | WEIGHT: 192.25 LBS | OXYGEN SATURATION: 94 % | TEMPERATURE: 99 F | HEIGHT: 69 IN | SYSTOLIC BLOOD PRESSURE: 167 MMHG

## 2019-03-31 LAB
ANION GAP SERPL CALC-SCNC: 12 MMOL/L (ref 8–16)
BASOPHILS # BLD AUTO: 0.02 K/UL (ref 0–0.2)
BASOPHILS NFR BLD: 0.2 % (ref 0–1.9)
BUN SERPL-MCNC: 10 MG/DL (ref 8–23)
CALCIUM SERPL-MCNC: 10.2 MG/DL (ref 8.7–10.5)
CHLORIDE SERPL-SCNC: 101 MMOL/L (ref 95–110)
CO2 SERPL-SCNC: 24 MMOL/L (ref 23–29)
CREAT SERPL-MCNC: 0.8 MG/DL (ref 0.5–1.4)
DIFFERENTIAL METHOD: ABNORMAL
EOSINOPHIL # BLD AUTO: 0.4 K/UL (ref 0–0.5)
EOSINOPHIL NFR BLD: 4 % (ref 0–8)
ERYTHROCYTE [DISTWIDTH] IN BLOOD BY AUTOMATED COUNT: 14 % (ref 11.5–14.5)
EST. GFR  (AFRICAN AMERICAN): >60 ML/MIN/1.73 M^2
EST. GFR  (NON AFRICAN AMERICAN): >60 ML/MIN/1.73 M^2
GLUCOSE SERPL-MCNC: 159 MG/DL (ref 70–110)
HCT VFR BLD AUTO: 35.9 % (ref 40–54)
HGB BLD-MCNC: 11.9 G/DL (ref 14–18)
LYMPHOCYTES # BLD AUTO: 1.7 K/UL (ref 1–4.8)
LYMPHOCYTES NFR BLD: 18.9 % (ref 18–48)
MCH RBC QN AUTO: 30.4 PG (ref 27–31)
MCHC RBC AUTO-ENTMCNC: 33.1 G/DL (ref 32–36)
MCV RBC AUTO: 92 FL (ref 82–98)
MONOCYTES # BLD AUTO: 0.7 K/UL (ref 0.3–1)
MONOCYTES NFR BLD: 8.2 % (ref 4–15)
NEUTROPHILS # BLD AUTO: 6.1 K/UL (ref 1.8–7.7)
NEUTROPHILS NFR BLD: 68.7 % (ref 38–73)
PLATELET # BLD AUTO: 208 K/UL (ref 150–350)
PMV BLD AUTO: 9.7 FL (ref 9.2–12.9)
POCT GLUCOSE: 164 MG/DL (ref 70–110)
POTASSIUM SERPL-SCNC: 3.7 MMOL/L (ref 3.5–5.1)
RBC # BLD AUTO: 3.92 M/UL (ref 4.6–6.2)
SODIUM SERPL-SCNC: 137 MMOL/L (ref 136–145)
WBC # BLD AUTO: 8.85 K/UL (ref 3.9–12.7)

## 2019-03-31 PROCEDURE — G8978 MOBILITY CURRENT STATUS: HCPCS | Mod: CI

## 2019-03-31 PROCEDURE — 97161 PT EVAL LOW COMPLEX 20 MIN: CPT

## 2019-03-31 PROCEDURE — 94799 UNLISTED PULMONARY SVC/PX: CPT

## 2019-03-31 PROCEDURE — 99900035 HC TECH TIME PER 15 MIN (STAT)

## 2019-03-31 PROCEDURE — 80048 BASIC METABOLIC PNL TOTAL CA: CPT | Mod: HCNC

## 2019-03-31 PROCEDURE — G0378 HOSPITAL OBSERVATION PER HR: HCPCS

## 2019-03-31 PROCEDURE — G8980 MOBILITY D/C STATUS: HCPCS | Mod: CI

## 2019-03-31 PROCEDURE — 25000003 PHARM REV CODE 250: Performed by: NURSE PRACTITIONER

## 2019-03-31 PROCEDURE — 96376 TX/PRO/DX INJ SAME DRUG ADON: CPT

## 2019-03-31 PROCEDURE — 36415 COLL VENOUS BLD VENIPUNCTURE: CPT | Mod: HCNC

## 2019-03-31 PROCEDURE — 94640 AIRWAY INHALATION TREATMENT: CPT

## 2019-03-31 PROCEDURE — G8979 MOBILITY GOAL STATUS: HCPCS | Mod: CI

## 2019-03-31 PROCEDURE — 25000003 PHARM REV CODE 250: Performed by: EMERGENCY MEDICINE

## 2019-03-31 PROCEDURE — 25000242 PHARM REV CODE 250 ALT 637 W/ HCPCS: Performed by: NURSE PRACTITIONER

## 2019-03-31 PROCEDURE — 85025 COMPLETE CBC W/AUTO DIFF WBC: CPT | Mod: HCNC

## 2019-03-31 RX ORDER — DOXYCYCLINE HYCLATE 100 MG
100 TABLET ORAL EVERY 12 HOURS
Status: DISCONTINUED | OUTPATIENT
Start: 2019-03-31 | End: 2019-03-31 | Stop reason: HOSPADM

## 2019-03-31 RX ORDER — GUAIFENESIN 600 MG/1
600 TABLET, EXTENDED RELEASE ORAL 2 TIMES DAILY
COMMUNITY
Start: 2019-03-31

## 2019-03-31 RX ORDER — DOXYCYCLINE 100 MG/1
100 CAPSULE ORAL EVERY 12 HOURS
Qty: 8 CAPSULE | Refills: 0
Start: 2019-03-31 | End: 2019-04-03

## 2019-03-31 RX ADMIN — FUROSEMIDE 20 MG: 20 TABLET ORAL at 08:03

## 2019-03-31 RX ADMIN — GUAIFENESIN 600 MG: 600 TABLET, EXTENDED RELEASE ORAL at 08:03

## 2019-03-31 RX ADMIN — IPRATROPIUM BROMIDE AND ALBUTEROL SULFATE 3 ML: .5; 3 SOLUTION RESPIRATORY (INHALATION) at 07:03

## 2019-03-31 RX ADMIN — PANTOPRAZOLE SODIUM 40 MG: 40 TABLET, DELAYED RELEASE ORAL at 08:03

## 2019-03-31 RX ADMIN — METOPROLOL SUCCINATE 100 MG: 50 TABLET, EXTENDED RELEASE ORAL at 08:03

## 2019-03-31 RX ADMIN — LEVETIRACETAM 250 MG: 250 TABLET ORAL at 08:03

## 2019-03-31 RX ADMIN — DOXYCYCLINE 100 MG: 100 INJECTION, POWDER, LYOPHILIZED, FOR SOLUTION INTRAVENOUS at 01:03

## 2019-03-31 RX ADMIN — ISOSORBIDE MONONITRATE 60 MG: 60 TABLET, EXTENDED RELEASE ORAL at 08:03

## 2019-03-31 NOTE — PLAN OF CARE
Problem: Adult Inpatient Plan of Care  Goal: Plan of Care Review  Outcome: Ongoing (interventions implemented as appropriate)  No acute events occurred throughout shift.  Pt oxygen saturation was above 93% on RA throughout shift.  IV infiltrated in Left FA.  New IV placed in R FA.  VSS throughout shift.  Plan of care discussed with pt with all questions and concerns answered.

## 2019-03-31 NOTE — PT/OT/SLP EVAL
Physical Therapy Evaluation    Patient Name:  Anthony Blair   MRN:  [id number]    Recommendations:     Discharge Recommendations:  home health PT   Discharge Equipment Recommendations: none   Barriers to discharge: None    Assessment:     Anthony Blair is a 85 y.o. male admitted with a medical diagnosis of Sepsis.  He presents with the following impairments/functional limitations:    decreased endurance. He is ok to DC to home with home health    Rehab Prognosis: Good; patient would benefit from acute skilled PT services to address these deficits and reach maximum level of function.    Recent Surgery: * No surgery found *      Plan:     Will DC from PT services. Recommend HHPT for endurance and safety training in the home    · Plan of Care Expires:       Subjective     Chief Complaint: none  Patient/Family Comments/goals: return to home  Pain/Comfort:  · Pain Rating 1: 0/10    Patients cultural, spiritual, [employer] conflicts given the current situation:      Living Environment:  Lives with daughter and son in law. Has 24 hour spv  Prior to admission, patients level of function was IND.  Not driving or working.  Equipment used at home: cane, straight, walker, rolling, bedside commode, bath bench.  DME owned (not currently used): rolling walker, single point cane and shower chair.  Upon discharge, patient will have assistance from children.    Objective:     Communicated with Bro prior to session.  Patient found supine with    upon PT entry to room.    General Precautions: Standard,     Orthopedic Precautions:    Braces:       Exams:  · RLE ROM: WFL  · RLE Strength: WFL  · LLE ROM: WFL  · LLE Strength: WFL    Functional Mobility:  · Bed Mobility:     · Supine to Sit: independence  · Transfers:     · Bed to Chair: modified independence with  rolling walker  using  Stand Pivot  · Gait: 120 feet with RW with CGA to SPV      Therapeutic Activities and Exercises:   Amb 120 feet with RW.  Mild unsteady gait but able to  self correct. Gait limited by complaints of dizziness    AM-PAC 6 CLICK MOBILITY  Total Score:24     Patient left up in chair with all lines intact, call button in reach and nursing notified.    GOALS:   Multidisciplinary Problems     Physical Therapy Goals        Problem: Physical Therapy Goal    Goal Priority Disciplines Outcome Goal Variances Interventions   Physical Therapy Goal     PT, PT/OT      Description:  PT eval complete.  Recommend DC with HHPT.  Pt has all needed equipment                      History:     Past Medical History:   Diagnosis Date    A-fib     Anemia     AP (angina pectoris) 1/23/2014    Carotid artery disease without cerebral infarction 8/22/2014    Cataract     Coronary artery disease     GERD (gastroesophageal reflux disease) 7/11/2013    Hemorrhagic cerebrovascular accident (CVA) 4/26/2018    Hyperlipidemia     Hypertension     Hypertensive heart disease with heart failure 3/12/2019    Intracranial hemorrhage     Lumbosacral spondylosis 12/24/2014    Pacemaker 1/23/2014    Paroxysmal atrial fibrillation 1/23/2014    Peripheral vascular disease 8/22/2014    Pneumonia of right lung due to infectious organism 3/27/2019    Seizure, late effect of stroke     Stroke     Type 2 diabetes mellitus with ophthalmic manifestations        Past Surgical History:   Procedure Laterality Date    ANGIOPLASTY      ATRIAL ABLATION SURGERY N/A     CATARACT EXTRACTION Bilateral     [facility]    CATARACT EXTRACTION W/ INTRAOCULAR LENS IMPLANT Right     CATARACT EXTRACTION W/ INTRAOCULAR LENS IMPLANT Left     COLONOSCOPY with biopsies N/A 10/16/2018    [facility]    CORONARY ARTERY BYPASS GRAFT  07/2010    x5    ESOPHAGOGASTRODUODENOSCOPY (EGD) N/A 5/7/2018    Performed by [facility] at [facility]    EYE SURGERY      pace maker surgrey  10/2010    reposition in 2011/2012 (approx)    VEIN BYPASS SURGERY         Time Tracking:      PT Received On: 03/31/19  PT Start Time: 0900     PT Stop Time: 0915  PT Total Time (min): 15 min     Billable Minutes: Evaluation 15      Tee Fernandez PT  03/31/2019

## 2019-03-31 NOTE — PLAN OF CARE
Preference form witness for [facility], referral placed in [facility].  place call to answering service to inform them of referral to on call nurse.      03/31/19 1314   Final Note   Assessment Type Final Discharge Note   Anticipated Discharge Disposition Home-Health  ([facility])   Discharge plans and expectations educations in teach back method with documentation complete? Yes   Right Care Referral Info   Post Acute Recommendation Home-care

## 2019-03-31 NOTE — PLAN OF CARE
Problem: Adult Inpatient Plan of Care  Goal: Plan of Care Review  Outcome: Ongoing (interventions implemented as appropriate)  No acute events over night. IV ABX infusing as ordered. Cardiac monitor in place, paced rhythm 70s. Accuchecks performed. Fall precautions maintained. Daily weights performed. VSS. Chart reviewed. Will monitor.

## 2019-03-31 NOTE — PROGRESS NOTES
No acute events occurred.  Pt given d/c instructions.  Pt's IV's taken out.  Plan of care when pt goes home discussed with patient in depth with all questions and concerns answered.

## 2019-03-31 NOTE — DISCHARGE SUMMARY
[facility] Hospital Medicine  Discharge Summary      Patient Name: Anthony Blair  MRN: [id number]  Admission Date: 3/27/2019  Hospital Length of Stay: 0 days  Discharge Date and Time: 3/31/2019 11:10 AM  Attending Physician: Dr. LUIS Garza   Discharging Provider: Rica Rodriguez NP  Primary Care Provider: Abdulaziz Mccabe MD      HPI:     85-year-old male patient who presents to the emergency room with complaints of  Cough that started this morning which woke in him.  No modifying factors.  Associated symptoms include shortness of breath.  Patient reports improvement in symptoms since arrival and treatment in the emergency room.  On arrival patient febrile 103.2.   Chest x-ray with right lung infiltrate.  Lab results from emergency room unremarkable.  Blood cultures pending.  Patient received Rocephin and azithromycin in emergency room.  Patient's curb score 1.   Hospital Medicine consult.Patient placed in observation.    * No surgery found *      Hospital Course:   The pt is a 86 yo male with Afib, CAD, hemorrhagic stroke, HTN, s/p PPM, seizures, DM who was placed in observation for Sepsis due to right sided pneumonia on IV Doxycycline and Rocephin. Fever 103.2F on arrival. WBC 14. Procalcitonin normal. CXR showed possible necrotizing PNA RUL.  IV Zosyn and Vancomycin initiated.  CT chest with contrast showed RUL and RLL atypical PNA. Low-grade pneumonia or subsegmental atelectasis in the lingula.  Suspected mild superimposed pulmonary edema.  Leukocytosis continued with increase noted.  Blood cultures show NGTD.  IV antibiotics adjusted.  Pt SOB improved post administration of IV lasix x one dose.  Cardiac echo showed EF 60% with diastolic dysfunction and elevated PAP. Repeat EGG showed ventricular paced rhythm.  Ambulatory pulse ox WNL. As of 3/30, pt reports mild SOB with exertion.  Current plan of care continued.  Pt afebrile with leukocytosis resolved and stable vital signs.  IV antibiotics  transitioned to oral administration.  CM consulted for discharge planning. On 3/31/19, pt verbalized symptom relief and eager for discharge.  Pt seen and examined on the date of discharge and deemed suitable for discharge to home with home health.  Pt counseled to maintain dietary restrictions with understanding verbalized.  Current medications resumed with Doxycycline and Mucinex prescribed.  Home health with [facility] established.  Pt instructed to follow up with PCP.       Consults:         Final Active Diagnoses:    Diagnosis Date Noted POA    PRINCIPAL PROBLEM:  Sepsis due to pneumonia  [A41.9] 03/27/2019 Yes    Acute on chronic combined systolic and diastolic congestive heart failure [I50.43] 03/27/2019 Yes    Pneumonia of right lung due to infectious organism [J18.9] 03/27/2019 Yes    Diabetes mellitus type 2 without retinopathy [E11.9] 01/21/2016 Yes     Chronic    Essential hypertension [I10] 01/23/2014 Yes    Paroxysmal atrial fibrillation [I48.0] 01/23/2014 Yes     Chronic    Coronary artery disease involving coronary bypass graft without angina pectoris [I25.810] 07/11/2013 Yes      Problems Resolved During this Admission:       Discharged Condition: stable    Disposition: Home or Self Care    Follow Up:  Follow-up Information     Abdulaziz Mccabe MD In 3 days.    Specialty:  Family Medicine  Why:  -hospital follow up and medication review  Contact information:  [id number] [address] [address] [phone]                 Patient Instructions:      Ambulatory referral to Home Health   Referral Priority: Routine Referral Type: Home Health   Referral Reason: Specialty Services Required   Requested Specialty: Home Health Services   Number of Visits Requested: 1       Significant Diagnostic Studies: Labs:   CMP   Recent Labs   Lab 03/31/19  0623      K 3.7      CO2 24   *   BUN 10   CREATININE 0.8   CALCIUM 10.2   ANIONGAP 12   ESTGFRAFRICA >60   EGFRNONAA >60    and CBC  "  Recent Labs   Lab 03/31/19  0623   WBC 8.85   HGB 11.9*   HCT 35.9*        Microbiology:   Blood Culture   Lab Results   Component Value Date    LABBLOO No growth after 5 days. 03/27/2019       Pending Diagnostic Studies:     None         Medications:  Reconciled Home Medications:      Medication List      START taking these medications    guaiFENesin 600 mg 12 hr tablet  Commonly known as:  MUCINEX  Take 1 tablet (600 mg total) by mouth 2 (two) times daily.        CHANGE how you take these medications    SURE COMFORT PEN NEEDLE 32 gauge x 5/32" Ndle  Generic drug:  pen needle, diabetic  USE FOUR TIMES DAILY.  What changed:  Another medication with the same name was removed. Continue taking this medication, and follow the directions you see here.        CONTINUE taking these medications    ACCU-CHEK TOMAS PLUS TEST STRP Strp  Generic drug:  blood sugar diagnostic  TEST BLOOD SUGAR SIX TIMES DAILY     ACCU-CHEK MULTICLIX LANCET Misc  Generic drug:  lancets  TEST BLOOD SUGAR THREE TIMES DAILY     fluticasone 50 mcg/actuation nasal spray  Commonly known as:  FLONASE  2 sprays (100 mcg total) by Each Nare route once daily.     furosemide 20 MG tablet  Commonly known as:  LASIX  TAKE ONE TABLET BY MOUTH DAILY     insulin glargine 100 units/mL (3mL) SubQ pen  Commonly known as:  LANTUS SOLOSTAR U-100 INSULIN  INJECT 16 UNITS SUB-Q AT BEDTIME     isosorbide mononitrate 60 MG 24 hr tablet  Commonly known as:  IMDUR  Take 1 tablet (60 mg total) by mouth once daily.     levETIRAcetam 250 MG Tab  Commonly known as:  KEPPRA  Take 1 tablet (250 mg total) by mouth 2 (two) times daily.     losartan 100 MG tablet  Commonly known as:  COZAAR  TAKE ONE TABLET BY MOUTH DAILY     metoprolol succinate 100 MG 24 hr tablet  Commonly known as:  TOPROL-XL  TAKE ONE TABLET BY MOUTH TWICE DAILY     nitroGLYCERIN 0.4 MG SL tablet  Commonly known as:  NITROSTAT  Place 1 tablet (0.4 mg total) under the tongue every 5 (five) minutes as " needed for Chest pain.     NovoLOG Flexpen U-100 Insulin 100 unit/mL Inpn pen  Generic drug:  insulin aspart U-100  Inject 5 units 3 times a day before meals     pantoprazole 40 MG tablet  Commonly known as:  PROTONIX  TAKE ONE TABLET BY MOUTH EVERY DAY     potassium chloride SA 20 MEQ tablet  Commonly known as:  K-DUR,KLOR-CON  TAKE ONE TABLET BY MOUTH EVERY DAY     REFRESH OPHT  Apply to eye.        STOP taking these medications    meclizine 12.5 mg tablet  Commonly known as:  ANTIVERT     saw palmetto fruit 450 mg Cap            Indwelling Lines/Drains at time of discharge:   Lines/Drains/Airways          None          Time spent on the discharge of patient: > 30 minutes  Patient was seen and examined on the date of discharge and determined to be suitable for discharge.         Rica Rodriguez NP  Department of Hospital Medicine  [facility]

## 2019-04-01 LAB
BACTERIA BLD CULT: NORMAL
BACTERIA BLD CULT: NORMAL

## 2019-04-03 ENCOUNTER — OFFICE VISIT (OUTPATIENT)
Dept: FAMILY MEDICINE | Facility: CLINIC | Age: 84
End: 2019-04-03
Payer: MEDICARE

## 2019-04-03 VITALS
BODY MASS INDEX: 27.11 KG/M2 | WEIGHT: 183 LBS | OXYGEN SATURATION: 95 % | TEMPERATURE: 100 F | SYSTOLIC BLOOD PRESSURE: 132 MMHG | HEART RATE: 82 BPM | HEIGHT: 69 IN | DIASTOLIC BLOOD PRESSURE: 74 MMHG

## 2019-04-03 DIAGNOSIS — Z09 HOSPITAL DISCHARGE FOLLOW-UP: ICD-10-CM

## 2019-04-03 DIAGNOSIS — J18.9 PNEUMONIA OF BOTH LUNGS DUE TO INFECTIOUS ORGANISM, UNSPECIFIED PART OF LUNG: ICD-10-CM

## 2019-04-03 DIAGNOSIS — S13.9XXA NECK SPRAIN, INITIAL ENCOUNTER: Primary | ICD-10-CM

## 2019-04-03 PROCEDURE — 1101F PT FALLS ASSESS-DOCD LE1/YR: CPT | Mod: HCNC,CPTII,S$GLB, | Performed by: FAMILY MEDICINE

## 2019-04-03 PROCEDURE — 99214 OFFICE O/P EST MOD 30 MIN: CPT | Mod: HCNC,S$GLB,, | Performed by: FAMILY MEDICINE

## 2019-04-03 PROCEDURE — 99999 PR PBB SHADOW E&M-EST. PATIENT-LVL IV: ICD-10-PCS | Mod: PBBFAC,HCNC,, | Performed by: FAMILY MEDICINE

## 2019-04-03 PROCEDURE — 99214 PR OFFICE/OUTPT VISIT, EST, LEVL IV, 30-39 MIN: ICD-10-PCS | Mod: HCNC,S$GLB,, | Performed by: FAMILY MEDICINE

## 2019-04-03 PROCEDURE — 99999 PR PBB SHADOW E&M-EST. PATIENT-LVL IV: CPT | Mod: PBBFAC,HCNC,, | Performed by: FAMILY MEDICINE

## 2019-04-03 PROCEDURE — 1101F PR PT FALLS ASSESS DOC 0-1 FALLS W/OUT INJ PAST YR: ICD-10-PCS | Mod: HCNC,CPTII,S$GLB, | Performed by: FAMILY MEDICINE

## 2019-04-03 RX ORDER — SAW PALMETTO 160 MG
160 CAPSULE ORAL 2 TIMES DAILY
COMMUNITY
End: 2021-06-29

## 2019-04-03 NOTE — PROGRESS NOTES
Chief Complaint:    Chief Complaint   Patient presents with    Hospital Follow Up       History of Present Illness:  Presents today status post hospital discharge  He was seen and hospitalized for pneumonia.  He is doing okay no fever cough since he was discharged  He is also complaining of some pain in the base of the neck started this morning hurts to turn the neck in all quadrants.      ROS:  Review of Systems   Constitutional: Negative for activity change, chills, fatigue, fever and unexpected weight change.   HENT: Negative for congestion, ear discharge, ear pain, hearing loss, postnasal drip and rhinorrhea.    Eyes: Negative for pain and visual disturbance.   Respiratory: Negative for cough, chest tightness and shortness of breath.    Cardiovascular: Negative for chest pain and palpitations.   Gastrointestinal: Negative for abdominal pain, diarrhea and vomiting.   Endocrine: Negative for heat intolerance.   Genitourinary: Negative for dysuria, flank pain, frequency and hematuria.   Musculoskeletal: Negative for back pain, gait problem and neck pain.   Skin: Negative for color change and rash.   Neurological: Negative for dizziness, tremors, seizures, numbness and headaches.   Psychiatric/Behavioral: Negative for agitation, hallucinations, self-injury, sleep disturbance and suicidal ideas. The patient is not nervous/anxious.        Past Medical History:   Diagnosis Date    A-fib     Anemia     AP (angina pectoris) 1/23/2014    Carotid artery disease without cerebral infarction 8/22/2014    Cataract     Coronary artery disease     GERD (gastroesophageal reflux disease) 7/11/2013    Hemorrhagic cerebrovascular accident (CVA) 4/26/2018    Hyperlipidemia     Hypertension     Hypertensive heart disease with heart failure 3/12/2019    Intracranial hemorrhage     Lumbosacral spondylosis 12/24/2014    Pacemaker 1/23/2014    Paroxysmal atrial fibrillation 1/23/2014    Peripheral vascular disease  2014    Pneumonia of right lung due to infectious organism 3/27/2019    Seizure, late effect of stroke     Stroke     Type 2 diabetes mellitus with ophthalmic manifestations        Social History:  Social History     Socioeconomic History    Marital status:      Spouse name: Not on file    Number of children: Not on file    Years of education: Not on file    Highest education level: Not on file   Occupational History    Not on file   Social Needs    Financial resource strain: Not on file    Food insecurity:     Worry: Not on file     Inability: Not on file    Transportation needs:     Medical: Not on file     Non-medical: Not on file   Tobacco Use    Smoking status: Former Smoker     Packs/day: 2.00     Years: 13.00     Pack years: 26.00     Types: Cigarettes     Start date: 1954     Last attempt to quit: 1967     Years since quittin.8    Smokeless tobacco: Never Used   Substance and Sexual Activity    Alcohol use: No    Drug use: No    Sexual activity: Never     Partners: Female     Birth control/protection: None   Lifestyle    Physical activity:     Days per week: Not on file     Minutes per session: Not on file    Stress: Not on file   Relationships    Social connections:     Talks on phone: Not on file     Gets together: Not on file     Attends Mandaeism service: Not on file     Active member of club or organization: Not on file     Attends meetings of clubs or organizations: Not on file     Relationship status: Not on file   Other Topics Concern    Not on file   Social History Narrative    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       Family History:   family history includes Alcohol abuse in his paternal uncle; Arthritis in his father and mother; Cancer in his daughter and sister; Diabetes in his brother, daughter, father, mother, sister, son, and son; Fibromyalgia in his daughter; Heart disease in his brother, mother, and sister; Kidney  "disease in his brother and mother; Miscarriages / Stillbirths in his daughter.    Health Maintenance   Topic Date Due    Aspirin/Antiplatelet Therapy  04/01/1951    Influenza Vaccine  08/01/2018    Foot Exam  11/17/2018    Eye Exam  06/25/2019    Hemoglobin A1c  09/28/2019    Lipid Panel  12/17/2019    TETANUS VACCINE  01/23/2024    Zoster Vaccine  Completed    Pneumococcal Vaccine (65+ Low/Medium Risk)  Completed       Physical Exam:    Vital Signs  Temp: 99.6 °F (37.6 °C)  Temp src: Tympanic  Pulse: 82  SpO2: 95 %  BP: 132/74  BP Location: Left arm  Patient Position: Sitting  Pain Score:   5  Pain Loc: Shoulder  Height and Weight  Height: 5' 9" (175.3 cm)  Weight: 83 kg (182 lb 15.7 oz)  BSA (Calculated - sq m): 2.01 sq meters  BMI (Calculated): 27.1  Weight in (lb) to have BMI = 25: 168.9]    Body mass index is 27.02 kg/m².    Physical Exam   Constitutional: He is oriented to person, place, and time. He appears well-developed.   HENT:   Mouth/Throat: Oropharynx is clear and moist.   Eyes: Pupils are equal, round, and reactive to light. Conjunctivae are normal.   Neck: Muscular tenderness present. Decreased range of motion present.       Cardiovascular: Normal rate, regular rhythm and normal heart sounds.   No murmur heard.  Pulmonary/Chest: Effort normal and breath sounds normal. No respiratory distress. He has no wheezes. He has no rales. He exhibits no tenderness.   Abdominal: Soft. He exhibits no distension and no mass. There is no tenderness. There is no guarding.   Musculoskeletal: He exhibits no edema or tenderness.   Lymphadenopathy:     He has no cervical adenopathy.   Neurological: He is alert and oriented to person, place, and time. He has normal reflexes.   Skin: Skin is warm and dry.   Psychiatric: He has a normal mood and affect. His behavior is normal. Judgment and thought content normal.         Assessment:      ICD-10-CM ICD-9-CM   1. Neck sprain, initial encounter S13.9XXA 847.0   2. " Hospital discharge follow-up Z09 V67.59   3. Pneumonia of both lungs due to infectious organism, unspecified part of lung J18.9 483.8         Plan:    Pneumonia resolve  Please continue to finish the antibiotics as recommended  Next sprain recommend ice if develops any more symptoms please come back  Patient's discharge summary reviewed.      No orders of the defined types were placed in this encounter.      Current Outpatient Medications   Medication Sig Dispense Refill    ACCU-CHEK TOMAS PLUS TEST STRP Strp TEST BLOOD SUGAR SIX TIMES DAILY 300 strip 5    ACCU-CHEK MULTICLIX LANCET lancets TEST BLOOD SUGAR THREE TIMES DAILY 200 each 5    CARBOXYMETHYLCELLULOSE SODIUM (REFRESH OPHT) Apply to eye.      fluticasone (FLONASE) 50 mcg/actuation nasal spray 2 sprays (100 mcg total) by Each Nare route once daily. 16 g 6    furosemide (LASIX) 20 MG tablet TAKE ONE TABLET BY MOUTH DAILY 30 tablet 12    guaiFENesin (MUCINEX) 600 mg 12 hr tablet Take 1 tablet (600 mg total) by mouth 2 (two) times daily.      insulin (LANTUS SOLOSTAR U-100 INSULIN) glargine 100 units/mL (3mL) SubQ pen INJECT 16 UNITS SUB-Q AT BEDTIME 15 mL 11    isosorbide mononitrate (IMDUR) 60 MG 24 hr tablet Take 1 tablet (60 mg total) by mouth once daily. 30 tablet 11    levETIRAcetam (KEPPRA) 250 MG Tab Take 1 tablet (250 mg total) by mouth 2 (two) times daily. 60 tablet 11    losartan (COZAAR) 100 MG tablet TAKE ONE TABLET BY MOUTH DAILY 90 tablet 0    metoprolol succinate (TOPROL-XL) 100 MG 24 hr tablet TAKE ONE TABLET BY MOUTH TWICE DAILY 60 tablet 12    nitroGLYCERIN (NITROSTAT) 0.4 MG SL tablet Place 1 tablet (0.4 mg total) under the tongue every 5 (five) minutes as needed for Chest pain. 25 tablet 12    NOVOLOG FLEXPEN U-100 INSULIN 100 unit/mL InPn pen Inject 5 units 3 times a day before meals 15 mL 3    pantoprazole (PROTONIX) 40 MG tablet TAKE ONE TABLET BY MOUTH EVERY DAY 30 tablet 5    potassium chloride SA (K-DUR,KLOR-CON) 20 MEQ  "tablet TAKE ONE TABLET BY MOUTH EVERY DAY 30 tablet 12    saw palmetto 160 MG capsule Take 160 mg by mouth 2 (two) times daily.      SURE COMFORT PEN NEEDLE 32 gauge x 5/32" Ndle USE FOUR TIMES DAILY. 100 each 6     No current facility-administered medications for this visit.        Medications Discontinued During This Encounter   Medication Reason    doxycycline (MONODOX) 100 MG capsule Patient no longer taking       No follow-ups on file.      Abdulaziz Mccabe MD              "

## 2019-04-04 ENCOUNTER — TELEPHONE (OUTPATIENT)
Dept: FAMILY MEDICINE | Facility: CLINIC | Age: 84
End: 2019-04-04

## 2019-04-04 NOTE — TELEPHONE ENCOUNTER
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          [phone]

## 2019-04-04 NOTE — TELEPHONE ENCOUNTER
Spoke with Wily with [facility]  and informed her of Dr Arellano's response. Wily verbalized understanding and states she is calling the patient to let him know.

## 2019-04-30 DIAGNOSIS — R56.9 SEIZURE, LATE EFFECT OF STROKE: ICD-10-CM

## 2019-04-30 DIAGNOSIS — I69.398 SEIZURE, LATE EFFECT OF STROKE: ICD-10-CM

## 2019-04-30 RX ORDER — LEVETIRACETAM 250 MG/1
TABLET ORAL
Qty: 60 TABLET | Refills: 11 | Status: SHIPPED | OUTPATIENT
Start: 2019-04-30 | End: 2019-04-30 | Stop reason: SDUPTHER

## 2019-04-30 RX ORDER — LEVETIRACETAM 250 MG/1
250 TABLET ORAL 2 TIMES DAILY
Qty: 60 TABLET | Refills: 11 | Status: SHIPPED | OUTPATIENT
Start: 2019-04-30 | End: 2019-11-14

## 2019-05-07 ENCOUNTER — APPOINTMENT (OUTPATIENT)
Dept: RADIOLOGY | Facility: HOSPITAL | Age: 84
End: 2019-05-07
Attending: FAMILY MEDICINE
Payer: MEDICARE

## 2019-05-07 ENCOUNTER — CLINICAL SUPPORT (OUTPATIENT)
Dept: CARDIOLOGY | Facility: CLINIC | Age: 84
End: 2019-05-07
Attending: INTERNAL MEDICINE
Payer: MEDICARE

## 2019-05-07 DIAGNOSIS — Z95.0 CARDIAC PACEMAKER IN SITU: ICD-10-CM

## 2019-05-07 DIAGNOSIS — N28.1 RENAL CYST: ICD-10-CM

## 2019-05-07 DIAGNOSIS — I48.0 PAF (PAROXYSMAL ATRIAL FIBRILLATION): ICD-10-CM

## 2019-05-07 DIAGNOSIS — I49.5 SSS (SICK SINUS SYNDROME): ICD-10-CM

## 2019-05-07 PROCEDURE — 93294 REM INTERROG EVL PM/LDLS PM: CPT | Mod: HCNC,S$GLB,, | Performed by: INTERNAL MEDICINE

## 2019-05-07 PROCEDURE — 93296 PACEMAKER REMOTE: ICD-10-PCS | Mod: HCNC,S$GLB,, | Performed by: INTERNAL MEDICINE

## 2019-05-07 PROCEDURE — 76770 US EXAM ABDO BACK WALL COMP: CPT | Mod: TC,HCNC,PO

## 2019-05-07 PROCEDURE — 76770 US RETROPERITONEAL COMPLETE: ICD-10-PCS | Mod: 26,HCNC,, | Performed by: RADIOLOGY

## 2019-05-07 PROCEDURE — 93294 PACEMAKER REMOTE: ICD-10-PCS | Mod: HCNC,S$GLB,, | Performed by: INTERNAL MEDICINE

## 2019-05-07 PROCEDURE — 76770 US EXAM ABDO BACK WALL COMP: CPT | Mod: 26,HCNC,, | Performed by: RADIOLOGY

## 2019-05-07 PROCEDURE — 93296 REM INTERROG EVL PM/IDS: CPT | Mod: HCNC,S$GLB,, | Performed by: INTERNAL MEDICINE

## 2019-05-08 ENCOUNTER — PATIENT MESSAGE (OUTPATIENT)
Dept: FAMILY MEDICINE | Facility: CLINIC | Age: 84
End: 2019-05-08

## 2019-05-09 ENCOUNTER — TELEPHONE (OUTPATIENT)
Dept: FAMILY MEDICINE | Facility: CLINIC | Age: 84
End: 2019-05-09

## 2019-05-09 DIAGNOSIS — N28.1 KIDNEY CYSTS: Primary | ICD-10-CM

## 2019-05-09 NOTE — TELEPHONE ENCOUNTER
Ultrasound pended      Notes recorded by Abdulaziz Mccabe MD on 5/8/2019 at 10:41 PM CDT  There is a complex cyst on the right kidney that needs to be followed up.  Recommend repeating renal ultrasound in 3 months.  Please schedule

## 2019-05-27 ENCOUNTER — OFFICE VISIT (OUTPATIENT)
Dept: FAMILY MEDICINE | Facility: CLINIC | Age: 84
End: 2019-05-27
Payer: MEDICARE

## 2019-05-27 ENCOUNTER — HOSPITAL ENCOUNTER (OUTPATIENT)
Dept: RADIOLOGY | Facility: HOSPITAL | Age: 84
Discharge: HOME | End: 2019-05-27
Attending: FAMILY MEDICINE
Payer: MEDICARE

## 2019-05-27 VITALS
OXYGEN SATURATION: 97 % | BODY MASS INDEX: 27.11 KG/M2 | HEART RATE: 80 BPM | HEIGHT: 69 IN | TEMPERATURE: 98 F | WEIGHT: 183.06 LBS | DIASTOLIC BLOOD PRESSURE: 78 MMHG | SYSTOLIC BLOOD PRESSURE: 140 MMHG

## 2019-05-27 DIAGNOSIS — M54.42 ACUTE LEFT-SIDED LOW BACK PAIN WITH LEFT-SIDED SCIATICA: Primary | ICD-10-CM

## 2019-05-27 DIAGNOSIS — M54.42 ACUTE LEFT-SIDED LOW BACK PAIN WITH LEFT-SIDED SCIATICA: ICD-10-CM

## 2019-05-27 PROCEDURE — 99999 PR PBB SHADOW E&M-EST. PATIENT-LVL III: ICD-10-PCS | Mod: PBBFAC,HCNC,, | Performed by: FAMILY MEDICINE

## 2019-05-27 PROCEDURE — 72100 XR LUMBAR SPINE AP AND LATERAL: ICD-10-PCS | Mod: 26,HCNC,, | Performed by: RADIOLOGY

## 2019-05-27 PROCEDURE — 99213 PR OFFICE/OUTPT VISIT, EST, LEVL III, 20-29 MIN: ICD-10-PCS | Mod: HCNC,S$GLB,, | Performed by: FAMILY MEDICINE

## 2019-05-27 PROCEDURE — 72100 X-RAY EXAM L-S SPINE 2/3 VWS: CPT | Mod: TC,HCNC,FY,PO

## 2019-05-27 PROCEDURE — 1101F PT FALLS ASSESS-DOCD LE1/YR: CPT | Mod: HCNC,CPTII,S$GLB, | Performed by: FAMILY MEDICINE

## 2019-05-27 PROCEDURE — 1101F PR PT FALLS ASSESS DOC 0-1 FALLS W/OUT INJ PAST YR: ICD-10-PCS | Mod: HCNC,CPTII,S$GLB, | Performed by: FAMILY MEDICINE

## 2019-05-27 PROCEDURE — 99999 PR PBB SHADOW E&M-EST. PATIENT-LVL III: CPT | Mod: PBBFAC,HCNC,, | Performed by: FAMILY MEDICINE

## 2019-05-27 PROCEDURE — 99213 OFFICE O/P EST LOW 20 MIN: CPT | Mod: HCNC,S$GLB,, | Performed by: FAMILY MEDICINE

## 2019-05-27 PROCEDURE — 72100 X-RAY EXAM L-S SPINE 2/3 VWS: CPT | Mod: 26,HCNC,, | Performed by: RADIOLOGY

## 2019-05-27 NOTE — PROGRESS NOTES
Chief Complaint:    Chief Complaint   Patient presents with    Back Pain       History of Present Illness:    Presents today complaining of pain in the low back started about 3 days back no injury or trauma.  For pain is moderate hurts with movement no associated symptoms no fever or blood in urine or any other symptoms.      ROS:  Review of Systems   Constitutional: Negative for activity change, chills, fatigue, fever and unexpected weight change.   HENT: Negative for congestion, ear discharge, ear pain, hearing loss, postnasal drip and rhinorrhea.    Eyes: Negative for pain and visual disturbance.   Respiratory: Negative for cough, chest tightness and shortness of breath.    Cardiovascular: Negative for chest pain and palpitations.   Gastrointestinal: Negative for abdominal pain, diarrhea and vomiting.   Endocrine: Negative for heat intolerance.   Genitourinary: Negative for dysuria, flank pain, frequency and hematuria.   Musculoskeletal: Positive for back pain. Negative for gait problem and neck pain.   Skin: Negative for color change and rash.   Neurological: Negative for dizziness, tremors, seizures, numbness and headaches.   Psychiatric/Behavioral: Negative for agitation, hallucinations, self-injury, sleep disturbance and suicidal ideas. The patient is not nervous/anxious.        Past Medical History:   Diagnosis Date    A-fib     Anemia     AP (angina pectoris) 1/23/2014    Carotid artery disease without cerebral infarction 8/22/2014    Cataract     Coronary artery disease     GERD (gastroesophageal reflux disease) 7/11/2013    Hemorrhagic cerebrovascular accident (CVA) 4/26/2018    Hyperlipidemia     Hypertension     Hypertensive heart disease with heart failure 3/12/2019    Intracranial hemorrhage     Lumbosacral spondylosis 12/24/2014    Pacemaker 1/23/2014    Paroxysmal atrial fibrillation 1/23/2014    Peripheral vascular disease 8/22/2014    Pneumonia of right lung due to infectious  organism 3/27/2019    Seizure, late effect of stroke     Stroke     Type 2 diabetes mellitus with ophthalmic manifestations        Social History:  Social History     Socioeconomic History    Marital status:      Spouse name: Not on file    Number of children: Not on file    Years of education: Not on file    Highest education level: Not on file   Occupational History    Not on file   Social Needs    Financial resource strain: Not on file    Food insecurity:     Worry: Not on file     Inability: Not on file    Transportation needs:     Medical: Not on file     Non-medical: Not on file   Tobacco Use    Smoking status: Former Smoker     Packs/day: 2.00     Years: 13.00     Pack years: 26.00     Types: Cigarettes     Start date: 1954     Last attempt to quit: 1967     Years since quittin.0    Smokeless tobacco: Never Used   Substance and Sexual Activity    Alcohol use: No    Drug use: No    Sexual activity: Never     Partners: Female     Birth control/protection: None   Lifestyle    Physical activity:     Days per week: Not on file     Minutes per session: Not on file    Stress: Not on file   Relationships    Social connections:     Talks on phone: Not on file     Gets together: Not on file     Attends Rastafarian service: Not on file     Active member of club or organization: Not on file     Attends meetings of clubs or organizations: Not on file     Relationship status: Not on file   Other Topics Concern    Not on file   Social History Narrative    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       Family History:   family history includes Alcohol abuse in his paternal uncle; Arthritis in his father and mother; Cancer in his daughter and sister; Diabetes in his brother, daughter, father, mother, sister, son, and son; Fibromyalgia in his daughter; Heart disease in his brother, mother, and sister; Kidney disease in his brother and mother; Miscarriages /  "Stillbirths in his daughter.    Health Maintenance   Topic Date Due    Aspirin/Antiplatelet Therapy  04/01/1951    Foot Exam  11/17/2018    Eye Exam  06/25/2019    Influenza Vaccine  08/01/2019    Hemoglobin A1c  09/28/2019    Lipid Panel  12/17/2019    TETANUS VACCINE  01/23/2024    Pneumococcal Vaccine (65+ Low/Medium Risk)  Completed       Physical Exam:    Vital Signs  Temp: 97.6 °F (36.4 °C)  Temp src: Oral  Pulse: 80  SpO2: 97 %  BP: (!) 140/78  BP Location: Left arm  Patient Position: Sitting  Pain Score:   3  Pain Loc: Back  Height and Weight  Height: 5' 9" (175.3 cm)  Weight: 83 kg (183 lb 1.5 oz)  BSA (Calculated - sq m): 2.01 sq meters  BMI (Calculated): 27.1  Weight in (lb) to have BMI = 25: 168.9]    Body mass index is 27.04 kg/m².    Physical Exam   Constitutional: He is oriented to person, place, and time. He appears well-developed.   HENT:   Mouth/Throat: Oropharynx is clear and moist.   Eyes: Pupils are equal, round, and reactive to light. Conjunctivae are normal.   Neck: Normal range of motion. Neck supple.   Cardiovascular: Normal rate, regular rhythm and normal heart sounds.   No murmur heard.  Pulmonary/Chest: Effort normal and breath sounds normal. No respiratory distress. He has no wheezes. He has no rales. He exhibits no tenderness.   Abdominal: Soft. He exhibits no distension and no mass. There is no tenderness. There is no guarding.   Musculoskeletal: He exhibits no edema.        Lumbar back: He exhibits tenderness.        Back:    Lymphadenopathy:     He has no cervical adenopathy.   Neurological: He is alert and oriented to person, place, and time. He has normal reflexes.   Skin: Skin is warm and dry.   Psychiatric: He has a normal mood and affect. His behavior is normal. Judgment and thought content normal.         Assessment:      ICD-10-CM ICD-9-CM   1. Acute left-sided low back pain with left-sided sciatica M54.42 724.2     724.3         Plan:    He is taking Tylenol arthritis " "for pain and uses topical OTC pain cream      Orders Placed This Encounter   Procedures    X-Ray Lumbar Spine AP And Lateral       Current Outpatient Medications   Medication Sig Dispense Refill    ACCU-CHEK TOMAS PLUS TEST STRP Strp TEST BLOOD SUGAR SIX TIMES DAILY 300 strip 5    ACCU-CHEK MULTICLIX LANCET lancets TEST BLOOD SUGAR THREE TIMES DAILY 200 each 5    CARBOXYMETHYLCELLULOSE SODIUM (REFRESH OPHT) Apply to eye.      fluticasone (FLONASE) 50 mcg/actuation nasal spray 2 sprays (100 mcg total) by Each Nare route once daily. 16 g 6    furosemide (LASIX) 20 MG tablet TAKE ONE TABLET BY MOUTH DAILY 30 tablet 12    guaiFENesin (MUCINEX) 600 mg 12 hr tablet Take 1 tablet (600 mg total) by mouth 2 (two) times daily.      insulin (LANTUS SOLOSTAR U-100 INSULIN) glargine 100 units/mL (3mL) SubQ pen INJECT 16 UNITS SUB-Q AT BEDTIME 15 mL 11    isosorbide mononitrate (IMDUR) 60 MG 24 hr tablet Take 1 tablet (60 mg total) by mouth once daily. 30 tablet 11    levETIRAcetam (KEPPRA) 250 MG Tab Take 1 tablet (250 mg total) by mouth 2 (two) times daily. 60 tablet 11    losartan (COZAAR) 100 MG tablet TAKE ONE TABLET BY MOUTH DAILY 90 tablet 0    metoprolol succinate (TOPROL-XL) 100 MG 24 hr tablet TAKE ONE TABLET BY MOUTH TWICE DAILY 60 tablet 12    nitroGLYCERIN (NITROSTAT) 0.4 MG SL tablet Place 1 tablet (0.4 mg total) under the tongue every 5 (five) minutes as needed for Chest pain. 25 tablet 12    NOVOLOG FLEXPEN U-100 INSULIN 100 unit/mL InPn pen Inject 5 units 3 times a day before meals 15 mL 3    pantoprazole (PROTONIX) 40 MG tablet TAKE ONE TABLET BY MOUTH EVERY DAY 30 tablet 5    potassium chloride SA (K-DUR,KLOR-CON) 20 MEQ tablet TAKE ONE TABLET BY MOUTH EVERY DAY 30 tablet 12    saw palmetto 160 MG capsule Take 160 mg by mouth 2 (two) times daily.      SURE COMFORT PEN NEEDLE 32 gauge x 5/32" Ndle USE FOUR TIMES DAILY. 100 each 6     No current facility-administered medications for this visit.  "       There are no discontinued medications.    No follow-ups on file.      Abdulaziz Mccabe MD

## 2019-05-29 ENCOUNTER — PATIENT MESSAGE (OUTPATIENT)
Dept: FAMILY MEDICINE | Facility: CLINIC | Age: 84
End: 2019-05-29

## 2019-06-18 RX ORDER — LOSARTAN POTASSIUM 100 MG/1
TABLET ORAL
Qty: 90 TABLET | Refills: 0 | Status: SHIPPED | OUTPATIENT
Start: 2019-06-18 | End: 2019-08-23 | Stop reason: SDUPTHER

## 2019-06-26 ENCOUNTER — LAB VISIT (OUTPATIENT)
Dept: LAB | Facility: HOSPITAL | Age: 84
End: 2019-06-26
Attending: FAMILY MEDICINE
Payer: MEDICARE

## 2019-06-26 DIAGNOSIS — E78.2 MIXED HYPERLIPIDEMIA: Chronic | ICD-10-CM

## 2019-06-26 PROCEDURE — 80061 LIPID PANEL: CPT | Mod: HCNC

## 2019-06-26 PROCEDURE — 80053 COMPREHEN METABOLIC PANEL: CPT | Mod: HCNC

## 2019-06-26 PROCEDURE — 36415 COLL VENOUS BLD VENIPUNCTURE: CPT | Mod: HCNC,PO

## 2019-06-26 PROCEDURE — 83036 HEMOGLOBIN GLYCOSYLATED A1C: CPT | Mod: HCNC

## 2019-06-27 LAB
ALBUMIN SERPL BCP-MCNC: 3.8 G/DL (ref 3.5–5.2)
ALP SERPL-CCNC: 116 U/L (ref 55–135)
ALT SERPL W/O P-5'-P-CCNC: 18 U/L (ref 10–44)
ANION GAP SERPL CALC-SCNC: 8 MMOL/L (ref 8–16)
AST SERPL-CCNC: 32 U/L (ref 10–40)
BILIRUB SERPL-MCNC: 0.6 MG/DL (ref 0.1–1)
BUN SERPL-MCNC: 15 MG/DL (ref 8–23)
CALCIUM SERPL-MCNC: 10.7 MG/DL (ref 8.7–10.5)
CHLORIDE SERPL-SCNC: 102 MMOL/L (ref 95–110)
CHOLEST SERPL-MCNC: 194 MG/DL (ref 120–199)
CHOLEST/HDLC SERPL: 4.4 {RATIO} (ref 2–5)
CO2 SERPL-SCNC: 29 MMOL/L (ref 23–29)
CREAT SERPL-MCNC: 0.9 MG/DL (ref 0.5–1.4)
EST. GFR  (AFRICAN AMERICAN): >60 ML/MIN/1.73 M^2
EST. GFR  (NON AFRICAN AMERICAN): >60 ML/MIN/1.73 M^2
ESTIMATED AVG GLUCOSE: 180 MG/DL (ref 68–131)
GLUCOSE SERPL-MCNC: 131 MG/DL (ref 70–110)
HBA1C MFR BLD HPLC: 7.9 % (ref 4–5.6)
HDLC SERPL-MCNC: 44 MG/DL (ref 40–75)
HDLC SERPL: 22.7 % (ref 20–50)
LDLC SERPL CALC-MCNC: 116.2 MG/DL (ref 63–159)
NONHDLC SERPL-MCNC: 150 MG/DL
POTASSIUM SERPL-SCNC: 4.1 MMOL/L (ref 3.5–5.1)
PROT SERPL-MCNC: 7.2 G/DL (ref 6–8.4)
SODIUM SERPL-SCNC: 139 MMOL/L (ref 136–145)
TRIGL SERPL-MCNC: 169 MG/DL (ref 30–150)

## 2019-06-28 ENCOUNTER — OFFICE VISIT (OUTPATIENT)
Dept: CARDIOLOGY | Facility: CLINIC | Age: 84
End: 2019-06-28
Payer: MEDICARE

## 2019-06-28 VITALS
OXYGEN SATURATION: 99 % | HEART RATE: 64 BPM | WEIGHT: 186.06 LBS | BODY MASS INDEX: 27.56 KG/M2 | SYSTOLIC BLOOD PRESSURE: 148 MMHG | HEIGHT: 69 IN | DIASTOLIC BLOOD PRESSURE: 68 MMHG

## 2019-06-28 DIAGNOSIS — I77.9 CAROTID ARTERY DISEASE WITHOUT CEREBRAL INFARCTION: Chronic | ICD-10-CM

## 2019-06-28 DIAGNOSIS — I73.9 PERIPHERAL VASCULAR DISEASE: Chronic | ICD-10-CM

## 2019-06-28 DIAGNOSIS — I61.9 HEMORRHAGIC CEREBROVASCULAR ACCIDENT (CVA): ICD-10-CM

## 2019-06-28 DIAGNOSIS — I36.1 NON-RHEUMATIC TRICUSPID VALVE INSUFFICIENCY: ICD-10-CM

## 2019-06-28 DIAGNOSIS — I11.0 HYPERTENSIVE HEART DISEASE WITH HEART FAILURE: ICD-10-CM

## 2019-06-28 DIAGNOSIS — Z78.9 STATIN INTOLERANCE: ICD-10-CM

## 2019-06-28 DIAGNOSIS — I73.9 CLAUDICATION IN PERIPHERAL VASCULAR DISEASE: Chronic | ICD-10-CM

## 2019-06-28 DIAGNOSIS — I65.23 ASYMPTOMATIC STENOSIS OF BOTH CAROTID ARTERIES WITHOUT INFARCTION: ICD-10-CM

## 2019-06-28 DIAGNOSIS — E13.51 PERIPHERAL VASCULAR DISEASE DUE TO SECONDARY DIABETES MELLITUS: ICD-10-CM

## 2019-06-28 DIAGNOSIS — I71.21 ANEURYSM OF ASCENDING AORTA: ICD-10-CM

## 2019-06-28 DIAGNOSIS — I25.810 CORONARY ARTERY DISEASE INVOLVING CORONARY BYPASS GRAFT OF NATIVE HEART WITHOUT ANGINA PECTORIS: Primary | ICD-10-CM

## 2019-06-28 DIAGNOSIS — Z95.0 PACEMAKER: Chronic | ICD-10-CM

## 2019-06-28 DIAGNOSIS — I48.0 PAROXYSMAL ATRIAL FIBRILLATION: Chronic | ICD-10-CM

## 2019-06-28 DIAGNOSIS — R06.02 SOB (SHORTNESS OF BREATH): ICD-10-CM

## 2019-06-28 DIAGNOSIS — E78.2 MIXED HYPERLIPIDEMIA: Chronic | ICD-10-CM

## 2019-06-28 DIAGNOSIS — I20.9 AP (ANGINA PECTORIS): ICD-10-CM

## 2019-06-28 DIAGNOSIS — R07.89 ATYPICAL CHEST PAIN: ICD-10-CM

## 2019-06-28 DIAGNOSIS — I48.19 PERSISTENT ATRIAL FIBRILLATION: ICD-10-CM

## 2019-06-28 DIAGNOSIS — I10 ESSENTIAL HYPERTENSION: ICD-10-CM

## 2019-06-28 PROBLEM — J18.9 PNEUMONIA OF RIGHT LUNG DUE TO INFECTIOUS ORGANISM: Status: RESOLVED | Noted: 2019-03-27 | Resolved: 2019-06-28

## 2019-06-28 PROBLEM — I50.43 ACUTE ON CHRONIC COMBINED SYSTOLIC AND DIASTOLIC CONGESTIVE HEART FAILURE: Status: RESOLVED | Noted: 2019-03-27 | Resolved: 2019-06-28

## 2019-06-28 PROBLEM — A41.9 SEPSIS: Status: RESOLVED | Noted: 2019-03-27 | Resolved: 2019-06-28

## 2019-06-28 PROCEDURE — 99215 OFFICE O/P EST HI 40 MIN: CPT | Mod: HCNC,S$GLB,, | Performed by: INTERNAL MEDICINE

## 2019-06-28 PROCEDURE — 99499 UNLISTED E&M SERVICE: CPT | Mod: HCNC,S$GLB,, | Performed by: INTERNAL MEDICINE

## 2019-06-28 PROCEDURE — 99999 PR PBB SHADOW E&M-EST. PATIENT-LVL III: CPT | Mod: PBBFAC,HCNC,, | Performed by: INTERNAL MEDICINE

## 2019-06-28 PROCEDURE — 1101F PR PT FALLS ASSESS DOC 0-1 FALLS W/OUT INJ PAST YR: ICD-10-PCS | Mod: HCNC,CPTII,S$GLB, | Performed by: INTERNAL MEDICINE

## 2019-06-28 PROCEDURE — 99999 PR PBB SHADOW E&M-EST. PATIENT-LVL III: ICD-10-PCS | Mod: PBBFAC,HCNC,, | Performed by: INTERNAL MEDICINE

## 2019-06-28 PROCEDURE — 1101F PT FALLS ASSESS-DOCD LE1/YR: CPT | Mod: HCNC,CPTII,S$GLB, | Performed by: INTERNAL MEDICINE

## 2019-06-28 PROCEDURE — 99215 PR OFFICE/OUTPT VISIT, EST, LEVL V, 40-54 MIN: ICD-10-PCS | Mod: HCNC,S$GLB,, | Performed by: INTERNAL MEDICINE

## 2019-06-28 PROCEDURE — 99499 RISK ADDL DX/OHS AUDIT: ICD-10-PCS | Mod: HCNC,S$GLB,, | Performed by: INTERNAL MEDICINE

## 2019-06-28 NOTE — PROGRESS NOTES
Subjective:    Patient ID:  Anthony Blair is a 86 y.o. male who presents for evaluation of Follow-up (4 month follow up ); Hypertension; Hyperlipidemia; Coronary Artery Disease; Carotid Artery Disease; Atrial Fibrillation; Risk Factor Management For Atherosclerosis; Peripheral Arterial Disease; and Claudication      HPI pt presents for f/u.  His current medical conditions include PAF/PSVT, CAD (PTCA 1980's), HTN, PPM, PHTN, LVH, LAE, carotid artery disease, DM, atrial septal aneurysm/pfo, PAD, dyslipidemia. Nonsmoker.  Past details pertinent for following:   Statin intolerant.  s/p CABG 7/10 (LIMA-LAD, SVG-OM1, SVG-OM3, SVG-PDA) for increasing OLIVARES and multivessel CAD on [facility].   S/p PPM 10/10 for SSS/PAF. Was hospitalized 1/11 for PSVT (Atrial tachycardia) and was started on Amiodarone but was stopped for GI discomfort. He also did not tolerate Multaq and has not tolerated multiple statins.   s/p EPS/ablation of his atrial tachycardia 6/11.   S/p abd w runoff March 2015 and had significant B LE infrapopliteal disease. Atherectomy/pta performed of critical R tibeoperoneal trunk stenosis. Also has L tibeoperoneal trunk stenosis and his PTA/MIGUEL are occluded bilaterally.  Started on Eliquis Feb 2017 for suspicious left internal jugular vein thrombus.  Stress MPI 3/17 showed no ischemia.  Echo 3/17 showed normal LV function, left-right atrial shunt.   Carotid u/s 6/17 showed mild bilateral disease, known L IJ thrombus noted.  Pt called clinic Sept 2017 stated he had stopped Eliquis couple weeks before his call due to diarrhea, weakness, bloating and also off Amlodipine due to sleepiness.  He stated sxs subsided when he stopped the meds.   He was advised by cardiology on 9/6/17 to take 81 mg ASA since he stopped his Eliquis.  He had acute hemorrhage of basal ganglia right side Sept 2017, causing weakness on left side.  Tx at [facility] 9/24/17. He was on ASA daily when CVA occurred and was not on Eliquis. Also noted to have mild  thoracic aneurysm 4.1 cm, 60% R ICA stenosis, 30% LICA stenosis, patent vertebral arteries but mod-severe distal left vertebral disease,  by CTA per PCP note.  He was taken off his asa.  Followed by Dr. Weaver, neurosurgery. No surgery was needed.  Echo showed normal LVEF.  Stress test 8/18 showed no ischemia, apical scar.    Echo 8/18 normal EF.  Now here.  PPM interrogation recently showed normal device function.   No palpitations.  No recurrent TIA/CVA sxs.  Not on asa or anticoagulation due hemorrhagic stroke on asa.  His neurological function has improved.  He states for his age he thinks he is doing ok.  CAD is stable.  No active anginal sxs.  Imdur increased last appt to 60 mg qd.  H/o atypical cp, stable.  No recent cp sxs.  Stable chronic OLIVARES, minimal. No pnd/orthopnea.  DM has worsened and HGAIC above goal.  Lipids above goal but stable.   Statin intolerant.  Echo 3/19 normal EF, LAE, LVH, DD, PHTN 76 mmHg, mild TR.  PAD is stable.  He has chronic leg fatigue walking but easily walked in and out of clinic today.  No rest pain.  Compliant with meds.  Chronic dizziness, minimal, much improved from few years ago.  No syncope.      Current Outpatient Medications:     ACCU-CHEK TOMAS PLUS TEST STRP Strp, TEST BLOOD SUGAR SIX TIMES DAILY, Disp: 300 strip, Rfl: 5    ACCU-CHEK MULTICLIX LANCET lancets, TEST BLOOD SUGAR THREE TIMES DAILY, Disp: 200 each, Rfl: 5    CARBOXYMETHYLCELLULOSE SODIUM (REFRESH OPHT), Apply to eye., Disp: , Rfl:     fluticasone (FLONASE) 50 mcg/actuation nasal spray, 2 sprays (100 mcg total) by Each Nare route once daily., Disp: 16 g, Rfl: 6    furosemide (LASIX) 20 MG tablet, TAKE ONE TABLET BY MOUTH DAILY, Disp: 30 tablet, Rfl: 12    guaiFENesin (MUCINEX) 600 mg 12 hr tablet, Take 1 tablet (600 mg total) by mouth 2 (two) times daily., Disp: , Rfl:     insulin (LANTUS SOLOSTAR U-100 INSULIN) glargine 100 units/mL (3mL) SubQ pen, INJECT 16 UNITS SUB-Q AT BEDTIME, Disp: 15 mL, Rfl:  "11    isosorbide mononitrate (IMDUR) 60 MG 24 hr tablet, Take 1 tablet (60 mg total) by mouth once daily., Disp: 30 tablet, Rfl: 11    levETIRAcetam (KEPPRA) 250 MG Tab, Take 1 tablet (250 mg total) by mouth 2 (two) times daily., Disp: 60 tablet, Rfl: 11    losartan (COZAAR) 100 MG tablet, TAKE ONE TABLET BY MOUTH EVERY DAY, Disp: 90 tablet, Rfl: 0    metoprolol succinate (TOPROL-XL) 100 MG 24 hr tablet, TAKE ONE TABLET BY MOUTH TWICE DAILY, Disp: 60 tablet, Rfl: 12    nitroGLYCERIN (NITROSTAT) 0.4 MG SL tablet, Place 1 tablet (0.4 mg total) under the tongue every 5 (five) minutes as needed for Chest pain., Disp: 25 tablet, Rfl: 12    NOVOLOG FLEXPEN U-100 INSULIN 100 unit/mL InPn pen, Inject 5 units 3 times a day before meals, Disp: 15 mL, Rfl: 3    pantoprazole (PROTONIX) 40 MG tablet, TAKE ONE TABLET BY MOUTH EVERY DAY, Disp: 30 tablet, Rfl: 5    potassium chloride SA (K-DUR,KLOR-CON) 20 MEQ tablet, TAKE ONE TABLET BY MOUTH EVERY DAY, Disp: 30 tablet, Rfl: 12    saw palmetto 160 MG capsule, Take 160 mg by mouth 2 (two) times daily., Disp: , Rfl:     SURE COMFORT PEN NEEDLE 32 gauge x 5/32" Ndle, USE FOUR TIMES DAILY., Disp: 100 each, Rfl: 6      Review of Systems   Constitution: Negative.   HENT: Negative.    Eyes: Negative.    Cardiovascular: Positive for claudication and dyspnea on exertion.   Respiratory: Positive for shortness of breath.    Endocrine: Negative.    Hematologic/Lymphatic: Negative.    Skin: Negative.    Musculoskeletal: Positive for arthritis and joint pain.   Gastrointestinal: Negative.    Genitourinary: Negative.    Neurological: Positive for dizziness and light-headedness.   Psychiatric/Behavioral: Negative.    Allergic/Immunologic: Negative.        BP (!) 148/68 (BP Location: Right arm, Patient Position: Sitting, BP Method: Medium (Manual))   Pulse 64   Ht 5' 9" (1.753 m)   Wt 84.4 kg (186 lb 1.1 oz)   SpO2 99%   BMI 27.48 kg/m²     Wt Readings from Last 3 Encounters: "   06/28/19 84.4 kg (186 lb 1.1 oz)   05/27/19 83 kg (183 lb 1.5 oz)   04/03/19 83 kg (182 lb 15.7 oz)     Temp Readings from Last 3 Encounters:   05/27/19 97.6 °F (36.4 °C) (Oral)   04/03/19 99.6 °F (37.6 °C) (Tympanic)   03/31/19 98.5 °F (36.9 °C) (Oral)     BP Readings from Last 3 Encounters:   06/28/19 (!) 148/68   05/27/19 (!) 140/78   04/03/19 132/74     Pulse Readings from Last 3 Encounters:   06/28/19 64   05/27/19 80   04/03/19 82          Objective:    Physical Exam   Constitutional: He is oriented to person, place, and time. He appears well-developed and well-nourished.   HENT:   Head: Normocephalic.   Neck: Normal range of motion. Neck supple. Normal carotid pulses, no hepatojugular reflux and no JVD present. Carotid bruit is not present. No thyromegaly present.   Cardiovascular: Normal rate, S1 normal and S2 normal. An irregularly irregular rhythm present. PMI is not displaced. Exam reveals no S3, no S4, no distant heart sounds, no friction rub, no midsystolic click and no opening snap.   No murmur heard.  Pulses:       Radial pulses are 2+ on the right side, and 2+ on the left side.   Pulmonary/Chest: Effort normal and breath sounds normal. He has no wheezes. He has no rales.   Abdominal: Soft. Bowel sounds are normal. He exhibits no distension, no abdominal bruit, no ascites and no mass. There is no tenderness.   Musculoskeletal: He exhibits no edema.   Neurological: He is alert and oriented to person, place, and time.   Skin: Skin is warm.   Psychiatric: He has a normal mood and affect. His behavior is normal.   Nursing note and vitals reviewed.      I have reviewed all pertinent labs and cardiac studies.      Chemistry        Component Value Date/Time     06/26/2019 0830    K 4.1 06/26/2019 0830     06/26/2019 0830    CO2 29 06/26/2019 0830    BUN 15 06/26/2019 0830    CREATININE 0.9 06/26/2019 0830     (H) 06/26/2019 0830        Component Value Date/Time    CALCIUM 10.7 (H)  06/26/2019 0830    ALKPHOS 116 06/26/2019 0830    AST 32 06/26/2019 0830    ALT 18 06/26/2019 0830    BILITOT 0.6 06/26/2019 0830    ESTGFRAFRICA >60.0 06/26/2019 0830    EGFRNONAA >60.0 06/26/2019 0830        Lab Results   Component Value Date    WBC 8.85 03/31/2019    HGB 11.9 (L) 03/31/2019    HCT 35.9 (L) 03/31/2019    MCV 92 03/31/2019     03/31/2019     Lab Results   Component Value Date    HGBA1C 7.9 (H) 06/26/2019     Lab Results   Component Value Date    CHOL 194 06/26/2019    CHOL 179 12/17/2018    CHOL 179 08/17/2018     Lab Results   Component Value Date    HDL 44 06/26/2019    HDL 39 (L) 12/17/2018    HDL 40 08/17/2018     Lab Results   Component Value Date    LDLCALC 116.2 06/26/2019    LDLCALC 113.0 12/17/2018    LDLCALC 100.6 08/17/2018     Lab Results   Component Value Date    TRIG 169 (H) 06/26/2019    TRIG 135 12/17/2018    TRIG 192 (H) 08/17/2018     Lab Results   Component Value Date    CHOLHDL 22.7 06/26/2019    CHOLHDL 21.8 12/17/2018    CHOLHDL 22.3 08/17/2018       Date of Procedure: 03/29/2019        TEST DESCRIPTION   Technical Quality: This is a technically challenging study.     Aorta: The aortic root is normal in size, measuring 3.5 cm at sinotubular junction and 3.5 cm at Sinuses of Valsalva. The proximal ascending aorta is normal in size, measuring 3.4 cm across.     Left Atrium: The left atrial volume index is mildly enlarged, measuring 35.39 cc/m2.     Left Ventricle: The left ventricle is normal in size, with an end-diastolic diameter of 3.4 cm, and an end-systolic diameter of 2.3 cm. LV wall thickness is normal, with the septum measuring 1.9 cm and the posterior wall measuring 1.7 cm across. Relative   wall thickness was increased at 1.00, and the LV mass index was increased at 146.0 g/m2 consistent with concentric left ventricular hypertrophy. There are no regional wall motion abnormalities. Left ventricular systolic function appears normal. Visually   estimated ejection  fraction is 60-65%. The LV Doppler derived stroke volume equals 67.0 ccs.     Diastolic indices: E wave velocity 1.3 m/s, E/A ratio 3.1,  msec., E/e' ratio(avg) 10. Mean left atrial pressures are increased. There is pseudonormalization of mitral inflow pattern consistent with significant diastolic dysfunction.     Right Atrium: The right atrium is normal in size, measuring 4.9 cm in length and 3.4 cm in width in the apical view.     Right Ventricle: The right ventricle is normal in size measuring 3.0 cm at the base in the apical right ventricle-focused view. RV free wall thickness is increased measuring 0.8 cm in Subcostal view, consistent with RVH. Global right ventricular systolic   function appears normal. Tricuspid annular plane systolic excursion (TAPSE) is 2.1 cm. The estimated PA systolic pressure is 76 mmHg.     Aortic Valve:  The aortic valve is markedly sclerotic. The mean gradient obtained across the aortic valve is 6 mmHg.     Mitral Valve:  The mitral valve is normal in structure. The pressure half time is 88 msec. The calculated mitral valve area is 2.5 cm2. There is trivial mitral regurgitation. There is mitral annular calcification.     Tricuspid Valve:  The tricuspid valve is normal in structure. There is mild tricuspid regurgitation.     Pulmonary Valve:  The pulmonic valve is not well seen.     IVC: IVC is enlarged and collapses < 50% with a sniff, suggesting high right atrial pressure of 15 mmHg.     Intracavitary: There is no evidence of pericardial effusion, intracavity mass, thrombi, or vegetation.         CONCLUSIONS     1 - Mild left atrial enlargement.     2 - Concentric hypertrophy.     3 - No wall motion abnormalities.     4 - Normal left ventricular systolic function (EF 60-65%).     5 - Impaired LV relaxation, elevated LAP (grade 2 diastolic dysfunction).     6 - Right ventricular hypertrophy.     7 - Normal right ventricular systolic function .     8 - Pulmonary hypertension. The  estimated PA systolic pressure is 76 mmHg.     9 - Trivial mitral regurgitation.     10 - Mild tricuspid regurgitation.     11 - Increased central venous pressure.             This document has been electronically    SIGNED BY: Mingo Jenkins MD On: 03/29/2019 17:13          Narrative     Date of Procedure: 05/07/2019    PRE-TEST DATA   DEVICES:  PACEMAKER  BIOTRONIK [id number] ROOSEVELTJUN MI S/N:[id number]  10/27/2010 - current     LEAD  [employer] 2088TC LEAD TENDRIL STS 46CM S/N:[id number]  Mago Grajeda  4/8/2013 - current     LEAD  BIOTRONIK [address]  S/N:[id number]  10/27/2010 - current     right atrium LEAD  BIOTRONIK [address]  S/N:[id number]  10/27/2010 - current       The following physician is MAGO Sow        TEST DESCRIPTION   Follow-Up Analysis:  Chamber type: Dual  Mode: DDDR    Lower limit rate is 60 bpm  Rest rate is 50 bpm  Upper tracking rate is 130 bpm    Paced AV delay: 350-300 ms    Magnet rate: 90    Auto mode switch: Yes     62% of day  Nonsustained VT: No    LEADS:  RA Lead       P-wave: 0.7 mV       Impedance: 351 Ohms       Paced: 14%    RV Lead       R-wave: 21.8 mV       Impedance: 546 Ohms       Paced: 99%    THRESHOLDS:  RV Lead     0.8 V at 0.4 ms      Auto threshold, Bipolar    The patient had 0 treated episodes.    Wound Comments:  Deferred (home transmission only)      Reprogramming Comments:  None - remote transmission only.      General Comments:  Current EGM: AsVp @ 75 bpm, AF  Battery voltage 25%.  Mode switching 3535 per day - burden 62%.  Routine Biotronik remote transmission received and reviewed. PPM device function stable.  Follow up with HM in 3 mos.    Nurse's Comments:  PPM 2 of 3    Interrogation performed by Teresita Luis RN.           This document has been electronically    SIGNED BY: Mago Harrington MD On: 05/15/2019 17:57           Assessment:       1. Coronary artery disease involving coronary bypass graft of native heart  without angina pectoris    2. 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 Non-rheumatic tricuspid valve insufficiency    4. Statin intolerance    5. SOB (shortness of breath)    6. Aneurysm of ascending aorta    7. AP (angina pectoris)    8. Asymptomatic stenosis of both carotid arteries without infarction    9. Atypical chest pain    10. Carotid artery disease without cerebral infarction    11. Claudication in peripheral vascular disease    12. Essential hypertension    13. Hemorrhagic cerebrovascular accident (CVA)    14. Hypertensive heart disease with heart failure    15. Mixed hyperlipidemia    16. Pacemaker    17. Paroxysmal atrial fibrillation    18. Peripheral vascular disease    19. Peripheral vascular disease due to secondary diabetes mellitus    20. Persistent atrial fibrillation         Plan:             Stable chronic extensive CV conditions on current medical tx.  Reviewed all tests and above medical conditions with pt in detail and formulated tx plan.  Continue OMT for CV conditions.  Not candidate for NOAC or ASA for CV conditions due to hemorrhagic CVA on 81 mg ASA qd in past.  Continue medical tx.  Lower DM HGAIC to goal.  Cardiac diet discussed.  Continue regular f/u in PPM clinic.  Daily walking exercise within safety limits for PAD.  F/u imaging for thoracic aneurysm, small in past.  F/u echo in future to reevaluate PAP.  F/u with PCP next week on DM care.  F/u 6 months in Cardiology clinic.        Complex visit detailing and managing numerous complex extensive CV conditions.

## 2019-07-01 ENCOUNTER — OFFICE VISIT (OUTPATIENT)
Dept: OPHTHALMOLOGY | Facility: CLINIC | Age: 84
End: 2019-07-01
Payer: MEDICARE

## 2019-07-01 DIAGNOSIS — Z96.1 PSEUDOPHAKIA: ICD-10-CM

## 2019-07-01 DIAGNOSIS — E11.9 DIABETES MELLITUS TYPE 2 WITHOUT RETINOPATHY: Primary | ICD-10-CM

## 2019-07-01 DIAGNOSIS — H52.7 REFRACTIVE ERROR: ICD-10-CM

## 2019-07-01 PROCEDURE — 92015 PR REFRACTION: ICD-10-PCS | Mod: HCNC,S$GLB,, | Performed by: OPTOMETRIST

## 2019-07-01 PROCEDURE — 99999 PR PBB SHADOW E&M-EST. PATIENT-LVL I: ICD-10-PCS | Mod: PBBFAC,HCNC,, | Performed by: OPTOMETRIST

## 2019-07-01 PROCEDURE — 92015 DETERMINE REFRACTIVE STATE: CPT | Mod: HCNC,S$GLB,, | Performed by: OPTOMETRIST

## 2019-07-01 PROCEDURE — 92014 PR EYE EXAM, EST PATIENT,COMPREHESV: ICD-10-PCS | Mod: HCNC,S$GLB,, | Performed by: OPTOMETRIST

## 2019-07-01 PROCEDURE — 99999 PR PBB SHADOW E&M-EST. PATIENT-LVL I: CPT | Mod: PBBFAC,HCNC,, | Performed by: OPTOMETRIST

## 2019-07-01 PROCEDURE — 92014 COMPRE OPH EXAM EST PT 1/>: CPT | Mod: HCNC,S$GLB,, | Performed by: OPTOMETRIST

## 2019-07-01 NOTE — PROGRESS NOTES
HPI     Diabetic Eye Exam      Additional comments: Last A1C 7.9 06/26/19              Comments     Patient returns for his annual diabetic exam his A1C was 7.9  , his B.S.   IS RUNNING ABOVE 150.        Last seen 06/25/18 TRF    1. PCIOL OU  YAG OD 2014 w/[facility]  YAG OS 2013 w/ [facility], 12/11/14 w/[facility]  2. DM x 1969  3. Dermatochalasis  4. Dry Eyes          Last edited by ANGELA Morales on 7/1/2019  2:06 PM. (History)            Assessment /Plan     For exam results, see Encounter Report.    Diabetes mellitus type 2 without retinopathy    Pseudophakia    Refractive error      No Background Diabetic Retinopathy    Stable IOL OU.    Dispense Final Rx for glasses.  RTC 1 year  Discussed above and answered questions.

## 2019-07-02 ENCOUNTER — OFFICE VISIT (OUTPATIENT)
Dept: FAMILY MEDICINE | Facility: CLINIC | Age: 84
End: 2019-07-02
Payer: MEDICARE

## 2019-07-02 VITALS
HEART RATE: 99 BPM | SYSTOLIC BLOOD PRESSURE: 140 MMHG | HEIGHT: 69 IN | OXYGEN SATURATION: 97 % | TEMPERATURE: 98 F | DIASTOLIC BLOOD PRESSURE: 80 MMHG | BODY MASS INDEX: 28.03 KG/M2 | WEIGHT: 189.25 LBS

## 2019-07-02 DIAGNOSIS — I73.9 CLAUDICATION IN PERIPHERAL VASCULAR DISEASE: Chronic | ICD-10-CM

## 2019-07-02 DIAGNOSIS — I77.9 CAROTID ARTERY DISEASE WITHOUT CEREBRAL INFARCTION: Chronic | ICD-10-CM

## 2019-07-02 DIAGNOSIS — I71.21 ANEURYSM OF ASCENDING AORTA: ICD-10-CM

## 2019-07-02 DIAGNOSIS — I48.19 PERSISTENT ATRIAL FIBRILLATION: ICD-10-CM

## 2019-07-02 DIAGNOSIS — I10 ESSENTIAL HYPERTENSION: ICD-10-CM

## 2019-07-02 DIAGNOSIS — E78.2 MIXED HYPERLIPIDEMIA: Chronic | ICD-10-CM

## 2019-07-02 PROBLEM — R07.9 CHEST PAIN: Status: RESOLVED | Noted: 2018-08-17 | Resolved: 2019-07-02

## 2019-07-02 PROCEDURE — 99214 PR OFFICE/OUTPT VISIT, EST, LEVL IV, 30-39 MIN: ICD-10-PCS | Mod: HCNC,S$GLB,, | Performed by: FAMILY MEDICINE

## 2019-07-02 PROCEDURE — 99999 PR PBB SHADOW E&M-EST. PATIENT-LVL IV: CPT | Mod: PBBFAC,HCNC,, | Performed by: FAMILY MEDICINE

## 2019-07-02 PROCEDURE — 99214 OFFICE O/P EST MOD 30 MIN: CPT | Mod: HCNC,S$GLB,, | Performed by: FAMILY MEDICINE

## 2019-07-02 PROCEDURE — 1101F PT FALLS ASSESS-DOCD LE1/YR: CPT | Mod: HCNC,CPTII,S$GLB, | Performed by: FAMILY MEDICINE

## 2019-07-02 PROCEDURE — 1101F PR PT FALLS ASSESS DOC 0-1 FALLS W/OUT INJ PAST YR: ICD-10-PCS | Mod: HCNC,CPTII,S$GLB, | Performed by: FAMILY MEDICINE

## 2019-07-02 PROCEDURE — 99999 PR PBB SHADOW E&M-EST. PATIENT-LVL IV: ICD-10-PCS | Mod: PBBFAC,HCNC,, | Performed by: FAMILY MEDICINE

## 2019-07-02 NOTE — PROGRESS NOTES
Chief Complaint:    Chief Complaint   Patient presents with    Follow-up       History of Present Illness:  Presents today for follow-up of chronic medical problems,  His A1c went up he says because he has been eating big portion he has gained some weight  Blood pressure is stable  He has P 80 but he is asymptomatic  Also has carotid stenosis following with Cardiology with serial imaging has not had carotid Dopplers in a while he has been asymptomatic  Off of aspirin due to hemorrhagic stroke  He does not want to increase his nighttime insulin because it causes hypoglycemia, he is taking about 5 units of short-acting insulin with mealtime his postprandial sugars about 200.      ROS:  Review of Systems   Constitutional: Negative for activity change, chills, fatigue, fever and unexpected weight change.   HENT: Negative for congestion, ear discharge, ear pain, hearing loss, postnasal drip and rhinorrhea.    Eyes: Negative for pain and visual disturbance.   Respiratory: Negative for cough, chest tightness and shortness of breath.    Cardiovascular: Negative for chest pain and palpitations.   Gastrointestinal: Negative for abdominal pain, diarrhea and vomiting.   Endocrine: Negative for heat intolerance.   Genitourinary: Negative for dysuria, flank pain, frequency and hematuria.   Musculoskeletal: Negative for back pain, gait problem and neck pain.   Skin: Negative for color change and rash.   Neurological: Negative for dizziness, tremors, seizures, numbness and headaches.   Psychiatric/Behavioral: Negative for agitation, hallucinations, self-injury, sleep disturbance and suicidal ideas. The patient is not nervous/anxious.        Past Medical History:   Diagnosis Date    A-fib     Anemia     AP (angina pectoris) 1/23/2014    Carotid artery disease without cerebral infarction 8/22/2014    Cataract     Coronary artery disease     GERD (gastroesophageal reflux disease) 7/11/2013    Hemorrhagic cerebrovascular  accident (CVA) 2018    Hyperlipidemia     Hypertension     Hypertensive heart disease with heart failure 3/12/2019    Intracranial hemorrhage     Lumbosacral spondylosis 2014    Pacemaker 2014    Paroxysmal atrial fibrillation 2014    Peripheral vascular disease 2014    Pneumonia of right lung due to infectious organism 3/27/2019    Seizure, late effect of stroke     Stroke     Type 2 diabetes mellitus with ophthalmic manifestations        Social History:  Social History     Socioeconomic History    Marital status:      Spouse name: Not on file    Number of children: Not on file    Years of education: Not on file    Highest education level: Not on file   Occupational History    Not on file   Social Needs    Financial resource strain: Not on file    Food insecurity:     Worry: Not on file     Inability: Not on file    Transportation needs:     Medical: Not on file     Non-medical: Not on file   Tobacco Use    Smoking status: Former Smoker     Packs/day: 2.00     Years: 13.00     Pack years: 26.00     Types: Cigarettes     Start date: 1954     Last attempt to quit: 1967     Years since quittin.1    Smokeless tobacco: Never Used   Substance and Sexual Activity    Alcohol use: No    Drug use: No    Sexual activity: Never     Partners: Female     Birth control/protection: None   Lifestyle    Physical activity:     Days per week: Not on file     Minutes per session: Not on file    Stress: Not on file   Relationships    Social connections:     Talks on phone: Not on file     Gets together: Not on file     Attends Baptism service: Not on file     Active member of club or organization: Not on file     Attends meetings of clubs or organizations: Not on file     Relationship status: Not on file   Other Topics Concern    Not on file   Social History Narrative    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       Family History:    "family history includes Alcohol abuse in his paternal uncle; Arthritis in his father and mother; Cancer in his daughter and sister; Diabetes in his brother, daughter, father, mother, sister, son, and son; Fibromyalgia in his daughter; Heart disease in his brother, mother, and sister; Kidney disease in his brother and mother; Miscarriages / Stillbirths in his daughter.    Health Maintenance   Topic Date Due    Influenza Vaccine  08/01/2019    Hemoglobin A1c  12/26/2019    Lipid Panel  06/26/2020    Eye Exam  07/01/2020    Foot Exam  07/02/2020    TETANUS VACCINE  01/23/2024    Pneumococcal Vaccine (65+ Low/Medium Risk)  Completed    Abdominal Aortic Aneurysm Screening  Completed    Aspirin/Antiplatelet Therapy  Discontinued       Physical Exam:    Vital Signs  Temp: 98.2 °F (36.8 °C)  Temp src: Temporal  Pulse: 99  SpO2: 97 %  BP: (!) 140/80  BP Location: Left arm  Patient Position: Sitting  Pain Score: 0-No pain  Height and Weight  Height: 5' 9" (175.3 cm)  Weight: 85.9 kg (189 lb 4.2 oz)  BSA (Calculated - sq m): 2.04 sq meters  BMI (Calculated): 28  Weight in (lb) to have BMI = 25: 168.9]    Body mass index is 27.95 kg/m².    Physical Exam   Constitutional: He is oriented to person, place, and time. He appears well-developed.   HENT:   Mouth/Throat: Oropharynx is clear and moist.   Eyes: Pupils are equal, round, and reactive to light. Conjunctivae are normal.   Neck: Normal range of motion. Neck supple.   Cardiovascular: Normal rate, regular rhythm and normal heart sounds.   No murmur heard.  Pulses:       Dorsalis pedis pulses are 0 on the right side, and 0 on the left side.        Posterior tibial pulses are 0 on the right side, and 0 on the left side.   Pulmonary/Chest: Effort normal and breath sounds normal. No respiratory distress. He has no wheezes. He has no rales. He exhibits no tenderness.   Abdominal: Soft. He exhibits no distension and no mass. There is no tenderness. There is no guarding. "   Musculoskeletal: He exhibits no edema or tenderness.   Feet:   Right Foot:   Protective Sensation: 10 sites tested. 10 sites sensed.   Left Foot:   Protective Sensation: 10 sites tested. 10 sites sensed.   Lymphadenopathy:     He has no cervical adenopathy.   Neurological: He is alert and oriented to person, place, and time. He has normal reflexes.   Skin: Skin is warm and dry.   Psychiatric: He has a normal mood and affect. His behavior is normal. Judgment and thought content normal.         Diabetes Management Status    Statin: Not taking  ACE/ARB: Taking    Screening or Prevention Patient's value Goal Complete/Controlled?   HgA1C Testing and Control   Lab Results   Component Value Date    HGBA1C 7.9 (H) 06/26/2019      Annually/Less than 8% Yes   Lipid profile : 06/26/2019 Annually Yes   LDL control Lab Results   Component Value Date    LDLCALC 116.2 06/26/2019    Annually/Less than 100 mg/dl  No   Nephropathy screening Lab Results   Component Value Date    LABMICR 14.0 11/22/2017     Lab Results   Component Value Date    PROTEINUA Trace (A) 03/27/2019    Annually Yes   Blood pressure BP Readings from Last 1 Encounters:   07/02/19 (!) 140/80    Less than 140/90 Yes   Dilated retinal exam : 07/01/2019 Annually Yes   Foot exam   : 07/02/2019 Annually Yes       Assessment:      ICD-10-CM ICD-9-CM   1. 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 Persistent atrial fibrillation I48.1 427.31   3. Mixed hyperlipidemia E78.2 272.2   4. Essential hypertension I10 401.9   5. Aneurysm of ascending aorta I71.2 441.2   6. Carotid artery disease without cerebral infarction I77.9 447.9   7. Claudication in peripheral vascular disease I73.9 443.9         Plan:  Aneurysm of ascending aorta  Pt advised to f/u with dr carter    Carotid artery disease without cerebral infarction  F/u cardiology    Claudication in peripheral  vascular disease  Currently aspymptomatic    He will increase his mealtime insulin to 8 units and he has been instructed to cut back on his portion sizes  Continue current medication  He will stay off of any anti coagulants like aspirin or Plavix  Will call back with his sugar readings and the next week  Orders Placed This Encounter   Procedures    Comprehensive metabolic panel    Microalbumin/creatinine urine ratio    Lipid panel    Hemoglobin A1c    CBC auto differential       Current Outpatient Medications   Medication Sig Dispense Refill    ACCU-CHEK TOMAS PLUS TEST STRP Strp TEST BLOOD SUGAR SIX TIMES DAILY 300 strip 5    ACCU-CHEK MULTICLIX LANCET lancets TEST BLOOD SUGAR THREE TIMES DAILY 200 each 5    CARBOXYMETHYLCELLULOSE SODIUM (REFRESH OPHT) Apply to eye.      fluticasone (FLONASE) 50 mcg/actuation nasal spray 2 sprays (100 mcg total) by Each Nare route once daily. 16 g 6    furosemide (LASIX) 20 MG tablet TAKE ONE TABLET BY MOUTH DAILY 30 tablet 12    guaiFENesin (MUCINEX) 600 mg 12 hr tablet Take 1 tablet (600 mg total) by mouth 2 (two) times daily.      insulin (LANTUS SOLOSTAR U-100 INSULIN) glargine 100 units/mL (3mL) SubQ pen INJECT 16 UNITS SUB-Q AT BEDTIME 15 mL 11    isosorbide mononitrate (IMDUR) 60 MG 24 hr tablet Take 1 tablet (60 mg total) by mouth once daily. 30 tablet 11    levETIRAcetam (KEPPRA) 250 MG Tab Take 1 tablet (250 mg total) by mouth 2 (two) times daily. 60 tablet 11    losartan (COZAAR) 100 MG tablet TAKE ONE TABLET BY MOUTH EVERY DAY 90 tablet 0    metoprolol succinate (TOPROL-XL) 100 MG 24 hr tablet TAKE ONE TABLET BY MOUTH TWICE DAILY 60 tablet 12    nitroGLYCERIN (NITROSTAT) 0.4 MG SL tablet Place 1 tablet (0.4 mg total) under the tongue every 5 (five) minutes as needed for Chest pain. 25 tablet 12    NOVOLOG FLEXPEN U-100 INSULIN 100 unit/mL InPn pen Inject 5 units 3 times a day before meals 15 mL 3    pantoprazole (PROTONIX) 40 MG tablet TAKE ONE TABLET  "BY MOUTH EVERY DAY 30 tablet 5    potassium chloride SA (K-DUR,KLOR-CON) 20 MEQ tablet TAKE ONE TABLET BY MOUTH EVERY DAY 30 tablet 12    saw palmetto 160 MG capsule Take 160 mg by mouth 2 (two) times daily.      SURE COMFORT PEN NEEDLE 32 gauge x 5/32" Ndle USE FOUR TIMES DAILY. 100 each 6     No current facility-administered medications for this visit.        There are no discontinued medications.    Follow up in about 3 months (around 10/2/2019).      Abdulaziz Mccabe MD                "

## 2019-07-19 ENCOUNTER — EXTERNAL HOME HEALTH (OUTPATIENT)
Dept: HOME HEALTH SERVICES | Facility: HOSPITAL | Age: 84
End: 2019-07-19
Payer: MEDICARE

## 2019-07-29 ENCOUNTER — TELEPHONE (OUTPATIENT)
Dept: FAMILY MEDICINE | Facility: CLINIC | Age: 84
End: 2019-07-29

## 2019-07-29 NOTE — TELEPHONE ENCOUNTER
Looking better, please try to be more consistent with eating habits because i see a lot of variation in numbers probably related to the calories in the carb intake at each meal.

## 2019-08-06 ENCOUNTER — CLINICAL SUPPORT (OUTPATIENT)
Dept: CARDIOLOGY | Facility: CLINIC | Age: 84
End: 2019-08-06
Attending: INTERNAL MEDICINE
Payer: MEDICARE

## 2019-08-06 DIAGNOSIS — I48.0 PAF (PAROXYSMAL ATRIAL FIBRILLATION): ICD-10-CM

## 2019-08-06 DIAGNOSIS — Z95.0 CARDIAC PACEMAKER IN SITU: ICD-10-CM

## 2019-08-06 DIAGNOSIS — I49.5 SSS (SICK SINUS SYNDROME): ICD-10-CM

## 2019-08-06 PROCEDURE — 93294 REM INTERROG EVL PM/LDLS PM: CPT | Mod: HCNC,S$GLB,, | Performed by: INTERNAL MEDICINE

## 2019-08-06 PROCEDURE — 93296 REM INTERROG EVL PM/IDS: CPT | Mod: HCNC,S$GLB,, | Performed by: INTERNAL MEDICINE

## 2019-08-06 PROCEDURE — 93294 PACEMAKER REMOTE: ICD-10-PCS | Mod: HCNC,S$GLB,, | Performed by: INTERNAL MEDICINE

## 2019-08-06 PROCEDURE — 93296 PACEMAKER REMOTE: ICD-10-PCS | Mod: HCNC,S$GLB,, | Performed by: INTERNAL MEDICINE

## 2019-08-08 ENCOUNTER — PATIENT MESSAGE (OUTPATIENT)
Dept: FAMILY MEDICINE | Facility: CLINIC | Age: 84
End: 2019-08-08

## 2019-08-09 RX ORDER — INSULIN ASPART 100 [IU]/ML
INJECTION, SOLUTION INTRAVENOUS; SUBCUTANEOUS
Qty: 15 ML | Refills: 3 | Status: SHIPPED | OUTPATIENT
Start: 2019-08-09 | End: 2020-02-28

## 2019-08-12 ENCOUNTER — TELEPHONE (OUTPATIENT)
Dept: RADIOLOGY | Facility: HOSPITAL | Age: 84
End: 2019-08-12

## 2019-08-13 ENCOUNTER — APPOINTMENT (OUTPATIENT)
Dept: RADIOLOGY | Facility: HOSPITAL | Age: 84
End: 2019-08-13
Attending: FAMILY MEDICINE
Payer: MEDICARE

## 2019-08-13 DIAGNOSIS — N28.1 KIDNEY CYSTS: ICD-10-CM

## 2019-08-13 PROCEDURE — 76770 US EXAM ABDO BACK WALL COMP: CPT | Mod: TC,HCNC,PO

## 2019-08-13 PROCEDURE — 76770 US EXAM ABDO BACK WALL COMP: CPT | Mod: 26,HCNC,, | Performed by: RADIOLOGY

## 2019-08-13 PROCEDURE — 76770 US RETROPERITONEAL COMPLETE: ICD-10-PCS | Mod: 26,HCNC,, | Performed by: RADIOLOGY

## 2019-08-18 ENCOUNTER — PATIENT MESSAGE (OUTPATIENT)
Dept: FAMILY MEDICINE | Facility: CLINIC | Age: 84
End: 2019-08-18

## 2019-08-19 ENCOUNTER — TELEPHONE (OUTPATIENT)
Dept: FAMILY MEDICINE | Facility: CLINIC | Age: 84
End: 2019-08-19

## 2019-08-19 DIAGNOSIS — N28.1 COMPLEX RENAL CYST: Primary | ICD-10-CM

## 2019-08-19 NOTE — TELEPHONE ENCOUNTER
They are inquiring about results of US. Do they need a CT? It says in the results that there may be the need for one

## 2019-08-20 ENCOUNTER — HOSPITAL ENCOUNTER (OUTPATIENT)
Dept: RADIOLOGY | Facility: HOSPITAL | Age: 84
Discharge: HOME | End: 2019-08-20
Attending: FAMILY MEDICINE
Payer: MEDICARE

## 2019-08-20 DIAGNOSIS — N28.1 COMPLEX RENAL CYST: ICD-10-CM

## 2019-08-20 DIAGNOSIS — N28.89 RENAL MASS: Primary | ICD-10-CM

## 2019-08-20 PROCEDURE — 25500020 PHARM REV CODE 255: Mod: HCNC | Performed by: FAMILY MEDICINE

## 2019-08-20 PROCEDURE — 74178 CT ABD&PLV WO CNTR FLWD CNTR: CPT | Mod: TC,HCNC

## 2019-08-20 RX ADMIN — IOHEXOL 100 ML: 350 INJECTION, SOLUTION INTRAVENOUS at 04:08

## 2019-08-21 RX ORDER — PANTOPRAZOLE SODIUM 40 MG/1
TABLET, DELAYED RELEASE ORAL
Qty: 30 TABLET | Refills: 5 | OUTPATIENT
Start: 2019-08-21

## 2019-08-22 ENCOUNTER — TELEPHONE (OUTPATIENT)
Dept: FAMILY MEDICINE | Facility: CLINIC | Age: 84
End: 2019-08-22

## 2019-08-22 ENCOUNTER — PATIENT MESSAGE (OUTPATIENT)
Dept: FAMILY MEDICINE | Facility: CLINIC | Age: 84
End: 2019-08-22

## 2019-08-22 DIAGNOSIS — R93.2 ABNORMAL CT OF LIVER: Primary | ICD-10-CM

## 2019-08-25 RX ORDER — LOSARTAN POTASSIUM 100 MG/1
TABLET ORAL
Qty: 90 TABLET | Refills: 0 | Status: SHIPPED | OUTPATIENT
Start: 2019-08-25 | End: 2019-12-02 | Stop reason: SDUPTHER

## 2019-09-04 ENCOUNTER — LAB VISIT (OUTPATIENT)
Dept: LAB | Facility: HOSPITAL | Age: 84
End: 2019-09-04
Attending: PHYSICIAN ASSISTANT
Payer: MEDICARE

## 2019-09-04 ENCOUNTER — OFFICE VISIT (OUTPATIENT)
Dept: GASTROENTEROLOGY | Facility: CLINIC | Age: 84
End: 2019-09-04
Payer: MEDICARE

## 2019-09-04 VITALS
BODY MASS INDEX: 27.75 KG/M2 | DIASTOLIC BLOOD PRESSURE: 60 MMHG | HEIGHT: 69 IN | OXYGEN SATURATION: 99 % | WEIGHT: 187.38 LBS | HEART RATE: 66 BPM | SYSTOLIC BLOOD PRESSURE: 142 MMHG

## 2019-09-04 DIAGNOSIS — K74.60 HEPATIC CIRRHOSIS, UNSPECIFIED HEPATIC CIRRHOSIS TYPE, UNSPECIFIED WHETHER ASCITES PRESENT: ICD-10-CM

## 2019-09-04 DIAGNOSIS — R93.2 ABNORMAL CT SCAN, LIVER: ICD-10-CM

## 2019-09-04 DIAGNOSIS — R93.2 ABNORMAL CT SCAN, LIVER: Primary | ICD-10-CM

## 2019-09-04 PROCEDURE — 86790 VIRUS ANTIBODY NOS: CPT | Mod: HCNC

## 2019-09-04 PROCEDURE — 99999 PR PBB SHADOW E&M-EST. PATIENT-LVL IV: CPT | Mod: PBBFAC,HCNC,, | Performed by: PHYSICIAN ASSISTANT

## 2019-09-04 PROCEDURE — 82105 ALPHA-FETOPROTEIN SERUM: CPT | Mod: HCNC

## 2019-09-04 PROCEDURE — 99999 PR PBB SHADOW E&M-EST. PATIENT-LVL IV: ICD-10-PCS | Mod: PBBFAC,HCNC,, | Performed by: PHYSICIAN ASSISTANT

## 2019-09-04 PROCEDURE — 99214 OFFICE O/P EST MOD 30 MIN: CPT | Mod: HCNC,S$GLB,, | Performed by: PHYSICIAN ASSISTANT

## 2019-09-04 PROCEDURE — 1101F PR PT FALLS ASSESS DOC 0-1 FALLS W/OUT INJ PAST YR: ICD-10-PCS | Mod: HCNC,CPTII,S$GLB, | Performed by: PHYSICIAN ASSISTANT

## 2019-09-04 PROCEDURE — 36415 COLL VENOUS BLD VENIPUNCTURE: CPT | Mod: HCNC

## 2019-09-04 PROCEDURE — 87340 HEPATITIS B SURFACE AG IA: CPT | Mod: HCNC

## 2019-09-04 PROCEDURE — 86706 HEP B SURFACE ANTIBODY: CPT | Mod: HCNC

## 2019-09-04 PROCEDURE — 86803 HEPATITIS C AB TEST: CPT | Mod: HCNC

## 2019-09-04 PROCEDURE — 1101F PT FALLS ASSESS-DOCD LE1/YR: CPT | Mod: HCNC,CPTII,S$GLB, | Performed by: PHYSICIAN ASSISTANT

## 2019-09-04 PROCEDURE — 99214 PR OFFICE/OUTPT VISIT, EST, LEVL IV, 30-39 MIN: ICD-10-PCS | Mod: HCNC,S$GLB,, | Performed by: PHYSICIAN ASSISTANT

## 2019-09-04 PROCEDURE — 86704 HEP B CORE ANTIBODY TOTAL: CPT | Mod: HCNC

## 2019-09-04 NOTE — PROGRESS NOTES
GI OUTPATIENT NOTE    PCP: Abdulaziz Mccabe [id number] [facility] [address] / [facility] [id number]    Chief Complaint   Patient presents with    Liver     referral from Dr Mccabe       HISTORY OF PRESENT ILLNESS:  The patient has a history of dysphagia, irregular bowel habits and AVM. He has been referred for abnormal CT scan of the liver. He had it to look at a renal cyst. He noted nodular contour of the liver which can be seen with cirrhosis. He has no prior diagnosis of liver disease. He said his nephew had hepatitis but no known family history of liver disease. He has never had a tattoo or blood transfusion. He denies a history of drug use. He was in the  and in [place]. Echo in March showed pulmonary hypertension. Today he denies GI complaints.     MELD-Na score: 8 at 3/29/2019  5:09 AM  MELD score: 8 at 3/29/2019  5:09 AM  Calculated from:  Serum Creatinine: 0.8 mg/dL (Rounded to 1 mg/dL) at 3/29/2019  5:09 AM  Serum Sodium: 137 mmol/L at 3/29/2019  5:09 AM  Total Bilirubin: 0.7 mg/dL (Rounded to 1 mg/dL) at 3/27/2019  3:45 AM  INR(ratio): 1.2 at 3/28/2019  4:42 AM  Age: 85 years    Past Medical History:   Diagnosis Date    A-fib     Anemia     AP (angina pectoris) 1/23/2014    Carotid artery disease without cerebral infarction 8/22/2014    Cataract     Coronary artery disease     GERD (gastroesophageal reflux disease) 7/11/2013    Hemorrhagic cerebrovascular accident (CVA) 4/26/2018    Hyperlipidemia     Hypertension     Hypertensive heart disease with heart failure 3/12/2019    Intracranial hemorrhage     Lumbosacral spondylosis 12/24/2014    Pacemaker 1/23/2014    Paroxysmal atrial fibrillation 1/23/2014    Peripheral vascular disease 8/22/2014    Pneumonia of right lung due to infectious organism 3/27/2019    Seizure, late effect of stroke     Stroke     Type 2 diabetes mellitus with ophthalmic manifestations        Past Surgical History:   Procedure Laterality Date    ANGIOPLASTY       ATRIAL ABLATION SURGERY N/A     CATARACT EXTRACTION Bilateral     [facility]    CATARACT EXTRACTION W/ INTRAOCULAR LENS IMPLANT Right     CATARACT EXTRACTION W/ INTRAOCULAR LENS IMPLANT Left     COLONOSCOPY with biopsies N/A 10/16/2018    [facility]    CORONARY ARTERY BYPASS GRAFT  07/2010    x5    ESOPHAGOGASTRODUODENOSCOPY (EGD) N/A 2018    Performed by Betty Leonardo [facility] at [facility] ENDO    EYE SURGERY      pace maker surgrey  10/2010    reposition in  (approx)    VEIN BYPASS SURGERY         Social History     Socioeconomic History    Marital status:      Spouse name: Not on file    Number of children: Not on file    Years of education: Not on file    Highest education level: Not on file   Occupational History    Not on file   Social Needs    Financial resource strain: Not on file    Food insecurity:     Worry: Not on file     Inability: Not on file    Transportation needs:     Medical: Not on file     Non-medical: Not on file   Tobacco Use    Smoking status: Former Smoker     Packs/day: 2.00     Years: 13.00     Pack years: 26.00     Types: Cigarettes     Start date: 1954     Last attempt to quit: 1967     Years since quittin.2    Smokeless tobacco: Never Used   Substance and Sexual Activity    Alcohol use: No    Drug use: No    Sexual activity: Never     Partners: Female     Birth control/protection: None   Lifestyle    Physical activity:     Days per week: Not on file     Minutes per session: Not on file    Stress: Not on file   Relationships    Social connections:     Talks on phone: Not on file     Gets together: Not on file     Attends Baptism service: Not on file     Active member of club or organization: Not on file     Attends meetings of clubs or organizations: Not on file     Relationship status: Not on file   Other Topics Concern    Not on file   Social History Narrative    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       Family History   Problem Relation Age of Onset    Arthritis Mother     Diabetes Mother     Kidney disease Mother     Heart disease Mother     Arthritis Father     Diabetes Father     Diabetes Sister     Cancer Sister         breast    Heart disease Sister     Diabetes Brother     Kidney disease Brother     Heart disease Brother     Cancer Daughter         breast    Diabetes Daughter     Miscarriages / Stillbirths Daughter     Fibromyalgia Daughter     Diabetes Son     Diabetes Son     Alcohol abuse Paternal Uncle     Stroke Neg Hx        MEDS/ALLERGY:  The patient's medications and allergies were reviewed and updated in the EPIC chart.     Review of Systems   As per HPI.     Physical Exam   Constitutional: He is oriented to person, place, and time. He appears well-developed and well-nourished. No distress.   HENT:   Head: Normocephalic and atraumatic.   Eyes: EOM are normal.   Neck: Normal range of motion.   Cardiovascular: Normal rate and regular rhythm.   Pulmonary/Chest: Effort normal and breath sounds normal. No respiratory distress.   Abdominal: Soft. Bowel sounds are normal. He exhibits no distension. There is no tenderness.   Musculoskeletal: He exhibits no edema.   Neurological: He is alert and oriented to person, place, and time. No cranial nerve deficit.   Skin: Skin is warm and dry.   Psychiatric: His behavior is normal.       LABS/IMAGING:   Pertinent results were reviewed.     ASSESSMENT:  1. Abnormal CT scan, liver    2. Hepatic cirrhosis, unspecified hepatic cirrhosis type, unspecified whether ascites present        PLAN:  CT suggests cirrhosis. Check labs including INR for MELD calculation.     ORDER SUMMARY:  Orders Placed This Encounter   Procedures    AFP tumor marker    Hepatitis B surface antibody    Hepatitis B surface antigen    Hepatitis C antibody    Hepatitis A antibody, IgG    Hepatitis B core antibody, total         Follow up in about 2 weeks (around 9/18/2019).     Thank you for the opportunity to participate in the care of this patient. This consult was designated to me by my supervising physician. He fully participated in the development of the assessment and recommendations.    Maxwell Altman PA-C.

## 2019-09-04 NOTE — LETTER
September 4, 2019      Abdulaziz Mccabe MD  [id number] [address] [address]           [facility]  [address] [place] [id number]  Phone: [phone]  Fax: [phone]          Patient: Anthony Blair   MR Number: [id number]   YOB: 1933   Date of Visit: 9/4/2019       Dear Dr. Abdulaziz Mccabe:    Thank you for referring Anthony Blair to me for evaluation. Attached you will find relevant portions of my assessment and plan of care.    If you have questions, please do not hesitate to call me. I look forward to following Anthony Blair along with you.    Sincerely,    Maxwell Altman PA-C    Enclosure  CC:  No Recipients    If you would like to receive this communication electronically, please contact [email or web address] or [phone] to request more information on [employer] Link access.    For providers and/or their staff who would like to refer a patient to [facility], please contact us through our one-stop-shop provider referral line, [facility] , at [phone].    If you feel you have received this communication in error or would no longer like to receive these types of communications, please e-mail [email or web address]

## 2019-09-05 LAB
AFP SERPL-MCNC: 3.6 NG/ML (ref 0–8.4)
HBV CORE AB SERPL QL IA: NEGATIVE
HBV SURFACE AB SER-ACNC: NEGATIVE M[IU]/ML
HBV SURFACE AG SERPL QL IA: NEGATIVE
HCV AB SERPL QL IA: NEGATIVE
HEPATITIS A ANTIBODY, IGG: POSITIVE

## 2019-09-14 RX ORDER — PANTOPRAZOLE SODIUM 40 MG/1
TABLET, DELAYED RELEASE ORAL
Qty: 30 TABLET | Refills: 5 | Status: SHIPPED | OUTPATIENT
Start: 2019-09-14 | End: 2020-02-28

## 2019-09-16 RX ORDER — METOPROLOL SUCCINATE 100 MG/1
TABLET, EXTENDED RELEASE ORAL
Qty: 60 TABLET | Refills: 12 | Status: SHIPPED | OUTPATIENT
Start: 2019-09-16 | End: 2020-09-25 | Stop reason: SDUPTHER

## 2019-09-20 ENCOUNTER — OFFICE VISIT (OUTPATIENT)
Dept: GASTROENTEROLOGY | Facility: CLINIC | Age: 84
End: 2019-09-20
Payer: MEDICARE

## 2019-09-20 VITALS
SYSTOLIC BLOOD PRESSURE: 122 MMHG | OXYGEN SATURATION: 99 % | HEART RATE: 84 BPM | HEIGHT: 69 IN | WEIGHT: 185.44 LBS | DIASTOLIC BLOOD PRESSURE: 60 MMHG | BODY MASS INDEX: 27.46 KG/M2

## 2019-09-20 DIAGNOSIS — K74.60 CIRRHOSIS OF LIVER WITHOUT ASCITES, UNSPECIFIED HEPATIC CIRRHOSIS TYPE: Primary | ICD-10-CM

## 2019-09-20 DIAGNOSIS — R19.8 IRREGULAR BOWEL HABITS: ICD-10-CM

## 2019-09-20 DIAGNOSIS — K31.819 AVM (ARTERIOVENOUS MALFORMATION) OF STOMACH, ACQUIRED: ICD-10-CM

## 2019-09-20 PROCEDURE — 99999 PR PBB SHADOW E&M-EST. PATIENT-LVL V: CPT | Mod: PBBFAC,HCNC,, | Performed by: PHYSICIAN ASSISTANT

## 2019-09-20 PROCEDURE — 1101F PR PT FALLS ASSESS DOC 0-1 FALLS W/OUT INJ PAST YR: ICD-10-PCS | Mod: HCNC,CPTII,S$GLB, | Performed by: PHYSICIAN ASSISTANT

## 2019-09-20 PROCEDURE — 99499 RISK ADDL DX/OHS AUDIT: ICD-10-PCS | Mod: HCNC,S$GLB,, | Performed by: PHYSICIAN ASSISTANT

## 2019-09-20 PROCEDURE — 99999 PR PBB SHADOW E&M-EST. PATIENT-LVL V: ICD-10-PCS | Mod: PBBFAC,HCNC,, | Performed by: PHYSICIAN ASSISTANT

## 2019-09-20 PROCEDURE — 1101F PT FALLS ASSESS-DOCD LE1/YR: CPT | Mod: HCNC,CPTII,S$GLB, | Performed by: PHYSICIAN ASSISTANT

## 2019-09-20 PROCEDURE — 99213 PR OFFICE/OUTPT VISIT, EST, LEVL III, 20-29 MIN: ICD-10-PCS | Mod: HCNC,S$GLB,, | Performed by: PHYSICIAN ASSISTANT

## 2019-09-20 PROCEDURE — 99213 OFFICE O/P EST LOW 20 MIN: CPT | Mod: HCNC,S$GLB,, | Performed by: PHYSICIAN ASSISTANT

## 2019-09-20 PROCEDURE — 99499 UNLISTED E&M SERVICE: CPT | Mod: HCNC,S$GLB,, | Performed by: PHYSICIAN ASSISTANT

## 2019-09-20 NOTE — PROGRESS NOTES
Subjective:      Patient ID: Anthony Blair is a 86 y.o. male.    Chief Complaint: Follow-up (rev labs)    HPI  The patient has a history of dysphagia, irregular bowel habits and AVM. He is here for a follow up visit. He has no complaints today. Last visit, he was seen for abnormal CT of the liver which showed nodular contour of the liver suspicious for cirrhosis. Recommendations included viral hepatitis serologies and AFP. His hepatitis serologies were normal and AFP was in normal range. He has a history of HF.     Review of Systems  As per HPI.     Objective:     Physical Exam   Constitutional: He is oriented to person, place, and time. He appears well-developed and well-nourished. No distress.   HENT:   Head: Normocephalic and atraumatic.   Eyes: EOM are normal.   Cardiovascular: Normal rate and regular rhythm.   Pulmonary/Chest: Effort normal and breath sounds normal. No respiratory distress. He has no wheezes.   Abdominal: Soft. Bowel sounds are normal. He exhibits no distension. There is no tenderness.   Neurological: He is alert and oriented to person, place, and time. No cranial nerve deficit.   Skin: He is not diaphoretic.   Psychiatric: His behavior is normal.     MELD-Na score: 8 at 3/29/2019  5:09 AM  MELD score: 8 at 3/29/2019  5:09 AM  Calculated from:  Serum Creatinine: 0.8 mg/dL (Rounded to 1 mg/dL) at 3/29/2019  5:09 AM  Serum Sodium: 137 mmol/L at 3/29/2019  5:09 AM  Total Bilirubin: 0.7 mg/dL (Rounded to 1 mg/dL) at 3/27/2019  3:45 AM  INR(ratio): 1.2 at 3/28/2019  4:42 AM  Age: 85 years      Assessment:     1. Cirrhosis of liver without ascites, unspecified hepatic cirrhosis type    2. AVM (arteriovenous malformation) of stomach, acquired    3. Irregular bowel habits        Plan:     Discussed results including natural history of cirrhosis.   Recommend US and AFP in six months for HCC surveillance.      Orders Placed This Encounter   Procedures    US Abdomen Limited    AFP tumor marker        Follow up in about 6 months (around 3/20/2020).    Thank you for the opportunity to participate in the care of this patient.   Maxwell Altman PA-C.

## 2019-09-24 RX ORDER — PEN NEEDLE, DIABETIC 32GX 5/32"
NEEDLE, DISPOSABLE MISCELLANEOUS
Qty: 200 EACH | Refills: 6 | Status: SHIPPED | OUTPATIENT
Start: 2019-09-24 | End: 2022-03-01

## 2019-10-02 ENCOUNTER — LAB VISIT (OUTPATIENT)
Dept: LAB | Facility: HOSPITAL | Age: 84
End: 2019-10-02
Attending: FAMILY MEDICINE
Payer: MEDICARE

## 2019-10-02 DIAGNOSIS — E78.2 MIXED HYPERLIPIDEMIA: Chronic | ICD-10-CM

## 2019-10-02 DIAGNOSIS — I48.19 PERSISTENT ATRIAL FIBRILLATION: ICD-10-CM

## 2019-10-02 DIAGNOSIS — I10 ESSENTIAL HYPERTENSION: ICD-10-CM

## 2019-10-02 LAB
BASOPHILS # BLD AUTO: 0.03 K/UL (ref 0–0.2)
BASOPHILS NFR BLD: 0.6 % (ref 0–1.9)
DIFFERENTIAL METHOD: ABNORMAL
EOSINOPHIL # BLD AUTO: 0.2 K/UL (ref 0–0.5)
EOSINOPHIL NFR BLD: 3.6 % (ref 0–8)
ERYTHROCYTE [DISTWIDTH] IN BLOOD BY AUTOMATED COUNT: 13.7 % (ref 11.5–14.5)
HCT VFR BLD AUTO: 42.3 % (ref 40–54)
HGB BLD-MCNC: 13.4 G/DL (ref 14–18)
IMM GRANULOCYTES # BLD AUTO: 0.01 K/UL (ref 0–0.04)
IMM GRANULOCYTES NFR BLD AUTO: 0.2 % (ref 0–0.5)
LYMPHOCYTES # BLD AUTO: 1.8 K/UL (ref 1–4.8)
LYMPHOCYTES NFR BLD: 37.3 % (ref 18–48)
MCH RBC QN AUTO: 30.2 PG (ref 27–31)
MCHC RBC AUTO-ENTMCNC: 31.7 G/DL (ref 32–36)
MCV RBC AUTO: 96 FL (ref 82–98)
MONOCYTES # BLD AUTO: 0.6 K/UL (ref 0.3–1)
MONOCYTES NFR BLD: 11.5 % (ref 4–15)
NEUTROPHILS # BLD AUTO: 2.2 K/UL (ref 1.8–7.7)
NEUTROPHILS NFR BLD: 46.8 % (ref 38–73)
NRBC BLD-RTO: 0 /100 WBC
PLATELET # BLD AUTO: 156 K/UL (ref 150–350)
PMV BLD AUTO: 11.7 FL (ref 9.2–12.9)
RBC # BLD AUTO: 4.43 M/UL (ref 4.6–6.2)
WBC # BLD AUTO: 4.77 K/UL (ref 3.9–12.7)

## 2019-10-02 PROCEDURE — 80061 LIPID PANEL: CPT | Mod: HCNC

## 2019-10-02 PROCEDURE — 83036 HEMOGLOBIN GLYCOSYLATED A1C: CPT | Mod: HCNC

## 2019-10-02 PROCEDURE — 85025 COMPLETE CBC W/AUTO DIFF WBC: CPT | Mod: HCNC

## 2019-10-02 PROCEDURE — 36415 COLL VENOUS BLD VENIPUNCTURE: CPT | Mod: HCNC,PO

## 2019-10-02 PROCEDURE — 80053 COMPREHEN METABOLIC PANEL: CPT | Mod: HCNC

## 2019-10-03 LAB
ALBUMIN SERPL BCP-MCNC: 4.2 G/DL (ref 3.5–5.2)
ALP SERPL-CCNC: 113 U/L (ref 55–135)
ALT SERPL W/O P-5'-P-CCNC: 19 U/L (ref 10–44)
ANION GAP SERPL CALC-SCNC: 6 MMOL/L (ref 8–16)
AST SERPL-CCNC: 29 U/L (ref 10–40)
BILIRUB SERPL-MCNC: 1 MG/DL (ref 0.1–1)
BUN SERPL-MCNC: 12 MG/DL (ref 8–23)
CALCIUM SERPL-MCNC: 10.7 MG/DL (ref 8.7–10.5)
CHLORIDE SERPL-SCNC: 101 MMOL/L (ref 95–110)
CHOLEST SERPL-MCNC: 175 MG/DL (ref 120–199)
CHOLEST/HDLC SERPL: 3.8 {RATIO} (ref 2–5)
CO2 SERPL-SCNC: 31 MMOL/L (ref 23–29)
CREAT SERPL-MCNC: 0.8 MG/DL (ref 0.5–1.4)
EST. GFR  (AFRICAN AMERICAN): >60 ML/MIN/1.73 M^2
EST. GFR  (NON AFRICAN AMERICAN): >60 ML/MIN/1.73 M^2
ESTIMATED AVG GLUCOSE: 148 MG/DL (ref 68–131)
GLUCOSE SERPL-MCNC: 120 MG/DL (ref 70–110)
HBA1C MFR BLD HPLC: 6.8 % (ref 4–5.6)
HDLC SERPL-MCNC: 46 MG/DL (ref 40–75)
HDLC SERPL: 26.3 % (ref 20–50)
LDLC SERPL CALC-MCNC: 104.6 MG/DL (ref 63–159)
NONHDLC SERPL-MCNC: 129 MG/DL
POTASSIUM SERPL-SCNC: 4.6 MMOL/L (ref 3.5–5.1)
PROT SERPL-MCNC: 7.5 G/DL (ref 6–8.4)
SODIUM SERPL-SCNC: 138 MMOL/L (ref 136–145)
TRIGL SERPL-MCNC: 122 MG/DL (ref 30–150)

## 2019-10-08 ENCOUNTER — OFFICE VISIT (OUTPATIENT)
Dept: FAMILY MEDICINE | Facility: CLINIC | Age: 84
End: 2019-10-08
Payer: MEDICARE

## 2019-10-08 VITALS
TEMPERATURE: 98 F | DIASTOLIC BLOOD PRESSURE: 64 MMHG | HEART RATE: 72 BPM | WEIGHT: 187.63 LBS | OXYGEN SATURATION: 96 % | SYSTOLIC BLOOD PRESSURE: 122 MMHG | HEIGHT: 69 IN | BODY MASS INDEX: 27.79 KG/M2

## 2019-10-08 DIAGNOSIS — I25.810 CORONARY ARTERY DISEASE INVOLVING CORONARY BYPASS GRAFT OF NATIVE HEART WITHOUT ANGINA PECTORIS: ICD-10-CM

## 2019-10-08 DIAGNOSIS — E78.2 MIXED HYPERLIPIDEMIA: Chronic | ICD-10-CM

## 2019-10-08 DIAGNOSIS — E08.51 DIABETES MELLITUS DUE TO UNDERLYING CONDITION WITH DIABETIC PERIPHERAL ANGIOPATHY WITHOUT GANGRENE, WITHOUT LONG-TERM CURRENT USE OF INSULIN: ICD-10-CM

## 2019-10-08 DIAGNOSIS — Z00.00 WELL ADULT EXAM: Primary | ICD-10-CM

## 2019-10-08 PROCEDURE — 99999 PR PBB SHADOW E&M-EST. PATIENT-LVL III: ICD-10-PCS | Mod: PBBFAC,HCNC,, | Performed by: FAMILY MEDICINE

## 2019-10-08 PROCEDURE — 99397 PER PM REEVAL EST PAT 65+ YR: CPT | Mod: HCNC,S$GLB,, | Performed by: FAMILY MEDICINE

## 2019-10-08 PROCEDURE — 99397 PR PREVENTIVE VISIT,EST,65 & OVER: ICD-10-PCS | Mod: HCNC,S$GLB,, | Performed by: FAMILY MEDICINE

## 2019-10-08 PROCEDURE — 99999 PR PBB SHADOW E&M-EST. PATIENT-LVL III: CPT | Mod: PBBFAC,HCNC,, | Performed by: FAMILY MEDICINE

## 2019-10-08 NOTE — PROGRESS NOTES
Chief Complaint:    Chief Complaint   Patient presents with    Follow-up       History of Present Illness:  Presents today for follow-up of chronic medical problems,  His A1c has come down he is getting  soon.  Blood pressure is stable  Also has carotid stenosis following with Cardiology with serial imaging has not had carotid Dopplers in a while he has been asymptomatic  Off of aspirin due to hemorrhagic stroke  He does not want to increase his nighttime insulin because it causes hypoglycemia, he is taking about 5 units of short-acting insulin with mealtime his postprandial sugars about 200.      ROS:  Review of Systems   Constitutional: Negative for activity change, chills, fatigue, fever and unexpected weight change.   HENT: Negative for congestion, ear discharge, ear pain, hearing loss, postnasal drip and rhinorrhea.    Eyes: Negative for pain and visual disturbance.   Respiratory: Negative for cough, chest tightness and shortness of breath.    Cardiovascular: Negative for chest pain and palpitations.   Gastrointestinal: Negative for abdominal pain, diarrhea and vomiting.   Endocrine: Negative for heat intolerance.   Genitourinary: Negative for dysuria, flank pain, frequency and hematuria.   Musculoskeletal: Negative for back pain, gait problem and neck pain.   Skin: Negative for color change and rash.   Neurological: Negative for dizziness, tremors, seizures, numbness and headaches.   Psychiatric/Behavioral: Negative for agitation, hallucinations, self-injury, sleep disturbance and suicidal ideas. The patient is not nervous/anxious.        Past Medical History:   Diagnosis Date    A-fib     Anemia     AP (angina pectoris) 1/23/2014    Carotid artery disease without cerebral infarction 8/22/2014    Cataract     Coronary artery disease     GERD (gastroesophageal reflux disease) 7/11/2013    Hemorrhagic cerebrovascular accident (CVA) 4/26/2018    Hyperlipidemia     Hypertension     Hypertensive  heart disease with heart failure 3/12/2019    Intracranial hemorrhage     Lumbosacral spondylosis 2014    Pacemaker 2014    Paroxysmal atrial fibrillation 2014    Peripheral vascular disease 2014    Pneumonia of right lung due to infectious organism 3/27/2019    Seizure, late effect of stroke     Stroke     Type 2 diabetes mellitus with ophthalmic manifestations        Social History:  Social History     Socioeconomic History    Marital status:      Spouse name: Not on file    Number of children: Not on file    Years of education: Not on file    Highest education level: Not on file   Occupational History    Not on file   Social Needs    Financial resource strain: Not on file    Food insecurity:     Worry: Not on file     Inability: Not on file    Transportation needs:     Medical: Not on file     Non-medical: Not on file   Tobacco Use    Smoking status: Former Smoker     Packs/day: 2.00     Years: 13.00     Pack years: 26.00     Types: Cigarettes     Start date: 1954     Last attempt to quit: 1967     Years since quittin.3    Smokeless tobacco: Never Used   Substance and Sexual Activity    Alcohol use: No    Drug use: No    Sexual activity: Never     Partners: Female     Birth control/protection: None   Lifestyle    Physical activity:     Days per week: Not on file     Minutes per session: Not on file    Stress: Not on file   Relationships    Social connections:     Talks on phone: Not on file     Gets together: Not on file     Attends Mor[employer] service: Not on file     Active member of club or organization: Not on file     Attends meetings of clubs or organizations: Not on file     Relationship status: Not on file   Other Topics Concern    Not on file   Social History Narrative    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       Family History:   family history includes Alcohol abuse in his paternal uncle; Arthritis in his  "father and mother; Cancer in his daughter and sister; Diabetes in his brother, daughter, father, mother, sister, son, and son; Fibromyalgia in his daughter; Heart disease in his brother, mother, and sister; Kidney disease in his brother and mother; Miscarriages / Stillbirths in his daughter.    Health Maintenance   Topic Date Due    Hemoglobin A1c  04/02/2020    Eye Exam  07/01/2020    Foot Exam  07/02/2020    Lipid Panel  10/02/2020    TETANUS VACCINE  01/23/2024    Pneumococcal Vaccine (65+ Low/Medium Risk)  Completed    Aspirin/Antiplatelet Therapy  Discontinued       Physical Exam:    Vital Signs  Temp: 97.9 °F (36.6 °C)  Temp src: Temporal  Pulse: 72  SpO2: 96 %  BP: 122/64  BP Location: Left arm  Patient Position: Sitting  Pain Score: 0-No pain  Height and Weight  Height: 5' 9" (175.3 cm)  Weight: 85.1 kg (187 lb 9.8 oz)  BSA (Calculated - sq m): 2.04 sq meters  BMI (Calculated): 27.8  Weight in (lb) to have BMI = 25: 168.9]    Body mass index is 27.71 kg/m².    Physical Exam   Constitutional: He is oriented to person, place, and time. He appears well-developed.   HENT:   Mouth/Throat: Oropharynx is clear and moist.   Eyes: Pupils are equal, round, and reactive to light. Conjunctivae are normal.   Neck: Normal range of motion. Neck supple.   Cardiovascular: Normal rate, regular rhythm and normal heart sounds.   No murmur heard.  Pulses:       Dorsalis pedis pulses are 0 on the right side, and 0 on the left side.        Posterior tibial pulses are 0 on the right side, and 0 on the left side.   Pulmonary/Chest: Effort normal and breath sounds normal. No respiratory distress. He has no wheezes. He has no rales. He exhibits no tenderness.   Abdominal: Soft. He exhibits no distension and no mass. There is no tenderness. There is no guarding.   Musculoskeletal: He exhibits no edema or tenderness.   Feet:   Right Foot:   Protective Sensation: 10 sites tested. 10 sites sensed.   Left Foot:   Protective " Sensation: 10 sites tested. 10 sites sensed.   Lymphadenopathy:     He has no cervical adenopathy.   Neurological: He is alert and oriented to person, place, and time. He has normal reflexes.   Skin: Skin is warm and dry.   Psychiatric: He has a normal mood and affect. His behavior is normal. Judgment and thought content normal.         Diabetes Management Status    Statin: Not taking  ACE/ARB: Taking    Screening or Prevention Patient's value Goal Complete/Controlled?   HgA1C Testing and Control   Lab Results   Component Value Date    HGBA1C 6.8 (H) 10/02/2019      Annually/Less than 8% Yes   Lipid profile : 10/02/2019 Annually Yes   LDL control Lab Results   Component Value Date    LDLCALC 104.6 10/02/2019    Annually/Less than 100 mg/dl  No   Nephropathy screening Lab Results   Component Value Date    LABMICR 14.0 10/02/2019     Lab Results   Component Value Date    PROTEINUA Trace (A) 03/27/2019    Annually Yes   Blood pressure BP Readings from Last 1 Encounters:   10/08/19 122/64    Less than 140/90 Yes   Dilated retinal exam : 07/01/2019 Annually Yes   Foot exam   : 07/02/2019 Annually Yes       Assessment:      ICD-10-CM ICD-9-CM   1. Well adult exam Z00.00 V70.0   2. Diabetes mellitus due to underlying condition with diabetic peripheral angiopathy without gangrene, without long-term current use of insulin E08.51 249.70     443.81   3. Coronary artery disease involving coronary bypass graft of native heart without angina pectoris I25.810 414.05   4. Mixed hyperlipidemia E78.2 272.2         Plan:       Doing well continue current meds and plan  Follow-up 3 months  He will get his flu shot after his wedding  Orders Placed This Encounter   Procedures    Hemoglobin A1c    Comprehensive metabolic panel    CBC auto differential    Microalbumin/creatinine urine ratio    Lipid panel       Current Outpatient Medications   Medication Sig Dispense Refill    ACCU-CHEK TOMAS PLUS TEST STRP Strp TEST BLOOD SUGAR SIX  "TIMES DAILY 300 strip 5    ACCU-CHEK MULTICLIX LANCET lancets TEST BLOOD SUGAR THREE TIMES DAILY 200 each 5    CARBOXYMETHYLCELLULOSE SODIUM (REFRESH OPHT) Apply to eye.      fluticasone (FLONASE) 50 mcg/actuation nasal spray 2 sprays (100 mcg total) by Each Nare route once daily. 16 g 6    furosemide (LASIX) 20 MG tablet TAKE ONE TABLET BY MOUTH DAILY 30 tablet 12    guaiFENesin (MUCINEX) 600 mg 12 hr tablet Take 1 tablet (600 mg total) by mouth 2 (two) times daily.      insulin (LANTUS SOLOSTAR U-100 INSULIN) glargine 100 units/mL (3mL) SubQ pen INJECT 16 UNITS SUB-Q AT BEDTIME 15 mL 11    insulin aspart U-100 (NOVOLOG FLEXPEN U-100 INSULIN) 100 unit/mL (3 mL) InPn pen 10 units at breakfast, 8 units at lunch, 10 units at dinner. 15 mL 3    isosorbide mononitrate (IMDUR) 60 MG 24 hr tablet Take 1 tablet (60 mg total) by mouth once daily. 30 tablet 11    levETIRAcetam (KEPPRA) 250 MG Tab Take 1 tablet (250 mg total) by mouth 2 (two) times daily. 60 tablet 11    losartan (COZAAR) 100 MG tablet TAKE ONE TABLET BY MOUTH EVERY DAY 90 tablet 0    metoprolol succinate (TOPROL-XL) 100 MG 24 hr tablet TAKE ONE TABLET BY MOUTH TWICE DAILY 60 tablet 12    nitroGLYCERIN (NITROSTAT) 0.4 MG SL tablet Place 1 tablet (0.4 mg total) under the tongue every 5 (five) minutes as needed for Chest pain. 25 tablet 12    pantoprazole (PROTONIX) 40 MG tablet TAKE ONE TABLET BY MOUTH EVERY DAY 30 tablet 5    potassium chloride SA (K-DUR,KLOR-CON) 20 MEQ tablet TAKE ONE TABLET BY MOUTH EVERY DAY 30 tablet 12    saw palmetto 160 MG capsule Take 160 mg by mouth 2 (two) times daily.      SURE COMFORT PEN NEEDLE 32 gauge x 5/32" Ndle USE FOUR TIMES DAILY. 200 each 6     No current facility-administered medications for this visit.        There are no discontinued medications.    Follow up in about 3 months (around 1/8/2020).      Abdulaziz Mccabe MD                "

## 2019-10-12 ENCOUNTER — HOSPITAL ENCOUNTER (OUTPATIENT)
Dept: RADIOLOGY | Facility: HOSPITAL | Age: 84
Discharge: HOME | End: 2019-10-12
Attending: PHYSICIAN ASSISTANT
Payer: MEDICARE

## 2019-10-12 ENCOUNTER — OFFICE VISIT (OUTPATIENT)
Dept: URGENT CARE | Facility: CLINIC | Age: 84
End: 2019-10-12
Payer: MEDICARE

## 2019-10-12 VITALS
SYSTOLIC BLOOD PRESSURE: 108 MMHG | HEART RATE: 76 BPM | HEIGHT: 69 IN | WEIGHT: 182.44 LBS | BODY MASS INDEX: 27.02 KG/M2 | OXYGEN SATURATION: 95 % | TEMPERATURE: 99 F | RESPIRATION RATE: 14 BRPM | DIASTOLIC BLOOD PRESSURE: 60 MMHG

## 2019-10-12 DIAGNOSIS — J18.9 PNEUMONIA SYMPTOMS: Primary | ICD-10-CM

## 2019-10-12 DIAGNOSIS — E11.9 DIABETES MELLITUS TYPE 2 WITHOUT RETINOPATHY: Chronic | ICD-10-CM

## 2019-10-12 DIAGNOSIS — J18.9 PNEUMONIA SYMPTOMS: ICD-10-CM

## 2019-10-12 PROCEDURE — 96372 PR INJECTION,THERAP/PROPH/DIAG2ST, IM OR SUBCUT: ICD-10-PCS | Mod: HCNC,S$GLB,, | Performed by: PHYSICIAN ASSISTANT

## 2019-10-12 PROCEDURE — 96372 THER/PROPH/DIAG INJ SC/IM: CPT | Mod: HCNC,S$GLB,, | Performed by: PHYSICIAN ASSISTANT

## 2019-10-12 PROCEDURE — 71046 XR CHEST PA AND LATERAL: ICD-10-PCS | Mod: 26,HCNC,, | Performed by: RADIOLOGY

## 2019-10-12 PROCEDURE — 99214 PR OFFICE/OUTPT VISIT, EST, LEVL IV, 30-39 MIN: ICD-10-PCS | Mod: HCNC,25,S$GLB, | Performed by: PHYSICIAN ASSISTANT

## 2019-10-12 PROCEDURE — 71046 X-RAY EXAM CHEST 2 VIEWS: CPT | Mod: 26,HCNC,, | Performed by: RADIOLOGY

## 2019-10-12 PROCEDURE — 1101F PT FALLS ASSESS-DOCD LE1/YR: CPT | Mod: HCNC,CPTII,S$GLB, | Performed by: PHYSICIAN ASSISTANT

## 2019-10-12 PROCEDURE — 99214 OFFICE O/P EST MOD 30 MIN: CPT | Mod: HCNC,25,S$GLB, | Performed by: PHYSICIAN ASSISTANT

## 2019-10-12 PROCEDURE — 99999 PR PBB SHADOW E&M-EST. PATIENT-LVL V: CPT | Mod: PBBFAC,HCNC,, | Performed by: PHYSICIAN ASSISTANT

## 2019-10-12 PROCEDURE — 1101F PR PT FALLS ASSESS DOC 0-1 FALLS W/OUT INJ PAST YR: ICD-10-PCS | Mod: HCNC,CPTII,S$GLB, | Performed by: PHYSICIAN ASSISTANT

## 2019-10-12 PROCEDURE — 71046 X-RAY EXAM CHEST 2 VIEWS: CPT | Mod: TC,HCNC,PO

## 2019-10-12 PROCEDURE — 99999 PR PBB SHADOW E&M-EST. PATIENT-LVL V: ICD-10-PCS | Mod: PBBFAC,HCNC,, | Performed by: PHYSICIAN ASSISTANT

## 2019-10-12 RX ORDER — CEFTRIAXONE 1 G/1
1 INJECTION, POWDER, FOR SOLUTION INTRAMUSCULAR; INTRAVENOUS
Status: COMPLETED | OUTPATIENT
Start: 2019-10-12 | End: 2019-10-12

## 2019-10-12 RX ORDER — LEVOFLOXACIN 500 MG/1
500 TABLET, FILM COATED ORAL DAILY
Qty: 10 TABLET | Refills: 0 | Status: SHIPPED | OUTPATIENT
Start: 2019-10-12 | End: 2019-10-22

## 2019-10-12 RX ADMIN — CEFTRIAXONE 1 G: 1 INJECTION, POWDER, FOR SOLUTION INTRAMUSCULAR; INTRAVENOUS at 01:10

## 2019-10-12 NOTE — PROGRESS NOTES
"Subjective:       Patient ID: Anthony Blair is a 86 y.o. male.    Vitals:  height is 5' 9" (1.753 m) and weight is 82.7 kg (182 lb 6.9 oz). His tympanic temperature is 99.2 °F (37.3 °C). His blood pressure is 108/60 and his pulse is 76. His respiration is 14 and oxygen saturation is 95%.     Chief Complaint: Nasal Congestion (started this am per patient ) and Sore Throat    Cough   This is a new problem. The current episode started today. The problem has been unchanged. The problem occurs every few minutes. The cough is productive of sputum. Associated symptoms include chest pain, chills and shortness of breath. Pertinent negatives include no hemoptysis or wheezing. Nothing aggravates the symptoms. He has tried nothing for the symptoms.     Past Medical History:   Diagnosis Date    A-fib     Anemia     AP (angina pectoris) 1/23/2014    Carotid artery disease without cerebral infarction 8/22/2014    Cataract     Coronary artery disease     GERD (gastroesophageal reflux disease) 7/11/2013    Hemorrhagic cerebrovascular accident (CVA) 4/26/2018    Hyperlipidemia     Hypertension     Hypertensive heart disease with heart failure 3/12/2019    Intracranial hemorrhage     Lumbosacral spondylosis 12/24/2014    Pacemaker 1/23/2014    Paroxysmal atrial fibrillation 1/23/2014    Peripheral vascular disease 8/22/2014    Pneumonia of right lung due to infectious organism 3/27/2019    Seizure, late effect of stroke     Stroke     Type 2 diabetes mellitus with ophthalmic manifestations          Constitution: Positive for chills. Negative for activity change, appetite change and fatigue.   HENT: Negative.    Cardiovascular: Positive for chest pain.   Respiratory: Positive for chest tightness, cough and shortness of breath. Negative for bloody sputum, COPD and wheezing.    Gastrointestinal: Negative for abdominal pain.       Objective:      Physical Exam   Constitutional: He appears well-developed and " well-nourished. No distress.   Neck: Neck supple.   Cardiovascular: Normal rate and regular rhythm. Exam reveals no gallop and no friction rub.   No murmur heard.  Pulmonary/Chest: Effort normal and breath sounds normal. No stridor. No respiratory distress. He has no wheezes. He has no rales. He exhibits no tenderness.   Abdominal: Soft. There is no tenderness.   Lymphadenopathy:     He has no cervical adenopathy.   Skin: Skin is not diaphoretic.   Nursing note and vitals reviewed.        Assessment:     Pneumonia symptoms  DM 2      Plan:         Pneumonia symptoms  -     cefTRIAXone injection 1 g  -     levoFLOXacin (LEVAQUIN) 500 MG tablet; Take 1 tablet (500 mg total) by mouth once daily. for 10 days  Dispense: 10 tablet; Refill: 0  -     X-Ray Chest PA And Lateral; Future; Expected date: 10/12/2019    Diabetes mellitus type 2 without retinopathy

## 2019-10-13 ENCOUNTER — TELEPHONE (OUTPATIENT)
Dept: URGENT CARE | Facility: CLINIC | Age: 84
End: 2019-10-13

## 2019-10-13 NOTE — TELEPHONE ENCOUNTER
Contact Patient regarding x-ray results per provider Raymundo Little PA-C. Results given and patient verbalized a complete understanding.

## 2019-10-14 ENCOUNTER — OFFICE VISIT (OUTPATIENT)
Dept: FAMILY MEDICINE | Facility: CLINIC | Age: 84
End: 2019-10-14
Payer: MEDICARE

## 2019-10-14 VITALS
WEIGHT: 185.63 LBS | TEMPERATURE: 98 F | SYSTOLIC BLOOD PRESSURE: 154 MMHG | HEIGHT: 69 IN | BODY MASS INDEX: 27.49 KG/M2 | OXYGEN SATURATION: 98 % | DIASTOLIC BLOOD PRESSURE: 80 MMHG | HEART RATE: 76 BPM

## 2019-10-14 DIAGNOSIS — R05.9 COUGH: ICD-10-CM

## 2019-10-14 DIAGNOSIS — R68.83 SHAKING CHILLS: Primary | ICD-10-CM

## 2019-10-14 PROCEDURE — 99999 PR PBB SHADOW E&M-EST. PATIENT-LVL III: ICD-10-PCS | Mod: PBBFAC,HCNC,, | Performed by: FAMILY MEDICINE

## 2019-10-14 PROCEDURE — 99213 OFFICE O/P EST LOW 20 MIN: CPT | Mod: HCNC,S$GLB,, | Performed by: FAMILY MEDICINE

## 2019-10-14 PROCEDURE — 1101F PT FALLS ASSESS-DOCD LE1/YR: CPT | Mod: HCNC,CPTII,S$GLB, | Performed by: FAMILY MEDICINE

## 2019-10-14 PROCEDURE — 99999 PR PBB SHADOW E&M-EST. PATIENT-LVL III: CPT | Mod: PBBFAC,HCNC,, | Performed by: FAMILY MEDICINE

## 2019-10-14 PROCEDURE — 99213 PR OFFICE/OUTPT VISIT, EST, LEVL III, 20-29 MIN: ICD-10-PCS | Mod: HCNC,S$GLB,, | Performed by: FAMILY MEDICINE

## 2019-10-14 PROCEDURE — 1101F PR PT FALLS ASSESS DOC 0-1 FALLS W/OUT INJ PAST YR: ICD-10-PCS | Mod: HCNC,CPTII,S$GLB, | Performed by: FAMILY MEDICINE

## 2019-10-14 NOTE — PROGRESS NOTES
Chief Complaint:    Chief Complaint   Patient presents with    Follow-up       History of Present Illness:  Patient is status post follow-up from urgent care.  He had an episode of chills followed by low-grade fever and he had some coffee went to urgent care was given Levaquin and in a chest x-ray was done.  He has been doing well since then is x-ray did not show any pneumonia.  He has not been coughing.      ROS:  Review of Systems   Constitutional: Negative for activity change, chills, fatigue, fever and unexpected weight change.   HENT: Negative for congestion, ear discharge, ear pain, hearing loss, postnasal drip and rhinorrhea.    Eyes: Negative for pain and visual disturbance.   Respiratory: Negative for cough, chest tightness and shortness of breath.    Cardiovascular: Negative for chest pain and palpitations.   Gastrointestinal: Negative for abdominal pain, diarrhea and vomiting.   Endocrine: Negative for heat intolerance.   Genitourinary: Negative for dysuria, flank pain, frequency and hematuria.   Musculoskeletal: Negative for back pain, gait problem and neck pain.   Skin: Negative for color change and rash.   Neurological: Negative for dizziness, tremors, seizures, numbness and headaches.   Psychiatric/Behavioral: Negative for agitation, hallucinations, self-injury, sleep disturbance and suicidal ideas. The patient is not nervous/anxious.        Past Medical History:   Diagnosis Date    A-fib     Anemia     AP (angina pectoris) 1/23/2014    Carotid artery disease without cerebral infarction 8/22/2014    Cataract     Coronary artery disease     GERD (gastroesophageal reflux disease) 7/11/2013    Hemorrhagic cerebrovascular accident (CVA) 4/26/2018    Hyperlipidemia     Hypertension     Hypertensive heart disease with heart failure 3/12/2019    Intracranial hemorrhage     Lumbosacral spondylosis 12/24/2014    Pacemaker 1/23/2014    Paroxysmal atrial fibrillation 1/23/2014    Peripheral  vascular disease 2014    Pneumonia of right lung due to infectious organism 3/27/2019    Seizure, late effect of stroke     Stroke     Type 2 diabetes mellitus with ophthalmic manifestations        Social History:  Social History     Socioeconomic History    Marital status:      Spouse name: Not on file    Number of children: Not on file    Years of education: Not on file    Highest education level: Not on file   Occupational History    Not on file   Social Needs    Financial resource strain: Not on file    Food insecurity:     Worry: Not on file     Inability: Not on file    Transportation needs:     Medical: Not on file     Non-medical: Not on file   Tobacco Use    Smoking status: Former Smoker     Packs/day: 2.00     Years: 13.00     Pack years: 26.00     Types: Cigarettes     Start date: 1954     Last attempt to quit: 1967     Years since quittin.3    Smokeless tobacco: Never Used   Substance and Sexual Activity    Alcohol use: No    Drug use: No    Sexual activity: Never     Partners: Female     Birth control/protection: None   Lifestyle    Physical activity:     Days per week: Not on file     Minutes per session: Not on file    Stress: Not on file   Relationships    Social connections:     Talks on phone: Not on file     Gets together: Not on file     Attends Spiritism service: Not on file     Active member of club or organization: Not on file     Attends meetings of clubs or organizations: Not on file     Relationship status: Not on file   Other Topics Concern    Not on file   Social History Narrative    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       Family History:   family history includes Alcohol abuse in his paternal uncle; Arthritis in his father and mother; Cancer in his daughter and sister; Diabetes in his brother, daughter, father, mother, sister, son, and son; Fibromyalgia in his daughter; Heart disease in his brother, mother, and  "sister; Kidney disease in his brother and mother; Miscarriages / Stillbirths in his daughter.    Health Maintenance   Topic Date Due    Hemoglobin A1c  04/02/2020    Eye Exam  07/01/2020    Foot Exam  07/02/2020    Lipid Panel  10/02/2020    TETANUS VACCINE  01/23/2024    Pneumococcal Vaccine (65+ Low/Medium Risk)  Completed    Aspirin/Antiplatelet Therapy  Discontinued       Physical Exam:    Vital Signs  Temp: 98.1 °F (36.7 °C)  Temp src: Temporal  Pulse: 76  SpO2: 98 %  BP: (!) 154/80  BP Location: Left arm  Patient Position: Sitting  Pain Score: 0-No pain  Height and Weight  Height: 5' 9" (175.3 cm)  Weight: 84.2 kg (185 lb 10 oz)  BSA (Calculated - sq m): 2.02 sq meters  BMI (Calculated): 27.5  Weight in (lb) to have BMI = 25: 168.9]    Body mass index is 27.41 kg/m².    Physical Exam   Constitutional: He is oriented to person, place, and time. He appears well-developed.   HENT:   Mouth/Throat: Oropharynx is clear and moist.   Eyes: Pupils are equal, round, and reactive to light. Conjunctivae are normal.   Neck: Normal range of motion. Neck supple.   Cardiovascular: Normal rate, regular rhythm and normal heart sounds.   No murmur heard.  Pulses:       Dorsalis pedis pulses are 0 on the right side, and 0 on the left side.        Posterior tibial pulses are 0 on the right side, and 0 on the left side.   Pulmonary/Chest: Effort normal and breath sounds normal. No respiratory distress. He has no wheezes. He has no rales. He exhibits no tenderness.   Abdominal: Soft. He exhibits no distension and no mass. There is no tenderness. There is no guarding.   Musculoskeletal: He exhibits no edema or tenderness.   Feet:   Right Foot:   Protective Sensation: 10 sites tested. 10 sites sensed.   Left Foot:   Protective Sensation: 10 sites tested. 10 sites sensed.   Lymphadenopathy:     He has no cervical adenopathy.   Neurological: He is alert and oriented to person, place, and time. He has normal reflexes.   Skin: Skin " is warm and dry.   Psychiatric: He has a normal mood and affect. His behavior is normal. Judgment and thought content normal.         Diabetes Management Status    Statin: Not taking  ACE/ARB: Taking    Screening or Prevention Patient's value Goal Complete/Controlled?   HgA1C Testing and Control   Lab Results   Component Value Date    HGBA1C 6.8 (H) 10/02/2019      Annually/Less than 8% Yes   Lipid profile : 10/02/2019 Annually Yes   LDL control Lab Results   Component Value Date    LDLCALC 104.6 10/02/2019    Annually/Less than 100 mg/dl  No   Nephropathy screening Lab Results   Component Value Date    LABMICR 14.0 10/02/2019     Lab Results   Component Value Date    PROTEINUA Trace (A) 03/27/2019    Annually Yes   Blood pressure BP Readings from Last 1 Encounters:   10/14/19 (!) 154/80    Less than 140/90 Yes   Dilated retinal exam : 07/01/2019 Annually Yes   Foot exam   : 07/02/2019 Annually Yes       Assessment:      ICD-10-CM ICD-9-CM   1. Shaking chills R68.83 780.64   2. Cough R05 786.2         Plan:       Chills and cough now resolved.  Do not think he has pneumonia please discontinue Levaquin.    Current Outpatient Medications   Medication Sig Dispense Refill    ACCU-CHEK TOMAS PLUS TEST STRP Strp TEST BLOOD SUGAR SIX TIMES DAILY 300 strip 5    ACCU-CHEK MULTICLIX LANCET lancets TEST BLOOD SUGAR THREE TIMES DAILY 200 each 5    CARBOXYMETHYLCELLULOSE SODIUM (REFRESH OPHT) Apply to eye.      fluticasone (FLONASE) 50 mcg/actuation nasal spray 2 sprays (100 mcg total) by Each Nare route once daily. 16 g 6    furosemide (LASIX) 20 MG tablet TAKE ONE TABLET BY MOUTH DAILY 30 tablet 12    guaiFENesin (MUCINEX) 600 mg 12 hr tablet Take 1 tablet (600 mg total) by mouth 2 (two) times daily.      insulin (LANTUS SOLOSTAR U-100 INSULIN) glargine 100 units/mL (3mL) SubQ pen INJECT 16 UNITS SUB-Q AT BEDTIME 15 mL 11    insulin aspart U-100 (NOVOLOG FLEXPEN U-100 INSULIN) 100 unit/mL (3 mL) InPn pen 10 units at  "breakfast, 8 units at lunch, 10 units at dinner. 15 mL 3    isosorbide mononitrate (IMDUR) 60 MG 24 hr tablet Take 1 tablet (60 mg total) by mouth once daily. 30 tablet 11    levETIRAcetam (KEPPRA) 250 MG Tab Take 1 tablet (250 mg total) by mouth 2 (two) times daily. 60 tablet 11    levoFLOXacin (LEVAQUIN) 500 MG tablet Take 1 tablet (500 mg total) by mouth once daily. for 10 days 10 tablet 0    losartan (COZAAR) 100 MG tablet TAKE ONE TABLET BY MOUTH EVERY DAY 90 tablet 0    metoprolol succinate (TOPROL-XL) 100 MG 24 hr tablet TAKE ONE TABLET BY MOUTH TWICE DAILY 60 tablet 12    nitroGLYCERIN (NITROSTAT) 0.4 MG SL tablet Place 1 tablet (0.4 mg total) under the tongue every 5 (five) minutes as needed for Chest pain. 25 tablet 12    pantoprazole (PROTONIX) 40 MG tablet TAKE ONE TABLET BY MOUTH EVERY DAY 30 tablet 5    potassium chloride SA (K-DUR,KLOR-CON) 20 MEQ tablet TAKE ONE TABLET BY MOUTH EVERY DAY 30 tablet 12    saw palmetto 160 MG capsule Take 160 mg by mouth 2 (two) times daily.      SURE COMFORT PEN NEEDLE 32 gauge x 5/32" Ndle USE FOUR TIMES DAILY. 200 each 6     No current facility-administered medications for this visit.        There are no discontinued medications.    No follow-ups on file.      Abdulaziz Mccabe MD                "

## 2019-10-21 ENCOUNTER — PATIENT MESSAGE (OUTPATIENT)
Dept: FAMILY MEDICINE | Facility: CLINIC | Age: 84
End: 2019-10-21

## 2019-10-29 ENCOUNTER — LAB VISIT (OUTPATIENT)
Dept: LAB | Facility: HOSPITAL | Age: 84
End: 2019-10-29
Attending: FAMILY MEDICINE
Payer: MEDICARE

## 2019-10-29 ENCOUNTER — OFFICE VISIT (OUTPATIENT)
Dept: FAMILY MEDICINE | Facility: CLINIC | Age: 84
End: 2019-10-29
Payer: MEDICARE

## 2019-10-29 VITALS
DIASTOLIC BLOOD PRESSURE: 70 MMHG | OXYGEN SATURATION: 98 % | HEIGHT: 69 IN | BODY MASS INDEX: 27.6 KG/M2 | WEIGHT: 186.38 LBS | TEMPERATURE: 98 F | HEART RATE: 87 BPM | SYSTOLIC BLOOD PRESSURE: 130 MMHG

## 2019-10-29 DIAGNOSIS — R68.83 CHILLS WITHOUT FEVER: ICD-10-CM

## 2019-10-29 DIAGNOSIS — R68.83 CHILLS WITHOUT FEVER: Primary | ICD-10-CM

## 2019-10-29 LAB
BILIRUB UR QL STRIP: NEGATIVE
CLARITY UR REFRACT.AUTO: CLEAR
COLOR UR AUTO: ABNORMAL
GLUCOSE UR QL STRIP: ABNORMAL
HGB UR QL STRIP: NEGATIVE
KETONES UR QL STRIP: NEGATIVE
LEUKOCYTE ESTERASE UR QL STRIP: NEGATIVE
NITRITE UR QL STRIP: NEGATIVE
PH UR STRIP: 6 [PH] (ref 5–8)
PROT UR QL STRIP: NEGATIVE
SP GR UR STRIP: 1 (ref 1–1.03)
URN SPEC COLLECT METH UR: ABNORMAL

## 2019-10-29 PROCEDURE — 81003 URINALYSIS AUTO W/O SCOPE: CPT | Mod: HCNC

## 2019-10-29 PROCEDURE — 99214 OFFICE O/P EST MOD 30 MIN: CPT | Mod: HCNC,S$GLB,, | Performed by: FAMILY MEDICINE

## 2019-10-29 PROCEDURE — 36415 COLL VENOUS BLD VENIPUNCTURE: CPT | Mod: HCNC,PO

## 2019-10-29 PROCEDURE — 87086 URINE CULTURE/COLONY COUNT: CPT | Mod: HCNC

## 2019-10-29 PROCEDURE — 99999 PR PBB SHADOW E&M-EST. PATIENT-LVL III: CPT | Mod: PBBFAC,HCNC,, | Performed by: FAMILY MEDICINE

## 2019-10-29 PROCEDURE — 1101F PT FALLS ASSESS-DOCD LE1/YR: CPT | Mod: HCNC,CPTII,S$GLB, | Performed by: FAMILY MEDICINE

## 2019-10-29 PROCEDURE — 1101F PR PT FALLS ASSESS DOC 0-1 FALLS W/OUT INJ PAST YR: ICD-10-PCS | Mod: HCNC,CPTII,S$GLB, | Performed by: FAMILY MEDICINE

## 2019-10-29 PROCEDURE — 99999 PR PBB SHADOW E&M-EST. PATIENT-LVL III: ICD-10-PCS | Mod: PBBFAC,HCNC,, | Performed by: FAMILY MEDICINE

## 2019-10-29 PROCEDURE — 84443 ASSAY THYROID STIM HORMONE: CPT | Mod: HCNC

## 2019-10-29 PROCEDURE — 99214 PR OFFICE/OUTPT VISIT, EST, LEVL IV, 30-39 MIN: ICD-10-PCS | Mod: HCNC,S$GLB,, | Performed by: FAMILY MEDICINE

## 2019-10-29 NOTE — PROGRESS NOTES
Chief Complaint:    Chief Complaint   Patient presents with    Diarrhea       History of Present Illness:  He presents today he describes as having another episode of chills but no associated fever.  He felt fine after this episode no trouble urinating no congestion cough fever or any other symptoms.  He does acknowledge that he has cor nature would like to get his thyroid checked as well.      ROS:  Review of Systems   Constitutional: Positive for chills. Negative for activity change, fatigue, fever and unexpected weight change.   HENT: Negative for congestion, ear discharge, ear pain, hearing loss, postnasal drip and rhinorrhea.    Eyes: Negative for pain and visual disturbance.   Respiratory: Negative for cough, chest tightness and shortness of breath.    Cardiovascular: Negative for chest pain and palpitations.   Gastrointestinal: Negative for abdominal pain, diarrhea and vomiting.   Endocrine: Negative for heat intolerance.   Genitourinary: Negative for dysuria, flank pain, frequency and hematuria.   Musculoskeletal: Negative for back pain, gait problem and neck pain.   Skin: Negative for color change and rash.   Neurological: Negative for dizziness, tremors, seizures, numbness and headaches.   Psychiatric/Behavioral: Negative for agitation, hallucinations, self-injury, sleep disturbance and suicidal ideas. The patient is not nervous/anxious.        Past Medical History:   Diagnosis Date    A-fib     Anemia     AP (angina pectoris) 1/23/2014    Carotid artery disease without cerebral infarction 8/22/2014    Cataract     Coronary artery disease     GERD (gastroesophageal reflux disease) 7/11/2013    Hemorrhagic cerebrovascular accident (CVA) 4/26/2018    Hyperlipidemia     Hypertension     Hypertensive heart disease with heart failure 3/12/2019    Intracranial hemorrhage     Lumbosacral spondylosis 12/24/2014    Pacemaker 1/23/2014    Paroxysmal atrial fibrillation 1/23/2014    Peripheral  vascular disease 2014    Pneumonia of right lung due to infectious organism 3/27/2019    Seizure, late effect of stroke     Stroke     Type 2 diabetes mellitus with ophthalmic manifestations        Social History:  Social History     Socioeconomic History    Marital status:      Spouse name: Not on file    Number of children: Not on file    Years of education: Not on file    Highest education level: Not on file   Occupational History    Not on file   Social Needs    Financial resource strain: Not on file    Food insecurity:     Worry: Not on file     Inability: Not on file    Transportation needs:     Medical: Not on file     Non-medical: Not on file   Tobacco Use    Smoking status: Former Smoker     Packs/day: 2.00     Years: 13.00     Pack years: 26.00     Types: Cigarettes     Start date: 1954     Last attempt to quit: 1967     Years since quittin.4    Smokeless tobacco: Never Used   Substance and Sexual Activity    Alcohol use: No    Drug use: No    Sexual activity: Never     Partners: Female     Birth control/protection: None   Lifestyle    Physical activity:     Days per week: Not on file     Minutes per session: Not on file    Stress: Not on file   Relationships    Social connections:     Talks on phone: Not on file     Gets together: Not on file     Attends Anabaptism service: Not on file     Active member of club or organization: Not on file     Attends meetings of clubs or organizations: Not on file     Relationship status: Not on file   Other Topics Concern    Not on file   Social History Narrative    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       Family History:   family history includes Alcohol abuse in his paternal uncle; Arthritis in his father and mother; Cancer in his daughter and sister; Diabetes in his brother, daughter, father, mother, sister, son, and son; Fibromyalgia in his daughter; Heart disease in his brother, mother, and  "sister; Kidney disease in his brother and mother; Miscarriages / Stillbirths in his daughter.    Health Maintenance   Topic Date Due    Hemoglobin A1c  04/02/2020    Eye Exam  07/01/2020    Foot Exam  07/02/2020    Lipid Panel  10/02/2020    TETANUS VACCINE  01/23/2024    Pneumococcal Vaccine (65+ Low/Medium Risk)  Completed    Aspirin/Antiplatelet Therapy  Discontinued       Physical Exam:    Vital Signs  Temp: 97.9 °F (36.6 °C)  Temp src: Temporal  Pulse: 87  SpO2: 98 %  BP: 130/70  BP Location: Left arm  Patient Position: Sitting  Pain Score: 0-No pain  Height and Weight  Height: 5' 9" (175.3 cm)  Weight: 84.5 kg (186 lb 6.4 oz)  BSA (Calculated - sq m): 2.03 sq meters  BMI (Calculated): 27.6  Weight in (lb) to have BMI = 25: 168.9]    Body mass index is 27.53 kg/m².    Physical Exam   Constitutional: He is oriented to person, place, and time. He appears well-developed.   HENT:   Mouth/Throat: Oropharynx is clear and moist.   Eyes: Pupils are equal, round, and reactive to light. Conjunctivae are normal.   Neck: Normal range of motion. Neck supple.   Cardiovascular: Normal rate, regular rhythm and normal heart sounds.   No murmur heard.  Pulses:       Dorsalis pedis pulses are 0 on the right side, and 0 on the left side.        Posterior tibial pulses are 0 on the right side, and 0 on the left side.   Pulmonary/Chest: Effort normal and breath sounds normal. No respiratory distress. He has no wheezes. He has no rales. He exhibits no tenderness.   Abdominal: Soft. He exhibits no distension and no mass. There is no tenderness. There is no guarding.   Musculoskeletal: He exhibits no edema or tenderness.   Feet:   Right Foot:   Protective Sensation: 10 sites tested. 10 sites sensed.   Left Foot:   Protective Sensation: 10 sites tested. 10 sites sensed.   Lymphadenopathy:     He has no cervical adenopathy.   Neurological: He is alert and oriented to person, place, and time. He has normal reflexes.   Skin: Skin is " warm and dry.   Psychiatric: He has a normal mood and affect. His behavior is normal. Judgment and thought content normal.         Diabetes Management Status    Statin: Not taking  ACE/ARB: Taking    Screening or Prevention Patient's value Goal Complete/Controlled?   HgA1C Testing and Control   Lab Results   Component Value Date    HGBA1C 6.8 (H) 10/02/2019      Annually/Less than 8% Yes   Lipid profile : 10/02/2019 Annually Yes   LDL control Lab Results   Component Value Date    LDLCALC 104.6 10/02/2019    Annually/Less than 100 mg/dl  No   Nephropathy screening Lab Results   Component Value Date    LABMICR 14.0 10/02/2019     Lab Results   Component Value Date    PROTEINUA Trace (A) 03/27/2019    Annually Yes   Blood pressure BP Readings from Last 1 Encounters:   10/29/19 130/70    Less than 140/90 Yes   Dilated retinal exam : 07/01/2019 Annually Yes   Foot exam   : 07/02/2019 Annually Yes       Assessment:      ICD-10-CM ICD-9-CM   1. Chills without fever R68.83 780.64         Plan:       Chills without a fever, differential include medications side effects for example like Keppra versus hypothyroidism versus hypoglycemia.  Will check TSH asked patient to monitor his blood sugar when such a spell happens and will notify us.  If nothing helps next step will be to try to wean him off of Keppra    Current Outpatient Medications   Medication Sig Dispense Refill    ACCU-CHEK TOMAS PLUS TEST STRP Strp TEST BLOOD SUGAR SIX TIMES DAILY 300 strip 5    ACCU-CHEK MULTICLIX LANCET lancets TEST BLOOD SUGAR THREE TIMES DAILY 200 each 5    CARBOXYMETHYLCELLULOSE SODIUM (REFRESH OPHT) Apply to eye.      fluticasone (FLONASE) 50 mcg/actuation nasal spray 2 sprays (100 mcg total) by Each Nare route once daily. 16 g 6    furosemide (LASIX) 20 MG tablet TAKE ONE TABLET BY MOUTH DAILY 30 tablet 12    guaiFENesin (MUCINEX) 600 mg 12 hr tablet Take 1 tablet (600 mg total) by mouth 2 (two) times daily.      insulin (LANTUS  "SOLOSTAR U-100 INSULIN) glargine 100 units/mL (3mL) SubQ pen INJECT 16 UNITS SUB-Q AT BEDTIME 15 mL 11    insulin aspart U-100 (NOVOLOG FLEXPEN U-100 INSULIN) 100 unit/mL (3 mL) InPn pen 10 units at breakfast, 8 units at lunch, 10 units at dinner. 15 mL 3    isosorbide mononitrate (IMDUR) 60 MG 24 hr tablet Take 1 tablet (60 mg total) by mouth once daily. 30 tablet 11    levETIRAcetam (KEPPRA) 250 MG Tab Take 1 tablet (250 mg total) by mouth 2 (two) times daily. 60 tablet 11    losartan (COZAAR) 100 MG tablet TAKE ONE TABLET BY MOUTH EVERY DAY 90 tablet 0    metoprolol succinate (TOPROL-XL) 100 MG 24 hr tablet TAKE ONE TABLET BY MOUTH TWICE DAILY 60 tablet 12    nitroGLYCERIN (NITROSTAT) 0.4 MG SL tablet Place 1 tablet (0.4 mg total) under the tongue every 5 (five) minutes as needed for Chest pain. 25 tablet 12    pantoprazole (PROTONIX) 40 MG tablet TAKE ONE TABLET BY MOUTH EVERY DAY 30 tablet 5    potassium chloride SA (K-DUR,KLOR-CON) 20 MEQ tablet TAKE ONE TABLET BY MOUTH EVERY DAY 30 tablet 12    saw palmetto 160 MG capsule Take 160 mg by mouth 2 (two) times daily.      SURE COMFORT PEN NEEDLE 32 gauge x 5/32" Ndle USE FOUR TIMES DAILY. 200 each 6     No current facility-administered medications for this visit.        There are no discontinued medications.    No follow-ups on file.      Abdulaziz Mccabe MD                "

## 2019-10-30 LAB — TSH SERPL DL<=0.005 MIU/L-ACNC: 1.05 UIU/ML (ref 0.4–4)

## 2019-10-31 ENCOUNTER — CLINICAL SUPPORT (OUTPATIENT)
Dept: CARDIOLOGY | Facility: CLINIC | Age: 84
End: 2019-10-31
Attending: INTERNAL MEDICINE
Payer: MEDICARE

## 2019-10-31 DIAGNOSIS — Z95.0 CARDIAC PACEMAKER IN SITU: ICD-10-CM

## 2019-10-31 DIAGNOSIS — I48.0 PAF (PAROXYSMAL ATRIAL FIBRILLATION): ICD-10-CM

## 2019-10-31 DIAGNOSIS — I49.5 SSS (SICK SINUS SYNDROME): ICD-10-CM

## 2019-10-31 LAB — BACTERIA UR CULT: NORMAL

## 2019-10-31 PROCEDURE — 93280 PACEMAKER PROGRAMMING: ICD-10-PCS | Mod: 26,HCNC,, | Performed by: INTERNAL MEDICINE

## 2019-10-31 PROCEDURE — 93280 PM DEVICE PROGR EVAL DUAL: CPT | Mod: 26,HCNC,, | Performed by: INTERNAL MEDICINE

## 2019-11-01 NOTE — PROGRESS NOTES
Dr. Harrington reviewed interrogation, inspected device pocket, and discussed with patient.  Pt will return to clinic in February/March months for [employer] device check and clinic appointment.

## 2019-11-06 ENCOUNTER — PATIENT MESSAGE (OUTPATIENT)
Dept: FAMILY MEDICINE | Facility: CLINIC | Age: 84
End: 2019-11-06

## 2019-11-14 ENCOUNTER — OFFICE VISIT (OUTPATIENT)
Dept: FAMILY MEDICINE | Facility: CLINIC | Age: 84
End: 2019-11-14
Payer: MEDICARE

## 2019-11-14 VITALS
BODY MASS INDEX: 27.87 KG/M2 | TEMPERATURE: 98 F | HEART RATE: 82 BPM | SYSTOLIC BLOOD PRESSURE: 136 MMHG | HEIGHT: 69 IN | WEIGHT: 188.19 LBS | DIASTOLIC BLOOD PRESSURE: 74 MMHG | OXYGEN SATURATION: 98 %

## 2019-11-14 DIAGNOSIS — R42 DIZZINESS: Primary | ICD-10-CM

## 2019-11-14 DIAGNOSIS — J06.9 UPPER RESPIRATORY TRACT INFECTION, UNSPECIFIED TYPE: ICD-10-CM

## 2019-11-14 PROCEDURE — 99214 OFFICE O/P EST MOD 30 MIN: CPT | Mod: HCNC,S$GLB,, | Performed by: FAMILY MEDICINE

## 2019-11-14 PROCEDURE — 99999 PR PBB SHADOW E&M-EST. PATIENT-LVL III: CPT | Mod: PBBFAC,HCNC,, | Performed by: FAMILY MEDICINE

## 2019-11-14 PROCEDURE — 99214 PR OFFICE/OUTPT VISIT, EST, LEVL IV, 30-39 MIN: ICD-10-PCS | Mod: HCNC,S$GLB,, | Performed by: FAMILY MEDICINE

## 2019-11-14 PROCEDURE — 1101F PT FALLS ASSESS-DOCD LE1/YR: CPT | Mod: HCNC,CPTII,S$GLB, | Performed by: FAMILY MEDICINE

## 2019-11-14 PROCEDURE — 99999 PR PBB SHADOW E&M-EST. PATIENT-LVL III: ICD-10-PCS | Mod: PBBFAC,HCNC,, | Performed by: FAMILY MEDICINE

## 2019-11-14 PROCEDURE — 1101F PR PT FALLS ASSESS DOC 0-1 FALLS W/OUT INJ PAST YR: ICD-10-PCS | Mod: HCNC,CPTII,S$GLB, | Performed by: FAMILY MEDICINE

## 2019-11-14 RX ORDER — MECLIZINE HCL 12.5 MG 12.5 MG/1
12.5 TABLET ORAL 2 TIMES DAILY PRN
Qty: 30 TABLET | Refills: 0 | Status: SHIPPED | OUTPATIENT
Start: 2019-11-14 | End: 2021-09-20

## 2019-11-14 NOTE — PROGRESS NOTES
Chief Complaint:    Chief Complaint   Patient presents with    Headache    Dizziness       History of Present Illness:  Presents today he has been recently in off Keppra did it yesterday was his last dose.  He says he has had a very mild headache in the frontal region since then he also developed a slight sore throat and has felt slightly dizzy sometimes when he turns his head a certain way.  Denies any fever no congestion cough.  He has the headache is slightly better today is very mild.    ROS:  Review of Systems   Constitutional: Negative for activity change, fatigue, fever and unexpected weight change.   HENT: Positive for sore throat. Negative for congestion, ear discharge, ear pain, hearing loss, postnasal drip and rhinorrhea.    Eyes: Negative for pain and visual disturbance.   Respiratory: Negative for cough, chest tightness and shortness of breath.    Cardiovascular: Negative for chest pain and palpitations.   Gastrointestinal: Negative for abdominal pain, diarrhea and vomiting.   Endocrine: Negative for heat intolerance.   Genitourinary: Negative for dysuria, flank pain, frequency and hematuria.   Musculoskeletal: Negative for back pain, gait problem and neck pain.   Skin: Negative for color change and rash.   Neurological: Positive for dizziness and headaches. Negative for tremors, seizures and numbness.   Psychiatric/Behavioral: Negative for agitation, hallucinations, self-injury, sleep disturbance and suicidal ideas. The patient is not nervous/anxious.        Past Medical History:   Diagnosis Date    A-fib     Anemia     AP (angina pectoris) 1/23/2014    Carotid artery disease without cerebral infarction 8/22/2014    Cataract     Coronary artery disease     GERD (gastroesophageal reflux disease) 7/11/2013    Hemorrhagic cerebrovascular accident (CVA) 4/26/2018    Hyperlipidemia     Hypertension     Hypertensive heart disease with heart failure 3/12/2019    Intracranial hemorrhage      Lumbosacral spondylosis 2014    Pacemaker 2014    Paroxysmal atrial fibrillation 2014    Peripheral vascular disease 2014    Pneumonia of right lung due to infectious organism 3/27/2019    Seizure, late effect of stroke     Stroke     Type 2 diabetes mellitus with ophthalmic manifestations        Social History:  Social History     Socioeconomic History    Marital status:      Spouse name: Not on file    Number of children: Not on file    Years of education: Not on file    Highest education level: Not on file   Occupational History    Not on file   Social Needs    Financial resource strain: Not on file    Food insecurity:     Worry: Not on file     Inability: Not on file    Transportation needs:     Medical: Not on file     Non-medical: Not on file   Tobacco Use    Smoking status: Former Smoker     Packs/day: 2.00     Years: 13.00     Pack years: 26.00     Types: Cigarettes     Start date: 1954     Last attempt to quit: 1967     Years since quittin.4    Smokeless tobacco: Never Used   Substance and Sexual Activity    Alcohol use: No    Drug use: No    Sexual activity: Never     Partners: Female     Birth control/protection: None   Lifestyle    Physical activity:     Days per week: Not on file     Minutes per session: Not on file    Stress: Not on file   Relationships    Social connections:     Talks on phone: Not on file     Gets together: Not on file     Attends Catholic service: Not on file     Active member of club or organization: Not on file     Attends meetings of clubs or organizations: Not on file     Relationship status: Not on file   Other Topics Concern    Not on file   Social History Narrative    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       Family History:   family history includes Alcohol abuse in his paternal uncle; Arthritis in his father and mother; Cancer in his daughter and sister; Diabetes in his brother,  "daughter, father, mother, sister, son, and son; Fibromyalgia in his daughter; Heart disease in his brother, mother, and sister; Kidney disease in his brother and mother; Miscarriages / Stillbirths in his daughter.    Health Maintenance   Topic Date Due    Hemoglobin A1c  04/02/2020    Eye Exam  07/01/2020    Foot Exam  07/02/2020    Lipid Panel  10/02/2020    TETANUS VACCINE  01/23/2024    Pneumococcal Vaccine (65+ Low/Medium Risk)  Completed    Aspirin/Antiplatelet Therapy  Discontinued       Physical Exam:    Vital Signs  Temp: 98.1 °F (36.7 °C)  Temp src: Temporal  Pulse: 82  SpO2: 98 %  BP: 136/74  BP Location: Left arm  Patient Position: Sitting  Pain Score:   1  Pain Loc: Head  Height and Weight  Height: 5' 9" (175.3 cm)  Weight: 85.3 kg (188 lb 2.6 oz)  BSA (Calculated - sq m): 2.04 sq meters  BMI (Calculated): 27.8  Weight in (lb) to have BMI = 25: 168.9]    Body mass index is 27.79 kg/m².    Physical Exam   Constitutional: He is oriented to person, place, and time. He appears well-developed.   HENT:   Mouth/Throat: Oropharynx is clear and moist.   Eyes: Pupils are equal, round, and reactive to light. Conjunctivae are normal.   Neck: Normal range of motion. Neck supple.   Cardiovascular: Normal rate, regular rhythm and normal heart sounds.   No murmur heard.  Pulses:       Dorsalis pedis pulses are 0 on the right side, and 0 on the left side.        Posterior tibial pulses are 0 on the right side, and 0 on the left side.   Pulmonary/Chest: Effort normal and breath sounds normal. No respiratory distress. He has no wheezes. He has no rales. He exhibits no tenderness.   Abdominal: Soft. He exhibits no distension and no mass. There is no tenderness. There is no guarding.   Musculoskeletal: He exhibits no edema or tenderness.   Feet:   Right Foot:   Protective Sensation: 10 sites tested. 10 sites sensed.   Left Foot:   Protective Sensation: 10 sites tested. 10 sites sensed.   Lymphadenopathy:     He has no " cervical adenopathy.   Neurological: He is alert and oriented to person, place, and time. He has normal reflexes.   Patient's gait is normal, slight horizontal nystagmus seen  Saltillo-Hallpike maneuver is minimally positive on the left   Skin: Skin is warm and dry.   Psychiatric: He has a normal mood and affect. His behavior is normal. Judgment and thought content normal.         Diabetes Management Status    Statin: Not taking  ACE/ARB: Taking    Screening or Prevention Patient's value Goal Complete/Controlled?   HgA1C Testing and Control   Lab Results   Component Value Date    HGBA1C 6.8 (H) 10/02/2019      Annually/Less than 8% Yes   Lipid profile : 10/02/2019 Annually Yes   LDL control Lab Results   Component Value Date    LDLCALC 104.6 10/02/2019    Annually/Less than 100 mg/dl  No   Nephropathy screening Lab Results   Component Value Date    LABMICR 14.0 10/02/2019     Lab Results   Component Value Date    PROTEINUA Negative 10/29/2019    Annually Yes   Blood pressure BP Readings from Last 1 Encounters:   11/14/19 136/74    Less than 140/90 Yes   Dilated retinal exam : 07/01/2019 Annually Yes   Foot exam   : 07/02/2019 Annually Yes       Assessment:      ICD-10-CM ICD-9-CM   1. Dizziness R42 780.4   2. Upper respiratory tract infection, unspecified type J06.9 465.9         Plan:       Upper respiratory symptoms home with possibly some withdrawal from Keppra.  Will treat symptomatically at this point meclizine given and warm saline gargles and watch for worsening symptoms.      Current Outpatient Medications   Medication Sig Dispense Refill    ACCU-CHEK TOMAS PLUS TEST STRP Strp TEST BLOOD SUGAR SIX TIMES DAILY 300 strip 5    ACCU-CHEK MULTICLIX LANCET lancets TEST BLOOD SUGAR THREE TIMES DAILY 200 each 5    CARBOXYMETHYLCELLULOSE SODIUM (REFRESH OPHT) Apply to eye.      fluticasone (FLONASE) 50 mcg/actuation nasal spray 2 sprays (100 mcg total) by Each Nare route once daily. 16 g 6    furosemide (LASIX) 20 MG  "tablet TAKE ONE TABLET BY MOUTH DAILY 30 tablet 12    guaiFENesin (MUCINEX) 600 mg 12 hr tablet Take 1 tablet (600 mg total) by mouth 2 (two) times daily.      insulin (LANTUS SOLOSTAR U-100 INSULIN) glargine 100 units/mL (3mL) SubQ pen INJECT 16 UNITS SUB-Q AT BEDTIME 15 mL 11    insulin aspart U-100 (NOVOLOG FLEXPEN U-100 INSULIN) 100 unit/mL (3 mL) InPn pen 10 units at breakfast, 8 units at lunch, 10 units at dinner. 15 mL 3    isosorbide mononitrate (IMDUR) 60 MG 24 hr tablet Take 1 tablet (60 mg total) by mouth once daily. 30 tablet 11    losartan (COZAAR) 100 MG tablet TAKE ONE TABLET BY MOUTH EVERY DAY 90 tablet 0    metoprolol succinate (TOPROL-XL) 100 MG 24 hr tablet TAKE ONE TABLET BY MOUTH TWICE DAILY 60 tablet 12    nitroGLYCERIN (NITROSTAT) 0.4 MG SL tablet Place 1 tablet (0.4 mg total) under the tongue every 5 (five) minutes as needed for Chest pain. 25 tablet 12    pantoprazole (PROTONIX) 40 MG tablet TAKE ONE TABLET BY MOUTH EVERY DAY 30 tablet 5    potassium chloride SA (K-DUR,KLOR-CON) 20 MEQ tablet TAKE ONE TABLET BY MOUTH EVERY DAY 30 tablet 12    saw palmetto 160 MG capsule Take 160 mg by mouth 2 (two) times daily.      SURE COMFORT PEN NEEDLE 32 gauge x 5/32" Ndle USE FOUR TIMES DAILY. 200 each 6    meclizine (ANTIVERT) 12.5 mg tablet Take 1 tablet (12.5 mg total) by mouth 2 (two) times daily as needed. 30 tablet 0     No current facility-administered medications for this visit.        Medications Discontinued During This Encounter   Medication Reason    levETIRAcetam (KEPPRA) 250 MG Tab Patient no longer taking       No follow-ups on file.      Abdulaziz Mccabe MD                "

## 2019-11-19 ENCOUNTER — TELEPHONE (OUTPATIENT)
Dept: CARDIOLOGY | Facility: CLINIC | Age: 84
End: 2019-11-19

## 2019-11-19 NOTE — TELEPHONE ENCOUNTER
----- Message from Domenico Grajeda MD sent at 11/8/2019 11:03 PM CST -----  Decrease to once a day for 23weeks then once every other day for 3 weeks then stop  ----- Message -----  From: Katie Garcia LPN  Sent: 11/4/2019   6:22 PM CST  To: Domenico Grajeda MD        ----- Message -----  From: Vivien Clements MA  Sent: 11/1/2019  12:51 PM CST  To: Katie Garcia LPN    Please see  message below  ----- Message -----  From: Hola Lucero MD  Sent: 10/31/2019   4:00 PM CDT  To: Vivien Clements MA    If the doctor wants to stop the medication, he should do so very gradually over a period of 4 to 6 weeks.  ----- Message -----  From: Vivien Clements MA  Sent: 10/31/2019  11:46 AM CDT  To: Hola Lucero MD    Please advise  ----- Message -----  From: Katie Garcia LPN  Sent: 10/31/2019  11:19 AM CDT  To: Jazmine Grajeda wants to know if pt is safe to  d/c Keppra due to side effects. Please discuss with Dr. Lucero and let me or the pt know. Thank you!

## 2019-11-22 ENCOUNTER — PATIENT OUTREACH (OUTPATIENT)
Dept: ADMINISTRATIVE | Facility: HOSPITAL | Age: 84
End: 2019-11-22

## 2019-11-22 NOTE — PROGRESS NOTES
[employer] - Medical Record Retrieval  ?  RE: Patient Attestation  [facility] MEASURE: Medication Adherence for HTN (ACE/ARB)    Payer ID:  [id number]                              NAME: RICARDO RICE                        :1933      Prescription documentation of ACE/ARB therapy  has been sent to [employer] as attestation documentation for [facility].  2019 measure has been satisfied.

## 2019-12-02 RX ORDER — FUROSEMIDE 20 MG/1
20 TABLET ORAL DAILY
Qty: 30 TABLET | Refills: 12 | Status: SHIPPED | OUTPATIENT
Start: 2019-12-02 | End: 2020-06-19

## 2019-12-02 RX ORDER — LOSARTAN POTASSIUM 100 MG/1
TABLET ORAL
Qty: 90 TABLET | Refills: 0 | Status: SHIPPED | OUTPATIENT
Start: 2019-12-02 | End: 2020-03-03

## 2019-12-05 DIAGNOSIS — M25.532 LEFT WRIST PAIN: ICD-10-CM

## 2019-12-09 RX ORDER — DICLOFENAC SODIUM 30 MG/G
GEL TOPICAL
Qty: 100 G | Refills: 0 | Status: SHIPPED | OUTPATIENT
Start: 2019-12-09 | End: 2019-12-17

## 2019-12-17 RX ORDER — DICLOFENAC SODIUM 10 MG/G
2 GEL TOPICAL DAILY
Qty: 100 G | Refills: 2 | Status: SHIPPED | OUTPATIENT
Start: 2019-12-17

## 2020-01-04 ENCOUNTER — HOSPITAL ENCOUNTER (OUTPATIENT)
Dept: RADIOLOGY | Facility: HOSPITAL | Age: 85
Discharge: HOME | End: 2020-01-04
Attending: PHYSICIAN ASSISTANT
Payer: MEDICARE

## 2020-01-04 ENCOUNTER — OFFICE VISIT (OUTPATIENT)
Dept: URGENT CARE | Facility: CLINIC | Age: 85
End: 2020-01-04
Payer: MEDICARE

## 2020-01-04 VITALS
SYSTOLIC BLOOD PRESSURE: 124 MMHG | HEIGHT: 69 IN | WEIGHT: 190.5 LBS | TEMPERATURE: 99 F | BODY MASS INDEX: 28.22 KG/M2 | HEART RATE: 84 BPM | DIASTOLIC BLOOD PRESSURE: 70 MMHG | OXYGEN SATURATION: 99 %

## 2020-01-04 DIAGNOSIS — S89.92XA INJURY OF LEFT KNEE, INITIAL ENCOUNTER: ICD-10-CM

## 2020-01-04 DIAGNOSIS — S89.92XA INJURY OF LEFT KNEE, INITIAL ENCOUNTER: Primary | ICD-10-CM

## 2020-01-04 PROCEDURE — 73562 X-RAY EXAM OF KNEE 3: CPT | Mod: 26,HCNC,LT, | Performed by: RADIOLOGY

## 2020-01-04 PROCEDURE — 1125F AMNT PAIN NOTED PAIN PRSNT: CPT | Mod: HCNC,S$GLB,, | Performed by: PHYSICIAN ASSISTANT

## 2020-01-04 PROCEDURE — 73560 X-RAY EXAM OF KNEE 1 OR 2: CPT | Mod: 26,HCNC,RT, | Performed by: RADIOLOGY

## 2020-01-04 PROCEDURE — 99213 OFFICE O/P EST LOW 20 MIN: CPT | Mod: HCNC,S$GLB,, | Performed by: PHYSICIAN ASSISTANT

## 2020-01-04 PROCEDURE — 73562 XR KNEE ORTHO LEFT: ICD-10-PCS | Mod: 26,HCNC,LT, | Performed by: RADIOLOGY

## 2020-01-04 PROCEDURE — 73560 XR KNEE ORTHO LEFT: ICD-10-PCS | Mod: 26,HCNC,RT, | Performed by: RADIOLOGY

## 2020-01-04 PROCEDURE — 99999 PR PBB SHADOW E&M-EST. PATIENT-LVL III: CPT | Mod: PBBFAC,HCNC,, | Performed by: PHYSICIAN ASSISTANT

## 2020-01-04 PROCEDURE — 1125F PR PAIN SEVERITY QUANTIFIED, PAIN PRESENT: ICD-10-PCS | Mod: HCNC,S$GLB,, | Performed by: PHYSICIAN ASSISTANT

## 2020-01-04 PROCEDURE — 1101F PR PT FALLS ASSESS DOC 0-1 FALLS W/OUT INJ PAST YR: ICD-10-PCS | Mod: HCNC,CPTII,S$GLB, | Performed by: PHYSICIAN ASSISTANT

## 2020-01-04 PROCEDURE — 99213 PR OFFICE/OUTPT VISIT, EST, LEVL III, 20-29 MIN: ICD-10-PCS | Mod: HCNC,S$GLB,, | Performed by: PHYSICIAN ASSISTANT

## 2020-01-04 PROCEDURE — 73560 X-RAY EXAM OF KNEE 1 OR 2: CPT | Mod: TC,HCNC,PO,RT

## 2020-01-04 PROCEDURE — 1159F PR MEDICATION LIST DOCUMENTED IN MEDICAL RECORD: ICD-10-PCS | Mod: HCNC,S$GLB,, | Performed by: PHYSICIAN ASSISTANT

## 2020-01-04 PROCEDURE — 99999 PR PBB SHADOW E&M-EST. PATIENT-LVL III: ICD-10-PCS | Mod: PBBFAC,HCNC,, | Performed by: PHYSICIAN ASSISTANT

## 2020-01-04 PROCEDURE — 1159F MED LIST DOCD IN RCRD: CPT | Mod: HCNC,S$GLB,, | Performed by: PHYSICIAN ASSISTANT

## 2020-01-04 PROCEDURE — 1101F PT FALLS ASSESS-DOCD LE1/YR: CPT | Mod: HCNC,CPTII,S$GLB, | Performed by: PHYSICIAN ASSISTANT

## 2020-01-04 NOTE — PROGRESS NOTES
"Anthony Blair is an 86 year old male who presents today with left knee pain after he twisted earlier this AM.  He states he dropped his pill box and he went to go pick it up and twisted his knee.  He has been ambulating but states his knee "has given out" on him a couple of times today.  He is concerned about being unsteady and falling and hurting himself further.  No numbness or tingling.    All areas of patients chart reviewed including past medical history, past surgical history, medications, allergies, family history, and social history.    Review of Systems   Constitutional: Negative for fever.   HENT: Negative for sore throat.    Respiratory: Negative for shortness of breath.    Cardiovascular: Negative for chest pain.   Gastrointestinal: Negative for abdominal pain and nausea.   Genitourinary: Negative for dysuria and frequency.   Musculoskeletal: Positive for joint pain. Negative for back pain.   Neurological: Negative for headaches.   All other systems reviewed and are negative.    Objective:  /70 (BP Location: Left arm, Patient Position: Sitting, BP Method: Large (Manual))   Pulse 84   Temp 98.6 °F (37 °C) (Tympanic)   Ht 5' 9" (1.753 m)   Wt 86.4 kg (190 lb 7.6 oz)   SpO2 99%   BMI 28.13 kg/m²   Physical Exam   Constitutional: He is oriented to person, place, and time and well-developed, well-nourished, and in no distress.   HENT:   Head: Normocephalic.   Right Ear: External ear normal.   Left Ear: External ear normal.   Mouth/Throat: No oropharyngeal exudate.   Neck: Normal range of motion.   Cardiovascular: Normal rate and normal heart sounds.   Pulmonary/Chest: Effort normal and breath sounds normal. No respiratory distress.   Musculoskeletal:        Left knee: He exhibits normal range of motion, no swelling, no effusion, no deformity, normal alignment, no LCL laxity, normal patellar mobility, no bony tenderness, normal meniscus and no MCL laxity. No medial joint line, no lateral joint " line and no MCL tenderness noted.   Neurological: He is alert and oriented to person, place, and time.   Skin: Skin is warm.   Psychiatric: Affect normal.     Assessment:  Encounter Diagnosis   Name Primary?    Injury of left knee, initial encounter Yes     Plan:  Xray of left knee shows no acute bony abnormalities. (wet read).  Pending formal read per radiologist.    Will place in knee immobilizer and crutches and have him follow up with primary care and/or ortho next week.  Tylenol for pain.  Report to ER with new or worsening symptoms.

## 2020-01-04 NOTE — PATIENT INSTRUCTIONS
Will place in knee immobilizer and crutches and have him follow up with primary care and/or ortho next week.  Tylenol for pain.  Report to ER with new or worsening symptoms.

## 2020-01-07 ENCOUNTER — OFFICE VISIT (OUTPATIENT)
Dept: FAMILY MEDICINE | Facility: CLINIC | Age: 85
End: 2020-01-07
Payer: MEDICARE

## 2020-01-07 VITALS
OXYGEN SATURATION: 98 % | HEIGHT: 69 IN | DIASTOLIC BLOOD PRESSURE: 72 MMHG | WEIGHT: 189.94 LBS | RESPIRATION RATE: 20 BRPM | SYSTOLIC BLOOD PRESSURE: 152 MMHG | BODY MASS INDEX: 28.13 KG/M2 | TEMPERATURE: 98 F | HEART RATE: 70 BPM

## 2020-01-07 DIAGNOSIS — M76.52 PATELLAR TENDINITIS OF LEFT KNEE: Primary | ICD-10-CM

## 2020-01-07 PROCEDURE — 1101F PT FALLS ASSESS-DOCD LE1/YR: CPT | Mod: HCNC,CPTII,S$GLB, | Performed by: FAMILY MEDICINE

## 2020-01-07 PROCEDURE — 99999 PR PBB SHADOW E&M-EST. PATIENT-LVL III: CPT | Mod: PBBFAC,HCNC,, | Performed by: FAMILY MEDICINE

## 2020-01-07 PROCEDURE — 99213 OFFICE O/P EST LOW 20 MIN: CPT | Mod: HCNC,S$GLB,, | Performed by: FAMILY MEDICINE

## 2020-01-07 PROCEDURE — 1159F PR MEDICATION LIST DOCUMENTED IN MEDICAL RECORD: ICD-10-PCS | Mod: HCNC,S$GLB,, | Performed by: FAMILY MEDICINE

## 2020-01-07 PROCEDURE — 1159F MED LIST DOCD IN RCRD: CPT | Mod: HCNC,S$GLB,, | Performed by: FAMILY MEDICINE

## 2020-01-07 PROCEDURE — 99999 PR PBB SHADOW E&M-EST. PATIENT-LVL III: ICD-10-PCS | Mod: PBBFAC,HCNC,, | Performed by: FAMILY MEDICINE

## 2020-01-07 PROCEDURE — 1125F AMNT PAIN NOTED PAIN PRSNT: CPT | Mod: HCNC,S$GLB,, | Performed by: FAMILY MEDICINE

## 2020-01-07 PROCEDURE — 1101F PR PT FALLS ASSESS DOC 0-1 FALLS W/OUT INJ PAST YR: ICD-10-PCS | Mod: HCNC,CPTII,S$GLB, | Performed by: FAMILY MEDICINE

## 2020-01-07 PROCEDURE — 1125F PR PAIN SEVERITY QUANTIFIED, PAIN PRESENT: ICD-10-PCS | Mod: HCNC,S$GLB,, | Performed by: FAMILY MEDICINE

## 2020-01-07 PROCEDURE — 99213 PR OFFICE/OUTPT VISIT, EST, LEVL III, 20-29 MIN: ICD-10-PCS | Mod: HCNC,S$GLB,, | Performed by: FAMILY MEDICINE

## 2020-01-07 NOTE — PROGRESS NOTES
Chief Complaint:    Chief Complaint   Patient presents with    Knee Pain       History of Present Illness:    Follow-up of knee pain   Says he is pending knee while trying to  something from the floor x-ray was done which did not show anything bad.  Was given a brace that bother removed.  ROS:  Review of Systems    Past Medical History:   Diagnosis Date    A-fib     Anemia     AP (angina pectoris) 2014    Carotid artery disease without cerebral infarction 2014    Cataract     Coronary artery disease     GERD (gastroesophageal reflux disease) 2013    Hemorrhagic cerebrovascular accident (CVA) 2018    Hyperlipidemia     Hypertension     Hypertensive heart disease with heart failure 3/12/2019    Intracranial hemorrhage     Lumbosacral spondylosis 2014    Pacemaker 2014    Paroxysmal atrial fibrillation 2014    Peripheral vascular disease 2014    Pneumonia of right lung due to infectious organism 3/27/2019    Seizure, late effect of stroke     Stroke     Type 2 diabetes mellitus with ophthalmic manifestations        Social History:  Social History     Socioeconomic History    Marital status:      Spouse name: Not on file    Number of children: Not on file    Years of education: Not on file    Highest education level: Not on file   Occupational History    Not on file   Social Needs    Financial resource strain: Not on file    Food insecurity:     Worry: Not on file     Inability: Not on file    Transportation needs:     Medical: Not on file     Non-medical: Not on file   Tobacco Use    Smoking status: Former Smoker     Packs/day: 2.00     Years: 13.00     Pack years: 26.00     Types: Cigarettes     Start date: 1954     Last attempt to quit: 1967     Years since quittin.6    Smokeless tobacco: Never Used   Substance and Sexual Activity    Alcohol use: No    Drug use: No    Sexual activity: Never     Partners: Female  "    Birth control/protection: None   Lifestyle    Physical activity:     Days per week: Not on file     Minutes per session: Not on file    Stress: Not on file   Relationships    Social connections:     Talks on phone: Not on file     Gets together: Not on file     Attends Scientologist service: Not on file     Active member of club or organization: Not on file     Attends meetings of clubs or organizations: Not on file     Relationship status: Not on file   Other Topics Concern    Not on file   Social History Narrative    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       Family History:   family history includes Alcohol abuse in his paternal uncle; Arthritis in his father and mother; Cancer in his daughter and sister; Diabetes in his brother, daughter, father, mother, sister, son, and son; Fibromyalgia in his daughter; Heart disease in his brother, mother, and sister; Kidney disease in his brother and mother; Miscarriages / Stillbirths in his daughter.    Health Maintenance   Topic Date Due    Hemoglobin A1c  04/02/2020    Eye Exam  07/01/2020    Foot Exam  07/02/2020    Lipid Panel  10/02/2020    TETANUS VACCINE  01/23/2024    Pneumococcal Vaccine (65+ Low/Medium Risk)  Completed    Aspirin/Antiplatelet Therapy  Discontinued       Physical Exam:    Vital Signs  Temp: 98.2 °F (36.8 °C)  Temp src: Temporal  Pulse: 70  Resp: 20  SpO2: 98 %  BP: (!) 152/72  BP Location: Left arm  Patient Position: Sitting  Pain Score:   8  Pain Loc: Knee  Height and Weight  Height: 5' 9" (175.3 cm)  Weight: 86.1 kg (189 lb 14.8 oz)  BSA (Calculated - sq m): 2.05 sq meters  BMI (Calculated): 28  Weight in (lb) to have BMI = 25: 168.9]    Body mass index is 28.05 kg/m².    Physical Exam   Musculoskeletal:        Legs:        Assessment:      ICD-10-CM ICD-9-CM   1. Patellar tendinitis of left knee M76.52 726.64         Plan:         Does will take at least will weeks to resolve explained this to the " patient.  Continue using either a walking cane or crutches.      No orders of the defined types were placed in this encounter.      Current Outpatient Medications   Medication Sig Dispense Refill    ACCU-CHEK TOMAS PLUS TEST STRP Strp TEST BLOOD SUGAR SIX TIMES DAILY 300 strip 5    ACCU-CHEK MULTICLIX LANCET lancets TEST BLOOD SUGAR THREE TIMES DAILY 200 each 5    CARBOXYMETHYLCELLULOSE SODIUM (REFRESH OPHT) Apply to eye.      diclofenac sodium (VOLTAREN) 1 % Gel Apply 2 g topically once daily. 100 g 2    fluticasone (FLONASE) 50 mcg/actuation nasal spray 2 sprays (100 mcg total) by Each Nare route once daily. 16 g 6    furosemide (LASIX) 20 MG tablet Take 1 tablet (20 mg total) by mouth once daily. 30 tablet 12    guaiFENesin (MUCINEX) 600 mg 12 hr tablet Take 1 tablet (600 mg total) by mouth 2 (two) times daily.      insulin (LANTUS SOLOSTAR U-100 INSULIN) glargine 100 units/mL (3mL) SubQ pen INJECT 16 UNITS SUB-Q AT BEDTIME 15 mL 11    insulin aspart U-100 (NOVOLOG FLEXPEN U-100 INSULIN) 100 unit/mL (3 mL) InPn pen 10 units at breakfast, 8 units at lunch, 10 units at dinner. 15 mL 3    isosorbide mononitrate (IMDUR) 60 MG 24 hr tablet Take 1 tablet (60 mg total) by mouth once daily. 30 tablet 11    losartan (COZAAR) 100 MG tablet TAKE ONE TABLET BY MOUTH EVERY DAY 90 tablet 0    meclizine (ANTIVERT) 12.5 mg tablet Take 1 tablet (12.5 mg total) by mouth 2 (two) times daily as needed. 30 tablet 0    metoprolol succinate (TOPROL-XL) 100 MG 24 hr tablet TAKE ONE TABLET BY MOUTH TWICE DAILY 60 tablet 12    nitroGLYCERIN (NITROSTAT) 0.4 MG SL tablet Place 1 tablet (0.4 mg total) under the tongue every 5 (five) minutes as needed for Chest pain. 25 tablet 12    pantoprazole (PROTONIX) 40 MG tablet TAKE ONE TABLET BY MOUTH EVERY DAY 30 tablet 5    potassium chloride SA (K-DUR,KLOR-CON) 20 MEQ tablet TAKE ONE TABLET BY MOUTH EVERY DAY 30 tablet 12    saw palmetto 160 MG capsule Take 160 mg by mouth 2 (two)  "times daily.      SURE COMFORT PEN NEEDLE 32 gauge x 5/32" Ndle USE FOUR TIMES DAILY. 200 each 6     No current facility-administered medications for this visit.        There are no discontinued medications.    No follow-ups on file.      Abdulaziz Mccabe MD              "

## 2020-01-08 ENCOUNTER — LAB VISIT (OUTPATIENT)
Dept: LAB | Facility: HOSPITAL | Age: 85
End: 2020-01-08
Attending: FAMILY MEDICINE
Payer: MEDICARE

## 2020-01-08 DIAGNOSIS — E78.2 MIXED HYPERLIPIDEMIA: Chronic | ICD-10-CM

## 2020-01-08 DIAGNOSIS — I25.810 CORONARY ARTERY DISEASE INVOLVING CORONARY BYPASS GRAFT OF NATIVE HEART WITHOUT ANGINA PECTORIS: ICD-10-CM

## 2020-01-08 DIAGNOSIS — N28.1 COMPLEX RENAL CYST: ICD-10-CM

## 2020-01-08 DIAGNOSIS — E08.51 DIABETES MELLITUS DUE TO UNDERLYING CONDITION WITH DIABETIC PERIPHERAL ANGIOPATHY WITHOUT GANGRENE, WITHOUT LONG-TERM CURRENT USE OF INSULIN: ICD-10-CM

## 2020-01-08 LAB
ALBUMIN SERPL BCP-MCNC: 3.6 G/DL (ref 3.5–5.2)
ALP SERPL-CCNC: 124 U/L (ref 55–135)
ALT SERPL W/O P-5'-P-CCNC: 16 U/L (ref 10–44)
ANION GAP SERPL CALC-SCNC: 7 MMOL/L (ref 8–16)
AST SERPL-CCNC: 27 U/L (ref 10–40)
BASOPHILS # BLD AUTO: 0.04 K/UL (ref 0–0.2)
BASOPHILS NFR BLD: 0.7 % (ref 0–1.9)
BILIRUB SERPL-MCNC: 0.6 MG/DL (ref 0.1–1)
BUN SERPL-MCNC: 19 MG/DL (ref 8–23)
CALCIUM SERPL-MCNC: 10.2 MG/DL (ref 8.7–10.5)
CHLORIDE SERPL-SCNC: 105 MMOL/L (ref 95–110)
CHOLEST SERPL-MCNC: 176 MG/DL (ref 120–199)
CHOLEST/HDLC SERPL: 3.4 {RATIO} (ref 2–5)
CO2 SERPL-SCNC: 30 MMOL/L (ref 23–29)
CREAT SERPL-MCNC: 0.9 MG/DL (ref 0.5–1.4)
CREAT SERPL-MCNC: 0.9 MG/DL (ref 0.5–1.4)
DIFFERENTIAL METHOD: ABNORMAL
EOSINOPHIL # BLD AUTO: 0.1 K/UL (ref 0–0.5)
EOSINOPHIL NFR BLD: 2.1 % (ref 0–8)
ERYTHROCYTE [DISTWIDTH] IN BLOOD BY AUTOMATED COUNT: 13.8 % (ref 11.5–14.5)
EST. GFR  (AFRICAN AMERICAN): >60 ML/MIN/1.73 M^2
EST. GFR  (AFRICAN AMERICAN): >60 ML/MIN/1.73 M^2
EST. GFR  (NON AFRICAN AMERICAN): >60 ML/MIN/1.73 M^2
EST. GFR  (NON AFRICAN AMERICAN): >60 ML/MIN/1.73 M^2
GLUCOSE SERPL-MCNC: 116 MG/DL (ref 70–110)
HCT VFR BLD AUTO: 40.9 % (ref 40–54)
HDLC SERPL-MCNC: 52 MG/DL (ref 40–75)
HDLC SERPL: 29.5 % (ref 20–50)
HGB BLD-MCNC: 12.6 G/DL (ref 14–18)
IMM GRANULOCYTES # BLD AUTO: 0.01 K/UL (ref 0–0.04)
IMM GRANULOCYTES NFR BLD AUTO: 0.2 % (ref 0–0.5)
LDLC SERPL CALC-MCNC: 110.6 MG/DL (ref 63–159)
LYMPHOCYTES # BLD AUTO: 1.8 K/UL (ref 1–4.8)
LYMPHOCYTES NFR BLD: 31 % (ref 18–48)
MCH RBC QN AUTO: 30.1 PG (ref 27–31)
MCHC RBC AUTO-ENTMCNC: 30.8 G/DL (ref 32–36)
MCV RBC AUTO: 98 FL (ref 82–98)
MONOCYTES # BLD AUTO: 0.7 K/UL (ref 0.3–1)
MONOCYTES NFR BLD: 12 % (ref 4–15)
NEUTROPHILS # BLD AUTO: 3.1 K/UL (ref 1.8–7.7)
NEUTROPHILS NFR BLD: 54 % (ref 38–73)
NONHDLC SERPL-MCNC: 124 MG/DL
NRBC BLD-RTO: 0 /100 WBC
PLATELET # BLD AUTO: 127 K/UL (ref 150–350)
PMV BLD AUTO: 12 FL (ref 9.2–12.9)
POTASSIUM SERPL-SCNC: 4.3 MMOL/L (ref 3.5–5.1)
PROT SERPL-MCNC: 7.1 G/DL (ref 6–8.4)
RBC # BLD AUTO: 4.18 M/UL (ref 4.6–6.2)
SODIUM SERPL-SCNC: 142 MMOL/L (ref 136–145)
TRIGL SERPL-MCNC: 67 MG/DL (ref 30–150)
WBC # BLD AUTO: 5.77 K/UL (ref 3.9–12.7)

## 2020-01-08 PROCEDURE — 85025 COMPLETE CBC W/AUTO DIFF WBC: CPT | Mod: HCNC

## 2020-01-08 PROCEDURE — 83036 HEMOGLOBIN GLYCOSYLATED A1C: CPT | Mod: HCNC

## 2020-01-08 PROCEDURE — 80053 COMPREHEN METABOLIC PANEL: CPT | Mod: HCNC

## 2020-01-08 PROCEDURE — 36415 COLL VENOUS BLD VENIPUNCTURE: CPT | Mod: HCNC,PO

## 2020-01-08 PROCEDURE — 80061 LIPID PANEL: CPT | Mod: HCNC

## 2020-01-09 LAB
ESTIMATED AVG GLUCOSE: 154 MG/DL (ref 68–131)
HBA1C MFR BLD HPLC: 7 % (ref 4–5.6)

## 2020-01-10 ENCOUNTER — OFFICE VISIT (OUTPATIENT)
Dept: CARDIOLOGY | Facility: CLINIC | Age: 85
End: 2020-01-10
Payer: MEDICARE

## 2020-01-10 VITALS
HEART RATE: 72 BPM | RESPIRATION RATE: 16 BRPM | BODY MASS INDEX: 29.09 KG/M2 | HEIGHT: 69 IN | OXYGEN SATURATION: 98 % | WEIGHT: 196.44 LBS | SYSTOLIC BLOOD PRESSURE: 124 MMHG | DIASTOLIC BLOOD PRESSURE: 68 MMHG

## 2020-01-10 DIAGNOSIS — I65.23 ASYMPTOMATIC STENOSIS OF BOTH CAROTID ARTERIES WITHOUT INFARCTION: ICD-10-CM

## 2020-01-10 DIAGNOSIS — I36.1 NONRHEUMATIC TRICUSPID VALVE REGURGITATION: ICD-10-CM

## 2020-01-10 DIAGNOSIS — I20.9 AP (ANGINA PECTORIS): ICD-10-CM

## 2020-01-10 DIAGNOSIS — I73.9 CLAUDICATION IN PERIPHERAL VASCULAR DISEASE: Chronic | ICD-10-CM

## 2020-01-10 DIAGNOSIS — E66.3 OVERWEIGHT (BMI 25.0-29.9): ICD-10-CM

## 2020-01-10 DIAGNOSIS — I71.21 ANEURYSM OF ASCENDING AORTA: ICD-10-CM

## 2020-01-10 DIAGNOSIS — I25.810 CORONARY ARTERY DISEASE INVOLVING CORONARY BYPASS GRAFT OF NATIVE HEART WITHOUT ANGINA PECTORIS: ICD-10-CM

## 2020-01-10 DIAGNOSIS — E13.51 PERIPHERAL VASCULAR DISEASE DUE TO SECONDARY DIABETES MELLITUS: ICD-10-CM

## 2020-01-10 DIAGNOSIS — Z95.0 PACEMAKER: Chronic | ICD-10-CM

## 2020-01-10 DIAGNOSIS — I10 ESSENTIAL HYPERTENSION: Primary | ICD-10-CM

## 2020-01-10 DIAGNOSIS — Z78.9 STATIN INTOLERANCE: ICD-10-CM

## 2020-01-10 DIAGNOSIS — R06.02 SOB (SHORTNESS OF BREATH): ICD-10-CM

## 2020-01-10 DIAGNOSIS — E08.51 DIABETES MELLITUS DUE TO UNDERLYING CONDITION WITH DIABETIC PERIPHERAL ANGIOPATHY WITHOUT GANGRENE, WITHOUT LONG-TERM CURRENT USE OF INSULIN: ICD-10-CM

## 2020-01-10 DIAGNOSIS — I61.9 HEMORRHAGIC CEREBROVASCULAR ACCIDENT (CVA): ICD-10-CM

## 2020-01-10 DIAGNOSIS — R60.0 EDEMA OF BOTH LEGS: ICD-10-CM

## 2020-01-10 DIAGNOSIS — I77.9 CAROTID ARTERY DISEASE WITHOUT CEREBRAL INFARCTION: Chronic | ICD-10-CM

## 2020-01-10 DIAGNOSIS — I48.0 PAROXYSMAL ATRIAL FIBRILLATION: Chronic | ICD-10-CM

## 2020-01-10 DIAGNOSIS — I73.9 PERIPHERAL VASCULAR DISEASE: Chronic | ICD-10-CM

## 2020-01-10 PROCEDURE — 1159F MED LIST DOCD IN RCRD: CPT | Mod: HCNC,S$GLB,, | Performed by: INTERNAL MEDICINE

## 2020-01-10 PROCEDURE — 99999 PR PBB SHADOW E&M-EST. PATIENT-LVL III: CPT | Mod: PBBFAC,HCNC,, | Performed by: INTERNAL MEDICINE

## 2020-01-10 PROCEDURE — 99999 PR PBB SHADOW E&M-EST. PATIENT-LVL III: ICD-10-PCS | Mod: PBBFAC,HCNC,, | Performed by: INTERNAL MEDICINE

## 2020-01-10 PROCEDURE — 99215 PR OFFICE/OUTPT VISIT, EST, LEVL V, 40-54 MIN: ICD-10-PCS | Mod: HCNC,S$GLB,, | Performed by: INTERNAL MEDICINE

## 2020-01-10 PROCEDURE — 1126F PR PAIN SEVERITY QUANTIFIED, NO PAIN PRESENT: ICD-10-PCS | Mod: HCNC,S$GLB,, | Performed by: INTERNAL MEDICINE

## 2020-01-10 PROCEDURE — 99499 RISK ADDL DX/OHS AUDIT: ICD-10-PCS | Mod: HCNC,S$GLB,, | Performed by: INTERNAL MEDICINE

## 2020-01-10 PROCEDURE — 1101F PR PT FALLS ASSESS DOC 0-1 FALLS W/OUT INJ PAST YR: ICD-10-PCS | Mod: HCNC,CPTII,S$GLB, | Performed by: INTERNAL MEDICINE

## 2020-01-10 PROCEDURE — 99215 OFFICE O/P EST HI 40 MIN: CPT | Mod: HCNC,S$GLB,, | Performed by: INTERNAL MEDICINE

## 2020-01-10 PROCEDURE — 1159F PR MEDICATION LIST DOCUMENTED IN MEDICAL RECORD: ICD-10-PCS | Mod: HCNC,S$GLB,, | Performed by: INTERNAL MEDICINE

## 2020-01-10 PROCEDURE — 99499 UNLISTED E&M SERVICE: CPT | Mod: HCNC,S$GLB,, | Performed by: INTERNAL MEDICINE

## 2020-01-10 PROCEDURE — 1126F AMNT PAIN NOTED NONE PRSNT: CPT | Mod: HCNC,S$GLB,, | Performed by: INTERNAL MEDICINE

## 2020-01-10 PROCEDURE — 1101F PT FALLS ASSESS-DOCD LE1/YR: CPT | Mod: HCNC,CPTII,S$GLB, | Performed by: INTERNAL MEDICINE

## 2020-01-10 NOTE — PROGRESS NOTES
Subjective:    Patient ID:  Anthony Blair is a 86 y.o. male who presents for evaluation of Follow-up; Carotid Artery Disease; Hyperlipidemia; Hypertension; Risk Factor Management For Atherosclerosis; Peripheral Arterial Disease; Coronary Artery Disease; Atrial Fibrillation; Claudication; and Shortness of Breath      HPI pt presents for f/u.  His current medical conditions include PAF/PSVT, CAD (PTCA 1980's), HTN, PPM, PHTN, LVH, LAE, carotid artery disease, DM, atrial septal aneurysm/pfo, PAD, dyslipidemia. Nonsmoker.  Past details pertinent for following:   Statin intolerant.  s/p CABG 7/10 (LIMA-LAD, SVG-OM1, SVG-OM3, SVG-PDA) for increasing OLIVARES and multivessel CAD on [facility].   S/p PPM 10/10 for SSS/PAF. Was hospitalized 1/11 for PSVT (Atrial tachycardia) and was started on Amiodarone but was stopped for GI discomfort. He also did not tolerate Multaq and has not tolerated multiple statins.   s/p EPS/ablation of his atrial tachycardia 6/11.   S/p abd w runoff March 2015 and had significant B LE infrapopliteal disease. Atherectomy/pta performed of critical R tibeoperoneal trunk stenosis. Also has L tibeoperoneal trunk stenosis and his PTA/MIGUEL are occluded bilaterally.  Started on Eliquis Feb 2017 for suspicious left internal jugular vein thrombus.  Stress MPI 3/17 showed no ischemia.  Echo 3/17 showed normal LV function, left-right atrial shunt.   Carotid u/s 6/17 showed mild bilateral disease, known L IJ thrombus noted.  Pt called clinic Sept 2017 stated he had stopped Eliquis couple weeks before his call due to diarrhea, weakness, bloating and also off Amlodipine due to sleepiness.  He stated sxs subsided when he stopped the meds.   He was advised by cardiology on 9/6/17 to take 81 mg ASA since he stopped his Eliquis.  He had acute hemorrhage of basal ganglia right side Sept 2017, causing weakness on left side.  Tx at [facility]/24/[facility]. He was on ASA daily when CVA occurred and was not on Eliquis. Also noted to have mild  thoracic aneurysm 4.1 cm, 60% R ICA stenosis, 30% LICA stenosis, patent vertebral arteries but mod-severe distal left vertebral disease,  by CTA per PCP note.  He was taken off his asa.  Followed by Dr. Weaver, neurosurgery. No surgery was needed.  Echo showed normal LVEF.  Stress test 8/18 showed no ischemia, apical scar.    Echo 8/18 normal EF.  Echo 3/19 normal EF, LAE, LVH, DD, PHTN 76 mmHg, mild TR.  Now here.  PPM 10/19 normal device function, a fib burden 6%.  HTN well controlled on current meds.  Weight up 10 pounds.  Eating too much.  Has some OLIVARES, bending over and tying shoes. sxs not acute.  No pnd/orthopnea.  CAD seems stable. No active anginal sxs.  Denies active cp sxs.  No leg edema.  Stable B LE claudication sxs.  Has chronic arthritis in legs.  DM controlled. HGAIC 7.0%, improved from last year.  Lipids controlled, not on statin due to intolerance.  Compliant with meds.  No recurrent TIA/CVA sxs.  No palpitations.  Chronic dizziness, stable, and minimal.  No falls.      Current Outpatient Medications:     ACCU-CHEK TOMAS PLUS TEST STRP Strp, TEST BLOOD SUGAR SIX TIMES DAILY, Disp: 300 strip, Rfl: 5    ACCU-CHEK MULTICLIX LANCET lancets, TEST BLOOD SUGAR THREE TIMES DAILY, Disp: 200 each, Rfl: 5    CARBOXYMETHYLCELLULOSE SODIUM (REFRESH OPHT), Apply to eye., Disp: , Rfl:     diclofenac sodium (VOLTAREN) 1 % Gel, Apply 2 g topically once daily., Disp: 100 g, Rfl: 2    fluticasone (FLONASE) 50 mcg/actuation nasal spray, 2 sprays (100 mcg total) by Each Nare route once daily., Disp: 16 g, Rfl: 6    furosemide (LASIX) 20 MG tablet, Take 1 tablet (20 mg total) by mouth once daily., Disp: 30 tablet, Rfl: 12    guaiFENesin (MUCINEX) 600 mg 12 hr tablet, Take 1 tablet (600 mg total) by mouth 2 (two) times daily., Disp: , Rfl:     insulin (LANTUS SOLOSTAR U-100 INSULIN) glargine 100 units/mL (3mL) SubQ pen, INJECT 16 UNITS SUB-Q AT BEDTIME, Disp: 15 mL, Rfl: 11    insulin aspart U-100 (NOVOLOG  "FLEXPEN U-100 INSULIN) 100 unit/mL (3 mL) InPn pen, 10 units at breakfast, 8 units at lunch, 10 units at dinner., Disp: 15 mL, Rfl: 3    isosorbide mononitrate (IMDUR) 60 MG 24 hr tablet, Take 1 tablet (60 mg total) by mouth once daily., Disp: 30 tablet, Rfl: 11    losartan (COZAAR) 100 MG tablet, TAKE ONE TABLET BY MOUTH EVERY DAY, Disp: 90 tablet, Rfl: 0    meclizine (ANTIVERT) 12.5 mg tablet, Take 1 tablet (12.5 mg total) by mouth 2 (two) times daily as needed., Disp: 30 tablet, Rfl: 0    metoprolol succinate (TOPROL-XL) 100 MG 24 hr tablet, TAKE ONE TABLET BY MOUTH TWICE DAILY, Disp: 60 tablet, Rfl: 12    nitroGLYCERIN (NITROSTAT) 0.4 MG SL tablet, Place 1 tablet (0.4 mg total) under the tongue every 5 (five) minutes as needed for Chest pain., Disp: 25 tablet, Rfl: 12    pantoprazole (PROTONIX) 40 MG tablet, TAKE ONE TABLET BY MOUTH EVERY DAY, Disp: 30 tablet, Rfl: 5    potassium chloride SA (K-DUR,KLOR-CON) 20 MEQ tablet, TAKE ONE TABLET BY MOUTH EVERY DAY, Disp: 30 tablet, Rfl: 12    saw palmetto 160 MG capsule, Take 160 mg by mouth 2 (two) times daily., Disp: , Rfl:     SURE COMFORT PEN NEEDLE 32 gauge x 5/32" Ndle, USE FOUR TIMES DAILY., Disp: 200 each, Rfl: 6        Review of Systems   Constitution: Positive for weight gain.   HENT: Negative.    Eyes: Negative.    Cardiovascular: Positive for claudication and dyspnea on exertion.   Respiratory: Positive for shortness of breath.    Endocrine: Negative.    Hematologic/Lymphatic: Negative.    Skin: Negative.    Musculoskeletal: Positive for arthritis and joint pain.   Gastrointestinal: Negative.    Genitourinary: Negative.    Neurological: Positive for light-headedness and weakness.   Psychiatric/Behavioral: Negative.    Allergic/Immunologic: Negative.        /68 (BP Location: Left arm, Patient Position: Sitting, BP Method: Large (Manual))   Pulse 72   Resp 16   Ht 5' 9" (1.753 m)   Wt 89.1 kg (196 lb 6.9 oz)   SpO2 98%   BMI 29.01 kg/m² "     Wt Readings from Last 3 Encounters:   01/10/20 89.1 kg (196 lb 6.9 oz)   01/07/20 86.1 kg (189 lb 14.8 oz)   01/04/20 86.4 kg (190 lb 7.6 oz)     Temp Readings from Last 3 Encounters:   01/07/20 98.2 °F (36.8 °C) (Temporal)   01/04/20 98.6 °F (37 °C) (Tympanic)   11/14/19 98.1 °F (36.7 °C) (Temporal)     BP Readings from Last 3 Encounters:   01/10/20 124/68   01/07/20 (!) 152/72   01/04/20 124/70     Pulse Readings from Last 3 Encounters:   01/10/20 72   01/07/20 70   01/04/20 84          Objective:    Physical Exam   Constitutional: He is oriented to person, place, and time. He appears well-developed and well-nourished.   HENT:   Head: Normocephalic.   Neck: Normal range of motion. Neck supple. Normal carotid pulses, no hepatojugular reflux and no JVD present. Carotid bruit is not present. No thyromegaly present.   Cardiovascular: Normal rate, regular rhythm, S1 normal and S2 normal. PMI is not displaced. Exam reveals no S3, no S4, no distant heart sounds, no friction rub, no midsystolic click and no opening snap.   No murmur heard.  Pulses:       Radial pulses are 2+ on the right side, and 2+ on the left side.   Pulmonary/Chest: Effort normal and breath sounds normal. He has no wheezes. He has no rales.   Abdominal: Soft. Bowel sounds are normal. He exhibits no distension, no abdominal bruit, no ascites and no mass. There is no tenderness.   Musculoskeletal: He exhibits no edema.   Neurological: He is alert and oriented to person, place, and time.   Skin: Skin is warm.   Psychiatric: He has a normal mood and affect. His behavior is normal.   Nursing note and vitals reviewed.      I have reviewed all pertinent labs and cardiac studies.      Chemistry        Component Value Date/Time     01/08/2020 0815    K 4.3 01/08/2020 0815     01/08/2020 0815    CO2 30 (H) 01/08/2020 0815    BUN 19 01/08/2020 0815    CREATININE 0.9 01/08/2020 0815    CREATININE 0.9 01/08/2020 0815     (H) 01/08/2020 0815         Component Value Date/Time    CALCIUM 10.2 01/08/2020 0815    ALKPHOS 124 01/08/2020 0815    AST 27 01/08/2020 0815    ALT 16 01/08/2020 0815    BILITOT 0.6 01/08/2020 0815    ESTGFRAFRICA >60.0 01/08/2020 0815    ESTGFRAFRICA >60.0 01/08/2020 0815    EGFRNONAA >60.0 01/08/2020 0815    EGFRNONAA >60.0 01/08/2020 0815        Lab Results   Component Value Date    WBC 5.77 01/08/2020    HGB 12.6 (L) 01/08/2020    HCT 40.9 01/08/2020    MCV 98 01/08/2020     (L) 01/08/2020       Lab Results   Component Value Date    HGBA1C 7.0 (H) 01/08/2020     Lab Results   Component Value Date    CHOL 176 01/08/2020    CHOL 175 10/02/2019    CHOL 194 06/26/2019     Lab Results   Component Value Date    HDL 52 01/08/2020    HDL 46 10/02/2019    HDL 44 06/26/2019     Lab Results   Component Value Date    LDLCALC 110.6 01/08/2020    LDLCALC 104.6 10/02/2019    LDLCALC 116.2 06/26/2019     Lab Results   Component Value Date    TRIG 67 01/08/2020    TRIG 122 10/02/2019    TRIG 169 (H) 06/26/2019     Lab Results   Component Value Date    CHOLHDL 29.5 01/08/2020    CHOLHDL 26.3 10/02/2019    CHOLHDL 22.7 06/26/2019           Assessment:       1. Essential hypertension    2. Overweight (BMI 25.0-29.9)    3. SOB (shortness of breath)    4. Peripheral vascular disease    5. Paroxysmal atrial fibrillation    6. Aneurysm of ascending aorta    7. Coronary artery disease involving coronary bypass graft of native heart without angina pectoris    8. Pacemaker    9. Peripheral vascular disease due to secondary diabetes mellitus    10. Nonrheumatic tricuspid valve regurgitation    11. Statin intolerance    12. AP (angina pectoris)    13. Asymptomatic stenosis of both carotid arteries without infarction    14. Carotid artery disease without cerebral infarction    15. Claudication in peripheral vascular disease    16. Diabetes mellitus due to underlying condition with diabetic peripheral angiopathy without gangrene, without long-term  current use of insulin    17. Edema of both legs    18. Hemorrhagic cerebrovascular accident (CVA)         Plan:             Stable cardiovascular conditions at present time on current medical treatment.  Reviewed all pertinent tests and above medical conditions with patient in detail and formulated treatment plan.  Continue optimal medical treatment for CAD/Cardiovascular conditions.  Poor candidate for asa and/or NOAC for a fib with h/o hemorrhagic CVA.  Cardiac low salt diet discussed.  Daily exercise encouraged as tolerated.  Fall risk precautions.  Maintaining healthy weight and weight loss goals (if needed) were discussed in clinic.  Needs to lose 10 pounds by next appt.  Medical tx for chronic PAD.  F/u PPM clinic next scheduled appt.  Keep DM HGAIC well controlled.  Diet for lipids -- statin intolerant.  F/u carotid u/s in future.  Monitor thoracic aneurysm but would be poor candidate in future for any surgery.  No changes in meds today.  F/u in 6 months.      Complex visit detailing numerous CV issues as noted above and formulating tx plan.

## 2020-01-12 ENCOUNTER — HOSPITAL ENCOUNTER (EMERGENCY)
Facility: HOSPITAL | Age: 85
Discharge: HOME | End: 2020-01-13
Attending: EMERGENCY MEDICINE
Payer: MEDICARE

## 2020-01-12 DIAGNOSIS — R07.9 CHEST PAIN: ICD-10-CM

## 2020-01-12 LAB
ALBUMIN SERPL BCP-MCNC: 3.5 G/DL (ref 3.5–5.2)
ALP SERPL-CCNC: 111 U/L (ref 55–135)
ALT SERPL W/O P-5'-P-CCNC: 13 U/L (ref 10–44)
ANION GAP SERPL CALC-SCNC: 11 MMOL/L (ref 8–16)
AST SERPL-CCNC: 27 U/L (ref 10–40)
BASOPHILS # BLD AUTO: 0.03 K/UL (ref 0–0.2)
BASOPHILS NFR BLD: 0.6 % (ref 0–1.9)
BILIRUB SERPL-MCNC: 0.6 MG/DL (ref 0.1–1)
BNP SERPL-MCNC: 148 PG/ML (ref 0–99)
BUN SERPL-MCNC: 13 MG/DL (ref 8–23)
CALCIUM SERPL-MCNC: 9.5 MG/DL (ref 8.7–10.5)
CHLORIDE SERPL-SCNC: 102 MMOL/L (ref 95–110)
CO2 SERPL-SCNC: 24 MMOL/L (ref 23–29)
CREAT SERPL-MCNC: 0.8 MG/DL (ref 0.5–1.4)
DIFFERENTIAL METHOD: ABNORMAL
EOSINOPHIL # BLD AUTO: 0.2 K/UL (ref 0–0.5)
EOSINOPHIL NFR BLD: 3.2 % (ref 0–8)
ERYTHROCYTE [DISTWIDTH] IN BLOOD BY AUTOMATED COUNT: 13.4 % (ref 11.5–14.5)
EST. GFR  (AFRICAN AMERICAN): >60 ML/MIN/1.73 M^2
EST. GFR  (NON AFRICAN AMERICAN): >60 ML/MIN/1.73 M^2
GLUCOSE SERPL-MCNC: 159 MG/DL (ref 70–110)
HCT VFR BLD AUTO: 36.4 % (ref 40–54)
HGB BLD-MCNC: 12 G/DL (ref 14–18)
IMM GRANULOCYTES # BLD AUTO: 0.01 K/UL (ref 0–0.04)
IMM GRANULOCYTES NFR BLD AUTO: 0.2 % (ref 0–0.5)
LYMPHOCYTES # BLD AUTO: 1.6 K/UL (ref 1–4.8)
LYMPHOCYTES NFR BLD: 30.2 % (ref 18–48)
MCH RBC QN AUTO: 30.8 PG (ref 27–31)
MCHC RBC AUTO-ENTMCNC: 33 G/DL (ref 32–36)
MCV RBC AUTO: 94 FL (ref 82–98)
MONOCYTES # BLD AUTO: 0.6 K/UL (ref 0.3–1)
MONOCYTES NFR BLD: 11.1 % (ref 4–15)
NEUTROPHILS # BLD AUTO: 2.9 K/UL (ref 1.8–7.7)
NEUTROPHILS NFR BLD: 54.7 % (ref 38–73)
NRBC BLD-RTO: 0 /100 WBC
PLATELET # BLD AUTO: 150 K/UL (ref 150–350)
PMV BLD AUTO: 10.1 FL (ref 9.2–12.9)
POTASSIUM SERPL-SCNC: 3.7 MMOL/L (ref 3.5–5.1)
PROT SERPL-MCNC: 7 G/DL (ref 6–8.4)
RBC # BLD AUTO: 3.89 M/UL (ref 4.6–6.2)
SODIUM SERPL-SCNC: 137 MMOL/L (ref 136–145)
TROPONIN I SERPL DL<=0.01 NG/ML-MCNC: 0.01 NG/ML (ref 0–0.03)
WBC # BLD AUTO: 5.24 K/UL (ref 3.9–12.7)

## 2020-01-12 PROCEDURE — 93005 ELECTROCARDIOGRAM TRACING: CPT | Mod: HCNC

## 2020-01-12 PROCEDURE — 85025 COMPLETE CBC W/AUTO DIFF WBC: CPT | Mod: HCNC

## 2020-01-12 PROCEDURE — 80053 COMPREHEN METABOLIC PANEL: CPT | Mod: HCNC

## 2020-01-12 PROCEDURE — 99284 EMERGENCY DEPT VISIT MOD MDM: CPT | Mod: 25,HCNC

## 2020-01-12 PROCEDURE — 93010 EKG 12-LEAD: ICD-10-PCS | Mod: HCNC,,, | Performed by: INTERNAL MEDICINE

## 2020-01-12 PROCEDURE — 93010 ELECTROCARDIOGRAM REPORT: CPT | Mod: HCNC,,, | Performed by: INTERNAL MEDICINE

## 2020-01-12 PROCEDURE — 84484 ASSAY OF TROPONIN QUANT: CPT | Mod: HCNC

## 2020-01-12 PROCEDURE — 83880 ASSAY OF NATRIURETIC PEPTIDE: CPT | Mod: HCNC

## 2020-01-12 PROCEDURE — 36415 COLL VENOUS BLD VENIPUNCTURE: CPT | Mod: HCNC

## 2020-01-12 RX ORDER — ASPIRIN 325 MG
325 TABLET ORAL
Status: DISCONTINUED | OUTPATIENT
Start: 2020-01-12 | End: 2020-01-13 | Stop reason: HOSPADM

## 2020-01-13 VITALS
BODY MASS INDEX: 27.09 KG/M2 | WEIGHT: 182.88 LBS | RESPIRATION RATE: 18 BRPM | OXYGEN SATURATION: 96 % | DIASTOLIC BLOOD PRESSURE: 62 MMHG | TEMPERATURE: 99 F | SYSTOLIC BLOOD PRESSURE: 134 MMHG | HEART RATE: 74 BPM | HEIGHT: 69 IN

## 2020-01-13 LAB — TROPONIN I SERPL DL<=0.01 NG/ML-MCNC: 0.02 NG/ML (ref 0–0.03)

## 2020-01-13 PROCEDURE — 84484 ASSAY OF TROPONIN QUANT: CPT | Mod: HCNC

## 2020-01-13 NOTE — ED PROVIDER NOTES
"SCRIBE #1 NOTE: I, Willem Painter, am scribing for, and in the presence of, Lizzie Funez MD. I have scribed the entire note.       History     Chief Complaint   Patient presents with    AICD Problem     Pt's heart rate is ranging between      Review of patient's allergies indicates:   Allergen Reactions    Atorvastatin      Other reaction(s): Muscle pain  Other reaction(s): Muscle weakness  Other reaction(s): Muscle pain    Codeine      Other reaction(s): Headache  Other reaction(s): Headache    Eliquis [apixaban]     Norvasc [amlodipine] Edema    Trulicity [dulaglutide] Nausea And Vomiting    Adhesive Rash     Other reaction(s): rash         History of Present Illness     HPI    1/12/2020, 9:48 PM  History obtained from the patient      History of Present Illness: Anthony Blair is a 86 y.o. male patient with a h/o pacemaker who presents to the Emergency Department for evaluation of intermittent generalized CP which onset several hours ago. Patient states sx have resolved at this time. No mitigating or exacerbating factors reported. No associated sxs reported. Patient denies any diaphoresis, SOB, cough, palpitations, n/v, and all other sxs at this time. Prior Tx includes 2 NTG PTA. No further complaints or concerns at this time. Patient states sx onset while at rest in bed but while "shaking." Patient reports increased stress today prior to onset of sx.      Arrival mode: Personal transportation    PCP: Abdulaziz Mccabe MD      Past Medical History:  Past Medical History:   Diagnosis Date    A-fib     Anemia     AP (angina pectoris) 1/23/2014    Carotid artery disease without cerebral infarction 8/22/2014    Cataract     Coronary artery disease     GERD (gastroesophageal reflux disease) 7/11/2013    Hemorrhagic cerebrovascular accident (CVA) 4/26/2018    Hyperlipidemia     Hypertension     Hypertensive heart disease with heart failure 3/12/2019    Intracranial hemorrhage     Lumbosacral " spondylosis 2014    Pacemaker 2014    Paroxysmal atrial fibrillation 2014    Peripheral vascular disease 2014    Pneumonia of right lung due to infectious organism 3/27/2019    Seizure, late effect of stroke     Stroke     Type 2 diabetes mellitus with ophthalmic manifestations        Past Surgical History:  Past Surgical History:   Procedure Laterality Date    ANGIOPLASTY      ATRIAL ABLATION SURGERY N/A     CATARACT EXTRACTION Bilateral     [facility]    CATARACT EXTRACTION W/ INTRAOCULAR LENS IMPLANT Right     CATARACT EXTRACTION W/ INTRAOCULAR LENS IMPLANT Left     COLONOSCOPY N/A 10/16/2018    Procedure: COLONOSCOPY with biopsies;  Surgeon: Anabell Griggs MD;  Location: [facility] ENDO;  Service: Endoscopy;  Laterality: N/A;    CORONARY ARTERY BYPASS GRAFT  07/2010    x5    EYE SURGERY      pace maker surgrey  10/2010    reposition in  (approx)    VEIN BYPASS SURGERY           Family History:  Family History   Problem Relation Age of Onset    Arthritis Mother     Diabetes Mother     Kidney disease Mother     Heart disease Mother     Arthritis Father     Diabetes Father     Diabetes Sister     Cancer Sister         breast    Heart disease Sister     Diabetes Brother     Kidney disease Brother     Heart disease Brother     Cancer Daughter         breast    Diabetes Daughter     Miscarriages / Stillbirths Daughter     Fibromyalgia Daughter     Diabetes Son     Diabetes Son     Alcohol abuse Paternal Uncle     Stroke Neg Hx        Social History:   Social History     Tobacco Use    Smoking status: Former Smoker     Packs/day: 2.00     Years: 13.00     Pack years: 26.00     Types: Cigarettes     Start date: 1954     Last attempt to quit: 1967     Years since quittin.6    Smokeless tobacco: Never Used   Substance and Sexual Activity    Alcohol use: No    Drug use: No    Sexual activity: Never     Partners: Female     Birth  control/protection: None        Review of Systems     Review of Systems   Constitutional: Negative for diaphoresis and fever.   HENT: Negative for sore throat.    Respiratory: Negative for cough and shortness of breath.    Cardiovascular: Positive for chest pain. Negative for palpitations.   Gastrointestinal: Negative for nausea and vomiting.   Genitourinary: Negative for dysuria.   Musculoskeletal: Negative for back pain.   Skin: Negative for rash.   Neurological: Negative for weakness.   Hematological: Does not bruise/bleed easily.   All other systems reviewed and are negative.       Physical Exam     Initial Vitals [01/12/20 2131]   BP Pulse Resp Temp SpO2   (!) 178/102 96 (!) 24 98.4 °F (36.9 °C) 97 %      MAP       --          Physical Exam  Nursing Notes and Vital Signs Reviewed.  Constitutional: Well-developed and well-nourished. NAD.  Head: Atraumatic. Normocephalic.  Eyes: PERRL. EOM intact. Conjunctivae are not pale. No scleral icterus.  ENT: Mucous membranes are moist. Oropharynx is clear and symmetric.    Neck: Supple. Full ROM. No lymphadenopathy.  Cardiovascular: Regular rate. Regular rhythm. No murmurs, rubs, or gallops. Distal pulses are 2+ and symmetric.  Pulmonary/Chest: No respiratory distress. Clear to auscultation bilaterally. No wheezing or rales.  Abdominal: Soft and non-distended.  There is no tenderness.  No rebound, guarding, or rigidity. Good bowel sounds.  Genitourinary: No CVA tenderness  Musculoskeletal: Moves all extremities. No obvious deformities. No calf tenderness.  Skin: Warm and dry.  Neurological:  Alert, awake, and appropriate.  Normal speech.  No acute focal neurological deficits are appreciated.  Psychiatric: Normal affect. Good eye contact. Appropriate in content.     ED Course   Procedures  ED Vital Signs:  Vitals:    01/12/20 2131 01/12/20 2150 01/12/20 2200 01/12/20 2348   BP: (!) 178/102   (!) 155/70   Pulse: 96 75  72   Resp: (!) 24   11   Temp: 98.4 °F (36.9 °C)     "  TempSrc: Oral      SpO2: 97%   96%   Weight:   83 kg (182 lb 14.4 oz)    Height: 5' 9" (1.753 m)       01/13/20 0001 01/13/20 0102 01/13/20 0211 01/13/20 0217   BP: (!) 146/64 (!) 144/68 (!) 132/59 134/62   Pulse: 75 64 68 74   Resp: 19 16 15 18   Temp:    98.6 °F (37 °C)   TempSrc:    Oral   SpO2: 95% 95% 97% 96%   Weight:       Height:           Abnormal Lab Results:  Labs Reviewed   CBC W/ AUTO DIFFERENTIAL - Abnormal; Notable for the following components:       Result Value    RBC 3.89 (*)     Hemoglobin 12.0 (*)     Hematocrit 36.4 (*)     All other components within normal limits   COMPREHENSIVE METABOLIC PANEL - Abnormal; Notable for the following components:    Glucose 159 (*)     All other components within normal limits   B-TYPE NATRIURETIC PEPTIDE - Abnormal; Notable for the following components:     (*)     All other components within normal limits   TROPONIN I   TROPONIN I        All Lab Results:  Results for orders placed or performed during the hospital encounter of 01/12/20   CBC auto differential   Result Value Ref Range    WBC 5.24 3.90 - 12.70 K/uL    RBC 3.89 (L) 4.60 - 6.20 M/uL    Hemoglobin 12.0 (L) 14.0 - 18.0 g/dL    Hematocrit 36.4 (L) 40.0 - 54.0 %    Mean Corpuscular Volume 94 82 - 98 fL    Mean Corpuscular Hemoglobin 30.8 27.0 - 31.0 pg    Mean Corpuscular Hemoglobin Conc 33.0 32.0 - 36.0 g/dL    RDW 13.4 11.5 - 14.5 %    Platelets 150 150 - 350 K/uL    MPV 10.1 9.2 - 12.9 fL    Immature Granulocytes 0.2 0.0 - 0.5 %    Gran # (ANC) 2.9 1.8 - 7.7 K/uL    Immature Grans (Abs) 0.01 0.00 - 0.04 K/uL    Lymph # 1.6 1.0 - 4.8 K/uL    Mono # 0.6 0.3 - 1.0 K/uL    Eos # 0.2 0.0 - 0.5 K/uL    Baso # 0.03 0.00 - 0.20 K/uL    nRBC 0 0 /100 WBC    Gran% 54.7 38.0 - 73.0 %    Lymph% 30.2 18.0 - 48.0 %    Mono% 11.1 4.0 - 15.0 %    Eosinophil% 3.2 0.0 - 8.0 %    Basophil% 0.6 0.0 - 1.9 %    Differential Method Automated    Comprehensive metabolic panel   Result Value Ref Range    Sodium 137 " 136 - 145 mmol/L    Potassium 3.7 3.5 - 5.1 mmol/L    Chloride 102 95 - 110 mmol/L    CO2 24 23 - 29 mmol/L    Glucose 159 (H) 70 - 110 mg/dL    BUN, Bld 13 8 - 23 mg/dL    Creatinine 0.8 0.5 - 1.4 mg/dL    Calcium 9.5 8.7 - 10.5 mg/dL    Total Protein 7.0 6.0 - 8.4 g/dL    Albumin 3.5 3.5 - 5.2 g/dL    Total Bilirubin 0.6 0.1 - 1.0 mg/dL    Alkaline Phosphatase 111 55 - 135 U/L    AST 27 10 - 40 U/L    ALT 13 10 - 44 U/L    Anion Gap 11 8 - 16 mmol/L    eGFR if African American >60 >60 mL/min/1.73 m^2    eGFR if non African American >60 >60 mL/min/1.73 m^2   Troponin I #1   Result Value Ref Range    Troponin I 0.012 0.000 - 0.026 ng/mL   B-Type natriuretic peptide (BNP)   Result Value Ref Range     (H) 0 - 99 pg/mL   Troponin I   Result Value Ref Range    Troponin I 0.017 0.000 - 0.026 ng/mL         The EKG was ordered, reviewed, and independently interpreted by the ED provider.  Interpretation time: 2141  Rate: 88 BPM  Rhythm: ventricular paced rhythm with occasional AV dual paced complexes and with premature ventricular or aberrantly conducted complexes  Interpretation: No acute ST changes. No STEMI.      The Emergency Provider reviewed the vital signs and test results, which are outlined above.     ED Discussion     2:03 AM: Reassessed pt at this time.  Pt states his condition has improved at this time. Discussed with pt all pertinent ED information and results. Discussed pt dx and plan of tx. Gave pt all f/u and return to the ED instructions. All questions and concerns were addressed at this time. Pt expresses understanding of information and instructions, and is comfortable with plan to discharge. Pt is stable for discharge.    I discussed with patient and/or family/caretaker that evaluation in the ED does not suggest any emergent or life threatening medical conditions requiring immediate intervention beyond what was provided in the ED, and I believe patient is safe for discharge.  Regardless, an  unremarkable evaluation in the ED does not preclude the development or presence of a serious of life threatening condition. As such, patient was instructed to return immediately for any worsening or change in current symptoms.    I have discussed with patient and/or family/caretaker chest pain precautions, specifically to return for worsening chest pain, shortness of breath, fever, or any concern.  I have low suspicion for cardiopulmonary, vascular, infectious, respiratory, or other emergent medical condition based on my evaluation in the ED.       MDM        Medical Decision Making:   Clinical Tests:   Lab Tests: Ordered and Reviewed  Medical Tests: Reviewed and Ordered           ED Medication(s):  Medications - No data to display    Discharge Medication List as of 1/13/2020  2:02 AM           Medication List      ASK your doctor about these medications    Accu-Chek Becky Plus test strp Strp  Generic drug:  blood sugar diagnostic  TEST BLOOD SUGAR SIX TIMES DAILY     Accu-Chek Multiclix Lancet Misc  Generic drug:  lancets  TEST BLOOD SUGAR THREE TIMES DAILY     diclofenac sodium 1 % Gel  Commonly known as:  Voltaren  Apply 2 g topically once daily.     fluticasone propionate 50 mcg/actuation nasal spray  Commonly known as:  FLONASE  2 sprays (100 mcg total) by Each Nare route once daily.     furosemide 20 MG tablet  Commonly known as:  LASIX  Take 1 tablet (20 mg total) by mouth once daily.     guaiFENesin 600 mg 12 hr tablet  Commonly known as:  MUCINEX  Take 1 tablet (600 mg total) by mouth 2 (two) times daily.     insulin aspart U-100 100 unit/mL (3 mL) Inpn pen  Commonly known as:  NovoLOG Flexpen U-100 Insulin  10 units at breakfast, 8 units at lunch, 10 units at dinner.     insulin glargine 100 units/mL (3mL) SubQ pen  Commonly known as:  Lantus Solostar U-100 Insulin  INJECT 16 UNITS SUB-Q AT BEDTIME     isosorbide mononitrate 60 MG 24 hr tablet  Commonly known as:  IMDUR  Take 1 tablet (60 mg total) by  "mouth once daily.     losartan 100 MG tablet  Commonly known as:  COZAAR  TAKE ONE TABLET BY MOUTH EVERY DAY     meclizine 12.5 mg tablet  Commonly known as:  ANTIVERT  Take 1 tablet (12.5 mg total) by mouth 2 (two) times daily as needed.     metoprolol succinate 100 MG 24 hr tablet  Commonly known as:  TOPROL-XL  TAKE ONE TABLET BY MOUTH TWICE DAILY     nitroGLYCERIN 0.4 MG SL tablet  Commonly known as:  Nitrostat  Place 1 tablet (0.4 mg total) under the tongue every 5 (five) minutes as needed for Chest pain.     pantoprazole 40 MG tablet  Commonly known as:  PROTONIX  TAKE ONE TABLET BY MOUTH EVERY DAY     potassium chloride SA 20 MEQ tablet  Commonly known as:  K-DUR,KLOR-CON  TAKE ONE TABLET BY MOUTH EVERY DAY     REFRESH OPHT     saw palmetto 160 MG capsule     Sure Comfort Pen Needle 32 gauge x 5/32" Ndle  Generic drug:  pen needle, diabetic  USE FOUR TIMES DAILY.            Follow-up Information     Abdulaziz Mccabe MD In 2 days.    Specialty:  Family Medicine  Contact information:  [address] [address] [address] [phone]             [facility]    Specialty:  Emergency Medicine  Why:  As needed, If symptoms worsen  Contact information:  [address] [facility] [address] [phone]                     Scribe Attestation:   Scribe #1: I performed the above scribed service and the documentation accurately describes the services I performed. I attest to the accuracy of the note.     Attending:   Physician Attestation Statement for Scribe #1: I, Lizzie Funez MD, personally performed the services described in this documentation, as scribed by Willem Painetr, in my presence, and it is both accurate and complete.           Clinical Impression       ICD-10-CM ICD-9-CM   1. Chest pain R07.9 786.50       Disposition:   Disposition: Discharged  Condition: Stable         Lizzie Funez MD  01/14/20 0550    "

## 2020-01-15 ENCOUNTER — OFFICE VISIT (OUTPATIENT)
Dept: FAMILY MEDICINE | Facility: CLINIC | Age: 85
End: 2020-01-15
Payer: MEDICARE

## 2020-01-15 VITALS
BODY MASS INDEX: 28.87 KG/M2 | OXYGEN SATURATION: 96 % | WEIGHT: 194.88 LBS | SYSTOLIC BLOOD PRESSURE: 120 MMHG | HEIGHT: 69 IN | DIASTOLIC BLOOD PRESSURE: 68 MMHG | TEMPERATURE: 99 F | HEART RATE: 84 BPM

## 2020-01-15 DIAGNOSIS — I10 ESSENTIAL HYPERTENSION: ICD-10-CM

## 2020-01-15 DIAGNOSIS — I71.21 ANEURYSM OF ASCENDING AORTA: ICD-10-CM

## 2020-01-15 DIAGNOSIS — R60.0 PEDAL EDEMA: ICD-10-CM

## 2020-01-15 DIAGNOSIS — E11.9 DIABETES MELLITUS TYPE 2 WITHOUT RETINOPATHY: Primary | Chronic | ICD-10-CM

## 2020-01-15 DIAGNOSIS — E78.2 MIXED HYPERLIPIDEMIA: Chronic | ICD-10-CM

## 2020-01-15 PROCEDURE — 1101F PT FALLS ASSESS-DOCD LE1/YR: CPT | Mod: HCNC,CPTII,S$GLB, | Performed by: FAMILY MEDICINE

## 2020-01-15 PROCEDURE — 1159F PR MEDICATION LIST DOCUMENTED IN MEDICAL RECORD: ICD-10-PCS | Mod: HCNC,S$GLB,, | Performed by: FAMILY MEDICINE

## 2020-01-15 PROCEDURE — 1126F AMNT PAIN NOTED NONE PRSNT: CPT | Mod: HCNC,S$GLB,, | Performed by: FAMILY MEDICINE

## 2020-01-15 PROCEDURE — 99214 OFFICE O/P EST MOD 30 MIN: CPT | Mod: HCNC,S$GLB,, | Performed by: FAMILY MEDICINE

## 2020-01-15 PROCEDURE — 99214 PR OFFICE/OUTPT VISIT, EST, LEVL IV, 30-39 MIN: ICD-10-PCS | Mod: HCNC,S$GLB,, | Performed by: FAMILY MEDICINE

## 2020-01-15 PROCEDURE — 1159F MED LIST DOCD IN RCRD: CPT | Mod: HCNC,S$GLB,, | Performed by: FAMILY MEDICINE

## 2020-01-15 PROCEDURE — 99999 PR PBB SHADOW E&M-EST. PATIENT-LVL IV: ICD-10-PCS | Mod: PBBFAC,HCNC,, | Performed by: FAMILY MEDICINE

## 2020-01-15 PROCEDURE — 1101F PR PT FALLS ASSESS DOC 0-1 FALLS W/OUT INJ PAST YR: ICD-10-PCS | Mod: HCNC,CPTII,S$GLB, | Performed by: FAMILY MEDICINE

## 2020-01-15 PROCEDURE — 99999 PR PBB SHADOW E&M-EST. PATIENT-LVL IV: CPT | Mod: PBBFAC,HCNC,, | Performed by: FAMILY MEDICINE

## 2020-01-15 PROCEDURE — 1126F PR PAIN SEVERITY QUANTIFIED, NO PAIN PRESENT: ICD-10-PCS | Mod: HCNC,S$GLB,, | Performed by: FAMILY MEDICINE

## 2020-01-15 NOTE — PROGRESS NOTES
Chief Complaint:    Chief Complaint   Patient presents with    Follow-up       History of Present Illness:  Presents today for three-month follow-up:  A1c slightly up he has been under stress.    Blood pressure is stable    Also has carotid stenosis following with Cardiology with serial imaging has not had carotid Dopplers in a while he has been asymptomatic    Recently went to the ED with complaints of chest pain was told is noncardiac following with Dr. Frazier  He seems to think it was stress related.  Workup was unremarkable.    He has swelling involving both ankles he is taking 40 mg of Lasix that do not seem to help denies any shortness of breath    ROS:  Review of Systems   Constitutional: Negative for activity change, chills, fatigue, fever and unexpected weight change.   HENT: Negative for congestion, ear discharge, ear pain, hearing loss, postnasal drip and rhinorrhea.    Eyes: Negative for pain and visual disturbance.   Respiratory: Negative for cough, chest tightness and shortness of breath.    Cardiovascular: Negative for chest pain and palpitations.   Gastrointestinal: Negative for abdominal pain, diarrhea and vomiting.   Endocrine: Negative for heat intolerance.   Genitourinary: Negative for dysuria, flank pain, frequency and hematuria.   Musculoskeletal: Negative for back pain, gait problem and neck pain.   Skin: Negative for color change and rash.   Neurological: Negative for dizziness, tremors, seizures, numbness and headaches.   Psychiatric/Behavioral: Negative for agitation, hallucinations, self-injury, sleep disturbance and suicidal ideas. The patient is not nervous/anxious.        Past Medical History:   Diagnosis Date    A-fib     Anemia     AP (angina pectoris) 1/23/2014    Carotid artery disease without cerebral infarction 8/22/2014    Cataract     Coronary artery disease     GERD (gastroesophageal reflux disease) 7/11/2013    Hemorrhagic cerebrovascular accident (CVA) 4/26/2018     Hyperlipidemia     Hypertension     Hypertensive heart disease with heart failure 3/12/2019    Intracranial hemorrhage     Lumbosacral spondylosis 2014    Pacemaker 2014    Paroxysmal atrial fibrillation 2014    Peripheral vascular disease 2014    Pneumonia of right lung due to infectious organism 3/27/2019    Seizure, late effect of stroke     Stroke     Type 2 diabetes mellitus with ophthalmic manifestations        Social History:  Social History     Socioeconomic History    Marital status:      Spouse name: Not on file    Number of children: Not on file    Years of education: Not on file    Highest education level: Not on file   Occupational History    Not on file   Social Needs    Financial resource strain: Not on file    Food insecurity:     Worry: Not on file     Inability: Not on file    Transportation needs:     Medical: Not on file     Non-medical: Not on file   Tobacco Use    Smoking status: Former Smoker     Packs/day: 2.00     Years: 13.00     Pack years: 26.00     Types: Cigarettes     Start date: 1954     Last attempt to quit: 1967     Years since quittin.6    Smokeless tobacco: Never Used   Substance and Sexual Activity    Alcohol use: No    Drug use: No    Sexual activity: Never     Partners: Female     Birth control/protection: None   Lifestyle    Physical activity:     Days per week: Not on file     Minutes per session: Not on file    Stress: Not on file   Relationships    Social connections:     Talks on phone: Not on file     Gets together: Not on file     Attends [employer] service: Not on file     Active member of club or organization: Not on file     Attends meetings of clubs or organizations: Not on file     Relationship status: Not on file   Other Topics Concern    Not on file   Social History Narrative    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       Family History:   family history includes Alcohol  "abuse in his paternal uncle; Arthritis in his father and mother; Cancer in his daughter and sister; Diabetes in his brother, daughter, father, mother, sister, son, and son; Fibromyalgia in his daughter; Heart disease in his brother, mother, and sister; Kidney disease in his brother and mother; Miscarriages / Stillbirths in his daughter.    Health Maintenance   Topic Date Due    Eye Exam  07/01/2020    Foot Exam  07/02/2020    Hemoglobin A1c  07/08/2020    Lipid Panel  01/08/2021    TETANUS VACCINE  01/23/2024    Pneumococcal Vaccine (65+ Low/Medium Risk)  Completed    Aspirin/Antiplatelet Therapy  Discontinued       Physical Exam:    Vital Signs  Temp: 98.8 °F (37.1 °C)  Temp src: Temporal  Pulse: 84  SpO2: 96 %  BP: 120/68  BP Location: Left arm  Patient Position: Sitting  Pain Score: 0-No pain  Height and Weight  Height: 5' 9" (175.3 cm)  Weight: 88.4 kg (194 lb 14.2 oz)  BSA (Calculated - sq m): 2.07 sq meters  BMI (Calculated): 28.8  Weight in (lb) to have BMI = 25: 168.9]    Body mass index is 28.78 kg/m².    Physical Exam   Constitutional: He is oriented to person, place, and time. He appears well-developed.   HENT:   Mouth/Throat: Oropharynx is clear and moist.   Eyes: Pupils are equal, round, and reactive to light. Conjunctivae are normal.   Neck: Normal range of motion. Neck supple.   Cardiovascular: Normal rate, regular rhythm and normal heart sounds.   No murmur heard.  Pulses:       Dorsalis pedis pulses are 0 on the right side, and 0 on the left side.        Posterior tibial pulses are 0 on the right side, and 0 on the left side.   Pulmonary/Chest: Effort normal and breath sounds normal. No respiratory distress. He has no wheezes. He has no rales. He exhibits no tenderness.   Abdominal: Soft. He exhibits no distension and no mass. There is no tenderness. There is no guarding.   Musculoskeletal: He exhibits edema (pitting B). He exhibits no tenderness.   Feet:   Right Foot:   Protective Sensation: " 10 sites tested. 10 sites sensed.   Left Foot:   Protective Sensation: 10 sites tested. 10 sites sensed.   Lymphadenopathy:     He has no cervical adenopathy.   Neurological: He is alert and oriented to person, place, and time. He has normal reflexes.   Skin: Skin is warm and dry.   Psychiatric: He has a normal mood and affect. His behavior is normal. Judgment and thought content normal.         Diabetes Management Status    Statin: Not taking  ACE/ARB: Taking    Screening or Prevention Patient's value Goal Complete/Controlled?   HgA1C Testing and Control   Lab Results   Component Value Date    HGBA1C 7.0 (H) 01/08/2020      Annually/Less than 8% Yes   Lipid profile : 01/08/2020 Annually Yes   LDL control Lab Results   Component Value Date    LDLCALC 110.6 01/08/2020    Annually/Less than 100 mg/dl  No   Nephropathy screening Lab Results   Component Value Date    LABMICR 30.0 01/08/2020     Lab Results   Component Value Date    PROTEINUA Negative 10/29/2019    Annually Yes   Blood pressure BP Readings from Last 1 Encounters:   01/15/20 120/68    Less than 140/90 Yes   Dilated retinal exam : 07/01/2019 Annually Yes   Foot exam   : 07/02/2019 Annually Yes       Assessment:      ICD-10-CM ICD-9-CM   1. Diabetes mellitus type 2 without retinopathy E11.9 250.00   2. Essential hypertension I10 401.9   3. Mixed hyperlipidemia E78.2 272.2   4. Aneurysm of ascending aorta I71.2 441.2   5. Pedal edema R60.0 782.3         Plan:       Recommend wearing compression stockings  Please be more careful of eating habits  Continue current medication  He will stay off of any anti coagulants like aspirin or Plavix, poor candidate for any surgical intervention  Will call back with his sugar readings and the next week  Orders Placed This Encounter   Procedures    Hemoglobin A1c    Comprehensive metabolic panel    CBC auto differential    Microalbumin/creatinine urine ratio    Lipid panel       Current Outpatient Medications  "  Medication Sig Dispense Refill    ACCU-CHEK TOMAS PLUS TEST STRP Strp TEST BLOOD SUGAR SIX TIMES DAILY 300 strip 5    ACCU-CHEK MULTICLIX LANCET lancets TEST BLOOD SUGAR THREE TIMES DAILY 200 each 5    CARBOXYMETHYLCELLULOSE SODIUM (REFRESH OPHT) Apply to eye.      diclofenac sodium (VOLTAREN) 1 % Gel Apply 2 g topically once daily. 100 g 2    fluticasone (FLONASE) 50 mcg/actuation nasal spray 2 sprays (100 mcg total) by Each Nare route once daily. 16 g 6    furosemide (LASIX) 20 MG tablet Take 1 tablet (20 mg total) by mouth once daily. 30 tablet 12    guaiFENesin (MUCINEX) 600 mg 12 hr tablet Take 1 tablet (600 mg total) by mouth 2 (two) times daily.      insulin (LANTUS SOLOSTAR U-100 INSULIN) glargine 100 units/mL (3mL) SubQ pen INJECT 16 UNITS SUB-Q AT BEDTIME 15 mL 11    insulin aspart U-100 (NOVOLOG FLEXPEN U-100 INSULIN) 100 unit/mL (3 mL) InPn pen 10 units at breakfast, 8 units at lunch, 10 units at dinner. 15 mL 3    isosorbide mononitrate (IMDUR) 60 MG 24 hr tablet Take 1 tablet (60 mg total) by mouth once daily. 30 tablet 11    losartan (COZAAR) 100 MG tablet TAKE ONE TABLET BY MOUTH EVERY DAY 90 tablet 0    meclizine (ANTIVERT) 12.5 mg tablet Take 1 tablet (12.5 mg total) by mouth 2 (two) times daily as needed. 30 tablet 0    metoprolol succinate (TOPROL-XL) 100 MG 24 hr tablet TAKE ONE TABLET BY MOUTH TWICE DAILY 60 tablet 12    nitroGLYCERIN (NITROSTAT) 0.4 MG SL tablet Place 1 tablet (0.4 mg total) under the tongue every 5 (five) minutes as needed for Chest pain. 25 tablet 12    pantoprazole (PROTONIX) 40 MG tablet TAKE ONE TABLET BY MOUTH EVERY DAY 30 tablet 5    potassium chloride SA (K-DUR,KLOR-CON) 20 MEQ tablet TAKE ONE TABLET BY MOUTH EVERY DAY 30 tablet 12    saw palmetto 160 MG capsule Take 160 mg by mouth 2 (two) times daily.      SURE COMFORT PEN NEEDLE 32 gauge x 5/32" Ndle USE FOUR TIMES DAILY. 200 each 6     No current facility-administered medications for this visit. "        There are no discontinued medications.    Follow up in about 3 months (around 4/15/2020).      Abdulaziz Mccabe MD                 independent

## 2020-01-23 DIAGNOSIS — I25.810 CORONARY ARTERY DISEASE INVOLVING CORONARY BYPASS GRAFT OF NATIVE HEART WITHOUT ANGINA PECTORIS: ICD-10-CM

## 2020-01-23 RX ORDER — ISOSORBIDE MONONITRATE 60 MG/1
60 TABLET, EXTENDED RELEASE ORAL DAILY
Qty: 30 TABLET | Refills: 11 | Status: SHIPPED | OUTPATIENT
Start: 2020-01-23 | End: 2021-01-25 | Stop reason: SDUPTHER

## 2020-02-06 ENCOUNTER — TELEPHONE (OUTPATIENT)
Dept: FAMILY MEDICINE | Facility: CLINIC | Age: 85
End: 2020-02-06

## 2020-02-06 ENCOUNTER — PATIENT MESSAGE (OUTPATIENT)
Dept: FAMILY MEDICINE | Facility: CLINIC | Age: 85
End: 2020-02-06

## 2020-02-06 DIAGNOSIS — D64.9 MILD CHRONIC ANEMIA: Primary | ICD-10-CM

## 2020-02-06 NOTE — TELEPHONE ENCOUNTER
"Patient came by the clinic today c/o being cold all the time. So cold at times that he "shakes"  He is concerned about being anemic.  Looks like last CBC done by ED showed mild anemia 12.0/36.4 , the month before that he was 12.6/40, so it has dropped in 4 weeks.   Last March it was much lower at 11.3/34.3 but he was Septic and had pneumonia and was in the hospital     He is due to see you again in April   Please advise    "

## 2020-02-07 ENCOUNTER — LAB VISIT (OUTPATIENT)
Dept: LAB | Facility: HOSPITAL | Age: 85
End: 2020-02-07
Attending: FAMILY MEDICINE
Payer: MEDICARE

## 2020-02-07 DIAGNOSIS — D64.9 MILD CHRONIC ANEMIA: ICD-10-CM

## 2020-02-07 LAB — RETICS/RBC NFR AUTO: 1.2 % (ref 0.4–2)

## 2020-02-07 PROCEDURE — 83615 LACTATE (LD) (LDH) ENZYME: CPT | Mod: HCNC

## 2020-02-07 PROCEDURE — 85045 AUTOMATED RETICULOCYTE COUNT: CPT | Mod: HCNC

## 2020-02-07 PROCEDURE — 83540 ASSAY OF IRON: CPT | Mod: HCNC

## 2020-02-07 PROCEDURE — 36415 COLL VENOUS BLD VENIPUNCTURE: CPT | Mod: HCNC,PO

## 2020-02-07 PROCEDURE — 82728 ASSAY OF FERRITIN: CPT | Mod: HCNC

## 2020-02-07 PROCEDURE — 84443 ASSAY THYROID STIM HORMONE: CPT | Mod: HCNC

## 2020-02-07 NOTE — TELEPHONE ENCOUNTER
Called patient to let him know labs are in the computer to come by and get done to check his iron levels

## 2020-02-07 NOTE — TELEPHONE ENCOUNTER
It is been many years since we checked iron levels the check them again then decide.  And 1 should take iron only there is a deficiency otherwise too much of iron can cause problems.    I put orders in he can come in and do the test

## 2020-02-08 LAB
FERRITIN SERPL-MCNC: 46 NG/ML (ref 20–300)
IRON SERPL-MCNC: 66 UG/DL (ref 45–160)
IRON SERPL-MCNC: 66 UG/DL (ref 45–160)
LDH SERPL L TO P-CCNC: 208 U/L (ref 110–260)
SATURATED IRON: 16 % (ref 20–50)
TOTAL IRON BINDING CAPACITY: 407 UG/DL (ref 250–450)
TRANSFERRIN SERPL-MCNC: 275 MG/DL (ref 200–375)
TSH SERPL DL<=0.005 MIU/L-ACNC: 1.46 UIU/ML (ref 0.4–4)

## 2020-02-10 ENCOUNTER — PATIENT MESSAGE (OUTPATIENT)
Dept: FAMILY MEDICINE | Facility: CLINIC | Age: 85
End: 2020-02-10

## 2020-02-14 ENCOUNTER — TELEPHONE (OUTPATIENT)
Dept: FAMILY MEDICINE | Facility: CLINIC | Age: 85
End: 2020-02-14

## 2020-02-14 ENCOUNTER — LAB VISIT (OUTPATIENT)
Dept: LAB | Facility: HOSPITAL | Age: 85
End: 2020-02-14
Attending: FAMILY MEDICINE
Payer: MEDICARE

## 2020-02-14 DIAGNOSIS — D64.9 MILD CHRONIC ANEMIA: Primary | ICD-10-CM

## 2020-02-14 DIAGNOSIS — D64.9 MILD CHRONIC ANEMIA: ICD-10-CM

## 2020-02-14 LAB
OB PNL STL: NEGATIVE
OB PNL STL: NEGATIVE

## 2020-02-14 PROCEDURE — 82272 OCCULT BLD FECES 1-3 TESTS: CPT | Mod: HCNC

## 2020-02-17 ENCOUNTER — LAB VISIT (OUTPATIENT)
Dept: LAB | Facility: HOSPITAL | Age: 85
End: 2020-02-17
Attending: FAMILY MEDICINE
Payer: MEDICARE

## 2020-02-17 DIAGNOSIS — D64.9 MILD CHRONIC ANEMIA: ICD-10-CM

## 2020-02-17 PROCEDURE — 82272 OCCULT BLD FECES 1-3 TESTS: CPT | Mod: HCNC

## 2020-02-18 LAB — OB PNL STL: NEGATIVE

## 2020-02-23 RX ORDER — INSULIN GLARGINE 100 [IU]/ML
INJECTION, SOLUTION SUBCUTANEOUS
Qty: 15 ML | Refills: 11 | Status: SHIPPED | OUTPATIENT
Start: 2020-02-23 | End: 2021-04-22

## 2020-02-28 RX ORDER — INSULIN ASPART 100 [IU]/ML
INJECTION, SOLUTION INTRAVENOUS; SUBCUTANEOUS
Qty: 15 ML | Refills: 3 | Status: SHIPPED | OUTPATIENT
Start: 2020-02-28 | End: 2020-10-08

## 2020-02-28 RX ORDER — PANTOPRAZOLE SODIUM 40 MG/1
TABLET, DELAYED RELEASE ORAL
Qty: 30 TABLET | Refills: 5 | Status: SHIPPED | OUTPATIENT
Start: 2020-02-28 | End: 2020-08-26

## 2020-03-03 RX ORDER — LOSARTAN POTASSIUM 100 MG/1
TABLET ORAL
Qty: 90 TABLET | Refills: 0 | Status: SHIPPED | OUTPATIENT
Start: 2020-03-03 | End: 2020-06-25

## 2020-03-05 ENCOUNTER — HOSPITAL ENCOUNTER (OUTPATIENT)
Dept: CARDIOLOGY | Facility: HOSPITAL | Age: 85
Discharge: HOME | End: 2020-03-05
Attending: INTERNAL MEDICINE
Payer: MEDICARE

## 2020-03-05 DIAGNOSIS — I49.5 SSS (SICK SINUS SYNDROME): ICD-10-CM

## 2020-03-05 DIAGNOSIS — Z95.0 CARDIAC PACEMAKER IN SITU: ICD-10-CM

## 2020-03-05 DIAGNOSIS — I48.0 PAF (PAROXYSMAL ATRIAL FIBRILLATION): ICD-10-CM

## 2020-03-05 PROCEDURE — 93280 PM DEVICE PROGR EVAL DUAL: CPT | Mod: HCNC

## 2020-03-05 PROCEDURE — 93280 CARDIAC DEVICE CHECK - IN CLINIC & HOSPITAL: ICD-10-PCS | Mod: 26,HCNC,, | Performed by: INTERNAL MEDICINE

## 2020-03-05 PROCEDURE — 93280 PM DEVICE PROGR EVAL DUAL: CPT | Mod: 26,HCNC,, | Performed by: INTERNAL MEDICINE

## 2020-03-06 LAB
AV DELAY - LONGEST: 350 MSEC
AV DELAY - SHORTEST: 300 MSEC
IMPEDANCE RA LEAD (NATIVE): 565 OHMS
IMPEDANCE RA LEAD: 390 OHMS
OHS CV DC PP MS1: 0.75 MS
OHS CV DC PP MS2: 0.4 MS
OHS CV DC PP V1: 2.4 V
OHS CV DC PP V2: NORMAL V
P/R-WAVE RA LEAD (NATIVE): 19.7 MV
P/R-WAVE RA LEAD: 0.5 MV
THRESHOLD MS RA LEAD (NATIVE): 0.4 MS
THRESHOLD MS RA LEAD: 0.75 MS
THRESHOLD V RA LEAD (NATIVE): 0.8 V
THRESHOLD V RA LEAD: 1.2 V

## 2020-03-13 RX ORDER — FLUTICASONE PROPIONATE 50 MCG
SPRAY, SUSPENSION (ML) NASAL
Qty: 16 G | Refills: 6 | Status: SHIPPED | OUTPATIENT
Start: 2020-03-13 | End: 2022-03-12

## 2020-03-27 ENCOUNTER — HOSPITAL ENCOUNTER (OUTPATIENT)
Dept: CARDIOLOGY | Facility: HOSPITAL | Age: 85
Discharge: HOME | End: 2020-03-27
Attending: INTERNAL MEDICINE
Payer: MEDICARE

## 2020-03-27 DIAGNOSIS — I49.5 SSS (SICK SINUS SYNDROME): ICD-10-CM

## 2020-03-27 DIAGNOSIS — Z95.0 CARDIAC PACEMAKER IN SITU: Primary | ICD-10-CM

## 2020-03-27 DIAGNOSIS — I48.0 PAF (PAROXYSMAL ATRIAL FIBRILLATION): ICD-10-CM

## 2020-03-27 DIAGNOSIS — Z95.0 CARDIAC PACEMAKER IN SITU: ICD-10-CM

## 2020-03-27 LAB
AV DELAY - LONGEST: 350 MSEC
AV DELAY - SHORTEST: 300 MSEC

## 2020-03-27 PROCEDURE — 93294 CARDIAC DEVICE CHECK - REMOTE: ICD-10-PCS | Mod: HCNC,S$GLB,, | Performed by: INTERNAL MEDICINE

## 2020-03-27 PROCEDURE — 93294 REM INTERROG EVL PM/LDLS PM: CPT | Mod: HCNC,S$GLB,, | Performed by: INTERNAL MEDICINE

## 2020-03-27 PROCEDURE — 93296 REM INTERROG EVL PM/IDS: CPT | Mod: HCNC

## 2020-04-24 RX ORDER — BLOOD SUGAR DIAGNOSTIC
STRIP MISCELLANEOUS
Qty: 100 STRIP | Refills: 6 | Status: SHIPPED | OUTPATIENT
Start: 2020-04-24 | End: 2021-04-09

## 2020-04-30 ENCOUNTER — TELEPHONE (OUTPATIENT)
Dept: CARDIOLOGY | Facility: HOSPITAL | Age: 85
End: 2020-04-30

## 2020-04-30 NOTE — TELEPHONE ENCOUNTER
Spoke with patient, his [employer] home monitor has not transmitted recently.  Pt states it is plugged in, says 'OK', has not been moved (still within 6 feet of bed).  He does not have any electronics or CPAP equipment near/around home monitor that may cause interference.      Called [employer] tech services, they confirmed the monitor is working properly and has a strong signal.  They advised pt should call directly for further troubleshooting.  Returned call to patient, requested he contact tech services directly at [phone].  Pt repeated back phone number correctly, will call today.

## 2020-05-05 ENCOUNTER — HOSPITAL ENCOUNTER (OUTPATIENT)
Dept: CARDIOLOGY | Facility: HOSPITAL | Age: 85
Discharge: HOME | End: 2020-05-05
Attending: INTERNAL MEDICINE
Payer: MEDICARE

## 2020-05-05 DIAGNOSIS — Z95.0 CARDIAC PACEMAKER IN SITU: ICD-10-CM

## 2020-05-05 DIAGNOSIS — I49.5 SSS (SICK SINUS SYNDROME): ICD-10-CM

## 2020-05-05 DIAGNOSIS — I48.0 PAF (PAROXYSMAL ATRIAL FIBRILLATION): ICD-10-CM

## 2020-05-05 LAB
AV DELAY - LONGEST: 350 MSEC
AV DELAY - SHORTEST: 300 MSEC
IMPEDANCE RA LEAD (NATIVE): 585 OHMS
IMPEDANCE RA LEAD: 390 OHMS
OHS CV DC PP MS1: 0.75 MS
OHS CV DC PP MS2: 0.4 MS
OHS CV DC PP V1: 2.4 V
OHS CV DC PP V2: 2 V
P/R-WAVE RA LEAD (NATIVE): 18.2 MV
P/R-WAVE RA LEAD: 0.4 MV
THRESHOLD MS RA LEAD (NATIVE): 0.4 MS
THRESHOLD MS RA LEAD: 0.75 MS
THRESHOLD V RA LEAD (NATIVE): 0.7 V
THRESHOLD V RA LEAD: 1.3 V

## 2020-05-05 PROCEDURE — 93280 CARDIAC DEVICE CHECK - IN CLINIC & HOSPITAL: ICD-10-PCS | Mod: 26,,, | Performed by: INTERNAL MEDICINE

## 2020-05-05 PROCEDURE — 93280 PM DEVICE PROGR EVAL DUAL: CPT

## 2020-05-05 PROCEDURE — 93280 PM DEVICE PROGR EVAL DUAL: CPT | Mod: 26,,, | Performed by: INTERNAL MEDICINE

## 2020-05-11 ENCOUNTER — OFFICE VISIT (OUTPATIENT)
Dept: FAMILY MEDICINE | Facility: CLINIC | Age: 85
End: 2020-05-11
Payer: MEDICARE

## 2020-05-11 DIAGNOSIS — E78.2 MIXED HYPERLIPIDEMIA: Chronic | ICD-10-CM

## 2020-05-11 DIAGNOSIS — I10 ESSENTIAL HYPERTENSION: ICD-10-CM

## 2020-05-11 DIAGNOSIS — E08.51 DIABETES MELLITUS DUE TO UNDERLYING CONDITION WITH DIABETIC PERIPHERAL ANGIOPATHY WITHOUT GANGRENE, WITHOUT LONG-TERM CURRENT USE OF INSULIN: Primary | ICD-10-CM

## 2020-05-11 PROCEDURE — 99441 PR PHYSICIAN TELEPHONE EVALUATION 5-10 MIN: CPT | Mod: HCNC,95,, | Performed by: FAMILY MEDICINE

## 2020-05-11 PROCEDURE — 99441 PR PHYSICIAN TELEPHONE EVALUATION 5-10 MIN: ICD-10-PCS | Mod: HCNC,95,, | Performed by: FAMILY MEDICINE

## 2020-05-11 NOTE — PROGRESS NOTES
Established Patient - Audio Only Telehealth Visit     The patient location is: home    The chief complaint leading to consultation is:   Visit type: Virtual visit with audio only (telephone)     The reason for the audio only service rather than synchronous audio and video virtual visit was related to technical difficulties or patient preference/necessity.     Each patient to whom I provide medical services by telemedicine is:  (1) informed of the relationship between the physician and patient and the respective role of any other health care provider with respect to management of the patient; and (2) notified that they may decline to receive medical services by telemedicine and may withdraw from such care at any time. Patient verbally consented to receive this service via voice-only telephone call.       HPI:  This is a telephone visit to discuss patient's blood sugar and other chronic medical problems.  He has tried exercise but has gained 4 lb he is currently using Lantus 20 units and he takes a variable amount of insulin with each meal his fasting sugars sometimes run low about 65 but postprandial sugars could be high he has not been counting his carbohydrates her calories.     Assessment and plan:     Please cut back on the nighttime Lantus to 16 units and start counting carbohydrates and calories with each meal and send us the number so we can further titrate his mealtime insulin  Other medical problems stable  Discussed intermittent fasting for weight loss                   This service was not originating from a related E/M service provided within the previous 7 days nor will  to an E/M service or procedure within the next 24 hours or my soonest available appointment.  Prevailing standard of care was able to be met in this audio-only visit.

## 2020-06-12 ENCOUNTER — TELEPHONE (OUTPATIENT)
Dept: CARDIOLOGY | Facility: CLINIC | Age: 85
End: 2020-06-12

## 2020-06-12 NOTE — TELEPHONE ENCOUNTER
Spoke with wife, Liat.  She reports that patient has increased edema to LE's.  Denies SOB.  Message sent to Dr. Frazier about c/o edema.  Information given to Chelsea regarding questions about pacemaker and loose wire.

## 2020-06-12 NOTE — TELEPHONE ENCOUNTER
Returned call to patient and he voiced complaints of shortness of breath, lower extremity swelling and abdominal bloating from time to time.  Reviewed all medications only taking Lasix 20 mg daily, Losartan 100, Metoprolol 100 mg and Potassium 20 meq  Also advised PPM appointment is to get full generator check and determine how soon he needs generator change  Also due for visit with Dr. Frazier in July    Dr. Frazier - please advise is additional dose of Lasix is acceptable

## 2020-06-12 NOTE — TELEPHONE ENCOUNTER
Returned call and advised to take extra dose of Lasix for next 4 days  40 mg take two 20 mg tablets in the morning of Sat, Sun, Mon, Tues

## 2020-06-15 ENCOUNTER — HOSPITAL ENCOUNTER (OUTPATIENT)
Dept: CARDIOLOGY | Facility: HOSPITAL | Age: 85
Discharge: HOME | End: 2020-06-15
Attending: INTERNAL MEDICINE
Payer: MEDICARE

## 2020-06-15 DIAGNOSIS — I48.0 PAF (PAROXYSMAL ATRIAL FIBRILLATION): ICD-10-CM

## 2020-06-15 DIAGNOSIS — I49.5 SSS (SICK SINUS SYNDROME): ICD-10-CM

## 2020-06-15 DIAGNOSIS — I48.92 ATRIAL FLUTTER, UNSPECIFIED TYPE: Primary | ICD-10-CM

## 2020-06-15 DIAGNOSIS — Z95.0 CARDIAC PACEMAKER IN SITU: ICD-10-CM

## 2020-06-15 DIAGNOSIS — Z95.0 CARDIAC PACEMAKER IN SITU: Primary | ICD-10-CM

## 2020-06-15 LAB
AV DELAY - LONGEST: 350 MSEC
AV DELAY - SHORTEST: 300 MSEC
IMPEDANCE RA LEAD (NATIVE): 585 OHMS
IMPEDANCE RA LEAD: 390 OHMS
OHS CV DC PP MS1: 0.75 MS
OHS CV DC PP MS2: 0.4 MS
OHS CV DC PP V1: 2.4 V
OHS CV DC PP V2: 2 V
P/R-WAVE RA LEAD (NATIVE): 18.6 MV
P/R-WAVE RA LEAD: 0.4 MV
THRESHOLD MS RA LEAD: 0.4 MS
THRESHOLD V RA LEAD: 0.7 V

## 2020-06-15 PROCEDURE — 93005 ELECTROCARDIOGRAM TRACING: CPT | Mod: HCNC

## 2020-06-15 PROCEDURE — 93280 PM DEVICE PROGR EVAL DUAL: CPT | Mod: 26,HCNC,, | Performed by: INTERNAL MEDICINE

## 2020-06-15 PROCEDURE — 93280 CARDIAC DEVICE CHECK - IN CLINIC & HOSPITAL: ICD-10-PCS | Mod: 26,HCNC,, | Performed by: INTERNAL MEDICINE

## 2020-06-15 PROCEDURE — 93010 ELECTROCARDIOGRAM REPORT: CPT | Mod: HCNC,,, | Performed by: INTERNAL MEDICINE

## 2020-06-15 PROCEDURE — 93010 EKG 12-LEAD: ICD-10-PCS | Mod: HCNC,,, | Performed by: INTERNAL MEDICINE

## 2020-06-15 PROCEDURE — 93280 PM DEVICE PROGR EVAL DUAL: CPT | Mod: HCNC

## 2020-06-15 RX ORDER — FLECAINIDE ACETATE 50 MG/1
50 TABLET ORAL EVERY 12 HOURS
COMMUNITY
End: 2020-06-15 | Stop reason: SDUPTHER

## 2020-06-15 RX ORDER — FLECAINIDE ACETATE 50 MG/1
50 TABLET ORAL EVERY 12 HOURS
Qty: 60 TABLET | Refills: 6 | Status: SHIPPED | OUTPATIENT
Start: 2020-06-15 | End: 2020-11-23

## 2020-06-17 ENCOUNTER — TELEPHONE (OUTPATIENT)
Dept: CARDIOLOGY | Facility: CLINIC | Age: 85
End: 2020-06-17

## 2020-06-18 ENCOUNTER — TELEPHONE (OUTPATIENT)
Dept: CARDIOLOGY | Facility: CLINIC | Age: 85
End: 2020-06-18

## 2020-06-18 NOTE — TELEPHONE ENCOUNTER
Received call from patient's wife concerning whether to continue Lasix 40 mg daily.  Stated patient lost about 3 pounds but still has shortness of breath when he bends over  Advised continue dosage until Dr. Frazier responds may need some lab work    Dr. Frazier- please advise

## 2020-06-18 NOTE — TELEPHONE ENCOUNTER
Will check with Neurology for appointment and then notify Ms. Bragg      ----- Message from Domenico Grajeda MD sent at 6/17/2020 10:50 PM CDT -----  See comments below and call patient to discuss.   Please close encounter when done -- no need to route back to me.  Thanks    Can you get him to see neuro/neuro surgery re: resumption of OAC (would use Eliquis  Also, he is getting close to needing a new PPM

## 2020-06-18 NOTE — PROGRESS NOTES
Subjective:   Patient ID:  Anthony Blair is a 87 y.o. male who presents for evaluation  of Edema and Shortness of Breath      HPI  Mr. Cruz's current medical conditions include PAF/PSVT, CAD (PTCA 1980's), HTN HLP, PPM, PHTN, LVH, LAE, carotid artery disease, DM, atrial septal aneurysm/pfo, PAD, dyslipidemia. Nonsmoker.  Past details pertinent for following:   Statin intolerant.  s/p CABG 7/10 (LIMA-LAD, SVG-OM1, SVG-OM3, SVG-PDA) for increasing OLIVARES and multivessel CAD on [facility].   S/p PPM 10/10 for SSS/PAF. Was hospitalized 1/11 for PSVT (Atrial tachycardia) and was started on Amiodarone but was stopped for GI discomfort. He also did not tolerate Multaq and has not tolerated multiple statins.   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   Patient had apparently stopped Eliquis and was on ASA 81mg daily when hemorrhage occurred.     Called clinic last week with complaints of SOB, LE edema and bloating. Was taking lasix 20mg daily, but given instructions to increase dose to 40mg daily x 4 days. Today, he reports that his weight is down 3 lbs, but still SOB with bending. Describes chest heaviness when he is lying down and resolves with sitting up. Sleeping on 1 pillow.  Is in the process of doing a second round of lasix 40mg daily .      Started on Flecainide 50mg BID earlier this week, by Dr. Fine,  for A-flutter noted on device interrogation.   Scheduled for Atrial lead revision and generator change in the near future.   Echo in March 2019 shows DD with preserved LVEF 60% and pulm HTN with RV hypertrophy.     Past Medical History:   Diagnosis Date    A-fib     Anemia     AP (angina pectoris) 1/23/2014    Carotid artery disease without cerebral infarction 8/22/2014    Cataract     Coronary artery disease      GERD (gastroesophageal reflux disease) 2013    Hemorrhagic cerebrovascular accident (CVA) 2018    Hyperlipidemia     Hypertension     Hypertensive heart disease with heart failure 3/12/2019    Intracranial hemorrhage     Lumbosacral spondylosis 2014    Pacemaker 2014    Paroxysmal atrial fibrillation 2014    Peripheral vascular disease 2014    Pneumonia of right lung due to infectious organism 3/27/2019    Seizure, late effect of stroke     Stroke     Type 2 diabetes mellitus with ophthalmic manifestations        Past Surgical History:   Procedure Laterality Date    ANGIOPLASTY      ATRIAL ABLATION SURGERY N/A     CATARACT EXTRACTION Bilateral     [facility]    CATARACT EXTRACTION W/ INTRAOCULAR LENS IMPLANT Right     CATARACT EXTRACTION W/ INTRAOCULAR LENS IMPLANT Left     COLONOSCOPY N/A 10/16/2018    Procedure: COLONOSCOPY with biopsies;  Surgeon: Anabell Griggs MD;  Location: [facility];  Service: Endoscopy;  Laterality: N/A;    CORONARY ARTERY BYPASS GRAFT  07/2010    x5    EYE SURGERY      pace maker surgrey  10/2010    reposition in  (approx)    VEIN BYPASS SURGERY         Social History     Tobacco Use    Smoking status: Former Smoker     Packs/day: 2.00     Years: 13.00     Pack years: 26.00     Types: Cigarettes     Start date: 1954     Quit date: 1967     Years since quittin.0    Smokeless tobacco: Never Used   Substance Use Topics    Alcohol use: No    Drug use: No       Family History   Problem Relation Age of Onset    Arthritis Mother     Diabetes Mother     Kidney disease Mother     Heart disease Mother     Arthritis Father     Diabetes Father     Diabetes Sister     Cancer Sister         breast    Heart disease Sister     Diabetes Brother     Kidney disease Brother     Heart disease Brother     Cancer Daughter         breast    Diabetes Daughter     Miscarriages / Stillbirths Daughter      Fibromyalgia Daughter     Diabetes Son     Diabetes Son     Alcohol abuse Paternal Uncle     Stroke Neg Hx        Current Outpatient Medications   Medication Sig    ACCU-CHEK TOMAS PLUS TEST STRP Strp TEST SIX TIMES A DAY    ACCU-CHEK MULTICLIX LANCET lancets TEST BLOOD SUGAR THREE TIMES DAILY    CARBOXYMETHYLCELLULOSE SODIUM (REFRESH OPHT) Apply to eye.    diclofenac sodium (VOLTAREN) 1 % Gel Apply 2 g topically once daily.    flecainide (TAMBOCOR) 50 MG Tab Take 1 tablet (50 mg total) by mouth every 12 (twelve) hours.    fluticasone propionate (FLONASE) 50 mcg/actuation nasal spray USE TWO SPRAYS IN EACH NOSTRIL ONCE DAILY    furosemide (LASIX) 20 MG tablet Take 1 tablet (20 mg total) by mouth once daily. (Patient taking differently: Take 40 mg by mouth once daily. )    guaiFENesin (MUCINEX) 600 mg 12 hr tablet Take 1 tablet (600 mg total) by mouth 2 (two) times daily.    insulin (LANTUS SOLOSTAR U-100 INSULIN) glargine 100 units/mL (3mL) SubQ pen INJECT 16 UNITS SUB-Q AT BEDTIME    insulin aspart U-100 (NOVOLOG FLEXPEN U-100 INSULIN) 100 unit/mL (3 mL) InPn pen INJECT TEN UNITS AT BREAKFAST, EIGHT UNITS AT LUNCH AND TEN UNITS AT DINNER. 54 DAY SUPPLY    isosorbide mononitrate (IMDUR) 60 MG 24 hr tablet Take 1 tablet (60 mg total) by mouth once daily.    losartan (COZAAR) 100 MG tablet TAKE ONE TABLET BY MOUTH EVERY DAY    meclizine (ANTIVERT) 12.5 mg tablet Take 1 tablet (12.5 mg total) by mouth 2 (two) times daily as needed.    metoprolol succinate (TOPROL-XL) 100 MG 24 hr tablet TAKE ONE TABLET BY MOUTH TWICE DAILY    nitroGLYCERIN (NITROSTAT) 0.4 MG SL tablet Place 1 tablet (0.4 mg total) under the tongue every 5 (five) minutes as needed for Chest pain.    pantoprazole (PROTONIX) 40 MG tablet TAKE ONE TABLET BY MOUTH EVERY DAY    potassium chloride SA (K-DUR,KLOR-CON) 20 MEQ tablet TAKE ONE TABLET BY MOUTH EVERY DAY    saw palmetto 160 MG capsule Take 160 mg by mouth 2 (two) times daily.  "   SURE COMFORT PEN NEEDLE 32 gauge x 5/32" Ndle USE FOUR TIMES DAILY.     No current facility-administered medications for this visit.      Current Outpatient Medications on File Prior to Visit   Medication Sig    ACCU-CHEK TOMAS PLUS TEST STRP Strp TEST SIX TIMES A DAY    ACCU-CHEK MULTICLIX LANCET lancets TEST BLOOD SUGAR THREE TIMES DAILY    CARBOXYMETHYLCELLULOSE SODIUM (REFRESH OPHT) Apply to eye.    diclofenac sodium (VOLTAREN) 1 % Gel Apply 2 g topically once daily.    flecainide (TAMBOCOR) 50 MG Tab Take 1 tablet (50 mg total) by mouth every 12 (twelve) hours.    fluticasone propionate (FLONASE) 50 mcg/actuation nasal spray USE TWO SPRAYS IN EACH NOSTRIL ONCE DAILY    furosemide (LASIX) 20 MG tablet Take 1 tablet (20 mg total) by mouth once daily. (Patient taking differently: Take 40 mg by mouth once daily. )    guaiFENesin (MUCINEX) 600 mg 12 hr tablet Take 1 tablet (600 mg total) by mouth 2 (two) times daily.    insulin (LANTUS SOLOSTAR U-100 INSULIN) glargine 100 units/mL (3mL) SubQ pen INJECT 16 UNITS SUB-Q AT BEDTIME    insulin aspart U-100 (NOVOLOG FLEXPEN U-100 INSULIN) 100 unit/mL (3 mL) InPn pen INJECT TEN UNITS AT BREAKFAST, EIGHT UNITS AT LUNCH AND TEN UNITS AT DINNER. 54 DAY SUPPLY    isosorbide mononitrate (IMDUR) 60 MG 24 hr tablet Take 1 tablet (60 mg total) by mouth once daily.    losartan (COZAAR) 100 MG tablet TAKE ONE TABLET BY MOUTH EVERY DAY    meclizine (ANTIVERT) 12.5 mg tablet Take 1 tablet (12.5 mg total) by mouth 2 (two) times daily as needed.    metoprolol succinate (TOPROL-XL) 100 MG 24 hr tablet TAKE ONE TABLET BY MOUTH TWICE DAILY    nitroGLYCERIN (NITROSTAT) 0.4 MG SL tablet Place 1 tablet (0.4 mg total) under the tongue every 5 (five) minutes as needed for Chest pain.    pantoprazole (PROTONIX) 40 MG tablet TAKE ONE TABLET BY MOUTH EVERY DAY    potassium chloride SA (K-DUR,KLOR-CON) 20 MEQ tablet TAKE ONE TABLET BY MOUTH EVERY DAY    saw palmetto 160 MG " "capsule Take 160 mg by mouth 2 (two) times daily.    SURE COMFORT PEN NEEDLE 32 gauge x 5/32" Ndle USE FOUR TIMES DAILY.     No current facility-administered medications on file prior to visit.        Review of Systems   Constitution: Positive for weight gain. Negative for diaphoresis, malaise/fatigue and weight loss.   HENT: Negative for congestion and nosebleeds.    Cardiovascular: Positive for leg swelling and paroxysmal nocturnal dyspnea. Negative for chest pain, claudication, cyanosis, dyspnea on exertion, irregular heartbeat, near-syncope, orthopnea, palpitations and syncope.   Respiratory: Negative for cough, hemoptysis, shortness of breath, sleep disturbances due to breathing, snoring, sputum production and wheezing.    Hematologic/Lymphatic: Negative for bleeding problem. Does not bruise/bleed easily.   Skin: Negative for rash.   Musculoskeletal: Positive for arthritis and joint pain. Negative for back pain, falls, muscle cramps and muscle weakness.   Gastrointestinal: Positive for bloating. Negative for abdominal pain, constipation, diarrhea, heartburn, hematemesis, hematochezia, melena, nausea and vomiting.   Genitourinary: Negative for dysuria, hematuria and nocturia.   Neurological: Negative for excessive daytime sleepiness, dizziness, headaches, light-headedness, loss of balance, numbness, vertigo and weakness.       Objective:   Physical Exam   Constitutional: He is oriented to person, place, and time. He appears well-developed and well-nourished.   Neck: Neck supple. JVD present.   Cardiovascular: Normal rate, regular rhythm, normal heart sounds and normal pulses. Exam reveals no friction rub.   No murmur heard.  Pulmonary/Chest: Effort normal. No respiratory distress. He has decreased breath sounds. He has no wheezes. He has no rales.   Abdominal: Soft. Bowel sounds are normal. He exhibits no distension.   Musculoskeletal:         General: Edema ( +3 BLE ) present. No tenderness.   Neurological: He " "is alert and oriented to person, place, and time.   Skin: Skin is warm and dry. No rash noted.   Psychiatric: He has a normal mood and affect. His behavior is normal.   Nursing note and vitals reviewed.    Vitals:    06/19/20 1104   BP: (!) 142/62   BP Location: Left arm   Patient Position: Sitting   Pulse: 62   SpO2: 95%   Weight: 88.7 kg (195 lb 8.8 oz)   Height: 5' 9" (1.753 m)     Lab Results   Component Value Date    CHOL 176 01/08/2020    CHOL 175 10/02/2019    CHOL 194 06/26/2019     Lab Results   Component Value Date    HDL 52 01/08/2020    HDL 46 10/02/2019    HDL 44 06/26/2019     Lab Results   Component Value Date    LDLCALC 110.6 01/08/2020    LDLCALC 104.6 10/02/2019    LDLCALC 116.2 06/26/2019     Lab Results   Component Value Date    TRIG 67 01/08/2020    TRIG 122 10/02/2019    TRIG 169 (H) 06/26/2019     Lab Results   Component Value Date    CHOLHDL 29.5 01/08/2020    CHOLHDL 26.3 10/02/2019    CHOLHDL 22.7 06/26/2019       Chemistry        Component Value Date/Time     01/12/2020 2218    K 3.7 01/12/2020 2218     01/12/2020 2218    CO2 24 01/12/2020 2218    BUN 13 01/12/2020 2218    CREATININE 0.8 01/12/2020 2218     (H) 01/12/2020 2218        Component Value Date/Time    CALCIUM 9.5 01/12/2020 2218    ALKPHOS 111 01/12/2020 2218    AST 27 01/12/2020 2218    ALT 13 01/12/2020 2218    BILITOT 0.6 01/12/2020 2218    ESTGFRAFRICA >60 01/12/2020 2218    EGFRNONAA >60 01/12/2020 2218          Lab Results   Component Value Date    TSH 1.458 02/07/2020     Lab Results   Component Value Date    INR 1.2 03/28/2019    INR 1.1 03/27/2019    INR 1.1 01/24/2019     Lab Results   Component Value Date    WBC 5.24 01/12/2020    HGB 12.0 (L) 01/12/2020    HCT 36.4 (L) 01/12/2020    MCV 94 01/12/2020     01/12/2020     BMP  Sodium   Date Value Ref Range Status   01/12/2020 137 136 - 145 mmol/L Final     Potassium   Date Value Ref Range Status   01/12/2020 3.7 3.5 - 5.1 mmol/L Final "     Chloride   Date Value Ref Range Status   01/12/2020 102 95 - 110 mmol/L Final     CO2   Date Value Ref Range Status   01/12/2020 24 23 - 29 mmol/L Final     BUN, Bld   Date Value Ref Range Status   01/12/2020 13 8 - 23 mg/dL Final     Creatinine   Date Value Ref Range Status   01/12/2020 0.8 0.5 - 1.4 mg/dL Final     Calcium   Date Value Ref Range Status   01/12/2020 9.5 8.7 - 10.5 mg/dL Final     Anion Gap   Date Value Ref Range Status   01/12/2020 11 8 - 16 mmol/L Final     eGFR if    Date Value Ref Range Status   01/12/2020 >60 >60 mL/min/1.73 m^2 Final     eGFR if non    Date Value Ref Range Status   01/12/2020 >60 >60 mL/min/1.73 m^2 Final     Comment:     Calculation used to obtain the estimated glomerular filtration  rate (eGFR) is the CKD-EPI equation.        CrCl cannot be calculated (Patient's most recent lab result is older than the maximum 7 days allowed.).    Assessment:     1. Paroxysmal atrial fibrillation    2. Pacemaker    3. Essential hypertension        Plan:   Increase Lasix to 40mg BID starting today and until he follows up next week   BMP same day   Heart healthy diet  Limit fluid intake 50-60 oz   Daily weights and to notify clinic if weight increases by more than 3 lbs in 1 day or 5 lbs in 1 week.   Exercise routine as tolerated

## 2020-06-19 ENCOUNTER — OFFICE VISIT (OUTPATIENT)
Dept: CARDIOLOGY | Facility: CLINIC | Age: 85
End: 2020-06-19
Payer: MEDICARE

## 2020-06-19 VITALS
DIASTOLIC BLOOD PRESSURE: 62 MMHG | HEIGHT: 69 IN | SYSTOLIC BLOOD PRESSURE: 142 MMHG | OXYGEN SATURATION: 95 % | WEIGHT: 195.56 LBS | HEART RATE: 62 BPM | BODY MASS INDEX: 28.96 KG/M2

## 2020-06-19 DIAGNOSIS — Z95.0 PACEMAKER: Chronic | ICD-10-CM

## 2020-06-19 DIAGNOSIS — I10 ESSENTIAL HYPERTENSION: ICD-10-CM

## 2020-06-19 DIAGNOSIS — I25.10 CAD (CORONARY ARTERY DISEASE): ICD-10-CM

## 2020-06-19 DIAGNOSIS — I48.0 PAROXYSMAL ATRIAL FIBRILLATION: Primary | Chronic | ICD-10-CM

## 2020-06-19 PROCEDURE — 1101F PT FALLS ASSESS-DOCD LE1/YR: CPT | Mod: HCNC,CPTII,S$GLB, | Performed by: NURSE PRACTITIONER

## 2020-06-19 PROCEDURE — 1159F PR MEDICATION LIST DOCUMENTED IN MEDICAL RECORD: ICD-10-PCS | Mod: HCNC,S$GLB,, | Performed by: NURSE PRACTITIONER

## 2020-06-19 PROCEDURE — 99214 PR OFFICE/OUTPT VISIT, EST, LEVL IV, 30-39 MIN: ICD-10-PCS | Mod: HCNC,S$GLB,, | Performed by: NURSE PRACTITIONER

## 2020-06-19 PROCEDURE — 99999 PR PBB SHADOW E&M-EST. PATIENT-LVL IV: ICD-10-PCS | Mod: PBBFAC,HCNC,, | Performed by: NURSE PRACTITIONER

## 2020-06-19 PROCEDURE — 99999 PR PBB SHADOW E&M-EST. PATIENT-LVL IV: CPT | Mod: PBBFAC,HCNC,, | Performed by: NURSE PRACTITIONER

## 2020-06-19 PROCEDURE — 1159F MED LIST DOCD IN RCRD: CPT | Mod: HCNC,S$GLB,, | Performed by: NURSE PRACTITIONER

## 2020-06-19 PROCEDURE — 1101F PR PT FALLS ASSESS DOC 0-1 FALLS W/OUT INJ PAST YR: ICD-10-PCS | Mod: HCNC,CPTII,S$GLB, | Performed by: NURSE PRACTITIONER

## 2020-06-19 PROCEDURE — 99214 OFFICE O/P EST MOD 30 MIN: CPT | Mod: HCNC,S$GLB,, | Performed by: NURSE PRACTITIONER

## 2020-06-19 RX ORDER — NITROGLYCERIN 0.4 MG/1
0.4 TABLET SUBLINGUAL EVERY 5 MIN PRN
Qty: 25 TABLET | Refills: 12 | Status: SHIPPED | OUTPATIENT
Start: 2020-06-19 | End: 2022-08-12 | Stop reason: SDUPTHER

## 2020-06-19 RX ORDER — FUROSEMIDE 40 MG/1
TABLET ORAL
Qty: 60 TABLET | Refills: 6 | Status: SHIPPED | OUTPATIENT
Start: 2020-06-19 | End: 2020-07-16

## 2020-06-19 NOTE — PATIENT INSTRUCTIONS
Take 2 lasix pills when you return home     Take 2 lasix pills tonight     Starting Saturday morning, start taking 2 pills twice daily ( morning and around 3pm)

## 2020-06-21 NOTE — TELEPHONE ENCOUNTER
Cardiology Note:    In regards to possible initiation of anticoagulation for cardiac arrhythmias:    I am not very supportive of resuming anticoagulation in Mr Blair as I have known him for a very long time and he had major hemorrhagic stroke several years ago and still has residual deficits. This occurred while only on 81 mg ASA daily.  I have advised him on several past encounters that I would not anticoagulate him again in future (at one point he had been on Eliquis but not at the time of his CVA).  I have documented in my clinic visits in past that in my medical opinion he is poor candidate for anticoagulation.      Thanks,    Dr Frazier

## 2020-06-26 ENCOUNTER — OFFICE VISIT (OUTPATIENT)
Dept: CARDIOLOGY | Facility: CLINIC | Age: 85
End: 2020-06-26
Payer: MEDICARE

## 2020-06-26 ENCOUNTER — LAB VISIT (OUTPATIENT)
Dept: LAB | Facility: HOSPITAL | Age: 85
End: 2020-06-26
Attending: NURSE PRACTITIONER
Payer: MEDICARE

## 2020-06-26 VITALS
WEIGHT: 189.81 LBS | DIASTOLIC BLOOD PRESSURE: 64 MMHG | HEIGHT: 69 IN | HEART RATE: 62 BPM | OXYGEN SATURATION: 98 % | SYSTOLIC BLOOD PRESSURE: 142 MMHG | BODY MASS INDEX: 28.11 KG/M2

## 2020-06-26 DIAGNOSIS — I48.0 PAROXYSMAL ATRIAL FIBRILLATION: Chronic | ICD-10-CM

## 2020-06-26 DIAGNOSIS — I48.0 PAROXYSMAL ATRIAL FIBRILLATION: Primary | Chronic | ICD-10-CM

## 2020-06-26 DIAGNOSIS — I11.0 HYPERTENSIVE HEART DISEASE WITH HEART FAILURE: ICD-10-CM

## 2020-06-26 LAB
ANION GAP SERPL CALC-SCNC: 7 MMOL/L (ref 8–16)
BUN SERPL-MCNC: 17 MG/DL (ref 8–23)
CALCIUM SERPL-MCNC: 10 MG/DL (ref 8.7–10.5)
CHLORIDE SERPL-SCNC: 100 MMOL/L (ref 95–110)
CO2 SERPL-SCNC: 29 MMOL/L (ref 23–29)
CREAT SERPL-MCNC: 0.9 MG/DL (ref 0.5–1.4)
EST. GFR  (AFRICAN AMERICAN): >60 ML/MIN/1.73 M^2
EST. GFR  (NON AFRICAN AMERICAN): >60 ML/MIN/1.73 M^2
GLUCOSE SERPL-MCNC: 194 MG/DL (ref 70–110)
POTASSIUM SERPL-SCNC: 3.8 MMOL/L (ref 3.5–5.1)
SODIUM SERPL-SCNC: 136 MMOL/L (ref 136–145)

## 2020-06-26 PROCEDURE — 99999 PR PBB SHADOW E&M-EST. PATIENT-LVL III: ICD-10-PCS | Mod: PBBFAC,HCNC,, | Performed by: NURSE PRACTITIONER

## 2020-06-26 PROCEDURE — 99999 PR PBB SHADOW E&M-EST. PATIENT-LVL III: CPT | Mod: PBBFAC,HCNC,, | Performed by: NURSE PRACTITIONER

## 2020-06-26 PROCEDURE — 1101F PT FALLS ASSESS-DOCD LE1/YR: CPT | Mod: HCNC,CPTII,S$GLB, | Performed by: NURSE PRACTITIONER

## 2020-06-26 PROCEDURE — 80048 BASIC METABOLIC PNL TOTAL CA: CPT | Mod: HCNC

## 2020-06-26 PROCEDURE — 1159F MED LIST DOCD IN RCRD: CPT | Mod: HCNC,S$GLB,, | Performed by: NURSE PRACTITIONER

## 2020-06-26 PROCEDURE — 36415 COLL VENOUS BLD VENIPUNCTURE: CPT | Mod: HCNC

## 2020-06-26 PROCEDURE — 1101F PR PT FALLS ASSESS DOC 0-1 FALLS W/OUT INJ PAST YR: ICD-10-PCS | Mod: HCNC,CPTII,S$GLB, | Performed by: NURSE PRACTITIONER

## 2020-06-26 PROCEDURE — 99214 PR OFFICE/OUTPT VISIT, EST, LEVL IV, 30-39 MIN: ICD-10-PCS | Mod: HCNC,S$GLB,, | Performed by: NURSE PRACTITIONER

## 2020-06-26 PROCEDURE — 1159F PR MEDICATION LIST DOCUMENTED IN MEDICAL RECORD: ICD-10-PCS | Mod: HCNC,S$GLB,, | Performed by: NURSE PRACTITIONER

## 2020-06-26 PROCEDURE — 99214 OFFICE O/P EST MOD 30 MIN: CPT | Mod: HCNC,S$GLB,, | Performed by: NURSE PRACTITIONER

## 2020-06-26 NOTE — PATIENT INSTRUCTIONS
Increase lasix to 80mg twice daily for  3 days, then go back to 40mg twice daily     Labs next week and follow up in 2-3 weeks for symptom recheck    Keep scheduled appt with Dr. Frazier

## 2020-06-26 NOTE — PROGRESS NOTES
Subjective:   Patient ID:  Anthony Blair is a 87 y.o. male who presents for follow up of Congestive Heart Failure      HPI  Mr. Cruz's current medical conditions include PAF/PSVT, CAD (PTCA 1980's), HTN HLP, PPM, PHTN, LVH, LAE, carotid artery disease, DM, atrial septal aneurysm/pfo, PAD, dyslipidemia. Nonsmoker.  Past details pertinent for following:   Statin intolerant.  s/p CABG 7/10 (LIMA-LAD, SVG-OM1, SVG-OM3, SVG-PDA) for increasing OLIVARES and multivessel CAD on [facility].   S/p PPM 10/10 for SSS/PAF. Was hospitalized 1/11 for PSVT (Atrial tachycardia) and was started on Amiodarone but was stopped for GI discomfort. He also did not tolerate Multaq and has not tolerated multiple statins.   s/p EPS/ablation of his atrial tachycardia 6/11  Echo in March 2019 shows DD with preserved LVEF 60% and pulm HTN with RV hypertrophy  Started on Eliquis Feb 2017 for suspicious left internal jugular vein thrombus, but had to be stopped due to  acute hemorrhage (on ASA) of basal ganglia right side Sept 2017, causing weakness on left side.   Patient had apparently stopped Eliquis and was on ASA 81mg daily when hemorrhage occurred.     Dr. Frazier didn't feel like patient not a good candidate for OAC due to risk of recurrent hemorrhagic stroke.    Edema, SOB and bloating. Lasix  Increased to 40mg BID/   Edema has improved and SOB improved, but still present. Weight down 6 lbs from last week. Bloating improved.     Past Medical History:   Diagnosis Date    A-fib     Anemia     AP (angina pectoris) 1/23/2014    Carotid artery disease without cerebral infarction 8/22/2014    Cataract     Coronary artery disease     GERD (gastroesophageal reflux disease) 7/11/2013    Hemorrhagic cerebrovascular accident (CVA) 4/26/2018    Hyperlipidemia     Hypertension     Hypertensive heart disease with heart failure 3/12/2019    Intracranial hemorrhage     Lumbosacral spondylosis 12/24/2014    Pacemaker 1/23/2014    Paroxysmal atrial  fibrillation 2014    Peripheral vascular disease 2014    Pneumonia of right lung due to infectious organism 3/27/2019    Seizure, late effect of stroke     Stroke     Type 2 diabetes mellitus with ophthalmic manifestations        Past Surgical History:   Procedure Laterality Date    ANGIOPLASTY      ATRIAL ABLATION SURGERY N/A     CATARACT EXTRACTION Bilateral     [facility]    CATARACT EXTRACTION W/ INTRAOCULAR LENS IMPLANT Right     CATARACT EXTRACTION W/ INTRAOCULAR LENS IMPLANT Left     COLONOSCOPY N/A 10/16/2018    Procedure: COLONOSCOPY with biopsies;  Surgeon: Anabell Griggs MD;  Location: [facility];  Service: Endoscopy;  Laterality: N/A;    CORONARY ARTERY BYPASS GRAFT  07/2010    x5    EYE SURGERY      pace maker surgrey  10/2010    reposition in  (approx)    VEIN BYPASS SURGERY         Social History     Tobacco Use    Smoking status: Former Smoker     Packs/day: 2.00     Years: 13.00     Pack years: 26.00     Types: Cigarettes     Start date: 1954     Quit date: 1967     Years since quittin.0    Smokeless tobacco: Never Used   Substance Use Topics    Alcohol use: No    Drug use: No       Family History   Problem Relation Age of Onset    Arthritis Mother     Diabetes Mother     Kidney disease Mother     Heart disease Mother     Arthritis Father     Diabetes Father     Diabetes Sister     Cancer Sister         breast    Heart disease Sister     Diabetes Brother     Kidney disease Brother     Heart disease Brother     Cancer Daughter         breast    Diabetes Daughter     Miscarriages / Stillbirths Daughter     Fibromyalgia Daughter     Diabetes Son     Diabetes Son     Alcohol abuse Paternal Uncle     Stroke Neg Hx        Current Outpatient Medications   Medication Sig    ACCU-CHEK TOMAS PLUS TEST STRP Strp TEST SIX TIMES A DAY    ACCU-CHEK MULTICLIX LANCET lancets TEST BLOOD SUGAR THREE TIMES DAILY     "CARBOXYMETHYLCELLULOSE SODIUM (REFRESH OPHT) Apply to eye.    diclofenac sodium (VOLTAREN) 1 % Gel Apply 2 g topically once daily.    flecainide (TAMBOCOR) 50 MG Tab Take 1 tablet (50 mg total) by mouth every 12 (twelve) hours.    fluticasone propionate (FLONASE) 50 mcg/actuation nasal spray USE TWO SPRAYS IN EACH NOSTRIL ONCE DAILY    furosemide (LASIX) 40 MG tablet Take 1 tab daily with an extra tab as directed    guaiFENesin (MUCINEX) 600 mg 12 hr tablet Take 1 tablet (600 mg total) by mouth 2 (two) times daily.    insulin (LANTUS SOLOSTAR U-100 INSULIN) glargine 100 units/mL (3mL) SubQ pen INJECT 16 UNITS SUB-Q AT BEDTIME    insulin aspart U-100 (NOVOLOG FLEXPEN U-100 INSULIN) 100 unit/mL (3 mL) InPn pen INJECT TEN UNITS AT BREAKFAST, EIGHT UNITS AT LUNCH AND TEN UNITS AT DINNER. 54 DAY SUPPLY    isosorbide mononitrate (IMDUR) 60 MG 24 hr tablet Take 1 tablet (60 mg total) by mouth once daily.    losartan (COZAAR) 100 MG tablet TAKE ONE TABLET BY MOUTH EVERY DAY    meclizine (ANTIVERT) 12.5 mg tablet Take 1 tablet (12.5 mg total) by mouth 2 (two) times daily as needed.    metoprolol succinate (TOPROL-XL) 100 MG 24 hr tablet TAKE ONE TABLET BY MOUTH TWICE DAILY    nitroGLYCERIN (NITROSTAT) 0.4 MG SL tablet Place 1 tablet (0.4 mg total) under the tongue every 5 (five) minutes as needed for Chest pain.    pantoprazole (PROTONIX) 40 MG tablet TAKE ONE TABLET BY MOUTH EVERY DAY    potassium chloride SA (K-DUR,KLOR-CON) 20 MEQ tablet TAKE ONE TABLET BY MOUTH EVERY DAY    saw palmetto 160 MG capsule Take 160 mg by mouth 2 (two) times daily.    SURE COMFORT PEN NEEDLE 32 gauge x 5/32" Ndle USE FOUR TIMES DAILY.     No current facility-administered medications for this visit.      Current Outpatient Medications on File Prior to Visit   Medication Sig    ACCU-CHEK TOMAS PLUS TEST STRP Strp TEST SIX TIMES A DAY    ACCU-CHEK MULTICLIX LANCET lancets TEST BLOOD SUGAR THREE TIMES DAILY    " "CARBOXYMETHYLCELLULOSE SODIUM (REFRESH OPHT) Apply to eye.    diclofenac sodium (VOLTAREN) 1 % Gel Apply 2 g topically once daily.    flecainide (TAMBOCOR) 50 MG Tab Take 1 tablet (50 mg total) by mouth every 12 (twelve) hours.    fluticasone propionate (FLONASE) 50 mcg/actuation nasal spray USE TWO SPRAYS IN EACH NOSTRIL ONCE DAILY    furosemide (LASIX) 40 MG tablet Take 1 tab daily with an extra tab as directed    guaiFENesin (MUCINEX) 600 mg 12 hr tablet Take 1 tablet (600 mg total) by mouth 2 (two) times daily.    insulin (LANTUS SOLOSTAR U-100 INSULIN) glargine 100 units/mL (3mL) SubQ pen INJECT 16 UNITS SUB-Q AT BEDTIME    insulin aspart U-100 (NOVOLOG FLEXPEN U-100 INSULIN) 100 unit/mL (3 mL) InPn pen INJECT TEN UNITS AT BREAKFAST, EIGHT UNITS AT LUNCH AND TEN UNITS AT DINNER. 54 DAY SUPPLY    isosorbide mononitrate (IMDUR) 60 MG 24 hr tablet Take 1 tablet (60 mg total) by mouth once daily.    losartan (COZAAR) 100 MG tablet TAKE ONE TABLET BY MOUTH EVERY DAY    meclizine (ANTIVERT) 12.5 mg tablet Take 1 tablet (12.5 mg total) by mouth 2 (two) times daily as needed.    metoprolol succinate (TOPROL-XL) 100 MG 24 hr tablet TAKE ONE TABLET BY MOUTH TWICE DAILY    nitroGLYCERIN (NITROSTAT) 0.4 MG SL tablet Place 1 tablet (0.4 mg total) under the tongue every 5 (five) minutes as needed for Chest pain.    pantoprazole (PROTONIX) 40 MG tablet TAKE ONE TABLET BY MOUTH EVERY DAY    potassium chloride SA (K-DUR,KLOR-CON) 20 MEQ tablet TAKE ONE TABLET BY MOUTH EVERY DAY    saw palmetto 160 MG capsule Take 160 mg by mouth 2 (two) times daily.    SURE COMFORT PEN NEEDLE 32 gauge x 5/32" Ndle USE FOUR TIMES DAILY.     No current facility-administered medications on file prior to visit.        Review of Systems   Constitution: Positive for weight loss ( 5 lbs ). Negative for diaphoresis, malaise/fatigue and weight gain.   HENT: Negative for congestion and nosebleeds.    Cardiovascular: Positive for leg " "swelling. Negative for chest pain, claudication, cyanosis, dyspnea on exertion, irregular heartbeat, near-syncope, orthopnea, palpitations, paroxysmal nocturnal dyspnea and syncope.   Respiratory: Negative for cough, hemoptysis, shortness of breath, sleep disturbances due to breathing, snoring, sputum production and wheezing.    Hematologic/Lymphatic: Negative for bleeding problem. Does not bruise/bleed easily.   Skin: Negative for rash.   Musculoskeletal: Positive for arthritis and joint pain. Negative for back pain, falls, muscle cramps and muscle weakness.   Gastrointestinal: Negative for bloating, abdominal pain, constipation, diarrhea, heartburn, hematemesis, hematochezia, melena, nausea and vomiting.   Genitourinary: Negative for dysuria, hematuria and nocturia.   Neurological: Negative for excessive daytime sleepiness, dizziness, headaches, light-headedness, loss of balance, numbness, vertigo and weakness.       Objective:   Physical Exam   Constitutional: He is oriented to person, place, and time. He appears well-developed and well-nourished.   Neck: Neck supple. No JVD present.   Cardiovascular: Normal rate, regular rhythm, normal heart sounds and normal pulses. Exam reveals no friction rub.   No murmur heard.  Pulmonary/Chest: Effort normal. No respiratory distress. He has no wheezes. He has rales ( RLL faint rales ).   Abdominal: Soft. Bowel sounds are normal. He exhibits no distension.   Musculoskeletal:         General: No tenderness or edema ( 2+ BLE  that extends to calf ).   Neurological: He is alert and oriented to person, place, and time.   Skin: Skin is warm and dry. No rash noted.   Psychiatric: He has a normal mood and affect. His behavior is normal.   Nursing note and vitals reviewed.    Vitals:    06/26/20 1034   BP: (!) 142/64   BP Location: Right arm   Patient Position: Sitting   Pulse: 62   SpO2: 98%   Weight: 86.1 kg (189 lb 13.1 oz)   Height: 5' 9" (1.753 m)     Lab Results   Component " Value Date    CHOL 176 01/08/2020    CHOL 175 10/02/2019    CHOL 194 06/26/2019     Lab Results   Component Value Date    HDL 52 01/08/2020    HDL 46 10/02/2019    HDL 44 06/26/2019     Lab Results   Component Value Date    LDLCALC 110.6 01/08/2020    LDLCALC 104.6 10/02/2019    LDLCALC 116.2 06/26/2019     Lab Results   Component Value Date    TRIG 67 01/08/2020    TRIG 122 10/02/2019    TRIG 169 (H) 06/26/2019     Lab Results   Component Value Date    CHOLHDL 29.5 01/08/2020    CHOLHDL 26.3 10/02/2019    CHOLHDL 22.7 06/26/2019       Chemistry        Component Value Date/Time     06/26/2020 1024    K 3.8 06/26/2020 1024     06/26/2020 1024    CO2 29 06/26/2020 1024    BUN 17 06/26/2020 1024    CREATININE 0.9 06/26/2020 1024     (H) 06/26/2020 1024        Component Value Date/Time    CALCIUM 10.0 06/26/2020 1024    ALKPHOS 111 01/12/2020 2218    AST 27 01/12/2020 2218    ALT 13 01/12/2020 2218    BILITOT 0.6 01/12/2020 2218    ESTGFRAFRICA >60 06/26/2020 1024    EGFRNONAA >60 06/26/2020 1024          Lab Results   Component Value Date    TSH 1.458 02/07/2020     Lab Results   Component Value Date    INR 1.2 03/28/2019    INR 1.1 03/27/2019    INR 1.1 01/24/2019     Lab Results   Component Value Date    WBC 5.24 01/12/2020    HGB 12.0 (L) 01/12/2020    HCT 36.4 (L) 01/12/2020    MCV 94 01/12/2020     01/12/2020     BMP  Sodium   Date Value Ref Range Status   06/26/2020 136 136 - 145 mmol/L Final     Potassium   Date Value Ref Range Status   06/26/2020 3.8 3.5 - 5.1 mmol/L Final     Chloride   Date Value Ref Range Status   06/26/2020 100 95 - 110 mmol/L Final     CO2   Date Value Ref Range Status   06/26/2020 29 23 - 29 mmol/L Final     BUN, Bld   Date Value Ref Range Status   06/26/2020 17 8 - 23 mg/dL Final     Creatinine   Date Value Ref Range Status   06/26/2020 0.9 0.5 - 1.4 mg/dL Final     Calcium   Date Value Ref Range Status   06/26/2020 10.0 8.7 - 10.5 mg/dL Final     Anion Gap    Date Value Ref Range Status   06/26/2020 7 (L) 8 - 16 mmol/L Final     eGFR if    Date Value Ref Range Status   06/26/2020 >60 >60 mL/min/1.73 m^2 Final     eGFR if non    Date Value Ref Range Status   06/26/2020 >60 >60 mL/min/1.73 m^2 Final     Comment:     Calculation used to obtain the estimated glomerular filtration  rate (eGFR) is the CKD-EPI equation.        Estimated Creatinine Clearance: 62.9 mL/min (based on SCr of 0.9 mg/dL).    Assessment:     1. Paroxysmal atrial fibrillation    2. Hypertensive heart disease with heart failure        Plan:     Increase Lasix to 80mg BID x3 days then resume 40mg BID   Continue KCL supplement   BMP next week  Follow up in 2-3 weeks for CHF recheck   RTC as scheduled with Dr. Frazier

## 2020-07-01 ENCOUNTER — LAB VISIT (OUTPATIENT)
Dept: LAB | Facility: HOSPITAL | Age: 85
End: 2020-07-01
Attending: FAMILY MEDICINE
Payer: MEDICARE

## 2020-07-01 DIAGNOSIS — I11.0 HYPERTENSIVE HEART DISEASE WITH HEART FAILURE: ICD-10-CM

## 2020-07-01 LAB
ANION GAP SERPL CALC-SCNC: 10 MMOL/L (ref 8–16)
BUN SERPL-MCNC: 23 MG/DL (ref 8–23)
CALCIUM SERPL-MCNC: 10.4 MG/DL (ref 8.7–10.5)
CHLORIDE SERPL-SCNC: 98 MMOL/L (ref 95–110)
CO2 SERPL-SCNC: 29 MMOL/L (ref 23–29)
CREAT SERPL-MCNC: 1 MG/DL (ref 0.5–1.4)
EST. GFR  (AFRICAN AMERICAN): >60 ML/MIN/1.73 M^2
EST. GFR  (NON AFRICAN AMERICAN): >60 ML/MIN/1.73 M^2
GLUCOSE SERPL-MCNC: 156 MG/DL (ref 70–110)
POTASSIUM SERPL-SCNC: 3.7 MMOL/L (ref 3.5–5.1)
SODIUM SERPL-SCNC: 137 MMOL/L (ref 136–145)

## 2020-07-01 PROCEDURE — 36415 COLL VENOUS BLD VENIPUNCTURE: CPT | Mod: HCNC,PO

## 2020-07-01 PROCEDURE — 80048 BASIC METABOLIC PNL TOTAL CA: CPT | Mod: HCNC

## 2020-07-02 ENCOUNTER — PATIENT OUTREACH (OUTPATIENT)
Dept: ADMINISTRATIVE | Facility: OTHER | Age: 85
End: 2020-07-02

## 2020-07-02 ENCOUNTER — TELEPHONE (OUTPATIENT)
Dept: CARDIOLOGY | Facility: CLINIC | Age: 85
End: 2020-07-02

## 2020-07-02 NOTE — TELEPHONE ENCOUNTER
The patient has been notified of this information and all questions answered.    The patient reported no shortness of breath or chest pain. He does have some swelling in his legs and ankles, but it has gone down. He has lost 11 lbs since the medication adjustment and feels much better now.

## 2020-07-02 NOTE — PROGRESS NOTES
Requested updates within Care Everywhere.  Patient's chart was reviewed for overdue NURIA topics.  Immunizations reconciled.    Orders previously placed: hgb a1c  Eye exam 7/6/20.

## 2020-07-02 NOTE — TELEPHONE ENCOUNTER
----- Message from ALICE Olivares sent at 7/2/2020  2:01 PM CDT -----  Please inform patient that labs reviewed and are stable since increasing Lasix. Please do CHF symptom check .

## 2020-07-06 ENCOUNTER — OFFICE VISIT (OUTPATIENT)
Dept: OPHTHALMOLOGY | Facility: CLINIC | Age: 85
End: 2020-07-06
Payer: MEDICARE

## 2020-07-06 DIAGNOSIS — H52.7 REFRACTIVE ERROR: ICD-10-CM

## 2020-07-06 DIAGNOSIS — E11.9 DIABETES MELLITUS TYPE 2 WITHOUT RETINOPATHY: Primary | ICD-10-CM

## 2020-07-06 DIAGNOSIS — Z96.1 PSEUDOPHAKIA: ICD-10-CM

## 2020-07-06 PROCEDURE — 92014 COMPRE OPH EXAM EST PT 1/>: CPT | Mod: HCNC,S$GLB,, | Performed by: OPTOMETRIST

## 2020-07-06 PROCEDURE — 99999 PR PBB SHADOW E&M-EST. PATIENT-LVL III: CPT | Mod: PBBFAC,HCNC,, | Performed by: OPTOMETRIST

## 2020-07-06 PROCEDURE — 92015 PR REFRACTION: ICD-10-PCS | Mod: HCNC,S$GLB,, | Performed by: OPTOMETRIST

## 2020-07-06 PROCEDURE — 92014 PR EYE EXAM, EST PATIENT,COMPREHESV: ICD-10-PCS | Mod: HCNC,S$GLB,, | Performed by: OPTOMETRIST

## 2020-07-06 PROCEDURE — 99999 PR PBB SHADOW E&M-EST. PATIENT-LVL III: ICD-10-PCS | Mod: PBBFAC,HCNC,, | Performed by: OPTOMETRIST

## 2020-07-06 PROCEDURE — 92015 DETERMINE REFRACTIVE STATE: CPT | Mod: HCNC,S$GLB,, | Performed by: OPTOMETRIST

## 2020-07-06 NOTE — PROGRESS NOTES
HPI     IDDM exam.  Floaters with increase blood sugar.  No flashes of light.  Last eye exam 07/01/2019 TRF.  Update glasses RX.  Lab Results       Component                Value               Date                       HGBA1C                   7.0 (H)             01/08/2020              Last edited by Varun Mason, OD on 7/6/2020 10:08 AM. (History)            Assessment /Plan     For exam results, see Encounter Report.    Diabetes mellitus type 2 without retinopathy    Pseudophakia    Refractive error      No Background Diabetic Retinopathy    Stable IOL OU.    Dispense Final Rx for glasses.  RTC 1 year  Discussed above and answered questions.

## 2020-07-13 NOTE — PROGRESS NOTES
Subjective:   Patient ID:  Anthony Blair is a 87 y.o. male who presents for follow up of No chief complaint on file.      HPI  Mr. Cruz's current medical conditions include PAF/PSVT, CAD (PTCA 1980's), HTN HLP, PPM, PHTN, LVH, LAE, carotid artery disease, DM, atrial septal aneurysm/pfo, PAD, dyslipidemia. Nonsmoker.  Past details pertinent for following:   Statin intolerant.  s/p CABG 7/10 (LIMA-LAD, SVG-OM1, SVG-OM3, SVG-PDA) for increasing OLIVARES and multivessel CAD on [facility].   S/p PPM 10/10 for SSS/PAF. Was hospitalized 1/11 for PSVT (Atrial tachycardia) and was started on Amiodarone but was stopped for GI discomfort. He also did not tolerate Multaq and has not tolerated multiple statins.   s/p EPS/ablation of his atrial tachycardia 6/11  Echo in March 2019 shows DD with preserved LVEF 60% and pulm HTN with RV hypertrophy  Started on Eliquis Feb 2017 for suspicious left internal jugular vein thrombus, but had to be stopped due to  acute hemorrhage (on ASA) of basal ganglia right side Sept 2017, causing weakness on left side.   Patient had apparently stopped Eliquis and was on ASA 81mg daily when hemorrhage occurred.      Dr. Frazier didn't feel like patient not a good candidate for OAC due to risk of recurrent hemorrhagic stroke.    SOB and edema both improved. Still taking Lasix 40mg BID    Denies any chest pain, OLIVARES,  orthopnea, PND, dizziness, palpitations,  near syncope, syncope. Has no symptoms concerning for angina or equivalent. No CNS Complaints to suggest TIA or CVA. Does well with limiting sodium intake.    Past Medical History:   Diagnosis Date    A-fib     Anemia     AP (angina pectoris) 1/23/2014    Carotid artery disease without cerebral infarction 8/22/2014    Cataract     Coronary artery disease     Diabetes mellitus     DM (diabetes mellitus) 1970     am 07/06/2020    GERD (gastroesophageal reflux disease) 7/11/2013    Hemorrhagic cerebrovascular accident (CVA) 4/26/2018     Hyperlipidemia     Hypertension     Hypertensive heart disease with heart failure 3/12/2019    Intracranial hemorrhage     Lumbosacral spondylosis 2014    Pacemaker 2014    Paroxysmal atrial fibrillation 2014    Peripheral vascular disease 2014    Pneumonia of right lung due to infectious organism 3/27/2019    Seizure, late effect of stroke     Stroke     Type 2 diabetes mellitus with ophthalmic manifestations        Past Surgical History:   Procedure Laterality Date    ANGIOPLASTY      ATRIAL ABLATION SURGERY N/A     CATARACT EXTRACTION Bilateral     [facility]    CATARACT EXTRACTION W/ INTRAOCULAR LENS IMPLANT Right     CATARACT EXTRACTION W/ INTRAOCULAR LENS IMPLANT Left     COLONOSCOPY N/A 10/16/2018    Procedure: COLONOSCOPY with biopsies;  Surgeon: Anabell Griggs MD;  Location: [facility];  Service: Endoscopy;  Laterality: N/A;    CORONARY ARTERY BYPASS GRAFT  07/2010    x5    EYE SURGERY      pace maker surgrey  10/2010    reposition in  (approx)    VEIN BYPASS SURGERY         Social History     Tobacco Use    Smoking status: Former Smoker     Packs/day: 2.00     Years: 13.00     Pack years: 26.00     Types: Cigarettes     Start date: 1954     Quit date: 1967     Years since quittin.1    Smokeless tobacco: Never Used   Substance Use Topics    Alcohol use: No    Drug use: No       Family History   Problem Relation Age of Onset    Arthritis Mother     Diabetes Mother     Kidney disease Mother     Heart disease Mother     Arthritis Father     Diabetes Father     Diabetes Sister     Cancer Sister         breast    Heart disease Sister     Diabetes Brother     Kidney disease Brother     Heart disease Brother     Cancer Daughter         breast    Diabetes Daughter     Miscarriages / Stillbirths Daughter     Fibromyalgia Daughter     Diabetes Son     Diabetes Son     Alcohol abuse Paternal Uncle     Stroke Neg Hx   "      Current Outpatient Medications   Medication Sig    ACCU-CHEK TOMAS PLUS TEST STRP Strp TEST SIX TIMES A DAY    diclofenac sodium (VOLTAREN) 1 % Gel Apply 2 g topically once daily.    flecainide (TAMBOCOR) 50 MG Tab Take 1 tablet (50 mg total) by mouth every 12 (twelve) hours.    fluticasone propionate (FLONASE) 50 mcg/actuation nasal spray USE TWO SPRAYS IN EACH NOSTRIL ONCE DAILY    furosemide (LASIX) 40 MG tablet Take 1 tab daily with an extra tab as directed    guaiFENesin (MUCINEX) 600 mg 12 hr tablet Take 1 tablet (600 mg total) by mouth 2 (two) times daily.    insulin (LANTUS SOLOSTAR U-100 INSULIN) glargine 100 units/mL (3mL) SubQ pen INJECT 16 UNITS SUB-Q AT BEDTIME    insulin aspart U-100 (NOVOLOG FLEXPEN U-100 INSULIN) 100 unit/mL (3 mL) InPn pen INJECT TEN UNITS AT BREAKFAST, EIGHT UNITS AT LUNCH AND TEN UNITS AT DINNER. 54 DAY SUPPLY    isosorbide mononitrate (IMDUR) 60 MG 24 hr tablet Take 1 tablet (60 mg total) by mouth once daily.    losartan (COZAAR) 100 MG tablet TAKE ONE TABLET BY MOUTH EVERY DAY    meclizine (ANTIVERT) 12.5 mg tablet Take 1 tablet (12.5 mg total) by mouth 2 (two) times daily as needed.    metoprolol succinate (TOPROL-XL) 100 MG 24 hr tablet TAKE ONE TABLET BY MOUTH TWICE DAILY    nitroGLYCERIN (NITROSTAT) 0.4 MG SL tablet Place 1 tablet (0.4 mg total) under the tongue every 5 (five) minutes as needed for Chest pain.    pantoprazole (PROTONIX) 40 MG tablet TAKE ONE TABLET BY MOUTH EVERY DAY    potassium chloride SA (K-DUR,KLOR-CON) 20 MEQ tablet TAKE ONE TABLET BY MOUTH EVERY DAY    saw palmetto 160 MG capsule Take 160 mg by mouth 2 (two) times daily.    ACCU-CHEK MULTICLIX LANCET lancets TEST BLOOD SUGAR THREE TIMES DAILY    CARBOXYMETHYLCELLULOSE SODIUM (REFRESH OPHT) Apply to eye.    SURE COMFORT PEN NEEDLE 32 gauge x 5/32" Ndle USE FOUR TIMES DAILY.     No current facility-administered medications for this visit.      Current Outpatient Medications on " "File Prior to Visit   Medication Sig    ACCU-CHEK TOMAS PLUS TEST STRP Strp TEST SIX TIMES A DAY    diclofenac sodium (VOLTAREN) 1 % Gel Apply 2 g topically once daily.    flecainide (TAMBOCOR) 50 MG Tab Take 1 tablet (50 mg total) by mouth every 12 (twelve) hours.    fluticasone propionate (FLONASE) 50 mcg/actuation nasal spray USE TWO SPRAYS IN EACH NOSTRIL ONCE DAILY    furosemide (LASIX) 40 MG tablet Take 1 tab daily with an extra tab as directed    guaiFENesin (MUCINEX) 600 mg 12 hr tablet Take 1 tablet (600 mg total) by mouth 2 (two) times daily.    insulin (LANTUS SOLOSTAR U-100 INSULIN) glargine 100 units/mL (3mL) SubQ pen INJECT 16 UNITS SUB-Q AT BEDTIME    insulin aspart U-100 (NOVOLOG FLEXPEN U-100 INSULIN) 100 unit/mL (3 mL) InPn pen INJECT TEN UNITS AT BREAKFAST, EIGHT UNITS AT LUNCH AND TEN UNITS AT DINNER. 54 DAY SUPPLY    isosorbide mononitrate (IMDUR) 60 MG 24 hr tablet Take 1 tablet (60 mg total) by mouth once daily.    losartan (COZAAR) 100 MG tablet TAKE ONE TABLET BY MOUTH EVERY DAY    meclizine (ANTIVERT) 12.5 mg tablet Take 1 tablet (12.5 mg total) by mouth 2 (two) times daily as needed.    metoprolol succinate (TOPROL-XL) 100 MG 24 hr tablet TAKE ONE TABLET BY MOUTH TWICE DAILY    nitroGLYCERIN (NITROSTAT) 0.4 MG SL tablet Place 1 tablet (0.4 mg total) under the tongue every 5 (five) minutes as needed for Chest pain.    pantoprazole (PROTONIX) 40 MG tablet TAKE ONE TABLET BY MOUTH EVERY DAY    potassium chloride SA (K-DUR,KLOR-CON) 20 MEQ tablet TAKE ONE TABLET BY MOUTH EVERY DAY    saw palmetto 160 MG capsule Take 160 mg by mouth 2 (two) times daily.    ACCU-CHEK MULTICLIX LANCET lancets TEST BLOOD SUGAR THREE TIMES DAILY    CARBOXYMETHYLCELLULOSE SODIUM (REFRESH OPHT) Apply to eye.    SURE COMFORT PEN NEEDLE 32 gauge x 5/32" Ndle USE FOUR TIMES DAILY.     No current facility-administered medications on file prior to visit.        Review of Systems   Constitution: Negative " for diaphoresis, malaise/fatigue, weight gain and weight loss.   HENT: Negative for congestion and nosebleeds.    Cardiovascular: Positive for leg swelling. Negative for chest pain, claudication, cyanosis, dyspnea on exertion, irregular heartbeat, near-syncope, orthopnea, palpitations, paroxysmal nocturnal dyspnea and syncope.   Respiratory: Negative for cough, hemoptysis, shortness of breath, sleep disturbances due to breathing, snoring, sputum production and wheezing.    Hematologic/Lymphatic: Negative for bleeding problem. Does not bruise/bleed easily.   Skin: Negative for rash.   Musculoskeletal: Positive for arthritis and joint pain. Negative for back pain, falls, muscle cramps and muscle weakness.   Gastrointestinal: Negative for bloating, abdominal pain, constipation, diarrhea, heartburn, hematemesis, hematochezia, melena, nausea and vomiting.   Genitourinary: Negative for dysuria, hematuria and nocturia.   Neurological: Negative for excessive daytime sleepiness, dizziness, headaches, light-headedness, loss of balance, numbness, vertigo and weakness.       Objective:   Physical Exam   Constitutional: He is oriented to person, place, and time. He appears well-developed and well-nourished.   Neck: Neck supple. No JVD present.   Cardiovascular: Normal rate, regular rhythm, normal heart sounds and normal pulses. Exam reveals no friction rub.   No murmur heard.  Pulmonary/Chest: Effort normal. No respiratory distress. He has no wheezes. He has rales ( faint rales to bilateral bases ).   Left PPM pocket WNL    Abdominal: Soft. Bowel sounds are normal. He exhibits no distension.   Musculoskeletal:         General: Edema ( +1 BLE) present. No tenderness.   Neurological: He is alert and oriented to person, place, and time.   Skin: Skin is warm and dry. No rash noted.   Psychiatric: He has a normal mood and affect. His behavior is normal.   Nursing note and vitals reviewed.    Vitals:    07/14/20 0806   BP: 132/74   BP  Location: Left arm   Patient Position: Sitting   BP Method: Medium (Manual)   Pulse: 62   SpO2: 98%   Weight: 85.4 kg (188 lb 4.4 oz)     Lab Results   Component Value Date    CHOL 176 01/08/2020    CHOL 175 10/02/2019    CHOL 194 06/26/2019     Lab Results   Component Value Date    HDL 52 01/08/2020    HDL 46 10/02/2019    HDL 44 06/26/2019     Lab Results   Component Value Date    LDLCALC 110.6 01/08/2020    LDLCALC 104.6 10/02/2019    LDLCALC 116.2 06/26/2019     Lab Results   Component Value Date    TRIG 67 01/08/2020    TRIG 122 10/02/2019    TRIG 169 (H) 06/26/2019     Lab Results   Component Value Date    CHOLHDL 29.5 01/08/2020    CHOLHDL 26.3 10/02/2019    CHOLHDL 22.7 06/26/2019       Chemistry        Component Value Date/Time     07/01/2020 0903    K 3.7 07/01/2020 0903    CL 98 07/01/2020 0903    CO2 29 07/01/2020 0903    BUN 23 07/01/2020 0903    CREATININE 1.0 07/01/2020 0903     (H) 07/01/2020 0903        Component Value Date/Time    CALCIUM 10.4 07/01/2020 0903    ALKPHOS 111 01/12/2020 2218    AST 27 01/12/2020 2218    ALT 13 01/12/2020 2218    BILITOT 0.6 01/12/2020 2218    ESTGFRAFRICA >60.0 07/01/2020 0903    EGFRNONAA >60.0 07/01/2020 0903          Lab Results   Component Value Date    TSH 1.458 02/07/2020     Lab Results   Component Value Date    INR 1.2 03/28/2019    INR 1.1 03/27/2019    INR 1.1 01/24/2019     Lab Results   Component Value Date    WBC 5.24 01/12/2020    HGB 12.0 (L) 01/12/2020    HCT 36.4 (L) 01/12/2020    MCV 94 01/12/2020     01/12/2020     BMP  Sodium   Date Value Ref Range Status   07/01/2020 137 136 - 145 mmol/L Final     Potassium   Date Value Ref Range Status   07/01/2020 3.7 3.5 - 5.1 mmol/L Final     Chloride   Date Value Ref Range Status   07/01/2020 98 95 - 110 mmol/L Final     CO2   Date Value Ref Range Status   07/01/2020 29 23 - 29 mmol/L Final     BUN, Bld   Date Value Ref Range Status   07/01/2020 23 8 - 23 mg/dL Final     Creatinine   Date  Value Ref Range Status   07/01/2020 1.0 0.5 - 1.4 mg/dL Final     Calcium   Date Value Ref Range Status   07/01/2020 10.4 8.7 - 10.5 mg/dL Final     Anion Gap   Date Value Ref Range Status   07/01/2020 10 8 - 16 mmol/L Final     eGFR if    Date Value Ref Range Status   07/01/2020 >60.0 >60 mL/min/1.73 m^2 Final     eGFR if non    Date Value Ref Range Status   07/01/2020 >60.0 >60 mL/min/1.73 m^2 Final     Comment:     Calculation used to obtain the estimated glomerular filtration  rate (eGFR) is the CKD-EPI equation.        CrCl cannot be calculated (Patient's most recent lab result is older than the maximum 7 days allowed.).    Assessment:     1. Paroxysmal atrial fibrillation    2. Pacemaker    3. Essential hypertension        Plan:   Continue current current medical therapy for now   Will have him take Lasix TID x3 days then resume BID dosing   Keep device clinic appt and appt with Dr. Frazier as scheduled

## 2020-07-14 ENCOUNTER — OFFICE VISIT (OUTPATIENT)
Dept: CARDIOLOGY | Facility: CLINIC | Age: 85
End: 2020-07-14
Payer: MEDICARE

## 2020-07-14 VITALS
BODY MASS INDEX: 27.8 KG/M2 | HEART RATE: 62 BPM | WEIGHT: 188.25 LBS | SYSTOLIC BLOOD PRESSURE: 132 MMHG | OXYGEN SATURATION: 98 % | DIASTOLIC BLOOD PRESSURE: 74 MMHG

## 2020-07-14 DIAGNOSIS — Z95.0 PACEMAKER: Chronic | ICD-10-CM

## 2020-07-14 DIAGNOSIS — I10 ESSENTIAL HYPERTENSION: ICD-10-CM

## 2020-07-14 DIAGNOSIS — I48.0 PAROXYSMAL ATRIAL FIBRILLATION: Primary | Chronic | ICD-10-CM

## 2020-07-14 PROCEDURE — 1159F MED LIST DOCD IN RCRD: CPT | Mod: HCNC,S$GLB,, | Performed by: NURSE PRACTITIONER

## 2020-07-14 PROCEDURE — 99214 OFFICE O/P EST MOD 30 MIN: CPT | Mod: HCNC,S$GLB,, | Performed by: NURSE PRACTITIONER

## 2020-07-14 PROCEDURE — 1101F PR PT FALLS ASSESS DOC 0-1 FALLS W/OUT INJ PAST YR: ICD-10-PCS | Mod: HCNC,CPTII,S$GLB, | Performed by: NURSE PRACTITIONER

## 2020-07-14 PROCEDURE — 99214 PR OFFICE/OUTPT VISIT, EST, LEVL IV, 30-39 MIN: ICD-10-PCS | Mod: HCNC,S$GLB,, | Performed by: NURSE PRACTITIONER

## 2020-07-14 PROCEDURE — 1101F PT FALLS ASSESS-DOCD LE1/YR: CPT | Mod: HCNC,CPTII,S$GLB, | Performed by: NURSE PRACTITIONER

## 2020-07-14 PROCEDURE — 1159F PR MEDICATION LIST DOCUMENTED IN MEDICAL RECORD: ICD-10-PCS | Mod: HCNC,S$GLB,, | Performed by: NURSE PRACTITIONER

## 2020-07-14 PROCEDURE — 99999 PR PBB SHADOW E&M-EST. PATIENT-LVL III: CPT | Mod: PBBFAC,HCNC,, | Performed by: NURSE PRACTITIONER

## 2020-07-14 PROCEDURE — 99999 PR PBB SHADOW E&M-EST. PATIENT-LVL III: ICD-10-PCS | Mod: PBBFAC,HCNC,, | Performed by: NURSE PRACTITIONER

## 2020-07-17 ENCOUNTER — PATIENT OUTREACH (OUTPATIENT)
Dept: ADMINISTRATIVE | Facility: OTHER | Age: 85
End: 2020-07-17

## 2020-07-17 NOTE — PROGRESS NOTES
Requested updates within Care Everywhere.  Patient's chart was reviewed for overdue NURIA topics.  Immunizations reconciled.

## 2020-07-20 ENCOUNTER — HOSPITAL ENCOUNTER (OUTPATIENT)
Dept: CARDIOLOGY | Facility: HOSPITAL | Age: 85
Discharge: HOME | End: 2020-07-20
Attending: INTERNAL MEDICINE
Payer: MEDICARE

## 2020-07-20 ENCOUNTER — OFFICE VISIT (OUTPATIENT)
Dept: CARDIOLOGY | Facility: CLINIC | Age: 85
End: 2020-07-20
Payer: MEDICARE

## 2020-07-20 VITALS — HEART RATE: 64 BPM | SYSTOLIC BLOOD PRESSURE: 154 MMHG | DIASTOLIC BLOOD PRESSURE: 84 MMHG

## 2020-07-20 DIAGNOSIS — I25.810 CORONARY ARTERY DISEASE INVOLVING CORONARY BYPASS GRAFT OF NATIVE HEART WITHOUT ANGINA PECTORIS: ICD-10-CM

## 2020-07-20 DIAGNOSIS — I48.19 PERSISTENT ATRIAL FIBRILLATION: ICD-10-CM

## 2020-07-20 DIAGNOSIS — I73.9 PERIPHERAL VASCULAR DISEASE: Chronic | ICD-10-CM

## 2020-07-20 DIAGNOSIS — I48.0 PAROXYSMAL ATRIAL FIBRILLATION: Chronic | ICD-10-CM

## 2020-07-20 DIAGNOSIS — E78.2 MIXED HYPERLIPIDEMIA: Chronic | ICD-10-CM

## 2020-07-20 DIAGNOSIS — T82.110A FAILURE OF PACEMAKER LEAD, INITIAL ENCOUNTER: ICD-10-CM

## 2020-07-20 DIAGNOSIS — I71.21 ANEURYSM OF ASCENDING AORTA: ICD-10-CM

## 2020-07-20 DIAGNOSIS — I82.890 JUGULAR VEIN THROMBOSIS: ICD-10-CM

## 2020-07-20 DIAGNOSIS — Z95.0 CARDIAC PACEMAKER IN SITU: Primary | ICD-10-CM

## 2020-07-20 DIAGNOSIS — E13.51 PERIPHERAL VASCULAR DISEASE DUE TO SECONDARY DIABETES MELLITUS: ICD-10-CM

## 2020-07-20 DIAGNOSIS — Z45.010 PACEMAKER AT END OF BATTERY LIFE: Primary | ICD-10-CM

## 2020-07-20 DIAGNOSIS — Z78.9 STATIN INTOLERANCE: ICD-10-CM

## 2020-07-20 DIAGNOSIS — I50.32 CHRONIC DIASTOLIC HEART FAILURE: ICD-10-CM

## 2020-07-20 DIAGNOSIS — Z95.0 CARDIAC PACEMAKER IN SITU: ICD-10-CM

## 2020-07-20 DIAGNOSIS — I49.5 SSS (SICK SINUS SYNDROME): ICD-10-CM

## 2020-07-20 DIAGNOSIS — I65.23 ASYMPTOMATIC STENOSIS OF BOTH CAROTID ARTERIES WITHOUT INFARCTION: ICD-10-CM

## 2020-07-20 DIAGNOSIS — I48.0 PAF (PAROXYSMAL ATRIAL FIBRILLATION): ICD-10-CM

## 2020-07-20 PROCEDURE — 93280 CARDIAC DEVICE CHECK - IN CLINIC & HOSPITAL: ICD-10-PCS | Mod: 26,HCNC,, | Performed by: INTERNAL MEDICINE

## 2020-07-20 PROCEDURE — 99999 PR PBB SHADOW E&M-EST. PATIENT-LVL III: ICD-10-PCS | Mod: PBBFAC,,, | Performed by: INTERNAL MEDICINE

## 2020-07-20 PROCEDURE — 1101F PR PT FALLS ASSESS DOC 0-1 FALLS W/OUT INJ PAST YR: ICD-10-PCS | Mod: CPTII,S$GLB,, | Performed by: INTERNAL MEDICINE

## 2020-07-20 PROCEDURE — 99215 OFFICE O/P EST HI 40 MIN: CPT | Mod: S$GLB,,, | Performed by: INTERNAL MEDICINE

## 2020-07-20 PROCEDURE — 1159F PR MEDICATION LIST DOCUMENTED IN MEDICAL RECORD: ICD-10-PCS | Mod: S$GLB,,, | Performed by: INTERNAL MEDICINE

## 2020-07-20 PROCEDURE — 1159F MED LIST DOCD IN RCRD: CPT | Mod: S$GLB,,, | Performed by: INTERNAL MEDICINE

## 2020-07-20 PROCEDURE — 99999 PR PBB SHADOW E&M-EST. PATIENT-LVL III: CPT | Mod: PBBFAC,,, | Performed by: INTERNAL MEDICINE

## 2020-07-20 PROCEDURE — 99215 PR OFFICE/OUTPT VISIT, EST, LEVL V, 40-54 MIN: ICD-10-PCS | Mod: S$GLB,,, | Performed by: INTERNAL MEDICINE

## 2020-07-20 PROCEDURE — 93280 PM DEVICE PROGR EVAL DUAL: CPT | Mod: 26,HCNC,, | Performed by: INTERNAL MEDICINE

## 2020-07-20 PROCEDURE — 1101F PT FALLS ASSESS-DOCD LE1/YR: CPT | Mod: CPTII,S$GLB,, | Performed by: INTERNAL MEDICINE

## 2020-07-20 PROCEDURE — 93280 PM DEVICE PROGR EVAL DUAL: CPT | Mod: HCNC

## 2020-07-20 NOTE — PROGRESS NOTES
Patient needs to be scheduled for Venogram, A- lead revision and Pacemaker generator change after Echocardiogram scheduled 7/29/20

## 2020-07-20 NOTE — H&P (VIEW-ONLY)
Subjective:   Patient ID:  Anthony Blair is a 87 y.o. male     Chief complaint:No chief complaint on file.      HPI  Background (for extensive clinical details, see my note dated 10/24/2018):  In short, he is 87 years old, he has a pacemaker for heart block and is pacemaker dependent.  He has an history of persistent atrial fibrillation there is also a prior history of intracerebral bleed.  Is currently on no anticoagulants (as per Dr. Seven Frazier recommendations).  He also has an AVM of the stomach.  He has CAD, hypertension, mixed hyperlipidemia, peripheral vascular disease and diabetes type 2.    Update since then:  He has been stable.  His pacemaker battery has been at DOLORES for some time.  Today it was nearly at EOS.   In addition, his atrial need is been malfunctioning.      Current Outpatient Medications   Medication Sig    flecainide (TAMBOCOR) 50 MG Tab Take 1 tablet (50 mg total) by mouth every 12 (twelve) hours.    furosemide (LASIX) 40 MG tablet TAKE ONE TABLET BY MOUTH DAILY WITH AN EXTRA TABLET AS DIRECTED    isosorbide mononitrate (IMDUR) 60 MG 24 hr tablet Take 1 tablet (60 mg total) by mouth once daily.    losartan (COZAAR) 100 MG tablet TAKE ONE TABLET BY MOUTH EVERY DAY    metoprolol succinate (TOPROL-XL) 100 MG 24 hr tablet TAKE ONE TABLET BY MOUTH TWICE DAILY    pantoprazole (PROTONIX) 40 MG tablet TAKE ONE TABLET BY MOUTH EVERY DAY    potassium chloride SA (K-DUR,KLOR-CON) 20 MEQ tablet TAKE ONE TABLET BY MOUTH EVERY DAY    ACCU-CHEK TOMAS PLUS TEST STRP Strp TEST SIX TIMES A DAY    ACCU-CHEK MULTICLIX LANCET lancets TEST BLOOD SUGAR THREE TIMES DAILY    CARBOXYMETHYLCELLULOSE SODIUM (REFRESH OPHT) Apply to eye.    diclofenac sodium (VOLTAREN) 1 % Gel Apply 2 g topically once daily.    fluticasone propionate (FLONASE) 50 mcg/actuation nasal spray USE TWO SPRAYS IN EACH NOSTRIL ONCE DAILY    guaiFENesin (MUCINEX) 600 mg 12 hr tablet Take 1 tablet (600 mg total) by mouth 2 (two)  "times daily.    insulin (LANTUS SOLOSTAR U-100 INSULIN) glargine 100 units/mL (3mL) SubQ pen INJECT 16 UNITS SUB-Q AT BEDTIME    insulin aspart U-100 (NOVOLOG FLEXPEN U-100 INSULIN) 100 unit/mL (3 mL) InPn pen INJECT TEN UNITS AT BREAKFAST, EIGHT UNITS AT LUNCH AND TEN UNITS AT DINNER. 54 DAY SUPPLY    meclizine (ANTIVERT) 12.5 mg tablet Take 1 tablet (12.5 mg total) by mouth 2 (two) times daily as needed.    nitroGLYCERIN (NITROSTAT) 0.4 MG SL tablet Place 1 tablet (0.4 mg total) under the tongue every 5 (five) minutes as needed for Chest pain.    saw palmetto 160 MG capsule Take 160 mg by mouth 2 (two) times daily.    SURE COMFORT PEN NEEDLE 32 gauge x 5/32" Ndle USE FOUR TIMES DAILY.     No current facility-administered medications for this visit.      Review of Systems   Constitution: Negative for decreased appetite, malaise/fatigue, weight gain and weight loss.   Eyes: Negative for blurred vision.   Cardiovascular: Negative for chest pain, claudication, cyanosis, dyspnea on exertion, irregular heartbeat, leg swelling, near-syncope, orthopnea and palpitations.   Respiratory: Negative for cough, shortness of breath, sleep disturbances due to breathing, snoring and wheezing.    Endocrine: Negative for heat intolerance.   Hematologic/Lymphatic: Does not bruise/bleed easily.   Musculoskeletal: Negative for muscle weakness and myalgias.   Gastrointestinal: Negative for melena, nausea and vomiting.   Genitourinary: Negative for nocturia.   Neurological: Negative for excessive daytime sleepiness, dizziness, headaches, light-headedness and weakness.   Psychiatric/Behavioral: Negative for depression, memory loss and substance abuse. The patient does not have insomnia and is not nervous/anxious.        Objective:   Physical Exam   Constitutional: He is oriented to person, place, and time. He appears well-developed and well-nourished.   HENT:   Head: Normocephalic and atraumatic.   Right Ear: External ear normal. "   Left Ear: External ear normal.   Eyes: Pupils are equal, round, and reactive to light. Conjunctivae are normal. Left conjunctiva is not injected. Left conjunctiva has no hemorrhage.   Neck: Neck supple. No JVD present. No thyromegaly present.   Cardiovascular: Normal rate, regular rhythm, normal heart sounds and intact distal pulses. PMI is not displaced. Exam reveals no gallop, no friction rub, no midsystolic click and no opening snap.   No murmur heard.  Pulses:       Carotid pulses are 2+ on the right side and 2+ on the left side.       Radial pulses are 2+ on the right side and 2+ on the left side.        Dorsalis pedis pulses are 2+ on the right side and 2+ on the left side.        Posterior tibial pulses are 2+ on the right side and 2+ on the left side.   Pulmonary/Chest: Effort normal and breath sounds normal. No respiratory distress. He has no wheezes. He has no rales. He exhibits no tenderness.   Device pocket is in excellent repair     Abdominal: Soft. Normal appearance. He exhibits no pulsatile liver. There is no hepatomegaly. There is no abdominal tenderness. There is no rigidity.   Musculoskeletal: Normal range of motion.         General: No tenderness or edema.      Right knee: He exhibits no swelling.      Left knee: He exhibits no swelling.      Right ankle: He exhibits no swelling.      Left ankle: He exhibits no swelling.      Right lower leg: He exhibits no swelling.      Left lower leg: He exhibits no swelling.      Right foot: No swelling.      Left foot: No swelling.   Neurological: He is alert and oriented to person, place, and time. He has normal strength and normal reflexes. No cranial nerve deficit. Coordination normal.   Skin: Skin is warm, dry and intact. No rash noted. No cyanosis.   Psychiatric: He has a normal mood and affect. His behavior is normal.   Nursing note and vitals reviewed.     Assessment:      1. Pacemaker at end of battery life    2. Failure of pacemaker lead, initial  encounter    3. Paroxysmal atrial fibrillation    4. Persistent atrial fibrillation    5. Coronary artery disease involving coronary bypass graft of native heart without angina pectoris    6. Chronic diastolic heart failure    7. Asymptomatic stenosis of both carotid arteries without infarction    8. Aneurysm of ascending aorta    9. Jugular vein thrombosis    10. Peripheral vascular disease due to secondary diabetes mellitus    11. Statin intolerance    12. Mixed hyperlipidemia    13. Peripheral vascular disease        Plan:    I recommended immediate pacemaker replacement and revision of his atrial lead.)  Venography as needed 1st).   He wants to wait for a couple weeks.  I have discussed the procedure in detail with the patient. I described its benefits and risks. I reviewed alternative therapies and discussed their potential value. The patient was given ample opportunity to express concerns and ask questions and I provided appropriate responses and  answers to such.The patient understands and agrees to proceed.  Consent form was signed today by patient and myself and appropriately witnessed.     No orders of the defined types were placed in this encounter.    Follow up Post workup/surgery..  There are no discontinued medications.     Medication List with Changes/Refills   Current Medications    ACCU-CHEK TOMAS PLUS TEST STRP STRP    TEST SIX TIMES A DAY    ACCU-CHEK MULTICLIX LANCET LANCETS    TEST BLOOD SUGAR THREE TIMES DAILY    CARBOXYMETHYLCELLULOSE SODIUM (REFRESH OPHT)    Apply to eye.    DICLOFENAC SODIUM (VOLTAREN) 1 % GEL    Apply 2 g topically once daily.    FLECAINIDE (TAMBOCOR) 50 MG TAB    Take 1 tablet (50 mg total) by mouth every 12 (twelve) hours.    FLUTICASONE PROPIONATE (FLONASE) 50 MCG/ACTUATION NASAL SPRAY    USE TWO SPRAYS IN EACH NOSTRIL ONCE DAILY    FUROSEMIDE (LASIX) 40 MG TABLET    TAKE ONE TABLET BY MOUTH DAILY WITH AN EXTRA TABLET AS DIRECTED    GUAIFENESIN (MUCINEX) 600 MG 12 HR  "TABLET    Take 1 tablet (600 mg total) by mouth 2 (two) times daily.    INSULIN (LANTUS SOLOSTAR U-100 INSULIN) GLARGINE 100 UNITS/ML (3ML) SUBQ PEN    INJECT 16 UNITS SUB-Q AT BEDTIME    INSULIN ASPART U-100 (NOVOLOG FLEXPEN U-100 INSULIN) 100 UNIT/ML (3 ML) INPN PEN    INJECT TEN UNITS AT BREAKFAST, EIGHT UNITS AT LUNCH AND TEN UNITS AT DINNER. 54 DAY SUPPLY    ISOSORBIDE MONONITRATE (IMDUR) 60 MG 24 HR TABLET    Take 1 tablet (60 mg total) by mouth once daily.    LOSARTAN (COZAAR) 100 MG TABLET    TAKE ONE TABLET BY MOUTH EVERY DAY    MECLIZINE (ANTIVERT) 12.5 MG TABLET    Take 1 tablet (12.5 mg total) by mouth 2 (two) times daily as needed.    METOPROLOL SUCCINATE (TOPROL-XL) 100 MG 24 HR TABLET    TAKE ONE TABLET BY MOUTH TWICE DAILY    NITROGLYCERIN (NITROSTAT) 0.4 MG SL TABLET    Place 1 tablet (0.4 mg total) under the tongue every 5 (five) minutes as needed for Chest pain.    PANTOPRAZOLE (PROTONIX) 40 MG TABLET    TAKE ONE TABLET BY MOUTH EVERY DAY    POTASSIUM CHLORIDE SA (K-DUR,KLOR-CON) 20 MEQ TABLET    TAKE ONE TABLET BY MOUTH EVERY DAY    SAW PALMETTO 160 MG CAPSULE    Take 160 mg by mouth 2 (two) times daily.    SURE COMFORT PEN NEEDLE 32 GAUGE X 5/32" NDLE    USE FOUR TIMES DAILY.                  "

## 2020-07-24 ENCOUNTER — OFFICE VISIT (OUTPATIENT)
Dept: CARDIOLOGY | Facility: CLINIC | Age: 85
End: 2020-07-24
Payer: MEDICARE

## 2020-07-24 VITALS
OXYGEN SATURATION: 98 % | HEART RATE: 90 BPM | WEIGHT: 182.75 LBS | RESPIRATION RATE: 16 BRPM | DIASTOLIC BLOOD PRESSURE: 70 MMHG | HEIGHT: 69 IN | BODY MASS INDEX: 27.07 KG/M2 | SYSTOLIC BLOOD PRESSURE: 128 MMHG

## 2020-07-24 DIAGNOSIS — I36.1 NONRHEUMATIC TRICUSPID VALVE REGURGITATION: ICD-10-CM

## 2020-07-24 DIAGNOSIS — R06.02 SOB (SHORTNESS OF BREATH): ICD-10-CM

## 2020-07-24 DIAGNOSIS — Z78.9 STATIN INTOLERANCE: ICD-10-CM

## 2020-07-24 DIAGNOSIS — Z95.0 PACEMAKER: Chronic | ICD-10-CM

## 2020-07-24 DIAGNOSIS — I73.9 CLAUDICATION IN PERIPHERAL VASCULAR DISEASE: Chronic | ICD-10-CM

## 2020-07-24 DIAGNOSIS — E13.51 PERIPHERAL VASCULAR DISEASE DUE TO SECONDARY DIABETES MELLITUS: ICD-10-CM

## 2020-07-24 DIAGNOSIS — I50.32 CHRONIC DIASTOLIC HEART FAILURE: Primary | ICD-10-CM

## 2020-07-24 DIAGNOSIS — I20.9 AP (ANGINA PECTORIS): ICD-10-CM

## 2020-07-24 DIAGNOSIS — I48.19 PERSISTENT ATRIAL FIBRILLATION: ICD-10-CM

## 2020-07-24 DIAGNOSIS — I71.21 ANEURYSM OF ASCENDING AORTA: ICD-10-CM

## 2020-07-24 DIAGNOSIS — I10 ESSENTIAL HYPERTENSION: ICD-10-CM

## 2020-07-24 DIAGNOSIS — I25.810 CORONARY ARTERY DISEASE INVOLVING CORONARY BYPASS GRAFT OF NATIVE HEART WITHOUT ANGINA PECTORIS: ICD-10-CM

## 2020-07-24 DIAGNOSIS — I73.9 PERIPHERAL VASCULAR DISEASE: Chronic | ICD-10-CM

## 2020-07-24 DIAGNOSIS — I77.9 CAROTID ARTERY DISEASE WITHOUT CEREBRAL INFARCTION: Chronic | ICD-10-CM

## 2020-07-24 DIAGNOSIS — I61.9 HEMORRHAGIC CEREBROVASCULAR ACCIDENT (CVA): ICD-10-CM

## 2020-07-24 DIAGNOSIS — I48.0 PAROXYSMAL ATRIAL FIBRILLATION: Chronic | ICD-10-CM

## 2020-07-24 DIAGNOSIS — E66.3 OVERWEIGHT (BMI 25.0-29.9): ICD-10-CM

## 2020-07-24 DIAGNOSIS — I65.23 ASYMPTOMATIC STENOSIS OF BOTH CAROTID ARTERIES WITHOUT INFARCTION: ICD-10-CM

## 2020-07-24 DIAGNOSIS — E78.2 MIXED HYPERLIPIDEMIA: Chronic | ICD-10-CM

## 2020-07-24 DIAGNOSIS — E08.51 DIABETES MELLITUS DUE TO UNDERLYING CONDITION WITH DIABETIC PERIPHERAL ANGIOPATHY WITHOUT GANGRENE, WITHOUT LONG-TERM CURRENT USE OF INSULIN: ICD-10-CM

## 2020-07-24 PROCEDURE — 1101F PT FALLS ASSESS-DOCD LE1/YR: CPT | Mod: CPTII,S$GLB,, | Performed by: INTERNAL MEDICINE

## 2020-07-24 PROCEDURE — 1159F PR MEDICATION LIST DOCUMENTED IN MEDICAL RECORD: ICD-10-PCS | Mod: S$GLB,,, | Performed by: INTERNAL MEDICINE

## 2020-07-24 PROCEDURE — 1126F PR PAIN SEVERITY QUANTIFIED, NO PAIN PRESENT: ICD-10-PCS | Mod: S$GLB,,, | Performed by: INTERNAL MEDICINE

## 2020-07-24 PROCEDURE — 99999 PR PBB SHADOW E&M-EST. PATIENT-LVL V: CPT | Mod: PBBFAC,HCNC,, | Performed by: INTERNAL MEDICINE

## 2020-07-24 PROCEDURE — 1159F MED LIST DOCD IN RCRD: CPT | Mod: S$GLB,,, | Performed by: INTERNAL MEDICINE

## 2020-07-24 PROCEDURE — 1126F AMNT PAIN NOTED NONE PRSNT: CPT | Mod: S$GLB,,, | Performed by: INTERNAL MEDICINE

## 2020-07-24 PROCEDURE — 99215 OFFICE O/P EST HI 40 MIN: CPT | Mod: S$GLB,,, | Performed by: INTERNAL MEDICINE

## 2020-07-24 PROCEDURE — 99215 PR OFFICE/OUTPT VISIT, EST, LEVL V, 40-54 MIN: ICD-10-PCS | Mod: S$GLB,,, | Performed by: INTERNAL MEDICINE

## 2020-07-24 PROCEDURE — 1101F PR PT FALLS ASSESS DOC 0-1 FALLS W/OUT INJ PAST YR: ICD-10-PCS | Mod: CPTII,S$GLB,, | Performed by: INTERNAL MEDICINE

## 2020-07-24 PROCEDURE — 99999 PR PBB SHADOW E&M-EST. PATIENT-LVL V: ICD-10-PCS | Mod: PBBFAC,HCNC,, | Performed by: INTERNAL MEDICINE

## 2020-07-24 NOTE — PROGRESS NOTES
Subjective:    Patient ID:  Anthony Blair is a 87 y.o. male who presents for evaluation of Essential hypertension, Hypertension, Hyperlipidemia, Coronary Artery Disease, Shortness of Breath, Risk Factor Management For Atherosclerosis, Peripheral Arterial Disease, Atrial Fibrillation, Carotid Artery Disease, and Congestive Heart Failure      HPI pt presents for f/u.  His current medical conditions include diastolic CHF, PAF/PSVT, CAD (PTCA 1980's), HTN, PPM, PHTN, LVH, LAE, carotid artery disease, DM, atrial septal aneurysm/pfo, PAD, dyslipidemia. Nonsmoker.  Past details pertinent for following:   Statin intolerant.  s/p CABG 7/10 (LIMA-LAD, SVG-OM1, SVG-OM3, SVG-PDA) for increasing OLIVARES and multivessel CAD on [facility].   S/p PPM 10/10 for SSS/PAF. Was hospitalized 1/11 for PSVT (Atrial tachycardia) and was started on Amiodarone but was stopped for GI discomfort. He also did not tolerate Multaq and has not tolerated multiple statins.   s/p EPS/ablation of his atrial tachycardia 6/11.   S/p abd w runoff March 2015 and had significant B LE infrapopliteal disease. Atherectomy/pta performed of critical R tibeoperoneal trunk stenosis. Also has L tibeoperoneal trunk stenosis and his PTA/MIGUEL are occluded bilaterally.  Started on Eliquis Feb 2017 for suspicious left internal jugular vein thrombus.  Stress MPI 3/17 showed no ischemia.  Echo 3/17 showed normal LV function, left-right atrial shunt.   Carotid u/s 6/17 showed mild bilateral disease, known L IJ thrombus noted.  Pt called clinic Sept 2017 stated he had stopped Eliquis couple weeks before his call due to diarrhea, weakness, bloating and also off Amlodipine due to sleepiness.  He stated sxs subsided when he stopped the meds.   He was advised by cardiology on 9/6/17 to take 81 mg ASA since he stopped his Eliquis.  He had acute hemorrhage of basal ganglia right side Sept 2017, causing weakness on left side.  Tx at [facility] 9/24/17. He was on ASA daily when CVA occurred and was  "not on Eliquis. 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  He was taken off his asa.  Followed by Dr. Weaver, neurosurgery. No surgery was needed.  Echo showed normal LVEF.  Stress test 8/18 showed no ischemia, apical scar.    Echo 8/18 normal EF.  Echo 3/19 normal EF, LAE, LVH, DD, PHTN 76 mmHg, mild TR.  Now here.  There was some consideration of restarting NOAC for a fib CVA protection after pt evaluated by EP service.  Started on Flecainide 6/20 by Dr. Grajeda.  I expressed reluctance to put pt back on anticoagulation in light of prior hemorrhagic CVA with residual deficits.  He did see Cards mid level few times recently for dyspnea and received some additional doses of Lasix.  He states today he feels "great".  Minimal dyspnea bending over. No pnd/orthopnea.  CAD is stable. Angina controlled. Not active.  Denies chest pain sxs.  Stable chronic dizziness, seems minimal.  No syncope.  Weight down 7 pounds since last month.  PAD stable.  Stable claudication.  Does not seem to be an issue.  Labs stable.  DM HGAIC controlled, 7.0%.  On insulin.  Lipids stable overall.  .  PPM end of life and due for generator change.  HTN controlled on current meds.  Carotid disease clinically stable.  No TIA/CVA sxs.  Not as mindful of diet as he should be.  Compliant with meds.      Current Outpatient Medications:     ACCU-CHEK TOMAS PLUS TEST STRP Strp, TEST SIX TIMES A DAY, Disp: 100 strip, Rfl: 6    ACCU-CHEK MULTICLIX LANCET lancets, TEST BLOOD SUGAR THREE TIMES DAILY, Disp: 200 each, Rfl: 5    CARBOXYMETHYLCELLULOSE SODIUM (REFRESH OPHT), Apply to eye., Disp: , Rfl:     diclofenac sodium (VOLTAREN) 1 % Gel, Apply 2 g topically once daily., Disp: 100 g, Rfl: 2    flecainide (TAMBOCOR) 50 MG Tab, Take 1 tablet (50 mg total) by mouth every 12 (twelve) hours., Disp: 60 tablet, " "Rfl: 6    fluticasone propionate (FLONASE) 50 mcg/actuation nasal spray, USE TWO SPRAYS IN EACH NOSTRIL ONCE DAILY, Disp: 16 g, Rfl: 6    furosemide (LASIX) 40 MG tablet, TAKE ONE TABLET BY MOUTH DAILY WITH AN EXTRA TABLET AS DIRECTED, Disp: 60 tablet, Rfl: 6    guaiFENesin (MUCINEX) 600 mg 12 hr tablet, Take 1 tablet (600 mg total) by mouth 2 (two) times daily., Disp: , Rfl:     insulin (LANTUS SOLOSTAR U-100 INSULIN) glargine 100 units/mL (3mL) SubQ pen, INJECT 16 UNITS SUB-Q AT BEDTIME, Disp: 15 mL, Rfl: 11    insulin aspart U-100 (NOVOLOG FLEXPEN U-100 INSULIN) 100 unit/mL (3 mL) InPn pen, INJECT TEN UNITS AT BREAKFAST, EIGHT UNITS AT LUNCH AND TEN UNITS AT DINNER. 54 DAY SUPPLY, Disp: 15 mL, Rfl: 3    isosorbide mononitrate (IMDUR) 60 MG 24 hr tablet, Take 1 tablet (60 mg total) by mouth once daily., Disp: 30 tablet, Rfl: 11    losartan (COZAAR) 100 MG tablet, TAKE ONE TABLET BY MOUTH EVERY DAY, Disp: 90 tablet, Rfl: 0    meclizine (ANTIVERT) 12.5 mg tablet, Take 1 tablet (12.5 mg total) by mouth 2 (two) times daily as needed., Disp: 30 tablet, Rfl: 0    metoprolol succinate (TOPROL-XL) 100 MG 24 hr tablet, TAKE ONE TABLET BY MOUTH TWICE DAILY, Disp: 60 tablet, Rfl: 12    nitroGLYCERIN (NITROSTAT) 0.4 MG SL tablet, Place 1 tablet (0.4 mg total) under the tongue every 5 (five) minutes as needed for Chest pain., Disp: 25 tablet, Rfl: 12    pantoprazole (PROTONIX) 40 MG tablet, TAKE ONE TABLET BY MOUTH EVERY DAY, Disp: 30 tablet, Rfl: 5    potassium chloride SA (K-DUR,KLOR-CON) 20 MEQ tablet, TAKE ONE TABLET BY MOUTH EVERY DAY, Disp: 30 tablet, Rfl: 12    saw palmetto 160 MG capsule, Take 160 mg by mouth 2 (two) times daily., Disp: , Rfl:     SURE COMFORT PEN NEEDLE 32 gauge x 5/32" Ndle, USE FOUR TIMES DAILY., Disp: 200 each, Rfl: 6      Review of Systems   Constitution: Positive for weight loss.   HENT: Negative.    Eyes: Negative.    Cardiovascular: Positive for claudication and dyspnea on " "exertion.   Respiratory: Positive for shortness of breath.    Endocrine: Negative.    Hematologic/Lymphatic: Negative.    Skin: Negative.    Musculoskeletal: Positive for arthritis and joint pain.   Gastrointestinal: Negative.    Genitourinary: Negative.    Neurological: Positive for weakness.   Psychiatric/Behavioral: Negative.    Allergic/Immunologic: Negative.        /70 (BP Location: Right arm, Patient Position: Sitting, BP Method: Medium (Manual))   Pulse 90   Resp 16   Ht 5' 9" (1.753 m)   Wt 82.9 kg (182 lb 12.2 oz)   SpO2 98%   BMI 26.99 kg/m²     Wt Readings from Last 3 Encounters:   07/24/20 82.9 kg (182 lb 12.2 oz)   07/14/20 85.4 kg (188 lb 4.4 oz)   06/26/20 86.1 kg (189 lb 13.1 oz)     Temp Readings from Last 3 Encounters:   01/15/20 98.8 °F (37.1 °C) (Temporal)   01/13/20 98.6 °F (37 °C) (Oral)   01/07/20 98.2 °F (36.8 °C) (Temporal)     BP Readings from Last 3 Encounters:   07/24/20 128/70   07/20/20 (!) 154/84   07/14/20 132/74     Pulse Readings from Last 3 Encounters:   07/24/20 90   07/20/20 64   07/14/20 62          Objective:    Physical Exam   Constitutional: He is oriented to person, place, and time. He appears well-developed and well-nourished.   HENT:   Head: Normocephalic.   Neck: Normal range of motion. Neck supple. Normal carotid pulses, no hepatojugular reflux and no JVD present. Carotid bruit is not present. No thyromegaly present.   Cardiovascular: Normal rate, regular rhythm, S1 normal and S2 normal. PMI is not displaced. Exam reveals no S3, no S4, no distant heart sounds, no friction rub, no midsystolic click and no opening snap.   No murmur heard.  Pulses:       Radial pulses are 2+ on the right side and 2+ on the left side.   Pulmonary/Chest: Effort normal and breath sounds normal. He has no wheezes. He has no rales.   Abdominal: Soft. Bowel sounds are normal. He exhibits no distension, no abdominal bruit, no ascites and no mass. There is no abdominal tenderness. "   Musculoskeletal:         General: No edema.   Neurological: He is alert and oriented to person, place, and time.   Skin: Skin is warm.   Psychiatric: He has a normal mood and affect. His behavior is normal.   Nursing note and vitals reviewed.      I have reviewed all pertinent labs and cardiac studies.      Chemistry        Component Value Date/Time     07/01/2020 0903    K 3.7 07/01/2020 0903    CL 98 07/01/2020 0903    CO2 29 07/01/2020 0903    BUN 23 07/01/2020 0903    CREATININE 1.0 07/01/2020 0903     (H) 07/01/2020 0903        Component Value Date/Time    CALCIUM 10.4 07/01/2020 0903    ALKPHOS 111 01/12/2020 2218    AST 27 01/12/2020 2218    ALT 13 01/12/2020 2218    BILITOT 0.6 01/12/2020 2218    ESTGFRAFRICA >60.0 07/01/2020 0903    EGFRNONAA >60.0 07/01/2020 0903        Lab Results   Component Value Date    WBC 5.24 01/12/2020    HGB 12.0 (L) 01/12/2020    HCT 36.4 (L) 01/12/2020    MCV 94 01/12/2020     01/12/2020       Lab Results   Component Value Date    HGBA1C 7.0 (H) 01/08/2020     Lab Results   Component Value Date    CHOL 176 01/08/2020    CHOL 175 10/02/2019    CHOL 194 06/26/2019     Lab Results   Component Value Date    HDL 52 01/08/2020    HDL 46 10/02/2019    HDL 44 06/26/2019     Lab Results   Component Value Date    LDLCALC 110.6 01/08/2020    LDLCALC 104.6 10/02/2019    LDLCALC 116.2 06/26/2019     Lab Results   Component Value Date    TRIG 67 01/08/2020    TRIG 122 10/02/2019    TRIG 169 (H) 06/26/2019     Lab Results   Component Value Date    CHOLHDL 29.5 01/08/2020    CHOLHDL 26.3 10/02/2019    CHOLHDL 22.7 06/26/2019           Assessment:       1. Chronic diastolic heart failure    2. Aneurysm of ascending aorta    3. AP (angina pectoris)    4. Asymptomatic stenosis of both carotid arteries without infarction    5. Carotid artery disease without cerebral infarction    6. Claudication in peripheral vascular disease    7. Coronary artery disease involving coronary  bypass graft of native heart without angina pectoris    8. Essential hypertension    9. Hemorrhagic cerebrovascular accident (CVA)    10. Diabetes mellitus due to underlying condition with diabetic peripheral angiopathy without gangrene, without long-term current use of insulin    11. Pacemaker    12. Paroxysmal atrial fibrillation    13. Peripheral vascular disease    14. Peripheral vascular disease due to secondary diabetes mellitus    15. Overweight (BMI 25.0-29.9)    16. Mixed hyperlipidemia    17. Nonrheumatic tricuspid valve regurgitation    18. Statin intolerance    19. SOB (shortness of breath)    20. Persistent atrial fibrillation         Plan:             Complex patient with extensive CV conditions.  Discussed indications, all risks/benefits for NOAC for a fib CVA protection.  Pt had hemorrhagic CVA on 81 mg ASA 2017 and still has neurological deficits (hand) and he is not interested in restarting Eliquis or other NOAC.  Difficult situation as he is at risk for CVA with a fib as he is unprotected but also do not feel comfortable restarting any anticoagulation in light of prior CVA.  He understands risk of CVA either way -- off of anticoagulation or risk of hemorrhagic CVA on anticoagulation.  For now as in past since CVA, he opts to stay off asa and not take NOAC.  F/u with Dr. Grajeda, EP, for PPM generator change 8/20.  CHF compensated on current medical regimen.  Continue current dose of Lasix.  Needs CMP, BNP soon -- add on to 8/4/20 labs.  Continue medical tx for chronic PAD.  OMT for CAD.  Diet for lipids.  Stable cardiovascular conditions overall at present time on current medical treatment.  Reviewed all tests and above medical conditions with patient in detail and formulated treatment plan.  Continue optimal medical treatment for cardiovascular conditions.  Cardiac low salt diet discussed.  Daily exercise encouraged, as tolerated.  Maintaining healthy weight and weight loss goals (if needed)  were discussed in clinic.  Optimal DM HGAIC control advised.  F/u in 3 months.      Complex pt detailing extensive medical/CV conditions and formulating tx/mgt plan.

## 2020-07-27 PROBLEM — T82.110A PACEMAKER LEAD FAILURE: Status: ACTIVE | Noted: 2020-07-27

## 2020-07-28 ENCOUNTER — PATIENT OUTREACH (OUTPATIENT)
Dept: ADMINISTRATIVE | Facility: OTHER | Age: 85
End: 2020-07-28

## 2020-07-29 ENCOUNTER — OFFICE VISIT (OUTPATIENT)
Dept: NEUROLOGY | Facility: CLINIC | Age: 85
End: 2020-07-29
Payer: MEDICARE

## 2020-07-29 ENCOUNTER — HOSPITAL ENCOUNTER (OUTPATIENT)
Dept: CARDIOLOGY | Facility: HOSPITAL | Age: 85
Discharge: HOME | End: 2020-07-29
Attending: INTERNAL MEDICINE
Payer: MEDICARE

## 2020-07-29 VITALS
HEART RATE: 68 BPM | DIASTOLIC BLOOD PRESSURE: 66 MMHG | WEIGHT: 186.94 LBS | SYSTOLIC BLOOD PRESSURE: 112 MMHG | BODY MASS INDEX: 28.33 KG/M2 | HEIGHT: 68 IN

## 2020-07-29 VITALS
HEIGHT: 69 IN | HEART RATE: 80 BPM | BODY MASS INDEX: 26.96 KG/M2 | SYSTOLIC BLOOD PRESSURE: 128 MMHG | WEIGHT: 182 LBS | DIASTOLIC BLOOD PRESSURE: 70 MMHG

## 2020-07-29 DIAGNOSIS — I65.23 ASYMPTOMATIC STENOSIS OF BOTH CAROTID ARTERIES WITHOUT INFARCTION: ICD-10-CM

## 2020-07-29 DIAGNOSIS — I69.30 LATE EFFECT OF STROKE: Primary | ICD-10-CM

## 2020-07-29 DIAGNOSIS — I49.5 SSS (SICK SINUS SYNDROME): ICD-10-CM

## 2020-07-29 DIAGNOSIS — Z95.0 CARDIAC PACEMAKER IN SITU: ICD-10-CM

## 2020-07-29 DIAGNOSIS — I73.9 CLAUDICATION IN PERIPHERAL VASCULAR DISEASE: Chronic | ICD-10-CM

## 2020-07-29 LAB
AORTIC ROOT ANNULUS: 3.32 CM
ASCENDING AORTA: 3.21 CM
AV INDEX (PROSTH): 0.57
AV MEAN GRADIENT: 4 MMHG
AV PEAK GRADIENT: 7 MMHG
AV VALVE AREA: 1.8 CM2
AV VELOCITY RATIO: 0.52
BSA FOR ECHO PROCEDURE: 2 M2
CV ECHO LV RWT: 0.68 CM
DOP CALC AO PEAK VEL: 1.32 M/S
DOP CALC AO VTI: 23.92 CM
DOP CALC LVOT AREA: 3.2 CM2
DOP CALC LVOT DIAMETER: 2.01 CM
DOP CALC LVOT PEAK VEL: 0.68 M/S
DOP CALC LVOT STROKE VOLUME: 43.16 CM3
DOP CALC RVOT PEAK VEL: 0.45 M/S
DOP CALC RVOT VTI: 9.42 CM
DOP CALCLVOT PEAK VEL VTI: 13.61 CM
E/E' RATIO: 14.5 M/S
ECHO LV POSTERIOR WALL: 1.32 CM (ref 0.6–1.1)
FRACTIONAL SHORTENING: 30 % (ref 28–44)
INTERVENTRICULAR SEPTUM: 1.49 CM (ref 0.6–1.1)
IVRT: 86.51 MSEC
LA MAJOR: 4.53 CM
LA MINOR: 4.86 CM
LA WIDTH: 3.05 CM
LEFT ATRIUM SIZE: 3.79 CM
LEFT ATRIUM VOLUME INDEX: 23.2 ML/M2
LEFT ATRIUM VOLUME: 46.07 CM3
LEFT INTERNAL DIMENSION IN SYSTOLE: 2.71 CM (ref 2.1–4)
LEFT VENTRICLE DIASTOLIC VOLUME INDEX: 32.55 ML/M2
LEFT VENTRICLE DIASTOLIC VOLUME: 64.59 ML
LEFT VENTRICLE MASS INDEX: 101 G/M2
LEFT VENTRICLE SYSTOLIC VOLUME INDEX: 13.8 ML/M2
LEFT VENTRICLE SYSTOLIC VOLUME: 27.34 ML
LEFT VENTRICULAR INTERNAL DIMENSION IN DIASTOLE: 3.87 CM (ref 3.5–6)
LEFT VENTRICULAR MASS: 200.38 G
LV LATERAL E/E' RATIO: 9.67 M/S
LV SEPTAL E/E' RATIO: 29 M/S
MV PEAK E VEL: 1.16 M/S
PISA TR MAX VEL: 2.62 M/S
PV MEAN GRADIENT: 0.47 MMHG
PV PEAK GRADIENT: 0.82 MMHG
PV PEAK VELOCITY: 0.72 CM/S
RA MAJOR: 4.5 CM
RA PRESSURE: 3 MMHG
RA WIDTH: 3.13 CM
RIGHT VENTRICULAR END-DIASTOLIC DIMENSION: 3.7 CM
SINUS: 2.91 CM
STJ: 2.52 CM
TDI LATERAL: 0.12 M/S
TDI SEPTAL: 0.04 M/S
TDI: 0.08 M/S
TR MAX PG: 27 MMHG
TV REST PULMONARY ARTERY PRESSURE: 30 MMHG

## 2020-07-29 PROCEDURE — 1101F PT FALLS ASSESS-DOCD LE1/YR: CPT | Mod: HCNC,CPTII,S$GLB, | Performed by: PSYCHIATRY & NEUROLOGY

## 2020-07-29 PROCEDURE — 93306 ECHO (CUPID ONLY): ICD-10-PCS | Mod: 26,HCNC,, | Performed by: INTERNAL MEDICINE

## 2020-07-29 PROCEDURE — 99213 PR OFFICE/OUTPT VISIT, EST, LEVL III, 20-29 MIN: ICD-10-PCS | Mod: HCNC,S$GLB,, | Performed by: PSYCHIATRY & NEUROLOGY

## 2020-07-29 PROCEDURE — 1159F MED LIST DOCD IN RCRD: CPT | Mod: HCNC,S$GLB,, | Performed by: PSYCHIATRY & NEUROLOGY

## 2020-07-29 PROCEDURE — 1159F PR MEDICATION LIST DOCUMENTED IN MEDICAL RECORD: ICD-10-PCS | Mod: HCNC,S$GLB,, | Performed by: PSYCHIATRY & NEUROLOGY

## 2020-07-29 PROCEDURE — 1101F PR PT FALLS ASSESS DOC 0-1 FALLS W/OUT INJ PAST YR: ICD-10-PCS | Mod: HCNC,CPTII,S$GLB, | Performed by: PSYCHIATRY & NEUROLOGY

## 2020-07-29 PROCEDURE — 99213 OFFICE O/P EST LOW 20 MIN: CPT | Mod: HCNC,S$GLB,, | Performed by: PSYCHIATRY & NEUROLOGY

## 2020-07-29 PROCEDURE — 1126F AMNT PAIN NOTED NONE PRSNT: CPT | Mod: HCNC,S$GLB,, | Performed by: PSYCHIATRY & NEUROLOGY

## 2020-07-29 PROCEDURE — 93306 TTE W/DOPPLER COMPLETE: CPT | Mod: HCNC

## 2020-07-29 PROCEDURE — 1126F PR PAIN SEVERITY QUANTIFIED, NO PAIN PRESENT: ICD-10-PCS | Mod: HCNC,S$GLB,, | Performed by: PSYCHIATRY & NEUROLOGY

## 2020-07-29 PROCEDURE — 99999 PR PBB SHADOW E&M-EST. PATIENT-LVL IV: ICD-10-PCS | Mod: PBBFAC,HCNC,, | Performed by: PSYCHIATRY & NEUROLOGY

## 2020-07-29 PROCEDURE — 99999 PR PBB SHADOW E&M-EST. PATIENT-LVL IV: CPT | Mod: PBBFAC,HCNC,, | Performed by: PSYCHIATRY & NEUROLOGY

## 2020-07-29 PROCEDURE — 99499 UNLISTED E&M SERVICE: CPT | Mod: HCNC,S$GLB,, | Performed by: PSYCHIATRY & NEUROLOGY

## 2020-07-29 PROCEDURE — 99499 RISK ADDL DX/OHS AUDIT: ICD-10-PCS | Mod: HCNC,S$GLB,, | Performed by: PSYCHIATRY & NEUROLOGY

## 2020-07-29 PROCEDURE — 93306 TTE W/DOPPLER COMPLETE: CPT | Mod: 26,HCNC,, | Performed by: INTERNAL MEDICINE

## 2020-07-29 NOTE — PROGRESS NOTES
Subjective:      Patient ID: Anthony Blair is a 87 y.o. male.    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  The patient had had intermittent involuntary motor movement involving the left arm and left leg until he was placed on Keppra 250 mg twice a day.  The patient was able to maintain that for 2 years and then was totally asymptomatic so that medication was discontinued.    On presentation today, the patient remains asymptomatic.  He has not had any further recurrence of partial seizure or other symptoms suggestive of partial seizure.  The patient states that he is doing very well at the present time and really has no complaints to offer other than some leg fatigue with prolonged standing and prolonged walking.  Patient states that he is scheduled to have his pacemaker replaced in the next several weeks.  This is being done because the battery has begun to fail.    The patient denies to me any headache or dizziness.  He denies any vision changes.  He denies any difficulty with speech.  He does mention occasional loss of hearing that occurs then that this has been present since the stroke.  Patient states that he can hear perfectly for periods of time and then suddenly he is unable to hear with the loss of hearing lasting 1-2 minutes and then returning very quickly.  He is not aware of any loss of consciousness during this episode.    The patient is not now on any medicines from Neurology.          ROS:  GENERAL: NO FEVER, CHILLS, FATIGABILITY OR WEIGHT LOSS.  SKIN: NO RASHES, ITCHING OR CHANGES IN COLOR OR TEXTURE OF SKIN.  HEAD: NO HEADACHES OR RECENT HEAD TRAUMA.  EYES: VISUAL ACUITY FINE. NO PHOTOPHOBIA, OCULAR PAIN OR DIPLOPIA.  EARS: DENIES EAR PAIN, DISCHARGE OR VERTIGO.  NOSE: NO LOSS OF SMELL, NO EPISTAXIS OR POSTNASAL DRIP.  MOUTH &  THROAT: NO HOARSENESS OR CHANGE IN VOICE. NO EXCESSIVE GUM BLEEDING.  NODES: DENIES SWOLLEN GLANDS.  CHEST: DENIES OLIVARES, CYANOSIS, WHEEZING, COUGH AND SPUTUM PRODUCTION.  CARDIOVASCULAR: DENIES CHEST PAIN, PND, ORTHOPNEA OR REDUCED EXERCISE TOLERANCE.  ABDOMEN: APPETITE FINE. NO WEIGHT LOSS. DENIES DIARRHEA, ABDOMINAL PAIN, HEMATEMESIS OR BLOOD IN STOOL.  URINARY: NO FLANK PAIN, DYSURIA OR HEMATURIA.  PERIPHERAL VASCULAR: NO CLAUDICATION OR CYANOSIS.  MUSCULOSKELETAL: NO JOINT STIFFNESS OR SWELLING. DENIES BACK PAIN.  NEUROLOGIC:   SEE COMMENTS IN PRESENT ILLNESS    Past Medical History:   Diagnosis Date    A-fib     Anemia     AP (angina pectoris) 1/23/2014    Carotid artery disease without cerebral infarction 8/22/2014    Cataract     Coronary artery disease     Diabetes mellitus     DM (diabetes mellitus) 1970     am 07/06/2020    GERD (gastroesophageal reflux disease) 7/11/2013    Hemorrhagic cerebrovascular accident (CVA) 4/26/2018    Hyperlipidemia     Hypertension     Hypertensive heart disease with heart failure 3/12/2019    Intracranial hemorrhage     Lumbosacral spondylosis 12/24/2014    Pacemaker 1/23/2014    Paroxysmal atrial fibrillation 1/23/2014    Peripheral vascular disease 8/22/2014    Pneumonia of right lung due to infectious organism 3/27/2019    Seizure, late effect of stroke     Stroke     Type 2 diabetes mellitus with ophthalmic manifestations      Past Surgical History:   Procedure Laterality Date    ANGIOPLASTY      ATRIAL ABLATION SURGERY N/A     CATARACT EXTRACTION Bilateral     [facility]    CATARACT EXTRACTION W/ INTRAOCULAR LENS IMPLANT Right     CATARACT EXTRACTION W/ INTRAOCULAR LENS IMPLANT Left     COLONOSCOPY N/A 10/16/2018    Procedure: COLONOSCOPY with biopsies;  Surgeon: Anabell Griggs MD;  Location: [facility];  Service: Endoscopy;  Laterality: N/A;    CORONARY ARTERY BYPASS GRAFT  07/2010    x5    EYE SURGERY      pace maker  surgrey  10/2010    reposition in  (approx)    VEIN BYPASS SURGERY       Family History   Problem Relation Age of Onset    Arthritis Mother     Diabetes Mother     Kidney disease Mother     Heart disease Mother     Arthritis Father     Diabetes Father     Diabetes Sister     Cancer Sister         breast    Heart disease Sister     Diabetes Brother     Kidney disease Brother     Heart disease Brother     Cancer Daughter         breast    Diabetes Daughter     Miscarriages / Stillbirths Daughter     Fibromyalgia Daughter     Diabetes Son     Diabetes Son     Alcohol abuse Paternal Uncle     Stroke Neg Hx      Social History     Socioeconomic History    Marital status:      Spouse name: Not on file    Number of children: Not on file    Years of education: Not on file    Highest education level: Not on file   Occupational History    Not on file   Social Needs    Financial resource strain: Not on file    Food insecurity     Worry: Not on file     Inability: Not on file    Transportation needs     Medical: Not on file     Non-medical: Not on file   Tobacco Use    Smoking status: Former Smoker     Packs/day: 2.00     Years: 13.00     Pack years: 26.00     Types: Cigarettes     Start date: 1954     Quit date: 1967     Years since quittin.1    Smokeless tobacco: Never Used   Substance and Sexual Activity    Alcohol use: No    Drug use: No    Sexual activity: Never     Partners: Female     Birth control/protection: None   Lifestyle    Physical activity     Days per week: Not on file     Minutes per session: Not on file    Stress: Not on file   Relationships    Social connections     Talks on phone: Not on file     Gets together: Not on file     Attends Congregational service: Not on file     Active member of club or organization: Not on file     Attends meetings of clubs or organizations: Not on file     Relationship status: Not on file   Other Topics Concern     Not on file   Social History Narrative    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         Objective:   PE:   VITAL SIGNS:  HEIGHT 5 FT 8 IN, WEIGHT 84.8 KG, BMI 28.43  Vitals:    07/29/20 1422   BP: 112/66   Pulse: 68     APPEARANCE: WELL NOURISHED, WELL DEVELOPED, IN NO ACUTE DISTRESS.    HEAD: NORMOCEPHALIC, ATRAUMATIC.  EYES: PERRL. EOMI.  NON-ICTERIC SCLERAE.    NOSE: MUCOSA PINK. AIRWAY CLEAR.  MOUTH & THROAT: NO TONSILLAR ENLARGEMENT. NO PHARYNGEAL ERYTHEMA OR EXUDATE. NO STRIDOR.  NECK: SUPPLE. NO BRUITS.  CHEST: LUNGS CLEAR TO AUSCULTATION.  CARDIOVASCULAR: REGULAR RHYTHM WITHOUT SIGNIFICANT MURMURS.  ABDOMEN: BOWEL SOUNDS NORMAL. NOT DISTENDED.   MUSCULOSKELETAL:  NO BONY DEFORMITY SEEN.  MUSCLE TONE  AND MUSCLE MASS ARE NORMAL IN BOTH UPPER AND BOTH LOWER EXTREMITIES.    NEUROLOGIC:   MENTAL STATUS:  THE PATIENT IS WELL ORIENTED TO PERSON, TIME, PLACE, AND SITUATION.  THE PATIENT IS ATTENTIVE TO THE ENVIRONMENT AND COOPERATIVE FOR THE EXAM.  CRANIAL NERVES: II-XII GROSSLY INTACT. FUNDOSCOPIC EXAM IS NORMAL.  NO HEMORRHAGE, EXUDATE OR PAPILLEDEMA IS PRESENT. THE EXTRAOCULAR MUSCLES ARE INTACT IN THE CARDINAL DIRECTIONS OF GAZE.  NO PTOSIS IS PRESENT. FACIAL FEATURES ARE SYMMETRICAL.  SPEECH IS NORMAL IN FLUENCY, DICTION, AND PHRASING.  TONGUE PROTRUDES IN THE MIDLINE.    GAIT AND STATION:  ROMBERG IS NEGATIVE.  GOOD ALTERNATE ARMSWING WITH NORMAL GAIT.  MOTOR:  NO DOWNDRIFT OF EITHER ARM WHEN HELD AT SHOULDER LEVEL.  MANUAL MUSCLE TESTING OF PROXIMAL AND DISTAL MUSCLES OF BOTH UPPER AND LOWER EXTREMITIES IS NORMAL. MUSCLE MASS IS NORMAL.  MUSCLE TONE IS NORMAL.  SENSORY:  INTACT BOTH UPPER AND LOWER EXTREMITIES TO PIN PRICK, TOUCH, AND VIBRATION.  CEREBELLAR:  FINGER TO NOSE DONE WELL.  ALTERNATING MOVEMENTS INTACT.  NO INVOLUNTARY MOVEMENTS OR TREMOR SEEN.  REFLEXES:  STRETCH REFLEXES ARE 2+ BOTH UPPER AND LOWER EXTREMITIES.  PLANTAR STIMULATION IS FLEXOR BILATERALLY AND NO  PATHOLOGICAL REFLEXES ARE SEEN              Assessment:     Encounter Diagnoses   Name Primary?    Late effect of stroke Yes    Asymptomatic stenosis of both carotid arteries without infarction     Claudication in peripheral vascular disease        Discussion:  The patient today does not have any focal neurologic deficit.  He has no motor deficit that I can identify.  He is articulate with his speech and is extremely well oriented.  I do not think he needs further follow-up by Neurology unless there is a change in his status.      Plan:     1.  May discharge from routine neurological care.  Return to Neurology as needed.    This was a 25 min visit with the patient with over 50% of the time spent counseling the patient regarding his current neurological evaluation and current status.  This note is generated with speech recognition software and is subject to transcription error and sound alike phrases that may be missed by proofreading.

## 2020-08-01 ENCOUNTER — HOSPITAL ENCOUNTER (EMERGENCY)
Facility: HOSPITAL | Age: 85
Discharge: HOME | End: 2020-08-01
Attending: EMERGENCY MEDICINE
Payer: MEDICARE

## 2020-08-01 VITALS
HEIGHT: 68 IN | SYSTOLIC BLOOD PRESSURE: 117 MMHG | HEART RATE: 102 BPM | OXYGEN SATURATION: 96 % | RESPIRATION RATE: 20 BRPM | BODY MASS INDEX: 28.43 KG/M2 | DIASTOLIC BLOOD PRESSURE: 64 MMHG | TEMPERATURE: 98 F

## 2020-08-01 DIAGNOSIS — R42 DIZZINESS: Primary | ICD-10-CM

## 2020-08-01 DIAGNOSIS — Z45.010 PACEMAKER AT END OF BATTERY LIFE: ICD-10-CM

## 2020-08-01 LAB
ALBUMIN SERPL BCP-MCNC: 3.6 G/DL (ref 3.5–5.2)
ALP SERPL-CCNC: 166 U/L (ref 55–135)
ALT SERPL W/O P-5'-P-CCNC: 20 U/L (ref 10–44)
ANION GAP SERPL CALC-SCNC: 11 MMOL/L (ref 8–16)
AST SERPL-CCNC: 29 U/L (ref 10–40)
BASOPHILS # BLD AUTO: 0.02 K/UL (ref 0–0.2)
BASOPHILS NFR BLD: 0.3 % (ref 0–1.9)
BILIRUB SERPL-MCNC: 0.6 MG/DL (ref 0.1–1)
BILIRUB UR QL STRIP: NEGATIVE
BNP SERPL-MCNC: 125 PG/ML (ref 0–99)
BUN SERPL-MCNC: 21 MG/DL (ref 8–23)
CALCIUM SERPL-MCNC: 9.9 MG/DL (ref 8.7–10.5)
CHLORIDE SERPL-SCNC: 99 MMOL/L (ref 95–110)
CK SERPL-CCNC: 77 U/L (ref 20–200)
CLARITY UR: CLEAR
CO2 SERPL-SCNC: 27 MMOL/L (ref 23–29)
COLOR UR: YELLOW
CREAT SERPL-MCNC: 1.1 MG/DL (ref 0.5–1.4)
DIFFERENTIAL METHOD: ABNORMAL
EOSINOPHIL # BLD AUTO: 0.2 K/UL (ref 0–0.5)
EOSINOPHIL NFR BLD: 2.9 % (ref 0–8)
ERYTHROCYTE [DISTWIDTH] IN BLOOD BY AUTOMATED COUNT: 13.9 % (ref 11.5–14.5)
EST. GFR  (AFRICAN AMERICAN): >60 ML/MIN/1.73 M^2
EST. GFR  (NON AFRICAN AMERICAN): >60 ML/MIN/1.73 M^2
GLUCOSE SERPL-MCNC: 164 MG/DL (ref 70–110)
GLUCOSE UR QL STRIP: NEGATIVE
HCT VFR BLD AUTO: 38.9 % (ref 40–54)
HGB BLD-MCNC: 12.8 G/DL (ref 14–18)
HGB UR QL STRIP: NEGATIVE
IMM GRANULOCYTES # BLD AUTO: 0.01 K/UL (ref 0–0.04)
IMM GRANULOCYTES NFR BLD AUTO: 0.2 % (ref 0–0.5)
KETONES UR QL STRIP: NEGATIVE
LEUKOCYTE ESTERASE UR QL STRIP: NEGATIVE
LYMPHOCYTES # BLD AUTO: 2 K/UL (ref 1–4.8)
LYMPHOCYTES NFR BLD: 32.6 % (ref 18–48)
MCH RBC QN AUTO: 30.8 PG (ref 27–31)
MCHC RBC AUTO-ENTMCNC: 32.9 G/DL (ref 32–36)
MCV RBC AUTO: 94 FL (ref 82–98)
MONOCYTES # BLD AUTO: 0.7 K/UL (ref 0.3–1)
MONOCYTES NFR BLD: 11.1 % (ref 4–15)
NEUTROPHILS # BLD AUTO: 3.3 K/UL (ref 1.8–7.7)
NEUTROPHILS NFR BLD: 52.9 % (ref 38–73)
NITRITE UR QL STRIP: NEGATIVE
NRBC BLD-RTO: 0 /100 WBC
PH UR STRIP: 7 [PH] (ref 5–8)
PLATELET # BLD AUTO: 160 K/UL (ref 150–350)
PMV BLD AUTO: 9.8 FL (ref 9.2–12.9)
POTASSIUM SERPL-SCNC: 3.9 MMOL/L (ref 3.5–5.1)
PROT SERPL-MCNC: 7.3 G/DL (ref 6–8.4)
PROT UR QL STRIP: NEGATIVE
RBC # BLD AUTO: 4.15 M/UL (ref 4.6–6.2)
SODIUM SERPL-SCNC: 137 MMOL/L (ref 136–145)
SP GR UR STRIP: 1.01 (ref 1–1.03)
TROPONIN I SERPL DL<=0.01 NG/ML-MCNC: 0.01 NG/ML (ref 0–0.03)
URN SPEC COLLECT METH UR: NORMAL
UROBILINOGEN UR STRIP-ACNC: NEGATIVE EU/DL
WBC # BLD AUTO: 6.14 K/UL (ref 3.9–12.7)

## 2020-08-01 PROCEDURE — 25000003 PHARM REV CODE 250: Mod: HCNC

## 2020-08-01 PROCEDURE — 82550 ASSAY OF CK (CPK): CPT | Mod: HCNC

## 2020-08-01 PROCEDURE — 93010 ELECTROCARDIOGRAM REPORT: CPT | Mod: HCNC,,, | Performed by: INTERNAL MEDICINE

## 2020-08-01 PROCEDURE — 93010 EKG 12-LEAD: ICD-10-PCS | Mod: HCNC,,, | Performed by: INTERNAL MEDICINE

## 2020-08-01 PROCEDURE — 36415 COLL VENOUS BLD VENIPUNCTURE: CPT | Mod: HCNC

## 2020-08-01 PROCEDURE — 63600175 PHARM REV CODE 636 W HCPCS: Mod: HCNC

## 2020-08-01 PROCEDURE — 84484 ASSAY OF TROPONIN QUANT: CPT | Mod: HCNC

## 2020-08-01 PROCEDURE — 25000003 PHARM REV CODE 250: Mod: HCNC | Performed by: EMERGENCY MEDICINE

## 2020-08-01 PROCEDURE — 80053 COMPREHEN METABOLIC PANEL: CPT | Mod: HCNC

## 2020-08-01 PROCEDURE — 83880 ASSAY OF NATRIURETIC PEPTIDE: CPT | Mod: HCNC

## 2020-08-01 PROCEDURE — 96360 HYDRATION IV INFUSION INIT: CPT | Mod: HCNC

## 2020-08-01 PROCEDURE — 85025 COMPLETE CBC W/AUTO DIFF WBC: CPT | Mod: HCNC

## 2020-08-01 PROCEDURE — 99285 EMERGENCY DEPT VISIT HI MDM: CPT | Mod: 25,HCNC

## 2020-08-01 PROCEDURE — 81003 URINALYSIS AUTO W/O SCOPE: CPT | Mod: HCNC

## 2020-08-01 PROCEDURE — 93005 ELECTROCARDIOGRAM TRACING: CPT | Mod: HCNC

## 2020-08-01 RX ADMIN — SODIUM CHLORIDE 500 ML: 0.9 INJECTION, SOLUTION INTRAVENOUS at 12:08

## 2020-08-01 NOTE — ED PROVIDER NOTES
SCRIBE #1 NOTE: I, Olesya Fernandez, am scribing for, and in the presence of, Emelyn Macias MD. I have scribed the entire note.       History     Chief Complaint   Patient presents with    Dizziness     with weakness after cutting the grass this morning; denies chest pain, N/V/D      Review of patient's allergies indicates:   Allergen Reactions    Atorvastatin      Other reaction(s): Muscle pain  Other reaction(s): Muscle weakness  Other reaction(s): Muscle pain    Codeine      Other reaction(s): Headache  Other reaction(s): Headache    Eliquis [apixaban]     Norvasc [amlodipine] Edema    Trulicity [dulaglutide] Nausea And Vomiting    Adhesive Rash     Other reaction(s): rash         History of Present Illness     HPI    8/1/2020, 11:49 AM  History obtained from the patient      History of Present Illness: Anthony Blair is a 87 y.o. male patient with a PMHx of a-fib, anemia, AP, CAD, DM, GERD, hyperlipidemia, HTN, intracranial hemorrhage, pacemaker, peripheral vasculare disease, seizure, and stroke who presents to the Emergency Department for evaluation of dizziness which onset gradually PTA when pt was outside cutting grass. Symptoms are constant and moderate in severity. No mitigating or exacerbating factors reported. Associated sxs include HA. Patient denies any CP, SOB, n/v/d, slurred speech, weakness, numbness, and all other sxs at this time. No prior tx reported. No further complaints or concerns at this time.       Arrival mode:  EMS  [facility]    PCP: Abdulaziz Mccabe MD        Past Medical History:  Past Medical History:   Diagnosis Date    A-fib     Anemia     AP (angina pectoris) 1/23/2014    Carotid artery disease without cerebral infarction 8/22/2014    Cataract     Coronary artery disease     Diabetes mellitus     DM (diabetes mellitus) 1970     am 07/06/2020    GERD (gastroesophageal reflux disease) 7/11/2013    Hemorrhagic cerebrovascular accident (CVA) 4/26/2018     Hyperlipidemia     Hypertension     Hypertensive heart disease with heart failure 3/12/2019    Intracranial hemorrhage     Lumbosacral spondylosis 2014    Pacemaker 2014    Paroxysmal atrial fibrillation 2014    Peripheral vascular disease 2014    Pneumonia of right lung due to infectious organism 3/27/2019    Seizure, late effect of stroke     Stroke     Type 2 diabetes mellitus with ophthalmic manifestations        Past Surgical History:  Past Surgical History:   Procedure Laterality Date    ANGIOPLASTY      ATRIAL ABLATION SURGERY N/A     CATARACT EXTRACTION Bilateral     [facility]    CATARACT EXTRACTION W/ INTRAOCULAR LENS IMPLANT Right     CATARACT EXTRACTION W/ INTRAOCULAR LENS IMPLANT Left     COLONOSCOPY N/A 10/16/2018    Procedure: COLONOSCOPY with biopsies;  Surgeon: Anabell Griggs MD;  Location: [facility];  Service: Endoscopy;  Laterality: N/A;    CORONARY ARTERY BYPASS GRAFT  07/2010    x5    EYE SURGERY      pace maker surgrey  10/2010    reposition in  (approx)    VEIN BYPASS SURGERY           Family History:  Family History   Problem Relation Age of Onset    Arthritis Mother     Diabetes Mother     Kidney disease Mother     Heart disease Mother     Arthritis Father     Diabetes Father     Diabetes Sister     Cancer Sister         breast    Heart disease Sister     Diabetes Brother     Kidney disease Brother     Heart disease Brother     Cancer Daughter         breast    Diabetes Daughter     Miscarriages / Stillbirths Daughter     Fibromyalgia Daughter     Diabetes Son     Diabetes Son     Alcohol abuse Paternal Uncle     Stroke Neg Hx        Social History:  Social History     Tobacco Use    Smoking status: Former Smoker     Packs/day: 2.00     Years: 13.00     Pack years: 26.00     Types: Cigarettes     Start date: 1954     Quit date: 1967     Years since quittin.1    Smokeless tobacco: Never Used    Substance and Sexual Activity    Alcohol use: No    Drug use: No    Sexual activity: Never     Partners: Female     Birth control/protection: None        Review of Systems     Review of Systems   Constitutional: Negative for fever.   HENT: Negative for sore throat.    Respiratory: Negative for shortness of breath.    Cardiovascular: Negative for chest pain.   Gastrointestinal: Negative for diarrhea, nausea and vomiting.   Genitourinary: Negative for dysuria.   Musculoskeletal: Negative for back pain.   Skin: Negative for rash.   Neurological: Positive for dizziness and headaches. Negative for speech difficulty, weakness and numbness.   Hematological: Does not bruise/bleed easily.   All other systems reviewed and are negative.       Physical Exam     Initial Vitals [08/01/20 1145]   BP Pulse Resp Temp SpO2   120/60 104 20 98.7 °F (37.1 °C) 97 %      MAP       --          Physical Exam  Nursing Notes and Vital Signs Reviewed.  Constitutional: Patient is in no acute distress. Well-developed and well-nourished.  Head: Atraumatic. Normocephalic.  Eyes: PERRL. EOM intact. Conjunctivae are not pale. No scleral icterus.  ENT: Mucous membranes are moist. Oropharynx is clear and symmetric. Pt is a little hard of hearing.   Neck: Supple. Full ROM. No lymphadenopathy.  Cardiovascular: Regular rate. Regular rhythm. No murmurs, rubs, or gallops. Distal pulses are 2+ and symmetric.  Pulmonary/Chest: No respiratory distress. Clear to auscultation bilaterally. No wheezing or rales.  Abdominal: Soft and non-distended.  There is no tenderness.  No rebound, guarding, or rigidity. Good bowel sounds.  Genitourinary: No CVA tenderness  Musculoskeletal: Moves all extremities. No obvious deformities. No edema. No calf tenderness.  Skin: Warm and dry.  Neurological:  Alert, awake, and appropriate.  Normal speech. CN 2-12 intact, hearing impaired, no dysmetria, no pronator drift.  No acute focal neurological deficits are  "appreciated.  Psychiatric: Normal affect. Good eye contact. Appropriate in content.     ED Course   Procedures  ED Vital Signs:  Vitals:    08/01/20 1145 08/01/20 1154 08/01/20 1201 08/01/20 1208   BP: 120/60 128/67 (!) 146/78 111/63   Pulse: 104 95 99 98   Resp: 20 18    Temp: 98.7 °F (37.1 °C)      TempSrc: Oral      SpO2: 97% 98% 97%    Height: 5' 8" (1.727 m)       08/01/20 1210 08/01/20 1212   BP: (!) 108/53 (!) 120/58   Pulse: 96 100   Resp:     Temp:     TempSrc:     SpO2:     Height:         Abnormal Lab Results:  Labs Reviewed   CBC W/ AUTO DIFFERENTIAL - Abnormal; Notable for the following components:       Result Value    RBC 4.15 (*)     Hemoglobin 12.8 (*)     Hematocrit 38.9 (*)     All other components within normal limits   COMPREHENSIVE METABOLIC PANEL - Abnormal; Notable for the following components:    Glucose 164 (*)     Alkaline Phosphatase 166 (*)     All other components within normal limits   B-TYPE NATRIURETIC PEPTIDE - Abnormal; Notable for the following components:     (*)     All other components within normal limits   TROPONIN I   CK   URINALYSIS, REFLEX TO URINE CULTURE    Narrative:     Specimen Source->Urine        All Lab Results:  Results for orders placed or performed during the hospital encounter of 08/01/20   CBC auto differential   Result Value Ref Range    WBC 6.14 3.90 - 12.70 K/uL    RBC 4.15 (L) 4.60 - 6.20 M/uL    Hemoglobin 12.8 (L) 14.0 - 18.0 g/dL    Hematocrit 38.9 (L) 40.0 - 54.0 %    Mean Corpuscular Volume 94 82 - 98 fL    Mean Corpuscular Hemoglobin 30.8 27.0 - 31.0 pg    Mean Corpuscular Hemoglobin Conc 32.9 32.0 - 36.0 g/dL    RDW 13.9 11.5 - 14.5 %    Platelets 160 150 - 350 K/uL    MPV 9.8 9.2 - 12.9 fL    Immature Granulocytes 0.2 0.0 - 0.5 %    Gran # (ANC) 3.3 1.8 - 7.7 K/uL    Immature Grans (Abs) 0.01 0.00 - 0.04 K/uL    Lymph # 2.0 1.0 - 4.8 K/uL    Mono # 0.7 0.3 - 1.0 K/uL    Eos # 0.2 0.0 - 0.5 K/uL    Baso # 0.02 0.00 - 0.20 K/uL    nRBC 0 0 " /100 WBC    Gran% 52.9 38.0 - 73.0 %    Lymph% 32.6 18.0 - 48.0 %    Mono% 11.1 4.0 - 15.0 %    Eosinophil% 2.9 0.0 - 8.0 %    Basophil% 0.3 0.0 - 1.9 %    Differential Method Automated    Comprehensive metabolic panel   Result Value Ref Range    Sodium 137 136 - 145 mmol/L    Potassium 3.9 3.5 - 5.1 mmol/L    Chloride 99 95 - 110 mmol/L    CO2 27 23 - 29 mmol/L    Glucose 164 (H) 70 - 110 mg/dL    BUN, Bld 21 8 - 23 mg/dL    Creatinine 1.1 0.5 - 1.4 mg/dL    Calcium 9.9 8.7 - 10.5 mg/dL    Total Protein 7.3 6.0 - 8.4 g/dL    Albumin 3.6 3.5 - 5.2 g/dL    Total Bilirubin 0.6 0.1 - 1.0 mg/dL    Alkaline Phosphatase 166 (H) 55 - 135 U/L    AST 29 10 - 40 U/L    ALT 20 10 - 44 U/L    Anion Gap 11 8 - 16 mmol/L    eGFR if African American >60 >60 mL/min/1.73 m^2    eGFR if non African American >60 >60 mL/min/1.73 m^2   Troponin I #1   Result Value Ref Range    Troponin I 0.006 0.000 - 0.026 ng/mL   B-Type natriuretic peptide (BNP)   Result Value Ref Range     (H) 0 - 99 pg/mL   CPK   Result Value Ref Range    CPK 77 20 - 200 U/L   Urinalysis, Reflex to Urine Culture Urine, Clean Catch    Specimen: Urine   Result Value Ref Range    Specimen UA Urine, Clean Catch     Color, UA Yellow Yellow, Straw, Ale    Appearance, UA Clear Clear    pH, UA 7.0 5.0 - 8.0    Specific Gravity, UA 1.010 1.005 - 1.030    Protein, UA Negative Negative    Glucose, UA Negative Negative    Ketones, UA Negative Negative    Bilirubin (UA) Negative Negative    Occult Blood UA Negative Negative    Nitrite, UA Negative Negative    Urobilinogen, UA Negative <2.0 EU/dL    Leukocytes, UA Negative Negative         Imaging Results:  Imaging Results          CT Head Without Contrast (Final result)  Result time 08/01/20 12:34:10    Final result by John Salazar MD (08/01/20 12:34:10)                 Impression:      1.  Negative for acute intracranial process. Negative for hemorrhage, or skull fracture.    2.  Atrophy, intracranial  atherosclerotic disease and small vessel ischemic changes again seen.  Stable right greater than left basal ganglia lacunar infarcts.    3.  Other stable findings as noted above.    All CT scans at this facility are performed  using dose modulation techniques as appropriate to performed exam including the following:  automated exposure control; adjustment of mA and/or kV according to the patients size (this includes techniques or standardized protocols for targeted exams where dose is matched to indication/reason for exam: i.e. extremities or head);  iterative reconstruction technique.      Electronically signed by: John Salazar MD  Date:    08/01/2020  Time:    12:34             Narrative:    EXAMINATION:  CT HEAD WITHOUT CONTRAST    CLINICAL HISTORY:  Dizziness, non-specific;    TECHNIQUE:  Axial images through the brain and posterior fossa were obtained without the use of IV contrast.  Sagittal and coronal reconstructions are provided for review.    COMPARISON:  August 28, 2018    FINDINGS:  The ventricles are midline and the CSF spaces are prominent.  The gray-white matter junction is well preserved. Negative for intracranial vascular abnormalities. Negative for mass, mass effect, cerebral edema, hemorrhage or abnormal fluid collections.  Intracranial atherosclerotic disease.  Small vessel ischemic changes.  Large right and small left basal ganglia lacunar infarcts again seen.  Stable falx and tentorial calcifications.  Stable arachnoid granulation calcifications.    Stable bubbly bone lesion within the right frontal skull most likely representing a benign process such as a hemangioma or focal area of fibrous dysplasia.  The skull and scalp are intact.  Mild patchy ethmoid air cell disease.  The rest of the paranasal sinuses, mastoid air cells, middle ears and ear canals are clear. The globes are intact.                               X-Ray Chest AP Portable (Final result)  Result time 08/01/20 12:19:31    Final  result by John Salazar MD (08/01/20 12:19:31)                 Impression:      1.  Mild vascular congestion.  Mild interstitial pulmonary edema difficult to exclude in the right clinical setting.    2.  Stable findings as noted above.  Negative for acute process otherwise.      Electronically signed by: John Salazar MD  Date:    08/01/2020  Time:    12:19             Narrative:    EXAMINATION:  XR CHEST AP PORTABLE    CLINICAL HISTORY:  dizziness;    COMPARISON:  Studies dating back to February 22, 2017    FINDINGS:  EKG leads overlie the chest which is slightly rotated to the right.  The lungs are free of alveolar opacities.  The cardiac silhouette size is enlarged.  The trachea is midline and the mediastinal width is normal. Negative for focal infiltrate, effusion or pneumothorax. Pulmonary vasculature is mildly congested.  Negative for osseous abnormalities. Tortuous aorta with calcifications of the aortic knob.  Median sternotomy wires and CABG changes.  Dual lead left subclavian pacemaker.  Marginal spondylosis.                                 The EKG was ordered, reviewed, and independently interpreted by the ED provider.  Interpretation time: 1149  Rate: 68 BPM  Rhythm: ventricular-paced rhythm   Interpretation: No acute ST changes. No STEMI.           The Emergency Provider reviewed the vital signs and test results, which are outlined above.     ED Discussion       1:47 PM: Pt is feeling better. Pt ambulated around the ER with no complaints of dizziness. Pt is stable for discharge and needs to follow up with Dr. Dewitt as scheduled on 8/6 at this time.     1:52 PM: Reassessed pt at this time. Discussed with pt all pertinent ED information and results. Discussed pt dx and plan of tx. Gave pt all f/u and return to the ED instructions. All questions and concerns were addressed at this time. Pt expresses understanding of information and instructions, and is comfortable with plan to discharge. Pt is stable  for discharge.    I discussed with patient and/or family/caretaker that evaluation in the ED does not suggest any emergent or life threatening medical conditions requiring immediate intervention beyond what was provided in the ED, and I believe patient is safe for discharge.  Regardless, an unremarkable evaluation in the ED does not preclude the development or presence of a serious of life threatening condition. As such, patient was instructed to return immediately for any worsening or change in current symptoms.         Medical Decision Making:   Clinical Tests:   Lab Tests: Ordered and Reviewed  Radiological Study: Ordered and Reviewed  Medical Tests: Ordered and Reviewed           ED Medication(s):  Medications   sodium chloride 0.9% bolus 500 mL (0 mLs Intravenous Stopped 8/1/20 1330)       New Prescriptions    No medications on file       Follow-up Information     Domenico Grajeda MD On 8/6/2020.    Specialties: Electrophysiology, Cardiology  Why: Return to the Emergency Room, If symptoms worsen  Contact information:  [address]  [facility] [address]  [phone]                       Scribe Attestation:   Scribe #1: I performed the above scribed service and the documentation accurately describes the services I performed. I attest to the accuracy of the note.     Attending:   Physician Attestation Statement for Scribe #1: I, Emelyn Macias MD, personally performed the services described in this documentation, as scribed by Olesya Fernandez, in my presence, and it is both accurate and complete.           Clinical Impression       ICD-10-CM ICD-9-CM   1. Dizziness  R42 780.4   2. Pacemaker at end of battery life  Z45.010 V53.31       Disposition:   Disposition: Discharged  Condition: Stable         Emelyn Macias MD  08/01/20 8485

## 2020-08-01 NOTE — ED NOTES
Pt wheeled out to vehicle. Denies questions or concerns. Pt instructed to follow up with cardiologist.

## 2020-08-03 ENCOUNTER — TELEPHONE (OUTPATIENT)
Dept: CARDIOLOGY | Facility: CLINIC | Age: 85
End: 2020-08-03

## 2020-08-03 ENCOUNTER — TELEPHONE (OUTPATIENT)
Dept: CARDIOLOGY | Facility: HOSPITAL | Age: 85
End: 2020-08-03

## 2020-08-03 DIAGNOSIS — I50.32 CHRONIC DIASTOLIC HEART FAILURE: Primary | ICD-10-CM

## 2020-08-03 NOTE — TELEPHONE ENCOUNTER
Spoke with wife, discussed results of echo as instructed by Dr. Harrington as well as addition of LV lead during generator change on 8/6/2020.  All questions answered.

## 2020-08-03 NOTE — TELEPHONE ENCOUNTER
----- Message from Domenico Grajeda MD sent at 7/30/2020  2:14 PM CDT -----  See comments below and call patient to discuss.   Please close encounter when done -- no need to route back to me.  Thanks    His EF has dropped some and since we will be replacing his PPM soon, I will also add an LV lead   Laura, can you help with this call please   Tx

## 2020-08-03 NOTE — TELEPHONE ENCOUNTER
----- Message from Solo Poole LPN sent at 8/3/2020  3:09 PM CDT -----  Wife, Liat called.  Patient was in ER this weekend with dehydration.  Took Furosemide 40mg this AM.  Wants to know if he should take 40mg at noon dose.  BP 97/56.  Please advise.

## 2020-08-03 NOTE — TELEPHONE ENCOUNTER
Wife, Liat call to say that patient was in ER this weekend for dehydration.  Took Furosemide 40mg this AM.  BP 97/50.  Wants to know if patient should take noon 40mg dose.  Please advise.

## 2020-08-04 ENCOUNTER — TELEPHONE (OUTPATIENT)
Dept: CARDIOLOGY | Facility: CLINIC | Age: 85
End: 2020-08-04

## 2020-08-04 ENCOUNTER — LAB VISIT (OUTPATIENT)
Dept: LAB | Facility: HOSPITAL | Age: 85
End: 2020-08-04
Attending: FAMILY MEDICINE
Payer: MEDICARE

## 2020-08-04 DIAGNOSIS — Z01.818 PRE-OP TESTING: ICD-10-CM

## 2020-08-04 DIAGNOSIS — Z45.010 PACEMAKER GENERATOR END OF LIFE: ICD-10-CM

## 2020-08-04 DIAGNOSIS — E08.51 DIABETES MELLITUS DUE TO UNDERLYING CONDITION WITH DIABETIC PERIPHERAL ANGIOPATHY WITHOUT GANGRENE, WITHOUT LONG-TERM CURRENT USE OF INSULIN: ICD-10-CM

## 2020-08-04 DIAGNOSIS — E78.2 MIXED HYPERLIPIDEMIA: Chronic | ICD-10-CM

## 2020-08-04 DIAGNOSIS — I10 ESSENTIAL HYPERTENSION: ICD-10-CM

## 2020-08-04 LAB
ALBUMIN SERPL BCP-MCNC: 3.9 G/DL (ref 3.5–5.2)
ALP SERPL-CCNC: 162 U/L (ref 55–135)
ALT SERPL W/O P-5'-P-CCNC: 21 U/L (ref 10–44)
ANION GAP SERPL CALC-SCNC: 7 MMOL/L (ref 8–16)
AST SERPL-CCNC: 31 U/L (ref 10–40)
BASOPHILS # BLD AUTO: 0.03 K/UL (ref 0–0.2)
BASOPHILS NFR BLD: 0.5 % (ref 0–1.9)
BILIRUB SERPL-MCNC: 0.8 MG/DL (ref 0.1–1)
BUN SERPL-MCNC: 19 MG/DL (ref 8–23)
CALCIUM SERPL-MCNC: 10.9 MG/DL (ref 8.7–10.5)
CHLORIDE SERPL-SCNC: 99 MMOL/L (ref 95–110)
CHOLEST SERPL-MCNC: 184 MG/DL (ref 120–199)
CHOLEST/HDLC SERPL: 3.9 {RATIO} (ref 2–5)
CO2 SERPL-SCNC: 31 MMOL/L (ref 23–29)
CREAT SERPL-MCNC: 1 MG/DL (ref 0.5–1.4)
DIFFERENTIAL METHOD: ABNORMAL
EOSINOPHIL # BLD AUTO: 0.2 K/UL (ref 0–0.5)
EOSINOPHIL NFR BLD: 2.8 % (ref 0–8)
ERYTHROCYTE [DISTWIDTH] IN BLOOD BY AUTOMATED COUNT: 14.6 % (ref 11.5–14.5)
EST. GFR  (AFRICAN AMERICAN): >60 ML/MIN/1.73 M^2
EST. GFR  (NON AFRICAN AMERICAN): >60 ML/MIN/1.73 M^2
GLUCOSE SERPL-MCNC: 127 MG/DL (ref 70–110)
HCT VFR BLD AUTO: 44.7 % (ref 40–54)
HDLC SERPL-MCNC: 47 MG/DL (ref 40–75)
HDLC SERPL: 25.5 % (ref 20–50)
HGB BLD-MCNC: 13.9 G/DL (ref 14–18)
IMM GRANULOCYTES # BLD AUTO: 0.01 K/UL (ref 0–0.04)
IMM GRANULOCYTES NFR BLD AUTO: 0.2 % (ref 0–0.5)
LDLC SERPL CALC-MCNC: 115.8 MG/DL (ref 63–159)
LYMPHOCYTES # BLD AUTO: 2.1 K/UL (ref 1–4.8)
LYMPHOCYTES NFR BLD: 34.5 % (ref 18–48)
MCH RBC QN AUTO: 30.2 PG (ref 27–31)
MCHC RBC AUTO-ENTMCNC: 31.1 G/DL (ref 32–36)
MCV RBC AUTO: 97 FL (ref 82–98)
MONOCYTES # BLD AUTO: 0.6 K/UL (ref 0.3–1)
MONOCYTES NFR BLD: 10 % (ref 4–15)
NEUTROPHILS # BLD AUTO: 3.1 K/UL (ref 1.8–7.7)
NEUTROPHILS NFR BLD: 52 % (ref 38–73)
NONHDLC SERPL-MCNC: 137 MG/DL
NRBC BLD-RTO: 0 /100 WBC
PLATELET # BLD AUTO: 158 K/UL (ref 150–350)
PMV BLD AUTO: 12.4 FL (ref 9.2–12.9)
POTASSIUM SERPL-SCNC: 4.6 MMOL/L (ref 3.5–5.1)
PROT SERPL-MCNC: 7.7 G/DL (ref 6–8.4)
RBC # BLD AUTO: 4.61 M/UL (ref 4.6–6.2)
SODIUM SERPL-SCNC: 137 MMOL/L (ref 136–145)
TRIGL SERPL-MCNC: 106 MG/DL (ref 30–150)
WBC # BLD AUTO: 6.03 K/UL (ref 3.9–12.7)

## 2020-08-04 PROCEDURE — 83036 HEMOGLOBIN GLYCOSYLATED A1C: CPT | Mod: HCNC

## 2020-08-04 PROCEDURE — 80061 LIPID PANEL: CPT | Mod: HCNC

## 2020-08-04 PROCEDURE — 85025 COMPLETE CBC W/AUTO DIFF WBC: CPT | Mod: HCNC

## 2020-08-04 PROCEDURE — 36415 COLL VENOUS BLD VENIPUNCTURE: CPT | Mod: HCNC,PO

## 2020-08-04 PROCEDURE — 80053 COMPREHEN METABOLIC PANEL: CPT | Mod: HCNC

## 2020-08-05 ENCOUNTER — ANESTHESIA EVENT (OUTPATIENT)
Dept: CARDIOLOGY | Facility: HOSPITAL | Age: 85
End: 2020-08-05
Payer: MEDICARE

## 2020-08-05 DIAGNOSIS — Z45.010 PACEMAKER GENERATOR END OF LIFE: ICD-10-CM

## 2020-08-05 DIAGNOSIS — I48.0 PAF (PAROXYSMAL ATRIAL FIBRILLATION): ICD-10-CM

## 2020-08-05 DIAGNOSIS — T82.110S PACEMAKER LEAD MALFUNCTION, SEQUELA: ICD-10-CM

## 2020-08-05 DIAGNOSIS — Z45.010 PACEMAKER AT END OF BATTERY LIFE: Primary | ICD-10-CM

## 2020-08-05 LAB
ESTIMATED AVG GLUCOSE: 169 MG/DL (ref 68–131)
HBA1C MFR BLD HPLC: 7.5 % (ref 4–5.6)

## 2020-08-06 ENCOUNTER — ANESTHESIA (OUTPATIENT)
Dept: CARDIOLOGY | Facility: HOSPITAL | Age: 85
End: 2020-08-06
Payer: MEDICARE

## 2020-08-06 ENCOUNTER — HOSPITAL ENCOUNTER (OUTPATIENT)
Facility: HOSPITAL | Age: 85
Discharge: HOME | End: 2020-08-06
Attending: INTERNAL MEDICINE | Admitting: INTERNAL MEDICINE
Payer: MEDICARE

## 2020-08-06 VITALS
SYSTOLIC BLOOD PRESSURE: 104 MMHG | DIASTOLIC BLOOD PRESSURE: 55 MMHG | OXYGEN SATURATION: 99 % | HEIGHT: 68 IN | BODY MASS INDEX: 28.19 KG/M2 | WEIGHT: 186 LBS | RESPIRATION RATE: 19 BRPM | HEART RATE: 59 BPM | TEMPERATURE: 98 F

## 2020-08-06 DIAGNOSIS — T82.110S PACEMAKER LEAD MALFUNCTION, SEQUELA: ICD-10-CM

## 2020-08-06 DIAGNOSIS — Z45.010 PACEMAKER AT END OF BATTERY LIFE: ICD-10-CM

## 2020-08-06 DIAGNOSIS — I50.9 CONGESTIVE HEART FAILURE, UNSPECIFIED HF CHRONICITY, UNSPECIFIED HEART FAILURE TYPE: ICD-10-CM

## 2020-08-06 DIAGNOSIS — I48.0 PAF (PAROXYSMAL ATRIAL FIBRILLATION): ICD-10-CM

## 2020-08-06 DIAGNOSIS — Z45.010 PACEMAKER GENERATOR END OF LIFE: ICD-10-CM

## 2020-08-06 DIAGNOSIS — I49.9 ARRHYTHMIA: ICD-10-CM

## 2020-08-06 PROCEDURE — C1887 CATHETER, GUIDING: HCPCS | Mod: HCNC | Performed by: INTERNAL MEDICINE

## 2020-08-06 PROCEDURE — 25000003 PHARM REV CODE 250: Mod: HCNC | Performed by: NURSE ANESTHETIST, CERTIFIED REGISTERED

## 2020-08-06 PROCEDURE — 63600175 PHARM REV CODE 636 W HCPCS: Mod: HCNC | Performed by: NURSE ANESTHETIST, CERTIFIED REGISTERED

## 2020-08-06 PROCEDURE — 99499 UNLISTED E&M SERVICE: CPT | Mod: HCNC,,, | Performed by: INTERNAL MEDICINE

## 2020-08-06 PROCEDURE — 33225 L VENTRIC PACING LEAD ADD-ON: CPT | Mod: HCNC | Performed by: INTERNAL MEDICINE

## 2020-08-06 PROCEDURE — C1730 CATH, EP, 19 OR FEW ELECT: HCPCS | Mod: HCNC | Performed by: INTERNAL MEDICINE

## 2020-08-06 PROCEDURE — 37000008 HC ANESTHESIA 1ST 15 MINUTES: Mod: HCNC | Performed by: INTERNAL MEDICINE

## 2020-08-06 PROCEDURE — 63600175 PHARM REV CODE 636 W HCPCS: Mod: HCNC | Performed by: INTERNAL MEDICINE

## 2020-08-06 PROCEDURE — 33206 INSERT HEART PM ATRIAL: CPT | Mod: HCNC,,, | Performed by: INTERNAL MEDICINE

## 2020-08-06 PROCEDURE — 25000003 PHARM REV CODE 250: Mod: HCNC | Performed by: INTERNAL MEDICINE

## 2020-08-06 PROCEDURE — 33206 PR INSER HART PACER XVENOUS ATRIAL: ICD-10-PCS | Mod: HCNC,,, | Performed by: INTERNAL MEDICINE

## 2020-08-06 PROCEDURE — 33225 PR INSRT PACING ELECT,AT INSERT NEW DEVICE: ICD-10-PCS | Mod: HCNC,,, | Performed by: INTERNAL MEDICINE

## 2020-08-06 PROCEDURE — 99499 NO LOS: ICD-10-PCS | Mod: HCNC,,, | Performed by: INTERNAL MEDICINE

## 2020-08-06 PROCEDURE — 37000009 HC ANESTHESIA EA ADD 15 MINS: Mod: HCNC | Performed by: INTERNAL MEDICINE

## 2020-08-06 PROCEDURE — 93010 EKG 12-LEAD: ICD-10-PCS | Mod: HCNC,76,, | Performed by: INTERNAL MEDICINE

## 2020-08-06 PROCEDURE — 93005 ELECTROCARDIOGRAM TRACING: CPT | Mod: HCNC

## 2020-08-06 PROCEDURE — 33206 INSERT HEART PM ATRIAL: CPT | Mod: HCNC,KX | Performed by: INTERNAL MEDICINE

## 2020-08-06 PROCEDURE — 25500020 PHARM REV CODE 255: Mod: HCNC | Performed by: INTERNAL MEDICINE

## 2020-08-06 PROCEDURE — C1894 INTRO/SHEATH, NON-LASER: HCPCS | Mod: HCNC | Performed by: INTERNAL MEDICINE

## 2020-08-06 PROCEDURE — C1898 LEAD, PMKR, OTHER THAN TRANS: HCPCS | Mod: HCNC | Performed by: INTERNAL MEDICINE

## 2020-08-06 PROCEDURE — 27201423 OPTIME MED/SURG SUP & DEVICES STERILE SUPPLY: Mod: HCNC | Performed by: INTERNAL MEDICINE

## 2020-08-06 PROCEDURE — C1769 GUIDE WIRE: HCPCS | Mod: HCNC | Performed by: INTERNAL MEDICINE

## 2020-08-06 PROCEDURE — C2621 PMKR, OTHER THAN SING/DUAL: HCPCS | Mod: HCNC | Performed by: INTERNAL MEDICINE

## 2020-08-06 PROCEDURE — 93010 ELECTROCARDIOGRAM REPORT: CPT | Mod: HCNC,,, | Performed by: INTERNAL MEDICINE

## 2020-08-06 PROCEDURE — 33225 L VENTRIC PACING LEAD ADD-ON: CPT | Mod: HCNC,,, | Performed by: INTERNAL MEDICINE

## 2020-08-06 DEVICE — LEAD PACE BIPOLAR CATH 74CM: Type: IMPLANTABLE DEVICE | Site: HEART | Status: FUNCTIONAL

## 2020-08-06 DEVICE — PACING LEAD
Type: IMPLANTABLE DEVICE | Site: HEART | Status: FUNCTIONAL
Brand: TENDRIL™

## 2020-08-06 DEVICE — CARDIAC RESYNCHRONIZATION THERAPY DEVICE, PULSE GENERATOR  DDDRV
Type: IMPLANTABLE DEVICE | Site: HEART | Status: FUNCTIONAL
Brand: ALLURE™

## 2020-08-06 RX ORDER — LIDOCAINE HYDROCHLORIDE 10 MG/ML
INJECTION, SOLUTION EPIDURAL; INFILTRATION; INTRACAUDAL; PERINEURAL
Status: DISCONTINUED | OUTPATIENT
Start: 2020-08-06 | End: 2020-08-06

## 2020-08-06 RX ORDER — ONDANSETRON 2 MG/ML
INJECTION INTRAMUSCULAR; INTRAVENOUS
Status: DISCONTINUED | OUTPATIENT
Start: 2020-08-06 | End: 2020-08-06

## 2020-08-06 RX ORDER — PROPOFOL 10 MG/ML
VIAL (ML) INTRAVENOUS CONTINUOUS PRN
Status: DISCONTINUED | OUTPATIENT
Start: 2020-08-06 | End: 2020-08-06

## 2020-08-06 RX ORDER — CEFAZOLIN SODIUM 2 G/50ML
2 SOLUTION INTRAVENOUS
Status: COMPLETED | OUTPATIENT
Start: 2020-08-06 | End: 2020-08-06

## 2020-08-06 RX ORDER — FENTANYL CITRATE 50 UG/ML
INJECTION, SOLUTION INTRAMUSCULAR; INTRAVENOUS
Status: DISCONTINUED | OUTPATIENT
Start: 2020-08-06 | End: 2020-08-06

## 2020-08-06 RX ORDER — PHENYLEPHRINE HYDROCHLORIDE 10 MG/ML
INJECTION INTRAVENOUS
Status: DISCONTINUED | OUTPATIENT
Start: 2020-08-06 | End: 2020-08-06

## 2020-08-06 RX ORDER — LIDOCAINE HYDROCHLORIDE 20 MG/ML
INJECTION, SOLUTION EPIDURAL; INFILTRATION; INTRACAUDAL; PERINEURAL
Status: DISCONTINUED | OUTPATIENT
Start: 2020-08-06 | End: 2020-08-06 | Stop reason: HOSPADM

## 2020-08-06 RX ORDER — ACETAMINOPHEN 325 MG/1
650 TABLET ORAL EVERY 4 HOURS PRN
Status: DISCONTINUED | OUTPATIENT
Start: 2020-08-06 | End: 2020-08-06 | Stop reason: HOSPADM

## 2020-08-06 RX ORDER — SODIUM CHLORIDE 9 MG/ML
INJECTION, SOLUTION INTRAVENOUS CONTINUOUS PRN
Status: DISCONTINUED | OUTPATIENT
Start: 2020-08-06 | End: 2020-08-06

## 2020-08-06 RX ADMIN — SODIUM CHLORIDE: 0.9 INJECTION, SOLUTION INTRAVENOUS at 09:08

## 2020-08-06 RX ADMIN — ONDANSETRON 4 MG: 2 INJECTION INTRAMUSCULAR; INTRAVENOUS at 12:08

## 2020-08-06 RX ADMIN — PHENYLEPHRINE HYDROCHLORIDE 100 MCG: 10 INJECTION INTRAVENOUS at 10:08

## 2020-08-06 RX ADMIN — FENTANYL CITRATE 25 MCG: 50 INJECTION, SOLUTION INTRAMUSCULAR; INTRAVENOUS at 10:08

## 2020-08-06 RX ADMIN — PROPOFOL 30 MCG/KG/MIN: 10 INJECTION, EMULSION INTRAVENOUS at 09:08

## 2020-08-06 RX ADMIN — CEFAZOLIN SODIUM 2 G: 2 SOLUTION INTRAVENOUS at 10:08

## 2020-08-06 RX ADMIN — PHENYLEPHRINE HYDROCHLORIDE 100 MCG: 10 INJECTION INTRAVENOUS at 12:08

## 2020-08-06 RX ADMIN — PHENYLEPHRINE HYDROCHLORIDE 100 MCG: 10 INJECTION INTRAVENOUS at 11:08

## 2020-08-06 RX ADMIN — FENTANYL CITRATE 25 MCG: 50 INJECTION, SOLUTION INTRAMUSCULAR; INTRAVENOUS at 12:08

## 2020-08-06 RX ADMIN — LIDOCAINE HYDROCHLORIDE 20 MG: 10 INJECTION, SOLUTION EPIDURAL; INFILTRATION; INTRACAUDAL; PERINEURAL at 09:08

## 2020-08-06 NOTE — TRANSFER OF CARE
"Anesthesia Transfer of Care Note    Patient: Anthony Blair    Procedure(s) Performed: Procedure(s) (LRB):  REPLACEMENT, PULSE GENERATOR, CARDIAC PACEMAKER (Left)  VENOGRAM, CATH LAB/bilateral (Bilateral)  REVISION, ELECTRODE LEAD, CARDIAC PACEMAKER (Bilateral)    Patient location: Cath Lab    Anesthesia Type: MAC    Transport from OR: Transported from OR on room air with adequate spontaneous ventilation    Post pain: adequate analgesia    Post assessment: no apparent anesthetic complications    Post vital signs: stable    Level of consciousness: awake, alert and oriented    Nausea/Vomiting: no nausea/vomiting    Complications: none    Transfer of care protocol was followed      Last vitals:   Visit Vitals  /80 (BP Location: Left arm, Patient Position: Lying)   Pulse 95   Temp 36.8 °C (98.2 °F) (Temporal)   Resp 16   Ht 5' 8" (1.727 m)   Wt 84.4 kg (186 lb)   SpO2 98%   BMI 28.28 kg/m²     "

## 2020-08-06 NOTE — BRIEF OP NOTE
"Patient with High grade AV block and PPM implant  PPM at EOL  RA lead non-sensing  EF dropped as a result of RV pacing    Admitted for revision    Procedure performed ended up as follows:  L subclavian venogram > patent  Implant of RA lead with mapping due to large portions/areas of non sensing  His lead implant (2 suture sleeves and a stress relief loop applied.   PPM pocket revision  Upgrade PPM to CRT - His lead placed in the LV port   Tested old RA lead for potential removal by simple traction - adhesion points localized within the SVC area - lead to lead adhesions - Did not attempt even simple "strenuous" traction.Lead was then capped and left in the pocket.    MAC anesthesia was well tolerated.    Patient to recover in CVRU and leave to self and family assisted care at home today    "

## 2020-08-06 NOTE — Clinical Note
Lead was inserted under fluorscopic guidance. The lead was inserted in the right atrium. A new lead was attached to the following location: right atrium.

## 2020-08-06 NOTE — ANESTHESIA PREPROCEDURE EVALUATION
08/06/2020  Anthony Blair is a 87 y.o., male.    Pre-op Assessment    I have reviewed the Patient Summary Reports.     I have reviewed the Nursing Notes. I have reviewed the NPO Status.   I have reviewed the Medications.     Review of Systems  Anesthesia Hx:  No problems with previous Anesthesia  Denies Family Hx of Anesthesia complications.   Denies Personal Hx of Anesthesia complications.   Social:  Former Smoker, No Alcohol Use    Hematology/Oncology:     Oncology Normal    -- Anemia:   EENT/Dental:EENT/Dental Normal   Cardiovascular:   Exercise tolerance: poor Pacemaker Hypertension, well controlled CAD  CABG/stent Dysrhythmias atrial fibrillation Angina PVD ECG has been reviewed. · Concentric left ventricular remodeling.  · Mildly decreased left ventricular systolic function. The estimated ejection fraction is 40%.  · Left ventricular diastolic dysfunction.  · Local segmental wall motion abnormalities.  · Mild right ventricular enlargement.  · Mildly reduced right ventricular systolic function.  · Moderate to severe tricuspid regurgitation.  · Small interatrial septal defect present with left to right shunting.  · Mild aortic valve stenosis.  · Aortic valve area is 1.80 cm2; peak velocity is 1.32 m/s; mean gradient is 4 mmHg.  · Mild mitral regurgitation.  · Normal central venous pressure (3 mmHg).  · The estimated PA systolic pressure is 30 mmHg.    PPM is EOL Coronary Artery Disease: S/P Aorto-Coronary Bypass Graft Surgery (CABG):  Carotoid Artery Disease  Hypertension, Essential Hypertension  Disorder of Cardiac Rhythm, Atrial Fibrillation    Pulmonary:   Shortness of breath    Hepatic/GI:   GERD Liver Disease,  Esophageal / Stomach Disorders Gerd    Musculoskeletal:   Arthritis     Neurological:   CVA Seizures Generalized weakness  Left arm numbness at times Seizure Disorder    Endocrine:    Diabetes, well controlled, type 2, using insulin  Diabetes, Type 2 Diabetes , controlled by insulin.    Dermatological:  Skin Normal    Psych:  Psychiatric Normal           Physical Exam  General:  Well nourished    Airway/Jaw/Neck:  Airway Findings: Mouth Opening: Normal Tongue: Normal  General Airway Assessment: Adult  Mallampati: II  TM Distance: Normal, at least 6 cm  Jaw/Neck Findings:  Neck ROM: Extension Decreased, Severe      Dental:  Dental Findings: upper front caps        Mental Status:  Mental Status Findings:  Cooperative, Alert and Oriented         Anesthesia Plan  Type of Anesthesia, risks & benefits discussed:  Anesthesia Type:  MAC  Patient's Preference:   Intra-op Monitoring Plan: standard ASA monitors  Intra-op Monitoring Plan Comments:   Post Op Pain Control Plan: per primary service following discharge from PACU  Post Op Pain Control Plan Comments:   Induction:   IV  Beta Blocker:  Patient is on a Beta-Blocker and has received one dose within the past 24 hours (No further documentation required).       Informed Consent: Patient understands risks and agrees with Anesthesia plan.  Questions answered. Anesthesia consent signed with patient.  ASA Score: 4     Day of Surgery Review of History & Physical: I have interviewed and examined the patient. I have reviewed the patient's H&P dated: 07/29/2020. There are no significant changes.          Ready For Surgery From Anesthesia Perspective.

## 2020-08-06 NOTE — Clinical Note
The site was marked. Prepped: groin and chest. Prepped with: ChloraPrep. The site was clipped. The patient was draped.

## 2020-08-06 NOTE — ANESTHESIA POSTPROCEDURE EVALUATION
Anesthesia Post Evaluation    Patient: Anthony Blair    Procedure(s) Performed: Procedure(s) (LRB):  REPLACEMENT, PULSE GENERATOR, CARDIAC PACEMAKER (Left)  VENOGRAM, CATH LAB/bilateral (Bilateral)  REVISION, ELECTRODE LEAD, CARDIAC PACEMAKER (Bilateral)    Final Anesthesia Type: MAC    Patient location during evaluation: Cath Lab  Patient participation: Yes- Able to Participate  Level of consciousness: awake and alert  Post-procedure vital signs: reviewed and stable  Pain management: adequate  Airway patency: patent    PONV status at discharge: No PONV  Anesthetic complications: no      Cardiovascular status: blood pressure returned to baseline and hemodynamically stable  Respiratory status: unassisted, spontaneous ventilation and room air  Hydration status: euvolemic  Follow-up not needed.          Vitals Value Taken Time   /55 08/06/20 1446   Temp 36.7 °C (98.1 °F) 08/06/20 1300   Pulse 59 08/06/20 1451   Resp 8 08/06/20 1451   SpO2 99 % 08/06/20 1451   Vitals shown include unvalidated device data.      No case tracking events are documented in the log.      Pain/Josefa Score: Josefa Score: 10 (8/6/2020  1:00 PM)

## 2020-08-06 NOTE — Clinical Note
20 ml injected throughout the case. 130 mL total wasted during the case. 150 mL total used in the case.

## 2020-08-06 NOTE — INTERVAL H&P NOTE
The patient has been examined and the H&P has been reviewed:  Since then, he has had an echo that showed a reduction in his EF to 40%. This is likely related to his RV pacing.   We have planned on PPM replacement and revision to include a new atrial lead and an LV lead or a His lead.   I have discussed the procedure in detail with the patient. I described its benefits and risks. I reviewed alternative therapies and discussed their potential value. The patient was given ample opportunity to express concerns and ask questions and I provided appropriate responses and  answers to such.The patient understands and agrees to proceed.  Consent form was signed  by patient and myself and appropriately witnessed.       Anesthesia/Surgery risks, benefits and alternative options discussed and understood by patient/family.          Active Hospital Problems    Diagnosis  POA    Pacemaker generator end of life [Z45.010]  Not Applicable     Priority: High      Resolved Hospital Problems   No resolved problems to display.

## 2020-08-06 NOTE — Clinical Note
A venogram was performed in the left axillary vein. The vessel was injected with hand injection with 20 mL of contrast.

## 2020-08-06 NOTE — PLAN OF CARE
Vitals stable: Ambulated in room. Cardiac monitor removed. PIV removed. D/C  instructions reviewed. Pt verbalized understanding. Wheeled to car via wheelchair accompanied by nurse.

## 2020-08-06 NOTE — DISCHARGE INSTRUCTIONS
ACTIVITY: Avoid heavy lifting or straining for 6 weeks          You may shower after 2 days-Saturday afternoon. Use hibiclens soap and blot dry after-do not rub. Leave incision open to air.          No tub bath until incision site is healed                     No reaching movements above the affected shoulder for 7 days                     Do not swing your arm for 6 weeks.                     Wear sling and swathe for 3 days and then 4 nights, for a total of 7 days with the sling                     Apply ice pack to site for 48 hours.                      WOUND CARE: It is not unusual to have tenderness at the incision site. Remove the dressing as instructed by your physician    DISCOMFORT: For general discomfort at the incision site, ou may take over the counter acetaminophen as directed on the bottle.    SIGNS AND SYMPTOMS TO REPORT: Call your physician for the following:          Fever greater than 101          Pain not relieved by medication          Swelling, drainage, warmth, or redness at incision site          Shortness of breath                     Hiccoughs within the first 48 hours that are persistent                     Increased fatigue with normal activity          Drowsiness that doesn't get better                 Weakness or dizziness that doesn't get better                 Repeated vomiting      WHEN TO SEEK EMERGENCY ASSISTANCE                      AFTER ICD CALL 911 FOR THE FOLLOWING:                     If you still have symptoms after shock                     If you have 3 or more shocks in a row                     If you have symptoms, but don't feel a shock, or if you feel faint or dizzy or                      Pass out, someone should call 911    IF YOU RECEIVED SEDATION TODAY, PLEASE FOLLOW THE  INSTRUCTIONS BELOW:     · For the next 8 hours, you should be watched by a responsible adult. This person should make sure your condition is not getting worse.  · Don't drink any alcohol for  the next 24 hours.  · Don't drive, operate dangerous machinery, or make important business or personal decisions during the next 24 hours.  · Your healthcare provider may tell you not to take any medicine by mouth for pain or sleep in the next 4 hours. These medicines may react with the medicines you were given in the hospital. This could cause a much stronger response than usual.                                                                                                                                   Start Antibiotics at 8 PM tonight, then take every 12 hours                                                                                                                                                             Nozin Instructions  Goal: the goal of Nozin is to reduce the risk of post-procedural infections by bacteria in the nasal cavity. Think of it as hand  for your nose.    How to use:    1. Shake Nozin bottle well    2. Take a cotton swab and apply 4 drops to one tip    3. Insert cotton tip into one nostril, being sure not to go deeper into nose than tip   of the swab.    4. Swab nostril 6 times counterclockwise and 6 times clockwise. Make sure to   swab the inside front pocket of the nostril.    5. Take swab out and apply 2 drops to the same cotton tip. Repeat steps 3 and 4                         in the other nostril.        Do steps 1-5 twice a day for 7 days.

## 2020-08-07 DIAGNOSIS — Z98.890 STATUS POST SURGERY: Primary | ICD-10-CM

## 2020-08-07 RX ORDER — CEFADROXIL 500 MG/1
500 CAPSULE ORAL EVERY 12 HOURS
COMMUNITY
End: 2020-08-07 | Stop reason: SDUPTHER

## 2020-08-07 RX ORDER — CEFADROXIL 500 MG/1
500 CAPSULE ORAL EVERY 12 HOURS
Qty: 6 CAPSULE | Refills: 0 | Status: SHIPPED | OUTPATIENT
Start: 2020-08-07 | End: 2020-11-18 | Stop reason: ALTCHOICE

## 2020-08-10 ENCOUNTER — HOSPITAL ENCOUNTER (OUTPATIENT)
Dept: CARDIOLOGY | Facility: HOSPITAL | Age: 85
Discharge: HOME | End: 2020-08-10
Attending: INTERNAL MEDICINE
Payer: MEDICARE

## 2020-08-10 ENCOUNTER — HOSPITAL ENCOUNTER (OUTPATIENT)
Dept: RADIOLOGY | Facility: HOSPITAL | Age: 85
Discharge: HOME | End: 2020-08-10
Attending: INTERNAL MEDICINE
Payer: MEDICARE

## 2020-08-10 DIAGNOSIS — I48.0 PAF (PAROXYSMAL ATRIAL FIBRILLATION): ICD-10-CM

## 2020-08-10 DIAGNOSIS — Z95.0 CARDIAC PACEMAKER IN SITU: ICD-10-CM

## 2020-08-10 DIAGNOSIS — T82.110S PACEMAKER LEAD MALFUNCTION, SEQUELA: Primary | ICD-10-CM

## 2020-08-10 DIAGNOSIS — T82.110S PACEMAKER LEAD MALFUNCTION, SEQUELA: ICD-10-CM

## 2020-08-10 DIAGNOSIS — I49.5 SSS (SICK SINUS SYNDROME): ICD-10-CM

## 2020-08-10 DIAGNOSIS — I49.5 SSS (SICK SINUS SYNDROME): Primary | ICD-10-CM

## 2020-08-10 PROCEDURE — 93281 PM DEVICE PROGR EVAL MULTI: CPT | Mod: HCNC

## 2020-08-10 PROCEDURE — 71046 X-RAY EXAM CHEST 2 VIEWS: CPT | Mod: 26,HCNC,, | Performed by: RADIOLOGY

## 2020-08-10 PROCEDURE — 93281 CARDIAC DEVICE CHECK - IN CLINIC & HOSPITAL: ICD-10-PCS | Mod: 26,HCNC,, | Performed by: INTERNAL MEDICINE

## 2020-08-10 PROCEDURE — 71046 X-RAY EXAM CHEST 2 VIEWS: CPT | Mod: TC,HCNC

## 2020-08-10 PROCEDURE — 71046 XR CHEST PA AND LATERAL: ICD-10-PCS | Mod: 26,HCNC,, | Performed by: RADIOLOGY

## 2020-08-10 PROCEDURE — 93281 PM DEVICE PROGR EVAL MULTI: CPT | Mod: 26,HCNC,, | Performed by: INTERNAL MEDICINE

## 2020-08-10 NOTE — DISCHARGE SUMMARY
Patient admitted for PPM revision and replacement  New RA lead and His leads added via L subclavian approach.  No Cx  Pat recovered in CVRU and was sent home w/o issues    Plan:  Post op care and follow up

## 2020-08-11 ENCOUNTER — TELEPHONE (OUTPATIENT)
Dept: FAMILY MEDICINE | Facility: CLINIC | Age: 85
End: 2020-08-11

## 2020-08-11 ENCOUNTER — OFFICE VISIT (OUTPATIENT)
Dept: FAMILY MEDICINE | Facility: CLINIC | Age: 85
End: 2020-08-11
Payer: MEDICARE

## 2020-08-11 VITALS
TEMPERATURE: 99 F | WEIGHT: 192.13 LBS | SYSTOLIC BLOOD PRESSURE: 120 MMHG | DIASTOLIC BLOOD PRESSURE: 70 MMHG | BODY MASS INDEX: 29.12 KG/M2 | HEIGHT: 68 IN | OXYGEN SATURATION: 98 % | HEART RATE: 76 BPM

## 2020-08-11 DIAGNOSIS — I10 ESSENTIAL HYPERTENSION: ICD-10-CM

## 2020-08-11 DIAGNOSIS — E08.51 DIABETES MELLITUS DUE TO UNDERLYING CONDITION WITH DIABETIC PERIPHERAL ANGIOPATHY WITHOUT GANGRENE, WITHOUT LONG-TERM CURRENT USE OF INSULIN: Primary | ICD-10-CM

## 2020-08-11 DIAGNOSIS — I50.32 CHRONIC DIASTOLIC HEART FAILURE: ICD-10-CM

## 2020-08-11 PROCEDURE — 99999 PR PBB SHADOW E&M-EST. PATIENT-LVL V: ICD-10-PCS | Mod: PBBFAC,HCNC,, | Performed by: FAMILY MEDICINE

## 2020-08-11 PROCEDURE — 99214 PR OFFICE/OUTPT VISIT, EST, LEVL IV, 30-39 MIN: ICD-10-PCS | Mod: HCNC,S$GLB,, | Performed by: FAMILY MEDICINE

## 2020-08-11 PROCEDURE — 99214 OFFICE O/P EST MOD 30 MIN: CPT | Mod: HCNC,S$GLB,, | Performed by: FAMILY MEDICINE

## 2020-08-11 PROCEDURE — 1159F PR MEDICATION LIST DOCUMENTED IN MEDICAL RECORD: ICD-10-PCS | Mod: HCNC,S$GLB,, | Performed by: FAMILY MEDICINE

## 2020-08-11 PROCEDURE — 1126F AMNT PAIN NOTED NONE PRSNT: CPT | Mod: HCNC,S$GLB,, | Performed by: FAMILY MEDICINE

## 2020-08-11 PROCEDURE — 1101F PR PT FALLS ASSESS DOC 0-1 FALLS W/OUT INJ PAST YR: ICD-10-PCS | Mod: HCNC,CPTII,S$GLB, | Performed by: FAMILY MEDICINE

## 2020-08-11 PROCEDURE — 1126F PR PAIN SEVERITY QUANTIFIED, NO PAIN PRESENT: ICD-10-PCS | Mod: HCNC,S$GLB,, | Performed by: FAMILY MEDICINE

## 2020-08-11 PROCEDURE — 99999 PR PBB SHADOW E&M-EST. PATIENT-LVL V: CPT | Mod: PBBFAC,HCNC,, | Performed by: FAMILY MEDICINE

## 2020-08-11 PROCEDURE — 1159F MED LIST DOCD IN RCRD: CPT | Mod: HCNC,S$GLB,, | Performed by: FAMILY MEDICINE

## 2020-08-11 PROCEDURE — 1101F PT FALLS ASSESS-DOCD LE1/YR: CPT | Mod: HCNC,CPTII,S$GLB, | Performed by: FAMILY MEDICINE

## 2020-08-11 RX ORDER — FUROSEMIDE 40 MG/1
1 TABLET ORAL DAILY
COMMUNITY
Start: 2020-08-08 | End: 2020-11-18

## 2020-08-11 NOTE — PROGRESS NOTES
Chief Complaint:    Chief Complaint   Patient presents with    Follow-up       History of Present Illness:  Presents today for three-month follow-up:  A1c slightly up he is not moving very much recently had pacemaker surgery.  Using about 16 units of Lantus and mealtime insulin.  He has an changes not very much    Blood pressure is stable    Also has carotid stenosis following with Cardiology with serial imaging has not had carotid Dopplers in a while he has been asymptomatic      He has swelling involving both ankles he is taking 40 mg of Lasix that do not seem to help denies any shortness of breath he says his weight is up slightly.  He is to take 80 mg of Lasix before    ROS:  Review of Systems   Constitutional: Negative for activity change, chills, fatigue, fever and unexpected weight change.   HENT: Negative for congestion, ear discharge, ear pain, hearing loss, postnasal drip and rhinorrhea.    Eyes: Negative for pain and visual disturbance.   Respiratory: Negative for cough, chest tightness and shortness of breath.    Cardiovascular: Negative for chest pain and palpitations.   Gastrointestinal: Negative for abdominal pain, diarrhea and vomiting.   Endocrine: Negative for heat intolerance.   Genitourinary: Negative for dysuria, flank pain, frequency and hematuria.   Musculoskeletal: Negative for back pain, gait problem and neck pain.   Skin: Negative for color change and rash.   Neurological: Negative for dizziness, tremors, seizures, numbness and headaches.   Psychiatric/Behavioral: Negative for agitation, hallucinations, self-injury, sleep disturbance and suicidal ideas. The patient is not nervous/anxious.        Past Medical History:   Diagnosis Date    A-fib     Anemia     AP (angina pectoris) 1/23/2014    Carotid artery disease without cerebral infarction 8/22/2014    Cataract     Coronary artery disease     Diabetes mellitus     DM (diabetes mellitus) 1970     am 07/06/2020    GERD  (gastroesophageal reflux disease) 2013    Hemorrhagic cerebrovascular accident (CVA) 2018    Hyperlipidemia     Hypertension     Hypertensive heart disease with heart failure 3/12/2019    Intracranial hemorrhage     Lumbosacral spondylosis 2014    Pacemaker 2014    Paroxysmal atrial fibrillation 2014    Peripheral vascular disease 2014    Pneumonia of right lung due to infectious organism 3/27/2019    Seizure, late effect of stroke     Stroke     Type 2 diabetes mellitus with ophthalmic manifestations        Social History:  Social History     Socioeconomic History    Marital status:      Spouse name: Not on file    Number of children: Not on file    Years of education: Not on file    Highest education level: Not on file   Occupational History    Not on file   Social Needs    Financial resource strain: Not on file    Food insecurity     Worry: Not on file     Inability: Not on file    Transportation needs     Medical: Not on file     Non-medical: Not on file   Tobacco Use    Smoking status: Former Smoker     Packs/day: 2.00     Years: 13.00     Pack years: 26.00     Types: Cigarettes     Start date: 1954     Quit date: 1967     Years since quittin.2    Smokeless tobacco: Never Used   Substance and Sexual Activity    Alcohol use: No    Drug use: No    Sexual activity: Never     Partners: Female     Birth control/protection: None   Lifestyle    Physical activity     Days per week: Not on file     Minutes per session: Not on file    Stress: Not on file   Relationships    Social connections     Talks on phone: Not on file     Gets together: Not on file     Attends Evangelical service: Not on file     Active member of club or organization: Not on file     Attends meetings of clubs or organizations: Not on file     Relationship status: Not on file   Other Topics Concern    Not on file   Social History Narrative    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       Family History:   family history includes Alcohol abuse in his paternal uncle; Arthritis in his father and mother; Cancer in his daughter and sister; Diabetes in his brother, daughter, father, mother, sister, son, and son; Fibromyalgia in his daughter; Heart disease in his brother, mother, and sister; Kidney disease in his brother and mother; Miscarriages / Stillbirths in his daughter.    Health Maintenance   Topic Date Due    Foot Exam  07/02/2020    Hemoglobin A1c  02/04/2021    Eye Exam  07/06/2021    Lipid Panel  08/04/2021    TETANUS VACCINE  01/23/2024    Pneumococcal Vaccine (65+ Low/Medium Risk)  Completed    Aspirin/Antiplatelet Therapy  Discontinued       Physical Exam:    Vital Signs  Temp: 98.8 °F (37.1 °C)  Temp src: Temporal  Pulse: 76  SpO2: 98 %  BP: 120/70  BP Location: Left arm  Patient Position: Sitting  Pain Score: 0-No pain  Height and Weight  Height: 5' 8" (172.7 cm)  Weight: 87.1 kg (192 lb 2.1 oz)  BSA (Calculated - sq m): 2.04 sq meters  BMI (Calculated): 29.2  Weight in (lb) to have BMI = 25: 164.1]    Body mass index is 29.21 kg/m².    Physical Exam  Constitutional:       Appearance: He is well-developed.   Eyes:      Conjunctiva/sclera: Conjunctivae normal.      Pupils: Pupils are equal, round, and reactive to light.   Neck:      Musculoskeletal: Normal range of motion and neck supple.   Cardiovascular:      Rate and Rhythm: Normal rate and regular rhythm.      Pulses:           Dorsalis pedis pulses are 0 on the right side and 0 on the left side.        Posterior tibial pulses are 0 on the right side and 0 on the left side.      Heart sounds: Normal heart sounds. No murmur.   Pulmonary:      Effort: Pulmonary effort is normal. No respiratory distress.      Breath sounds: Normal breath sounds. No wheezing or rales.   Chest:      Chest wall: No tenderness.   Abdominal:      General: There is no distension.      Palpations: Abdomen is " soft. There is no mass.      Tenderness: There is no abdominal tenderness. There is no guarding.   Musculoskeletal:         General: Swelling present. No tenderness.   Feet:      Right foot:      Protective Sensation: 10 sites tested. 10 sites sensed.      Left foot:      Protective Sensation: 10 sites tested. 10 sites sensed.   Lymphadenopathy:      Cervical: No cervical adenopathy.   Skin:     General: Skin is warm and dry.   Neurological:      Mental Status: He is alert and oriented to person, place, and time.      Deep Tendon Reflexes: Reflexes are normal and symmetric.   Psychiatric:         Behavior: Behavior normal.         Thought Content: Thought content normal.         Judgment: Judgment normal.           Diabetes Management Status    Statin: Not taking  ACE/ARB: Taking    Screening or Prevention Patient's value Goal Complete/Controlled?   HgA1C Testing and Control   Lab Results   Component Value Date    HGBA1C 7.5 (H) 08/04/2020      Annually/Less than 8% Yes   Lipid profile : 08/04/2020 Annually Yes   LDL control Lab Results   Component Value Date    LDLCALC 115.8 08/04/2020    Annually/Less than 100 mg/dl  No   Nephropathy screening Lab Results   Component Value Date    LABMICR 13.0 08/04/2020     Lab Results   Component Value Date    PROTEINUA Negative 08/01/2020    Annually Yes   Blood pressure BP Readings from Last 1 Encounters:   08/11/20 120/70    Less than 140/90 Yes   Dilated retinal exam : 07/06/2020 Annually Yes   Foot exam   : 07/02/2019 Annually Yes       Assessment:      ICD-10-CM ICD-9-CM   1. Diabetes mellitus due to underlying condition with diabetic peripheral angiopathy without gangrene, without long-term current use of insulin  E08.51 249.70     443.81   2. Essential hypertension  I10 401.9   3. Chronic diastolic heart failure  I50.32 428.32         Plan:      He will increase his Lasix to 80 mg b.i.d. for the next 2-3 days until the pedal edema improves and await comes back to normal  after which he can take 80 mg daily thereafter or alternate with 40  Increase Lantus to 20 units call back with fasting sugar readings continue the mealtime Humalog as is.  Increase physical activity as allowed by Cardiology.  Other medical problems stable  Follow-up 3 months      Orders Placed This Encounter   Procedures    Hemoglobin A1C    Comprehensive metabolic panel    CBC auto differential    Microalbumin/creatinine urine ratio    Lipid Panel       Current Outpatient Medications   Medication Sig Dispense Refill    ACCU-CHEK TOMAS PLUS TEST STRP Strp TEST SIX TIMES A  strip 6    ACCU-CHEK MULTICLIX LANCET lancets TEST BLOOD SUGAR THREE TIMES DAILY 200 each 5    CARBOXYMETHYLCELLULOSE SODIUM (REFRESH OPHT) Apply to eye.      cefadroxil (DURICEF) 500 MG Cap Take 1 capsule (500 mg total) by mouth every 12 (twelve) hours. Begin first dose 8am-8pm 6 capsule 0    diclofenac sodium (VOLTAREN) 1 % Gel Apply 2 g topically once daily. 100 g 2    flecainide (TAMBOCOR) 50 MG Tab Take 1 tablet (50 mg total) by mouth every 12 (twelve) hours. 60 tablet 6    fluticasone propionate (FLONASE) 50 mcg/actuation nasal spray USE TWO SPRAYS IN EACH NOSTRIL ONCE DAILY 16 g 6    furosemide (LASIX) 40 MG tablet Take 1 tablet by mouth once daily.      guaiFENesin (MUCINEX) 600 mg 12 hr tablet Take 1 tablet (600 mg total) by mouth 2 (two) times daily.      insulin (LANTUS SOLOSTAR U-100 INSULIN) glargine 100 units/mL (3mL) SubQ pen INJECT 16 UNITS SUB-Q AT BEDTIME 15 mL 11    insulin aspart U-100 (NOVOLOG FLEXPEN U-100 INSULIN) 100 unit/mL (3 mL) InPn pen INJECT TEN UNITS AT BREAKFAST, EIGHT UNITS AT LUNCH AND TEN UNITS AT DINNER. 54 DAY SUPPLY 15 mL 3    isosorbide mononitrate (IMDUR) 60 MG 24 hr tablet Take 1 tablet (60 mg total) by mouth once daily. 30 tablet 11    losartan (COZAAR) 100 MG tablet TAKE ONE TABLET BY MOUTH EVERY DAY 90 tablet 0    meclizine (ANTIVERT) 12.5 mg tablet Take 1 tablet (12.5 mg  "total) by mouth 2 (two) times daily as needed. 30 tablet 0    metoprolol succinate (TOPROL-XL) 100 MG 24 hr tablet TAKE ONE TABLET BY MOUTH TWICE DAILY 60 tablet 12    nitroGLYCERIN (NITROSTAT) 0.4 MG SL tablet Place 1 tablet (0.4 mg total) under the tongue every 5 (five) minutes as needed for Chest pain. 25 tablet 12    pantoprazole (PROTONIX) 40 MG tablet TAKE ONE TABLET BY MOUTH EVERY DAY 30 tablet 5    potassium chloride SA (K-DUR,KLOR-CON) 20 MEQ tablet TAKE ONE TABLET BY MOUTH EVERY DAY 30 tablet 12    saw palmetto 160 MG capsule Take 160 mg by mouth 2 (two) times daily.      SURE COMFORT PEN NEEDLE 32 gauge x 5/32" Ndle USE FOUR TIMES DAILY. 200 each 6     No current facility-administered medications for this visit.        There are no discontinued medications.    No follow-ups on file.      Abdulaziz Mccabe MD                  "

## 2020-08-11 NOTE — TELEPHONE ENCOUNTER
If his blood pressure drops too low and he starts to get weak and dizzy then he needs to stay cut back on the Lasix increase his hydration.  The increased dose of Lasix she is not recommended for more than 2-3 days anyway.

## 2020-08-11 NOTE — TELEPHONE ENCOUNTER
----- Message from Nereida Galindo sent at 8/11/2020  3:39 PM CDT -----  His wife robyn would like dr littlejohn to call her.

## 2020-08-12 ENCOUNTER — TELEPHONE (OUTPATIENT)
Dept: CARDIOLOGY | Facility: CLINIC | Age: 85
End: 2020-08-12

## 2020-08-12 RX ORDER — FUROSEMIDE 20 MG/1
20 TABLET ORAL DAILY
COMMUNITY
Start: 2020-08-13 | End: 2020-08-19

## 2020-08-12 NOTE — TELEPHONE ENCOUNTER
Telephoned results of x-ray and advised of Medication therapy.    ----- Message from Domenico Grajeda MD sent at 8/11/2020  3:46 PM CDT -----  Results reviewed. abnormalities as listed. Please see appended comments,create telephone encounter, call patient and inform him/her of results and action to take then document in encounter and close it.   In general there will be no need to route back to   me unless there is an issue that you need to discuss. Thanks    Atrial lead has fallen out of position - no need to anything for now for this  RTC 6 weeks  Also, increase his lasix by 20 mg daily for 5 days then go back to his current dose

## 2020-08-15 LAB
AV DELAY - LONGEST: 180 MSEC
AV DELAY - SHORTEST: 140 MSEC
BATTERY VOLTAGE (V): 3.01 V
IMPEDANCE RA LEAD (DONOR): 410 OHMS
IMPEDANCE RA LEAD (NATIVE): 565 OHMS
IMPEDANCE RA LEAD (NATIVE): 600 OHMS
IMPEDANCE RA LEAD: 390 OHMS
IMPEDANCE RA LEAD: 460 OHMS
OHS CV DC PP MS1: 0.4 MS
OHS CV DC PP MS1: 0.4 MS
OHS CV DC PP MS2: 0.3 MS
OHS CV DC PP MS3: 0.4 MS
OHS CV DC PP V1: 2 V
OHS CV DC PP V1: 3.5 V
OHS CV DC PP V2: 2.5 V
OHS CV DC PP V3: NORMAL V
P/R-WAVE RA LEAD (NATIVE): 10 MV
P/R-WAVE RA LEAD (NATIVE): 22.2 MV
P/R-WAVE RA LEAD: 2.9 MV
THRESHOLD MS RA LEAD (NATIVE): 0.4 MS
THRESHOLD MS RA LEAD: 0.3 MS
THRESHOLD MS RA LEAD: 0.4 MS
THRESHOLD V RA LEAD (NATIVE): 0.5 V
THRESHOLD V RA LEAD: 0.9 V
THRESHOLD V RA LEAD: 1.25 V
VV DELAY: 80 MSEC

## 2020-08-15 RX ORDER — FUROSEMIDE 20 MG/1
20 TABLET ORAL DAILY
Qty: 30 TABLET | Refills: 11 | Status: SHIPPED | OUTPATIENT
Start: 2020-08-15 | End: 2021-02-24 | Stop reason: SDUPTHER

## 2020-08-17 LAB — POCT GLUCOSE: 122 MG/DL (ref 70–110)

## 2020-08-31 ENCOUNTER — TELEPHONE (OUTPATIENT)
Dept: FAMILY MEDICINE | Facility: CLINIC | Age: 85
End: 2020-08-31

## 2020-08-31 NOTE — TELEPHONE ENCOUNTER
Notified patients spouse per Dr. Mccabe Blood sugar looks significantly improved Verbalized understanding.

## 2020-09-01 RX ORDER — LOSARTAN POTASSIUM 100 MG/1
TABLET ORAL
Qty: 90 TABLET | Refills: 0 | OUTPATIENT
Start: 2020-09-01

## 2020-09-10 ENCOUNTER — OFFICE VISIT (OUTPATIENT)
Dept: INTERNAL MEDICINE | Facility: CLINIC | Age: 85
End: 2020-09-10
Payer: MEDICARE

## 2020-09-10 VITALS
DIASTOLIC BLOOD PRESSURE: 78 MMHG | HEIGHT: 69 IN | SYSTOLIC BLOOD PRESSURE: 130 MMHG | HEART RATE: 70 BPM | WEIGHT: 187.81 LBS | OXYGEN SATURATION: 99 % | BODY MASS INDEX: 27.82 KG/M2

## 2020-09-10 DIAGNOSIS — Z00.00 ENCOUNTER FOR PREVENTIVE HEALTH EXAMINATION: Primary | ICD-10-CM

## 2020-09-10 DIAGNOSIS — Z86.73 HISTORY OF CVA (CEREBROVASCULAR ACCIDENT): ICD-10-CM

## 2020-09-10 DIAGNOSIS — E78.5 HYPERLIPIDEMIA ASSOCIATED WITH TYPE 2 DIABETES MELLITUS: ICD-10-CM

## 2020-09-10 DIAGNOSIS — I71.40 ABDOMINAL AORTIC ANEURYSM (AAA) WITHOUT RUPTURE: ICD-10-CM

## 2020-09-10 DIAGNOSIS — I35.0 AORTIC VALVE STENOSIS, ETIOLOGY OF CARDIAC VALVE DISEASE UNSPECIFIED: ICD-10-CM

## 2020-09-10 DIAGNOSIS — I10 ESSENTIAL HYPERTENSION: ICD-10-CM

## 2020-09-10 DIAGNOSIS — I50.32 CHRONIC DIASTOLIC HEART FAILURE: ICD-10-CM

## 2020-09-10 DIAGNOSIS — I65.29 STENOSIS OF CAROTID ARTERY, UNSPECIFIED LATERALITY: ICD-10-CM

## 2020-09-10 DIAGNOSIS — I73.9 PVD (PERIPHERAL VASCULAR DISEASE): ICD-10-CM

## 2020-09-10 DIAGNOSIS — R20.2 NUMBNESS AND TINGLING: ICD-10-CM

## 2020-09-10 DIAGNOSIS — K74.60 HEPATIC CIRRHOSIS, UNSPECIFIED HEPATIC CIRRHOSIS TYPE, UNSPECIFIED WHETHER ASCITES PRESENT: ICD-10-CM

## 2020-09-10 DIAGNOSIS — I82.890 JUGULAR VEIN THROMBOSIS: ICD-10-CM

## 2020-09-10 DIAGNOSIS — R20.0 NUMBNESS AND TINGLING: ICD-10-CM

## 2020-09-10 DIAGNOSIS — Z95.0 PACEMAKER: ICD-10-CM

## 2020-09-10 DIAGNOSIS — I70.0 CALCIFICATION OF AORTA: ICD-10-CM

## 2020-09-10 DIAGNOSIS — I48.0 PAROXYSMAL ATRIAL FIBRILLATION: Chronic | ICD-10-CM

## 2020-09-10 DIAGNOSIS — I25.10 CORONARY ARTERY DISEASE, ANGINA PRESENCE UNSPECIFIED, UNSPECIFIED VESSEL OR LESION TYPE, UNSPECIFIED WHETHER NATIVE OR TRANSPLANTED HEART: ICD-10-CM

## 2020-09-10 DIAGNOSIS — K31.819 AVM (ARTERIOVENOUS MALFORMATION) OF STOMACH, ACQUIRED: ICD-10-CM

## 2020-09-10 DIAGNOSIS — E11.69 HYPERLIPIDEMIA ASSOCIATED WITH TYPE 2 DIABETES MELLITUS: ICD-10-CM

## 2020-09-10 DIAGNOSIS — R20.8 BURNING SENSATION OF FEET: ICD-10-CM

## 2020-09-10 DIAGNOSIS — I20.9 AP (ANGINA PECTORIS): ICD-10-CM

## 2020-09-10 PROCEDURE — 99999 PR PBB SHADOW E&M-EST. PATIENT-LVL V: CPT | Mod: PBBFAC,HCNC,, | Performed by: NURSE PRACTITIONER

## 2020-09-10 PROCEDURE — 3051F HG A1C>EQUAL 7.0%<8.0%: CPT | Mod: HCNC,CPTII,S$GLB, | Performed by: NURSE PRACTITIONER

## 2020-09-10 PROCEDURE — 99499 RISK ADDL DX/OHS AUDIT: ICD-10-PCS | Mod: HCNC,S$GLB,, | Performed by: NURSE PRACTITIONER

## 2020-09-10 PROCEDURE — 99499 UNLISTED E&M SERVICE: CPT | Mod: HCNC,S$GLB,, | Performed by: NURSE PRACTITIONER

## 2020-09-10 PROCEDURE — G0439 PPPS, SUBSEQ VISIT: HCPCS | Mod: HCNC,S$GLB,, | Performed by: NURSE PRACTITIONER

## 2020-09-10 PROCEDURE — 99999 PR PBB SHADOW E&M-EST. PATIENT-LVL V: ICD-10-PCS | Mod: PBBFAC,HCNC,, | Performed by: NURSE PRACTITIONER

## 2020-09-10 PROCEDURE — G0439 PR MEDICARE ANNUAL WELLNESS SUBSEQUENT VISIT: ICD-10-PCS | Mod: HCNC,S$GLB,, | Performed by: NURSE PRACTITIONER

## 2020-09-10 PROCEDURE — 3051F PR MOST RECENT HEMOGLOBIN A1C LEVEL 7.0 - < 8.0%: ICD-10-PCS | Mod: HCNC,CPTII,S$GLB, | Performed by: NURSE PRACTITIONER

## 2020-09-10 NOTE — Clinical Note
Your patient was seen today for AWV. Abnormalities bolded. Please review.   Thank you,   BASSAM Reeder

## 2020-09-10 NOTE — PATIENT INSTRUCTIONS
Counseling and Referral of Other Preventative  (Italic type indicates deductible and co-insurance are waived)    Patient Name: Anthony Blair  Today's Date: 9/10/2020    Health Maintenance       Date Due Completion Date    Shingles Vaccine (1 of 2) 04/01/1983 ---    Influenza Vaccine (1) 10/22/2020 (Originally 8/1/2020) 10/21/2019    Override on 4/26/2018: Declined    Hemoglobin A1c 02/04/2021 8/4/2020    Eye Exam 07/06/2021 7/6/2020 (Done)    Override on 7/6/2020: Done    Override on 7/1/2019: Done    Override on 6/25/2018: Done    Override on 1/21/2016: Done    Override on 1/21/2016: Done    Override on 12/11/2014: Done    Override on 1/23/2014: Done    Lipid Panel 08/04/2021 8/4/2020    Foot Exam 09/10/2021 9/10/2020 (Done)    Override on 9/10/2020: Done    Override on 7/2/2019: Done    Override on 2/24/2016: Done    Override on 7/25/2014: Done    TETANUS VACCINE 01/23/2024 1/23/2014        No orders of the defined types were placed in this encounter.    The following information is provided to all patients.  This information is to help you find resources for any of the problems found today that may be affecting your health:                Living healthy guide: www.[facility].[place].gov      Understanding Diabetes: www.diabetes.org      Eating healthy: www.cdc.gov/healthyweight      CDC home safety checklist: www.cdc.gov/steadi/patient.html      Agency on Aging: www.goea.[place].[facility]      Alcoholics anonymous (AA): www.aa.org      Physical Activity: www.nathan.nih.gov/rx7zpeo      Tobacco use: www.quitwithusla.org

## 2020-09-10 NOTE — PROGRESS NOTES
"  Anthony Blair presented for a  Medicare AWV and comprehensive Health Risk Assessment today. The following components were reviewed and updated:    · Medical history  · Family History  · Social history  · Allergies and Current Medications  · Health Risk Assessment  · Health Maintenance  · Care Team     ** See Completed Assessments for Annual Wellness Visit within the encounter summary.**         The following assessments were completed:  · Living Situation  · CAGE  · Depression Screening  · Timed Get Up and Go  · Whisper Test-NA  · Cognitive Function Screening  · Nutrition Screening  · ADL Screening  · PAQ Screening        Vitals:    09/10/20 1101   BP: 130/78   BP Location: Right arm   Patient Position: Sitting   BP Method: Medium (Manual)   Pulse: 70   SpO2: 99%   Weight: 85.2 kg (187 lb 13.3 oz)   Height: 5' 9" (1.753 m)     Body mass index is 27.74 kg/m².  Physical Exam  Vitals signs and nursing note reviewed.   Constitutional:       Appearance: He is well-developed.      Comments: Patient accompanied by wife, who was present throughout the visit.      HENT:      Head: Normocephalic.   Cardiovascular:      Rate and Rhythm: Normal rate and regular rhythm.      Pulses:           Dorsalis pedis pulses are 1+ on the right side and 1+ on the left side.      Heart sounds: Normal heart sounds. No murmur.      Comments: 1+ edema noted to left lower extremity. Reports normal for him. No erythema, increased warmth, or tenderness noted to either calf.    Pulmonary:      Effort: Pulmonary effort is normal. No respiratory distress.      Breath sounds: Normal breath sounds.   Abdominal:      Palpations: Abdomen is soft. There is no mass.      Tenderness: There is no abdominal tenderness.   Musculoskeletal: Normal range of motion.   Feet:      Right foot:      Protective Sensation: 10 sites tested. 10 sites sensed.      Skin integrity: No ulcer.      Left foot:      Protective Sensation: 10 sites tested. 10 sites sensed.      " Skin integrity: No ulcer.   Skin:     General: Skin is warm and dry.   Neurological:      Mental Status: He is alert and oriented to person, place, and time.      Motor: No abnormal muscle tone.   Psychiatric:         Speech: Speech normal.         Behavior: Behavior normal.               Diagnoses and health risks identified today and associated recommendations/orders:    1. Encounter for preventive health examination  Scheduled  Gastroenterology    He declines flu vaccine and will think about Shingrix.     2. Chronic diastolic heart failure  Stable. Continue current treatment plan as previously prescribed with your cardiologist.     3. Coronary artery disease, angina presence unspecified, unspecified vessel or lesion type, unspecified whether native or transplanted heart  Discussed s/s of MI and stroke (patient denies any s/s) and advised to go to the ER if occur. He expressed understanding.   Stable and controlled. Continue current treatment plan as previously prescribed with your cardiologist.     4. AP (angina pectoris)  See #3    5. Stenosis of carotid artery, unspecified laterality  US 6/2017  Continue current treatment plan as previously prescribed with your cardiologist.     6. PVD (peripheral vascular disease)  US 1/2016  Stable and controlled. Continue current treatment plan as previously prescribed with your cardiologist.     7. Paroxysmal atrial fibrillation  Stable and controlled. Continue current treatment plan as previously prescribed with your cardiologist and electrophysiologist.     8. Aortic valve stenosis, etiology of cardiac valve disease unspecified  Echo 7/2020  Continue current treatment plan as previously prescribed with your EP.    9. Abdominal aortic aneurysm (AAA) without rupture  Lumbar xray 5/2019   Continue current treatment plan as previously prescribed with your cardiologist.     10. Jugular vein thrombosis  US 6/2017   Continue current treatment plan as previously prescribed with  your cardiologist and EP.     11. Calcification of aorta  cxr 8/2020  Discussed diagnosis and risk reduction.   Advised to follow up with PCP/cardiologist for further recommendations. Patient expressed understanding.       12. Essential hypertension   Continue current treatment plan as previously prescribed with your cardiologist and PCP.     13. Hyperlipidemia associated with type 2 diabetes mellitus  A1C 7.5   Continue current treatment plan as previously prescribed with your cardiologist and PCP.     14. Pacemaker   Continue current treatment plan as previously prescribed with your cardiologist and EP.     15. Hepatic cirrhosis, unspecified hepatic cirrhosis type, unspecified whether ascites present  CT 8/2019  Epic contraindicated Voltaren use and hepatic disease. He will discuss with gastroenterology at upcoming visit.    Continue current treatment plan as previously prescribed with gastroenterology.     16. AVM (arteriovenous malformation) of stomach, acquired  egd 5/2018   Continue current treatment plan as previously prescribed with gastroenterology.     17. History of CVA (cerebrovascular accident)   Continue current treatment plan as previously prescribed with your providers.     18. Numbness and tingling  Reports intermittent numbness and tingling to LUE, not new and actually better. Denies associated symptoms.   Advised to follow up with PCP for further evaluation and treatment. He expressed understanding.      19. Burning sensation of feet  Reports intermittent burning sensation and tingling to bilateral feet, not new and not worsening.   Encouraged daily foot inspections.   Advised to follow up with PCP for further evaluation and treatment. He expressed understanding.        Provided Anthony with a 5-10 year written screening schedule and personal prevention plan. Recommendations were developed using the USPSTF age appropriate recommendations. Education, counseling, and referrals were provided as needed.  After Visit Summary printed and given to patient which includes a list of additional screenings\tests needed.    Follow up in about 1 year (around 9/10/2021) for awv.    Amanda Baker NP

## 2020-09-14 ENCOUNTER — OFFICE VISIT (OUTPATIENT)
Dept: CARDIOLOGY | Facility: CLINIC | Age: 85
End: 2020-09-14
Payer: MEDICARE

## 2020-09-14 VITALS
HEART RATE: 80 BPM | DIASTOLIC BLOOD PRESSURE: 62 MMHG | HEIGHT: 69 IN | OXYGEN SATURATION: 95 % | WEIGHT: 186.31 LBS | SYSTOLIC BLOOD PRESSURE: 124 MMHG | BODY MASS INDEX: 27.6 KG/M2

## 2020-09-14 DIAGNOSIS — Z45.018 BIVENTRICULAR PACEMAKER CHECK: ICD-10-CM

## 2020-09-14 DIAGNOSIS — E08.51 DIABETES MELLITUS DUE TO UNDERLYING CONDITION WITH DIABETIC PERIPHERAL ANGIOPATHY WITHOUT GANGRENE, WITHOUT LONG-TERM CURRENT USE OF INSULIN: ICD-10-CM

## 2020-09-14 DIAGNOSIS — I35.0 AORTIC VALVE STENOSIS, ETIOLOGY OF CARDIAC VALVE DISEASE UNSPECIFIED: ICD-10-CM

## 2020-09-14 DIAGNOSIS — I25.810 CORONARY ARTERY DISEASE INVOLVING CORONARY BYPASS GRAFT OF NATIVE HEART WITHOUT ANGINA PECTORIS: ICD-10-CM

## 2020-09-14 DIAGNOSIS — I65.23 ASYMPTOMATIC STENOSIS OF BOTH CAROTID ARTERIES WITHOUT INFARCTION: ICD-10-CM

## 2020-09-14 DIAGNOSIS — I10 ESSENTIAL HYPERTENSION: ICD-10-CM

## 2020-09-14 DIAGNOSIS — E78.2 MIXED HYPERLIPIDEMIA: Chronic | ICD-10-CM

## 2020-09-14 DIAGNOSIS — Z78.9 STATIN INTOLERANCE: ICD-10-CM

## 2020-09-14 DIAGNOSIS — K31.819 AVM (ARTERIOVENOUS MALFORMATION) OF STOMACH, ACQUIRED: ICD-10-CM

## 2020-09-14 DIAGNOSIS — I48.19 PERSISTENT ATRIAL FIBRILLATION: Primary | ICD-10-CM

## 2020-09-14 DIAGNOSIS — I82.890 JUGULAR VEIN THROMBOSIS: ICD-10-CM

## 2020-09-14 DIAGNOSIS — I50.32 CHRONIC DIASTOLIC HEART FAILURE: ICD-10-CM

## 2020-09-14 PROCEDURE — 1101F PT FALLS ASSESS-DOCD LE1/YR: CPT | Mod: HCNC,CPTII,S$GLB, | Performed by: INTERNAL MEDICINE

## 2020-09-14 PROCEDURE — 1101F PR PT FALLS ASSESS DOC 0-1 FALLS W/OUT INJ PAST YR: ICD-10-PCS | Mod: HCNC,CPTII,S$GLB, | Performed by: INTERNAL MEDICINE

## 2020-09-14 PROCEDURE — 99024 PR POST-OP FOLLOW-UP VISIT: ICD-10-PCS | Mod: HCNC,S$GLB,, | Performed by: INTERNAL MEDICINE

## 2020-09-14 PROCEDURE — 1159F MED LIST DOCD IN RCRD: CPT | Mod: HCNC,S$GLB,, | Performed by: INTERNAL MEDICINE

## 2020-09-14 PROCEDURE — 99024 POSTOP FOLLOW-UP VISIT: CPT | Mod: HCNC,S$GLB,, | Performed by: INTERNAL MEDICINE

## 2020-09-14 PROCEDURE — 99999 PR PBB SHADOW E&M-EST. PATIENT-LVL III: ICD-10-PCS | Mod: PBBFAC,HCNC,, | Performed by: INTERNAL MEDICINE

## 2020-09-14 PROCEDURE — 99999 PR PBB SHADOW E&M-EST. PATIENT-LVL III: CPT | Mod: PBBFAC,HCNC,, | Performed by: INTERNAL MEDICINE

## 2020-09-14 PROCEDURE — 1159F PR MEDICATION LIST DOCUMENTED IN MEDICAL RECORD: ICD-10-PCS | Mod: HCNC,S$GLB,, | Performed by: INTERNAL MEDICINE

## 2020-09-14 NOTE — PROGRESS NOTES
"  Subjective:   Patient ID:  Anthony Blair is a 87 y.o. male     Chief complaint:No chief complaint on file.      HPI    Background (for extensive clinical details, see my note dated 10/24/2018):  In short, he is 87 years old, he has a pacemaker for heart block and is pacemaker dependent.  He has an history of persistent atrial fibrillation there is also a prior history of intracerebral bleed.  Is currently on no anticoagulants (as per Dr. Seven Frazier recommendations). He also has an AVM of the stomach.  He has CAD, hypertension, mixed hyperlipidemia, peripheral vascular disease and diabetes type 2.     Update 07/20/2020:  He has been stable.  His pacemaker battery has been at DOLORES for some time.  Today it was nearly at EOS.   In addition, his atrial need is been malfunctioning.       Update since then:   I recommended immediate pacemaker replacement and revision of his atrial lead.)  Venography as needed 1st).   He wanted to wait for a couple weeks but eventually had his procedure done on 08/06/2020.  His chronic RA lead was not removed due to lead to lead adhesions especially at the level of the SVC.  A new RA lead was added and an LV lead was placed.  Since then,he has been feeling great. "I feel as improved as I did after my bypass surgery". Wife says "it feels so good to see him feel like that".   He seems to be unrestricted now.   His sleep is also of better quality now  Current Outpatient Medications   Medication Sig    ACCU-CHEK TOMAS PLUS TEST STRP Strp TEST SIX TIMES A DAY    ACCU-CHEK MULTICLIX LANCET lancets TEST BLOOD SUGAR THREE TIMES DAILY    CARBOXYMETHYLCELLULOSE SODIUM (REFRESH OPHT) Apply to eye.    diclofenac sodium (VOLTAREN) 1 % Gel Apply 2 g topically once daily.    flecainide (TAMBOCOR) 50 MG Tab Take 1 tablet (50 mg total) by mouth every 12 (twelve) hours.    fluticasone propionate (FLONASE) 50 mcg/actuation nasal spray USE TWO SPRAYS IN EACH NOSTRIL ONCE DAILY    furosemide (LASIX) " "20 MG tablet Take 1 tablet (20 mg total) by mouth once daily.    furosemide (LASIX) 40 MG tablet Take 1 tablet by mouth once daily.    guaiFENesin (MUCINEX) 600 mg 12 hr tablet Take 1 tablet (600 mg total) by mouth 2 (two) times daily.    insulin (LANTUS SOLOSTAR U-100 INSULIN) glargine 100 units/mL (3mL) SubQ pen INJECT 16 UNITS SUB-Q AT BEDTIME    insulin aspart U-100 (NOVOLOG FLEXPEN U-100 INSULIN) 100 unit/mL (3 mL) InPn pen INJECT TEN UNITS AT BREAKFAST, EIGHT UNITS AT LUNCH AND TEN UNITS AT DINNER. 54 DAY SUPPLY    isosorbide mononitrate (IMDUR) 60 MG 24 hr tablet Take 1 tablet (60 mg total) by mouth once daily.    losartan (COZAAR) 100 MG tablet TAKE ONE TABLET BY MOUTH EVERY DAY    meclizine (ANTIVERT) 12.5 mg tablet Take 1 tablet (12.5 mg total) by mouth 2 (two) times daily as needed.    metoprolol succinate (TOPROL-XL) 100 MG 24 hr tablet TAKE ONE TABLET BY MOUTH TWICE DAILY    nitroGLYCERIN (NITROSTAT) 0.4 MG SL tablet Place 1 tablet (0.4 mg total) under the tongue every 5 (five) minutes as needed for Chest pain.    pantoprazole (PROTONIX) 40 MG tablet TAKE ONE TABLET BY MOUTH EVERY DAY    potassium chloride SA (K-DUR,KLOR-CON) 20 MEQ tablet TAKE ONE TABLET BY MOUTH EVERY DAY    saw palmetto 160 MG capsule Take 160 mg by mouth 2 (two) times daily.    SURE COMFORT PEN NEEDLE 32 gauge x 5/32" Ndle USE FOUR TIMES DAILY.    cefadroxil (DURICEF) 500 MG Cap Take 1 capsule (500 mg total) by mouth every 12 (twelve) hours. Begin first dose 8am-8pm     No current facility-administered medications for this visit.      Review of Systems   Constitution: Negative for decreased appetite, malaise/fatigue, weight gain and weight loss.   HENT: Positive for hearing loss.    Eyes: Negative for blurred vision.   Cardiovascular: Negative for chest pain, claudication, cyanosis, dyspnea on exertion, irregular heartbeat, leg swelling, near-syncope, orthopnea and palpitations.   Respiratory: Negative for cough, " shortness of breath, sleep disturbances due to breathing, snoring and wheezing.    Endocrine: Negative for heat intolerance.   Hematologic/Lymphatic: Bruises/bleeds easily.   Musculoskeletal: Negative for muscle weakness and myalgias.   Gastrointestinal: Negative for melena, nausea and vomiting.   Genitourinary: Positive for frequency. Negative for nocturia.   Neurological: Negative for excessive daytime sleepiness, dizziness, headaches, light-headedness and weakness.   Psychiatric/Behavioral: Negative for depression, memory loss and substance abuse. The patient does not have insomnia and is not nervous/anxious.        Objective:   Physical Exam   Constitutional: He is oriented to person, place, and time. He appears well-developed and well-nourished.   HENT:   Head: Normocephalic and atraumatic.   Right Ear: External ear normal.   Left Ear: External ear normal.   Eyes: Pupils are equal, round, and reactive to light. Conjunctivae are normal. Left conjunctiva is not injected. Left conjunctiva has no hemorrhage.   Neck: Neck supple. No JVD present. No thyromegaly present.   Cardiovascular: Normal rate, regular rhythm, normal heart sounds and intact distal pulses. PMI is not displaced. Exam reveals no gallop, no friction rub, no midsystolic click and no opening snap.   No murmur heard.  Pulses:       Carotid pulses are 2+ on the right side and 2+ on the left side.       Radial pulses are 2+ on the right side and 2+ on the left side.        Dorsalis pedis pulses are 2+ on the right side and 2+ on the left side.        Posterior tibial pulses are 2+ on the right side and 2+ on the left side.   Pulmonary/Chest: Effort normal and breath sounds normal. No respiratory distress. He has no wheezes. He has no rales. He exhibits no tenderness.   Device pocket is in excellent repair     Abdominal: Soft. Normal appearance. He exhibits no pulsatile liver. There is no hepatomegaly. There is no abdominal tenderness. There is no  "rigidity.   Musculoskeletal: Normal range of motion.         General: No tenderness or edema.      Right knee: He exhibits no swelling.      Left knee: He exhibits no swelling.      Right ankle: He exhibits no swelling.      Left ankle: He exhibits no swelling.      Right lower leg: He exhibits no swelling.      Left lower leg: He exhibits no swelling.      Right foot: No swelling.      Left foot: No swelling.   Neurological: He is alert and oriented to person, place, and time. He has normal strength and normal reflexes. No cranial nerve deficit. Coordination normal.   Skin: Skin is warm, dry and intact. No rash noted. No cyanosis.   Psychiatric: He has a normal mood and affect. His behavior is normal.   Nursing note and vitals reviewed.    /62 (BP Location: Right arm, Patient Position: Sitting)   Pulse 80   Ht 5' 9" (1.753 m)   Wt 84.5 kg (186 lb 4.6 oz)   SpO2 95%   BMI 27.51 kg/m²      Assessment:    Excellent clinical response to CRT -- he is in persiatent AT/AF and therefore in a VVIR BiV paced mode . PPM in excellent repair.   1. Persistent atrial fibrillation    2. Biventricular pacemaker check    3. Aortic valve stenosis, etiology of cardiac valve disease unspecified    4. Asymptomatic stenosis of both carotid arteries without infarction    5. Chronic diastolic heart failure    6. Coronary artery disease involving coronary bypass graft of native heart without angina pectoris    7. Essential hypertension    8. Mixed hyperlipidemia    9. Jugular vein thrombosis    10. Diabetes mellitus due to underlying condition with diabetic peripheral angiopathy without gangrene, without long-term current use of insulin    11. AVM (arteriovenous malformation) of stomach, acquired    12. Statin intolerance        Plan:      No orders of the defined types were placed in this encounter.    Follow up in about 1 year (around 9/14/2021), or if symptoms worsen or fail to improve.  There are no discontinued " medications.     Medication List with Changes/Refills   Current Medications    ACCU-CHEK TOMAS PLUS TEST STRP STRP    TEST SIX TIMES A DAY    ACCU-CHEK MULTICLIX LANCET LANCETS    TEST BLOOD SUGAR THREE TIMES DAILY    CARBOXYMETHYLCELLULOSE SODIUM (REFRESH OPHT)    Apply to eye.    CEFADROXIL (DURICEF) 500 MG CAP    Take 1 capsule (500 mg total) by mouth every 12 (twelve) hours. Begin first dose 8am-8pm    DICLOFENAC SODIUM (VOLTAREN) 1 % GEL    Apply 2 g topically once daily.    FLECAINIDE (TAMBOCOR) 50 MG TAB    Take 1 tablet (50 mg total) by mouth every 12 (twelve) hours.    FLUTICASONE PROPIONATE (FLONASE) 50 MCG/ACTUATION NASAL SPRAY    USE TWO SPRAYS IN EACH NOSTRIL ONCE DAILY    FUROSEMIDE (LASIX) 20 MG TABLET    Take 1 tablet (20 mg total) by mouth once daily.    FUROSEMIDE (LASIX) 40 MG TABLET    Take 1 tablet by mouth once daily.    GUAIFENESIN (MUCINEX) 600 MG 12 HR TABLET    Take 1 tablet (600 mg total) by mouth 2 (two) times daily.    INSULIN (LANTUS SOLOSTAR U-100 INSULIN) GLARGINE 100 UNITS/ML (3ML) SUBQ PEN    INJECT 16 UNITS SUB-Q AT BEDTIME    INSULIN ASPART U-100 (NOVOLOG FLEXPEN U-100 INSULIN) 100 UNIT/ML (3 ML) INPN PEN    INJECT TEN UNITS AT BREAKFAST, EIGHT UNITS AT LUNCH AND TEN UNITS AT DINNER. 54 DAY SUPPLY    ISOSORBIDE MONONITRATE (IMDUR) 60 MG 24 HR TABLET    Take 1 tablet (60 mg total) by mouth once daily.    LOSARTAN (COZAAR) 100 MG TABLET    TAKE ONE TABLET BY MOUTH EVERY DAY    MECLIZINE (ANTIVERT) 12.5 MG TABLET    Take 1 tablet (12.5 mg total) by mouth 2 (two) times daily as needed.    METOPROLOL SUCCINATE (TOPROL-XL) 100 MG 24 HR TABLET    TAKE ONE TABLET BY MOUTH TWICE DAILY    NITROGLYCERIN (NITROSTAT) 0.4 MG SL TABLET    Place 1 tablet (0.4 mg total) under the tongue every 5 (five) minutes as needed for Chest pain.    PANTOPRAZOLE (PROTONIX) 40 MG TABLET    TAKE ONE TABLET BY MOUTH EVERY DAY    POTASSIUM CHLORIDE SA (K-DUR,KLOR-CON) 20 MEQ TABLET    TAKE ONE TABLET BY MOUTH  "EVERY DAY    SAW PALMETTO 160 MG CAPSULE    Take 160 mg by mouth 2 (two) times daily.    SURE COMFORT PEN NEEDLE 32 GAUGE X 5/32" NDLE    USE FOUR TIMES DAILY.                  "

## 2020-09-24 ENCOUNTER — OFFICE VISIT (OUTPATIENT)
Dept: GASTROENTEROLOGY | Facility: CLINIC | Age: 85
End: 2020-09-24
Payer: MEDICARE

## 2020-09-24 VITALS
SYSTOLIC BLOOD PRESSURE: 112 MMHG | HEART RATE: 72 BPM | WEIGHT: 184.5 LBS | BODY MASS INDEX: 27.33 KG/M2 | DIASTOLIC BLOOD PRESSURE: 58 MMHG | OXYGEN SATURATION: 96 % | HEIGHT: 69 IN

## 2020-09-24 DIAGNOSIS — K74.60 CIRRHOSIS OF LIVER WITHOUT ASCITES, UNSPECIFIED HEPATIC CIRRHOSIS TYPE: Primary | ICD-10-CM

## 2020-09-24 DIAGNOSIS — K31.819 AVM (ARTERIOVENOUS MALFORMATION) OF STOMACH, ACQUIRED: ICD-10-CM

## 2020-09-24 PROCEDURE — 1159F PR MEDICATION LIST DOCUMENTED IN MEDICAL RECORD: ICD-10-PCS | Mod: HCNC,S$GLB,, | Performed by: PHYSICIAN ASSISTANT

## 2020-09-24 PROCEDURE — 99999 PR PBB SHADOW E&M-EST. PATIENT-LVL V: ICD-10-PCS | Mod: PBBFAC,HCNC,, | Performed by: PHYSICIAN ASSISTANT

## 2020-09-24 PROCEDURE — 99213 PR OFFICE/OUTPT VISIT, EST, LEVL III, 20-29 MIN: ICD-10-PCS | Mod: HCNC,S$GLB,, | Performed by: PHYSICIAN ASSISTANT

## 2020-09-24 PROCEDURE — 1126F PR PAIN SEVERITY QUANTIFIED, NO PAIN PRESENT: ICD-10-PCS | Mod: HCNC,S$GLB,, | Performed by: PHYSICIAN ASSISTANT

## 2020-09-24 PROCEDURE — 1101F PT FALLS ASSESS-DOCD LE1/YR: CPT | Mod: HCNC,CPTII,S$GLB, | Performed by: PHYSICIAN ASSISTANT

## 2020-09-24 PROCEDURE — 1126F AMNT PAIN NOTED NONE PRSNT: CPT | Mod: HCNC,S$GLB,, | Performed by: PHYSICIAN ASSISTANT

## 2020-09-24 PROCEDURE — 1159F MED LIST DOCD IN RCRD: CPT | Mod: HCNC,S$GLB,, | Performed by: PHYSICIAN ASSISTANT

## 2020-09-24 PROCEDURE — 1101F PR PT FALLS ASSESS DOC 0-1 FALLS W/OUT INJ PAST YR: ICD-10-PCS | Mod: HCNC,CPTII,S$GLB, | Performed by: PHYSICIAN ASSISTANT

## 2020-09-24 PROCEDURE — 99999 PR PBB SHADOW E&M-EST. PATIENT-LVL V: CPT | Mod: PBBFAC,HCNC,, | Performed by: PHYSICIAN ASSISTANT

## 2020-09-24 PROCEDURE — 99213 OFFICE O/P EST LOW 20 MIN: CPT | Mod: HCNC,S$GLB,, | Performed by: PHYSICIAN ASSISTANT

## 2020-09-24 RX ORDER — LOSARTAN POTASSIUM 100 MG/1
TABLET ORAL
Qty: 90 TABLET | Refills: 0 | OUTPATIENT
Start: 2020-09-24

## 2020-09-24 RX ORDER — LOSARTAN POTASSIUM 100 MG/1
100 TABLET ORAL DAILY
Qty: 90 TABLET | Refills: 1 | Status: SHIPPED | OUTPATIENT
Start: 2020-09-24 | End: 2021-02-26

## 2020-09-24 NOTE — PROGRESS NOTES
Subjective:      Patient ID: Anthony Blair is a 87 y.o. male.    Chief Complaint: Follow-up    HPI   The patient has a history of liver cirrhosis, pulmonary hypertension, dysphagia, irregular bowel habits and AVM. He was last seen 09/20/19. He is here for a routine follow up. He reports feeling much better since his pacemaker was adjusted. He had questions about his Protonix and his liver. This was primary a counseling visit. I reviewed general information about liver cirrhosis, possible complications and risk of HCC. I also reviewed possible side effects of PPI use. I do not feel his PPI is affecting his liver. However, I offered to decrease the dose to 20 mg for a trial period. He decided to stay at current dosing. I will order labs today and an ultrasound. These should also be repeated in six months. His questions were answered and he reported understanding of the discussion.     Review of Systems  As per HPI.     Objective:     Physical Exam  Constitutional:       General: He is not in acute distress.     Appearance: He is well-developed. He is not diaphoretic.   HENT:      Head: Normocephalic and atraumatic.   Eyes:      Extraocular Movements: Extraocular movements intact.   Cardiovascular:      Rate and Rhythm: Normal rate and regular rhythm.   Pulmonary:      Effort: Pulmonary effort is normal. No respiratory distress.      Breath sounds: Normal breath sounds. No wheezing.   Abdominal:      General: Bowel sounds are normal. There is no distension.      Palpations: Abdomen is soft.      Tenderness: There is no abdominal tenderness.   Neurological:      Mental Status: He is alert and oriented to person, place, and time.      Cranial Nerves: No cranial nerve deficit.   Psychiatric:         Behavior: Behavior normal.         Assessment:     1. Cirrhosis of liver without ascites, unspecified hepatic cirrhosis type    2. AVM (arteriovenous malformation) of stomach, acquired        Plan:     Counseling visit. See  above. Greater than 50% of this 15 minute visit was spent counseling the patient and his wife.     Orders Placed This Encounter   Procedures    US Abdomen Limited    Protime-INR    AFP tumor marker       Follow up in about 6 months (around 3/24/2021).    Thank you for the opportunity to participate in the care of this patient.   Maxwell Altman PA-C.

## 2020-09-25 DIAGNOSIS — I48.0 PAF (PAROXYSMAL ATRIAL FIBRILLATION): Primary | ICD-10-CM

## 2020-09-25 RX ORDER — METOPROLOL SUCCINATE 100 MG/1
100 TABLET, EXTENDED RELEASE ORAL 2 TIMES DAILY
Qty: 60 TABLET | Refills: 12 | Status: ON HOLD | OUTPATIENT
Start: 2020-09-25 | End: 2021-09-21

## 2020-09-29 ENCOUNTER — PATIENT MESSAGE (OUTPATIENT)
Dept: OTHER | Facility: OTHER | Age: 85
End: 2020-09-29

## 2020-10-01 ENCOUNTER — TELEPHONE (OUTPATIENT)
Dept: RADIOLOGY | Facility: HOSPITAL | Age: 85
End: 2020-10-01

## 2020-10-02 ENCOUNTER — HOSPITAL ENCOUNTER (OUTPATIENT)
Dept: RADIOLOGY | Facility: HOSPITAL | Age: 85
Discharge: HOME | End: 2020-10-02
Attending: PHYSICIAN ASSISTANT
Payer: MEDICARE

## 2020-10-02 DIAGNOSIS — K74.60 CIRRHOSIS OF LIVER WITHOUT ASCITES, UNSPECIFIED HEPATIC CIRRHOSIS TYPE: ICD-10-CM

## 2020-10-02 PROCEDURE — 76705 US ABDOMEN LIMITED: ICD-10-PCS | Mod: 26,HCNC,, | Performed by: RADIOLOGY

## 2020-10-02 PROCEDURE — 76705 ECHO EXAM OF ABDOMEN: CPT | Mod: TC,HCNC

## 2020-10-02 PROCEDURE — 76705 ECHO EXAM OF ABDOMEN: CPT | Mod: 26,HCNC,, | Performed by: RADIOLOGY

## 2020-10-28 ENCOUNTER — LAB VISIT (OUTPATIENT)
Dept: LAB | Facility: HOSPITAL | Age: 85
End: 2020-10-28
Attending: INTERNAL MEDICINE
Payer: MEDICARE

## 2020-10-28 DIAGNOSIS — I73.9 CLAUDICATION IN PERIPHERAL VASCULAR DISEASE: Chronic | ICD-10-CM

## 2020-10-28 DIAGNOSIS — Z78.9 STATIN INTOLERANCE: ICD-10-CM

## 2020-10-28 DIAGNOSIS — I71.21 ANEURYSM OF ASCENDING AORTA: ICD-10-CM

## 2020-10-28 DIAGNOSIS — E13.51 PERIPHERAL VASCULAR DISEASE DUE TO SECONDARY DIABETES MELLITUS: ICD-10-CM

## 2020-10-28 DIAGNOSIS — I25.810 CORONARY ARTERY DISEASE INVOLVING CORONARY BYPASS GRAFT OF NATIVE HEART WITHOUT ANGINA PECTORIS: ICD-10-CM

## 2020-10-28 DIAGNOSIS — I10 ESSENTIAL HYPERTENSION: ICD-10-CM

## 2020-10-28 DIAGNOSIS — I77.9 CAROTID ARTERY DISEASE WITHOUT CEREBRAL INFARCTION: Chronic | ICD-10-CM

## 2020-10-28 DIAGNOSIS — E66.3 OVERWEIGHT (BMI 25.0-29.9): ICD-10-CM

## 2020-10-28 DIAGNOSIS — Z95.0 PACEMAKER: Chronic | ICD-10-CM

## 2020-10-28 DIAGNOSIS — E08.51 DIABETES MELLITUS DUE TO UNDERLYING CONDITION WITH DIABETIC PERIPHERAL ANGIOPATHY WITHOUT GANGRENE, WITHOUT LONG-TERM CURRENT USE OF INSULIN: ICD-10-CM

## 2020-10-28 DIAGNOSIS — E78.2 MIXED HYPERLIPIDEMIA: Chronic | ICD-10-CM

## 2020-10-28 DIAGNOSIS — R06.02 SOB (SHORTNESS OF BREATH): ICD-10-CM

## 2020-10-28 DIAGNOSIS — I48.19 PERSISTENT ATRIAL FIBRILLATION: ICD-10-CM

## 2020-10-28 DIAGNOSIS — I73.9 PERIPHERAL VASCULAR DISEASE: Chronic | ICD-10-CM

## 2020-10-28 DIAGNOSIS — I65.23 ASYMPTOMATIC STENOSIS OF BOTH CAROTID ARTERIES WITHOUT INFARCTION: ICD-10-CM

## 2020-10-28 DIAGNOSIS — I61.9 HEMORRHAGIC CEREBROVASCULAR ACCIDENT (CVA): ICD-10-CM

## 2020-10-28 DIAGNOSIS — I20.9 AP (ANGINA PECTORIS): ICD-10-CM

## 2020-10-28 DIAGNOSIS — I36.1 NONRHEUMATIC TRICUSPID VALVE REGURGITATION: ICD-10-CM

## 2020-10-28 DIAGNOSIS — I48.0 PAROXYSMAL ATRIAL FIBRILLATION: Chronic | ICD-10-CM

## 2020-10-28 PROCEDURE — 36415 COLL VENOUS BLD VENIPUNCTURE: CPT | Mod: HCNC,PO

## 2020-10-28 PROCEDURE — 83880 ASSAY OF NATRIURETIC PEPTIDE: CPT | Mod: HCNC

## 2020-10-28 PROCEDURE — 80053 COMPREHEN METABOLIC PANEL: CPT | Mod: HCNC

## 2020-10-29 LAB
ALBUMIN SERPL BCP-MCNC: 4 G/DL (ref 3.5–5.2)
ALP SERPL-CCNC: 151 U/L (ref 55–135)
ALT SERPL W/O P-5'-P-CCNC: 19 U/L (ref 10–44)
ANION GAP SERPL CALC-SCNC: 11 MMOL/L (ref 8–16)
AST SERPL-CCNC: 33 U/L (ref 10–40)
BILIRUB SERPL-MCNC: 0.8 MG/DL (ref 0.1–1)
BNP SERPL-MCNC: 58 PG/ML (ref 0–99)
BUN SERPL-MCNC: 21 MG/DL (ref 8–23)
CALCIUM SERPL-MCNC: 10.3 MG/DL (ref 8.7–10.5)
CHLORIDE SERPL-SCNC: 99 MMOL/L (ref 95–110)
CO2 SERPL-SCNC: 28 MMOL/L (ref 23–29)
CREAT SERPL-MCNC: 1.1 MG/DL (ref 0.5–1.4)
EST. GFR  (AFRICAN AMERICAN): >60 ML/MIN/1.73 M^2
EST. GFR  (NON AFRICAN AMERICAN): >60 ML/MIN/1.73 M^2
GLUCOSE SERPL-MCNC: 115 MG/DL (ref 70–110)
POTASSIUM SERPL-SCNC: 3.8 MMOL/L (ref 3.5–5.1)
PROT SERPL-MCNC: 7.5 G/DL (ref 6–8.4)
SODIUM SERPL-SCNC: 138 MMOL/L (ref 136–145)

## 2020-11-04 ENCOUNTER — OFFICE VISIT (OUTPATIENT)
Dept: CARDIOLOGY | Facility: CLINIC | Age: 85
End: 2020-11-04
Payer: MEDICARE

## 2020-11-04 VITALS
OXYGEN SATURATION: 96 % | BODY MASS INDEX: 27.97 KG/M2 | HEART RATE: 77 BPM | SYSTOLIC BLOOD PRESSURE: 111 MMHG | DIASTOLIC BLOOD PRESSURE: 52 MMHG | WEIGHT: 189.38 LBS

## 2020-11-04 DIAGNOSIS — I11.0 HYPERTENSIVE HEART DISEASE WITH HEART FAILURE: ICD-10-CM

## 2020-11-04 DIAGNOSIS — I36.1 NONRHEUMATIC TRICUSPID VALVE REGURGITATION: ICD-10-CM

## 2020-11-04 DIAGNOSIS — I73.9 PERIPHERAL VASCULAR DISEASE: Chronic | ICD-10-CM

## 2020-11-04 DIAGNOSIS — I71.21 ANEURYSM OF ASCENDING AORTA: ICD-10-CM

## 2020-11-04 DIAGNOSIS — I73.9 CLAUDICATION IN PERIPHERAL VASCULAR DISEASE: Chronic | ICD-10-CM

## 2020-11-04 DIAGNOSIS — I35.0 NONRHEUMATIC AORTIC VALVE STENOSIS: ICD-10-CM

## 2020-11-04 DIAGNOSIS — I20.9 AP (ANGINA PECTORIS): ICD-10-CM

## 2020-11-04 DIAGNOSIS — Z78.9 STATIN INTOLERANCE: ICD-10-CM

## 2020-11-04 DIAGNOSIS — I10 ESSENTIAL HYPERTENSION: ICD-10-CM

## 2020-11-04 DIAGNOSIS — I50.32 CHRONIC DIASTOLIC HEART FAILURE: ICD-10-CM

## 2020-11-04 DIAGNOSIS — R06.02 SOB (SHORTNESS OF BREATH): ICD-10-CM

## 2020-11-04 DIAGNOSIS — E13.51 PERIPHERAL VASCULAR DISEASE DUE TO SECONDARY DIABETES MELLITUS: ICD-10-CM

## 2020-11-04 DIAGNOSIS — I77.9 CAROTID ARTERY DISEASE WITHOUT CEREBRAL INFARCTION: Chronic | ICD-10-CM

## 2020-11-04 DIAGNOSIS — E78.2 MIXED HYPERLIPIDEMIA: Chronic | ICD-10-CM

## 2020-11-04 DIAGNOSIS — I50.42 CHRONIC COMBINED SYSTOLIC AND DIASTOLIC CONGESTIVE HEART FAILURE: Primary | ICD-10-CM

## 2020-11-04 DIAGNOSIS — E66.3 OVERWEIGHT (BMI 25.0-29.9): ICD-10-CM

## 2020-11-04 DIAGNOSIS — I61.9 HEMORRHAGIC CEREBROVASCULAR ACCIDENT (CVA): ICD-10-CM

## 2020-11-04 DIAGNOSIS — I48.19 PERSISTENT ATRIAL FIBRILLATION: ICD-10-CM

## 2020-11-04 DIAGNOSIS — I25.708 CORONARY ARTERY DISEASE OF BYPASS GRAFT OF NATIVE HEART WITH STABLE ANGINA PECTORIS: ICD-10-CM

## 2020-11-04 DIAGNOSIS — I65.23 ASYMPTOMATIC STENOSIS OF BOTH CAROTID ARTERIES WITHOUT INFARCTION: ICD-10-CM

## 2020-11-04 DIAGNOSIS — Z45.018 BIVENTRICULAR PACEMAKER CHECK: ICD-10-CM

## 2020-11-04 PROCEDURE — 1101F PR PT FALLS ASSESS DOC 0-1 FALLS W/OUT INJ PAST YR: ICD-10-PCS | Mod: HCNC,CPTII,S$GLB, | Performed by: INTERNAL MEDICINE

## 2020-11-04 PROCEDURE — 99214 PR OFFICE/OUTPT VISIT, EST, LEVL IV, 30-39 MIN: ICD-10-PCS | Mod: HCNC,S$GLB,, | Performed by: INTERNAL MEDICINE

## 2020-11-04 PROCEDURE — 99214 OFFICE O/P EST MOD 30 MIN: CPT | Mod: HCNC,S$GLB,, | Performed by: INTERNAL MEDICINE

## 2020-11-04 PROCEDURE — 1159F MED LIST DOCD IN RCRD: CPT | Mod: HCNC,S$GLB,, | Performed by: INTERNAL MEDICINE

## 2020-11-04 PROCEDURE — 99999 PR PBB SHADOW E&M-EST. PATIENT-LVL II: CPT | Mod: PBBFAC,HCNC,, | Performed by: INTERNAL MEDICINE

## 2020-11-04 PROCEDURE — 99999 PR PBB SHADOW E&M-EST. PATIENT-LVL II: ICD-10-PCS | Mod: PBBFAC,HCNC,, | Performed by: INTERNAL MEDICINE

## 2020-11-04 PROCEDURE — 1159F PR MEDICATION LIST DOCUMENTED IN MEDICAL RECORD: ICD-10-PCS | Mod: HCNC,S$GLB,, | Performed by: INTERNAL MEDICINE

## 2020-11-04 PROCEDURE — 1101F PT FALLS ASSESS-DOCD LE1/YR: CPT | Mod: HCNC,CPTII,S$GLB, | Performed by: INTERNAL MEDICINE

## 2020-11-04 NOTE — PROGRESS NOTES
Subjective:    Patient ID:  Anthony Blair is a 87 y.o. male who presents for evaluation of Peripheral Arterial Disease, Hypertension, Hyperlipidemia, Coronary Artery Disease, Congestive Heart Failure, Atrial Fibrillation, and Carotid Artery Disease      HPI  pt presents for f/u.  His current medical conditions include combined CHF, PAF/PSVT, CAD (PTCA 1980's), HTN, PPM, PHTN, LVH, LAE, carotid artery disease, DM, atrial septal aneurysm/pfo, PAD, dyslipidemia. Nonsmoker.  Past details pertinent for following:   Statin intolerant.  s/p CABG 7/10 (LIMA-LAD, SVG-OM1, SVG-OM3, SVG-PDA) for increasing OLIVARES and multivessel CAD on [facility].   S/p PPM 10/10 for SSS/PAF. Was hospitalized 1/11 for PSVT (Atrial tachycardia) and was started on Amiodarone but was stopped for GI discomfort. He also did not tolerate Multaq and has not tolerated multiple statins.   s/p EPS/ablation of his atrial tachycardia 6/11.   S/p abd w runoff March 2015 and had significant B LE infrapopliteal disease. Atherectomy/pta performed of critical R tibeoperoneal trunk stenosis. Also has L tibeoperoneal trunk stenosis and his PTA/MIGUEL are occluded bilaterally.  Started on Eliquis Feb 2017 for suspicious left internal jugular vein thrombus.  Stress MPI 3/17 showed no ischemia.  Echo 3/17 showed normal LV function, left-right atrial shunt.   Carotid u/s 6/17 showed mild bilateral disease, known L IJ thrombus noted.  Pt called clinic Sept 2017 stated he had stopped Eliquis couple weeks before his call due to diarrhea, weakness, bloating and also off Amlodipine due to sleepiness.  He stated sxs subsided when he stopped the meds.   He was advised by cardiology on 9/6/17 to take 81 mg ASA since he stopped his Eliquis.  He had acute hemorrhage of basal ganglia right side Sept 2017, causing weakness on left side.  Tx at [facility] 9/24/17. He was on ASA daily when CVA occurred and was not on Eliquis. Also noted to have mild thoracic aneurysm 4.1 cm, 60% R ICA stenosis,  30% LICA stenosis, patent vertebral arteries but mod-severe distal left vertebral disease,  by CTA per PCP note.  He was taken off his asa.  Followed by Dr. Weaver, neurosurgery. No surgery was needed.  Echo showed normal LVEF.  Stress test 8/18 showed no ischemia, apical scar.    Echo 8/18 normal EF.  Echo 3/19 normal EF, LAE, LVH, DD, PHTN 76 mmHg, mild TR.  There was some consideration of restarting NOAC for a fib CVA protection after pt evaluated by EP service.  Started on Flecainide 6/20 by Dr. Grajeda.  I expressed reluctance to put pt back on anticoagulation in light of prior hemorrhagic CVA with residual deficits.  Now here.  S/p CRT device 8/20 with Dr. Grajeda.  He feels much better.  CHF improved.   Stable OLIVARES, improved.  No pnd/orthopnea.  BNP 10/20 now normal.  BP controlled.  No TIA/CVA sxs.  DM HGAIC slightly above goal.  Lipids overall stable.  Compliant w meds.  Weight overall stable.  Echo 7/20 EF 40%, DD, RVE, mild RV dysfunction, mod-severe TR, mild MR, LVH, mild AS, PFO.        Current Outpatient Medications:     ACCU-CHEK TOMAS PLUS TEST STRP Strp, TEST SIX TIMES A DAY, Disp: 100 strip, Rfl: 6    ACCU-CHEK MULTICLIX LANCET lancets, TEST BLOOD SUGAR THREE TIMES DAILY, Disp: 200 each, Rfl: 5    CARBOXYMETHYLCELLULOSE SODIUM (REFRESH OPHT), Apply to eye., Disp: , Rfl:     cefadroxil (DURICEF) 500 MG Cap, Take 1 capsule (500 mg total) by mouth every 12 (twelve) hours. Begin first dose 8am-8pm, Disp: 6 capsule, Rfl: 0    diclofenac sodium (VOLTAREN) 1 % Gel, Apply 2 g topically once daily., Disp: 100 g, Rfl: 2    flecainide (TAMBOCOR) 50 MG Tab, Take 1 tablet (50 mg total) by mouth every 12 (twelve) hours., Disp: 60 tablet, Rfl: 6    fluticasone propionate (FLONASE) 50 mcg/actuation nasal spray, USE TWO SPRAYS IN EACH NOSTRIL ONCE DAILY, Disp: 16 g, Rfl: 6    furosemide (LASIX) 20 MG tablet, Take 1 tablet (20 mg total) by mouth once daily., Disp: 30 tablet, Rfl: 11    furosemide  "(LASIX) 40 MG tablet, Take 1 tablet by mouth once daily., Disp: , Rfl:     guaiFENesin (MUCINEX) 600 mg 12 hr tablet, Take 1 tablet (600 mg total) by mouth 2 (two) times daily., Disp: , Rfl:     insulin (LANTUS SOLOSTAR U-100 INSULIN) glargine 100 units/mL (3mL) SubQ pen, INJECT 16 UNITS SUB-Q AT BEDTIME, Disp: 15 mL, Rfl: 11    insulin aspart U-100 (NOVOLOG FLEXPEN U-100 INSULIN) 100 unit/mL (3 mL) InPn pen, INJECT TEN UNITS AT BREAKFAST, EIGHT UNITS AT LUNCH AND TEN UNITS AT DINNER. 54 DAY SUPPLY, Disp: 15 mL, Rfl: 3    isosorbide mononitrate (IMDUR) 60 MG 24 hr tablet, Take 1 tablet (60 mg total) by mouth once daily., Disp: 30 tablet, Rfl: 11    losartan (COZAAR) 100 MG tablet, Take 1 tablet (100 mg total) by mouth once daily., Disp: 90 tablet, Rfl: 1    meclizine (ANTIVERT) 12.5 mg tablet, Take 1 tablet (12.5 mg total) by mouth 2 (two) times daily as needed., Disp: 30 tablet, Rfl: 0    metoprolol succinate (TOPROL-XL) 100 MG 24 hr tablet, Take 1 tablet (100 mg total) by mouth 2 (two) times daily., Disp: 60 tablet, Rfl: 12    nitroGLYCERIN (NITROSTAT) 0.4 MG SL tablet, Place 1 tablet (0.4 mg total) under the tongue every 5 (five) minutes as needed for Chest pain., Disp: 25 tablet, Rfl: 12    pantoprazole (PROTONIX) 40 MG tablet, TAKE ONE TABLET BY MOUTH EVERY DAY, Disp: 30 tablet, Rfl: 5    potassium chloride SA (K-DUR,KLOR-CON) 20 MEQ tablet, TAKE ONE TABLET BY MOUTH EVERY DAY, Disp: 30 tablet, Rfl: 12    saw palmetto 160 MG capsule, Take 160 mg by mouth 2 (two) times daily., Disp: , Rfl:     SURE COMFORT PEN NEEDLE 32 gauge x 5/32" Ndle, USE FOUR TIMES DAILY., Disp: 200 each, Rfl: 6      Review of Systems   Constitution: Negative.   HENT: Negative.    Eyes: Negative.    Cardiovascular: Negative.    Respiratory: Negative.    Endocrine: Negative.    Hematologic/Lymphatic: Negative.    Skin: Negative.    Musculoskeletal: Positive for arthritis and joint pain.   Gastrointestinal: Negative.  "   Genitourinary: Negative.    Neurological: Positive for weakness.   Psychiatric/Behavioral: Negative.    Allergic/Immunologic: Negative.        BP (!) 111/52 (BP Location: Right arm, Patient Position: Sitting, BP Method: Medium (Manual))   Pulse 77   Wt 85.9 kg (189 lb 6 oz)   SpO2 96%   BMI 27.97 kg/m²     Wt Readings from Last 3 Encounters:   11/04/20 85.9 kg (189 lb 6 oz)   09/24/20 83.7 kg (184 lb 8.4 oz)   09/14/20 84.5 kg (186 lb 4.6 oz)     Temp Readings from Last 3 Encounters:   08/11/20 98.8 °F (37.1 °C) (Temporal)   08/06/20 98.1 °F (36.7 °C) (Temporal)   08/01/20 97.8 °F (36.6 °C) (Oral)     BP Readings from Last 3 Encounters:   11/04/20 (!) 111/52   09/24/20 (!) 112/58   09/14/20 124/62     Pulse Readings from Last 3 Encounters:   11/04/20 77   09/24/20 72   09/14/20 80          Objective:    Physical Exam   Constitutional: He is oriented to person, place, and time. He appears well-developed and well-nourished.   HENT:   Head: Normocephalic.   Neck: Normal range of motion. Neck supple. Normal carotid pulses, no hepatojugular reflux and no JVD present. Carotid bruit is not present. No thyromegaly present.   Cardiovascular: Normal rate, regular rhythm, S1 normal and S2 normal. PMI is not displaced. Exam reveals no S3, no S4, no distant heart sounds, no friction rub, no midsystolic click and no opening snap.   No murmur heard.  Pulses:       Radial pulses are 2+ on the right side and 2+ on the left side.   Pulmonary/Chest: Effort normal and breath sounds normal. He has no wheezes. He has no rales.   Abdominal: Soft. Bowel sounds are normal. He exhibits no distension, no abdominal bruit, no ascites and no mass. There is no abdominal tenderness.   Musculoskeletal:         General: No edema.   Neurological: He is alert and oriented to person, place, and time.   Skin: Skin is warm.   Psychiatric: He has a normal mood and affect. His behavior is normal.   Nursing note and vitals reviewed.      I have  reviewed all pertinent labs and cardiac studies.    Component      Latest Ref Rng & Units 10/28/2020   BNP      0 - 99 pg/mL 58           Chemistry        Component Value Date/Time     10/28/2020 0946    K 3.8 10/28/2020 0946    CL 99 10/28/2020 0946    CO2 28 10/28/2020 0946    BUN 21 10/28/2020 0946    CREATININE 1.1 10/28/2020 0946     (H) 10/28/2020 0946        Component Value Date/Time    CALCIUM 10.3 10/28/2020 0946    ALKPHOS 151 (H) 10/28/2020 0946    AST 33 10/28/2020 0946    ALT 19 10/28/2020 0946    BILITOT 0.8 10/28/2020 0946    ESTGFRAFRICA >60.0 10/28/2020 0946    EGFRNONAA >60.0 10/28/2020 0946        Lab Results   Component Value Date    WBC 6.03 08/04/2020    HGB 13.9 (L) 08/04/2020    HCT 44.7 08/04/2020    MCV 97 08/04/2020     08/04/2020       Lab Results   Component Value Date    HGBA1C 7.5 (H) 08/04/2020     Lab Results   Component Value Date    CHOL 184 08/04/2020    CHOL 176 01/08/2020    CHOL 175 10/02/2019     Lab Results   Component Value Date    HDL 47 08/04/2020    HDL 52 01/08/2020    HDL 46 10/02/2019     Lab Results   Component Value Date    LDLCALC 115.8 08/04/2020    LDLCALC 110.6 01/08/2020    LDLCALC 104.6 10/02/2019     Lab Results   Component Value Date    TRIG 106 08/04/2020    TRIG 67 01/08/2020    TRIG 122 10/02/2019     Lab Results   Component Value Date    CHOLHDL 25.5 08/04/2020    CHOLHDL 29.5 01/08/2020    CHOLHDL 26.3 10/02/2019           Assessment:       1. Chronic combined systolic and diastolic congestive heart failure    2. Nonrheumatic tricuspid valve regurgitation    3. Statin intolerance    4. SOB (shortness of breath)    5. Persistent atrial fibrillation    6. Peripheral vascular disease due to secondary diabetes mellitus    7. Peripheral vascular disease    8. Overweight (BMI 25.0-29.9)    9. Mixed hyperlipidemia    10. Hypertensive heart disease with heart failure    11. Hemorrhagic cerebrovascular accident (CVA)    12. Essential  hypertension    13. Nonrheumatic aortic valve stenosis    14. Aneurysm of ascending aorta    15. AP (angina pectoris)    16. Asymptomatic stenosis of both carotid arteries without infarction    17. Carotid artery disease without cerebral infarction    18. Chronic diastolic heart failure    19. Claudication in peripheral vascular disease    20. Coronary artery disease of bypass graft of native heart with stable angina pectoris    21. Biventricular pacemaker check         Plan:             Stable cardiovascular conditions at present time on current medical treatment.  S/p CRT with good clinical results.  CHF well compensated right now.  Continue med tx for CAD/PAD.  Reviewed all tests and above medical conditions with patient in detail and formulated treatment plan.  Continue optimal medical treatment for cardiovascular conditions.  Cardiac low salt diet discussed.  Daily exercise encouraged, goal 30 +  minutes aerobic exercise as tolerated.  Maintaining healthy weight and weight loss goals (if needed) were discussed in clinic.  F/u PPM clinic.  Lower DM HGAIC to goal.  No changes in meds today.  F/u in 3 months.

## 2020-11-09 ENCOUNTER — CLINICAL SUPPORT (OUTPATIENT)
Dept: CARDIOLOGY | Facility: HOSPITAL | Age: 85
End: 2020-11-09
Payer: MEDICARE

## 2020-11-09 DIAGNOSIS — Z95.0 PRESENCE OF CARDIAC PACEMAKER: ICD-10-CM

## 2020-11-09 PROCEDURE — 93296 REM INTERROG EVL PM/IDS: CPT | Mod: HCNC,S$GLB | Performed by: INTERNAL MEDICINE

## 2020-11-09 PROCEDURE — 93294 CARDIAC DEVICE CHECK - REMOTE: ICD-10-PCS | Mod: HCNC,S$GLB,, | Performed by: INTERNAL MEDICINE

## 2020-11-09 PROCEDURE — 93294 REM INTERROG EVL PM/LDLS PM: CPT | Mod: HCNC,S$GLB,, | Performed by: INTERNAL MEDICINE

## 2020-11-11 ENCOUNTER — LAB VISIT (OUTPATIENT)
Dept: LAB | Facility: HOSPITAL | Age: 85
End: 2020-11-11
Attending: FAMILY MEDICINE
Payer: MEDICARE

## 2020-11-11 DIAGNOSIS — I50.32 CHRONIC DIASTOLIC HEART FAILURE: ICD-10-CM

## 2020-11-11 DIAGNOSIS — E08.51 DIABETES MELLITUS DUE TO UNDERLYING CONDITION WITH DIABETIC PERIPHERAL ANGIOPATHY WITHOUT GANGRENE, WITHOUT LONG-TERM CURRENT USE OF INSULIN: ICD-10-CM

## 2020-11-11 DIAGNOSIS — I10 ESSENTIAL HYPERTENSION: ICD-10-CM

## 2020-11-11 LAB
ALBUMIN SERPL BCP-MCNC: 3.7 G/DL (ref 3.5–5.2)
ALP SERPL-CCNC: 146 U/L (ref 55–135)
ALT SERPL W/O P-5'-P-CCNC: 30 U/L (ref 10–44)
ANION GAP SERPL CALC-SCNC: 7 MMOL/L (ref 8–16)
AST SERPL-CCNC: 35 U/L (ref 10–40)
BASOPHILS # BLD AUTO: 0.03 K/UL (ref 0–0.2)
BASOPHILS NFR BLD: 0.6 % (ref 0–1.9)
BILIRUB SERPL-MCNC: 0.8 MG/DL (ref 0.1–1)
BUN SERPL-MCNC: 27 MG/DL (ref 8–23)
CALCIUM SERPL-MCNC: 10.1 MG/DL (ref 8.7–10.5)
CHLORIDE SERPL-SCNC: 100 MMOL/L (ref 95–110)
CHOLEST SERPL-MCNC: 171 MG/DL (ref 120–199)
CHOLEST/HDLC SERPL: 3.4 {RATIO} (ref 2–5)
CO2 SERPL-SCNC: 32 MMOL/L (ref 23–29)
CREAT SERPL-MCNC: 1 MG/DL (ref 0.5–1.4)
DIFFERENTIAL METHOD: ABNORMAL
EOSINOPHIL # BLD AUTO: 0.2 K/UL (ref 0–0.5)
EOSINOPHIL NFR BLD: 2.9 % (ref 0–8)
ERYTHROCYTE [DISTWIDTH] IN BLOOD BY AUTOMATED COUNT: 13.6 % (ref 11.5–14.5)
EST. GFR  (AFRICAN AMERICAN): >60 ML/MIN/1.73 M^2
EST. GFR  (NON AFRICAN AMERICAN): >60 ML/MIN/1.73 M^2
GLUCOSE SERPL-MCNC: 122 MG/DL (ref 70–110)
HCT VFR BLD AUTO: 41.6 % (ref 40–54)
HDLC SERPL-MCNC: 50 MG/DL (ref 40–75)
HDLC SERPL: 29.2 % (ref 20–50)
HGB BLD-MCNC: 13.2 G/DL (ref 14–18)
IMM GRANULOCYTES # BLD AUTO: 0.01 K/UL (ref 0–0.04)
IMM GRANULOCYTES NFR BLD AUTO: 0.2 % (ref 0–0.5)
LDLC SERPL CALC-MCNC: 102.6 MG/DL (ref 63–159)
LYMPHOCYTES # BLD AUTO: 1.8 K/UL (ref 1–4.8)
LYMPHOCYTES NFR BLD: 34.3 % (ref 18–48)
MCH RBC QN AUTO: 31.1 PG (ref 27–31)
MCHC RBC AUTO-ENTMCNC: 31.7 G/DL (ref 32–36)
MCV RBC AUTO: 98 FL (ref 82–98)
MONOCYTES # BLD AUTO: 0.5 K/UL (ref 0.3–1)
MONOCYTES NFR BLD: 9.4 % (ref 4–15)
NEUTROPHILS # BLD AUTO: 2.7 K/UL (ref 1.8–7.7)
NEUTROPHILS NFR BLD: 52.6 % (ref 38–73)
NONHDLC SERPL-MCNC: 121 MG/DL
NRBC BLD-RTO: 0 /100 WBC
PLATELET # BLD AUTO: 121 K/UL (ref 150–350)
PMV BLD AUTO: 13 FL (ref 9.2–12.9)
POTASSIUM SERPL-SCNC: 4.2 MMOL/L (ref 3.5–5.1)
PROT SERPL-MCNC: 7.1 G/DL (ref 6–8.4)
RBC # BLD AUTO: 4.24 M/UL (ref 4.6–6.2)
SODIUM SERPL-SCNC: 139 MMOL/L (ref 136–145)
TRIGL SERPL-MCNC: 92 MG/DL (ref 30–150)
WBC # BLD AUTO: 5.1 K/UL (ref 3.9–12.7)

## 2020-11-11 PROCEDURE — 83036 HEMOGLOBIN GLYCOSYLATED A1C: CPT | Mod: HCNC

## 2020-11-11 PROCEDURE — 36415 COLL VENOUS BLD VENIPUNCTURE: CPT | Mod: HCNC,PO

## 2020-11-11 PROCEDURE — 85025 COMPLETE CBC W/AUTO DIFF WBC: CPT | Mod: HCNC

## 2020-11-11 PROCEDURE — 80053 COMPREHEN METABOLIC PANEL: CPT | Mod: HCNC

## 2020-11-11 PROCEDURE — 80061 LIPID PANEL: CPT | Mod: HCNC

## 2020-11-12 LAB
ESTIMATED AVG GLUCOSE: 137 MG/DL (ref 68–131)
HBA1C MFR BLD HPLC: 6.4 % (ref 4–5.6)

## 2020-11-16 ENCOUNTER — CLINICAL SUPPORT (OUTPATIENT)
Dept: CARDIOLOGY | Facility: HOSPITAL | Age: 85
End: 2020-11-16
Attending: INTERNAL MEDICINE
Payer: MEDICARE

## 2020-11-16 DIAGNOSIS — Z95.0 CARDIAC PACEMAKER IN SITU: ICD-10-CM

## 2020-11-16 DIAGNOSIS — I49.5 SSS (SICK SINUS SYNDROME): ICD-10-CM

## 2020-11-16 PROCEDURE — 93281 PM DEVICE PROGR EVAL MULTI: CPT | Mod: 26,HCNC,, | Performed by: INTERNAL MEDICINE

## 2020-11-16 PROCEDURE — 93281 CARDIAC DEVICE CHECK - IN CLINIC & HOSPITAL: ICD-10-PCS | Mod: 26,HCNC,, | Performed by: INTERNAL MEDICINE

## 2020-11-16 PROCEDURE — 93281 PM DEVICE PROGR EVAL MULTI: CPT | Mod: HCNC

## 2020-11-16 PROCEDURE — 99999 PR PBB SHADOW E&M-EST. PATIENT-LVL I: ICD-10-PCS | Mod: PBBFAC,HCNC,,

## 2020-11-16 PROCEDURE — 99999 PR PBB SHADOW E&M-EST. PATIENT-LVL I: CPT | Mod: PBBFAC,HCNC,,

## 2020-11-18 ENCOUNTER — OFFICE VISIT (OUTPATIENT)
Dept: FAMILY MEDICINE | Facility: CLINIC | Age: 85
End: 2020-11-18
Payer: MEDICARE

## 2020-11-18 VITALS
HEART RATE: 70 BPM | BODY MASS INDEX: 28.03 KG/M2 | TEMPERATURE: 98 F | SYSTOLIC BLOOD PRESSURE: 128 MMHG | WEIGHT: 189.25 LBS | DIASTOLIC BLOOD PRESSURE: 72 MMHG | OXYGEN SATURATION: 98 % | HEIGHT: 69 IN

## 2020-11-18 DIAGNOSIS — E11.9 DIABETES MELLITUS TYPE 2 WITHOUT RETINOPATHY: Chronic | ICD-10-CM

## 2020-11-18 DIAGNOSIS — Z00.00 WELL ADULT EXAM: Primary | ICD-10-CM

## 2020-11-18 DIAGNOSIS — K74.60 CIRRHOSIS OF LIVER WITHOUT ASCITES, UNSPECIFIED HEPATIC CIRRHOSIS TYPE: ICD-10-CM

## 2020-11-18 DIAGNOSIS — I50.42 CHRONIC COMBINED SYSTOLIC AND DIASTOLIC CONGESTIVE HEART FAILURE: ICD-10-CM

## 2020-11-18 PROCEDURE — 1101F PR PT FALLS ASSESS DOC 0-1 FALLS W/OUT INJ PAST YR: ICD-10-PCS | Mod: HCNC,CPTII,S$GLB, | Performed by: FAMILY MEDICINE

## 2020-11-18 PROCEDURE — 3288F FALL RISK ASSESSMENT DOCD: CPT | Mod: HCNC,CPTII,S$GLB, | Performed by: FAMILY MEDICINE

## 2020-11-18 PROCEDURE — 1126F AMNT PAIN NOTED NONE PRSNT: CPT | Mod: HCNC,S$GLB,, | Performed by: FAMILY MEDICINE

## 2020-11-18 PROCEDURE — 3288F PR FALLS RISK ASSESSMENT DOCUMENTED: ICD-10-PCS | Mod: HCNC,CPTII,S$GLB, | Performed by: FAMILY MEDICINE

## 2020-11-18 PROCEDURE — 1157F PR ADVANCE CARE PLAN OR EQUIV PRESENT IN MEDICAL RECORD: ICD-10-PCS | Mod: HCNC,S$GLB,, | Performed by: FAMILY MEDICINE

## 2020-11-18 PROCEDURE — 1157F ADVNC CARE PLAN IN RCRD: CPT | Mod: HCNC,S$GLB,, | Performed by: FAMILY MEDICINE

## 2020-11-18 PROCEDURE — 99999 PR PBB SHADOW E&M-EST. PATIENT-LVL V: ICD-10-PCS | Mod: PBBFAC,HCNC,, | Performed by: FAMILY MEDICINE

## 2020-11-18 PROCEDURE — 99397 PR PREVENTIVE VISIT,EST,65 & OVER: ICD-10-PCS | Mod: HCNC,S$GLB,, | Performed by: FAMILY MEDICINE

## 2020-11-18 PROCEDURE — 99397 PER PM REEVAL EST PAT 65+ YR: CPT | Mod: HCNC,S$GLB,, | Performed by: FAMILY MEDICINE

## 2020-11-18 PROCEDURE — 1101F PT FALLS ASSESS-DOCD LE1/YR: CPT | Mod: HCNC,CPTII,S$GLB, | Performed by: FAMILY MEDICINE

## 2020-11-18 PROCEDURE — 99999 PR PBB SHADOW E&M-EST. PATIENT-LVL V: CPT | Mod: PBBFAC,HCNC,, | Performed by: FAMILY MEDICINE

## 2020-11-18 PROCEDURE — 1126F PR PAIN SEVERITY QUANTIFIED, NO PAIN PRESENT: ICD-10-PCS | Mod: HCNC,S$GLB,, | Performed by: FAMILY MEDICINE

## 2020-11-18 NOTE — PROGRESS NOTES
Chief Complaint:    Chief Complaint   Patient presents with    Follow-up       History of Present Illness:  Presents today for three-month follow-up:  A1c slightly up he is not moving very much recently had pacemaker surgery.  Using about 16 units of Lantus and mealtime insulin.  He has an changes not very much    Blood pressure is stable    Also has carotid stenosis following with Cardiology with serial imaging       He says he gets swelling in his legs and abdominal fullness and shortness of breath if this when he doubles up on his Lasix and does help he does have systolic heart failure.  He also has cirrhosis of the liver without ascites.  Last ultrasound did not show any fluid in the abdomen.  ROS:  Review of Systems   Constitutional: Negative for activity change, chills, fatigue, fever and unexpected weight change.   HENT: Negative for congestion, ear discharge, ear pain, hearing loss, postnasal drip and rhinorrhea.    Eyes: Negative for pain and visual disturbance.   Respiratory: Negative for cough, chest tightness and shortness of breath.    Cardiovascular: Negative for chest pain and palpitations.   Gastrointestinal: Negative for abdominal pain, diarrhea and vomiting.   Endocrine: Negative for heat intolerance.   Genitourinary: Negative for dysuria, flank pain, frequency and hematuria.   Musculoskeletal: Negative for back pain, gait problem and neck pain.   Skin: Negative for color change and rash.   Neurological: Negative for dizziness, tremors, seizures, numbness and headaches.   Psychiatric/Behavioral: Negative for agitation, hallucinations, self-injury, sleep disturbance and suicidal ideas. The patient is not nervous/anxious.        Past Medical History:   Diagnosis Date    A-fib     Anemia     AP (angina pectoris) 1/23/2014    Carotid artery disease without cerebral infarction 8/22/2014    Cataract     Cirrhosis of liver 9/10/2020    Coronary artery disease     Diabetes mellitus     DM  (diabetes mellitus) 1970     am 2020    GERD (gastroesophageal reflux disease) 2013    Hemorrhagic cerebrovascular accident (CVA) 2018    Hyperlipidemia     Hypertension     Hypertensive heart disease with heart failure 3/12/2019    Intracranial hemorrhage     Lumbosacral spondylosis 2014    Pacemaker 2014    Paroxysmal atrial fibrillation 2014    Peripheral vascular disease 2014    Pneumonia of right lung due to infectious organism 3/27/2019    Seizure, late effect of stroke     Stroke     Type 2 diabetes mellitus with ophthalmic manifestations        Social History:  Social History     Socioeconomic History    Marital status:      Spouse name: Not on file    Number of children: 3    Years of education: Not on file    Highest education level: GED or equivalent   Occupational History    Not on file   Social Needs    Financial resource strain: Somewhat hard    Food insecurity     Worry: Never true     Inability: Never true    Transportation needs     Medical: No     Non-medical: No   Tobacco Use    Smoking status: Former Smoker     Packs/day: 2.00     Years: 13.00     Pack years: 26.00     Types: Cigarettes     Start date: 1954     Quit date: 1967     Years since quittin.4    Smokeless tobacco: Never Used   Substance and Sexual Activity    Alcohol use: No    Drug use: No    Sexual activity: Never     Partners: Female     Birth control/protection: None   Lifestyle    Physical activity     Days per week: 0 days     Minutes per session: 0 min    Stress: Not at all   Relationships    Social connections     Talks on phone: More than three times a week     Gets together: Twice a week     Attends Sikh service: More than 4 times per year     Active member of club or organization: No     Attends meetings of clubs or organizations: Never     Relationship status:    Other Topics Concern    Not on file   Social History  "Narrative    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       Family History:   family history includes Alcohol abuse in his paternal uncle; Arthritis in his father and mother; Cancer in his daughter and sister; Diabetes in his brother, daughter, father, mother, sister, son, and son; Fibromyalgia in his daughter; Heart disease in his brother, mother, and sister; Kidney disease in his brother and mother; Miscarriages / Stillbirths in his daughter.    Health Maintenance   Topic Date Due    Hemoglobin A1c  05/11/2021    Eye Exam  07/06/2021    Foot Exam  09/10/2021    Lipid Panel  11/11/2021    TETANUS VACCINE  01/23/2024    Pneumococcal Vaccine (65+ Low/Medium Risk)  Completed    Aspirin/Antiplatelet Therapy  Discontinued       Physical Exam:    Vital Signs  Temp: 98.2 °F (36.8 °C)  Temp src: Temporal  Pulse: 70  SpO2: 98 %  BP: 128/72  BP Location: Left arm  Patient Position: Sitting  Pain Score: 0-No pain  Height and Weight  Height: 5' 9" (175.3 cm)  Weight: 85.9 kg (189 lb 4.2 oz)  BSA (Calculated - sq m): 2.04 sq meters  BMI (Calculated): 27.9  Weight in (lb) to have BMI = 25: 168.9]    Body mass index is 27.95 kg/m².    Physical Exam  Constitutional:       Appearance: He is well-developed.   Eyes:      Conjunctiva/sclera: Conjunctivae normal.      Pupils: Pupils are equal, round, and reactive to light.   Neck:      Musculoskeletal: Normal range of motion and neck supple.   Cardiovascular:      Rate and Rhythm: Normal rate and regular rhythm.      Pulses:           Dorsalis pedis pulses are 0 on the right side and 0 on the left side.        Posterior tibial pulses are 0 on the right side and 0 on the left side.      Heart sounds: Normal heart sounds. No murmur.   Pulmonary:      Effort: Pulmonary effort is normal. No respiratory distress.      Breath sounds: Normal breath sounds. No wheezing or rales.   Chest:      Chest wall: No tenderness.   Abdominal:      General: There is no " distension.      Palpations: Abdomen is soft. There is no mass.      Tenderness: There is no abdominal tenderness. There is no guarding.   Musculoskeletal:         General: Swelling present. No tenderness.   Feet:      Right foot:      Protective Sensation: 10 sites tested. 10 sites sensed.      Left foot:      Protective Sensation: 10 sites tested. 10 sites sensed.   Lymphadenopathy:      Cervical: No cervical adenopathy.   Skin:     General: Skin is warm and dry.   Neurological:      Mental Status: He is alert and oriented to person, place, and time.      Deep Tendon Reflexes: Reflexes are normal and symmetric.   Psychiatric:         Behavior: Behavior normal.         Thought Content: Thought content normal.         Judgment: Judgment normal.           Diabetes Management Status    Statin: Not taking  ACE/ARB: Taking    Screening or Prevention Patient's value Goal Complete/Controlled?   HgA1C Testing and Control   Lab Results   Component Value Date    HGBA1C 6.4 (H) 11/11/2020      Annually/Less than 8% Yes   Lipid profile : 11/11/2020 Annually Yes   LDL control Lab Results   Component Value Date    LDLCALC 102.6 11/11/2020    Annually/Less than 100 mg/dl  No   Nephropathy screening Lab Results   Component Value Date    LABMICR 10.0 11/11/2020     Lab Results   Component Value Date    PROTEINUA Negative 08/01/2020    Annually Yes   Blood pressure BP Readings from Last 1 Encounters:   11/18/20 128/72    Less than 140/90 Yes   Dilated retinal exam : 07/06/2020 Annually Yes   Foot exam   : 09/10/2020 Annually Yes       Assessment:      ICD-10-CM ICD-9-CM   1. Well adult exam  Z00.00 V70.0   2. Chronic combined systolic and diastolic congestive heart failure  I50.42 428.42     428.0   3. Diabetes mellitus type 2 without retinopathy  E11.9 250.00   4. Cirrhosis of liver without ascites, unspecified hepatic cirrhosis type  K74.60 571.5         Plan:        He will take Lasix double up ft 2-3 days or more depending on  the swelling.  Other medical problems stable  Follow-up 3 months      Orders Placed This Encounter   Procedures    Hemoglobin A1C    Comprehensive Metabolic Panel    CBC Auto Differential    Microalbumin/Creatinine Ratio, Urine    Lipid Panel       Current Outpatient Medications   Medication Sig Dispense Refill    ACCU-CHEK TOMAS PLUS TEST STRP Strp TEST SIX TIMES A  strip 6    ACCU-CHEK MULTICLIX LANCET lancets TEST BLOOD SUGAR THREE TIMES DAILY 200 each 5    CARBOXYMETHYLCELLULOSE SODIUM (REFRESH OPHT) Apply to eye.      diclofenac sodium (VOLTAREN) 1 % Gel Apply 2 g topically once daily. 100 g 2    flecainide (TAMBOCOR) 50 MG Tab Take 1 tablet (50 mg total) by mouth every 12 (twelve) hours. 60 tablet 6    fluticasone propionate (FLONASE) 50 mcg/actuation nasal spray USE TWO SPRAYS IN EACH NOSTRIL ONCE DAILY 16 g 6    furosemide (LASIX) 20 MG tablet Take 1 tablet (20 mg total) by mouth once daily. 30 tablet 11    guaiFENesin (MUCINEX) 600 mg 12 hr tablet Take 1 tablet (600 mg total) by mouth 2 (two) times daily.      insulin (LANTUS SOLOSTAR U-100 INSULIN) glargine 100 units/mL (3mL) SubQ pen INJECT 16 UNITS SUB-Q AT BEDTIME 15 mL 11    insulin aspart U-100 (NOVOLOG FLEXPEN U-100 INSULIN) 100 unit/mL (3 mL) InPn pen INJECT TEN UNITS AT BREAKFAST, EIGHT UNITS AT LUNCH AND TEN UNITS AT DINNER. 54 DAY SUPPLY 15 mL 3    isosorbide mononitrate (IMDUR) 60 MG 24 hr tablet Take 1 tablet (60 mg total) by mouth once daily. 30 tablet 11    losartan (COZAAR) 100 MG tablet Take 1 tablet (100 mg total) by mouth once daily. 90 tablet 1    meclizine (ANTIVERT) 12.5 mg tablet Take 1 tablet (12.5 mg total) by mouth 2 (two) times daily as needed. 30 tablet 0    metoprolol succinate (TOPROL-XL) 100 MG 24 hr tablet Take 1 tablet (100 mg total) by mouth 2 (two) times daily. 60 tablet 12    nitroGLYCERIN (NITROSTAT) 0.4 MG SL tablet Place 1 tablet (0.4 mg total) under the tongue every 5 (five) minutes as  "needed for Chest pain. 25 tablet 12    pantoprazole (PROTONIX) 40 MG tablet TAKE ONE TABLET BY MOUTH EVERY DAY 30 tablet 5    potassium chloride SA (K-DUR,KLOR-CON) 20 MEQ tablet TAKE ONE TABLET BY MOUTH EVERY DAY 30 tablet 12    saw palmetto 160 MG capsule Take 160 mg by mouth 2 (two) times daily.      SURE COMFORT PEN NEEDLE 32 gauge x 5/32" Ndle USE FOUR TIMES DAILY. 200 each 6     No current facility-administered medications for this visit.        Medications Discontinued During This Encounter   Medication Reason    cefadroxil (DURICEF) 500 MG Cap Therapy completed    furosemide (LASIX) 40 MG tablet Patient no longer taking       No follow-ups on file.      Abdulaziz Mccabe MD                    "

## 2020-11-19 LAB
BATTERY VOLTAGE (V): 2.99 V
IMPEDANCE RA LEAD (NATIVE): 410 OHMS
IMPEDANCE RA LEAD: 600 OHMS
OHS CV DC PP MS1: 0.3 MS
OHS CV DC PP MS2: 0.4 MS
OHS CV DC PP V1: 2.5 V
OHS CV DC PP V2: NORMAL V
P/R-WAVE RA LEAD: 12 MV
THRESHOLD MS RA LEAD (NATIVE): 0.4 MS
THRESHOLD MS RA LEAD: 0.3 MS
THRESHOLD V RA LEAD (NATIVE): 0.5 V
THRESHOLD V RA LEAD: 1.25 V
VV DELAY: 80 MSEC

## 2020-12-11 ENCOUNTER — PATIENT MESSAGE (OUTPATIENT)
Dept: OTHER | Facility: OTHER | Age: 85
End: 2020-12-11

## 2020-12-21 ENCOUNTER — HOSPITAL ENCOUNTER (OUTPATIENT)
Dept: CARDIOLOGY | Facility: HOSPITAL | Age: 85
Discharge: HOME | End: 2020-12-21
Attending: INTERNAL MEDICINE
Payer: MEDICARE

## 2020-12-21 DIAGNOSIS — I49.5 SSS (SICK SINUS SYNDROME): ICD-10-CM

## 2020-12-21 DIAGNOSIS — Z95.0 CARDIAC PACEMAKER IN SITU: ICD-10-CM

## 2020-12-21 PROCEDURE — 93281 PM DEVICE PROGR EVAL MULTI: CPT | Mod: HCNC

## 2020-12-21 PROCEDURE — 93281 PM DEVICE PROGR EVAL MULTI: CPT | Mod: 26,HCNC,, | Performed by: INTERNAL MEDICINE

## 2020-12-21 PROCEDURE — 93281 CARDIAC DEVICE CHECK - IN CLINIC & HOSPITAL: ICD-10-PCS | Mod: 26,HCNC,, | Performed by: INTERNAL MEDICINE

## 2020-12-23 LAB
BATTERY VOLTAGE (V): 3.01 V
IMPEDANCE RA LEAD (NATIVE): 410 OHMS
IMPEDANCE RA LEAD: 600 OHMS
OHS CV DC PP MS1: 0.3 MS
OHS CV DC PP MS2: 0.4 MS
OHS CV DC PP V1: 2.5 V
OHS CV DC PP V2: NORMAL V
P/R-WAVE RA LEAD: 12 MV
THRESHOLD MS RA LEAD (NATIVE): 0.4 MS
THRESHOLD MS RA LEAD: 0.3 MS
THRESHOLD V RA LEAD (NATIVE): 0.5 V
THRESHOLD V RA LEAD: 1.25 V
VV DELAY: 80 MSEC

## 2021-01-25 DIAGNOSIS — I25.810 CORONARY ARTERY DISEASE INVOLVING CORONARY BYPASS GRAFT OF NATIVE HEART WITHOUT ANGINA PECTORIS: ICD-10-CM

## 2021-01-25 RX ORDER — ISOSORBIDE MONONITRATE 60 MG/1
60 TABLET, EXTENDED RELEASE ORAL DAILY
Qty: 30 TABLET | Refills: 11 | Status: ON HOLD | OUTPATIENT
Start: 2021-01-25 | End: 2021-09-21 | Stop reason: HOSPADM

## 2021-02-07 ENCOUNTER — CLINICAL SUPPORT (OUTPATIENT)
Dept: CARDIOLOGY | Facility: HOSPITAL | Age: 86
End: 2021-02-07
Payer: MEDICARE

## 2021-02-07 DIAGNOSIS — Z95.0 PRESENCE OF CARDIAC PACEMAKER: ICD-10-CM

## 2021-02-07 PROCEDURE — 93296 REM INTERROG EVL PM/IDS: CPT | Performed by: INTERNAL MEDICINE

## 2021-02-07 PROCEDURE — 93294 REM INTERROG EVL PM/LDLS PM: CPT | Mod: ,,, | Performed by: INTERNAL MEDICINE

## 2021-02-07 PROCEDURE — 93294 CARDIAC DEVICE CHECK - REMOTE: ICD-10-PCS | Mod: ,,, | Performed by: INTERNAL MEDICINE

## 2021-02-10 ENCOUNTER — OFFICE VISIT (OUTPATIENT)
Dept: CARDIOLOGY | Facility: CLINIC | Age: 86
End: 2021-02-10
Payer: MEDICARE

## 2021-02-10 VITALS
DIASTOLIC BLOOD PRESSURE: 64 MMHG | WEIGHT: 193 LBS | HEART RATE: 83 BPM | BODY MASS INDEX: 28.58 KG/M2 | SYSTOLIC BLOOD PRESSURE: 140 MMHG | HEIGHT: 69 IN | OXYGEN SATURATION: 96 %

## 2021-02-10 DIAGNOSIS — I71.21 ANEURYSM OF ASCENDING AORTA: ICD-10-CM

## 2021-02-10 DIAGNOSIS — I11.0 HYPERTENSIVE HEART DISEASE WITH HEART FAILURE: ICD-10-CM

## 2021-02-10 DIAGNOSIS — E08.51 DIABETES MELLITUS DUE TO UNDERLYING CONDITION WITH DIABETIC PERIPHERAL ANGIOPATHY WITHOUT GANGRENE, WITHOUT LONG-TERM CURRENT USE OF INSULIN: ICD-10-CM

## 2021-02-10 DIAGNOSIS — R06.02 SOB (SHORTNESS OF BREATH): ICD-10-CM

## 2021-02-10 DIAGNOSIS — Z78.9 STATIN INTOLERANCE: ICD-10-CM

## 2021-02-10 DIAGNOSIS — I10 ESSENTIAL HYPERTENSION: ICD-10-CM

## 2021-02-10 DIAGNOSIS — I50.32 CHRONIC DIASTOLIC HEART FAILURE: ICD-10-CM

## 2021-02-10 DIAGNOSIS — I20.9 AP (ANGINA PECTORIS): ICD-10-CM

## 2021-02-10 DIAGNOSIS — I73.9 CLAUDICATION IN PERIPHERAL VASCULAR DISEASE: Chronic | ICD-10-CM

## 2021-02-10 DIAGNOSIS — E78.2 MIXED HYPERLIPIDEMIA: Chronic | ICD-10-CM

## 2021-02-10 DIAGNOSIS — I73.9 PERIPHERAL VASCULAR DISEASE: Chronic | ICD-10-CM

## 2021-02-10 DIAGNOSIS — I48.19 PERSISTENT ATRIAL FIBRILLATION: ICD-10-CM

## 2021-02-10 DIAGNOSIS — I36.1 NONRHEUMATIC TRICUSPID VALVE REGURGITATION: ICD-10-CM

## 2021-02-10 DIAGNOSIS — I25.708 CORONARY ARTERY DISEASE OF BYPASS GRAFT OF NATIVE HEART WITH STABLE ANGINA PECTORIS: Primary | ICD-10-CM

## 2021-02-10 DIAGNOSIS — Z45.018 BIVENTRICULAR PACEMAKER CHECK: ICD-10-CM

## 2021-02-10 DIAGNOSIS — E13.51 PERIPHERAL VASCULAR DISEASE DUE TO SECONDARY DIABETES MELLITUS: ICD-10-CM

## 2021-02-10 DIAGNOSIS — I50.42 CHRONIC COMBINED SYSTOLIC AND DIASTOLIC CONGESTIVE HEART FAILURE: ICD-10-CM

## 2021-02-10 DIAGNOSIS — I65.23 ASYMPTOMATIC STENOSIS OF BOTH CAROTID ARTERIES WITHOUT INFARCTION: ICD-10-CM

## 2021-02-10 DIAGNOSIS — I48.0 PAROXYSMAL ATRIAL FIBRILLATION: Chronic | ICD-10-CM

## 2021-02-10 DIAGNOSIS — I35.0 NONRHEUMATIC AORTIC VALVE STENOSIS: ICD-10-CM

## 2021-02-10 DIAGNOSIS — I61.9 HEMORRHAGIC CEREBROVASCULAR ACCIDENT (CVA): ICD-10-CM

## 2021-02-10 DIAGNOSIS — I77.9 CAROTID ARTERY DISEASE WITHOUT CEREBRAL INFARCTION: Chronic | ICD-10-CM

## 2021-02-10 DIAGNOSIS — E66.3 OVERWEIGHT (BMI 25.0-29.9): ICD-10-CM

## 2021-02-10 DIAGNOSIS — I82.890 JUGULAR VEIN THROMBOSIS: ICD-10-CM

## 2021-02-10 PROBLEM — Z45.010 PACEMAKER GENERATOR END OF LIFE: Status: RESOLVED | Noted: 2020-08-06 | Resolved: 2021-02-10

## 2021-02-10 PROBLEM — T82.110A PACEMAKER LEAD MALFUNCTION: Status: RESOLVED | Noted: 2020-07-27 | Resolved: 2021-02-10

## 2021-02-10 PROCEDURE — 1157F PR ADVANCE CARE PLAN OR EQUIV PRESENT IN MEDICAL RECORD: ICD-10-PCS | Mod: S$GLB,,, | Performed by: INTERNAL MEDICINE

## 2021-02-10 PROCEDURE — 99999 PR PBB SHADOW E&M-EST. PATIENT-LVL III: ICD-10-PCS | Mod: PBBFAC,,, | Performed by: INTERNAL MEDICINE

## 2021-02-10 PROCEDURE — 1157F ADVNC CARE PLAN IN RCRD: CPT | Mod: S$GLB,,, | Performed by: INTERNAL MEDICINE

## 2021-02-10 PROCEDURE — 99499 UNLISTED E&M SERVICE: CPT | Mod: S$GLB,,, | Performed by: INTERNAL MEDICINE

## 2021-02-10 PROCEDURE — 99999 PR PBB SHADOW E&M-EST. PATIENT-LVL III: CPT | Mod: PBBFAC,,, | Performed by: INTERNAL MEDICINE

## 2021-02-10 PROCEDURE — 1159F PR MEDICATION LIST DOCUMENTED IN MEDICAL RECORD: ICD-10-PCS | Mod: S$GLB,,, | Performed by: INTERNAL MEDICINE

## 2021-02-10 PROCEDURE — 99499 RISK ADDL DX/OHS AUDIT: ICD-10-PCS | Mod: S$GLB,,, | Performed by: INTERNAL MEDICINE

## 2021-02-10 PROCEDURE — 99214 OFFICE O/P EST MOD 30 MIN: CPT | Mod: S$GLB,,, | Performed by: INTERNAL MEDICINE

## 2021-02-10 PROCEDURE — 1159F MED LIST DOCD IN RCRD: CPT | Mod: S$GLB,,, | Performed by: INTERNAL MEDICINE

## 2021-02-10 PROCEDURE — 1126F AMNT PAIN NOTED NONE PRSNT: CPT | Mod: S$GLB,,, | Performed by: INTERNAL MEDICINE

## 2021-02-10 PROCEDURE — 99214 PR OFFICE/OUTPT VISIT, EST, LEVL IV, 30-39 MIN: ICD-10-PCS | Mod: S$GLB,,, | Performed by: INTERNAL MEDICINE

## 2021-02-10 PROCEDURE — 1126F PR PAIN SEVERITY QUANTIFIED, NO PAIN PRESENT: ICD-10-PCS | Mod: S$GLB,,, | Performed by: INTERNAL MEDICINE

## 2021-02-14 NOTE — ED NOTES
Pt resting quietly on stretcher - no complaints of pain or SOB at this time.  Pt cont on monitor in paced rhythm.  Family at bedside. Pt informed of need for additional blood work and POC for pneumonia.     Satisfactory

## 2021-02-18 ENCOUNTER — LAB VISIT (OUTPATIENT)
Dept: LAB | Facility: HOSPITAL | Age: 86
End: 2021-02-18
Attending: FAMILY MEDICINE
Payer: MEDICARE

## 2021-02-18 DIAGNOSIS — E11.9 DIABETES MELLITUS TYPE 2 WITHOUT RETINOPATHY: Chronic | ICD-10-CM

## 2021-02-18 DIAGNOSIS — I50.42 CHRONIC COMBINED SYSTOLIC AND DIASTOLIC CONGESTIVE HEART FAILURE: ICD-10-CM

## 2021-02-18 DIAGNOSIS — Z00.00 WELL ADULT EXAM: ICD-10-CM

## 2021-02-18 LAB
BASOPHILS # BLD AUTO: 0.03 K/UL (ref 0–0.2)
BASOPHILS NFR BLD: 0.6 % (ref 0–1.9)
DIFFERENTIAL METHOD: ABNORMAL
EOSINOPHIL # BLD AUTO: 0.2 K/UL (ref 0–0.5)
EOSINOPHIL NFR BLD: 2.9 % (ref 0–8)
ERYTHROCYTE [DISTWIDTH] IN BLOOD BY AUTOMATED COUNT: 13.5 % (ref 11.5–14.5)
HCT VFR BLD AUTO: 44 % (ref 40–54)
HGB BLD-MCNC: 14 G/DL (ref 14–18)
IMM GRANULOCYTES # BLD AUTO: 0.01 K/UL (ref 0–0.04)
IMM GRANULOCYTES NFR BLD AUTO: 0.2 % (ref 0–0.5)
LYMPHOCYTES # BLD AUTO: 1.8 K/UL (ref 1–4.8)
LYMPHOCYTES NFR BLD: 34 % (ref 18–48)
MCH RBC QN AUTO: 31.5 PG (ref 27–31)
MCHC RBC AUTO-ENTMCNC: 31.8 G/DL (ref 32–36)
MCV RBC AUTO: 99 FL (ref 82–98)
MONOCYTES # BLD AUTO: 0.6 K/UL (ref 0.3–1)
MONOCYTES NFR BLD: 11.5 % (ref 4–15)
NEUTROPHILS # BLD AUTO: 2.6 K/UL (ref 1.8–7.7)
NEUTROPHILS NFR BLD: 50.8 % (ref 38–73)
NRBC BLD-RTO: 0 /100 WBC
PLATELET # BLD AUTO: 159 K/UL (ref 150–350)
PMV BLD AUTO: 12 FL (ref 9.2–12.9)
RBC # BLD AUTO: 4.44 M/UL (ref 4.6–6.2)
WBC # BLD AUTO: 5.2 K/UL (ref 3.9–12.7)

## 2021-02-18 PROCEDURE — 83036 HEMOGLOBIN GLYCOSYLATED A1C: CPT

## 2021-02-18 PROCEDURE — 80053 COMPREHEN METABOLIC PANEL: CPT

## 2021-02-18 PROCEDURE — 80061 LIPID PANEL: CPT

## 2021-02-18 PROCEDURE — 36415 COLL VENOUS BLD VENIPUNCTURE: CPT | Mod: PO

## 2021-02-18 PROCEDURE — 85025 COMPLETE CBC W/AUTO DIFF WBC: CPT

## 2021-02-19 LAB
ALBUMIN SERPL BCP-MCNC: 4 G/DL (ref 3.5–5.2)
ALP SERPL-CCNC: 155 U/L (ref 55–135)
ALT SERPL W/O P-5'-P-CCNC: 21 U/L (ref 10–44)
ANION GAP SERPL CALC-SCNC: 8 MMOL/L (ref 8–16)
AST SERPL-CCNC: 32 U/L (ref 10–40)
BILIRUB SERPL-MCNC: 0.8 MG/DL (ref 0.1–1)
BUN SERPL-MCNC: 34 MG/DL (ref 8–23)
CALCIUM SERPL-MCNC: 10.2 MG/DL (ref 8.7–10.5)
CHLORIDE SERPL-SCNC: 103 MMOL/L (ref 95–110)
CHOLEST SERPL-MCNC: 179 MG/DL (ref 120–199)
CHOLEST/HDLC SERPL: 4.2 {RATIO} (ref 2–5)
CO2 SERPL-SCNC: 31 MMOL/L (ref 23–29)
CREAT SERPL-MCNC: 1 MG/DL (ref 0.5–1.4)
EST. GFR  (AFRICAN AMERICAN): >60 ML/MIN/1.73 M^2
EST. GFR  (NON AFRICAN AMERICAN): >60 ML/MIN/1.73 M^2
ESTIMATED AVG GLUCOSE: 160 MG/DL (ref 68–131)
GLUCOSE SERPL-MCNC: 118 MG/DL (ref 70–110)
HBA1C MFR BLD: 7.2 % (ref 4–5.6)
HDLC SERPL-MCNC: 43 MG/DL (ref 40–75)
HDLC SERPL: 24 % (ref 20–50)
LDLC SERPL CALC-MCNC: 107.8 MG/DL (ref 63–159)
NONHDLC SERPL-MCNC: 136 MG/DL
POTASSIUM SERPL-SCNC: 4.2 MMOL/L (ref 3.5–5.1)
PROT SERPL-MCNC: 7.5 G/DL (ref 6–8.4)
SODIUM SERPL-SCNC: 142 MMOL/L (ref 136–145)
TRIGL SERPL-MCNC: 141 MG/DL (ref 30–150)

## 2021-02-26 ENCOUNTER — OFFICE VISIT (OUTPATIENT)
Dept: FAMILY MEDICINE | Facility: CLINIC | Age: 86
End: 2021-02-26
Payer: MEDICARE

## 2021-02-26 VITALS
HEART RATE: 71 BPM | OXYGEN SATURATION: 99 % | HEIGHT: 69 IN | BODY MASS INDEX: 28.29 KG/M2 | SYSTOLIC BLOOD PRESSURE: 138 MMHG | DIASTOLIC BLOOD PRESSURE: 70 MMHG | WEIGHT: 191 LBS | TEMPERATURE: 98 F

## 2021-02-26 DIAGNOSIS — I11.0 HYPERTENSIVE HEART DISEASE WITH HEART FAILURE: ICD-10-CM

## 2021-02-26 DIAGNOSIS — E08.51 DIABETES MELLITUS DUE TO UNDERLYING CONDITION WITH DIABETIC PERIPHERAL ANGIOPATHY WITHOUT GANGRENE, WITHOUT LONG-TERM CURRENT USE OF INSULIN: Primary | ICD-10-CM

## 2021-02-26 DIAGNOSIS — I69.154 HEMIPLEGIA AND HEMIPARESIS FOLLOWING NONTRAUMATIC INTRACEREBRAL HEMORRHAGE AFFECTING LEFT NON-DOMINANT SIDE: ICD-10-CM

## 2021-02-26 DIAGNOSIS — I48.0 PAROXYSMAL ATRIAL FIBRILLATION: Chronic | ICD-10-CM

## 2021-02-26 DIAGNOSIS — K74.60 CIRRHOSIS OF LIVER WITHOUT ASCITES, UNSPECIFIED HEPATIC CIRRHOSIS TYPE: ICD-10-CM

## 2021-02-26 DIAGNOSIS — I48.19 PERSISTENT ATRIAL FIBRILLATION: ICD-10-CM

## 2021-02-26 DIAGNOSIS — I69.398 SEIZURE, LATE EFFECT OF STROKE: ICD-10-CM

## 2021-02-26 DIAGNOSIS — R56.9 SEIZURE, LATE EFFECT OF STROKE: ICD-10-CM

## 2021-02-26 PROCEDURE — 99999 PR PBB SHADOW E&M-EST. PATIENT-LVL V: CPT | Mod: PBBFAC,,, | Performed by: FAMILY MEDICINE

## 2021-02-26 PROCEDURE — 99499 RISK ADDL DX/OHS AUDIT: ICD-10-PCS | Mod: S$GLB,,, | Performed by: FAMILY MEDICINE

## 2021-02-26 PROCEDURE — 1157F PR ADVANCE CARE PLAN OR EQUIV PRESENT IN MEDICAL RECORD: ICD-10-PCS | Mod: S$GLB,,, | Performed by: FAMILY MEDICINE

## 2021-02-26 PROCEDURE — 1126F AMNT PAIN NOTED NONE PRSNT: CPT | Mod: S$GLB,,, | Performed by: FAMILY MEDICINE

## 2021-02-26 PROCEDURE — 1157F ADVNC CARE PLAN IN RCRD: CPT | Mod: S$GLB,,, | Performed by: FAMILY MEDICINE

## 2021-02-26 PROCEDURE — 1126F PR PAIN SEVERITY QUANTIFIED, NO PAIN PRESENT: ICD-10-PCS | Mod: S$GLB,,, | Performed by: FAMILY MEDICINE

## 2021-02-26 PROCEDURE — 99214 OFFICE O/P EST MOD 30 MIN: CPT | Mod: S$GLB,,, | Performed by: FAMILY MEDICINE

## 2021-02-26 PROCEDURE — 1159F MED LIST DOCD IN RCRD: CPT | Mod: S$GLB,,, | Performed by: FAMILY MEDICINE

## 2021-02-26 PROCEDURE — 1159F PR MEDICATION LIST DOCUMENTED IN MEDICAL RECORD: ICD-10-PCS | Mod: S$GLB,,, | Performed by: FAMILY MEDICINE

## 2021-02-26 PROCEDURE — 99214 PR OFFICE/OUTPT VISIT, EST, LEVL IV, 30-39 MIN: ICD-10-PCS | Mod: S$GLB,,, | Performed by: FAMILY MEDICINE

## 2021-02-26 PROCEDURE — 99999 PR PBB SHADOW E&M-EST. PATIENT-LVL V: ICD-10-PCS | Mod: PBBFAC,,, | Performed by: FAMILY MEDICINE

## 2021-02-26 PROCEDURE — 99499 UNLISTED E&M SERVICE: CPT | Mod: S$GLB,,, | Performed by: FAMILY MEDICINE

## 2021-03-18 RX ORDER — DEXTROSE 4 G
1 TABLET,CHEWABLE ORAL ONCE
Qty: 1 EACH | Refills: 0 | Status: SHIPPED | OUTPATIENT
Start: 2021-03-18 | End: 2024-03-18

## 2021-03-24 ENCOUNTER — PATIENT OUTREACH (OUTPATIENT)
Dept: ADMINISTRATIVE | Facility: OTHER | Age: 86
End: 2021-03-24

## 2021-03-25 ENCOUNTER — OFFICE VISIT (OUTPATIENT)
Dept: GASTROENTEROLOGY | Facility: CLINIC | Age: 86
End: 2021-03-25
Payer: MEDICARE

## 2021-03-25 ENCOUNTER — LAB VISIT (OUTPATIENT)
Dept: LAB | Facility: HOSPITAL | Age: 86
End: 2021-03-25
Attending: PHYSICIAN ASSISTANT
Payer: MEDICARE

## 2021-03-25 VITALS
SYSTOLIC BLOOD PRESSURE: 130 MMHG | WEIGHT: 192 LBS | HEIGHT: 69 IN | HEART RATE: 100 BPM | DIASTOLIC BLOOD PRESSURE: 62 MMHG | BODY MASS INDEX: 28.44 KG/M2

## 2021-03-25 DIAGNOSIS — K74.60 CIRRHOSIS OF LIVER WITHOUT ASCITES, UNSPECIFIED HEPATIC CIRRHOSIS TYPE: Primary | ICD-10-CM

## 2021-03-25 DIAGNOSIS — K31.819 AVM (ARTERIOVENOUS MALFORMATION) OF STOMACH, ACQUIRED: ICD-10-CM

## 2021-03-25 DIAGNOSIS — K74.60 CIRRHOSIS OF LIVER WITHOUT ASCITES, UNSPECIFIED HEPATIC CIRRHOSIS TYPE: ICD-10-CM

## 2021-03-25 LAB
INR PPP: 1 (ref 0.8–1.2)
PROTHROMBIN TIME: 11.4 SEC (ref 9–12.5)

## 2021-03-25 PROCEDURE — 36415 COLL VENOUS BLD VENIPUNCTURE: CPT | Performed by: PHYSICIAN ASSISTANT

## 2021-03-25 PROCEDURE — 99214 PR OFFICE/OUTPT VISIT, EST, LEVL IV, 30-39 MIN: ICD-10-PCS | Mod: S$GLB,,, | Performed by: PHYSICIAN ASSISTANT

## 2021-03-25 PROCEDURE — 1101F PR PT FALLS ASSESS DOC 0-1 FALLS W/OUT INJ PAST YR: ICD-10-PCS | Mod: CPTII,S$GLB,, | Performed by: PHYSICIAN ASSISTANT

## 2021-03-25 PROCEDURE — 1159F PR MEDICATION LIST DOCUMENTED IN MEDICAL RECORD: ICD-10-PCS | Mod: S$GLB,,, | Performed by: PHYSICIAN ASSISTANT

## 2021-03-25 PROCEDURE — 1126F PR PAIN SEVERITY QUANTIFIED, NO PAIN PRESENT: ICD-10-PCS | Mod: S$GLB,,, | Performed by: PHYSICIAN ASSISTANT

## 2021-03-25 PROCEDURE — 99214 OFFICE O/P EST MOD 30 MIN: CPT | Mod: S$GLB,,, | Performed by: PHYSICIAN ASSISTANT

## 2021-03-25 PROCEDURE — 3288F PR FALLS RISK ASSESSMENT DOCUMENTED: ICD-10-PCS | Mod: CPTII,S$GLB,, | Performed by: PHYSICIAN ASSISTANT

## 2021-03-25 PROCEDURE — 1157F ADVNC CARE PLAN IN RCRD: CPT | Mod: S$GLB,,, | Performed by: PHYSICIAN ASSISTANT

## 2021-03-25 PROCEDURE — 1101F PT FALLS ASSESS-DOCD LE1/YR: CPT | Mod: CPTII,S$GLB,, | Performed by: PHYSICIAN ASSISTANT

## 2021-03-25 PROCEDURE — 1157F PR ADVANCE CARE PLAN OR EQUIV PRESENT IN MEDICAL RECORD: ICD-10-PCS | Mod: S$GLB,,, | Performed by: PHYSICIAN ASSISTANT

## 2021-03-25 PROCEDURE — 82565 ASSAY OF CREATININE: CPT | Performed by: PHYSICIAN ASSISTANT

## 2021-03-25 PROCEDURE — 99999 PR PBB SHADOW E&M-EST. PATIENT-LVL IV: CPT | Mod: PBBFAC,,, | Performed by: PHYSICIAN ASSISTANT

## 2021-03-25 PROCEDURE — 1126F AMNT PAIN NOTED NONE PRSNT: CPT | Mod: S$GLB,,, | Performed by: PHYSICIAN ASSISTANT

## 2021-03-25 PROCEDURE — 3288F FALL RISK ASSESSMENT DOCD: CPT | Mod: CPTII,S$GLB,, | Performed by: PHYSICIAN ASSISTANT

## 2021-03-25 PROCEDURE — 85610 PROTHROMBIN TIME: CPT | Performed by: PHYSICIAN ASSISTANT

## 2021-03-25 PROCEDURE — 82247 BILIRUBIN TOTAL: CPT | Performed by: PHYSICIAN ASSISTANT

## 2021-03-25 PROCEDURE — 1159F MED LIST DOCD IN RCRD: CPT | Mod: S$GLB,,, | Performed by: PHYSICIAN ASSISTANT

## 2021-03-25 PROCEDURE — 99999 PR PBB SHADOW E&M-EST. PATIENT-LVL IV: ICD-10-PCS | Mod: PBBFAC,,, | Performed by: PHYSICIAN ASSISTANT

## 2021-03-26 LAB
BILIRUB SERPL-MCNC: 0.8 MG/DL (ref 0.1–1)
CREAT SERPL-MCNC: 1 MG/DL (ref 0.5–1.4)
EST. GFR  (AFRICAN AMERICAN): >60 ML/MIN/1.73 M^2
EST. GFR  (NON AFRICAN AMERICAN): >60 ML/MIN/1.73 M^2

## 2021-03-30 ENCOUNTER — TELEPHONE (OUTPATIENT)
Dept: RADIOLOGY | Facility: HOSPITAL | Age: 86
End: 2021-03-30

## 2021-04-05 ENCOUNTER — TELEPHONE (OUTPATIENT)
Dept: FAMILY MEDICINE | Facility: CLINIC | Age: 86
End: 2021-04-05

## 2021-04-06 ENCOUNTER — PATIENT MESSAGE (OUTPATIENT)
Dept: GASTROENTEROLOGY | Facility: CLINIC | Age: 86
End: 2021-04-06

## 2021-04-13 ENCOUNTER — TELEPHONE (OUTPATIENT)
Dept: RADIOLOGY | Facility: HOSPITAL | Age: 86
End: 2021-04-13

## 2021-04-14 ENCOUNTER — HOSPITAL ENCOUNTER (OUTPATIENT)
Dept: RADIOLOGY | Facility: HOSPITAL | Age: 86
Discharge: HOME | End: 2021-04-14
Attending: PHYSICIAN ASSISTANT
Payer: MEDICARE

## 2021-04-14 DIAGNOSIS — K74.60 CIRRHOSIS OF LIVER WITHOUT ASCITES, UNSPECIFIED HEPATIC CIRRHOSIS TYPE: ICD-10-CM

## 2021-04-16 ENCOUNTER — TELEPHONE (OUTPATIENT)
Dept: RADIOLOGY | Facility: HOSPITAL | Age: 86
End: 2021-04-16

## 2021-04-19 ENCOUNTER — HOSPITAL ENCOUNTER (OUTPATIENT)
Dept: RADIOLOGY | Facility: HOSPITAL | Age: 86
Discharge: HOME | End: 2021-04-19
Attending: PHYSICIAN ASSISTANT
Payer: MEDICARE

## 2021-04-19 PROCEDURE — 76705 ECHO EXAM OF ABDOMEN: CPT | Mod: 26,,, | Performed by: RADIOLOGY

## 2021-04-19 PROCEDURE — 76705 US ABDOMEN LIMITED: ICD-10-PCS | Mod: 26,,, | Performed by: RADIOLOGY

## 2021-04-19 PROCEDURE — 76705 ECHO EXAM OF ABDOMEN: CPT | Mod: TC

## 2021-05-08 ENCOUNTER — CLINICAL SUPPORT (OUTPATIENT)
Dept: CARDIOLOGY | Facility: HOSPITAL | Age: 86
End: 2021-05-08
Payer: MEDICARE

## 2021-05-08 DIAGNOSIS — Z95.0 PRESENCE OF CARDIAC PACEMAKER: ICD-10-CM

## 2021-05-08 PROCEDURE — 93294 CARDIAC DEVICE CHECK - REMOTE: ICD-10-PCS | Mod: S$GLB,,, | Performed by: INTERNAL MEDICINE

## 2021-05-08 PROCEDURE — 93294 REM INTERROG EVL PM/LDLS PM: CPT | Mod: S$GLB,,, | Performed by: INTERNAL MEDICINE

## 2021-05-08 PROCEDURE — 93296 REM INTERROG EVL PM/IDS: CPT | Performed by: INTERNAL MEDICINE

## 2021-05-12 ENCOUNTER — HOSPITAL ENCOUNTER (OUTPATIENT)
Dept: CARDIOLOGY | Facility: HOSPITAL | Age: 86
Discharge: HOME | End: 2021-05-12
Attending: INTERNAL MEDICINE
Payer: MEDICARE

## 2021-05-12 VITALS
HEART RATE: 70 BPM | SYSTOLIC BLOOD PRESSURE: 130 MMHG | BODY MASS INDEX: 28.44 KG/M2 | DIASTOLIC BLOOD PRESSURE: 62 MMHG | HEIGHT: 69 IN | WEIGHT: 192 LBS

## 2021-05-12 DIAGNOSIS — I50.42 CHRONIC COMBINED SYSTOLIC AND DIASTOLIC CONGESTIVE HEART FAILURE: ICD-10-CM

## 2021-05-12 DIAGNOSIS — I35.0 NONRHEUMATIC AORTIC VALVE STENOSIS: ICD-10-CM

## 2021-05-12 DIAGNOSIS — I36.1 NONRHEUMATIC TRICUSPID VALVE REGURGITATION: ICD-10-CM

## 2021-05-12 DIAGNOSIS — I48.19 PERSISTENT ATRIAL FIBRILLATION: ICD-10-CM

## 2021-05-12 DIAGNOSIS — I20.9 AP (ANGINA PECTORIS): ICD-10-CM

## 2021-05-12 DIAGNOSIS — I10 ESSENTIAL HYPERTENSION: ICD-10-CM

## 2021-05-12 DIAGNOSIS — Z45.018 BIVENTRICULAR PACEMAKER CHECK: ICD-10-CM

## 2021-05-12 DIAGNOSIS — I50.32 CHRONIC DIASTOLIC HEART FAILURE: ICD-10-CM

## 2021-05-12 DIAGNOSIS — R06.02 SOB (SHORTNESS OF BREATH): ICD-10-CM

## 2021-05-12 DIAGNOSIS — I25.708 CORONARY ARTERY DISEASE OF BYPASS GRAFT OF NATIVE HEART WITH STABLE ANGINA PECTORIS: ICD-10-CM

## 2021-05-12 DIAGNOSIS — I11.0 HYPERTENSIVE HEART DISEASE WITH HEART FAILURE: ICD-10-CM

## 2021-05-12 DIAGNOSIS — I48.0 PAROXYSMAL ATRIAL FIBRILLATION: ICD-10-CM

## 2021-05-12 LAB
ASCENDING AORTA: 3.62 CM
AV INDEX (PROSTH): 0.63
AV MEAN GRADIENT: 5 MMHG
AV PEAK GRADIENT: 9 MMHG
AV VALVE AREA: 2.25 CM2
AV VELOCITY RATIO: 0.61
BSA FOR ECHO PROCEDURE: 2.06 M2
CV ECHO LV RWT: 0.68 CM
DOP CALC AO PEAK VEL: 1.48 M/S
DOP CALC AO VTI: 31.88 CM
DOP CALC LVOT AREA: 3.6 CM2
DOP CALC LVOT DIAMETER: 2.14 CM
DOP CALC LVOT PEAK VEL: 0.9 M/S
DOP CALC LVOT STROKE VOLUME: 71.79 CM3
DOP CALC RVOT PEAK VEL: 0.6 M/S
DOP CALC RVOT VTI: 12.38 CM
DOP CALCLVOT PEAK VEL VTI: 19.97 CM
E WAVE DECELERATION TIME: 131.56 MSEC
E/A RATIO: 4.41
E/E' RATIO: 13.47 M/S
ECHO LV POSTERIOR WALL: 1.21 CM (ref 0.6–1.1)
EJECTION FRACTION: 55 %
FRACTIONAL SHORTENING: 28 % (ref 28–44)
INTERVENTRICULAR SEPTUM: 1.43 CM (ref 0.6–1.1)
IVRT: 102.76 MSEC
LA MAJOR: 5.7 CM
LA MINOR: 5.52 CM
LA WIDTH: 4.52 CM
LEFT ATRIUM SIZE: 3.73 CM
LEFT ATRIUM VOLUME INDEX: 39.6 ML/M2
LEFT ATRIUM VOLUME: 80.37 CM3
LEFT INTERNAL DIMENSION IN SYSTOLE: 2.56 CM (ref 2.1–4)
LEFT VENTRICLE DIASTOLIC VOLUME INDEX: 26.03 ML/M2
LEFT VENTRICLE DIASTOLIC VOLUME: 52.85 ML
LEFT VENTRICLE MASS INDEX: 79 G/M2
LEFT VENTRICLE SYSTOLIC VOLUME INDEX: 11.6 ML/M2
LEFT VENTRICLE SYSTOLIC VOLUME: 23.63 ML
LEFT VENTRICULAR INTERNAL DIMENSION IN DIASTOLE: 3.56 CM (ref 3.5–6)
LEFT VENTRICULAR MASS: 161.35 G
LV LATERAL E/E' RATIO: 9.85 M/S
LV SEPTAL E/E' RATIO: 21.33 M/S
MV PEAK A VEL: 0.29 M/S
MV PEAK E VEL: 1.28 M/S
MV STENOSIS PRESSURE HALF TIME: 38.15 MS
MV VALVE AREA P 1/2 METHOD: 5.77 CM2
PISA TR MAX VEL: 3.12 M/S
PV MEAN GRADIENT: 1 MMHG
PV PEAK VELOCITY: 1.08 CM/S
RA MAJOR: 5.21 CM
RA PRESSURE: 3 MMHG
RA WIDTH: 3.84 CM
RIGHT VENTRICULAR END-DIASTOLIC DIMENSION: 3.73 CM
SINUS: 3.18 CM
STJ: 2.72 CM
TDI LATERAL: 0.13 M/S
TDI SEPTAL: 0.06 M/S
TDI: 0.1 M/S
TR MAX PG: 39 MMHG
TV REST PULMONARY ARTERY PRESSURE: 42 MMHG

## 2021-05-12 PROCEDURE — 93306 TTE W/DOPPLER COMPLETE: CPT | Mod: 26,,, | Performed by: INTERNAL MEDICINE

## 2021-05-12 PROCEDURE — 93306 TTE W/DOPPLER COMPLETE: CPT

## 2021-05-12 PROCEDURE — 93306 ECHO (CUPID ONLY): ICD-10-PCS | Mod: 26,,, | Performed by: INTERNAL MEDICINE

## 2021-05-26 ENCOUNTER — HOSPITAL ENCOUNTER (OUTPATIENT)
Dept: CARDIOLOGY | Facility: HOSPITAL | Age: 86
Discharge: HOME | End: 2021-05-26
Attending: INTERNAL MEDICINE
Payer: MEDICARE

## 2021-05-26 ENCOUNTER — LAB VISIT (OUTPATIENT)
Dept: LAB | Facility: HOSPITAL | Age: 86
End: 2021-05-26
Attending: FAMILY MEDICINE
Payer: MEDICARE

## 2021-05-26 ENCOUNTER — OFFICE VISIT (OUTPATIENT)
Dept: CARDIOLOGY | Facility: CLINIC | Age: 86
End: 2021-05-26
Payer: MEDICARE

## 2021-05-26 VITALS
DIASTOLIC BLOOD PRESSURE: 74 MMHG | BODY MASS INDEX: 27.88 KG/M2 | SYSTOLIC BLOOD PRESSURE: 158 MMHG | OXYGEN SATURATION: 98 % | HEART RATE: 70 BPM | HEIGHT: 69 IN | WEIGHT: 188.25 LBS | RESPIRATION RATE: 16 BRPM

## 2021-05-26 DIAGNOSIS — I61.9 HEMORRHAGIC CEREBROVASCULAR ACCIDENT (CVA): ICD-10-CM

## 2021-05-26 DIAGNOSIS — I50.42 CHRONIC COMBINED SYSTOLIC AND DIASTOLIC CONGESTIVE HEART FAILURE: ICD-10-CM

## 2021-05-26 DIAGNOSIS — I48.0 PAROXYSMAL ATRIAL FIBRILLATION: Chronic | ICD-10-CM

## 2021-05-26 DIAGNOSIS — I65.23 ASYMPTOMATIC STENOSIS OF BOTH CAROTID ARTERIES WITHOUT INFARCTION: ICD-10-CM

## 2021-05-26 DIAGNOSIS — K31.819 AVM (ARTERIOVENOUS MALFORMATION) OF STOMACH, ACQUIRED: ICD-10-CM

## 2021-05-26 DIAGNOSIS — I73.9 PERIPHERAL VASCULAR DISEASE: Chronic | ICD-10-CM

## 2021-05-26 DIAGNOSIS — E78.2 MIXED HYPERLIPIDEMIA: ICD-10-CM

## 2021-05-26 DIAGNOSIS — I77.9 CAROTID ARTERY DISEASE WITHOUT CEREBRAL INFARCTION: ICD-10-CM

## 2021-05-26 DIAGNOSIS — I69.154 HEMIPLEGIA AND HEMIPARESIS FOLLOWING NONTRAUMATIC INTRACEREBRAL HEMORRHAGE AFFECTING LEFT NON-DOMINANT SIDE: ICD-10-CM

## 2021-05-26 DIAGNOSIS — I11.0 HYPERTENSIVE HEART DISEASE WITH HEART FAILURE: ICD-10-CM

## 2021-05-26 DIAGNOSIS — I70.0 CALCIFICATION OF AORTA: ICD-10-CM

## 2021-05-26 DIAGNOSIS — I69.398 SEIZURE, LATE EFFECT OF STROKE: ICD-10-CM

## 2021-05-26 DIAGNOSIS — E13.51 PERIPHERAL VASCULAR DISEASE DUE TO SECONDARY DIABETES MELLITUS: ICD-10-CM

## 2021-05-26 DIAGNOSIS — I20.9 AP (ANGINA PECTORIS): ICD-10-CM

## 2021-05-26 DIAGNOSIS — I73.9 CLAUDICATION IN PERIPHERAL VASCULAR DISEASE: Chronic | ICD-10-CM

## 2021-05-26 DIAGNOSIS — I82.890 JUGULAR VEIN THROMBOSIS: ICD-10-CM

## 2021-05-26 DIAGNOSIS — R06.02 SOB (SHORTNESS OF BREATH): ICD-10-CM

## 2021-05-26 DIAGNOSIS — I50.42 CHRONIC COMBINED SYSTOLIC AND DIASTOLIC CONGESTIVE HEART FAILURE: Primary | ICD-10-CM

## 2021-05-26 DIAGNOSIS — I50.32 CHRONIC DIASTOLIC HEART FAILURE: ICD-10-CM

## 2021-05-26 DIAGNOSIS — I25.708 CORONARY ARTERY DISEASE OF BYPASS GRAFT OF NATIVE HEART WITH STABLE ANGINA PECTORIS: ICD-10-CM

## 2021-05-26 DIAGNOSIS — Z78.9 STATIN INTOLERANCE: ICD-10-CM

## 2021-05-26 DIAGNOSIS — Z45.018 BIVENTRICULAR PACEMAKER CHECK: ICD-10-CM

## 2021-05-26 DIAGNOSIS — R56.9 SEIZURE, LATE EFFECT OF STROKE: ICD-10-CM

## 2021-05-26 DIAGNOSIS — R07.89 ATYPICAL CHEST PAIN: ICD-10-CM

## 2021-05-26 DIAGNOSIS — I10 ESSENTIAL HYPERTENSION: ICD-10-CM

## 2021-05-26 DIAGNOSIS — I35.0 NONRHEUMATIC AORTIC VALVE STENOSIS: ICD-10-CM

## 2021-05-26 DIAGNOSIS — E08.51 DIABETES MELLITUS DUE TO UNDERLYING CONDITION WITH DIABETIC PERIPHERAL ANGIOPATHY WITHOUT GANGRENE, WITHOUT LONG-TERM CURRENT USE OF INSULIN: ICD-10-CM

## 2021-05-26 DIAGNOSIS — K74.60 CIRRHOSIS OF LIVER WITHOUT ASCITES, UNSPECIFIED HEPATIC CIRRHOSIS TYPE: ICD-10-CM

## 2021-05-26 DIAGNOSIS — I48.19 PERSISTENT ATRIAL FIBRILLATION: ICD-10-CM

## 2021-05-26 DIAGNOSIS — I73.9 PERIPHERAL VASCULAR DISEASE: ICD-10-CM

## 2021-05-26 DIAGNOSIS — I71.21 ANEURYSM OF ASCENDING AORTA: ICD-10-CM

## 2021-05-26 DIAGNOSIS — E66.3 OVERWEIGHT (BMI 25.0-29.9): ICD-10-CM

## 2021-05-26 DIAGNOSIS — R60.0 EDEMA OF BOTH LEGS: ICD-10-CM

## 2021-05-26 DIAGNOSIS — I73.9 CLAUDICATION IN PERIPHERAL VASCULAR DISEASE: ICD-10-CM

## 2021-05-26 DIAGNOSIS — I36.1 NONRHEUMATIC TRICUSPID VALVE REGURGITATION: ICD-10-CM

## 2021-05-26 DIAGNOSIS — I48.0 PAROXYSMAL ATRIAL FIBRILLATION: ICD-10-CM

## 2021-05-26 DIAGNOSIS — I77.9 CAROTID ARTERY DISEASE WITHOUT CEREBRAL INFARCTION: Chronic | ICD-10-CM

## 2021-05-26 DIAGNOSIS — E78.2 MIXED HYPERLIPIDEMIA: Chronic | ICD-10-CM

## 2021-05-26 LAB
BASOPHILS # BLD AUTO: 0.03 K/UL (ref 0–0.2)
BASOPHILS NFR BLD: 0.5 % (ref 0–1.9)
DIFFERENTIAL METHOD: ABNORMAL
EOSINOPHIL # BLD AUTO: 0.2 K/UL (ref 0–0.5)
EOSINOPHIL NFR BLD: 3.9 % (ref 0–8)
ERYTHROCYTE [DISTWIDTH] IN BLOOD BY AUTOMATED COUNT: 13.2 % (ref 11.5–14.5)
ESTIMATED AVG GLUCOSE: 171 MG/DL (ref 68–131)
HBA1C MFR BLD: 7.6 % (ref 4–5.6)
HCT VFR BLD AUTO: 38.5 % (ref 40–54)
HGB BLD-MCNC: 12.8 G/DL (ref 14–18)
IMM GRANULOCYTES # BLD AUTO: 0.02 K/UL (ref 0–0.04)
IMM GRANULOCYTES NFR BLD AUTO: 0.4 % (ref 0–0.5)
LYMPHOCYTES # BLD AUTO: 2 K/UL (ref 1–4.8)
LYMPHOCYTES NFR BLD: 35.3 % (ref 18–48)
MCH RBC QN AUTO: 31.9 PG (ref 27–31)
MCHC RBC AUTO-ENTMCNC: 33.2 G/DL (ref 32–36)
MCV RBC AUTO: 96 FL (ref 82–98)
MONOCYTES # BLD AUTO: 0.7 K/UL (ref 0.3–1)
MONOCYTES NFR BLD: 11.5 % (ref 4–15)
NEUTROPHILS # BLD AUTO: 2.7 K/UL (ref 1.8–7.7)
NEUTROPHILS NFR BLD: 48.4 % (ref 38–73)
NRBC BLD-RTO: 0 /100 WBC
PLATELET # BLD AUTO: 164 K/UL (ref 150–450)
PMV BLD AUTO: 12 FL (ref 9.2–12.9)
RBC # BLD AUTO: 4.01 M/UL (ref 4.6–6.2)
WBC # BLD AUTO: 5.66 K/UL (ref 3.9–12.7)

## 2021-05-26 PROCEDURE — 1159F PR MEDICATION LIST DOCUMENTED IN MEDICAL RECORD: ICD-10-PCS | Mod: S$GLB,,, | Performed by: INTERNAL MEDICINE

## 2021-05-26 PROCEDURE — 1126F AMNT PAIN NOTED NONE PRSNT: CPT | Mod: S$GLB,,, | Performed by: INTERNAL MEDICINE

## 2021-05-26 PROCEDURE — 1159F MED LIST DOCD IN RCRD: CPT | Mod: S$GLB,,, | Performed by: INTERNAL MEDICINE

## 2021-05-26 PROCEDURE — 36415 COLL VENOUS BLD VENIPUNCTURE: CPT | Mod: PO | Performed by: FAMILY MEDICINE

## 2021-05-26 PROCEDURE — 1157F ADVNC CARE PLAN IN RCRD: CPT | Mod: S$GLB,,, | Performed by: INTERNAL MEDICINE

## 2021-05-26 PROCEDURE — 80061 LIPID PANEL: CPT | Performed by: FAMILY MEDICINE

## 2021-05-26 PROCEDURE — 99499 RISK ADDL DX/OHS AUDIT: ICD-10-PCS | Mod: HCNC,S$GLB,, | Performed by: INTERNAL MEDICINE

## 2021-05-26 PROCEDURE — 93010 ELECTROCARDIOGRAM REPORT: CPT | Mod: ,,, | Performed by: INTERNAL MEDICINE

## 2021-05-26 PROCEDURE — 85025 COMPLETE CBC W/AUTO DIFF WBC: CPT | Performed by: FAMILY MEDICINE

## 2021-05-26 PROCEDURE — 1157F PR ADVANCE CARE PLAN OR EQUIV PRESENT IN MEDICAL RECORD: ICD-10-PCS | Mod: S$GLB,,, | Performed by: INTERNAL MEDICINE

## 2021-05-26 PROCEDURE — 99999 PR PBB SHADOW E&M-EST. PATIENT-LVL III: CPT | Mod: PBBFAC,,, | Performed by: INTERNAL MEDICINE

## 2021-05-26 PROCEDURE — 99215 OFFICE O/P EST HI 40 MIN: CPT | Mod: S$GLB,,, | Performed by: INTERNAL MEDICINE

## 2021-05-26 PROCEDURE — 93005 ELECTROCARDIOGRAM TRACING: CPT

## 2021-05-26 PROCEDURE — 99215 PR OFFICE/OUTPT VISIT, EST, LEVL V, 40-54 MIN: ICD-10-PCS | Mod: S$GLB,,, | Performed by: INTERNAL MEDICINE

## 2021-05-26 PROCEDURE — 83036 HEMOGLOBIN GLYCOSYLATED A1C: CPT | Performed by: FAMILY MEDICINE

## 2021-05-26 PROCEDURE — 1126F PR PAIN SEVERITY QUANTIFIED, NO PAIN PRESENT: ICD-10-PCS | Mod: S$GLB,,, | Performed by: INTERNAL MEDICINE

## 2021-05-26 PROCEDURE — 93010 EKG 12-LEAD: ICD-10-PCS | Mod: ,,, | Performed by: INTERNAL MEDICINE

## 2021-05-26 PROCEDURE — 99999 PR PBB SHADOW E&M-EST. PATIENT-LVL III: ICD-10-PCS | Mod: PBBFAC,,, | Performed by: INTERNAL MEDICINE

## 2021-05-26 PROCEDURE — 80053 COMPREHEN METABOLIC PANEL: CPT | Performed by: FAMILY MEDICINE

## 2021-05-26 PROCEDURE — 99499 UNLISTED E&M SERVICE: CPT | Mod: HCNC,S$GLB,, | Performed by: INTERNAL MEDICINE

## 2021-05-27 LAB
ALBUMIN SERPL BCP-MCNC: 3.8 G/DL (ref 3.5–5.2)
ALP SERPL-CCNC: 138 U/L (ref 55–135)
ALT SERPL W/O P-5'-P-CCNC: 21 U/L (ref 10–44)
ANION GAP SERPL CALC-SCNC: 7 MMOL/L (ref 8–16)
AST SERPL-CCNC: 29 U/L (ref 10–40)
BILIRUB SERPL-MCNC: 0.9 MG/DL (ref 0.1–1)
BUN SERPL-MCNC: 21 MG/DL (ref 8–23)
CALCIUM SERPL-MCNC: 10.6 MG/DL (ref 8.7–10.5)
CHLORIDE SERPL-SCNC: 102 MMOL/L (ref 95–110)
CHOLEST SERPL-MCNC: 183 MG/DL (ref 120–199)
CHOLEST/HDLC SERPL: 4.2 {RATIO} (ref 2–5)
CO2 SERPL-SCNC: 30 MMOL/L (ref 23–29)
CREAT SERPL-MCNC: 1 MG/DL (ref 0.5–1.4)
EST. GFR  (AFRICAN AMERICAN): >60 ML/MIN/1.73 M^2
EST. GFR  (NON AFRICAN AMERICAN): >60 ML/MIN/1.73 M^2
GLUCOSE SERPL-MCNC: 108 MG/DL (ref 70–110)
HDLC SERPL-MCNC: 44 MG/DL (ref 40–75)
HDLC SERPL: 24 % (ref 20–50)
LDLC SERPL CALC-MCNC: 108.4 MG/DL (ref 63–159)
NONHDLC SERPL-MCNC: 139 MG/DL
POTASSIUM SERPL-SCNC: 3.9 MMOL/L (ref 3.5–5.1)
PROT SERPL-MCNC: 7.4 G/DL (ref 6–8.4)
SODIUM SERPL-SCNC: 139 MMOL/L (ref 136–145)
TRIGL SERPL-MCNC: 153 MG/DL (ref 30–150)

## 2021-05-28 ENCOUNTER — PATIENT MESSAGE (OUTPATIENT)
Dept: ADMINISTRATIVE | Facility: OTHER | Age: 86
End: 2021-05-28

## 2021-05-28 ENCOUNTER — HOSPITAL ENCOUNTER (OUTPATIENT)
Dept: CARDIOLOGY | Facility: HOSPITAL | Age: 86
Discharge: HOME | End: 2021-05-28
Attending: INTERNAL MEDICINE
Payer: MEDICARE

## 2021-05-28 VITALS
HEIGHT: 69 IN | WEIGHT: 188 LBS | DIASTOLIC BLOOD PRESSURE: 78 MMHG | SYSTOLIC BLOOD PRESSURE: 149 MMHG | BODY MASS INDEX: 27.85 KG/M2

## 2021-05-28 DIAGNOSIS — Z78.9 STATIN INTOLERANCE: ICD-10-CM

## 2021-05-28 DIAGNOSIS — I73.9 PERIPHERAL VASCULAR DISEASE: ICD-10-CM

## 2021-05-28 DIAGNOSIS — I71.21 ANEURYSM OF ASCENDING AORTA: ICD-10-CM

## 2021-05-28 DIAGNOSIS — K31.819 AVM (ARTERIOVENOUS MALFORMATION) OF STOMACH, ACQUIRED: ICD-10-CM

## 2021-05-28 DIAGNOSIS — I65.23 ASYMPTOMATIC STENOSIS OF BOTH CAROTID ARTERIES WITHOUT INFARCTION: ICD-10-CM

## 2021-05-28 DIAGNOSIS — I10 ESSENTIAL HYPERTENSION: ICD-10-CM

## 2021-05-28 DIAGNOSIS — I61.9 HEMORRHAGIC CEREBROVASCULAR ACCIDENT (CVA): Primary | ICD-10-CM

## 2021-05-28 DIAGNOSIS — I48.19 PERSISTENT ATRIAL FIBRILLATION: ICD-10-CM

## 2021-05-28 DIAGNOSIS — I25.708 CORONARY ARTERY DISEASE OF BYPASS GRAFT OF NATIVE HEART WITH STABLE ANGINA PECTORIS: ICD-10-CM

## 2021-05-28 DIAGNOSIS — I11.0 HYPERTENSIVE HEART DISEASE WITH HEART FAILURE: ICD-10-CM

## 2021-05-28 DIAGNOSIS — I82.890 JUGULAR VEIN THROMBOSIS: ICD-10-CM

## 2021-05-28 DIAGNOSIS — I77.9 CAROTID ARTERY DISEASE WITHOUT CEREBRAL INFARCTION: ICD-10-CM

## 2021-05-28 DIAGNOSIS — R60.0 EDEMA OF BOTH LEGS: ICD-10-CM

## 2021-05-28 DIAGNOSIS — E13.51 PERIPHERAL VASCULAR DISEASE DUE TO SECONDARY DIABETES MELLITUS: ICD-10-CM

## 2021-05-28 DIAGNOSIS — R07.89 ATYPICAL CHEST PAIN: ICD-10-CM

## 2021-05-28 DIAGNOSIS — R06.02 SOB (SHORTNESS OF BREATH): ICD-10-CM

## 2021-05-28 DIAGNOSIS — I50.32 CHRONIC DIASTOLIC HEART FAILURE: ICD-10-CM

## 2021-05-28 DIAGNOSIS — I69.398 SEIZURE, LATE EFFECT OF STROKE: ICD-10-CM

## 2021-05-28 DIAGNOSIS — I73.9 CLAUDICATION IN PERIPHERAL VASCULAR DISEASE: ICD-10-CM

## 2021-05-28 DIAGNOSIS — Z45.018 BIVENTRICULAR PACEMAKER CHECK: ICD-10-CM

## 2021-05-28 DIAGNOSIS — I20.9 AP (ANGINA PECTORIS): ICD-10-CM

## 2021-05-28 DIAGNOSIS — I36.1 NONRHEUMATIC TRICUSPID VALVE REGURGITATION: ICD-10-CM

## 2021-05-28 DIAGNOSIS — E78.2 MIXED HYPERLIPIDEMIA: ICD-10-CM

## 2021-05-28 DIAGNOSIS — E66.3 OVERWEIGHT (BMI 25.0-29.9): ICD-10-CM

## 2021-05-28 DIAGNOSIS — I48.0 PAROXYSMAL ATRIAL FIBRILLATION: ICD-10-CM

## 2021-05-28 DIAGNOSIS — I50.42 CHRONIC COMBINED SYSTOLIC AND DIASTOLIC CONGESTIVE HEART FAILURE: ICD-10-CM

## 2021-05-28 DIAGNOSIS — I70.0 CALCIFICATION OF AORTA: ICD-10-CM

## 2021-05-28 DIAGNOSIS — I35.0 NONRHEUMATIC AORTIC VALVE STENOSIS: ICD-10-CM

## 2021-05-28 DIAGNOSIS — R56.9 SEIZURE, LATE EFFECT OF STROKE: ICD-10-CM

## 2021-05-28 LAB
LEFT ARM DIASTOLIC BLOOD PRESSURE: 78 MMHG
LEFT ARM SYSTOLIC BLOOD PRESSURE: 149 MMHG
LEFT CBA DIAS: 18 CM/S
LEFT CBA SYS: 104 CM/S
LEFT CCA DIST DIAS: 14 CM/S
LEFT CCA DIST SYS: 73 CM/S
LEFT CCA MID DIAS: 12 CM/S
LEFT CCA MID SYS: 74 CM/S
LEFT CCA PROX DIAS: 7 CM/S
LEFT CCA PROX SYS: 62 CM/S
LEFT ECA DIAS: 7 CM/S
LEFT ECA SYS: 135 CM/S
LEFT ICA DIST DIAS: 20 CM/S
LEFT ICA DIST SYS: 76 CM/S
LEFT ICA MID DIAS: 21 CM/S
LEFT ICA MID SYS: 103 CM/S
LEFT ICA PROX DIAS: 19 CM/S
LEFT ICA PROX SYS: 108 CM/S
LEFT VERTEBRAL DIAS: 8 CM/S
LEFT VERTEBRAL SYS: 62 CM/S
OHS CV CAROTID RIGHT ICA EDV HIGHEST: 24
OHS CV CAROTID ULTRASOUND LEFT ICA/CCA RATIO: 1.48
OHS CV CAROTID ULTRASOUND RIGHT ICA/CCA RATIO: 1.52
OHS CV PV CAROTID LEFT HIGHEST CCA: 74
OHS CV PV CAROTID LEFT HIGHEST ICA: 108
OHS CV PV CAROTID RIGHT HIGHEST CCA: 68
OHS CV PV CAROTID RIGHT HIGHEST ICA: 100
OHS CV US CAROTID LEFT HIGHEST EDV: 21
RIGHT ARM DIASTOLIC BLOOD PRESSURE: 60 MMHG
RIGHT ARM SYSTOLIC BLOOD PRESSURE: 133 MMHG
RIGHT CBA DIAS: 9 CM/S
RIGHT CBA SYS: 74 CM/S
RIGHT CCA DIST DIAS: 8 CM/S
RIGHT CCA DIST SYS: 66 CM/S
RIGHT CCA MID DIAS: 6 CM/S
RIGHT CCA MID SYS: 55 CM/S
RIGHT CCA PROX DIAS: 5 CM/S
RIGHT CCA PROX SYS: 68 CM/S
RIGHT ECA DIAS: 4 CM/S
RIGHT ECA SYS: 111 CM/S
RIGHT ICA DIST DIAS: 19 CM/S
RIGHT ICA DIST SYS: 67 CM/S
RIGHT ICA MID DIAS: 15 CM/S
RIGHT ICA MID SYS: 61 CM/S
RIGHT ICA PROX DIAS: 24 CM/S
RIGHT ICA PROX SYS: 100 CM/S
RIGHT VERTEBRAL DIAS: 15 CM/S
RIGHT VERTEBRAL SYS: 78 CM/S

## 2021-05-28 PROCEDURE — 93880 EXTRACRANIAL BILAT STUDY: CPT

## 2021-05-28 PROCEDURE — 93880 EXTRACRANIAL BILAT STUDY: CPT | Mod: 26,,, | Performed by: INTERNAL MEDICINE

## 2021-05-28 PROCEDURE — 93880 CV US DOPPLER CAROTID (CUPID ONLY): ICD-10-PCS | Mod: 26,,, | Performed by: INTERNAL MEDICINE

## 2021-05-29 ENCOUNTER — TELEPHONE (OUTPATIENT)
Dept: CARDIOLOGY | Facility: CLINIC | Age: 86
End: 2021-05-29

## 2021-06-01 ENCOUNTER — PATIENT OUTREACH (OUTPATIENT)
Dept: OTHER | Facility: OTHER | Age: 86
End: 2021-06-01

## 2021-06-01 ENCOUNTER — HOSPITAL ENCOUNTER (OUTPATIENT)
Dept: CARDIOLOGY | Facility: HOSPITAL | Age: 86
Discharge: HOME | End: 2021-06-01
Attending: INTERNAL MEDICINE
Payer: MEDICARE

## 2021-06-01 DIAGNOSIS — Z95.0 CARDIAC PACEMAKER IN SITU: ICD-10-CM

## 2021-06-01 DIAGNOSIS — I49.5 SSS (SICK SINUS SYNDROME): ICD-10-CM

## 2021-06-01 PROCEDURE — 93281 CARDIAC DEVICE CHECK - IN CLINIC & HOSPITAL: ICD-10-PCS | Mod: 26,,, | Performed by: INTERNAL MEDICINE

## 2021-06-01 PROCEDURE — 93281 PM DEVICE PROGR EVAL MULTI: CPT | Mod: 26,,, | Performed by: INTERNAL MEDICINE

## 2021-06-01 PROCEDURE — 93281 PM DEVICE PROGR EVAL MULTI: CPT

## 2021-06-02 ENCOUNTER — OFFICE VISIT (OUTPATIENT)
Dept: FAMILY MEDICINE | Facility: CLINIC | Age: 86
End: 2021-06-02
Payer: MEDICARE

## 2021-06-02 VITALS
HEIGHT: 69 IN | SYSTOLIC BLOOD PRESSURE: 124 MMHG | WEIGHT: 189.5 LBS | DIASTOLIC BLOOD PRESSURE: 82 MMHG | OXYGEN SATURATION: 98 % | BODY MASS INDEX: 28.07 KG/M2 | HEART RATE: 87 BPM | TEMPERATURE: 98 F

## 2021-06-02 DIAGNOSIS — I10 HYPERTENSION, UNSPECIFIED TYPE: ICD-10-CM

## 2021-06-02 DIAGNOSIS — E08.51 DIABETES MELLITUS DUE TO UNDERLYING CONDITION WITH DIABETIC PERIPHERAL ANGIOPATHY WITHOUT GANGRENE, WITHOUT LONG-TERM CURRENT USE OF INSULIN: ICD-10-CM

## 2021-06-02 DIAGNOSIS — R53.1 SPELL OF GENERALIZED WEAKNESS: Primary | ICD-10-CM

## 2021-06-02 PROCEDURE — 99999 PR PBB SHADOW E&M-EST. PATIENT-LVL V: CPT | Mod: PBBFAC,,, | Performed by: FAMILY MEDICINE

## 2021-06-02 PROCEDURE — 1157F ADVNC CARE PLAN IN RCRD: CPT | Mod: S$GLB,,, | Performed by: FAMILY MEDICINE

## 2021-06-02 PROCEDURE — 1126F AMNT PAIN NOTED NONE PRSNT: CPT | Mod: S$GLB,,, | Performed by: FAMILY MEDICINE

## 2021-06-02 PROCEDURE — 99214 PR OFFICE/OUTPT VISIT, EST, LEVL IV, 30-39 MIN: ICD-10-PCS | Mod: S$GLB,,, | Performed by: FAMILY MEDICINE

## 2021-06-02 PROCEDURE — 1101F PR PT FALLS ASSESS DOC 0-1 FALLS W/OUT INJ PAST YR: ICD-10-PCS | Mod: CPTII,S$GLB,, | Performed by: FAMILY MEDICINE

## 2021-06-02 PROCEDURE — 1101F PT FALLS ASSESS-DOCD LE1/YR: CPT | Mod: CPTII,S$GLB,, | Performed by: FAMILY MEDICINE

## 2021-06-02 PROCEDURE — 3288F PR FALLS RISK ASSESSMENT DOCUMENTED: ICD-10-PCS | Mod: CPTII,S$GLB,, | Performed by: FAMILY MEDICINE

## 2021-06-02 PROCEDURE — 99999 PR PBB SHADOW E&M-EST. PATIENT-LVL V: ICD-10-PCS | Mod: PBBFAC,,, | Performed by: FAMILY MEDICINE

## 2021-06-02 PROCEDURE — 1159F MED LIST DOCD IN RCRD: CPT | Mod: S$GLB,,, | Performed by: FAMILY MEDICINE

## 2021-06-02 PROCEDURE — 1126F PR PAIN SEVERITY QUANTIFIED, NO PAIN PRESENT: ICD-10-PCS | Mod: S$GLB,,, | Performed by: FAMILY MEDICINE

## 2021-06-02 PROCEDURE — 1157F PR ADVANCE CARE PLAN OR EQUIV PRESENT IN MEDICAL RECORD: ICD-10-PCS | Mod: S$GLB,,, | Performed by: FAMILY MEDICINE

## 2021-06-02 PROCEDURE — 1159F PR MEDICATION LIST DOCUMENTED IN MEDICAL RECORD: ICD-10-PCS | Mod: S$GLB,,, | Performed by: FAMILY MEDICINE

## 2021-06-02 PROCEDURE — 3288F FALL RISK ASSESSMENT DOCD: CPT | Mod: CPTII,S$GLB,, | Performed by: FAMILY MEDICINE

## 2021-06-02 PROCEDURE — 99214 OFFICE O/P EST MOD 30 MIN: CPT | Mod: S$GLB,,, | Performed by: FAMILY MEDICINE

## 2021-06-03 LAB
BATTERY VOLTAGE (V): 3.01 V
IMPEDANCE RA LEAD (NATIVE): NORMAL OHMS
IMPEDANCE RA LEAD: NORMAL OHMS
OHS CV DC PP MS1: 0.3 MS
OHS CV DC PP MS2: NORMAL MS
OHS CV DC PP V1: 2.5 V
OHS CV DC PP V2: 2 V
P/R-WAVE RA LEAD: 11.5 MV
THRESHOLD MS RA LEAD (NATIVE): 0.4 MS
THRESHOLD MS RA LEAD: 0.3 MS
THRESHOLD V RA LEAD (NATIVE): 0.5 V
THRESHOLD V RA LEAD: 1 V
VV DELAY: 80 MSEC

## 2021-06-09 ENCOUNTER — TELEPHONE (OUTPATIENT)
Dept: ADMINISTRATIVE | Facility: HOSPITAL | Age: 86
End: 2021-06-09

## 2021-06-15 PROCEDURE — 99453 REM MNTR PHYSIOL PARAM SETUP: CPT | Mod: S$GLB,,, | Performed by: FAMILY MEDICINE

## 2021-06-15 PROCEDURE — 99453 PR REMOTE MONITR, PHYSIOL PARAM, INITIAL: ICD-10-PCS | Mod: S$GLB,,, | Performed by: FAMILY MEDICINE

## 2021-06-22 ENCOUNTER — TELEPHONE (OUTPATIENT)
Dept: FAMILY MEDICINE | Facility: CLINIC | Age: 86
End: 2021-06-22

## 2021-06-23 ENCOUNTER — PATIENT MESSAGE (OUTPATIENT)
Dept: FAMILY MEDICINE | Facility: CLINIC | Age: 86
End: 2021-06-23

## 2021-06-24 ENCOUNTER — TELEPHONE (OUTPATIENT)
Dept: FAMILY MEDICINE | Facility: CLINIC | Age: 86
End: 2021-06-24

## 2021-06-30 PROCEDURE — 99457 RPM TX MGMT 1ST 20 MIN: CPT | Mod: S$GLB,,, | Performed by: FAMILY MEDICINE

## 2021-06-30 PROCEDURE — 99457 PR MONITORING, PHYSIOL PARAM, REMOTE, 1ST 20 MINS, PER MONTH: ICD-10-PCS | Mod: S$GLB,,, | Performed by: FAMILY MEDICINE

## 2021-07-08 ENCOUNTER — TELEPHONE (OUTPATIENT)
Dept: FAMILY MEDICINE | Facility: CLINIC | Age: 86
End: 2021-07-08

## 2021-07-12 ENCOUNTER — OFFICE VISIT (OUTPATIENT)
Dept: OPHTHALMOLOGY | Facility: CLINIC | Age: 86
End: 2021-07-12
Payer: MEDICARE

## 2021-07-12 DIAGNOSIS — E11.9 DIABETES MELLITUS TYPE 2 WITHOUT RETINOPATHY: Primary | ICD-10-CM

## 2021-07-12 DIAGNOSIS — Z96.1 PSEUDOPHAKIA: ICD-10-CM

## 2021-07-12 DIAGNOSIS — H52.7 REFRACTIVE ERROR: ICD-10-CM

## 2021-07-12 PROCEDURE — 2023F DILAT RTA XM W/O RTNOPTHY: CPT | Mod: S$GLB,,, | Performed by: OPTOMETRIST

## 2021-07-12 PROCEDURE — 92015 PR REFRACTION: ICD-10-PCS | Mod: S$GLB,,, | Performed by: OPTOMETRIST

## 2021-07-12 PROCEDURE — 1157F ADVNC CARE PLAN IN RCRD: CPT | Mod: S$GLB,,, | Performed by: OPTOMETRIST

## 2021-07-12 PROCEDURE — 1126F AMNT PAIN NOTED NONE PRSNT: CPT | Mod: S$GLB,,, | Performed by: OPTOMETRIST

## 2021-07-12 PROCEDURE — 92014 COMPRE OPH EXAM EST PT 1/>: CPT | Mod: S$GLB,,, | Performed by: OPTOMETRIST

## 2021-07-12 PROCEDURE — 92015 DETERMINE REFRACTIVE STATE: CPT | Mod: S$GLB,,, | Performed by: OPTOMETRIST

## 2021-07-12 PROCEDURE — 2023F PR DILATED RETINAL EXAM W/O EVID OF RETINOPATHY: ICD-10-PCS | Mod: S$GLB,,, | Performed by: OPTOMETRIST

## 2021-07-12 PROCEDURE — 99999 PR PBB SHADOW E&M-EST. PATIENT-LVL III: ICD-10-PCS | Mod: PBBFAC,,, | Performed by: OPTOMETRIST

## 2021-07-12 PROCEDURE — 1157F PR ADVANCE CARE PLAN OR EQUIV PRESENT IN MEDICAL RECORD: ICD-10-PCS | Mod: S$GLB,,, | Performed by: OPTOMETRIST

## 2021-07-12 PROCEDURE — 99999 PR PBB SHADOW E&M-EST. PATIENT-LVL III: CPT | Mod: PBBFAC,,, | Performed by: OPTOMETRIST

## 2021-07-12 PROCEDURE — 99499 UNLISTED E&M SERVICE: CPT | Mod: HCNC,S$GLB,, | Performed by: OPTOMETRIST

## 2021-07-12 PROCEDURE — 92014 PR EYE EXAM, EST PATIENT,COMPREHESV: ICD-10-PCS | Mod: S$GLB,,, | Performed by: OPTOMETRIST

## 2021-07-12 PROCEDURE — 1126F PR PAIN SEVERITY QUANTIFIED, NO PAIN PRESENT: ICD-10-PCS | Mod: S$GLB,,, | Performed by: OPTOMETRIST

## 2021-07-12 PROCEDURE — 99499 RISK ADDL DX/OHS AUDIT: ICD-10-PCS | Mod: HCNC,S$GLB,, | Performed by: OPTOMETRIST

## 2021-07-14 ENCOUNTER — PES CALL (OUTPATIENT)
Dept: ADMINISTRATIVE | Facility: CLINIC | Age: 86
End: 2021-07-14

## 2021-07-22 ENCOUNTER — PATIENT OUTREACH (OUTPATIENT)
Dept: OTHER | Facility: OTHER | Age: 86
End: 2021-07-22
Payer: MEDICARE

## 2021-07-30 ENCOUNTER — OFFICE VISIT (OUTPATIENT)
Dept: CARDIOLOGY | Facility: CLINIC | Age: 86
End: 2021-07-30
Payer: MEDICARE

## 2021-07-30 VITALS
DIASTOLIC BLOOD PRESSURE: 76 MMHG | RESPIRATION RATE: 16 BRPM | SYSTOLIC BLOOD PRESSURE: 150 MMHG | HEART RATE: 70 BPM | BODY MASS INDEX: 27.97 KG/M2 | WEIGHT: 189.38 LBS

## 2021-07-30 DIAGNOSIS — I36.1 NONRHEUMATIC TRICUSPID VALVE REGURGITATION: ICD-10-CM

## 2021-07-30 DIAGNOSIS — I50.32 CHRONIC DIASTOLIC HEART FAILURE: ICD-10-CM

## 2021-07-30 DIAGNOSIS — I73.9 CLAUDICATION IN PERIPHERAL VASCULAR DISEASE: Chronic | ICD-10-CM

## 2021-07-30 DIAGNOSIS — I35.0 NONRHEUMATIC AORTIC VALVE STENOSIS: ICD-10-CM

## 2021-07-30 DIAGNOSIS — I73.9 PERIPHERAL VASCULAR DISEASE: Chronic | ICD-10-CM

## 2021-07-30 DIAGNOSIS — I20.9 AP (ANGINA PECTORIS): ICD-10-CM

## 2021-07-30 DIAGNOSIS — R60.0 EDEMA OF BOTH LEGS: ICD-10-CM

## 2021-07-30 DIAGNOSIS — E66.3 OVERWEIGHT (BMI 25.0-29.9): ICD-10-CM

## 2021-07-30 DIAGNOSIS — I25.708 CORONARY ARTERY DISEASE OF BYPASS GRAFT OF NATIVE HEART WITH STABLE ANGINA PECTORIS: ICD-10-CM

## 2021-07-30 DIAGNOSIS — I65.23 ASYMPTOMATIC STENOSIS OF BOTH CAROTID ARTERIES WITHOUT INFARCTION: ICD-10-CM

## 2021-07-30 DIAGNOSIS — E08.51 DIABETES MELLITUS DUE TO UNDERLYING CONDITION WITH DIABETIC PERIPHERAL ANGIOPATHY WITHOUT GANGRENE, WITHOUT LONG-TERM CURRENT USE OF INSULIN: ICD-10-CM

## 2021-07-30 DIAGNOSIS — Z78.9 STATIN INTOLERANCE: ICD-10-CM

## 2021-07-30 DIAGNOSIS — I11.0 HYPERTENSIVE HEART DISEASE WITH HEART FAILURE: ICD-10-CM

## 2021-07-30 DIAGNOSIS — I77.9 CAROTID ARTERY DISEASE WITHOUT CEREBRAL INFARCTION: Chronic | ICD-10-CM

## 2021-07-30 DIAGNOSIS — I50.42 CHRONIC COMBINED SYSTOLIC AND DIASTOLIC CONGESTIVE HEART FAILURE: ICD-10-CM

## 2021-07-30 DIAGNOSIS — R94.31 ABNORMAL ECG: Primary | ICD-10-CM

## 2021-07-30 DIAGNOSIS — R06.02 SOB (SHORTNESS OF BREATH): ICD-10-CM

## 2021-07-30 DIAGNOSIS — I71.21 ANEURYSM OF ASCENDING AORTA: ICD-10-CM

## 2021-07-30 DIAGNOSIS — I61.9 HEMORRHAGIC CEREBROVASCULAR ACCIDENT (CVA): ICD-10-CM

## 2021-07-30 DIAGNOSIS — E78.2 MIXED HYPERLIPIDEMIA: Chronic | ICD-10-CM

## 2021-07-30 DIAGNOSIS — I48.0 PAROXYSMAL ATRIAL FIBRILLATION: Chronic | ICD-10-CM

## 2021-07-30 DIAGNOSIS — E13.51 PERIPHERAL VASCULAR DISEASE DUE TO SECONDARY DIABETES MELLITUS: ICD-10-CM

## 2021-07-30 DIAGNOSIS — I10 ESSENTIAL HYPERTENSION: ICD-10-CM

## 2021-07-30 PROCEDURE — 99214 OFFICE O/P EST MOD 30 MIN: CPT | Mod: S$GLB,,, | Performed by: INTERNAL MEDICINE

## 2021-07-30 PROCEDURE — 99999 PR PBB SHADOW E&M-EST. PATIENT-LVL III: ICD-10-PCS | Mod: PBBFAC,,, | Performed by: INTERNAL MEDICINE

## 2021-07-30 PROCEDURE — 99214 PR OFFICE/OUTPT VISIT, EST, LEVL IV, 30-39 MIN: ICD-10-PCS | Mod: S$GLB,,, | Performed by: INTERNAL MEDICINE

## 2021-07-30 PROCEDURE — 99999 PR PBB SHADOW E&M-EST. PATIENT-LVL III: CPT | Mod: PBBFAC,,, | Performed by: INTERNAL MEDICINE

## 2021-07-30 PROCEDURE — 1157F ADVNC CARE PLAN IN RCRD: CPT | Mod: CPTII,S$GLB,, | Performed by: INTERNAL MEDICINE

## 2021-07-30 PROCEDURE — 1160F PR REVIEW ALL MEDS BY PRESCRIBER/CLIN PHARMACIST DOCUMENTED: ICD-10-PCS | Mod: CPTII,S$GLB,, | Performed by: INTERNAL MEDICINE

## 2021-07-30 PROCEDURE — 1157F PR ADVANCE CARE PLAN OR EQUIV PRESENT IN MEDICAL RECORD: ICD-10-PCS | Mod: CPTII,S$GLB,, | Performed by: INTERNAL MEDICINE

## 2021-07-30 PROCEDURE — 1159F MED LIST DOCD IN RCRD: CPT | Mod: CPTII,S$GLB,, | Performed by: INTERNAL MEDICINE

## 2021-07-30 PROCEDURE — 1159F PR MEDICATION LIST DOCUMENTED IN MEDICAL RECORD: ICD-10-PCS | Mod: CPTII,S$GLB,, | Performed by: INTERNAL MEDICINE

## 2021-07-30 PROCEDURE — 1160F RVW MEDS BY RX/DR IN RCRD: CPT | Mod: CPTII,S$GLB,, | Performed by: INTERNAL MEDICINE

## 2021-08-06 ENCOUNTER — CLINICAL SUPPORT (OUTPATIENT)
Dept: CARDIOLOGY | Facility: HOSPITAL | Age: 86
End: 2021-08-06
Payer: MEDICARE

## 2021-08-06 DIAGNOSIS — Z95.0 PRESENCE OF CARDIAC PACEMAKER: ICD-10-CM

## 2021-08-06 PROCEDURE — 93294 REM INTERROG EVL PM/LDLS PM: CPT | Mod: S$GLB,,, | Performed by: INTERNAL MEDICINE

## 2021-08-06 PROCEDURE — 93296 REM INTERROG EVL PM/IDS: CPT | Performed by: INTERNAL MEDICINE

## 2021-08-06 PROCEDURE — 93294 CARDIAC DEVICE CHECK - REMOTE: ICD-10-PCS | Mod: S$GLB,,, | Performed by: INTERNAL MEDICINE

## 2021-08-26 ENCOUNTER — PATIENT MESSAGE (OUTPATIENT)
Dept: ADMINISTRATIVE | Facility: HOSPITAL | Age: 86
End: 2021-08-26

## 2021-09-08 ENCOUNTER — PES CALL (OUTPATIENT)
Dept: ADMINISTRATIVE | Facility: CLINIC | Age: 86
End: 2021-09-08

## 2021-09-08 ENCOUNTER — PATIENT MESSAGE (OUTPATIENT)
Dept: FAMILY MEDICINE | Facility: CLINIC | Age: 86
End: 2021-09-08

## 2021-09-09 ENCOUNTER — LAB VISIT (OUTPATIENT)
Dept: LAB | Facility: HOSPITAL | Age: 86
End: 2021-09-09
Attending: FAMILY MEDICINE
Payer: MEDICARE

## 2021-09-09 ENCOUNTER — OFFICE VISIT (OUTPATIENT)
Dept: FAMILY MEDICINE | Facility: CLINIC | Age: 86
End: 2021-09-09
Payer: MEDICARE

## 2021-09-09 VITALS
WEIGHT: 189.63 LBS | DIASTOLIC BLOOD PRESSURE: 88 MMHG | BODY MASS INDEX: 28.08 KG/M2 | SYSTOLIC BLOOD PRESSURE: 172 MMHG | HEART RATE: 70 BPM | HEIGHT: 69 IN | TEMPERATURE: 98 F | OXYGEN SATURATION: 98 %

## 2021-09-09 DIAGNOSIS — E08.51 DIABETES MELLITUS DUE TO UNDERLYING CONDITION WITH DIABETIC PERIPHERAL ANGIOPATHY WITHOUT GANGRENE, WITHOUT LONG-TERM CURRENT USE OF INSULIN: ICD-10-CM
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
## 2021-09-09 DIAGNOSIS — I10 HYPERTENSION, UNSPECIFIED TYPE: ICD-10-CM

## 2021-09-09 DIAGNOSIS — R51.9 HEADACHE, CHRONIC DAILY: ICD-10-CM

## 2021-09-09 DIAGNOSIS — I10 HYPERTENSION NOT AT GOAL: Primary | ICD-10-CM

## 2021-09-09 DIAGNOSIS — R53.1 SPELL OF GENERALIZED WEAKNESS: ICD-10-CM

## 2021-09-09 LAB
ALBUMIN SERPL BCP-MCNC: 4 G/DL (ref 3.5–5.2)
ALP SERPL-CCNC: 172 U/L (ref 55–135)
ALT SERPL W/O P-5'-P-CCNC: 25 U/L (ref 10–44)
ANION GAP SERPL CALC-SCNC: 12 MMOL/L (ref 8–16)
AST SERPL-CCNC: 39 U/L (ref 10–40)
BASOPHILS # BLD AUTO: 0.03 K/UL (ref 0–0.2)
BASOPHILS NFR BLD: 0.6 % (ref 0–1.9)
BILIRUB SERPL-MCNC: 0.6 MG/DL (ref 0.1–1)
BUN SERPL-MCNC: 23 MG/DL (ref 8–23)
CALCIUM SERPL-MCNC: 10.6 MG/DL (ref 8.7–10.5)
CHLORIDE SERPL-SCNC: 99 MMOL/L (ref 95–110)
CHOLEST SERPL-MCNC: 163 MG/DL (ref 120–199)
CHOLEST/HDLC SERPL: 4 {RATIO} (ref 2–5)
CO2 SERPL-SCNC: 26 MMOL/L (ref 23–29)
CREAT SERPL-MCNC: 1 MG/DL (ref 0.5–1.4)
DIFFERENTIAL METHOD: ABNORMAL
EOSINOPHIL # BLD AUTO: 0.2 K/UL (ref 0–0.5)
EOSINOPHIL NFR BLD: 4.3 % (ref 0–8)
ERYTHROCYTE [DISTWIDTH] IN BLOOD BY AUTOMATED COUNT: 13.3 % (ref 11.5–14.5)
EST. GFR  (AFRICAN AMERICAN): >60 ML/MIN/1.73 M^2
EST. GFR  (NON AFRICAN AMERICAN): >60 ML/MIN/1.73 M^2
ESTIMATED AVG GLUCOSE: 131 MG/DL (ref 68–131)
GLUCOSE SERPL-MCNC: 119 MG/DL (ref 70–110)
HBA1C MFR BLD: 6.2 % (ref 4–5.6)
HCT VFR BLD AUTO: 40.4 % (ref 40–54)
HDLC SERPL-MCNC: 41 MG/DL (ref 40–75)
HDLC SERPL: 25.2 % (ref 20–50)
HGB BLD-MCNC: 12.9 G/DL (ref 14–18)
IMM GRANULOCYTES # BLD AUTO: 0.01 K/UL (ref 0–0.04)
IMM GRANULOCYTES NFR BLD AUTO: 0.2 % (ref 0–0.5)
LDLC SERPL CALC-MCNC: 94.4 MG/DL (ref 63–159)
LYMPHOCYTES # BLD AUTO: 1.8 K/UL (ref 1–4.8)
LYMPHOCYTES NFR BLD: 34.4 % (ref 18–48)
MCH RBC QN AUTO: 31.1 PG (ref 27–31)
MCHC RBC AUTO-ENTMCNC: 31.9 G/DL (ref 32–36)
MCV RBC AUTO: 97 FL (ref 82–98)
MONOCYTES # BLD AUTO: 0.6 K/UL (ref 0.3–1)
MONOCYTES NFR BLD: 11.8 % (ref 4–15)
NEUTROPHILS # BLD AUTO: 2.6 K/UL (ref 1.8–7.7)
NEUTROPHILS NFR BLD: 48.7 % (ref 38–73)
NONHDLC SERPL-MCNC: 122 MG/DL
NRBC BLD-RTO: 0 /100 WBC
PLATELET # BLD AUTO: 177 K/UL (ref 150–450)
PMV BLD AUTO: 11 FL (ref 9.2–12.9)
POTASSIUM SERPL-SCNC: 4.3 MMOL/L (ref 3.5–5.1)
PROT SERPL-MCNC: 7.3 G/DL (ref 6–8.4)
RBC # BLD AUTO: 4.15 M/UL (ref 4.6–6.2)
SODIUM SERPL-SCNC: 137 MMOL/L (ref 136–145)
TRIGL SERPL-MCNC: 138 MG/DL (ref 30–150)
WBC # BLD AUTO: 5.35 K/UL (ref 3.9–12.7)

## 2021-09-09 PROCEDURE — 3288F PR FALLS RISK ASSESSMENT DOCUMENTED: ICD-10-PCS | Mod: CPTII,S$GLB,, | Performed by: FAMILY MEDICINE

## 2021-09-09 PROCEDURE — 3288F FALL RISK ASSESSMENT DOCD: CPT | Mod: CPTII,S$GLB,, | Performed by: FAMILY MEDICINE

## 2021-09-09 PROCEDURE — 1126F PR PAIN SEVERITY QUANTIFIED, NO PAIN PRESENT: ICD-10-PCS | Mod: CPTII,S$GLB,, | Performed by: FAMILY MEDICINE

## 2021-09-09 PROCEDURE — 1157F PR ADVANCE CARE PLAN OR EQUIV PRESENT IN MEDICAL RECORD: ICD-10-PCS | Mod: CPTII,S$GLB,, | Performed by: FAMILY MEDICINE

## 2021-09-09 PROCEDURE — 1126F AMNT PAIN NOTED NONE PRSNT: CPT | Mod: CPTII,S$GLB,, | Performed by: FAMILY MEDICINE

## 2021-09-09 PROCEDURE — 1101F PT FALLS ASSESS-DOCD LE1/YR: CPT | Mod: CPTII,S$GLB,, | Performed by: FAMILY MEDICINE

## 2021-09-09 PROCEDURE — 83036 HEMOGLOBIN GLYCOSYLATED A1C: CPT | Performed by: FAMILY MEDICINE

## 2021-09-09 PROCEDURE — 1159F PR MEDICATION LIST DOCUMENTED IN MEDICAL RECORD: ICD-10-PCS | Mod: CPTII,S$GLB,, | Performed by: FAMILY MEDICINE

## 2021-09-09 PROCEDURE — 80061 LIPID PANEL: CPT | Performed by: FAMILY MEDICINE

## 2021-09-09 PROCEDURE — 1101F PR PT FALLS ASSESS DOC 0-1 FALLS W/OUT INJ PAST YR: ICD-10-PCS | Mod: CPTII,S$GLB,, | Performed by: FAMILY MEDICINE

## 2021-09-09 PROCEDURE — 99214 PR OFFICE/OUTPT VISIT, EST, LEVL IV, 30-39 MIN: ICD-10-PCS | Mod: S$GLB,,, | Performed by: FAMILY MEDICINE

## 2021-09-09 PROCEDURE — 1157F ADVNC CARE PLAN IN RCRD: CPT | Mod: CPTII,S$GLB,, | Performed by: FAMILY MEDICINE

## 2021-09-09 PROCEDURE — 99999 PR PBB SHADOW E&M-EST. PATIENT-LVL V: CPT | Mod: PBBFAC,,, | Performed by: FAMILY MEDICINE

## 2021-09-09 PROCEDURE — 99999 PR PBB SHADOW E&M-EST. PATIENT-LVL V: ICD-10-PCS | Mod: PBBFAC,,, | Performed by: FAMILY MEDICINE

## 2021-09-09 PROCEDURE — 36415 COLL VENOUS BLD VENIPUNCTURE: CPT | Mod: PO | Performed by: FAMILY MEDICINE

## 2021-09-09 PROCEDURE — 85025 COMPLETE CBC W/AUTO DIFF WBC: CPT | Performed by: FAMILY MEDICINE

## 2021-09-09 PROCEDURE — 1159F MED LIST DOCD IN RCRD: CPT | Mod: CPTII,S$GLB,, | Performed by: FAMILY MEDICINE

## 2021-09-09 PROCEDURE — 99214 OFFICE O/P EST MOD 30 MIN: CPT | Mod: S$GLB,,, | Performed by: FAMILY MEDICINE

## 2021-09-09 PROCEDURE — 80053 COMPREHEN METABOLIC PANEL: CPT | Performed by: FAMILY MEDICINE

## 2021-09-09 RX ORDER — SPIRONOLACTONE AND HYDROCHLOROTHIAZIDE 25; 25 MG/1; MG/1
1 TABLET ORAL EVERY MORNING
Qty: 30 TABLET | Refills: 11 | Status: ON HOLD | OUTPATIENT
Start: 2021-09-09 | End: 2021-09-21

## 2021-09-09 RX ORDER — NIFEDIPINE 30 MG/1
30 TABLET, EXTENDED RELEASE ORAL NIGHTLY
Qty: 30 TABLET | Refills: 11 | Status: ON HOLD | OUTPATIENT
Start: 2021-09-09 | End: 2021-09-21 | Stop reason: HOSPADM

## 2021-09-12 DIAGNOSIS — R74.8 ELEVATED ALKALINE PHOSPHATASE LEVEL: Primary | ICD-10-CM

## 2021-09-13 ENCOUNTER — PATIENT MESSAGE (OUTPATIENT)
Dept: FAMILY MEDICINE | Facility: CLINIC | Age: 86
End: 2021-09-13

## 2021-09-14 ENCOUNTER — PATIENT MESSAGE (OUTPATIENT)
Dept: GASTROENTEROLOGY | Facility: CLINIC | Age: 86
End: 2021-09-14

## 2021-09-14 ENCOUNTER — PATIENT MESSAGE (OUTPATIENT)
Dept: FAMILY MEDICINE | Facility: CLINIC | Age: 86
End: 2021-09-14

## 2021-09-15 ENCOUNTER — LAB VISIT (OUTPATIENT)
Dept: LAB | Facility: HOSPITAL | Age: 86
End: 2021-09-15
Attending: FAMILY MEDICINE
Payer: MEDICARE

## 2021-09-15 DIAGNOSIS — R74.8 ELEVATED ALKALINE PHOSPHATASE LEVEL: ICD-10-CM

## 2021-09-15 PROCEDURE — 84080 ASSAY ALKALINE PHOSPHATASES: CPT | Performed by: FAMILY MEDICINE

## 2021-09-15 PROCEDURE — 36415 COLL VENOUS BLD VENIPUNCTURE: CPT | Mod: PO | Performed by: FAMILY MEDICINE

## 2021-09-15 PROCEDURE — 84075 ASSAY ALKALINE PHOSPHATASE: CPT | Performed by: FAMILY MEDICINE

## 2021-09-16 ENCOUNTER — PATIENT MESSAGE (OUTPATIENT)
Dept: FAMILY MEDICINE | Facility: CLINIC | Age: 86
End: 2021-09-16

## 2021-09-16 DIAGNOSIS — G47.00 INSOMNIA, UNSPECIFIED TYPE: Primary | ICD-10-CM

## 2021-09-16 RX ORDER — TRAZODONE HYDROCHLORIDE 50 MG/1
50 TABLET ORAL NIGHTLY
Qty: 30 TABLET | Refills: 1 | Status: SHIPPED | OUTPATIENT
Start: 2021-09-16 | End: 2021-11-04

## 2021-09-20 ENCOUNTER — PATIENT MESSAGE (OUTPATIENT)
Dept: FAMILY MEDICINE | Facility: CLINIC | Age: 86
End: 2021-09-20

## 2021-09-20 ENCOUNTER — HOSPITAL ENCOUNTER (OUTPATIENT)
Dept: RADIOLOGY | Facility: HOSPITAL | Age: 86
Discharge: HOME | End: 2021-09-20
Attending: FAMILY MEDICINE
Payer: MEDICARE

## 2021-09-20 ENCOUNTER — HOSPITAL ENCOUNTER (OUTPATIENT)
Facility: HOSPITAL | Age: 86
Discharge: HOME | End: 2021-09-21
Attending: EMERGENCY MEDICINE | Admitting: INTERNAL MEDICINE
Payer: MEDICARE

## 2021-09-20 ENCOUNTER — OFFICE VISIT (OUTPATIENT)
Dept: URGENT CARE | Facility: CLINIC | Age: 86
End: 2021-09-20
Payer: MEDICARE

## 2021-09-20 VITALS
HEART RATE: 69 BPM | SYSTOLIC BLOOD PRESSURE: 129 MMHG | WEIGHT: 192.25 LBS | DIASTOLIC BLOOD PRESSURE: 72 MMHG | OXYGEN SATURATION: 99 % | BODY MASS INDEX: 28.39 KG/M2

## 2021-09-20 DIAGNOSIS — R07.89 CHEST TIGHTNESS: Primary | ICD-10-CM

## 2021-09-20 DIAGNOSIS — R07.9 CHEST PAIN: ICD-10-CM
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
## 2021-09-20 DIAGNOSIS — R07.9 CHEST PAIN, UNSPECIFIED TYPE: Primary | ICD-10-CM

## 2021-09-20 DIAGNOSIS — R51.9 HEADACHE, CHRONIC DAILY: ICD-10-CM

## 2021-09-20 DIAGNOSIS — R53.1 FEELING WEAK: ICD-10-CM

## 2021-09-20 DIAGNOSIS — R20.0 ARM NUMBNESS: ICD-10-CM

## 2021-09-20 PROBLEM — R19.7 DIARRHEA: Status: RESOLVED | Noted: 2018-10-16 | Resolved: 2021-09-20

## 2021-09-20 PROBLEM — R60.0 EDEMA OF BOTH LEGS: Status: RESOLVED | Noted: 2017-02-09 | Resolved: 2021-09-20

## 2021-09-20 PROBLEM — R06.02 SOB (SHORTNESS OF BREATH): Status: RESOLVED | Noted: 2017-03-03 | Resolved: 2021-09-20

## 2021-09-20 PROBLEM — R13.10 DYSPHAGIA: Status: RESOLVED | Noted: 2018-05-07 | Resolved: 2021-09-20

## 2021-09-20 LAB
ALBUMIN SERPL BCP-MCNC: 3.8 G/DL (ref 3.5–5.2)
ALBUMIN SERPL BCP-MCNC: 3.9 G/DL (ref 3.5–5.2)
ALP SERPL-CCNC: 160 U/L (ref 55–135)
ALP SERPL-CCNC: 163 U/L (ref 55–135)
ALT SERPL W/O P-5'-P-CCNC: 20 U/L (ref 10–44)
ALT SERPL W/O P-5'-P-CCNC: 20 U/L (ref 10–44)
ANION GAP SERPL CALC-SCNC: 11 MMOL/L (ref 8–16)
ANION GAP SERPL CALC-SCNC: 12 MMOL/L (ref 8–16)
AST SERPL-CCNC: 30 U/L (ref 10–40)
AST SERPL-CCNC: 31 U/L (ref 10–40)
BASOPHILS # BLD AUTO: 0.02 K/UL (ref 0–0.2)
BASOPHILS # BLD AUTO: 0.02 K/UL (ref 0–0.2)
BASOPHILS NFR BLD: 0.4 % (ref 0–1.9)
BASOPHILS NFR BLD: 0.4 % (ref 0–1.9)
BILIRUB SERPL-MCNC: 0.5 MG/DL (ref 0.1–1)
BILIRUB SERPL-MCNC: 0.5 MG/DL (ref 0.1–1)
BUN SERPL-MCNC: 52 MG/DL (ref 8–23)
BUN SERPL-MCNC: 57 MG/DL (ref 8–23)
CALCIUM SERPL-MCNC: 10.6 MG/DL (ref 8.7–10.5)
CALCIUM SERPL-MCNC: 10.7 MG/DL (ref 8.7–10.5)
CHLORIDE SERPL-SCNC: 101 MMOL/L (ref 95–110)
CHLORIDE SERPL-SCNC: 101 MMOL/L (ref 95–110)
CK SERPL-CCNC: 74 U/L (ref 20–200)
CO2 SERPL-SCNC: 24 MMOL/L (ref 23–29)
CO2 SERPL-SCNC: 25 MMOL/L (ref 23–29)
CREAT SERPL-MCNC: 1.4 MG/DL (ref 0.5–1.4)
CREAT SERPL-MCNC: 1.5 MG/DL (ref 0.5–1.4)
CTP QC/QA: YES
DIFFERENTIAL METHOD: ABNORMAL
DIFFERENTIAL METHOD: ABNORMAL
EOSINOPHIL # BLD AUTO: 0.2 K/UL (ref 0–0.5)
EOSINOPHIL # BLD AUTO: 0.2 K/UL (ref 0–0.5)
EOSINOPHIL NFR BLD: 3 % (ref 0–8)
EOSINOPHIL NFR BLD: 3 % (ref 0–8)
ERYTHROCYTE [DISTWIDTH] IN BLOOD BY AUTOMATED COUNT: 13.1 % (ref 11.5–14.5)
ERYTHROCYTE [DISTWIDTH] IN BLOOD BY AUTOMATED COUNT: 13.1 % (ref 11.5–14.5)
EST. GFR  (AFRICAN AMERICAN): 47 ML/MIN/1.73 M^2
EST. GFR  (AFRICAN AMERICAN): 51 ML/MIN/1.73 M^2
EST. GFR  (NON AFRICAN AMERICAN): 41 ML/MIN/1.73 M^2
EST. GFR  (NON AFRICAN AMERICAN): 45 ML/MIN/1.73 M^2
GLUCOSE SERPL-MCNC: 144 MG/DL (ref 70–110)
GLUCOSE SERPL-MCNC: 149 MG/DL (ref 70–110)
GLUCOSE SERPL-MCNC: 207 MG/DL (ref 70–110)
HCT VFR BLD AUTO: 37.3 % (ref 40–54)
HCT VFR BLD AUTO: 37.3 % (ref 40–54)
HGB BLD-MCNC: 12.2 G/DL (ref 14–18)
HGB BLD-MCNC: 12.2 G/DL (ref 14–18)
IMM GRANULOCYTES # BLD AUTO: 0.01 K/UL (ref 0–0.04)
IMM GRANULOCYTES # BLD AUTO: 0.01 K/UL (ref 0–0.04)
IMM GRANULOCYTES NFR BLD AUTO: 0.2 % (ref 0–0.5)
IMM GRANULOCYTES NFR BLD AUTO: 0.2 % (ref 0–0.5)
LYMPHOCYTES # BLD AUTO: 1.9 K/UL (ref 1–4.8)
LYMPHOCYTES # BLD AUTO: 1.9 K/UL (ref 1–4.8)
LYMPHOCYTES NFR BLD: 33.2 % (ref 18–48)
LYMPHOCYTES NFR BLD: 33.2 % (ref 18–48)
MCH RBC QN AUTO: 31 PG (ref 27–31)
MCH RBC QN AUTO: 31 PG (ref 27–31)
MCHC RBC AUTO-ENTMCNC: 32.7 G/DL (ref 32–36)
MCHC RBC AUTO-ENTMCNC: 32.7 G/DL (ref 32–36)
MCV RBC AUTO: 95 FL (ref 82–98)
MCV RBC AUTO: 95 FL (ref 82–98)
MONOCYTES # BLD AUTO: 0.6 K/UL (ref 0.3–1)
MONOCYTES # BLD AUTO: 0.6 K/UL (ref 0.3–1)
MONOCYTES NFR BLD: 11.4 % (ref 4–15)
MONOCYTES NFR BLD: 11.4 % (ref 4–15)
NEUTROPHILS # BLD AUTO: 2.9 K/UL (ref 1.8–7.7)
NEUTROPHILS # BLD AUTO: 2.9 K/UL (ref 1.8–7.7)
NEUTROPHILS NFR BLD: 51.8 % (ref 38–73)
NEUTROPHILS NFR BLD: 51.8 % (ref 38–73)
NRBC BLD-RTO: 0 /100 WBC
NRBC BLD-RTO: 0 /100 WBC
PLATELET # BLD AUTO: 148 K/UL (ref 150–450)
PLATELET # BLD AUTO: 148 K/UL (ref 150–450)
PMV BLD AUTO: 9.5 FL (ref 9.2–12.9)
PMV BLD AUTO: 9.5 FL (ref 9.2–12.9)
POCT GLUCOSE: 122 MG/DL (ref 70–110)
POTASSIUM SERPL-SCNC: 4.8 MMOL/L (ref 3.5–5.1)
POTASSIUM SERPL-SCNC: 4.8 MMOL/L (ref 3.5–5.1)
PROT SERPL-MCNC: 7.2 G/DL (ref 6–8.4)
PROT SERPL-MCNC: 7.4 G/DL (ref 6–8.4)
RBC # BLD AUTO: 3.94 M/UL (ref 4.6–6.2)
RBC # BLD AUTO: 3.94 M/UL (ref 4.6–6.2)
SARS-COV-2 RDRP RESP QL NAA+PROBE: NEGATIVE
SODIUM SERPL-SCNC: 137 MMOL/L (ref 136–145)
SODIUM SERPL-SCNC: 137 MMOL/L (ref 136–145)
TROPONIN I SERPL DL<=0.01 NG/ML-MCNC: 0.01 NG/ML (ref 0–0.03)
TROPONIN I SERPL DL<=0.01 NG/ML-MCNC: 0.01 NG/ML (ref 0–0.03)
TROPONIN I SERPL DL<=0.01 NG/ML-MCNC: <0.006 NG/ML (ref 0–0.03)
TSH SERPL DL<=0.005 MIU/L-ACNC: 2.39 UIU/ML (ref 0.4–4)
WBC # BLD AUTO: 5.63 K/UL (ref 3.9–12.7)
WBC # BLD AUTO: 5.63 K/UL (ref 3.9–12.7)

## 2021-09-20 PROCEDURE — 93005 ELECTROCARDIOGRAM TRACING: CPT | Mod: S$GLB,,, | Performed by: PHYSICIAN ASSISTANT

## 2021-09-20 PROCEDURE — 93010 ELECTROCARDIOGRAM REPORT: CPT | Mod: ,,, | Performed by: INTERNAL MEDICINE

## 2021-09-20 PROCEDURE — 93005 ELECTROCARDIOGRAM TRACING: CPT

## 2021-09-20 PROCEDURE — 85025 COMPLETE CBC W/AUTO DIFF WBC: CPT | Performed by: EMERGENCY MEDICINE

## 2021-09-20 PROCEDURE — 25000003 PHARM REV CODE 250: Performed by: NURSE PRACTITIONER

## 2021-09-20 PROCEDURE — 1159F MED LIST DOCD IN RCRD: CPT | Mod: CPTII,S$GLB,, | Performed by: PHYSICIAN ASSISTANT

## 2021-09-20 PROCEDURE — 80053 COMPREHEN METABOLIC PANEL: CPT | Performed by: NURSE PRACTITIONER

## 2021-09-20 PROCEDURE — 99214 OFFICE O/P EST MOD 30 MIN: CPT | Mod: S$GLB,,, | Performed by: PHYSICIAN ASSISTANT

## 2021-09-20 PROCEDURE — 84443 ASSAY THYROID STIM HORMONE: CPT | Performed by: NURSE PRACTITIONER

## 2021-09-20 PROCEDURE — 93010 ELECTROCARDIOGRAM REPORT: CPT | Mod: S$GLB,,, | Performed by: INTERNAL MEDICINE

## 2021-09-20 PROCEDURE — 93005 EKG 12-LEAD: ICD-10-PCS | Mod: S$GLB,,, | Performed by: PHYSICIAN ASSISTANT

## 2021-09-20 PROCEDURE — A4216 STERILE WATER/SALINE, 10 ML: HCPCS | Performed by: NURSE PRACTITIONER

## 2021-09-20 PROCEDURE — 84484 ASSAY OF TROPONIN QUANT: CPT | Performed by: NURSE PRACTITIONER

## 2021-09-20 PROCEDURE — 99999 PR PBB SHADOW E&M-EST. PATIENT-LVL II: CPT | Mod: PBBFAC,,, | Performed by: PHYSICIAN ASSISTANT

## 2021-09-20 PROCEDURE — G0378 HOSPITAL OBSERVATION PER HR: HCPCS

## 2021-09-20 PROCEDURE — 99999 PR PBB SHADOW E&M-EST. PATIENT-LVL II: ICD-10-PCS | Mod: PBBFAC,,, | Performed by: PHYSICIAN ASSISTANT

## 2021-09-20 PROCEDURE — U0002 COVID-19 LAB TEST NON-CDC: HCPCS | Performed by: EMERGENCY MEDICINE

## 2021-09-20 PROCEDURE — 82962 GLUCOSE BLOOD TEST: CPT | Mod: S$GLB,,, | Performed by: PHYSICIAN ASSISTANT

## 2021-09-20 PROCEDURE — 1157F PR ADVANCE CARE PLAN OR EQUIV PRESENT IN MEDICAL RECORD: ICD-10-PCS | Mod: CPTII,S$GLB,, | Performed by: PHYSICIAN ASSISTANT

## 2021-09-20 PROCEDURE — 96372 THER/PROPH/DIAG INJ SC/IM: CPT | Mod: 59

## 2021-09-20 PROCEDURE — 84484 ASSAY OF TROPONIN QUANT: CPT | Mod: 91 | Performed by: NURSE PRACTITIONER

## 2021-09-20 PROCEDURE — 99214 PR OFFICE/OUTPT VISIT, EST, LEVL IV, 30-39 MIN: ICD-10-PCS | Mod: S$GLB,,, | Performed by: PHYSICIAN ASSISTANT

## 2021-09-20 PROCEDURE — 36415 COLL VENOUS BLD VENIPUNCTURE: CPT | Performed by: NURSE PRACTITIONER

## 2021-09-20 PROCEDURE — 80053 COMPREHEN METABOLIC PANEL: CPT | Mod: 91 | Performed by: EMERGENCY MEDICINE

## 2021-09-20 PROCEDURE — 63600175 PHARM REV CODE 636 W HCPCS: Performed by: INTERNAL MEDICINE

## 2021-09-20 PROCEDURE — 1157F ADVNC CARE PLAN IN RCRD: CPT | Mod: CPTII,S$GLB,, | Performed by: PHYSICIAN ASSISTANT

## 2021-09-20 PROCEDURE — 93010 EKG 12-LEAD: ICD-10-PCS | Mod: S$GLB,,, | Performed by: INTERNAL MEDICINE

## 2021-09-20 PROCEDURE — 1159F PR MEDICATION LIST DOCUMENTED IN MEDICAL RECORD: ICD-10-PCS | Mod: CPTII,S$GLB,, | Performed by: PHYSICIAN ASSISTANT

## 2021-09-20 PROCEDURE — 84484 ASSAY OF TROPONIN QUANT: CPT | Mod: 91 | Performed by: EMERGENCY MEDICINE

## 2021-09-20 PROCEDURE — 36000 PLACE NEEDLE IN VEIN: CPT | Mod: HCNC

## 2021-09-20 PROCEDURE — 1160F PR REVIEW ALL MEDS BY PRESCRIBER/CLIN PHARMACIST DOCUMENTED: ICD-10-PCS | Mod: CPTII,S$GLB,, | Performed by: PHYSICIAN ASSISTANT

## 2021-09-20 PROCEDURE — 1160F RVW MEDS BY RX/DR IN RCRD: CPT | Mod: CPTII,S$GLB,, | Performed by: PHYSICIAN ASSISTANT

## 2021-09-20 PROCEDURE — 70450 CT HEAD/BRAIN W/O DYE: CPT | Mod: TC

## 2021-09-20 PROCEDURE — 93010 EKG 12-LEAD: ICD-10-PCS | Mod: ,,, | Performed by: INTERNAL MEDICINE

## 2021-09-20 PROCEDURE — 99285 EMERGENCY DEPT VISIT HI MDM: CPT | Mod: 25,HCNC

## 2021-09-20 PROCEDURE — 82962 POCT GLUCOSE, HAND-HELD DEVICE: ICD-10-PCS | Mod: S$GLB,,, | Performed by: PHYSICIAN ASSISTANT

## 2021-09-20 PROCEDURE — C9399 UNCLASSIFIED DRUGS OR BIOLOG: HCPCS | Performed by: NURSE PRACTITIONER

## 2021-09-20 PROCEDURE — 82550 ASSAY OF CK (CPK): CPT | Performed by: NURSE PRACTITIONER

## 2021-09-20 RX ORDER — NITROGLYCERIN 0.4 MG/1
0.4 TABLET SUBLINGUAL EVERY 5 MIN PRN
Status: DISCONTINUED | OUTPATIENT
Start: 2021-09-20 | End: 2021-09-21 | Stop reason: HOSPADM

## 2021-09-20 RX ORDER — HYDROCHLOROTHIAZIDE 25 MG/1
25 TABLET ORAL DAILY
Status: DISCONTINUED | OUTPATIENT
Start: 2021-09-21 | End: 2021-09-21

## 2021-09-20 RX ORDER — ENOXAPARIN SODIUM 100 MG/ML
40 INJECTION SUBCUTANEOUS EVERY 24 HOURS
Status: DISCONTINUED | OUTPATIENT
Start: 2021-09-20 | End: 2021-09-21 | Stop reason: HOSPADM

## 2021-09-20 RX ORDER — ACETAMINOPHEN 325 MG/1
650 TABLET ORAL EVERY 8 HOURS PRN
Status: DISCONTINUED | OUTPATIENT
Start: 2021-09-20 | End: 2021-09-21 | Stop reason: HOSPADM

## 2021-09-20 RX ORDER — NIFEDIPINE 30 MG/1
30 TABLET, EXTENDED RELEASE ORAL NIGHTLY
Status: DISCONTINUED | OUTPATIENT
Start: 2021-09-20 | End: 2021-09-21

## 2021-09-20 RX ORDER — SODIUM,POTASSIUM PHOSPHATES 280-250MG
2 POWDER IN PACKET (EA) ORAL
Status: DISCONTINUED | OUTPATIENT
Start: 2021-09-20 | End: 2021-09-21 | Stop reason: HOSPADM

## 2021-09-20 RX ORDER — ASCORBIC ACID 500 MG
500 TABLET ORAL DAILY
Status: DISCONTINUED | OUTPATIENT
Start: 2021-09-21 | End: 2021-09-21 | Stop reason: HOSPADM

## 2021-09-20 RX ORDER — LOSARTAN POTASSIUM 50 MG/1
100 TABLET ORAL DAILY
Status: DISCONTINUED | OUTPATIENT
Start: 2021-09-21 | End: 2021-09-21 | Stop reason: HOSPADM

## 2021-09-20 RX ORDER — FUROSEMIDE 20 MG/1
20 TABLET ORAL DAILY
Status: DISCONTINUED | OUTPATIENT
Start: 2021-09-21 | End: 2021-09-21

## 2021-09-20 RX ORDER — ACETAMINOPHEN 325 MG/1
650 TABLET ORAL EVERY 4 HOURS PRN
Status: DISCONTINUED | OUTPATIENT
Start: 2021-09-20 | End: 2021-09-21 | Stop reason: HOSPADM

## 2021-09-20 RX ORDER — ONDANSETRON 8 MG/1
8 TABLET, ORALLY DISINTEGRATING ORAL EVERY 8 HOURS PRN
Status: DISCONTINUED | OUTPATIENT
Start: 2021-09-20 | End: 2021-09-21 | Stop reason: HOSPADM

## 2021-09-20 RX ORDER — ASPIRIN 325 MG
325 TABLET ORAL
Status: COMPLETED | OUTPATIENT
Start: 2021-09-20 | End: 2021-09-20

## 2021-09-20 RX ORDER — FLECAINIDE ACETATE 50 MG/1
50 TABLET ORAL EVERY 12 HOURS
Status: DISCONTINUED | OUTPATIENT
Start: 2021-09-20 | End: 2021-09-21 | Stop reason: HOSPADM

## 2021-09-20 RX ORDER — PANTOPRAZOLE SODIUM 40 MG/1
40 TABLET, DELAYED RELEASE ORAL DAILY
Status: DISCONTINUED | OUTPATIENT
Start: 2021-09-21 | End: 2021-09-21 | Stop reason: HOSPADM

## 2021-09-20 RX ORDER — PROMETHAZINE HYDROCHLORIDE 25 MG/1
25 TABLET ORAL EVERY 6 HOURS PRN
Status: DISCONTINUED | OUTPATIENT
Start: 2021-09-20 | End: 2021-09-21 | Stop reason: HOSPADM

## 2021-09-20 RX ORDER — GLUCAGON 1 MG
1 KIT INJECTION
Status: DISCONTINUED | OUTPATIENT
Start: 2021-09-20 | End: 2021-09-21 | Stop reason: HOSPADM

## 2021-09-20 RX ORDER — MAG HYDROX/ALUMINUM HYD/SIMETH 200-200-20
30 SUSPENSION, ORAL (FINAL DOSE FORM) ORAL 4 TIMES DAILY PRN
Status: DISCONTINUED | OUTPATIENT
Start: 2021-09-20 | End: 2021-09-21 | Stop reason: HOSPADM

## 2021-09-20 RX ORDER — OXYCODONE HYDROCHLORIDE 5 MG/1
10 TABLET ORAL EVERY 6 HOURS PRN
Status: DISCONTINUED | OUTPATIENT
Start: 2021-09-20 | End: 2021-09-21 | Stop reason: HOSPADM

## 2021-09-20 RX ORDER — SPIRONOLACTONE 25 MG/1
25 TABLET ORAL DAILY
Status: DISCONTINUED | OUTPATIENT
Start: 2021-09-21 | End: 2021-09-21

## 2021-09-20 RX ORDER — ISOSORBIDE MONONITRATE 60 MG/1
60 TABLET, EXTENDED RELEASE ORAL DAILY
Status: DISCONTINUED | OUTPATIENT
Start: 2021-09-21 | End: 2021-09-21

## 2021-09-20 RX ORDER — METOPROLOL SUCCINATE 50 MG/1
100 TABLET, EXTENDED RELEASE ORAL 2 TIMES DAILY
Status: DISCONTINUED | OUTPATIENT
Start: 2021-09-20 | End: 2021-09-21 | Stop reason: HOSPADM

## 2021-09-20 RX ORDER — INSULIN ASPART 100 [IU]/ML
1-10 INJECTION, SOLUTION INTRAVENOUS; SUBCUTANEOUS
Status: DISCONTINUED | OUTPATIENT
Start: 2021-09-20 | End: 2021-09-21 | Stop reason: HOSPADM

## 2021-09-20 RX ORDER — IBUPROFEN 200 MG
24 TABLET ORAL
Status: DISCONTINUED | OUTPATIENT
Start: 2021-09-20 | End: 2021-09-21 | Stop reason: HOSPADM

## 2021-09-20 RX ORDER — LANOLIN ALCOHOL/MO/W.PET/CERES
800 CREAM (GRAM) TOPICAL
Status: DISCONTINUED | OUTPATIENT
Start: 2021-09-20 | End: 2021-09-21 | Stop reason: HOSPADM

## 2021-09-20 RX ORDER — SODIUM CHLORIDE 0.9 % (FLUSH) 0.9 %
10 SYRINGE (ML) INJECTION EVERY 8 HOURS
Status: DISCONTINUED | OUTPATIENT
Start: 2021-09-20 | End: 2021-09-21 | Stop reason: HOSPADM

## 2021-09-20 RX ORDER — SIMETHICONE 80 MG
1 TABLET,CHEWABLE ORAL 4 TIMES DAILY PRN
Status: DISCONTINUED | OUTPATIENT
Start: 2021-09-20 | End: 2021-09-21 | Stop reason: HOSPADM

## 2021-09-20 RX ORDER — AMOXICILLIN 250 MG
1 CAPSULE ORAL 2 TIMES DAILY PRN
Status: DISCONTINUED | OUTPATIENT
Start: 2021-09-20 | End: 2021-09-21 | Stop reason: HOSPADM

## 2021-09-20 RX ORDER — ENOXAPARIN SODIUM 100 MG/ML
30 INJECTION SUBCUTANEOUS EVERY 24 HOURS
Status: DISCONTINUED | OUTPATIENT
Start: 2021-09-20 | End: 2021-09-20 | Stop reason: DRUGHIGH

## 2021-09-20 RX ORDER — NALOXONE HCL 0.4 MG/ML
0.02 VIAL (ML) INJECTION
Status: DISCONTINUED | OUTPATIENT
Start: 2021-09-20 | End: 2021-09-21 | Stop reason: HOSPADM

## 2021-09-20 RX ORDER — IBUPROFEN 200 MG
16 TABLET ORAL
Status: DISCONTINUED | OUTPATIENT
Start: 2021-09-20 | End: 2021-09-21 | Stop reason: HOSPADM

## 2021-09-20 RX ORDER — OXYCODONE HYDROCHLORIDE 5 MG/1
5 TABLET ORAL EVERY 6 HOURS PRN
Status: DISCONTINUED | OUTPATIENT
Start: 2021-09-20 | End: 2021-09-21 | Stop reason: HOSPADM

## 2021-09-20 RX ADMIN — ENOXAPARIN SODIUM 40 MG: 100 INJECTION SUBCUTANEOUS at 08:09

## 2021-09-20 RX ADMIN — FLECAINIDE ACETATE 50 MG: 50 TABLET ORAL at 08:09

## 2021-09-20 RX ADMIN — NIFEDIPINE 30 MG: 30 TABLET, FILM COATED, EXTENDED RELEASE ORAL at 08:09

## 2021-09-20 RX ADMIN — ASPIRIN 325 MG ORAL TABLET 325 MG: 325 PILL ORAL at 03:09

## 2021-09-20 RX ADMIN — METOPROLOL SUCCINATE 100 MG: 50 TABLET, EXTENDED RELEASE ORAL at 08:09

## 2021-09-20 RX ADMIN — SODIUM CHLORIDE 10 ML: 9 INJECTION INTRAMUSCULAR; INTRAVENOUS; SUBCUTANEOUS at 10:09

## 2021-09-20 RX ADMIN — INSULIN DETEMIR 10 UNITS: 100 INJECTION, SOLUTION SUBCUTANEOUS at 08:09

## 2021-09-21 VITALS
DIASTOLIC BLOOD PRESSURE: 56 MMHG | RESPIRATION RATE: 17 BRPM | BODY MASS INDEX: 26.63 KG/M2 | TEMPERATURE: 98 F | WEIGHT: 186 LBS | SYSTOLIC BLOOD PRESSURE: 117 MMHG | OXYGEN SATURATION: 95 % | HEIGHT: 70 IN | HEART RATE: 68 BPM

## 2021-09-21 LAB
ANION GAP SERPL CALC-SCNC: 8 MMOL/L (ref 8–16)
AORTIC ROOT ANNULUS: 3.8 CM
ASCENDING AORTA: 3.58 CM
AV INDEX (PROSTH): 0.67
AV MEAN GRADIENT: 3 MMHG
AV PEAK GRADIENT: 5 MMHG
AV VALVE AREA: 2.65 CM2
AV VELOCITY RATIO: 0.69
BASOPHILS # BLD AUTO: 0.04 K/UL (ref 0–0.2)
BASOPHILS NFR BLD: 0.8 % (ref 0–1.9)
BSA FOR ECHO PROCEDURE: 2.04 M2
BUN SERPL-MCNC: 43 MG/DL (ref 8–23)
CALCIUM SERPL-MCNC: 10.3 MG/DL (ref 8.7–10.5)
CHLORIDE SERPL-SCNC: 103 MMOL/L (ref 95–110)
CO2 SERPL-SCNC: 25 MMOL/L (ref 23–29)
CREAT SERPL-MCNC: 1.1 MG/DL (ref 0.5–1.4)
CV ECHO LV RWT: 0.64 CM
DIFFERENTIAL METHOD: ABNORMAL
DOP CALC AO PEAK VEL: 1.17 M/S
DOP CALC AO VTI: 30.76 CM
DOP CALC LVOT AREA: 4 CM2
DOP CALC LVOT DIAMETER: 2.25 CM
DOP CALC LVOT PEAK VEL: 0.81 M/S
DOP CALC LVOT STROKE VOLUME: 81.43 CM3
DOP CALC RVOT PEAK VEL: 0.6 M/S
DOP CALC RVOT VTI: 15.01 CM
DOP CALCLVOT PEAK VEL VTI: 20.49 CM
E WAVE DECELERATION TIME: 169.22 MSEC
E/A RATIO: 3.26
E/E' RATIO: 12.4 M/S
ECHO LV POSTERIOR WALL: 1.26 CM (ref 0.6–1.1)
EJECTION FRACTION: 50 %
EOSINOPHIL # BLD AUTO: 0.2 K/UL (ref 0–0.5)
EOSINOPHIL NFR BLD: 4.2 % (ref 0–8)
ERYTHROCYTE [DISTWIDTH] IN BLOOD BY AUTOMATED COUNT: 13.2 % (ref 11.5–14.5)
EST. GFR  (AFRICAN AMERICAN): >60 ML/MIN/1.73 M^2
EST. GFR  (NON AFRICAN AMERICAN): 60 ML/MIN/1.73 M^2
FRACTIONAL SHORTENING: 27 % (ref 28–44)
GLUCOSE SERPL-MCNC: 109 MG/DL (ref 70–110)
HCT VFR BLD AUTO: 36.3 % (ref 40–54)
HGB BLD-MCNC: 11.7 G/DL (ref 14–18)
IMM GRANULOCYTES # BLD AUTO: 0.01 K/UL (ref 0–0.04)
IMM GRANULOCYTES NFR BLD AUTO: 0.2 % (ref 0–0.5)
INTERVENTRICULAR SEPTUM: 1.49 CM (ref 0.6–1.1)
IVRT: 68.51 MSEC
LA MAJOR: 4.48 CM
LA MINOR: 2.97 CM
LA WIDTH: 3.2 CM
LEFT ATRIUM SIZE: 3.09 CM
LEFT ATRIUM VOLUME INDEX: 14.9 ML/M2
LEFT ATRIUM VOLUME: 30.02 CM3
LEFT INTERNAL DIMENSION IN SYSTOLE: 2.9 CM (ref 2.1–4)
LEFT VENTRICLE DIASTOLIC VOLUME INDEX: 33.63 ML/M2
LEFT VENTRICLE DIASTOLIC VOLUME: 67.94 ML
LEFT VENTRICLE MASS INDEX: 99 G/M2
LEFT VENTRICLE SYSTOLIC VOLUME INDEX: 16 ML/M2
LEFT VENTRICLE SYSTOLIC VOLUME: 32.34 ML
LEFT VENTRICULAR INTERNAL DIMENSION IN DIASTOLE: 3.95 CM (ref 3.5–6)
LEFT VENTRICULAR MASS: 199.56 G
LV LATERAL E/E' RATIO: 10.33 M/S
LV SEPTAL E/E' RATIO: 15.5 M/S
LYMPHOCYTES # BLD AUTO: 1.9 K/UL (ref 1–4.8)
LYMPHOCYTES NFR BLD: 37.4 % (ref 18–48)
MAGNESIUM SERPL-MCNC: 2.1 MG/DL (ref 1.6–2.6)
MCH RBC QN AUTO: 30.5 PG (ref 27–31)
MCHC RBC AUTO-ENTMCNC: 32.2 G/DL (ref 32–36)
MCV RBC AUTO: 95 FL (ref 82–98)
MONOCYTES # BLD AUTO: 0.6 K/UL (ref 0.3–1)
MONOCYTES NFR BLD: 11 % (ref 4–15)
MV PEAK A VEL: 0.38 M/S
MV PEAK E VEL: 1.24 M/S
MV STENOSIS PRESSURE HALF TIME: 49.07 MS
MV VALVE AREA P 1/2 METHOD: 4.48 CM2
NEUTROPHILS # BLD AUTO: 2.4 K/UL (ref 1.8–7.7)
NEUTROPHILS NFR BLD: 46.4 % (ref 38–73)
NRBC BLD-RTO: 0 /100 WBC
PHOSPHATE SERPL-MCNC: 2.4 MG/DL (ref 2.7–4.5)
PISA TR MAX VEL: 2.84 M/S
PLATELET # BLD AUTO: 142 K/UL (ref 150–450)
PMV BLD AUTO: 9.5 FL (ref 9.2–12.9)
POTASSIUM SERPL-SCNC: 4.7 MMOL/L (ref 3.5–5.1)
PV MEAN GRADIENT: 1 MMHG
RA MAJOR: 4.69 CM
RA PRESSURE: 15 MMHG
RBC # BLD AUTO: 3.83 M/UL (ref 4.6–6.2)
SINUS: 3.94 CM
SODIUM SERPL-SCNC: 136 MMOL/L (ref 136–145)
STJ: 3.15 CM
TDI LATERAL: 0.12 M/S
TDI SEPTAL: 0.08 M/S
TDI: 0.1 M/S
TR MAX PG: 32 MMHG
TRICUSPID ANNULAR PLANE SYSTOLIC EXCURSION: 1.2 CM
TROPONIN I SERPL DL<=0.01 NG/ML-MCNC: 0.02 NG/ML (ref 0–0.03)
TV REST PULMONARY ARTERY PRESSURE: 47 MMHG
WBC # BLD AUTO: 5.19 K/UL (ref 3.9–12.7)

## 2021-09-21 PROCEDURE — 99214 PR OFFICE/OUTPT VISIT, EST, LEVL IV, 30-39 MIN: ICD-10-PCS | Mod: ,,, | Performed by: INTERNAL MEDICINE

## 2021-09-21 PROCEDURE — 85025 COMPLETE CBC W/AUTO DIFF WBC: CPT | Performed by: NURSE PRACTITIONER

## 2021-09-21 PROCEDURE — 84484 ASSAY OF TROPONIN QUANT: CPT | Performed by: NURSE PRACTITIONER

## 2021-09-21 PROCEDURE — 84100 ASSAY OF PHOSPHORUS: CPT | Performed by: NURSE PRACTITIONER

## 2021-09-21 PROCEDURE — G0378 HOSPITAL OBSERVATION PER HR: HCPCS

## 2021-09-21 PROCEDURE — 99214 OFFICE O/P EST MOD 30 MIN: CPT | Mod: ,,, | Performed by: INTERNAL MEDICINE

## 2021-09-21 PROCEDURE — 36415 COLL VENOUS BLD VENIPUNCTURE: CPT | Performed by: NURSE PRACTITIONER

## 2021-09-21 PROCEDURE — A4216 STERILE WATER/SALINE, 10 ML: HCPCS | Performed by: NURSE PRACTITIONER

## 2021-09-21 PROCEDURE — 80048 BASIC METABOLIC PNL TOTAL CA: CPT | Performed by: NURSE PRACTITIONER

## 2021-09-21 PROCEDURE — 25000003 PHARM REV CODE 250: Performed by: NURSE PRACTITIONER

## 2021-09-21 PROCEDURE — 83735 ASSAY OF MAGNESIUM: CPT | Performed by: NURSE PRACTITIONER

## 2021-09-21 RX ORDER — ISOSORBIDE MONONITRATE 60 MG/1
60 TABLET, EXTENDED RELEASE ORAL 2 TIMES DAILY
Qty: 60 TABLET | Refills: 1 | Status: SHIPPED | OUTPATIENT
Start: 2021-09-21 | End: 2021-12-21

## 2021-09-21 RX ORDER — ISOSORBIDE MONONITRATE 60 MG/1
60 TABLET, EXTENDED RELEASE ORAL 2 TIMES DAILY
Status: DISCONTINUED | OUTPATIENT
Start: 2021-09-21 | End: 2021-09-21 | Stop reason: HOSPADM

## 2021-09-21 RX ORDER — FUROSEMIDE 40 MG/1
40 TABLET ORAL DAILY
Qty: 30 TABLET | Refills: 1 | Status: SHIPPED | OUTPATIENT
Start: 2021-09-22 | End: 2021-12-21

## 2021-09-21 RX ORDER — FUROSEMIDE 40 MG/1
40 TABLET ORAL DAILY
Status: DISCONTINUED | OUTPATIENT
Start: 2021-09-22 | End: 2021-09-21 | Stop reason: HOSPADM

## 2021-09-21 RX ADMIN — FLECAINIDE ACETATE 50 MG: 50 TABLET ORAL at 10:09

## 2021-09-21 RX ADMIN — SODIUM CHLORIDE 10 ML: 9 INJECTION INTRAMUSCULAR; INTRAVENOUS; SUBCUTANEOUS at 06:09

## 2021-09-21 RX ADMIN — LOSARTAN POTASSIUM 100 MG: 50 TABLET, FILM COATED ORAL at 10:09

## 2021-09-21 RX ADMIN — METOPROLOL SUCCINATE 100 MG: 50 TABLET, EXTENDED RELEASE ORAL at 10:09

## 2021-09-21 RX ADMIN — OXYCODONE HYDROCHLORIDE AND ACETAMINOPHEN 500 MG: 500 TABLET ORAL at 10:09

## 2021-09-21 RX ADMIN — FUROSEMIDE 20 MG: 20 TABLET ORAL at 10:09

## 2021-09-21 RX ADMIN — ISOSORBIDE MONONITRATE 60 MG: 60 TABLET, EXTENDED RELEASE ORAL at 10:09

## 2021-09-21 RX ADMIN — PANTOPRAZOLE SODIUM 40 MG: 40 TABLET, DELAYED RELEASE ORAL at 10:09

## 2021-09-22 ENCOUNTER — PATIENT MESSAGE (OUTPATIENT)
Dept: FAMILY MEDICINE | Facility: CLINIC | Age: 86
End: 2021-09-22

## 2021-09-22 ENCOUNTER — PATIENT MESSAGE (OUTPATIENT)
Dept: CARDIOLOGY | Facility: CLINIC | Age: 86
End: 2021-09-22

## 2021-09-23 ENCOUNTER — OFFICE VISIT (OUTPATIENT)
Dept: FAMILY MEDICINE | Facility: CLINIC | Age: 86
End: 2021-09-23
Payer: MEDICARE

## 2021-09-23 VITALS
HEIGHT: 70 IN | TEMPERATURE: 98 F | BODY MASS INDEX: 26.95 KG/M2 | DIASTOLIC BLOOD PRESSURE: 70 MMHG | WEIGHT: 188.25 LBS | OXYGEN SATURATION: 99 % | HEART RATE: 78 BPM | SYSTOLIC BLOOD PRESSURE: 142 MMHG

## 2021-09-23 DIAGNOSIS — I10 ESSENTIAL HYPERTENSION: Primary | ICD-10-CM

## 2021-09-23 DIAGNOSIS — R07.9 CHEST PAIN, UNSPECIFIED TYPE: ICD-10-CM

## 2021-09-23 LAB
ALP BONE CFR SERPL: 22 % (ref 28–66)
ALP INTEST CFR SERPL: 21 % (ref 1–24)
ALP ISOS SERPL-IMP: ABNORMAL
ALP LIVER CFR SERPL: 57 % (ref 25–69)
ALP MACROHEPATIC CFR SERPL: ABNORMAL %
ALP PLAC CFR SERPL: 0 %
ALP SERPL-CCNC: 164 U/L (ref 35–144)

## 2021-09-23 PROCEDURE — 1126F PR PAIN SEVERITY QUANTIFIED, NO PAIN PRESENT: ICD-10-PCS | Mod: HCNC,CPTII,S$GLB, | Performed by: FAMILY MEDICINE

## 2021-09-23 PROCEDURE — 99214 OFFICE O/P EST MOD 30 MIN: CPT | Mod: HCNC,S$GLB,, | Performed by: FAMILY MEDICINE

## 2021-09-23 PROCEDURE — 99999 PR PBB SHADOW E&M-EST. PATIENT-LVL V: CPT | Mod: PBBFAC,,, | Performed by: FAMILY MEDICINE

## 2021-09-23 PROCEDURE — 3288F FALL RISK ASSESSMENT DOCD: CPT | Mod: HCNC,CPTII,S$GLB, | Performed by: FAMILY MEDICINE

## 2021-09-23 PROCEDURE — 99214 PR OFFICE/OUTPT VISIT, EST, LEVL IV, 30-39 MIN: ICD-10-PCS | Mod: HCNC,S$GLB,, | Performed by: FAMILY MEDICINE

## 2021-09-23 PROCEDURE — 1126F AMNT PAIN NOTED NONE PRSNT: CPT | Mod: HCNC,CPTII,S$GLB, | Performed by: FAMILY MEDICINE

## 2021-09-23 PROCEDURE — 1101F PT FALLS ASSESS-DOCD LE1/YR: CPT | Mod: HCNC,CPTII,S$GLB, | Performed by: FAMILY MEDICINE

## 2021-09-23 PROCEDURE — 1157F PR ADVANCE CARE PLAN OR EQUIV PRESENT IN MEDICAL RECORD: ICD-10-PCS | Mod: HCNC,CPTII,S$GLB, | Performed by: FAMILY MEDICINE

## 2021-09-23 PROCEDURE — 1159F MED LIST DOCD IN RCRD: CPT | Mod: HCNC,CPTII,S$GLB, | Performed by: FAMILY MEDICINE

## 2021-09-23 PROCEDURE — 99999 PR PBB SHADOW E&M-EST. PATIENT-LVL V: ICD-10-PCS | Mod: PBBFAC,,, | Performed by: FAMILY MEDICINE

## 2021-09-23 PROCEDURE — 1101F PR PT FALLS ASSESS DOC 0-1 FALLS W/OUT INJ PAST YR: ICD-10-PCS | Mod: HCNC,CPTII,S$GLB, | Performed by: FAMILY MEDICINE

## 2021-09-23 PROCEDURE — 3288F PR FALLS RISK ASSESSMENT DOCUMENTED: ICD-10-PCS | Mod: HCNC,CPTII,S$GLB, | Performed by: FAMILY MEDICINE

## 2021-09-23 PROCEDURE — 1157F ADVNC CARE PLAN IN RCRD: CPT | Mod: HCNC,CPTII,S$GLB, | Performed by: FAMILY MEDICINE

## 2021-09-23 PROCEDURE — 1159F PR MEDICATION LIST DOCUMENTED IN MEDICAL RECORD: ICD-10-PCS | Mod: HCNC,CPTII,S$GLB, | Performed by: FAMILY MEDICINE

## 2021-09-24 ENCOUNTER — PATIENT MESSAGE (OUTPATIENT)
Dept: GASTROENTEROLOGY | Facility: CLINIC | Age: 86
End: 2021-09-24

## 2021-09-24 ENCOUNTER — OFFICE VISIT (OUTPATIENT)
Dept: GASTROENTEROLOGY | Facility: CLINIC | Age: 86
End: 2021-09-24
Payer: MEDICARE

## 2021-09-24 ENCOUNTER — OFFICE VISIT (OUTPATIENT)
Dept: CARDIOLOGY | Facility: CLINIC | Age: 86
End: 2021-09-24
Payer: MEDICARE

## 2021-09-24 VITALS
WEIGHT: 186.5 LBS | DIASTOLIC BLOOD PRESSURE: 64 MMHG | HEART RATE: 74 BPM | HEIGHT: 70 IN | BODY MASS INDEX: 26.7 KG/M2 | OXYGEN SATURATION: 98 % | SYSTOLIC BLOOD PRESSURE: 136 MMHG

## 2021-09-24 VITALS
HEART RATE: 69 BPM | DIASTOLIC BLOOD PRESSURE: 70 MMHG | HEIGHT: 70 IN | BODY MASS INDEX: 26.7 KG/M2 | SYSTOLIC BLOOD PRESSURE: 151 MMHG | WEIGHT: 186.5 LBS

## 2021-09-24 DIAGNOSIS — E66.3 OVERWEIGHT (BMI 25.0-29.9): ICD-10-CM

## 2021-09-24 DIAGNOSIS — I48.19 PERSISTENT ATRIAL FIBRILLATION: ICD-10-CM

## 2021-09-24 DIAGNOSIS — I50.42 CHRONIC COMBINED SYSTOLIC AND DIASTOLIC CONGESTIVE HEART FAILURE: Primary | ICD-10-CM

## 2021-09-24 DIAGNOSIS — K74.60 CIRRHOSIS OF LIVER WITHOUT ASCITES, UNSPECIFIED HEPATIC CIRRHOSIS TYPE: Primary | ICD-10-CM

## 2021-09-24 DIAGNOSIS — E08.51 DIABETES MELLITUS DUE TO UNDERLYING CONDITION WITH DIABETIC PERIPHERAL ANGIOPATHY WITHOUT GANGRENE, WITHOUT LONG-TERM CURRENT USE OF INSULIN: ICD-10-CM

## 2021-09-24 DIAGNOSIS — Z45.018 BIVENTRICULAR PACEMAKER CHECK: ICD-10-CM

## 2021-09-24 DIAGNOSIS — K31.819 AVM (ARTERIOVENOUS MALFORMATION) OF STOMACH, ACQUIRED: ICD-10-CM

## 2021-09-24 DIAGNOSIS — Z78.9 STATIN INTOLERANCE: ICD-10-CM

## 2021-09-24 PROCEDURE — 99214 OFFICE O/P EST MOD 30 MIN: CPT | Mod: HCNC,S$GLB,, | Performed by: NURSE PRACTITIONER

## 2021-09-24 PROCEDURE — 3288F PR FALLS RISK ASSESSMENT DOCUMENTED: ICD-10-PCS | Mod: HCNC,CPTII,S$GLB, | Performed by: PHYSICIAN ASSISTANT

## 2021-09-24 PROCEDURE — 1159F PR MEDICATION LIST DOCUMENTED IN MEDICAL RECORD: ICD-10-PCS | Mod: HCNC,CPTII,S$GLB, | Performed by: NURSE PRACTITIONER

## 2021-09-24 PROCEDURE — 1157F PR ADVANCE CARE PLAN OR EQUIV PRESENT IN MEDICAL RECORD: ICD-10-PCS | Mod: HCNC,CPTII,S$GLB, | Performed by: PHYSICIAN ASSISTANT

## 2021-09-24 PROCEDURE — 99999 PR PBB SHADOW E&M-EST. PATIENT-LVL IV: CPT | Mod: PBBFAC,HCNC,, | Performed by: NURSE PRACTITIONER

## 2021-09-24 PROCEDURE — 99999 PR PBB SHADOW E&M-EST. PATIENT-LVL IV: CPT | Mod: PBBFAC,,, | Performed by: PHYSICIAN ASSISTANT

## 2021-09-24 PROCEDURE — 99213 PR OFFICE/OUTPT VISIT, EST, LEVL III, 20-29 MIN: ICD-10-PCS | Mod: HCNC,S$GLB,, | Performed by: PHYSICIAN ASSISTANT

## 2021-09-24 PROCEDURE — 1157F ADVNC CARE PLAN IN RCRD: CPT | Mod: HCNC,CPTII,S$GLB, | Performed by: PHYSICIAN ASSISTANT

## 2021-09-24 PROCEDURE — 99999 PR PBB SHADOW E&M-EST. PATIENT-LVL IV: ICD-10-PCS | Mod: PBBFAC,HCNC,, | Performed by: NURSE PRACTITIONER

## 2021-09-24 PROCEDURE — 1101F PR PT FALLS ASSESS DOC 0-1 FALLS W/OUT INJ PAST YR: ICD-10-PCS | Mod: HCNC,CPTII,S$GLB, | Performed by: PHYSICIAN ASSISTANT

## 2021-09-24 PROCEDURE — 1159F MED LIST DOCD IN RCRD: CPT | Mod: HCNC,CPTII,S$GLB, | Performed by: NURSE PRACTITIONER

## 2021-09-24 PROCEDURE — 1101F PT FALLS ASSESS-DOCD LE1/YR: CPT | Mod: HCNC,CPTII,S$GLB, | Performed by: PHYSICIAN ASSISTANT

## 2021-09-24 PROCEDURE — 99214 PR OFFICE/OUTPT VISIT, EST, LEVL IV, 30-39 MIN: ICD-10-PCS | Mod: HCNC,S$GLB,, | Performed by: NURSE PRACTITIONER

## 2021-09-24 PROCEDURE — 99999 PR PBB SHADOW E&M-EST. PATIENT-LVL IV: ICD-10-PCS | Mod: PBBFAC,,, | Performed by: PHYSICIAN ASSISTANT

## 2021-09-24 PROCEDURE — 3288F FALL RISK ASSESSMENT DOCD: CPT | Mod: HCNC,CPTII,S$GLB, | Performed by: PHYSICIAN ASSISTANT

## 2021-09-24 PROCEDURE — 1157F PR ADVANCE CARE PLAN OR EQUIV PRESENT IN MEDICAL RECORD: ICD-10-PCS | Mod: HCNC,CPTII,S$GLB, | Performed by: NURSE PRACTITIONER

## 2021-09-24 PROCEDURE — 1157F ADVNC CARE PLAN IN RCRD: CPT | Mod: HCNC,CPTII,S$GLB, | Performed by: NURSE PRACTITIONER

## 2021-09-24 PROCEDURE — 99213 OFFICE O/P EST LOW 20 MIN: CPT | Mod: HCNC,S$GLB,, | Performed by: PHYSICIAN ASSISTANT

## 2021-09-27 ENCOUNTER — PATIENT MESSAGE (OUTPATIENT)
Dept: FAMILY MEDICINE | Facility: CLINIC | Age: 86
End: 2021-09-27

## 2021-09-28 ENCOUNTER — LAB VISIT (OUTPATIENT)
Dept: LAB | Facility: HOSPITAL | Age: 86
End: 2021-09-28
Attending: FAMILY MEDICINE
Payer: MEDICARE

## 2021-09-28 ENCOUNTER — TELEPHONE (OUTPATIENT)
Dept: GASTROENTEROLOGY | Facility: CLINIC | Age: 86
End: 2021-09-28

## 2021-09-28 DIAGNOSIS — K74.60 CIRRHOSIS OF LIVER WITHOUT ASCITES, UNSPECIFIED HEPATIC CIRRHOSIS TYPE: ICD-10-CM

## 2021-09-28 DIAGNOSIS — I10 ESSENTIAL HYPERTENSION: ICD-10-CM

## 2021-09-28 LAB
AFP SERPL-MCNC: 4.4 NG/ML (ref 0–8.4)
ANION GAP SERPL CALC-SCNC: 14 MMOL/L (ref 8–16)
BUN SERPL-MCNC: 27 MG/DL (ref 8–23)
CALCIUM SERPL-MCNC: 10.6 MG/DL (ref 8.7–10.5)
CHLORIDE SERPL-SCNC: 101 MMOL/L (ref 95–110)
CO2 SERPL-SCNC: 23 MMOL/L (ref 23–29)
CREAT SERPL-MCNC: 1 MG/DL (ref 0.5–1.4)
EST. GFR  (AFRICAN AMERICAN): >60 ML/MIN/1.73 M^2
EST. GFR  (NON AFRICAN AMERICAN): >60 ML/MIN/1.73 M^2
GLUCOSE SERPL-MCNC: 233 MG/DL (ref 70–110)
INR PPP: 1.1 (ref 0.8–1.2)
POTASSIUM SERPL-SCNC: 4.1 MMOL/L (ref 3.5–5.1)
PROTHROMBIN TIME: 11.9 SEC (ref 9–12.5)
SODIUM SERPL-SCNC: 138 MMOL/L (ref 136–145)

## 2021-09-28 PROCEDURE — 36415 COLL VENOUS BLD VENIPUNCTURE: CPT | Mod: HCNC,PO | Performed by: PHYSICIAN ASSISTANT

## 2021-09-28 PROCEDURE — 80048 BASIC METABOLIC PNL TOTAL CA: CPT | Mod: HCNC | Performed by: FAMILY MEDICINE

## 2021-09-28 PROCEDURE — 82105 ALPHA-FETOPROTEIN SERUM: CPT | Mod: HCNC | Performed by: PHYSICIAN ASSISTANT

## 2021-09-28 PROCEDURE — 85610 PROTHROMBIN TIME: CPT | Mod: HCNC | Performed by: PHYSICIAN ASSISTANT

## 2021-10-01 ENCOUNTER — HOSPITAL ENCOUNTER (OUTPATIENT)
Dept: RADIOLOGY | Facility: HOSPITAL | Age: 86
Discharge: HOME | End: 2021-10-01
Attending: PHYSICIAN ASSISTANT
Payer: MEDICARE

## 2021-10-01 DIAGNOSIS — K74.60 CIRRHOSIS OF LIVER WITHOUT ASCITES, UNSPECIFIED HEPATIC CIRRHOSIS TYPE: ICD-10-CM

## 2021-10-01 PROCEDURE — 76705 ECHO EXAM OF ABDOMEN: CPT | Mod: 26,HCNC,, | Performed by: RADIOLOGY

## 2021-10-01 PROCEDURE — 76705 ECHO EXAM OF ABDOMEN: CPT | Mod: TC,HCNC

## 2021-10-01 PROCEDURE — 76705 US ABDOMEN LIMITED: ICD-10-PCS | Mod: 26,HCNC,, | Performed by: RADIOLOGY

## 2021-10-05 ENCOUNTER — HOSPITAL ENCOUNTER (OUTPATIENT)
Dept: CARDIOLOGY | Facility: HOSPITAL | Age: 86
Discharge: HOME | End: 2021-10-05
Attending: INTERNAL MEDICINE
Payer: MEDICARE

## 2021-10-05 ENCOUNTER — PATIENT MESSAGE (OUTPATIENT)
Dept: FAMILY MEDICINE | Facility: CLINIC | Age: 86
End: 2021-10-05

## 2021-10-05 ENCOUNTER — OFFICE VISIT (OUTPATIENT)
Dept: CARDIOLOGY | Facility: CLINIC | Age: 86
End: 2021-10-05
Payer: MEDICARE

## 2021-10-05 ENCOUNTER — TELEPHONE (OUTPATIENT)
Dept: CARDIOLOGY | Facility: CLINIC | Age: 86
End: 2021-10-05

## 2021-10-05 VITALS
HEIGHT: 70 IN | BODY MASS INDEX: 27.67 KG/M2 | DIASTOLIC BLOOD PRESSURE: 70 MMHG | SYSTOLIC BLOOD PRESSURE: 158 MMHG | WEIGHT: 193.31 LBS | HEART RATE: 70 BPM

## 2021-10-05 DIAGNOSIS — Z95.0 CARDIAC PACEMAKER IN SITU: ICD-10-CM

## 2021-10-05 DIAGNOSIS — E13.51 PERIPHERAL VASCULAR DISEASE DUE TO SECONDARY DIABETES MELLITUS: ICD-10-CM

## 2021-10-05 DIAGNOSIS — E08.51 DIABETES MELLITUS DUE TO UNDERLYING CONDITION WITH DIABETIC PERIPHERAL ANGIOPATHY WITHOUT GANGRENE, WITHOUT LONG-TERM CURRENT USE OF INSULIN: ICD-10-CM

## 2021-10-05 DIAGNOSIS — K74.60 CIRRHOSIS OF LIVER WITHOUT ASCITES, UNSPECIFIED HEPATIC CIRRHOSIS TYPE: ICD-10-CM

## 2021-10-05 DIAGNOSIS — I73.9 CLAUDICATION IN PERIPHERAL VASCULAR DISEASE: Chronic | ICD-10-CM

## 2021-10-05 DIAGNOSIS — K31.819 AVM (ARTERIOVENOUS MALFORMATION) OF STOMACH, ACQUIRED: ICD-10-CM

## 2021-10-05 DIAGNOSIS — K21.9 GASTROESOPHAGEAL REFLUX DISEASE WITHOUT ESOPHAGITIS: ICD-10-CM

## 2021-10-05 DIAGNOSIS — I50.42 CHRONIC COMBINED SYSTOLIC AND DIASTOLIC CONGESTIVE HEART FAILURE: Primary | ICD-10-CM

## 2021-10-05 DIAGNOSIS — I49.5 SSS (SICK SINUS SYNDROME): ICD-10-CM

## 2021-10-05 DIAGNOSIS — Z78.9 STATIN INTOLERANCE: ICD-10-CM

## 2021-10-05 DIAGNOSIS — I71.21 ANEURYSM OF ASCENDING AORTA: ICD-10-CM

## 2021-10-05 DIAGNOSIS — I10 ESSENTIAL HYPERTENSION: ICD-10-CM

## 2021-10-05 DIAGNOSIS — I48.19 PERSISTENT ATRIAL FIBRILLATION: ICD-10-CM

## 2021-10-05 DIAGNOSIS — R09.82 POST-NASAL DRIP: ICD-10-CM

## 2021-10-05 DIAGNOSIS — I65.23 ASYMPTOMATIC STENOSIS OF BOTH CAROTID ARTERIES WITHOUT INFARCTION: ICD-10-CM

## 2021-10-05 DIAGNOSIS — Z45.018 BIVENTRICULAR PACEMAKER CHECK: ICD-10-CM

## 2021-10-05 DIAGNOSIS — I70.0 CALCIFICATION OF AORTA: ICD-10-CM

## 2021-10-05 DIAGNOSIS — I48.0 PAROXYSMAL ATRIAL FIBRILLATION: Chronic | ICD-10-CM

## 2021-10-05 PROCEDURE — 1160F PR REVIEW ALL MEDS BY PRESCRIBER/CLIN PHARMACIST DOCUMENTED: ICD-10-PCS | Mod: HCNC,CPTII,95, | Performed by: INTERNAL MEDICINE

## 2021-10-05 PROCEDURE — 93281 PM DEVICE PROGR EVAL MULTI: CPT | Mod: 26,HCNC,, | Performed by: INTERNAL MEDICINE

## 2021-10-05 PROCEDURE — 99499 RISK ADDL DX/OHS AUDIT: ICD-10-PCS | Mod: 95,,, | Performed by: INTERNAL MEDICINE

## 2021-10-05 PROCEDURE — 1159F MED LIST DOCD IN RCRD: CPT | Mod: HCNC,CPTII,95, | Performed by: INTERNAL MEDICINE

## 2021-10-05 PROCEDURE — 93281 CARDIAC DEVICE CHECK - IN CLINIC & HOSPITAL: ICD-10-PCS | Mod: 26,HCNC,, | Performed by: INTERNAL MEDICINE

## 2021-10-05 PROCEDURE — 3288F FALL RISK ASSESSMENT DOCD: CPT | Mod: HCNC,CPTII,95, | Performed by: INTERNAL MEDICINE

## 2021-10-05 PROCEDURE — 99215 OFFICE O/P EST HI 40 MIN: CPT | Mod: HCNC,95,, | Performed by: INTERNAL MEDICINE

## 2021-10-05 PROCEDURE — 1157F PR ADVANCE CARE PLAN OR EQUIV PRESENT IN MEDICAL RECORD: ICD-10-PCS | Mod: HCNC,CPTII,95, | Performed by: INTERNAL MEDICINE

## 2021-10-05 PROCEDURE — 1159F PR MEDICATION LIST DOCUMENTED IN MEDICAL RECORD: ICD-10-PCS | Mod: HCNC,CPTII,95, | Performed by: INTERNAL MEDICINE

## 2021-10-05 PROCEDURE — 1160F RVW MEDS BY RX/DR IN RCRD: CPT | Mod: HCNC,CPTII,95, | Performed by: INTERNAL MEDICINE

## 2021-10-05 PROCEDURE — 99499 UNLISTED E&M SERVICE: CPT | Mod: 95,,, | Performed by: INTERNAL MEDICINE

## 2021-10-05 PROCEDURE — 93281 PM DEVICE PROGR EVAL MULTI: CPT | Mod: HCNC

## 2021-10-05 PROCEDURE — 1100F PR PT FALLS ASSESS DOC 2+ FALLS/FALL W/INJURY/YR: ICD-10-PCS | Mod: HCNC,CPTII,95, | Performed by: INTERNAL MEDICINE

## 2021-10-05 PROCEDURE — 99215 PR OFFICE/OUTPT VISIT, EST, LEVL V, 40-54 MIN: ICD-10-PCS | Mod: HCNC,95,, | Performed by: INTERNAL MEDICINE

## 2021-10-05 PROCEDURE — 3288F PR FALLS RISK ASSESSMENT DOCUMENTED: ICD-10-PCS | Mod: HCNC,CPTII,95, | Performed by: INTERNAL MEDICINE

## 2021-10-05 PROCEDURE — 1157F ADVNC CARE PLAN IN RCRD: CPT | Mod: HCNC,CPTII,95, | Performed by: INTERNAL MEDICINE

## 2021-10-05 PROCEDURE — 1100F PTFALLS ASSESS-DOCD GE2>/YR: CPT | Mod: HCNC,CPTII,95, | Performed by: INTERNAL MEDICINE

## 2021-10-05 RX ORDER — CHOLECALCIFEROL (VITAMIN D3) 25 MCG
1000 TABLET ORAL DAILY
COMMUNITY

## 2021-10-06 LAB
BATTERY VOLTAGE (V): 3.01 V
IMPEDANCE RA LEAD (NATIVE): NORMAL OHMS
IMPEDANCE RA LEAD: NORMAL OHMS
OHS CV DC PP MS1: 0.3 MS
OHS CV DC PP MS2: 0.4 MS
OHS CV DC PP V1: 2.5 V
OHS CV DC PP V2: NORMAL V
P/R-WAVE RA LEAD (NATIVE): 12 MV
THRESHOLD MS RA LEAD (NATIVE): 0.4 MS
THRESHOLD MS RA LEAD: 0.3 MS
THRESHOLD V RA LEAD (NATIVE): 0.75 V
THRESHOLD V RA LEAD: 1 V
VV DELAY: 80 MSEC

## 2021-10-11 ENCOUNTER — PES CALL (OUTPATIENT)
Dept: ADMINISTRATIVE | Facility: CLINIC | Age: 86
End: 2021-10-11

## 2021-10-19 ENCOUNTER — PATIENT MESSAGE (OUTPATIENT)
Dept: CARDIOLOGY | Facility: CLINIC | Age: 86
End: 2021-10-19
Payer: MEDICARE

## 2021-10-25 PROBLEM — Z91.81 HISTORY OF FALL: Status: ACTIVE | Noted: 2021-10-25

## 2021-10-31 PROCEDURE — 99457 PR MONITORING, PHYSIOL PARAM, REMOTE, 1ST 20 MINS, PER MONTH: ICD-10-PCS | Mod: S$GLB,,, | Performed by: FAMILY MEDICINE

## 2021-10-31 PROCEDURE — 99457 RPM TX MGMT 1ST 20 MIN: CPT | Mod: S$GLB,,, | Performed by: FAMILY MEDICINE

## 2021-11-04 ENCOUNTER — CLINICAL SUPPORT (OUTPATIENT)
Dept: CARDIOLOGY | Facility: HOSPITAL | Age: 86
End: 2021-11-04
Payer: MEDICARE

## 2021-11-04 DIAGNOSIS — Z95.0 PRESENCE OF CARDIAC PACEMAKER: ICD-10-CM

## 2021-11-04 PROCEDURE — 93294 CARDIAC DEVICE CHECK - REMOTE: ICD-10-PCS | Mod: HCNC,S$GLB,, | Performed by: INTERNAL MEDICINE

## 2021-11-04 PROCEDURE — 93296 REM INTERROG EVL PM/IDS: CPT | Mod: HCNC | Performed by: INTERNAL MEDICINE

## 2021-11-04 PROCEDURE — 93294 REM INTERROG EVL PM/LDLS PM: CPT | Mod: HCNC,S$GLB,, | Performed by: INTERNAL MEDICINE

## 2021-11-24 ENCOUNTER — OFFICE VISIT (OUTPATIENT)
Dept: CARDIOLOGY | Facility: CLINIC | Age: 86
End: 2021-11-24
Payer: MEDICARE

## 2021-11-24 VITALS
HEIGHT: 70 IN | HEART RATE: 79 BPM | WEIGHT: 190.06 LBS | DIASTOLIC BLOOD PRESSURE: 62 MMHG | SYSTOLIC BLOOD PRESSURE: 132 MMHG | OXYGEN SATURATION: 98 % | BODY MASS INDEX: 27.21 KG/M2

## 2021-11-24 DIAGNOSIS — I61.9 HEMORRHAGIC CEREBROVASCULAR ACCIDENT (CVA): ICD-10-CM

## 2021-11-24 DIAGNOSIS — R53.82 CHRONIC FATIGUE: ICD-10-CM

## 2021-11-24 DIAGNOSIS — I65.23 ASYMPTOMATIC STENOSIS OF BOTH CAROTID ARTERIES WITHOUT INFARCTION: ICD-10-CM

## 2021-11-24 DIAGNOSIS — I35.0 NONRHEUMATIC AORTIC VALVE STENOSIS: ICD-10-CM

## 2021-11-24 DIAGNOSIS — I10 ESSENTIAL HYPERTENSION: ICD-10-CM

## 2021-11-24 DIAGNOSIS — E08.51 DIABETES MELLITUS DUE TO UNDERLYING CONDITION WITH DIABETIC PERIPHERAL ANGIOPATHY WITHOUT GANGRENE, WITHOUT LONG-TERM CURRENT USE OF INSULIN: ICD-10-CM

## 2021-11-24 DIAGNOSIS — E78.2 MIXED HYPERLIPIDEMIA: Chronic | ICD-10-CM

## 2021-11-24 DIAGNOSIS — I50.42 CHRONIC COMBINED SYSTOLIC AND DIASTOLIC CONGESTIVE HEART FAILURE: ICD-10-CM

## 2021-11-24 DIAGNOSIS — I77.9 CAROTID ARTERY DISEASE WITHOUT CEREBRAL INFARCTION: Chronic | ICD-10-CM

## 2021-11-24 DIAGNOSIS — I73.9 CLAUDICATION IN PERIPHERAL VASCULAR DISEASE: Chronic | ICD-10-CM

## 2021-11-24 DIAGNOSIS — I73.9 PERIPHERAL VASCULAR DISEASE: Chronic | ICD-10-CM

## 2021-11-24 DIAGNOSIS — I20.9 AP (ANGINA PECTORIS): ICD-10-CM

## 2021-11-24 DIAGNOSIS — I50.32 CHRONIC DIASTOLIC HEART FAILURE: ICD-10-CM

## 2021-11-24 DIAGNOSIS — E66.3 OVERWEIGHT (BMI 25.0-29.9): ICD-10-CM

## 2021-11-24 DIAGNOSIS — R94.31 ABNORMAL ECG: ICD-10-CM

## 2021-11-24 DIAGNOSIS — Z78.9 STATIN INTOLERANCE: ICD-10-CM

## 2021-11-24 DIAGNOSIS — I25.708 CORONARY ARTERY DISEASE OF BYPASS GRAFT OF NATIVE HEART WITH STABLE ANGINA PECTORIS: Primary | ICD-10-CM

## 2021-11-24 DIAGNOSIS — I71.21 ANEURYSM OF ASCENDING AORTA: ICD-10-CM

## 2021-11-24 DIAGNOSIS — E13.51 PERIPHERAL VASCULAR DISEASE DUE TO SECONDARY DIABETES MELLITUS: ICD-10-CM

## 2021-11-24 DIAGNOSIS — I36.1 NONRHEUMATIC TRICUSPID VALVE REGURGITATION: ICD-10-CM

## 2021-11-24 DIAGNOSIS — I48.0 PAROXYSMAL ATRIAL FIBRILLATION: Chronic | ICD-10-CM

## 2021-11-24 PROCEDURE — 99999 PR PBB SHADOW E&M-EST. PATIENT-LVL III: CPT | Mod: PBBFAC,HCNC,, | Performed by: INTERNAL MEDICINE

## 2021-11-24 PROCEDURE — 1157F ADVNC CARE PLAN IN RCRD: CPT | Mod: HCNC,CPTII,S$GLB, | Performed by: INTERNAL MEDICINE

## 2021-11-24 PROCEDURE — 1157F PR ADVANCE CARE PLAN OR EQUIV PRESENT IN MEDICAL RECORD: ICD-10-PCS | Mod: HCNC,CPTII,S$GLB, | Performed by: INTERNAL MEDICINE

## 2021-11-24 PROCEDURE — 99999 PR PBB SHADOW E&M-EST. PATIENT-LVL III: ICD-10-PCS | Mod: PBBFAC,HCNC,, | Performed by: INTERNAL MEDICINE

## 2021-11-24 PROCEDURE — 99214 PR OFFICE/OUTPT VISIT, EST, LEVL IV, 30-39 MIN: ICD-10-PCS | Mod: HCNC,S$GLB,, | Performed by: INTERNAL MEDICINE

## 2021-11-24 PROCEDURE — 99214 OFFICE O/P EST MOD 30 MIN: CPT | Mod: HCNC,S$GLB,, | Performed by: INTERNAL MEDICINE

## 2021-11-26 ENCOUNTER — PES CALL (OUTPATIENT)
Dept: ADMINISTRATIVE | Facility: CLINIC | Age: 86
End: 2021-11-26
Payer: MEDICARE

## 2021-12-02 NOTE — ED NOTES
From: Isadora Dasilva  To: Ale Leone  Sent: 11/29/2021 1:19 PM CST  Subject: Question    Elly leone , I wanted to ask you a few questions about testosterone pills ?    Patient c/o mid-sternal chest pain that radiated to left side of chest after taking three Nitro at home. Patient reports pain was unrelieved with Nitro, so he called [facility] to transport him.    Patient moved to ED room 14, patient assisted onto stretcher and changed into a gown. Patient placed on cardiac monitor, continuous pulse oximetry and automatic blood pressure cuff. Bed placed in low locked position, side rails up x 2, call light is within reach of patient or family, orientation to room and explanation of wait provided to family and patient, alarms set and turned on for monitor and pulse ox, awaiting MD evaluation and orders, will continue to monitor.    Patient identifies self as Anthony Blair      LOC: The patient is awake, alert and aware of environment with an appropriate affect, the patient is oriented x 3 and speaking appropriately.  APPEARANCE: Patient resting comfortably and in no acute distress, patient is clean and well groomed, patient's clothing is properly fastened.  SKIN: The skin is warm and dry, color consistent with ethnicity, patient has normal skin turgor and moist mucus membranes, skin intact, no breakdown or bruising noted.  MUSCULOSKELETAL: Patient moving all extremities well, no obvious swelling or deformities noted.  RESPIRATORY: Airway is open and patent, respirations are spontaneous, patient has a normal effort and rate, no accessory muscle use noted.  CARDIAC: Patient has a normal rate and rhythm, no periphreal edema noted, capillary refill < 3 seconds.  ABDOMEN: Soft and non tender to palpation, no distention noted.  NEUROLOGIC: PERRL, 3 mm bilaterally, eyes open spontaneously, behavior appropriate to situation, follows commands, facial expression symmetrical, bilateral hand grasp equal and even, purposeful motor response noted, normal sensation in all extremities when touched with a finger.

## 2021-12-07 ENCOUNTER — APPOINTMENT (OUTPATIENT)
Dept: RADIOLOGY | Facility: HOSPITAL | Age: 86
End: 2021-12-07
Attending: NURSE PRACTITIONER
Payer: MEDICARE

## 2021-12-07 ENCOUNTER — TELEPHONE (OUTPATIENT)
Dept: URGENT CARE | Facility: CLINIC | Age: 86
End: 2021-12-07

## 2021-12-07 ENCOUNTER — PATIENT MESSAGE (OUTPATIENT)
Dept: FAMILY MEDICINE | Facility: CLINIC | Age: 86
End: 2021-12-07
Payer: MEDICARE

## 2021-12-07 ENCOUNTER — TELEPHONE (OUTPATIENT)
Dept: FAMILY MEDICINE | Facility: CLINIC | Age: 86
End: 2021-12-07
Payer: MEDICARE

## 2021-12-07 ENCOUNTER — OFFICE VISIT (OUTPATIENT)
Dept: URGENT CARE | Facility: CLINIC | Age: 86
End: 2021-12-07
Payer: MEDICARE

## 2021-12-07 ENCOUNTER — PATIENT OUTREACH (OUTPATIENT)
Dept: OTHER | Facility: OTHER | Age: 86
End: 2021-12-07
Payer: MEDICARE

## 2021-12-07 VITALS
HEART RATE: 73 BPM | RESPIRATION RATE: 18 BRPM | SYSTOLIC BLOOD PRESSURE: 168 MMHG | BODY MASS INDEX: 27.2 KG/M2 | TEMPERATURE: 98 F | HEIGHT: 70 IN | WEIGHT: 190 LBS | OXYGEN SATURATION: 99 % | DIASTOLIC BLOOD PRESSURE: 82 MMHG

## 2021-12-07 DIAGNOSIS — R10.84 GENERALIZED ABDOMINAL PAIN: ICD-10-CM

## 2021-12-07 DIAGNOSIS — R10.84 GENERALIZED ABDOMINAL PAIN: Primary | ICD-10-CM

## 2021-12-07 PROCEDURE — 99214 OFFICE O/P EST MOD 30 MIN: CPT | Mod: S$GLB,,, | Performed by: NURSE PRACTITIONER

## 2021-12-07 PROCEDURE — 76700 US EXAM ABDOM COMPLETE: CPT | Mod: 26,HCNC,, | Performed by: RADIOLOGY

## 2021-12-07 PROCEDURE — 1157F PR ADVANCE CARE PLAN OR EQUIV PRESENT IN MEDICAL RECORD: ICD-10-PCS | Mod: CPTII,S$GLB,, | Performed by: NURSE PRACTITIONER

## 2021-12-07 PROCEDURE — 76700 US EXAM ABDOM COMPLETE: CPT | Mod: TC,HCNC,PO

## 2021-12-07 PROCEDURE — 76700 US ABDOMEN COMPLETE: ICD-10-PCS | Mod: 26,HCNC,, | Performed by: RADIOLOGY

## 2021-12-07 PROCEDURE — 99214 PR OFFICE/OUTPT VISIT, EST, LEVL IV, 30-39 MIN: ICD-10-PCS | Mod: S$GLB,,, | Performed by: NURSE PRACTITIONER

## 2021-12-07 PROCEDURE — 1157F ADVNC CARE PLAN IN RCRD: CPT | Mod: CPTII,S$GLB,, | Performed by: NURSE PRACTITIONER

## 2021-12-07 RX ORDER — SPIRONOLACTONE AND HYDROCHLOROTHIAZIDE 25; 25 MG/1; MG/1
1 TABLET ORAL EVERY MORNING
COMMUNITY
Start: 2021-10-20 | End: 2022-03-25

## 2021-12-07 RX ORDER — SIMETHICONE 125 MG
125 CAPSULE ORAL 4 TIMES DAILY PRN
Refills: 0 | COMMUNITY
Start: 2021-12-07

## 2021-12-11 ENCOUNTER — NURSE TRIAGE (OUTPATIENT)
Dept: ADMINISTRATIVE | Facility: CLINIC | Age: 86
End: 2021-12-11
Payer: MEDICARE

## 2021-12-12 ENCOUNTER — HOSPITAL ENCOUNTER (EMERGENCY)
Facility: HOSPITAL | Age: 86
Discharge: HOME | End: 2021-12-12
Attending: FAMILY MEDICINE
Payer: MEDICARE

## 2021-12-12 VITALS
RESPIRATION RATE: 18 BRPM | SYSTOLIC BLOOD PRESSURE: 160 MMHG | OXYGEN SATURATION: 98 % | BODY MASS INDEX: 27.46 KG/M2 | WEIGHT: 191.81 LBS | DIASTOLIC BLOOD PRESSURE: 82 MMHG | TEMPERATURE: 99 F | HEIGHT: 70 IN | HEART RATE: 72 BPM

## 2021-12-12 DIAGNOSIS — R07.9 CHEST PAIN: ICD-10-CM

## 2021-12-12 LAB
ALBUMIN SERPL BCP-MCNC: 3.4 G/DL (ref 3.5–5.2)
ALP SERPL-CCNC: 183 U/L (ref 55–135)
ALT SERPL W/O P-5'-P-CCNC: 15 U/L (ref 10–44)
AMPHET+METHAMPHET UR QL: NEGATIVE
ANION GAP SERPL CALC-SCNC: 9 MMOL/L (ref 8–16)
AST SERPL-CCNC: 23 U/L (ref 10–40)
BARBITURATES UR QL SCN>200 NG/ML: NEGATIVE
BASOPHILS # BLD AUTO: 0.03 K/UL (ref 0–0.2)
BASOPHILS NFR BLD: 0.5 % (ref 0–1.9)
BENZODIAZ UR QL SCN>200 NG/ML: NEGATIVE
BILIRUB SERPL-MCNC: 0.5 MG/DL (ref 0.1–1)
BILIRUB UR QL STRIP: NEGATIVE
BNP SERPL-MCNC: 188 PG/ML (ref 0–99)
BUN SERPL-MCNC: 24 MG/DL (ref 8–23)
BZE UR QL SCN: NEGATIVE
CALCIUM SERPL-MCNC: 10.1 MG/DL (ref 8.7–10.5)
CANNABINOIDS UR QL SCN: NEGATIVE
CHLORIDE SERPL-SCNC: 104 MMOL/L (ref 95–110)
CLARITY UR: CLEAR
CO2 SERPL-SCNC: 25 MMOL/L (ref 23–29)
COLOR UR: YELLOW
CREAT SERPL-MCNC: 0.9 MG/DL (ref 0.5–1.4)
CREAT UR-MCNC: 78.7 MG/DL (ref 23–375)
DIFFERENTIAL METHOD: ABNORMAL
EOSINOPHIL # BLD AUTO: 0.2 K/UL (ref 0–0.5)
EOSINOPHIL NFR BLD: 3.7 % (ref 0–8)
ERYTHROCYTE [DISTWIDTH] IN BLOOD BY AUTOMATED COUNT: 13.2 % (ref 11.5–14.5)
EST. GFR  (AFRICAN AMERICAN): >60 ML/MIN/1.73 M^2
EST. GFR  (NON AFRICAN AMERICAN): >60 ML/MIN/1.73 M^2
GLUCOSE SERPL-MCNC: 202 MG/DL (ref 70–110)
GLUCOSE UR QL STRIP: NEGATIVE
HCT VFR BLD AUTO: 34 % (ref 40–54)
HGB BLD-MCNC: 11.2 G/DL (ref 14–18)
HGB UR QL STRIP: NEGATIVE
IMM GRANULOCYTES # BLD AUTO: 0.01 K/UL (ref 0–0.04)
IMM GRANULOCYTES NFR BLD AUTO: 0.2 % (ref 0–0.5)
KETONES UR QL STRIP: NEGATIVE
LEUKOCYTE ESTERASE UR QL STRIP: NEGATIVE
LIPASE SERPL-CCNC: 24 U/L (ref 4–60)
LYMPHOCYTES # BLD AUTO: 2 K/UL (ref 1–4.8)
LYMPHOCYTES NFR BLD: 32.3 % (ref 18–48)
MCH RBC QN AUTO: 31.2 PG (ref 27–31)
MCHC RBC AUTO-ENTMCNC: 32.9 G/DL (ref 32–36)
MCV RBC AUTO: 95 FL (ref 82–98)
METHADONE UR QL SCN>300 NG/ML: NEGATIVE
MONOCYTES # BLD AUTO: 0.9 K/UL (ref 0.3–1)
MONOCYTES NFR BLD: 13.9 % (ref 4–15)
NEUTROPHILS # BLD AUTO: 3.1 K/UL (ref 1.8–7.7)
NEUTROPHILS NFR BLD: 49.4 % (ref 38–73)
NITRITE UR QL STRIP: NEGATIVE
NRBC BLD-RTO: 0 /100 WBC
OPIATES UR QL SCN: NEGATIVE
PCP UR QL SCN>25 NG/ML: NEGATIVE
PH UR STRIP: 6 [PH] (ref 5–8)
PLATELET # BLD AUTO: 157 K/UL (ref 150–450)
PMV BLD AUTO: 9.7 FL (ref 9.2–12.9)
POTASSIUM SERPL-SCNC: 3.9 MMOL/L (ref 3.5–5.1)
PROT SERPL-MCNC: 6.6 G/DL (ref 6–8.4)
PROT UR QL STRIP: NEGATIVE
RBC # BLD AUTO: 3.59 M/UL (ref 4.6–6.2)
SODIUM SERPL-SCNC: 138 MMOL/L (ref 136–145)
SP GR UR STRIP: 1.02 (ref 1–1.03)
TOXICOLOGY INFORMATION: NORMAL
TROPONIN I SERPL DL<=0.01 NG/ML-MCNC: 0.01 NG/ML (ref 0–0.03)
TROPONIN I SERPL DL<=0.01 NG/ML-MCNC: 0.02 NG/ML (ref 0–0.03)
TROPONIN I SERPL DL<=0.01 NG/ML-MCNC: <0.006 NG/ML (ref 0–0.03)
URN SPEC COLLECT METH UR: NORMAL
UROBILINOGEN UR STRIP-ACNC: NEGATIVE EU/DL
WBC # BLD AUTO: 6.17 K/UL (ref 3.9–12.7)

## 2021-12-12 PROCEDURE — 85025 COMPLETE CBC W/AUTO DIFF WBC: CPT | Mod: HCNC | Performed by: FAMILY MEDICINE

## 2021-12-12 PROCEDURE — 81003 URINALYSIS AUTO W/O SCOPE: CPT | Mod: HCNC,59 | Performed by: FAMILY MEDICINE

## 2021-12-12 PROCEDURE — 99285 EMERGENCY DEPT VISIT HI MDM: CPT | Mod: 25,HCNC

## 2021-12-12 PROCEDURE — 80307 DRUG TEST PRSMV CHEM ANLYZR: CPT | Mod: HCNC | Performed by: FAMILY MEDICINE

## 2021-12-12 PROCEDURE — 93010 EKG 12-LEAD: ICD-10-PCS | Mod: HCNC,76,, | Performed by: INTERNAL MEDICINE

## 2021-12-12 PROCEDURE — 80053 COMPREHEN METABOLIC PANEL: CPT | Mod: HCNC | Performed by: FAMILY MEDICINE

## 2021-12-12 PROCEDURE — 93010 ELECTROCARDIOGRAM REPORT: CPT | Mod: HCNC,76,, | Performed by: INTERNAL MEDICINE

## 2021-12-12 PROCEDURE — 84484 ASSAY OF TROPONIN QUANT: CPT | Mod: 91,HCNC | Performed by: FAMILY MEDICINE

## 2021-12-12 PROCEDURE — 83690 ASSAY OF LIPASE: CPT | Mod: HCNC | Performed by: FAMILY MEDICINE

## 2021-12-12 PROCEDURE — 25000003 PHARM REV CODE 250: Mod: HCNC | Performed by: FAMILY MEDICINE

## 2021-12-12 PROCEDURE — 93005 ELECTROCARDIOGRAM TRACING: CPT | Mod: HCNC

## 2021-12-12 PROCEDURE — 83880 ASSAY OF NATRIURETIC PEPTIDE: CPT | Mod: HCNC | Performed by: FAMILY MEDICINE

## 2021-12-12 RX ADMIN — NITROGLYCERIN 1 INCH: 20 OINTMENT TOPICAL at 02:12

## 2021-12-12 RX ADMIN — DICYCLOMINE HYDROCHLORIDE: 10 SOLUTION ORAL at 01:12

## 2021-12-13 ENCOUNTER — PATIENT MESSAGE (OUTPATIENT)
Dept: CARDIOLOGY | Facility: CLINIC | Age: 86
End: 2021-12-13
Payer: MEDICARE

## 2021-12-13 ENCOUNTER — PATIENT MESSAGE (OUTPATIENT)
Dept: FAMILY MEDICINE | Facility: CLINIC | Age: 86
End: 2021-12-13
Payer: MEDICARE

## 2021-12-16 ENCOUNTER — OFFICE VISIT (OUTPATIENT)
Dept: FAMILY MEDICINE | Facility: CLINIC | Age: 86
End: 2021-12-16
Payer: MEDICARE

## 2021-12-16 VITALS
HEIGHT: 70 IN | WEIGHT: 190.06 LBS | BODY MASS INDEX: 27.21 KG/M2 | OXYGEN SATURATION: 96 % | SYSTOLIC BLOOD PRESSURE: 138 MMHG | HEART RATE: 80 BPM | TEMPERATURE: 98 F | DIASTOLIC BLOOD PRESSURE: 66 MMHG

## 2021-12-16 DIAGNOSIS — I10 HYPERTENSION, UNSPECIFIED TYPE: ICD-10-CM

## 2021-12-16 DIAGNOSIS — R10.13 EPIGASTRIC PAIN: Primary | ICD-10-CM

## 2021-12-16 PROCEDURE — 99214 OFFICE O/P EST MOD 30 MIN: CPT | Mod: HCNC,S$GLB,, | Performed by: FAMILY MEDICINE

## 2021-12-16 PROCEDURE — 99999 PR PBB SHADOW E&M-EST. PATIENT-LVL V: ICD-10-PCS | Mod: PBBFAC,HCNC,, | Performed by: FAMILY MEDICINE

## 2021-12-16 PROCEDURE — 1157F ADVNC CARE PLAN IN RCRD: CPT | Mod: HCNC,CPTII,S$GLB, | Performed by: FAMILY MEDICINE

## 2021-12-16 PROCEDURE — 99999 PR PBB SHADOW E&M-EST. PATIENT-LVL V: CPT | Mod: PBBFAC,HCNC,, | Performed by: FAMILY MEDICINE

## 2021-12-16 PROCEDURE — 1157F PR ADVANCE CARE PLAN OR EQUIV PRESENT IN MEDICAL RECORD: ICD-10-PCS | Mod: HCNC,CPTII,S$GLB, | Performed by: FAMILY MEDICINE

## 2021-12-16 PROCEDURE — 99214 PR OFFICE/OUTPT VISIT, EST, LEVL IV, 30-39 MIN: ICD-10-PCS | Mod: HCNC,S$GLB,, | Performed by: FAMILY MEDICINE

## 2021-12-29 ENCOUNTER — HOSPITAL ENCOUNTER (EMERGENCY)
Facility: HOSPITAL | Age: 86
Discharge: HOME | End: 2021-12-29
Attending: EMERGENCY MEDICINE
Payer: MEDICARE

## 2021-12-29 VITALS
DIASTOLIC BLOOD PRESSURE: 88 MMHG | OXYGEN SATURATION: 95 % | WEIGHT: 190.81 LBS | BODY MASS INDEX: 27.38 KG/M2 | RESPIRATION RATE: 24 BRPM | TEMPERATURE: 99 F | SYSTOLIC BLOOD PRESSURE: 189 MMHG | HEART RATE: 77 BPM

## 2021-12-29 DIAGNOSIS — R05.9 COUGH: ICD-10-CM

## 2021-12-29 DIAGNOSIS — U07.1 COVID-19 VIRUS DETECTED: ICD-10-CM

## 2021-12-29 DIAGNOSIS — U07.1 COVID-19 VIRUS INFECTION: Primary | ICD-10-CM

## 2021-12-29 LAB
CTP QC/QA: YES
SARS-COV-2 RDRP RESP QL NAA+PROBE: POSITIVE

## 2021-12-29 PROCEDURE — 80047 BASIC METABLC PNL IONIZED CA: CPT | Mod: HCNC

## 2021-12-29 PROCEDURE — U0002 COVID-19 LAB TEST NON-CDC: HCPCS | Mod: HCNC | Performed by: NURSE PRACTITIONER

## 2021-12-29 PROCEDURE — 99282 EMERGENCY DEPT VISIT SF MDM: CPT | Mod: 25,HCNC

## 2021-12-30 NOTE — ED NOTES
Bed: 20  Expected date:   Expected time:   Means of arrival: Personal Transportation  Comments:  [facility] only

## 2021-12-30 NOTE — ED PROVIDER NOTES
SCRIBE #1 NOTE: I, Mani Gage, am scribing for, and in the presence of, Lizzie Funez MD. I have scribed the entire note.       History     Chief Complaint   Patient presents with    COVID-19 Concerns     Fever headache and cough with generalized weakness     Review of patient's allergies indicates:   Allergen Reactions    Atorvastatin      Other reaction(s): Muscle pain  Other reaction(s): Muscle weakness  Other reaction(s): Muscle pain    Codeine      Other reaction(s): Headache  Other reaction(s): Headache    Eliquis [apixaban]     Norvasc [amlodipine] Edema    Trulicity [dulaglutide] Nausea And Vomiting    Adhesive Rash     Other reaction(s): rash         History of Present Illness     HPI    12/29/2021, 9:54 PM  History obtained from the patient      History of Present Illness: Anthony Blair is a 88 y.o. male patient with a PMHx of A-fib, CAD, DM, HTN, and stroke who presents to the Emergency Department for evaluation of COVID-19 concerns which onset gradually 3 days ago. Pt c/o generalized weakness with decreased appetite. He had a fever of 101.2F yesterday. Symptoms are constant and moderate in severity. No mitigating or exacerbating factors reported. Associated sxs include cough and HA. Patient denies any SOB, CP, N/V/D, sore throat, and all other sxs at this time. No further complaints or concerns at this time.       Arrival mode: Personal vehicle     PCP: Abdulaziz Mccabe MD        Past Medical History:  Past Medical History:   Diagnosis Date    A-fib     Anemia     AP (angina pectoris) 1/23/2014    Carotid artery disease without cerebral infarction 8/22/2014    Cataract     Cirrhosis of liver 9/10/2020    Coronary artery disease     Diabetes mellitus     DM (diabetes mellitus) 1970     am 07/06/2020    GERD (gastroesophageal reflux disease) 7/11/2013    Hemorrhagic cerebrovascular accident (CVA) 4/26/2018    Hyperlipidemia     Hypertension     Hypertensive heart disease with  heart failure 3/12/2019    Intracranial hemorrhage     Lumbosacral spondylosis 12/24/2014    Pacemaker 1/23/2014    Paroxysmal atrial fibrillation 1/23/2014    Peripheral vascular disease 8/22/2014    Pneumonia of right lung due to infectious organism 3/27/2019    Seizure, late effect of stroke     Stroke     Type 2 diabetes mellitus with ophthalmic manifestations        Past Surgical History:  Past Surgical History:   Procedure Laterality Date    ANGIOPLASTY      ATRIAL ABLATION SURGERY N/A     CATARACT EXTRACTION Bilateral     [facility]    CATARACT EXTRACTION W/ INTRAOCULAR LENS IMPLANT Right     CATARACT EXTRACTION W/ INTRAOCULAR LENS IMPLANT Left     COLONOSCOPY N/A 10/16/2018    Procedure: COLONOSCOPY with biopsies;  Surgeon: Anabell Griggs MD;  Location: [facility] ENDO;  Service: Endoscopy;  Laterality: N/A;    CORONARY ARTERY BYPASS GRAFT  07/2010    x5    EYE SURGERY      pace maker surgrey  10/2010    reposition in 2011/2012 (approx)    REPLACEMENT OF PACEMAKER GENERATOR Left 8/6/2020    Procedure: REPLACEMENT, PULSE GENERATOR, CARDIAC PACEMAKER;  Surgeon: Domenico Grajeda MD;  Location: [facility] CATH LAB;  Service: Cardiology;  Laterality: Left;  [facility]    REVISION OF PROCEDURE INVOLVING PACEMAKER LEAD Bilateral 8/6/2020    Procedure: REVISION, ELECTRODE LEAD, CARDIAC PACEMAKER;  Surgeon: Domenico Grajeda MD;  Location: [facility] CATH LAB;  Service: Cardiology;  Laterality: Bilateral;    VEIN BYPASS SURGERY           Family History:  Family History   Problem Relation Age of Onset    Arthritis Mother     Diabetes Mother     Kidney disease Mother     Heart disease Mother     Arthritis Father     Diabetes Father     Diabetes Sister     Cancer Sister         breast    Heart disease Sister     Diabetes Brother     Kidney disease Brother     Heart disease Brother     Cancer Daughter         breast    Diabetes Daughter     Miscarriages / Stillbirths Daughter      Fibromyalgia Daughter     Diabetes Son     Diabetes Son     Alcohol abuse Paternal Uncle     Stroke Neg Hx     Hypertension Neg Hx     Hyperlipidemia Neg Hx     COPD Neg Hx        Social History:  Social History     Tobacco Use    Smoking status: Former Smoker     Packs/day: 2.00     Years: 13.00     Pack years: 26.00     Types: Cigarettes     Start date: 1954     Quit date: 1967     Years since quittin.6    Smokeless tobacco: Never Used   Substance and Sexual Activity    Alcohol use: No    Drug use: No    Sexual activity: Never     Partners: Female     Birth control/protection: None        Review of Systems     Review of Systems   Constitutional: Positive for appetite change (decreased) and fever (101.2F yesterday).   HENT: Negative for sore throat.    Respiratory: Positive for cough. Negative for shortness of breath.    Cardiovascular: Negative for chest pain.   Gastrointestinal: Negative for diarrhea, nausea and vomiting.   Genitourinary: Negative for dysuria.   Musculoskeletal: Negative for back pain.   Skin: Negative for rash.   Neurological: Positive for weakness (Generalized) and headaches.   Hematological: Does not bruise/bleed easily.   All other systems reviewed and are negative.       Physical Exam     Initial Vitals [21]   BP Pulse Resp Temp SpO2   (!) 142/52 78 20 98.9 °F (37.2 °C) (!) 94 %      MAP       --          Physical Exam  Nursing Notes and Vital Signs Reviewed.  Constitutional: Patient is in no acute distress. Well-developed and well-nourished.  Head: Atraumatic. Normocephalic.  Eyes: EOM intact. Conjunctivae are not pale. No scleral icterus.  ENT: Mucous membranes are moist. Oropharynx is clear and symmetric.    Neck: Supple. Full ROM.   Cardiovascular: Regular rate. Regular rhythm. No murmurs, rubs, or gallops. Distal pulses are 2+ and symmetric.  Pulmonary/Chest: No respiratory distress. Clear to auscultation bilaterally. No wheezing or  rales.  Abdominal: Soft and non-distended.  There is no tenderness.  No rebound, guarding, or rigidity.   Musculoskeletal: Moves all extremities. No obvious deformities. No edema.   Skin: Warm and dry.  Neurological:  Alert, awake, and appropriate.  Normal speech.  No acute focal neurological deficits are appreciated.  Psychiatric: Normal affect. Good eye contact. Appropriate in content.     ED Course   Procedures  ED Vital Signs:  Vitals:    12/29/21 1814 12/29/21 2214 12/29/21 2352   BP: (!) 142/52 (!) 178/84 (!) 189/88   Pulse: 78 71 77   Resp: 20  (!) 24   Temp: 98.9 °F (37.2 °C)  99.1 °F (37.3 °C)   SpO2: (!) 94% 96% 95%   Weight: 86.5 kg (190 lb 12.9 oz)         Abnormal Lab Results:  Labs Reviewed   SARS-COV-2 RDRP GENE - Abnormal; Notable for the following components:       Result Value    POC Rapid COVID Positive (*)     All other components within normal limits    Narrative:     This test utilizes isothermal nucleic acid amplification   technology to detect the SARS-CoV-2 RdRp nucleic acid segment.   The analytical sensitivity (limit of detection) is 125 genome   equivalents/mL.   A POSITIVE result implies infection with the SARS-CoV-2 virus;   the patient is presumed to be contagious.     A NEGATIVE result means that SARS-CoV-2 nucleic acids are not   present above the limit of detection. A NEGATIVE result should be   treated as presumptive. It does not rule out the possibility of   COVID-19 and should not be the sole basis for treatment decisions.   If COVID-19 is strongly suspected based on clinical and exposure   history, re-testing using an alternate molecular assay should be   considered.   This test is only for use under the Food and Drug   Administration s Emergency Use Authorization (EUA).   Commercial kits are provided by [employer].   Performance characteristics of the EUA have been independently   verified by [facility] Department of   Pathology and Laboratory Medicine.    _________________________________________________________________   The authorized Fact Sheet for Healthcare Providers and the authorized Fact   Sheet for Patients of the ID NOW COVID-19 are available on the FDA   website:     https://www.fda.gov/media/852864/download  https://www.fda.gov/media/061358/download              All Lab Results:  Results for orders placed or performed during the hospital encounter of 12/29/21   POCT COVID-19 Rapid Screening   Result Value Ref Range    POC Rapid COVID Positive (A) Negative     Acceptable Yes          Imaging Results:  Imaging Results          X-Ray Chest AP Portable (Final result)  Result time 12/29/21 23:10:44    Final result by James Rojas MD (12/29/21 23:10:44)                 Impression:      As above.      Electronically signed by: James Rojas  Date:    12/29/2021  Time:    23:10             Narrative:    EXAMINATION:  XR CHEST AP PORTABLE    CLINICAL HISTORY:  cough;cough;    TECHNIQUE:  Single frontal view of the chest was performed.    COMPARISON:  Multiple priors.    FINDINGS:  Left chest cardiac pacing device.  Median sternotomy wires in place.    Partial resolution of the right basilar opacity.  This may represent residual or recurrent infection.  Correlation is advised.  No pleural fluid or pneumothorax.    The cardiac silhouette is normal in size. The hilar and mediastinal contours are unremarkable.    Bones are intact.                                        The Emergency Provider reviewed the vital signs and test results, which are outlined above.     ED Discussion       11:20 PM: Reassessed pt at this time. Discussed with pt all pertinent ED information and results. Discussed pt dx and plan of tx. Gave pt all f/u and return to the ED instructions. All questions and concerns were addressed at this time. Pt expresses understanding of information and instructions, and is comfortable with plan to discharge. Pt is stable for  discharge.    I discussed with patient and/or family/caretaker that evaluation in the ED does not suggest any emergent or life threatening medical conditions requiring immediate intervention beyond what was provided in the ED, and I believe patient is safe for discharge.  Regardless, an unremarkable evaluation in the ED does not preclude the development or presence of a serious of life threatening condition. As such, patient was instructed to return immediately for any worsening or change in current symptoms.         Medical Decision Making:   Clinical Tests:   Lab Tests: Ordered and Reviewed  Radiological Study: Ordered and Reviewed           ED Medication(s):  Medications - No data to display    Discharge Medication List as of 12/29/2021 11:20 PM           Follow-up Information     [facility] - Emergency Dept..    Specialty: Emergency Medicine  Why: As needed, If symptoms worsen  Contact information:  [address] [facility] [address] [phone]           Abdulaziz Mccabe MD In 1 week.    Specialty: Family Medicine  Contact information:  [address] [address] [address] [phone]                             Scribe Attestation:   Scribe #1: I performed the above scribed service and the documentation accurately describes the services I performed. I attest to the accuracy of the note.     Attending:   Physician Attestation Statement for Scribe #1: I, Lizzie Funez MD, personally performed the services described in this documentation, as scribed by Mani Gage, in my presence, and it is both accurate and complete.           Clinical Impression       ICD-10-CM ICD-9-CM   1. COVID-19 virus infection  U07.1 079.89   2. Cough  R05.9 786.2       Disposition:   Disposition: Discharged  Condition: Stable         Lizzie Funez MD  12/30/21 [id number]

## 2021-12-30 NOTE — FIRST PROVIDER EVALUATION
Medical screening exam completed.  I have conducted a focused provider triage encounter, findings are as follows:    Brief history of present illness:  Pt. C/o generalized fatigue.     There were no vitals filed for this visit.        Preliminary workup initiated; this workup will be continued and followed by the physician or advanced practice provider that is assigned to the patient when roomed.

## 2022-01-05 ENCOUNTER — INFUSION (OUTPATIENT)
Dept: INFECTIOUS DISEASES | Facility: HOSPITAL | Age: 87
End: 2022-01-05
Attending: EMERGENCY MEDICINE
Payer: MEDICARE

## 2022-01-05 VITALS
RESPIRATION RATE: 18 BRPM | HEART RATE: 73 BPM | TEMPERATURE: 99 F | DIASTOLIC BLOOD PRESSURE: 95 MMHG | SYSTOLIC BLOOD PRESSURE: 183 MMHG | OXYGEN SATURATION: 94 %

## 2022-01-05 DIAGNOSIS — U07.1 COVID-19: ICD-10-CM

## 2022-01-05 PROCEDURE — M0243 CASIRIVI AND IMDEVI INFUSION: HCPCS | Performed by: INTERNAL MEDICINE

## 2022-01-05 PROCEDURE — 63600175 PHARM REV CODE 636 W HCPCS: Mod: HCNC | Performed by: INTERNAL MEDICINE

## 2022-01-05 PROCEDURE — 25000003 PHARM REV CODE 250: Mod: HCNC | Performed by: INTERNAL MEDICINE

## 2022-01-05 RX ORDER — ALBUTEROL SULFATE 90 UG/1
2 AEROSOL, METERED RESPIRATORY (INHALATION)
Status: DISCONTINUED | OUTPATIENT
Start: 2022-01-05 | End: 2024-01-11

## 2022-01-05 RX ORDER — SODIUM CHLORIDE 0.9 % (FLUSH) 0.9 %
10 SYRINGE (ML) INJECTION
Status: DISCONTINUED | OUTPATIENT
Start: 2022-01-05 | End: 2024-01-11

## 2022-01-05 RX ORDER — DIPHENHYDRAMINE HYDROCHLORIDE 50 MG/ML
25 INJECTION INTRAMUSCULAR; INTRAVENOUS ONCE AS NEEDED
Status: DISCONTINUED | OUTPATIENT
Start: 2022-01-05 | End: 2024-01-11

## 2022-01-05 RX ORDER — EPINEPHRINE 0.3 MG/.3ML
0.3 INJECTION SUBCUTANEOUS
Status: DISCONTINUED | OUTPATIENT
Start: 2022-01-05 | End: 2024-01-11

## 2022-01-05 RX ORDER — ONDANSETRON 4 MG/1
4 TABLET, ORALLY DISINTEGRATING ORAL ONCE AS NEEDED
Status: DISCONTINUED | OUTPATIENT
Start: 2022-01-05 | End: 2024-01-11

## 2022-01-05 RX ORDER — ACETAMINOPHEN 325 MG/1
650 TABLET ORAL ONCE AS NEEDED
Status: DISCONTINUED | OUTPATIENT
Start: 2022-01-05 | End: 2024-01-11

## 2022-01-05 RX ADMIN — CASIRIVIMAB AND IMDEVIMAB 600 MG: 600; 600 INJECTION, SOLUTION, CONCENTRATE INTRAVENOUS at 03:01

## 2022-01-05 NOTE — PROGRESS NOTES
If you have any further COVID related symtoms that have not improved or feel you're having a reaction to your infusion please notify your primary care physician, go to the nearest emergency room or call 911. Patient discharged via wheelchair/ambulatory with escort to front door of hospital in no apparent distress. Tolerated infusion well. Instructed patient to notify primary care physician if symptoms do not resolve or worsen and to continue to quarantine for the full 10 day period. Also, instructed to wait 90 days post infusion to receive Covid vaccine. Patient verbalized intent to comply.

## 2022-01-10 ENCOUNTER — PATIENT OUTREACH (OUTPATIENT)
Dept: OTHER | Facility: OTHER | Age: 87
End: 2022-01-10

## 2022-01-11 ENCOUNTER — PATIENT MESSAGE (OUTPATIENT)
Dept: FAMILY MEDICINE | Facility: CLINIC | Age: 87
End: 2022-01-11
Payer: MEDICARE

## 2022-01-18 ENCOUNTER — PES CALL (OUTPATIENT)
Dept: ADMINISTRATIVE | Facility: CLINIC | Age: 87
End: 2022-01-18
Payer: MEDICARE

## 2022-02-02 ENCOUNTER — CLINICAL SUPPORT (OUTPATIENT)
Dept: CARDIOLOGY | Facility: HOSPITAL | Age: 87
End: 2022-02-02
Payer: MEDICARE

## 2022-02-02 DIAGNOSIS — Z95.0 PRESENCE OF CARDIAC PACEMAKER: ICD-10-CM

## 2022-02-02 PROCEDURE — 93296 REM INTERROG EVL PM/IDS: CPT | Mod: HCNC | Performed by: INTERNAL MEDICINE

## 2022-02-02 PROCEDURE — 93294 CARDIAC DEVICE CHECK - REMOTE: ICD-10-PCS | Mod: HCNC,S$GLB,, | Performed by: INTERNAL MEDICINE

## 2022-02-02 PROCEDURE — 93294 REM INTERROG EVL PM/LDLS PM: CPT | Mod: HCNC,S$GLB,, | Performed by: INTERNAL MEDICINE

## 2022-02-17 NOTE — TELEPHONE ENCOUNTER
Care Due:                  Date            Visit Type   Department     Provider  --------------------------------------------------------------------------------                                EP -                              PRIMARY      [facility]  Last Visit: 12-      CARE ([facility])   MEDICINE       Abdulaziz Mccabe  Next Visit: None Scheduled  None         None Found                                                            Last  Test          Frequency    Reason                     Performed    Due Date  --------------------------------------------------------------------------------    HBA1C.......  6 months...  insulin..................  09-   03-    Powered by [employer]. Reference number: [id number].   2/17/2022 10:42:58 AM CST

## 2022-02-18 RX ORDER — PANTOPRAZOLE SODIUM 40 MG/1
TABLET, DELAYED RELEASE ORAL
Qty: 90 TABLET | Refills: 3 | Status: SHIPPED | OUTPATIENT
Start: 2022-02-18 | End: 2023-03-10

## 2022-02-18 RX ORDER — LOSARTAN POTASSIUM 100 MG/1
TABLET ORAL
Qty: 90 TABLET | Refills: 1 | Status: SHIPPED | OUTPATIENT
Start: 2022-02-18 | End: 2022-09-13

## 2022-02-18 NOTE — TELEPHONE ENCOUNTER
Refill Routing Note   Medication(s) are not appropriate for processing by [facility] for the following reason(s):      - Required vitals are abnormal    [facility] action(s):  Defer  Approve          --->Care Gap information included in message below if applicable.   Medication reconciliation completed: No   Automatic Epic Generated Protocol Data:    Orders Placed This Encounter    pantoprazole (PROTONIX) 40 MG tablet      Requested Prescriptions   Pending Prescriptions Disp Refills    losartan (COZAAR) 100 MG tablet [Pharmacy Med Name: losartan 100 mg tablet] 90 tablet 1     Sig: TAKE ONE TABLET (100mg) BY MOUTH DAILY       Cardiovascular:  Angiotensin Receptor Blockers Failed - 2/17/2022 10:42 AM        Failed - Last BP in normal range within 360 days     BP Readings from Last 1 Encounters:   01/05/22 (!) 183/95               Passed - Patient is at least 18 years old        Passed - Valid encounter within last 15 months     Recent Visits  Date Type Provider Dept   12/16/21 Office Visit Abdulaziz Mccabe MD [facility]   09/23/21 Office Visit Abdulaziz Mccabe MD [facility]   09/09/21 Office Visit Abdulaziz Mccabe MD [facility]   06/02/21 Office Visit Abdulaziz Mccabe MD [facility]   02/26/21 Office Visit Abdulaziz Mccabe MD [facility]   11/18/20 Office Visit Abdulaziz Mccabe MD [facility]   08/11/20 Office Visit Abdulaziz Mccabe MD [facility]   05/11/20 Office Visit Abdulaziz Mccabe MD [facility]   Showing recent visits within past 720 days and meeting all other requirements  Future Appointments  No visits were found meeting these conditions.  Showing future appointments within next 150 days and meeting all other requirements      Future Appointments              In 2 weeks LABORATORY, [facility], [facility]    In 1 month MD Indira [facility]aya - Cardiology, [facility]    In 1 month [facility] - Gastroenterology, [facility]    In 2 months HOME MONITOR DEVICE CHECK [place] - Arrhythmia Procedures, [place]                Passed - Cr is 1.39 or below and within 360 days     Lab Results   Component Value Date    CREATININE 0.9 12/12/2021    CREATININE 1.0 09/28/2021    CREATININE 1.1 09/21/2021              Passed - K is 5.2 or below and within 360 days     Potassium   Date Value Ref Range Status   12/12/2021 3.9 3.5 - 5.1 mmol/L Final   09/28/2021 4.1 3.5 - 5.1 mmol/L Final   09/21/2021 4.7 3.5 - 5.1 mmol/L Final              Passed - eGFR within 360 days     Lab Results   Component Value Date    EGFRNONAA >60 12/12/2021    EGFRNONAA >60.0 09/28/2021    EGFRNONAA 60 09/21/2021                 Signed Prescriptions Disp Refills    pantoprazole (PROTONIX) 40 MG tablet 90 tablet 3     Sig: TAKE ONE TABLET (40mg) BY MOUTH EVERY DAY       Gastroenterology: Proton Pump Inhibitors 2 Passed - 2/17/2022 10:42 AM        Passed - Patient is at least 18 years old        Passed - Osteoporosis is not on problem list        Passed - An appropriate indication is on the problem list        Passed - Valid encounter within last 15 months     Recent Visits  Date Type Provider Dept   12/16/21 Office Visit Abdulaziz Mccabe MD [facility]   09/23/21 Office Visit Abdulaziz Mccabe MD [facility]   09/09/21 Office Visit Abdulaziz Mccabe MD [facility] Internal Medicine   06/02/21 Office Visit Abdulaziz Mccabe MD [facility]   02/26/21 Office Visit Abdulaziz Mccabe MD [facility]   11/18/20 Office Visit Abdulaziz Mccabe MD [facility]   08/11/20 Office Visit Abdulaziz Mccabe MD [facility]   05/11/20 Office Visit Abdulaziz Mccabe MD [facility]   Showing recent visits within past 720 days and meeting all other requirements  Future Appointments  No visits were found meeting these conditions.  Showing future appointments within next 150 days and meeting all other requirements      Future Appointments              In 2  weeks LABORATORY, [facility] - [facility], [facility]    In 1 month MD Indira Minaya - Cardiology,  Medical C    In 1 month [facility] [place] - Gastroenterology,  Medical C    In 2 months HOME MONITOR DEVICE CHECK [place] - Arrhythmia Procedures, [place]                      Appointments  past 12m or future 3m with PCP    Date Provider   Last Visit   12/16/2021 Abdulaziz Mccabe MD   Next Visit   Visit date not found Abdulaziz Mccabe MD   ED visits in past 90 days: 2        Note composed:12:42 PM 02/18/2022

## 2022-02-25 NOTE — TELEPHONE ENCOUNTER
No new care gaps identified.  Powered by [employer]. Reference number: [id number].   2/25/2022 8:04:59 AM CST

## 2022-03-01 RX ORDER — PEN NEEDLE, DIABETIC 31 GX5/16"
NEEDLE, DISPOSABLE MISCELLANEOUS
Qty: 200 EACH | Refills: 6 | Status: SHIPPED | OUTPATIENT
Start: 2022-03-01 | End: 2023-03-21

## 2022-03-08 ENCOUNTER — PATIENT MESSAGE (OUTPATIENT)
Dept: CARDIOLOGY | Facility: CLINIC | Age: 87
End: 2022-03-08
Payer: MEDICARE

## 2022-03-12 ENCOUNTER — OFFICE VISIT (OUTPATIENT)
Dept: URGENT CARE | Facility: CLINIC | Age: 87
End: 2022-03-12
Payer: MEDICARE

## 2022-03-12 VITALS
BODY MASS INDEX: 27.2 KG/M2 | OXYGEN SATURATION: 98 % | TEMPERATURE: 98 F | HEIGHT: 70 IN | DIASTOLIC BLOOD PRESSURE: 63 MMHG | WEIGHT: 190 LBS | SYSTOLIC BLOOD PRESSURE: 142 MMHG | HEART RATE: 70 BPM | RESPIRATION RATE: 18 BRPM

## 2022-03-12 DIAGNOSIS — R05.9 COUGH: ICD-10-CM

## 2022-03-12 DIAGNOSIS — J10.1 INFLUENZA A: Primary | ICD-10-CM

## 2022-03-12 LAB
CTP QC/QA: YES
POC MOLECULAR INFLUENZA A AGN: POSITIVE
POC MOLECULAR INFLUENZA B AGN: NEGATIVE

## 2022-03-12 PROCEDURE — 3072F LOW RISK FOR RETINOPATHY: CPT | Mod: CPTII,S$GLB,, | Performed by: EMERGENCY MEDICINE

## 2022-03-12 PROCEDURE — 87502 POCT INFLUENZA A/B MOLECULAR: ICD-10-PCS | Mod: QW,S$GLB,, | Performed by: EMERGENCY MEDICINE

## 2022-03-12 PROCEDURE — 1157F ADVNC CARE PLAN IN RCRD: CPT | Mod: CPTII,S$GLB,, | Performed by: EMERGENCY MEDICINE

## 2022-03-12 PROCEDURE — 99213 PR OFFICE/OUTPT VISIT, EST, LEVL III, 20-29 MIN: ICD-10-PCS | Mod: S$GLB,,, | Performed by: EMERGENCY MEDICINE

## 2022-03-12 PROCEDURE — 1160F RVW MEDS BY RX/DR IN RCRD: CPT | Mod: CPTII,S$GLB,, | Performed by: EMERGENCY MEDICINE

## 2022-03-12 PROCEDURE — 1160F PR REVIEW ALL MEDS BY PRESCRIBER/CLIN PHARMACIST DOCUMENTED: ICD-10-PCS | Mod: CPTII,S$GLB,, | Performed by: EMERGENCY MEDICINE

## 2022-03-12 PROCEDURE — 1159F PR MEDICATION LIST DOCUMENTED IN MEDICAL RECORD: ICD-10-PCS | Mod: CPTII,S$GLB,, | Performed by: EMERGENCY MEDICINE

## 2022-03-12 PROCEDURE — 1157F PR ADVANCE CARE PLAN OR EQUIV PRESENT IN MEDICAL RECORD: ICD-10-PCS | Mod: CPTII,S$GLB,, | Performed by: EMERGENCY MEDICINE

## 2022-03-12 PROCEDURE — 1159F MED LIST DOCD IN RCRD: CPT | Mod: CPTII,S$GLB,, | Performed by: EMERGENCY MEDICINE

## 2022-03-12 PROCEDURE — 1125F AMNT PAIN NOTED PAIN PRSNT: CPT | Mod: CPTII,S$GLB,, | Performed by: EMERGENCY MEDICINE

## 2022-03-12 PROCEDURE — 87502 INFLUENZA DNA AMP PROBE: CPT | Mod: QW,S$GLB,, | Performed by: EMERGENCY MEDICINE

## 2022-03-12 PROCEDURE — 3072F PR LOW RISK FOR RETINOPATHY: ICD-10-PCS | Mod: CPTII,S$GLB,, | Performed by: EMERGENCY MEDICINE

## 2022-03-12 PROCEDURE — 1125F PR PAIN SEVERITY QUANTIFIED, PAIN PRESENT: ICD-10-PCS | Mod: CPTII,S$GLB,, | Performed by: EMERGENCY MEDICINE

## 2022-03-12 PROCEDURE — 99213 OFFICE O/P EST LOW 20 MIN: CPT | Mod: S$GLB,,, | Performed by: EMERGENCY MEDICINE

## 2022-03-12 RX ORDER — FLUTICASONE PROPIONATE 50 MCG
2 SPRAY, SUSPENSION (ML) NASAL DAILY
Qty: 16 G | Refills: 0 | Status: SHIPPED | OUTPATIENT
Start: 2022-03-12 | End: 2022-05-02 | Stop reason: SDUPTHER

## 2022-03-12 RX ORDER — PROMETHAZINE HYDROCHLORIDE AND DEXTROMETHORPHAN HYDROBROMIDE 6.25; 15 MG/5ML; MG/5ML
5 SYRUP ORAL EVERY 4 HOURS PRN
Qty: 180 ML | Refills: 0 | Status: SHIPPED | OUTPATIENT
Start: 2022-03-12 | End: 2022-03-22

## 2022-03-12 RX ORDER — OSELTAMIVIR PHOSPHATE 75 MG/1
75 CAPSULE ORAL 2 TIMES DAILY
Qty: 10 CAPSULE | Refills: 0 | Status: SHIPPED | OUTPATIENT
Start: 2022-03-12 | End: 2022-03-17

## 2022-03-12 RX ORDER — LEVOCETIRIZINE DIHYDROCHLORIDE 5 MG/1
5 TABLET, FILM COATED ORAL NIGHTLY
Qty: 30 TABLET | Refills: 11 | Status: SHIPPED | OUTPATIENT
Start: 2022-03-12 | End: 2024-01-11

## 2022-03-12 NOTE — PATIENT INSTRUCTIONS
Tamiflu as prescribed for 5 days.   Xyzal daily.  Flonase: 2 sprays per nostril 1-2 times a day.   Nasal saline drops: 2-4 drops per nostril 10-15 times a day.   Phenergan DM as prescribed for nighttime cough.  This medication is sedating.  Use with caution.    Warm saltwater gargles to 3 times a day.  Rest and drink plenty of fluids.  Avoid OTC decongestants if you have high blood pressure. This includes multi-cold symptom preparations.    Follow up in 2-3 days with PCP if no improvement or any worsening.       Viral Upper Respiratory Infection Discharge Instructions, Adult   About this topic   You have an upper respiratory infection or URI. A URI can affect your nose, throat, ears, and sinuses. A virus is the cause of almost all URIs and antibiotics will not help you feel better more quickly. The common cold is an example of a viral URI.  URIs are easy to spread from person to person, most often through coughing or sneezing. A URI will almost always get better in a week or two without any treatment.         What care is needed at home?   Ask your doctor what you need to do when you go home. Make sure you ask questions if you do not understand what the doctor says.  If you smoke, try to quit. Your doctor or nurse can help.  Drink lots of fluids like water, juice, or broth. This will help replace any fluids lost if you have a runny nose or fever. Warm tea or soup can help soothe a sore throat.  If the air in your home feels dry, use a cool mist humidifier. This can help a stuffy nose and make it easier to breathe.  You can also use saline nose drops to relieve stuffiness.  If you decide to take over-the-counter cough or cold medicines, follow the directions on the label carefully. Be sure you do not take more than 1 medicine that contains acetaminophen. Also, if you have a heart problem or high blood pressure, check with your doctor before you take any of these medicines.  Wash your hands often. Cough or sneeze into  a tissue or your elbow instead of your hands. This will help keep others healthy.  What follow-up care is needed?   Your doctor may ask you to make visits to the office to check on your progress. Be sure to keep these visits.  What drugs may be needed?   The doctor may order drugs to:  Open up the tubes of your lungs  Treat viral infection  Relieve or stop coughing  Help with pain from a sore throat  Relieve runny and stuffy nose  Provide oxygen  Will physical activity be limited?   You need to rest for a few days to let your body recover from the infection.  What changes to diet are needed?   Eat soft foods like soup if swallowing is too painful.  What problems could happen?   Asthma attack  Sinus infections  Lung problems like pneumonia and bronchitis  Severe fluid loss. This is dehydration.  What can be done to prevent this health problem?   Wash your hands often with soap and water for at least 20 seconds, especially after coughing or sneezing. Alcohol-based hand sanitizers also work to kill the virus.  If you are sick, cover your mouth and nose with tissue when you cough or sneeze. You can also cough into your elbow. Throw away tissues in the trash and wash your hands after touching used tissues.  Do not get too close (kissing, hugging) to people who are sick.  Do not share towels or hankies with anyone who is sick. Clean commonly handled things like door handles, remotes, toys, and phones. Wipe them with a disinfectant.  Stay away from crowded places.  Cover your nose and mouth when you sneeze or cough.  Take vitamin C to help build up your body's ability to fight disease.  Get a flu shot each year.  When do I need to call the doctor?   You have trouble breathing when talking or sitting still.  You have a fever of 100.4°F (38°C) or higher for several days, chills, a very bad sore throat, or ear or sinus pain.  You develop a new fever after several days of feeling the same or improving.  You develop chest pain  when you cough.  You have a cough that lasts more than 10 days.  You cough up blood, or the color of the mucus you cough up changes.  Teach Back: Helping You Understand   The Teach Back Method helps you understand the information we are giving you. After you talk with the staff, tell them in your own words what you learned. This helps to make sure the staff has described each thing clearly. It also helps to explain things that may have been confusing. Before going home, make sure you can do these:  I can tell you about my condition.  I can tell you what may help ease my signs.  I can tell you what I will do if I have a fever, chills, breathing very fast, or trouble breathing.  Where can I learn more?   American Lung Association  https://www.lung.org/blog/can-you-exercise-with-a-cold   American Lung Association  https://www.lung.org/lung-health-diseases/lung-disease-lookup/influenza/facts-about-the-common-cold   NHS Choices  https://www.nhs.uk/conditions/respiratory-tract-infection/   UpToDate  https://www.[employer]/contents/the-common-cold-in-adults-beyond-the-basics   Last Reviewed Date   2021-06-08  Consumer Information Use and Disclaimer   This information is not specific medical advice and does not replace information you receive from your health care provider. This is only a brief summary of general information. It does NOT include all information about conditions, illnesses, injuries, tests, procedures, treatments, therapies, discharge instructions or life-style choices that may apply to you. You must talk with your health care provider for complete information about your health and treatment options. This information should not be used to decide whether or not to accept your health care providers advice, instructions or recommendations. Only your health care provider has the knowledge and training to provide advice that is right for you.  Copyright   Copyright © 2021 UpToDate, Inc. and its affiliates  and/or licensors. All rights reserved.

## 2022-03-12 NOTE — PROGRESS NOTES
"Subjective:       Patient ID: Anthony Blair is a 88 y.o. male.    Vitals:  height is 5' 10" (1.778 m) and weight is 86.2 kg (190 lb). His temperature is 98.4 °F (36.9 °C). His blood pressure is 142/63 (abnormal) and his pulse is 70. His respiration is 18 and oxygen saturation is 98%.     Chief Complaint: Cough    88-year-old male presents to urgent care for evaluation of cough which has been going on for couple of days.  States that he had COVID at the end of December and since that time he has had increased fatigue.  Fatigue is a little worse over the last 2 days.  Last night he began to feel like he had a rattle in his chest.  Not taking anything for sinuses.  No shortness of breath reported    Pt states he has been coughing, fatigue, and feels like he had a rattle in his chest last night. Pt states after his hot coffee this morning he felt the rattle go away. Pt states his coughing started Wednesday. Denies any fever, but states he does get cold.    Cough  This is a new problem. The current episode started in the past 7 days. The problem has been unchanged. The problem occurs every few hours. The cough is productive of sputum. Pertinent negatives include no chest pain, chills, ear congestion, ear pain, fever, headaches, heartburn, hemoptysis, myalgias, nasal congestion, postnasal drip, rash, rhinorrhea, sore throat, shortness of breath, sweats, weight loss or wheezing. Nothing aggravates the symptoms. He has tried nothing for the symptoms. The treatment provided no relief. There is no history of asthma, bronchiectasis, bronchitis, COPD, emphysema, environmental allergies or pneumonia.       Constitution: Negative for chills and fever.   HENT: Negative for ear pain, postnasal drip and sore throat.    Cardiovascular: Negative for chest pain.   Respiratory: Positive for cough. Negative for bloody sputum, shortness of breath and wheezing.    Gastrointestinal: Negative for heartburn.   Musculoskeletal: Negative for " muscle ache.   Skin: Negative for rash.   Allergic/Immunologic: Negative for environmental allergies.   Neurological: Negative for headaches.       Objective:      Physical Exam   Constitutional: He is oriented to person, place, and time. He appears well-developed. He is cooperative.  Non-toxic appearance. He does not appear ill. No distress.   HENT:   Head: Normocephalic and atraumatic.   Ears:   Right Ear: Hearing and external ear normal.   Left Ear: Hearing and external ear normal.   Nose: Congestion present. No mucosal edema, rhinorrhea or nasal deformity. No epistaxis. Right sinus exhibits no maxillary sinus tenderness and no frontal sinus tenderness. Left sinus exhibits no maxillary sinus tenderness and no frontal sinus tenderness.   Mouth/Throat: Uvula is midline and mucous membranes are normal. No trismus in the jaw. Normal dentition. No uvula swelling. Posterior oropharyngeal erythema present. No oropharyngeal exudate or posterior oropharyngeal edema.   Eyes: Conjunctivae and lids are normal. No scleral icterus.      extraocular movement intact   Neck: Trachea normal and phonation normal. Neck supple. No edema present. No erythema present. No neck rigidity present.   Cardiovascular: Normal rate, regular rhythm, normal heart sounds and normal pulses.   Pulmonary/Chest: Effort normal and breath sounds normal. No respiratory distress. He has no decreased breath sounds. He has no wheezes. He has no rhonchi. He has no rales.   Abdominal: Normal appearance.   Musculoskeletal: Normal range of motion.         General: No deformity. Normal range of motion.   Neurological: He is alert and oriented to person, place, and time. He exhibits normal muscle tone. Coordination normal.   Skin: Skin is warm, dry, intact, not diaphoretic and not pale.   Psychiatric: His speech is normal and behavior is normal. Judgment and thought content normal.   Nursing note and vitals reviewed.        Assessment:       1. Influenza A    2.  Cough        Results for orders placed or performed in visit on 03/12/22   POCT Influenza A/B MOLECULAR   Result Value Ref Range    POC Molecular Influenza A Ag Positive (A) Negative, Not Reported    POC Molecular Influenza B Ag Negative Negative, Not Reported     Acceptable Yes        Plan:         Influenza A  -     oseltamivir (TAMIFLU) 75 MG capsule; Take 1 capsule (75 mg total) by mouth 2 (two) times daily. for 5 days  Dispense: 10 capsule; Refill: 0  -     fluticasone propionate (FLONASE) 50 mcg/actuation nasal spray; 2 sprays (100 mcg total) by Each Nostril route once daily.  Dispense: 16 g; Refill: 0  -     levocetirizine (XYZAL) 5 MG tablet; Take 1 tablet (5 mg total) by mouth every evening.  Dispense: 30 tablet; Refill: 11  -     promethazine-dextromethorphan (PROMETHAZINE-DM) 6.25-15 mg/5 mL Syrp; Take 5 mLs by mouth every 4 (four) hours as needed (cough).  Dispense: 180 mL; Refill: 0    Cough  -     POCT Influenza A/B MOLECULAR

## 2022-03-14 ENCOUNTER — PATIENT OUTREACH (OUTPATIENT)
Dept: ADMINISTRATIVE | Facility: HOSPITAL | Age: 87
End: 2022-03-14
Payer: MEDICARE

## 2022-03-14 DIAGNOSIS — E08.51 DIABETES MELLITUS DUE TO UNDERLYING CONDITION WITH DIABETIC PERIPHERAL ANGIOPATHY WITHOUT GANGRENE, WITHOUT LONG-TERM CURRENT USE OF INSULIN: Primary | ICD-10-CM

## 2022-03-14 NOTE — PROGRESS NOTES
Working Diabetes.    Pt due repeat A1C, has lab appt scheduled, 3/16/22.  A1C ordered per guidelines and linked to appt.

## 2022-03-15 ENCOUNTER — TELEPHONE (OUTPATIENT)
Dept: URGENT CARE | Facility: CLINIC | Age: 87
End: 2022-03-15
Payer: MEDICARE

## 2022-03-15 ENCOUNTER — HOSPITAL ENCOUNTER (OUTPATIENT)
Dept: RADIOLOGY | Facility: CLINIC | Age: 87
Discharge: HOME | End: 2022-03-15
Attending: NURSE PRACTITIONER
Payer: MEDICARE

## 2022-03-15 ENCOUNTER — OFFICE VISIT (OUTPATIENT)
Dept: URGENT CARE | Facility: CLINIC | Age: 87
End: 2022-03-15
Payer: MEDICARE

## 2022-03-15 VITALS
WEIGHT: 190 LBS | HEART RATE: 72 BPM | BODY MASS INDEX: 27.2 KG/M2 | SYSTOLIC BLOOD PRESSURE: 150 MMHG | RESPIRATION RATE: 14 BRPM | HEIGHT: 70 IN | OXYGEN SATURATION: 97 % | DIASTOLIC BLOOD PRESSURE: 66 MMHG | TEMPERATURE: 98 F

## 2022-03-15 DIAGNOSIS — R06.02 SOB (SHORTNESS OF BREATH): ICD-10-CM

## 2022-03-15 DIAGNOSIS — I10 ESSENTIAL HYPERTENSION: Primary | ICD-10-CM

## 2022-03-15 DIAGNOSIS — I10 ESSENTIAL HYPERTENSION: ICD-10-CM

## 2022-03-15 DIAGNOSIS — J10.1 INFLUENZA A: ICD-10-CM

## 2022-03-15 DIAGNOSIS — L98.9 SKIN LESION: ICD-10-CM

## 2022-03-15 DIAGNOSIS — J22 ACUTE LOWER RESPIRATORY TRACT INFECTION: ICD-10-CM

## 2022-03-15 PROCEDURE — 1159F PR MEDICATION LIST DOCUMENTED IN MEDICAL RECORD: ICD-10-PCS | Mod: CPTII,S$GLB,, | Performed by: NURSE PRACTITIONER

## 2022-03-15 PROCEDURE — 71046 XR CHEST PA AND LATERAL: ICD-10-PCS | Mod: S$GLB,,, | Performed by: RADIOLOGY

## 2022-03-15 PROCEDURE — 3072F LOW RISK FOR RETINOPATHY: CPT | Mod: CPTII,S$GLB,, | Performed by: NURSE PRACTITIONER

## 2022-03-15 PROCEDURE — 1160F PR REVIEW ALL MEDS BY PRESCRIBER/CLIN PHARMACIST DOCUMENTED: ICD-10-PCS | Mod: CPTII,S$GLB,, | Performed by: NURSE PRACTITIONER

## 2022-03-15 PROCEDURE — 1157F PR ADVANCE CARE PLAN OR EQUIV PRESENT IN MEDICAL RECORD: ICD-10-PCS | Mod: CPTII,S$GLB,, | Performed by: NURSE PRACTITIONER

## 2022-03-15 PROCEDURE — 1160F RVW MEDS BY RX/DR IN RCRD: CPT | Mod: CPTII,S$GLB,, | Performed by: NURSE PRACTITIONER

## 2022-03-15 PROCEDURE — 71046 X-RAY EXAM CHEST 2 VIEWS: CPT | Mod: S$GLB,,, | Performed by: RADIOLOGY

## 2022-03-15 PROCEDURE — 1157F ADVNC CARE PLAN IN RCRD: CPT | Mod: CPTII,S$GLB,, | Performed by: NURSE PRACTITIONER

## 2022-03-15 PROCEDURE — 99214 OFFICE O/P EST MOD 30 MIN: CPT | Mod: S$GLB,,, | Performed by: NURSE PRACTITIONER

## 2022-03-15 PROCEDURE — 1126F PR PAIN SEVERITY QUANTIFIED, NO PAIN PRESENT: ICD-10-PCS | Mod: CPTII,S$GLB,, | Performed by: NURSE PRACTITIONER

## 2022-03-15 PROCEDURE — 99214 PR OFFICE/OUTPT VISIT, EST, LEVL IV, 30-39 MIN: ICD-10-PCS | Mod: S$GLB,,, | Performed by: NURSE PRACTITIONER

## 2022-03-15 PROCEDURE — 1159F MED LIST DOCD IN RCRD: CPT | Mod: CPTII,S$GLB,, | Performed by: NURSE PRACTITIONER

## 2022-03-15 PROCEDURE — 1126F AMNT PAIN NOTED NONE PRSNT: CPT | Mod: CPTII,S$GLB,, | Performed by: NURSE PRACTITIONER

## 2022-03-15 PROCEDURE — 3072F PR LOW RISK FOR RETINOPATHY: ICD-10-PCS | Mod: CPTII,S$GLB,, | Performed by: NURSE PRACTITIONER

## 2022-03-15 RX ORDER — MUPIROCIN 20 MG/G
OINTMENT TOPICAL 2 TIMES DAILY
Qty: 30 G | Refills: 0 | Status: SHIPPED | OUTPATIENT
Start: 2022-03-15 | End: 2024-01-11

## 2022-03-15 RX ORDER — DOXYCYCLINE 100 MG/1
100 CAPSULE ORAL EVERY 12 HOURS
Qty: 20 CAPSULE | Refills: 0 | Status: SHIPPED | OUTPATIENT
Start: 2022-03-15 | End: 2022-03-25

## 2022-03-15 NOTE — TELEPHONE ENCOUNTER
Courtesy call, spoke with pt daughter in law she states that the pt is having some side effects from the medication and she stopped giving it to him.Pt daughter in law was advised to make an appt with his PCP or bring him back into the office to be re evaluated so that the medication can be changed.

## 2022-03-15 NOTE — PROGRESS NOTES
"Subjective:       Patient ID: Anthony Blair is a 88 y.o. male.    Vitals:  height is 5' 10" (1.778 m) and weight is 86.2 kg (190 lb). His tympanic temperature is 97.8 °F (36.6 °C). His blood pressure is 150/66 (abnormal) and his pulse is 72. His respiration is 14 and oxygen saturation is 97%.     Chief Complaint: Medication Reaction    Patient presents with no improvement since last visit, fatigue, slurred speech, balance issues.    Medication Reaction  This is a new problem. The current episode started in the past 7 days (3). The problem occurs constantly. The problem has been unchanged. Associated symptoms include fatigue. Pertinent negatives include no abdominal pain, anorexia, arthralgias, change in bowel habit, chest pain, chills, congestion, coughing, diaphoresis, fever, headaches, joint swelling, myalgias, nausea, neck pain, numbness, rash, sore throat, swollen glands, urinary symptoms, vertigo, visual change, vomiting or weakness. Nothing aggravates the symptoms.       Constitution: Positive for fatigue. Negative for chills, sweating and fever.   HENT: Negative for congestion and sore throat.    Neck: Negative for neck pain.   Cardiovascular: Negative for chest pain.   Respiratory: Negative for cough.    Gastrointestinal: Negative for abdominal pain, nausea and vomiting.   Musculoskeletal: Negative for joint pain, joint swelling and muscle ache.   Skin: Negative for rash.   Neurological: Negative for history of vertigo, headaches and numbness.          Past Medical History:   Diagnosis Date    A-fib     Anemia     AP (angina pectoris) 1/23/2014    Carotid artery disease without cerebral infarction 8/22/2014    Cataract     Cirrhosis of liver 9/10/2020    Coronary artery disease     Diabetes mellitus     DM (diabetes mellitus) 1970     am 07/06/2020    GERD (gastroesophageal reflux disease) 7/11/2013    Hemorrhagic cerebrovascular accident (CVA) 4/26/2018    Hyperlipidemia     Hypertension "     Hypertensive heart disease with heart failure 3/12/2019    Intracranial hemorrhage     Lumbosacral spondylosis 12/24/2014    Pacemaker 1/23/2014    Paroxysmal atrial fibrillation 1/23/2014    Peripheral vascular disease 8/22/2014    Pneumonia of right lung due to infectious organism 3/27/2019    Seizure, late effect of stroke     Stroke     Type 2 diabetes mellitus with ophthalmic manifestations        Past Surgical History:   Procedure Laterality Date    ANGIOPLASTY      ATRIAL ABLATION SURGERY N/A     CATARACT EXTRACTION Bilateral     [facility]    CATARACT EXTRACTION W/ INTRAOCULAR LENS IMPLANT Right     CATARACT EXTRACTION W/ INTRAOCULAR LENS IMPLANT Left     COLONOSCOPY N/A 10/16/2018    Procedure: COLONOSCOPY with biopsies;  Surgeon: Anabell Griggs MD;  Location: [facility] ENDO;  Service: Endoscopy;  Laterality: N/A;    CORONARY ARTERY BYPASS GRAFT  07/2010    x5    EYE SURGERY      pace maker surgrey  10/2010    reposition in 2011/2012 (approx)    REPLACEMENT OF PACEMAKER GENERATOR Left 8/6/2020    Procedure: REPLACEMENT, PULSE GENERATOR, CARDIAC PACEMAKER;  Surgeon: Domenico Grajeda MD;  Location: [facility] CATH LAB;  Service: Cardiology;  Laterality: Left;  [facility]    REVISION OF PROCEDURE INVOLVING PACEMAKER LEAD Bilateral 8/6/2020    Procedure: REVISION, ELECTRODE LEAD, CARDIAC PACEMAKER;  Surgeon: Domenico Grajeda MD;  Location: [facility] CATH LAB;  Service: Cardiology;  Laterality: Bilateral;    VEIN BYPASS SURGERY              Family History   Problem Relation Age of Onset    Arthritis Mother     Diabetes Mother     Kidney disease Mother     Heart disease Mother     Arthritis Father     Diabetes Father     Diabetes Sister     Cancer Sister         breast    Heart disease Sister     Diabetes Brother     Kidney disease Brother     Heart disease Brother     Cancer Daughter         breast    Diabetes Daughter     Miscarriages / Stillbirths Daughter      Fibromyalgia Daughter     Diabetes Son     Diabetes Son     Alcohol abuse Paternal Uncle     Stroke Neg Hx     Hypertension Neg Hx     Hyperlipidemia Neg Hx     COPD Neg Hx             Social History     Socioeconomic History    Marital status:     Number of children: 3    Highest education level: GED or equivalent   Tobacco Use    Smoking status: Former Smoker     Packs/day: 2.00     Years: 13.00     Pack years: 26.00     Types: Cigarettes     Start date: 1954     Quit date: 1967     Years since quittin.8    Smokeless tobacco: Never Used   Substance and Sexual Activity    Alcohol use: No    Drug use: No    Sexual activity: Never     Partners: Female     Birth control/protection: None   Social History Narrative    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       ROS:  GENERAL: Reports  Fatigue, dizziness  SKIN:  Honey-colored crusted skin lesions upper lip and mustache  HEENT: Reports nasal congestion, postnasal drip, sinus headache,, hoarseness.   NODES: No masses or lesions. Denies swollen glands.  CHEST: Reports productive cough. & wheezing  CARDIOVASCULAR: Denies chest pain, shortness of breath  ABDOMEN: Appetite fair, No weight loss.  MUSCULOSKELETAL: reports no back pain.  NEUROLOGIC: No history of seizures, paralysis, alteration of gait or coordination.  PSYCHIATRIC: Aaron mood swings, depression.    PE:   APPEARANCE: Well nourished, well developed, in moderate distress, temp 97.8°, pulse ox 97%  Vital signs /reviewed  Skin:  Upper lip, mustache with honey-colored crusted skin lesion        HEENT: Turbinates red, Minimal red pharynx, TM's poor light reflex bilateral.  Minimal facial tenderness  CHEST:  Minimal rhonchi on auscultation.  CARDIOVASCULAR: Regular rate and rhythm. .  MUSCULOSKELETAL: URIBE without difficulty  NEUROLOGIC: No sensory deficits. Gait & Posture: normal, No cerebellar signs.  MENTAL STATUS: Patient alert, oriented x 3 &  conversant.    PLAN: CXR,   Drink plenty of clear fluids--at least 64 ounces of water/juice & rest  Simply saline nasal wash or flonase to irrigate sinuses and for congestion/runny nose.  Cool mist humidifier/vaporizer.  Advise use Flonase every morning  Meds: Doxycycline,  Bactroban/ no refills  Practice good handwashing..  Advise warm coffee with local honey  Tylenol  for fever, headache and body aches.  Warm salt water gargles for throat comfort.  Chloraseptic spray or lozenges for throat comfort.  Advise follow up with PCP in 2-3 days for recheck  Advise go to ER if symptoms worsen or fail to improve with treatment.  AVS provided and reviewed with patient including supportive care, follow up, and red flag symptoms.   Patient verbalizes understanding and agrees with treatment plan. Discharged from Urgent Care in stable condition.      DIAGNOSIS:  SOB  Fatigue  Cough  Influenza A   Skin lesion vs impetigo  Acute lower respiratory tract infection

## 2022-03-15 NOTE — PATIENT INSTRUCTIONS
PLAN: CXR,   Drink plenty of clear fluids--at least 64 ounces of water/juice & rest  Simply saline nasal wash or flonase to irrigate sinuses and for congestion/runny nose.  Cool mist humidifier/vaporizer.  Advise use Flonase every morning  Meds: Doxycycline,  Bactroban/ no refills  Practice good handwashing..  Advise warm coffee with local honey  Tylenol  for fever, headache and body aches.  Warm salt water gargles for throat comfort.  Chloraseptic spray or lozenges for throat comfort.  Advise follow up with PCP in 2-3 days for recheck  Advise go to ER if symptoms worsen or fail to improve with treatment.  AVS provided and reviewed with patient including supportive care, follow up, and red flag symptoms.   Patient verbalizes understanding and agrees with treatment plan. Discharged from Urgent Care in stable condition.

## 2022-03-16 ENCOUNTER — LAB VISIT (OUTPATIENT)
Dept: LAB | Facility: HOSPITAL | Age: 87
End: 2022-03-16
Attending: INTERNAL MEDICINE
Payer: MEDICARE

## 2022-03-16 DIAGNOSIS — E08.51 DIABETES MELLITUS DUE TO UNDERLYING CONDITION WITH DIABETIC PERIPHERAL ANGIOPATHY WITHOUT GANGRENE, WITHOUT LONG-TERM CURRENT USE OF INSULIN: ICD-10-CM

## 2022-03-16 DIAGNOSIS — I50.42 CHRONIC COMBINED SYSTOLIC AND DIASTOLIC CONGESTIVE HEART FAILURE: ICD-10-CM

## 2022-03-16 LAB
ALBUMIN SERPL BCP-MCNC: 3.2 G/DL (ref 3.5–5.2)
ALP SERPL-CCNC: 159 U/L (ref 55–135)
ALT SERPL W/O P-5'-P-CCNC: 18 U/L (ref 10–44)
ANION GAP SERPL CALC-SCNC: 9 MMOL/L (ref 8–16)
AST SERPL-CCNC: 33 U/L (ref 10–40)
BILIRUB SERPL-MCNC: 1 MG/DL (ref 0.1–1)
BNP SERPL-MCNC: 59 PG/ML (ref 0–99)
BUN SERPL-MCNC: 15 MG/DL (ref 8–23)
CALCIUM SERPL-MCNC: 9.7 MG/DL (ref 8.7–10.5)
CHLORIDE SERPL-SCNC: 100 MMOL/L (ref 95–110)
CO2 SERPL-SCNC: 31 MMOL/L (ref 23–29)
CREAT SERPL-MCNC: 0.9 MG/DL (ref 0.5–1.4)
EST. GFR  (AFRICAN AMERICAN): >60 ML/MIN/1.73 M^2
EST. GFR  (NON AFRICAN AMERICAN): >60 ML/MIN/1.73 M^2
ESTIMATED AVG GLUCOSE: 192 MG/DL (ref 68–131)
GLUCOSE SERPL-MCNC: 116 MG/DL (ref 70–110)
HBA1C MFR BLD: 8.3 % (ref 4–5.6)
POTASSIUM SERPL-SCNC: 3.6 MMOL/L (ref 3.5–5.1)
PROT SERPL-MCNC: 7.1 G/DL (ref 6–8.4)
SODIUM SERPL-SCNC: 140 MMOL/L (ref 136–145)

## 2022-03-16 PROCEDURE — 80053 COMPREHEN METABOLIC PANEL: CPT | Performed by: INTERNAL MEDICINE

## 2022-03-16 PROCEDURE — 83880 ASSAY OF NATRIURETIC PEPTIDE: CPT | Performed by: INTERNAL MEDICINE

## 2022-03-16 PROCEDURE — 36415 COLL VENOUS BLD VENIPUNCTURE: CPT | Performed by: INTERNAL MEDICINE

## 2022-03-16 PROCEDURE — 83036 HEMOGLOBIN GLYCOSYLATED A1C: CPT | Performed by: FAMILY MEDICINE

## 2022-03-18 ENCOUNTER — TELEPHONE (OUTPATIENT)
Dept: URGENT CARE | Facility: CLINIC | Age: 87
End: 2022-03-18
Payer: MEDICARE

## 2022-03-18 NOTE — TELEPHONE ENCOUNTER
Contacted patient's wife Kori as a courtesy from pt's visit on 03.15.2022, Mrs. Sheikh states that patient is doing much better and that he is even smiling now. /chuck/

## 2022-03-25 ENCOUNTER — OFFICE VISIT (OUTPATIENT)
Dept: CARDIOLOGY | Facility: CLINIC | Age: 87
End: 2022-03-25
Payer: MEDICARE

## 2022-03-25 VITALS
OXYGEN SATURATION: 98 % | WEIGHT: 193.31 LBS | BODY MASS INDEX: 27.67 KG/M2 | HEART RATE: 85 BPM | DIASTOLIC BLOOD PRESSURE: 70 MMHG | RESPIRATION RATE: 16 BRPM | SYSTOLIC BLOOD PRESSURE: 158 MMHG | HEIGHT: 70 IN

## 2022-03-25 DIAGNOSIS — I69.30 HISTORY OF HEMORRHAGIC CEREBROVASCULAR ACCIDENT (CVA) WITH RESIDUAL DEFICIT: ICD-10-CM

## 2022-03-25 DIAGNOSIS — Z86.16 HISTORY OF COVID-19: ICD-10-CM

## 2022-03-25 DIAGNOSIS — I73.9 CLAUDICATION IN PERIPHERAL VASCULAR DISEASE: Chronic | ICD-10-CM

## 2022-03-25 DIAGNOSIS — I77.9 CAROTID ARTERY DISEASE WITHOUT CEREBRAL INFARCTION: Chronic | ICD-10-CM

## 2022-03-25 DIAGNOSIS — I65.23 ASYMPTOMATIC STENOSIS OF BOTH CAROTID ARTERIES WITHOUT INFARCTION: ICD-10-CM

## 2022-03-25 DIAGNOSIS — E08.51 DIABETES MELLITUS DUE TO UNDERLYING CONDITION WITH DIABETIC PERIPHERAL ANGIOPATHY WITHOUT GANGRENE, WITHOUT LONG-TERM CURRENT USE OF INSULIN: ICD-10-CM

## 2022-03-25 DIAGNOSIS — I36.1 NONRHEUMATIC TRICUSPID VALVE REGURGITATION: ICD-10-CM

## 2022-03-25 DIAGNOSIS — E66.3 OVERWEIGHT (BMI 25.0-29.9): ICD-10-CM

## 2022-03-25 DIAGNOSIS — I50.42 CHRONIC COMBINED SYSTOLIC AND DIASTOLIC CONGESTIVE HEART FAILURE: ICD-10-CM

## 2022-03-25 DIAGNOSIS — I10 ESSENTIAL HYPERTENSION: ICD-10-CM

## 2022-03-25 DIAGNOSIS — Z78.9 STATIN INTOLERANCE: ICD-10-CM

## 2022-03-25 DIAGNOSIS — I20.9 AP (ANGINA PECTORIS): ICD-10-CM

## 2022-03-25 DIAGNOSIS — R94.31 ABNORMAL ECG: ICD-10-CM

## 2022-03-25 DIAGNOSIS — E78.2 MIXED HYPERLIPIDEMIA: Chronic | ICD-10-CM

## 2022-03-25 DIAGNOSIS — I73.9 PERIPHERAL VASCULAR DISEASE: Chronic | ICD-10-CM

## 2022-03-25 DIAGNOSIS — I70.0 CALCIFICATION OF AORTA: ICD-10-CM

## 2022-03-25 DIAGNOSIS — E13.51 PERIPHERAL VASCULAR DISEASE DUE TO SECONDARY DIABETES MELLITUS: ICD-10-CM

## 2022-03-25 DIAGNOSIS — I50.32 CHRONIC DIASTOLIC HEART FAILURE: ICD-10-CM

## 2022-03-25 DIAGNOSIS — I35.0 NONRHEUMATIC AORTIC VALVE STENOSIS: ICD-10-CM

## 2022-03-25 DIAGNOSIS — I48.0 PAROXYSMAL ATRIAL FIBRILLATION: Chronic | ICD-10-CM

## 2022-03-25 DIAGNOSIS — I25.708 CORONARY ARTERY DISEASE OF BYPASS GRAFT OF NATIVE HEART WITH STABLE ANGINA PECTORIS: Primary | ICD-10-CM

## 2022-03-25 DIAGNOSIS — I71.21 ANEURYSM OF ASCENDING AORTA: ICD-10-CM

## 2022-03-25 PROCEDURE — 1101F PR PT FALLS ASSESS DOC 0-1 FALLS W/OUT INJ PAST YR: ICD-10-PCS | Mod: CPTII,S$GLB,, | Performed by: INTERNAL MEDICINE

## 2022-03-25 PROCEDURE — 1126F AMNT PAIN NOTED NONE PRSNT: CPT | Mod: CPTII,S$GLB,, | Performed by: INTERNAL MEDICINE

## 2022-03-25 PROCEDURE — 3288F FALL RISK ASSESSMENT DOCD: CPT | Mod: CPTII,S$GLB,, | Performed by: INTERNAL MEDICINE

## 2022-03-25 PROCEDURE — 3288F PR FALLS RISK ASSESSMENT DOCUMENTED: ICD-10-PCS | Mod: CPTII,S$GLB,, | Performed by: INTERNAL MEDICINE

## 2022-03-25 PROCEDURE — 3072F PR LOW RISK FOR RETINOPATHY: ICD-10-PCS | Mod: CPTII,S$GLB,, | Performed by: INTERNAL MEDICINE

## 2022-03-25 PROCEDURE — 99214 PR OFFICE/OUTPT VISIT, EST, LEVL IV, 30-39 MIN: ICD-10-PCS | Mod: S$GLB,,, | Performed by: INTERNAL MEDICINE

## 2022-03-25 PROCEDURE — 99999 PR PBB SHADOW E&M-EST. PATIENT-LVL III: CPT | Mod: PBBFAC,,, | Performed by: INTERNAL MEDICINE

## 2022-03-25 PROCEDURE — 99999 PR PBB SHADOW E&M-EST. PATIENT-LVL III: ICD-10-PCS | Mod: PBBFAC,,, | Performed by: INTERNAL MEDICINE

## 2022-03-25 PROCEDURE — 1101F PT FALLS ASSESS-DOCD LE1/YR: CPT | Mod: CPTII,S$GLB,, | Performed by: INTERNAL MEDICINE

## 2022-03-25 PROCEDURE — 1160F RVW MEDS BY RX/DR IN RCRD: CPT | Mod: CPTII,S$GLB,, | Performed by: INTERNAL MEDICINE

## 2022-03-25 PROCEDURE — 1159F MED LIST DOCD IN RCRD: CPT | Mod: CPTII,S$GLB,, | Performed by: INTERNAL MEDICINE

## 2022-03-25 PROCEDURE — 1157F ADVNC CARE PLAN IN RCRD: CPT | Mod: CPTII,S$GLB,, | Performed by: INTERNAL MEDICINE

## 2022-03-25 PROCEDURE — 1159F PR MEDICATION LIST DOCUMENTED IN MEDICAL RECORD: ICD-10-PCS | Mod: CPTII,S$GLB,, | Performed by: INTERNAL MEDICINE

## 2022-03-25 PROCEDURE — 1157F PR ADVANCE CARE PLAN OR EQUIV PRESENT IN MEDICAL RECORD: ICD-10-PCS | Mod: CPTII,S$GLB,, | Performed by: INTERNAL MEDICINE

## 2022-03-25 PROCEDURE — 1126F PR PAIN SEVERITY QUANTIFIED, NO PAIN PRESENT: ICD-10-PCS | Mod: CPTII,S$GLB,, | Performed by: INTERNAL MEDICINE

## 2022-03-25 PROCEDURE — 3072F LOW RISK FOR RETINOPATHY: CPT | Mod: CPTII,S$GLB,, | Performed by: INTERNAL MEDICINE

## 2022-03-25 PROCEDURE — 99214 OFFICE O/P EST MOD 30 MIN: CPT | Mod: S$GLB,,, | Performed by: INTERNAL MEDICINE

## 2022-03-25 PROCEDURE — 1160F PR REVIEW ALL MEDS BY PRESCRIBER/CLIN PHARMACIST DOCUMENTED: ICD-10-PCS | Mod: CPTII,S$GLB,, | Performed by: INTERNAL MEDICINE

## 2022-03-25 NOTE — PROGRESS NOTES
Subjective:    Patient ID:  Anthony Blair is a 88 y.o. male who presents for evaluation of Coronary Artery Disease, Hyperlipidemia, Hypertension, Peripheral Arterial Disease, Carotid Artery Disease, and Atrial Fibrillation      HPI pt presents for eval.  His current medical conditions include combined CHF, PAF/PSVT, CAD (PTCA 1980's), HTN, PPM, PHTN, LVH, LAE, carotid artery disease, DM, atrial septal aneurysm/pfo, PAD, dyslipidemia. Nonsmoker.  Past details pertinent for following:   Statin intolerant.  s/p CABG 7/10 (LIMA-LAD, SVG-OM1, SVG-OM3, SVG-PDA) for increasing OLIVARES and multivessel CAD on [facility].   S/p PPM 10/10 for SSS/PAF. Was hospitalized 1/11 for PSVT (Atrial tachycardia) and was started on Amiodarone but was stopped for GI discomfort. He also did not tolerate Multaq and has not tolerated multiple statins.   s/p EPS/ablation of his atrial tachycardia 6/11.   S/p abd w runoff March 2015 and had significant B LE infrapopliteal disease. Atherectomy/pta performed of critical R tibeoperoneal trunk stenosis. Also has L tibeoperoneal trunk stenosis and his PTA/MIGUEL are occluded bilaterally.  Started on Eliquis Feb 2017 for suspicious left internal jugular vein thrombus.  Stress MPI 3/17 showed no ischemia.  Echo 3/17 showed normal LV function, left-right atrial shunt.   Carotid u/s 6/17 showed mild bilateral disease, known L IJ thrombus noted.  Pt called clinic Sept 2017 stated he had stopped Eliquis couple weeks before his call due to diarrhea, weakness, bloating and also off Amlodipine due to sleepiness.  He stated sxs subsided when he stopped the meds.   He was advised by cardiology on 9/6/17 to take 81 mg ASA since he stopped his Eliquis.  He had acute hemorrhage of basal ganglia right side Sept 2017, causing weakness on left side.  Tx at [facility] 9/24/17. He was on ASA daily when CVA occurred and was not on Eliquis. 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  He was taken off his asa.  Followed by Dr. Weaver, neurosurgery. No surgery was needed.  Echo showed normal LVEF.  Stress test 8/18 showed no ischemia, apical scar.    Echo 8/18 normal EF.  Echo 3/19 normal EF, LAE, LVH, DD, PHTN 76 mmHg, mild TR.  There was some consideration of restarting NOAC for a fib CVA protection after pt evaluated by EP service.  Started on Flecainide 6/20 by Dr. Grajeda.  I expressed reluctance to put pt back on anticoagulation in light of prior hemorrhagic CVA with residual deficits.  S/p CRT device 8/20 with Dr. Grajeda.  Echo 7/20 EF 40%, DD, RVE, mild RV dysfunction, mod-severe TR, mild MR, LVH, mild AS, PFO.  Echo 5/21: Normal EF, DD, LAE, ASD, mild TR, PAP 42 mmHg.  Carotid u/s 5/21 20 - 39% bilateral stenosis.  Echo 9/21 EF 50%, DD, mild-mod TR, mild AI/MR, PAP 47 mmHg.  Now here.  Had Covid 12/21 then flu a while later.  Felt bad, took a while to recover.  Finally feeling better.  CAD stable.  Angina controlled on current meds.  No active CP sxs.  CHF is stable.  No concerning dyspnea, pnd/orthopnea.  BNP test normal.  BP overall controlled.  DM HGAIC control slipped.  Lipids stable.  Stable claudication.  PPM well functioning last check.  Weight stable.  Compliant w meds.  ecg 12/12/21 v paced.          Past Medical History:   Diagnosis Date    A-fib     Anemia     AP (angina pectoris) 1/23/2014    Carotid artery disease without cerebral infarction 8/22/2014    Cataract     Cirrhosis of liver 9/10/2020    Coronary artery disease     Diabetes mellitus     DM (diabetes mellitus) 1970     am 07/06/2020    GERD (gastroesophageal reflux disease) 7/11/2013    Hemorrhagic cerebrovascular accident (CVA) 4/26/2018    Hyperlipidemia     Hypertension     Hypertensive heart disease with heart failure 3/12/2019    Intracranial hemorrhage     Lumbosacral spondylosis 12/24/2014     "Pacemaker 1/23/2014    Paroxysmal atrial fibrillation 1/23/2014    Peripheral vascular disease 8/22/2014    Pneumonia of right lung due to infectious organism 3/27/2019    Seizure, late effect of stroke     Stroke     Type 2 diabetes mellitus with ophthalmic manifestations      Current Outpatient Medications   Medication Instructions    ACCU-CHEK TOMAS PLUS TEST STRP Strp TEST SIX TIMES A DAY    ACCU-CHEK GUIDE TEST STRIPS Strp TEST SIX TIMES DAILY    ACCU-CHEK MULTICLIX LANCET lancets TEST BLOOD SUGAR THREE TIMES DAILY    ascorbic acid (vitamin C) (VITAMIN C) 100 mg, Oral, Daily    blood-glucose meter Misc 1 Device, Misc.(Non-Drug; Combo Route), Once    CARBOXYMETHYLCELLULOSE SODIUM (REFRESH OPHT) Ophthalmic    COMFORT EZ PEN NEEDLES 32 gauge x 5/32" Ndle USE FOUR TIMES DAILY.    diclofenac sodium (VOLTAREN) 2 g, Topical (Top), Daily    doxycycline (VIBRAMYCIN) 100 mg, Oral, Every 12 hours    flecainide (TAMBOCOR) 50 MG Tab TAKE ONE TABLET BY MOUTH EVERY TWELVE HOURS    fluticasone propionate (FLONASE) 100 mcg, Each Nostril, Daily    furosemide (LASIX) 40 mg, Oral, Daily    guaiFENesin (MUCINEX) 600 mg, Oral, 2 times daily    insulin (LANTUS SOLOSTAR U-100 INSULIN) glargine 100 units/mL (3mL) SubQ pen INJECT 16 UNITS SUBCUTANEOUSLY AT BEDTIME. 94 DAY SUPPLY    insulin aspart U-100 (NOVOLOG FLEXPEN U-100 INSULIN) 100 unit/mL (3 mL) InPn pen INJECT TEN UNITS AT BREAKFAST, EIGHT AT LUNCH, AND TEN AT DINNER (54 DAYS SUPPLY)    isosorbide mononitrate (IMDUR) 60 MG 24 hr tablet TAKE ONE TABLET BY MOUTH TWICE DAILY    levocetirizine (XYZAL) 5 mg, Oral, Nightly    losartan (COZAAR) 100 MG tablet TAKE ONE TABLET (100mg) BY MOUTH DAILY    metoprolol succinate (TOPROL-XL) 100 mg, Oral, 2 times daily    mupirocin (BACTROBAN) 2 % ointment Topical (Top), 2 times daily    nitroGLYCERIN (NITROSTAT) 0.4 mg, Sublingual, Every 5 min PRN    pantoprazole (PROTONIX) 40 MG tablet TAKE ONE TABLET (40mg) BY " "MOUTH EVERY DAY    saw palmetto 500 mg, Oral, 2 times daily    simethicone (GAS-X EXTRA STRENGTH) 125 mg, Oral, 4 times daily PRN    turmeric 400 mg Cap 1 capsule, Oral, Daily    vitamin D (VITAMIN D3) 1,000 Units, Oral, Daily    vitamin D (VITAMIN D3) 1,000 Units, Oral, Daily    vitamin E 100 Units, Oral, Daily         Review of Systems   Constitutional: Positive for malaise/fatigue.   HENT: Negative.    Eyes: Negative.    Cardiovascular: Positive for claudication.   Respiratory: Negative.    Endocrine: Negative.    Hematologic/Lymphatic: Negative.    Skin: Negative.    Musculoskeletal: Positive for arthritis and joint pain.   Gastrointestinal: Negative.    Genitourinary: Negative.    Neurological: Positive for weakness.   Psychiatric/Behavioral: Negative.    Allergic/Immunologic: Negative.        BP (!) 158/70 (BP Location: Left arm, Patient Position: Sitting, BP Method: Medium (Manual))   Pulse 85   Resp 16   Ht 5' 10" (1.778 m)   Wt 87.7 kg (193 lb 5.5 oz)   SpO2 98%   BMI 27.74 kg/m²     Wt Readings from Last 3 Encounters:   03/25/22 87.7 kg (193 lb 5.5 oz)   03/15/22 86.2 kg (190 lb)   03/12/22 86.2 kg (190 lb)     Temp Readings from Last 3 Encounters:   03/15/22 97.8 °F (36.6 °C) (Tympanic)   03/12/22 98.4 °F (36.9 °C)   01/05/22 98.8 °F (37.1 °C) (Tympanic)     BP Readings from Last 3 Encounters:   03/25/22 (!) 158/70   03/15/22 (!) 150/66   03/12/22 (!) 142/63     Pulse Readings from Last 3 Encounters:   03/25/22 85   03/15/22 72   03/12/22 70          Objective:    Physical Exam  Vitals and nursing note reviewed.   Constitutional:       Appearance: He is well-developed.   HENT:      Head: Normocephalic.   Neck:      Thyroid: No thyromegaly.      Vascular: Normal carotid pulses. No carotid bruit, hepatojugular reflux or JVD.   Cardiovascular:      Rate and Rhythm: Normal rate and regular rhythm.      Pulses: No midsystolic click and no opening snap.           Radial pulses are 2+ on the right " side and 2+ on the left side.      Heart sounds: S1 normal and S2 normal. Heart sounds not distant. Murmur heard.    Medium-pitched early systolic murmur is present with a grade of 2/6 at the upper left sternal border.    No friction rub. No S3 or S4 sounds.   Pulmonary:      Effort: Pulmonary effort is normal.      Breath sounds: Normal breath sounds. No wheezing or rales.   Abdominal:      General: Bowel sounds are normal. There is no distension or abdominal bruit.      Palpations: Abdomen is soft. There is no mass.      Tenderness: There is no abdominal tenderness.   Musculoskeletal:      Cervical back: Normal range of motion and neck supple.   Skin:     General: Skin is warm.   Neurological:      Mental Status: He is alert and oriented to person, place, and time.   Psychiatric:         Behavior: Behavior normal.         I have reviewed all pertinent labs and cardiac studies.    Component      Latest Ref Rng & Units 3/16/2022   BNP      0 - 99 pg/mL 59         Chemistry        Component Value Date/Time     03/16/2022 0944    K 3.6 03/16/2022 0944     03/16/2022 0944    CO2 31 (H) 03/16/2022 0944    BUN 15 03/16/2022 0944    CREATININE 0.9 03/16/2022 0944     (H) 03/16/2022 0944        Component Value Date/Time    CALCIUM 9.7 03/16/2022 0944    ALKPHOS 159 (H) 03/16/2022 0944    AST 33 03/16/2022 0944    ALT 18 03/16/2022 0944    BILITOT 1.0 03/16/2022 0944    ESTGFRAFRICA >60.0 03/16/2022 0944    EGFRNONAA >60.0 03/16/2022 0944        Lab Results   Component Value Date    WBC 6.17 12/12/2021    HGB 11.2 (L) 12/12/2021    HCT 34.0 (L) 12/12/2021    MCV 95 12/12/2021     12/12/2021       Lab Results   Component Value Date    HGBA1C 8.3 (H) 03/16/2022     Lab Results   Component Value Date    CHOL 163 09/09/2021    CHOL 183 05/26/2021    CHOL 179 02/18/2021     Lab Results   Component Value Date    HDL 41 09/09/2021    HDL 44 05/26/2021    HDL 43 02/18/2021     Lab Results   Component Value  Date    LDLCALC 94.4 09/09/2021    LDLCALC 108.4 05/26/2021    LDLCALC 107.8 02/18/2021     Lab Results   Component Value Date    TRIG 138 09/09/2021    TRIG 153 (H) 05/26/2021    TRIG 141 02/18/2021     Lab Results   Component Value Date    CHOLHDL 25.2 09/09/2021    CHOLHDL 24.0 05/26/2021    CHOLHDL 24.0 02/18/2021           Assessment:       1. Coronary artery disease of bypass graft of native heart with stable angina pectoris    2. History of COVID-19    3. Abnormal ECG    4. Aneurysm of ascending aorta    5. Nonrheumatic aortic valve stenosis    6. AP (angina pectoris)    7. Asymptomatic stenosis of both carotid arteries without infarction    8. Chronic combined systolic and diastolic congestive heart failure    9. Chronic diastolic heart failure    10. Claudication in peripheral vascular disease    11. Calcification of aorta    12. Carotid artery disease without cerebral infarction    13. Diabetes mellitus due to underlying condition with diabetic peripheral angiopathy without gangrene, without long-term current use of insulin    14. Essential hypertension    15. Mixed hyperlipidemia    16. Overweight (BMI 25.0-29.9)    17. Paroxysmal atrial fibrillation    18. Peripheral vascular disease due to secondary diabetes mellitus    19. Peripheral vascular disease    20. Statin intolerance    21. Nonrheumatic tricuspid valve regurgitation    22. History of hemorrhagic cerebrovascular accident (CVA) with residual deficit         Plan:             Stable cardiovascular conditions at present time on current medical treatment.  Continue current CV meds.  F/u PPM clinic.  Med tx advised for chronic CAD/PAD.  Reviewed all tests and above medical conditions with patient in detail and formulated treatment plan.  Continue optimal medical treatment for cardiovascular conditions.  Cardiac low salt diet advised.  Daily exercise encouraged, as tolerated.  Maintaining healthy weight and weight loss goals (if needed) were  discussed in clinic.  Need for BP control and HTN goals (if needed) were discussed and tx plan formulated.  Importance of optimal lipid control were discussed in detail as well as possible pharmacologic and lifestyle changes that may be needed.  DM HGAIC improvement suggested.  No changes in meds today.  F/u in 4 - 6 months.      I have reviewed all pertinent labs and cardiac studies independently. Plans and recommendations have been formulated under my direct supervision. All questions answered and patient voiced understanding.   '

## 2022-03-26 ENCOUNTER — PATIENT OUTREACH (OUTPATIENT)
Dept: ADMINISTRATIVE | Facility: OTHER | Age: 87
End: 2022-03-26
Payer: MEDICARE

## 2022-03-26 NOTE — PROGRESS NOTES
Health Maintenance Due   Topic Date Due    Shingles Vaccine (1 of 2) Never done    Influenza Vaccine (1) 09/01/2021    COVID-19 Vaccine (3 - Booster for Moderna series) 10/10/2021     Updates were requested from care everywhere.  Chart was reviewed for overdue Proactive [facility] Encounters ([facility]UR[facility]) topics (CRS, Breast Cancer Screening, Eye exam)  Health Maintenance has been updated.  LINKS immunization registry triggered.  Immunizations were reconciled.

## 2022-03-28 ENCOUNTER — OFFICE VISIT (OUTPATIENT)
Dept: GASTROENTEROLOGY | Facility: CLINIC | Age: 87
End: 2022-03-28
Payer: MEDICARE

## 2022-03-28 ENCOUNTER — PATIENT MESSAGE (OUTPATIENT)
Dept: GASTROENTEROLOGY | Facility: CLINIC | Age: 87
End: 2022-03-28

## 2022-03-28 ENCOUNTER — LAB VISIT (OUTPATIENT)
Dept: LAB | Facility: HOSPITAL | Age: 87
End: 2022-03-28
Attending: PHYSICIAN ASSISTANT
Payer: MEDICARE

## 2022-03-28 VITALS
WEIGHT: 188.69 LBS | DIASTOLIC BLOOD PRESSURE: 80 MMHG | HEART RATE: 68 BPM | HEIGHT: 70 IN | BODY MASS INDEX: 27.01 KG/M2 | SYSTOLIC BLOOD PRESSURE: 134 MMHG

## 2022-03-28 DIAGNOSIS — K74.60 CIRRHOSIS OF LIVER WITHOUT ASCITES, UNSPECIFIED HEPATIC CIRRHOSIS TYPE: ICD-10-CM

## 2022-03-28 DIAGNOSIS — K74.60 CIRRHOSIS OF LIVER WITHOUT ASCITES, UNSPECIFIED HEPATIC CIRRHOSIS TYPE: Primary | ICD-10-CM

## 2022-03-28 LAB — AFP SERPL-MCNC: 3 NG/ML (ref 0–8.4)

## 2022-03-28 PROCEDURE — 1101F PR PT FALLS ASSESS DOC 0-1 FALLS W/OUT INJ PAST YR: ICD-10-PCS | Mod: CPTII,S$GLB,, | Performed by: PHYSICIAN ASSISTANT

## 2022-03-28 PROCEDURE — 99999 PR PBB SHADOW E&M-EST. PATIENT-LVL V: CPT | Mod: PBBFAC,,, | Performed by: PHYSICIAN ASSISTANT

## 2022-03-28 PROCEDURE — 99499 RISK ADDL DX/OHS AUDIT: ICD-10-PCS | Mod: HCNC,S$GLB,, | Performed by: PHYSICIAN ASSISTANT

## 2022-03-28 PROCEDURE — 1157F PR ADVANCE CARE PLAN OR EQUIV PRESENT IN MEDICAL RECORD: ICD-10-PCS | Mod: CPTII,S$GLB,, | Performed by: PHYSICIAN ASSISTANT

## 2022-03-28 PROCEDURE — 99213 OFFICE O/P EST LOW 20 MIN: CPT | Mod: S$GLB,,, | Performed by: PHYSICIAN ASSISTANT

## 2022-03-28 PROCEDURE — 1157F ADVNC CARE PLAN IN RCRD: CPT | Mod: CPTII,S$GLB,, | Performed by: PHYSICIAN ASSISTANT

## 2022-03-28 PROCEDURE — 1126F PR PAIN SEVERITY QUANTIFIED, NO PAIN PRESENT: ICD-10-PCS | Mod: CPTII,S$GLB,, | Performed by: PHYSICIAN ASSISTANT

## 2022-03-28 PROCEDURE — 3072F LOW RISK FOR RETINOPATHY: CPT | Mod: CPTII,S$GLB,, | Performed by: PHYSICIAN ASSISTANT

## 2022-03-28 PROCEDURE — 1101F PT FALLS ASSESS-DOCD LE1/YR: CPT | Mod: CPTII,S$GLB,, | Performed by: PHYSICIAN ASSISTANT

## 2022-03-28 PROCEDURE — 99499 UNLISTED E&M SERVICE: CPT | Mod: HCNC,S$GLB,, | Performed by: PHYSICIAN ASSISTANT

## 2022-03-28 PROCEDURE — 1126F AMNT PAIN NOTED NONE PRSNT: CPT | Mod: CPTII,S$GLB,, | Performed by: PHYSICIAN ASSISTANT

## 2022-03-28 PROCEDURE — 3288F FALL RISK ASSESSMENT DOCD: CPT | Mod: CPTII,S$GLB,, | Performed by: PHYSICIAN ASSISTANT

## 2022-03-28 PROCEDURE — 99999 PR PBB SHADOW E&M-EST. PATIENT-LVL V: ICD-10-PCS | Mod: PBBFAC,,, | Performed by: PHYSICIAN ASSISTANT

## 2022-03-28 PROCEDURE — 3288F PR FALLS RISK ASSESSMENT DOCUMENTED: ICD-10-PCS | Mod: CPTII,S$GLB,, | Performed by: PHYSICIAN ASSISTANT

## 2022-03-28 PROCEDURE — 1159F PR MEDICATION LIST DOCUMENTED IN MEDICAL RECORD: ICD-10-PCS | Mod: CPTII,S$GLB,, | Performed by: PHYSICIAN ASSISTANT

## 2022-03-28 PROCEDURE — 1160F RVW MEDS BY RX/DR IN RCRD: CPT | Mod: CPTII,S$GLB,, | Performed by: PHYSICIAN ASSISTANT

## 2022-03-28 PROCEDURE — 3072F PR LOW RISK FOR RETINOPATHY: ICD-10-PCS | Mod: CPTII,S$GLB,, | Performed by: PHYSICIAN ASSISTANT

## 2022-03-28 PROCEDURE — 99213 PR OFFICE/OUTPT VISIT, EST, LEVL III, 20-29 MIN: ICD-10-PCS | Mod: S$GLB,,, | Performed by: PHYSICIAN ASSISTANT

## 2022-03-28 PROCEDURE — 1160F PR REVIEW ALL MEDS BY PRESCRIBER/CLIN PHARMACIST DOCUMENTED: ICD-10-PCS | Mod: CPTII,S$GLB,, | Performed by: PHYSICIAN ASSISTANT

## 2022-03-28 PROCEDURE — 82105 ALPHA-FETOPROTEIN SERUM: CPT | Performed by: PHYSICIAN ASSISTANT

## 2022-03-28 PROCEDURE — 36415 COLL VENOUS BLD VENIPUNCTURE: CPT | Performed by: PHYSICIAN ASSISTANT

## 2022-03-28 PROCEDURE — 1159F MED LIST DOCD IN RCRD: CPT | Mod: CPTII,S$GLB,, | Performed by: PHYSICIAN ASSISTANT

## 2022-03-28 NOTE — PATIENT INSTRUCTIONS
Take one dose of Metamucil once a day to help improve bowel habits. Ok to increase to twice a day if no improvement with the one dose.

## 2022-03-28 NOTE — PROGRESS NOTES
Subjective:      Patient ID: Anthony Blair is a 88 y.o. male.    Chief Complaint: Cirrhosis (6 month f/u)    HPI  The patient has a history of liver cirrhosis, pulmonary hypertension, dysphagia, irregular bowel habits and AVM. He was last seen 09/24/21 and is here for routine follow up. He denies nausea, vomiting, abdominal pain, change in appetite, change in bowel habits, hematochezia or melena. He doesn't report urgency to defecate which occurs about once a week. Other times, he has a normal stool without straining or incontinence. He had the flu about two weeks ago and is feeling better now.     US (12/27/21): no masses. Cholelithiasis noted.  AFP (09/28/21): 4.4   US (04/19/21): no masses. Cholelithiasis noted.     EGD (05/2018): no varices. He is on Metoprolol 100 mg bid.     Review of Systems  As per HPI.     Objective:     Physical Exam  Constitutional:       General: He is not in acute distress.     Appearance: He is well-developed. He is not diaphoretic.   HENT:      Head: Normocephalic and atraumatic.   Eyes:      Extraocular Movements: Extraocular movements intact.   Cardiovascular:      Rate and Rhythm: Normal rate and regular rhythm.   Pulmonary:      Effort: Pulmonary effort is normal. No respiratory distress.      Breath sounds: Normal breath sounds. No wheezing.   Abdominal:      General: Bowel sounds are normal. There is no distension.      Palpations: Abdomen is soft.      Tenderness: There is no abdominal tenderness.   Neurological:      Mental Status: He is alert and oriented to person, place, and time.      Cranial Nerves: No cranial nerve deficit.   Psychiatric:         Behavior: Behavior normal.         Assessment:     1. Cirrhosis of liver without ascites, unspecified hepatic cirrhosis type        Plan:     Trial of Metamucil 1-2 times a day.   US and Labs to be updated. See orders.    Continue Beta blocker.     Orders Placed This Encounter   Procedures    US Abdomen Limited    AFP Tumor  Marker       Follow up in about 6 months (around 9/28/2022).    Thank you for the opportunity to participate in the care of this patient.   Maxwell Altman PA-C.

## 2022-04-01 ENCOUNTER — OFFICE VISIT (OUTPATIENT)
Dept: FAMILY MEDICINE | Facility: CLINIC | Age: 87
End: 2022-04-01
Payer: MEDICARE

## 2022-04-01 VITALS
SYSTOLIC BLOOD PRESSURE: 156 MMHG | HEIGHT: 70 IN | OXYGEN SATURATION: 98 % | WEIGHT: 187.19 LBS | DIASTOLIC BLOOD PRESSURE: 70 MMHG | HEART RATE: 78 BPM | BODY MASS INDEX: 26.8 KG/M2 | TEMPERATURE: 98 F

## 2022-04-01 DIAGNOSIS — I10 ESSENTIAL HYPERTENSION: ICD-10-CM

## 2022-04-01 DIAGNOSIS — E08.51 DIABETES MELLITUS DUE TO UNDERLYING CONDITION WITH DIABETIC PERIPHERAL ANGIOPATHY WITHOUT GANGRENE, WITHOUT LONG-TERM CURRENT USE OF INSULIN: ICD-10-CM

## 2022-04-01 DIAGNOSIS — I25.708 CORONARY ARTERY DISEASE OF BYPASS GRAFT OF NATIVE HEART WITH STABLE ANGINA PECTORIS: ICD-10-CM

## 2022-04-01 DIAGNOSIS — R05.9 COUGH: Primary | ICD-10-CM

## 2022-04-01 PROCEDURE — 3072F PR LOW RISK FOR RETINOPATHY: ICD-10-PCS | Mod: CPTII,S$GLB,, | Performed by: FAMILY MEDICINE

## 2022-04-01 PROCEDURE — 99214 OFFICE O/P EST MOD 30 MIN: CPT | Mod: S$GLB,,, | Performed by: FAMILY MEDICINE

## 2022-04-01 PROCEDURE — 3288F FALL RISK ASSESSMENT DOCD: CPT | Mod: CPTII,S$GLB,, | Performed by: FAMILY MEDICINE

## 2022-04-01 PROCEDURE — 99999 PR PBB SHADOW E&M-EST. PATIENT-LVL V: ICD-10-PCS | Mod: PBBFAC,,, | Performed by: FAMILY MEDICINE

## 2022-04-01 PROCEDURE — 1101F PT FALLS ASSESS-DOCD LE1/YR: CPT | Mod: CPTII,S$GLB,, | Performed by: FAMILY MEDICINE

## 2022-04-01 PROCEDURE — 1101F PR PT FALLS ASSESS DOC 0-1 FALLS W/OUT INJ PAST YR: ICD-10-PCS | Mod: CPTII,S$GLB,, | Performed by: FAMILY MEDICINE

## 2022-04-01 PROCEDURE — 3288F PR FALLS RISK ASSESSMENT DOCUMENTED: ICD-10-PCS | Mod: CPTII,S$GLB,, | Performed by: FAMILY MEDICINE

## 2022-04-01 PROCEDURE — 99499 RISK ADDL DX/OHS AUDIT: ICD-10-PCS | Mod: S$GLB,,, | Performed by: FAMILY MEDICINE

## 2022-04-01 PROCEDURE — 1157F PR ADVANCE CARE PLAN OR EQUIV PRESENT IN MEDICAL RECORD: ICD-10-PCS | Mod: CPTII,S$GLB,, | Performed by: FAMILY MEDICINE

## 2022-04-01 PROCEDURE — 1159F MED LIST DOCD IN RCRD: CPT | Mod: CPTII,S$GLB,, | Performed by: FAMILY MEDICINE

## 2022-04-01 PROCEDURE — 3072F LOW RISK FOR RETINOPATHY: CPT | Mod: CPTII,S$GLB,, | Performed by: FAMILY MEDICINE

## 2022-04-01 PROCEDURE — 99214 PR OFFICE/OUTPT VISIT, EST, LEVL IV, 30-39 MIN: ICD-10-PCS | Mod: S$GLB,,, | Performed by: FAMILY MEDICINE

## 2022-04-01 PROCEDURE — 1157F ADVNC CARE PLAN IN RCRD: CPT | Mod: CPTII,S$GLB,, | Performed by: FAMILY MEDICINE

## 2022-04-01 PROCEDURE — 99999 PR PBB SHADOW E&M-EST. PATIENT-LVL V: CPT | Mod: PBBFAC,,, | Performed by: FAMILY MEDICINE

## 2022-04-01 PROCEDURE — 99499 UNLISTED E&M SERVICE: CPT | Mod: S$GLB,,, | Performed by: FAMILY MEDICINE

## 2022-04-01 PROCEDURE — 1159F PR MEDICATION LIST DOCUMENTED IN MEDICAL RECORD: ICD-10-PCS | Mod: CPTII,S$GLB,, | Performed by: FAMILY MEDICINE

## 2022-04-01 RX ORDER — BENZONATATE 200 MG/1
200 CAPSULE ORAL 3 TIMES DAILY PRN
Qty: 30 CAPSULE | Refills: 0 | Status: SHIPPED | OUTPATIENT
Start: 2022-04-01 | End: 2022-04-11

## 2022-04-04 ENCOUNTER — HOSPITAL ENCOUNTER (OUTPATIENT)
Dept: RADIOLOGY | Facility: HOSPITAL | Age: 87
Discharge: HOME | End: 2022-04-04
Attending: PHYSICIAN ASSISTANT
Payer: MEDICARE

## 2022-04-04 DIAGNOSIS — K74.60 CIRRHOSIS OF LIVER WITHOUT ASCITES, UNSPECIFIED HEPATIC CIRRHOSIS TYPE: ICD-10-CM

## 2022-04-04 PROCEDURE — 76705 ECHO EXAM OF ABDOMEN: CPT | Mod: TC

## 2022-04-18 ENCOUNTER — OFFICE VISIT (OUTPATIENT)
Dept: FAMILY MEDICINE | Facility: CLINIC | Age: 87
End: 2022-04-18
Payer: MEDICARE

## 2022-04-18 VITALS
DIASTOLIC BLOOD PRESSURE: 66 MMHG | TEMPERATURE: 99 F | HEART RATE: 85 BPM | WEIGHT: 187.81 LBS | HEIGHT: 70 IN | OXYGEN SATURATION: 95 % | SYSTOLIC BLOOD PRESSURE: 138 MMHG | BODY MASS INDEX: 26.89 KG/M2

## 2022-04-18 DIAGNOSIS — E11.51 TYPE 2 DIABETES MELLITUS WITH DIABETIC PERIPHERAL ANGIOPATHY WITHOUT GANGRENE, WITH LONG-TERM CURRENT USE OF INSULIN: ICD-10-CM

## 2022-04-18 DIAGNOSIS — E11.9 DIABETES MELLITUS TYPE 2 WITHOUT RETINOPATHY: Primary | Chronic | ICD-10-CM

## 2022-04-18 DIAGNOSIS — Z79.4 TYPE 2 DIABETES MELLITUS WITH DIABETIC PERIPHERAL ANGIOPATHY WITHOUT GANGRENE, WITH LONG-TERM CURRENT USE OF INSULIN: ICD-10-CM

## 2022-04-18 PROCEDURE — 1159F MED LIST DOCD IN RCRD: CPT | Mod: CPTII,S$GLB,, | Performed by: FAMILY MEDICINE

## 2022-04-18 PROCEDURE — 3072F LOW RISK FOR RETINOPATHY: CPT | Mod: CPTII,S$GLB,, | Performed by: FAMILY MEDICINE

## 2022-04-18 PROCEDURE — 3288F FALL RISK ASSESSMENT DOCD: CPT | Mod: CPTII,S$GLB,, | Performed by: FAMILY MEDICINE

## 2022-04-18 PROCEDURE — 1159F PR MEDICATION LIST DOCUMENTED IN MEDICAL RECORD: ICD-10-PCS | Mod: CPTII,S$GLB,, | Performed by: FAMILY MEDICINE

## 2022-04-18 PROCEDURE — 99499 UNLISTED E&M SERVICE: CPT | Mod: S$GLB,,, | Performed by: FAMILY MEDICINE

## 2022-04-18 PROCEDURE — 3072F PR LOW RISK FOR RETINOPATHY: ICD-10-PCS | Mod: CPTII,S$GLB,, | Performed by: FAMILY MEDICINE

## 2022-04-18 PROCEDURE — 1101F PR PT FALLS ASSESS DOC 0-1 FALLS W/OUT INJ PAST YR: ICD-10-PCS | Mod: CPTII,S$GLB,, | Performed by: FAMILY MEDICINE

## 2022-04-18 PROCEDURE — 3288F PR FALLS RISK ASSESSMENT DOCUMENTED: ICD-10-PCS | Mod: CPTII,S$GLB,, | Performed by: FAMILY MEDICINE

## 2022-04-18 PROCEDURE — 3052F HG A1C>EQUAL 8.0%<EQUAL 9.0%: CPT | Mod: CPTII,S$GLB,, | Performed by: FAMILY MEDICINE

## 2022-04-18 PROCEDURE — 1157F PR ADVANCE CARE PLAN OR EQUIV PRESENT IN MEDICAL RECORD: ICD-10-PCS | Mod: CPTII,S$GLB,, | Performed by: FAMILY MEDICINE

## 2022-04-18 PROCEDURE — 99999 PR PBB SHADOW E&M-EST. PATIENT-LVL IV: CPT | Mod: PBBFAC,,, | Performed by: FAMILY MEDICINE

## 2022-04-18 PROCEDURE — 1157F ADVNC CARE PLAN IN RCRD: CPT | Mod: CPTII,S$GLB,, | Performed by: FAMILY MEDICINE

## 2022-04-18 PROCEDURE — 99999 PR PBB SHADOW E&M-EST. PATIENT-LVL IV: ICD-10-PCS | Mod: PBBFAC,,, | Performed by: FAMILY MEDICINE

## 2022-04-18 PROCEDURE — 3052F PR MOST RECENT HEMOGLOBIN A1C LEVEL 8.0 - < 9.0%: ICD-10-PCS | Mod: CPTII,S$GLB,, | Performed by: FAMILY MEDICINE

## 2022-04-18 PROCEDURE — 1101F PT FALLS ASSESS-DOCD LE1/YR: CPT | Mod: CPTII,S$GLB,, | Performed by: FAMILY MEDICINE

## 2022-04-18 PROCEDURE — 99214 PR OFFICE/OUTPT VISIT, EST, LEVL IV, 30-39 MIN: ICD-10-PCS | Mod: S$GLB,,, | Performed by: FAMILY MEDICINE

## 2022-04-18 PROCEDURE — 99499 RISK ADDL DX/OHS AUDIT: ICD-10-PCS | Mod: S$GLB,,, | Performed by: FAMILY MEDICINE

## 2022-04-18 PROCEDURE — 99214 OFFICE O/P EST MOD 30 MIN: CPT | Mod: S$GLB,,, | Performed by: FAMILY MEDICINE

## 2022-04-18 NOTE — PROGRESS NOTES
Chief Complaint:    Chief Complaint   Patient presents with    Follow-up       History of Present Illness:  Patient with a hx of CVA, DDD, hemiplegia and hemiparesis, PAF, HLD, PVD, CAD, DM2, GERD, and cirrhosis of liver presents today for a two week follow up for HTN and HLD.    Brought numbers, they look good.   Working on cutting back on waffles. Tried sugar free cereal but it still raised his sugar.   A1C is 8.3. Takes 16 units of Lantus at night, 10 units Novalog at breakfast, 10 units Novalog at lunch, and 10 units Novalog at night. His fasting sugar usually runs in the 90s.   Postprandial sugars have come down in 150 range    Reports a knot on his R head after scratching himself accidentally in the shower 3 months ago.      ROS:  Review of Systems   Constitutional: Negative for activity change, chills, fatigue, fever and unexpected weight change.   HENT: Negative for congestion, ear discharge, ear pain, hearing loss, postnasal drip and rhinorrhea.    Eyes: Negative for pain and visual disturbance.   Respiratory: Negative for cough, chest tightness and shortness of breath.    Cardiovascular: Negative for chest pain and palpitations.   Gastrointestinal: Negative for abdominal pain, diarrhea and vomiting.   Endocrine: Negative for heat intolerance.   Genitourinary: Negative for dysuria, flank pain, frequency and hematuria.   Musculoskeletal: Negative for back pain, gait problem and neck pain.   Skin: Negative for color change and rash.   Neurological: Negative for dizziness, tremors, seizures, numbness and headaches.   Psychiatric/Behavioral: Negative for agitation, hallucinations, self-injury, sleep disturbance and suicidal ideas. The patient is not nervous/anxious.    All other systems reviewed and are negative.      Past Medical History:   Diagnosis Date    A-fib     Anemia     AP (angina pectoris) 1/23/2014    Carotid artery disease without cerebral infarction 8/22/2014    Cataract     Cirrhosis of  liver 9/10/2020    Coronary artery disease     Diabetes mellitus     DM (diabetes mellitus) 1970     am 2020    GERD (gastroesophageal reflux disease) 2013    Hemorrhagic cerebrovascular accident (CVA) 2018    Hyperlipidemia     Hypertension     Hypertensive heart disease with heart failure 3/12/2019    Intracranial hemorrhage     Lumbosacral spondylosis 2014    Pacemaker 2014    Paroxysmal atrial fibrillation 2014    Peripheral vascular disease 2014    Pneumonia of right lung due to infectious organism 3/27/2019    Seizure, late effect of stroke     Stroke     Type 2 diabetes mellitus with ophthalmic manifestations        Social History:  Social History     Socioeconomic History    Marital status:     Number of children: 3    Highest education level: GED or equivalent   Tobacco Use    Smoking status: Former Smoker     Packs/day: 2.00     Years: 13.00     Pack years: 26.00     Types: Cigarettes     Start date: 1954     Quit date: 1967     Years since quittin.9    Smokeless tobacco: Never Used   Substance and Sexual Activity    Alcohol use: No    Drug use: No    Sexual activity: Never     Partners: Female     Birth control/protection: None   Social History Narrative    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       Family History:   family history includes Alcohol abuse in his paternal uncle; Arthritis in his father and mother; Cancer in his daughter and sister; Diabetes in his brother, daughter, father, mother, sister, son, and son; Fibromyalgia in his daughter; Heart disease in his brother, mother, and sister; Kidney disease in his brother and mother; Miscarriages / Stillbirths in his daughter.    Health Maintenance   Topic Date Due    Foot Exam  2022    Hemoglobin A1c  2022    Eye Exam  2022    Lipid Panel  2022    TETANUS VACCINE  2024       Physical Exam:    Vital  "Signs  Temp: 98.6 °F (37 °C)  Pulse: 85  SpO2: 95 %  BP: 138/66  Height and Weight  Height: 5' 10" (177.8 cm)  Weight: 85.2 kg (187 lb 13.3 oz)  BSA (Calculated - sq m): 2.05 sq meters  BMI (Calculated): 27  Weight in (lb) to have BMI = 25: 173.9]    Body mass index is 26.95 kg/m².    Physical Exam  Vitals and nursing note reviewed.   Constitutional:       Appearance: Normal appearance. He is well-developed.   HENT:      Head: Normocephalic and atraumatic.        Right Ear: Tympanic membrane normal. Decreased hearing noted.      Left Ear: Tympanic membrane normal. Decreased hearing noted.   Eyes:      Extraocular Movements: Extraocular movements intact.      Conjunctiva/sclera: Conjunctivae normal.      Pupils: Pupils are equal, round, and reactive to light.   Cardiovascular:      Rate and Rhythm: Normal rate and regular rhythm.      Pulses: Normal pulses.      Heart sounds: Normal heart sounds. No murmur heard.    No gallop.   Pulmonary:      Effort: Pulmonary effort is normal. No respiratory distress.      Breath sounds: Normal breath sounds. No wheezing, rhonchi or rales.   Chest:      Chest wall: No tenderness.   Abdominal:      General: There is no distension.      Palpations: Abdomen is soft. There is no mass.      Tenderness: There is no abdominal tenderness. There is no guarding.   Musculoskeletal:         General: No swelling, tenderness, deformity or signs of injury. Normal range of motion.      Cervical back: Normal range of motion and neck supple.   Lymphadenopathy:      Cervical: No cervical adenopathy.   Skin:     General: Skin is warm and dry.      Capillary Refill: Capillary refill takes less than 2 seconds.      Coloration: Skin is not jaundiced or pale.   Neurological:      General: No focal deficit present.      Mental Status: He is alert and oriented to person, place, and time.      Deep Tendon Reflexes: Reflexes are normal and symmetric.   Psychiatric:         Mood and Affect: Mood normal.       "   Behavior: Behavior normal.         Thought Content: Thought content normal.         Judgment: Judgment normal.           Diabetes Management Status    Statin: Not taking  ACE/ARB: Taking    Screening or Prevention Patient's value Goal Complete/Controlled?   HgA1C Testing and Control   Lab Results   Component Value Date    HGBA1C 8.3 (H) 03/16/2022      Annually/Less than 8% Yes   Lipid profile : 09/09/2021 Annually Yes   LDL control Lab Results   Component Value Date    LDLCALC 94.4 09/09/2021    Annually/Less than 100 mg/dl  No   Nephropathy screening Lab Results   Component Value Date    LABMICR 19.0 09/09/2021     Lab Results   Component Value Date    PROTEINUA Negative 12/12/2021    Annually Yes   Blood pressure BP Readings from Last 1 Encounters:   04/18/22 138/66    Less than 140/90 Yes   Dilated retinal exam : 07/12/2021 Annually Yes   Foot exam   : 06/02/2021 Annually Yes       Assessment:      ICD-10-CM ICD-9-CM   1. Diabetes mellitus type 2 without retinopathy  E11.9 250.00   2. Type 2 diabetes mellitus with diabetic peripheral angiopathy without gangrene, with long-term current use of insulin  E11.51 250.70    Z79.4 443.81     V58.67         Plan:    Continue monitoring blood pressure. Keep < 140/90.  Continue monitoring blood sugar fasting level and sugar level two hours after you eat. Fasting sugar levels should be . Two hours after eating should be < 150. Anatone with foods to see what keeps your sugars within normal range.   Keep balanced diet. Monitor carb intake. Limit bread and waffle consumption. When skipping meals, don't take insulin.  Do recommend skipping breakfast.   Avoid scratching the injured area on the scalp  Continue walking for exercise.   See labs below.  Follow up in 3 months.   No orders of the defined types were placed in this encounter.      Current Outpatient Medications   Medication Sig Dispense Refill    ACCU-CHEK TOMAS PLUS TEST STRP Strp TEST SIX TIMES A   "strip 6    ACCU-CHEK GUIDE TEST STRIPS Strp TEST SIX TIMES DAILY 100 strip 0    ACCU-CHEK MULTICLIX LANCET lancets TEST BLOOD SUGAR THREE TIMES DAILY 200 each 5    ascorbic acid, vitamin C, (VITAMIN C) 100 MG tablet Take 100 mg by mouth once daily.      CARBOXYMETHYLCELLULOSE SODIUM (REFRESH OPHT) Apply to eye.      COMFORT EZ PEN NEEDLES 32 gauge x 5/32" Ndle USE FOUR TIMES DAILY. 200 each 6    diclofenac sodium (VOLTAREN) 1 % Gel Apply 2 g topically once daily. 100 g 2    ferrous sulfate (IRON ORAL) Take 65 mg by mouth.      flecainide (TAMBOCOR) 50 MG Tab TAKE ONE TABLET BY MOUTH EVERY TWELVE HOURS 60 tablet 6    fluticasone propionate (FLONASE) 50 mcg/actuation nasal spray 2 sprays (100 mcg total) by Each Nostril route once daily. 16 g 0    furosemide (LASIX) 40 MG tablet Take 1 tablet (40 mg total) by mouth once daily. 30 tablet 12    guaiFENesin (MUCINEX) 600 mg 12 hr tablet Take 1 tablet (600 mg total) by mouth 2 (two) times daily.      insulin (LANTUS SOLOSTAR U-100 INSULIN) glargine 100 units/mL (3mL) SubQ pen INJECT 16 UNITS SUBCUTANEOUSLY AT BEDTIME. 94 DAY SUPPLY 15 mL 11    insulin aspart U-100 (NOVOLOG FLEXPEN U-100 INSULIN) 100 unit/mL (3 mL) InPn pen INJECT TEN UNITS AT BREAKFAST, EIGHT AT LUNCH, AND TEN AT DINNER (54 DAYS SUPPLY) 15 mL 3    isosorbide mononitrate (IMDUR) 60 MG 24 hr tablet TAKE ONE TABLET BY MOUTH TWICE DAILY 60 tablet 12    levocetirizine (XYZAL) 5 MG tablet Take 1 tablet (5 mg total) by mouth every evening. 30 tablet 11    losartan (COZAAR) 100 MG tablet TAKE ONE TABLET (100mg) BY MOUTH DAILY 90 tablet 1    metoprolol succinate (TOPROL-XL) 100 MG 24 hr tablet Take 1 tablet (100 mg total) by mouth 2 (two) times daily. 60 tablet 12    mupirocin (BACTROBAN) 2 % ointment Apply topically 2 (two) times daily. 30 g 0    nitroGLYCERIN (NITROSTAT) 0.4 MG SL tablet Place 1 tablet (0.4 mg total) under the tongue every 5 (five) minutes as needed for Chest pain. 25 tablet 12 "    pantoprazole (PROTONIX) 40 MG tablet TAKE ONE TABLET (40mg) BY MOUTH EVERY DAY 90 tablet 3    saw palmetto 500 MG capsule Take 500 mg by mouth 2 (two) times a day.      simethicone (GAS-X EXTRA STRENGTH) 125 mg Cap capsule Take 1 capsule (125 mg total) by mouth 4 (four) times daily as needed for Flatulence.  0    turmeric 400 mg Cap Take 1 capsule by mouth once daily.      vitamin D (VITAMIN D3) 1000 units Tab Take 1,000 Units by mouth once daily.      vitamin E 100 UNIT capsule Take 100 Units by mouth once daily.      blood-glucose meter Misc 1 Device by Misc.(Non-Drug; Combo Route) route once. for 1 dose 1 each 0     Current Facility-Administered Medications   Medication Dose Route Frequency Provider Last Rate Last Admin    acetaminophen tablet 650 mg  650 mg Oral Once PRN Hesham Monaco MD        albuterol inhaler 2 puff  2 puff Inhalation Q20 Min PRN Hesham Monaco MD        diphenhydrAMINE injection 25 mg  25 mg Intravenous Once PRN Hesham Monaco MD        EPINEPHrine (EPIPEN) 0.3 mg/0.3 mL pen injection 0.3 mg  0.3 mg Intramuscular PRN Hesham Monaco MD        methylPREDNISolone sodium succinate injection 40 mg  40 mg Intravenous Once PRN Hesham Monaco MD        ondansetron disintegrating tablet 4 mg  4 mg Oral Once PRN Hesham Monaco MD        sodium chloride 0.9% 500 mL flush bag   Intravenous PRN Hesham Monaco MD        sodium chloride 0.9% flush 10 mL  10 mL Intravenous PRN Hesham Monaco MD           There are no discontinued medications.    No follow-ups on file.      Abdulaziz Mccabe MD  Scribe Attestation:   IAni, am scribing for, and in the presence of, Dr.Arif Mccabe I performed the above scribed service and the documentation accurately describes the services I performed. I attest to the accuracy of the note.    I, Dr. Abdulaziz Mccabe, reviewed documentation as scribed above. I performed the services described in this documentation.  I agree that  the record reflects my personal performance and is accurate and complete. Abdulaziz Mccabe MD.  10/04/2021

## 2022-05-02 DIAGNOSIS — J10.1 INFLUENZA A: ICD-10-CM

## 2022-05-02 RX ORDER — FLUTICASONE PROPIONATE 50 MCG
2 SPRAY, SUSPENSION (ML) NASAL DAILY
Qty: 16 G | Refills: 3 | Status: SHIPPED | OUTPATIENT
Start: 2022-05-02 | End: 2024-01-11 | Stop reason: SDUPTHER

## 2022-05-02 NOTE — TELEPHONE ENCOUNTER
No new care gaps identified.  Powered by [employer]. Reference number: [id number].   5/02/2022 4:36:51 PM CDT

## 2022-05-02 NOTE — TELEPHONE ENCOUNTER
----- Message from Nate Aranda sent at 5/2/2022  4:23 PM CDT -----  Contact: Kori  Type:  RX Refill Request    Who Called: Kori (daughter in law)  Refill or New Rx:Refill  RX Name and Strength:Flonase  How is the patient currently taking it? (ex. 1XDay):1XDay  Is this a 30 day or 90 day RX:90  Preferred Pharmacy with phone number:  [facility] - [place], [place] - [address]  [address] [address]  Phone: [phone] Fax: [phone]  Local or Mail Order:Local  Ordering Provider:Eliot  Would the patient rather a call back or a response via [facility]? Call back  Best Call Back Number:[phone]  Additional Information:

## 2022-05-03 ENCOUNTER — CLINICAL SUPPORT (OUTPATIENT)
Dept: CARDIOLOGY | Facility: HOSPITAL | Age: 87
End: 2022-05-03
Payer: MEDICARE

## 2022-05-03 DIAGNOSIS — Z95.0 PRESENCE OF CARDIAC PACEMAKER: ICD-10-CM

## 2022-05-03 PROCEDURE — 93296 REM INTERROG EVL PM/IDS: CPT | Performed by: INTERNAL MEDICINE

## 2022-05-08 ENCOUNTER — HOSPITAL ENCOUNTER (OUTPATIENT)
Facility: HOSPITAL | Age: 87
Discharge: HOME | End: 2022-05-09
Attending: EMERGENCY MEDICINE | Admitting: INTERNAL MEDICINE
Payer: MEDICARE

## 2022-05-08 DIAGNOSIS — G45.9 TIA (TRANSIENT ISCHEMIC ATTACK): Primary | ICD-10-CM

## 2022-05-08 DIAGNOSIS — I63.9 STROKE: ICD-10-CM

## 2022-05-08 LAB
ALBUMIN SERPL BCP-MCNC: 3.6 G/DL (ref 3.5–5.2)
ALP SERPL-CCNC: 134 U/L (ref 55–135)
ALT SERPL W/O P-5'-P-CCNC: 18 U/L (ref 10–44)
ANION GAP SERPL CALC-SCNC: 10 MMOL/L (ref 8–16)
APTT BLDCRRT: 27.8 SEC (ref 21–32)
AST SERPL-CCNC: 33 U/L (ref 10–40)
BASOPHILS # BLD AUTO: 0.03 K/UL (ref 0–0.2)
BASOPHILS NFR BLD: 0.6 % (ref 0–1.9)
BILIRUB SERPL-MCNC: 0.9 MG/DL (ref 0.1–1)
BUN SERPL-MCNC: 24 MG/DL (ref 8–23)
CALCIUM SERPL-MCNC: 10.4 MG/DL (ref 8.7–10.5)
CHLORIDE SERPL-SCNC: 99 MMOL/L (ref 95–110)
CO2 SERPL-SCNC: 30 MMOL/L (ref 23–29)
CREAT SERPL-MCNC: 0.9 MG/DL (ref 0.5–1.4)
DIFFERENTIAL METHOD: ABNORMAL
EOSINOPHIL # BLD AUTO: 0.3 K/UL (ref 0–0.5)
EOSINOPHIL NFR BLD: 4.8 % (ref 0–8)
ERYTHROCYTE [DISTWIDTH] IN BLOOD BY AUTOMATED COUNT: 14.3 % (ref 11.5–14.5)
EST. GFR  (AFRICAN AMERICAN): >60 ML/MIN/1.73 M^2
EST. GFR  (NON AFRICAN AMERICAN): >60 ML/MIN/1.73 M^2
GLUCOSE SERPL-MCNC: 118 MG/DL (ref 70–110)
HCT VFR BLD AUTO: 37.2 % (ref 40–54)
HGB BLD-MCNC: 12.4 G/DL (ref 14–18)
IMM GRANULOCYTES # BLD AUTO: 0.01 K/UL (ref 0–0.04)
IMM GRANULOCYTES NFR BLD AUTO: 0.2 % (ref 0–0.5)
INR PPP: 1.1 (ref 0.8–1.2)
LYMPHOCYTES # BLD AUTO: 2 K/UL (ref 1–4.8)
LYMPHOCYTES NFR BLD: 37.8 % (ref 18–48)
MCH RBC QN AUTO: 31 PG (ref 27–31)
MCHC RBC AUTO-ENTMCNC: 33.3 G/DL (ref 32–36)
MCV RBC AUTO: 93 FL (ref 82–98)
MONOCYTES # BLD AUTO: 0.6 K/UL (ref 0.3–1)
MONOCYTES NFR BLD: 11.9 % (ref 4–15)
NEUTROPHILS # BLD AUTO: 2.4 K/UL (ref 1.8–7.7)
NEUTROPHILS NFR BLD: 44.7 % (ref 38–73)
NRBC BLD-RTO: 0 /100 WBC
PLATELET # BLD AUTO: 174 K/UL (ref 150–450)
PMV BLD AUTO: 9.5 FL (ref 9.2–12.9)
POCT GLUCOSE: 136 MG/DL (ref 70–110)
POTASSIUM SERPL-SCNC: 4.1 MMOL/L (ref 3.5–5.1)
PROT SERPL-MCNC: 7.5 G/DL (ref 6–8.4)
PROTHROMBIN TIME: 11.9 SEC (ref 9–12.5)
RBC # BLD AUTO: 4 M/UL (ref 4.6–6.2)
SARS-COV-2 RDRP RESP QL NAA+PROBE: NEGATIVE
SODIUM SERPL-SCNC: 139 MMOL/L (ref 136–145)
TSH SERPL DL<=0.005 MIU/L-ACNC: 1.7 UIU/ML (ref 0.4–4)
WBC # BLD AUTO: 5.37 K/UL (ref 3.9–12.7)

## 2022-05-08 PROCEDURE — G0508 PR CRITICAL CARE TELEHLTH INITIAL CONSULT 60MIN: ICD-10-PCS | Mod: 95,,, | Performed by: PSYCHIATRY & NEUROLOGY

## 2022-05-08 PROCEDURE — 85730 THROMBOPLASTIN TIME PARTIAL: CPT | Performed by: EMERGENCY MEDICINE

## 2022-05-08 PROCEDURE — 25000003 PHARM REV CODE 250: Performed by: EMERGENCY MEDICINE

## 2022-05-08 PROCEDURE — 99285 EMERGENCY DEPT VISIT HI MDM: CPT | Mod: 25,CS

## 2022-05-08 PROCEDURE — 93010 EKG 12-LEAD: ICD-10-PCS | Mod: ,,, | Performed by: STUDENT IN AN ORGANIZED HEALTH CARE EDUCATION/TRAINING PROGRAM

## 2022-05-08 PROCEDURE — 85025 COMPLETE CBC W/AUTO DIFF WBC: CPT | Performed by: EMERGENCY MEDICINE

## 2022-05-08 PROCEDURE — 82962 GLUCOSE BLOOD TEST: CPT

## 2022-05-08 PROCEDURE — G0508 CRIT CARE TELEHEA CONSULT 60: HCPCS | Mod: 95,,, | Performed by: PSYCHIATRY & NEUROLOGY

## 2022-05-08 PROCEDURE — U0002 COVID-19 LAB TEST NON-CDC: HCPCS | Performed by: EMERGENCY MEDICINE

## 2022-05-08 PROCEDURE — G0378 HOSPITAL OBSERVATION PER HR: HCPCS

## 2022-05-08 PROCEDURE — 93005 ELECTROCARDIOGRAM TRACING: CPT

## 2022-05-08 PROCEDURE — 93010 ELECTROCARDIOGRAM REPORT: CPT | Mod: ,,, | Performed by: STUDENT IN AN ORGANIZED HEALTH CARE EDUCATION/TRAINING PROGRAM

## 2022-05-08 PROCEDURE — 80053 COMPREHEN METABOLIC PANEL: CPT | Performed by: EMERGENCY MEDICINE

## 2022-05-08 PROCEDURE — 85610 PROTHROMBIN TIME: CPT | Performed by: EMERGENCY MEDICINE

## 2022-05-08 PROCEDURE — 84443 ASSAY THYROID STIM HORMONE: CPT | Performed by: EMERGENCY MEDICINE

## 2022-05-08 PROCEDURE — 80061 LIPID PANEL: CPT | Performed by: EMERGENCY MEDICINE

## 2022-05-08 RX ORDER — CLOPIDOGREL 300 MG/1
300 TABLET, FILM COATED ORAL
Status: COMPLETED | OUTPATIENT
Start: 2022-05-08 | End: 2022-05-08

## 2022-05-08 RX ORDER — ENOXAPARIN SODIUM 100 MG/ML
40 INJECTION SUBCUTANEOUS EVERY 24 HOURS
Status: DISCONTINUED | OUTPATIENT
Start: 2022-05-09 | End: 2022-05-09 | Stop reason: HOSPADM

## 2022-05-08 RX ORDER — SODIUM,POTASSIUM PHOSPHATES 280-250MG
2 POWDER IN PACKET (EA) ORAL
Status: DISCONTINUED | OUTPATIENT
Start: 2022-05-09 | End: 2022-05-09 | Stop reason: HOSPADM

## 2022-05-08 RX ORDER — LANOLIN ALCOHOL/MO/W.PET/CERES
800 CREAM (GRAM) TOPICAL
Status: DISCONTINUED | OUTPATIENT
Start: 2022-05-09 | End: 2022-05-09 | Stop reason: HOSPADM

## 2022-05-08 RX ORDER — TALC
6 POWDER (GRAM) TOPICAL NIGHTLY PRN
Status: DISCONTINUED | OUTPATIENT
Start: 2022-05-09 | End: 2022-05-09 | Stop reason: HOSPADM

## 2022-05-08 RX ORDER — POLYETHYLENE GLYCOL 3350 17 G/17G
17 POWDER, FOR SOLUTION ORAL DAILY PRN
Status: DISCONTINUED | OUTPATIENT
Start: 2022-05-09 | End: 2022-05-09 | Stop reason: HOSPADM

## 2022-05-08 RX ORDER — GLUCAGON 1 MG
1 KIT INJECTION
Status: DISCONTINUED | OUTPATIENT
Start: 2022-05-09 | End: 2022-05-09 | Stop reason: HOSPADM

## 2022-05-08 RX ORDER — FLECAINIDE ACETATE 50 MG/1
50 TABLET ORAL EVERY 12 HOURS
Status: DISCONTINUED | OUTPATIENT
Start: 2022-05-09 | End: 2022-05-09 | Stop reason: HOSPADM

## 2022-05-08 RX ORDER — NAPROXEN SODIUM 220 MG/1
81 TABLET, FILM COATED ORAL
Status: COMPLETED | OUTPATIENT
Start: 2022-05-08 | End: 2022-05-08

## 2022-05-08 RX ORDER — SODIUM CHLORIDE 0.9 % (FLUSH) 0.9 %
10 SYRINGE (ML) INJECTION EVERY 8 HOURS
Status: DISCONTINUED | OUTPATIENT
Start: 2022-05-09 | End: 2022-05-09 | Stop reason: HOSPADM

## 2022-05-08 RX ORDER — HYDROCODONE BITARTRATE AND ACETAMINOPHEN 5; 325 MG/1; MG/1
1 TABLET ORAL EVERY 6 HOURS PRN
Status: DISCONTINUED | OUTPATIENT
Start: 2022-05-09 | End: 2022-05-09 | Stop reason: HOSPADM

## 2022-05-08 RX ORDER — ATORVASTATIN CALCIUM 40 MG/1
40 TABLET, FILM COATED ORAL DAILY
Status: DISCONTINUED | OUTPATIENT
Start: 2022-05-09 | End: 2022-05-09

## 2022-05-08 RX ORDER — ASPIRIN 81 MG/1
81 TABLET ORAL DAILY
Status: DISCONTINUED | OUTPATIENT
Start: 2022-05-09 | End: 2022-05-09 | Stop reason: HOSPADM

## 2022-05-08 RX ORDER — METOPROLOL SUCCINATE 50 MG/1
100 TABLET, EXTENDED RELEASE ORAL 2 TIMES DAILY
Status: DISCONTINUED | OUTPATIENT
Start: 2022-05-09 | End: 2022-05-09 | Stop reason: HOSPADM

## 2022-05-08 RX ORDER — IBUPROFEN 200 MG
24 TABLET ORAL
Status: DISCONTINUED | OUTPATIENT
Start: 2022-05-09 | End: 2022-05-09 | Stop reason: HOSPADM

## 2022-05-08 RX ORDER — FUROSEMIDE 40 MG/1
40 TABLET ORAL DAILY
Status: DISCONTINUED | OUTPATIENT
Start: 2022-05-09 | End: 2022-05-09 | Stop reason: HOSPADM

## 2022-05-08 RX ORDER — NALOXONE HCL 0.4 MG/ML
0.02 VIAL (ML) INJECTION
Status: DISCONTINUED | OUTPATIENT
Start: 2022-05-09 | End: 2022-05-09 | Stop reason: HOSPADM

## 2022-05-08 RX ORDER — IBUPROFEN 200 MG
16 TABLET ORAL
Status: DISCONTINUED | OUTPATIENT
Start: 2022-05-09 | End: 2022-05-09 | Stop reason: HOSPADM

## 2022-05-08 RX ORDER — LOSARTAN POTASSIUM 25 MG/1
25 TABLET ORAL DAILY
Status: DISCONTINUED | OUTPATIENT
Start: 2022-05-09 | End: 2022-05-09 | Stop reason: HOSPADM

## 2022-05-08 RX ORDER — INSULIN ASPART 100 [IU]/ML
0-5 INJECTION, SOLUTION INTRAVENOUS; SUBCUTANEOUS
Status: DISCONTINUED | OUTPATIENT
Start: 2022-05-09 | End: 2022-05-09 | Stop reason: HOSPADM

## 2022-05-08 RX ORDER — LABETALOL HYDROCHLORIDE 5 MG/ML
10 INJECTION, SOLUTION INTRAVENOUS
Status: DISCONTINUED | OUTPATIENT
Start: 2022-05-09 | End: 2022-05-09 | Stop reason: HOSPADM

## 2022-05-08 RX ORDER — ACETAMINOPHEN 325 MG/1
650 TABLET ORAL EVERY 4 HOURS PRN
Status: DISCONTINUED | OUTPATIENT
Start: 2022-05-09 | End: 2022-05-09 | Stop reason: HOSPADM

## 2022-05-08 RX ORDER — SODIUM CHLORIDE 0.9 % (FLUSH) 0.9 %
10 SYRINGE (ML) INJECTION
Status: DISCONTINUED | OUTPATIENT
Start: 2022-05-09 | End: 2022-05-09 | Stop reason: HOSPADM

## 2022-05-08 RX ADMIN — CLOPIDOGREL BISULFATE 300 MG: 300 TABLET, FILM COATED ORAL at 07:05

## 2022-05-08 RX ADMIN — ASPIRIN 81 MG CHEWABLE TABLET 81 MG: 81 TABLET CHEWABLE at 07:05

## 2022-05-08 NOTE — ASSESSMENT & PLAN NOTE
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 Not a tPA nor interventional candidate.    Antithrombotics for secondary stroke prevention: Antiplatelets: Aspirin: 81 mg daily  Clopidogrel: 300 mg loading dose x 1, now  Clopidogrel: 75 mg daily x 21 days    Statins for secondary stroke prevention and hyperlipidemia, if present:   Statins: Rosuvastatin- 20 mg daily    Aggressive risk factor modification:      Rehab efforts: The patient has been evaluated by a stroke team provider and the therapy needs have been fully considered based off the presenting complaints and exam findings. The following therapy evaluations are needed: PT evaluate and treat, OT evaluate and treat    Diagnostics ordered/pending: HgbA1C to assess blood glucose levels, Lipid Profile to assess cholesterol levels, MRA head to assess vasculature, MRA neck/arch to assess vasculature, MRI head without contrast to assess brain parenchyma, TTE to assess cardiac function/status ; if pacemaker is not MR-compatible, rec CTA head/neck     VTE prophylaxis: Enoxaparin 40 mg SQ every 24 hours    BP parameters: Infarct: No intervention, SBP <220    NS @ 75cc/hr overnight

## 2022-05-08 NOTE — CONSULTS
[facility]  Vascular Neurology  Comprehensive Stroke Center  TeleVascular Neurology Acute Consultation Note      Consults    Consulting Provider: OBEY FRANK  Current Providers  No providers found    Patient Location:  [facility] EMERGENCY DEPARTMENT Emergency Department  Spoke hospital nurse at bedside with patient assisting consultant.     Patient information was obtained from patient.         Assessment/Plan:       Diagnoses:   Ischemic stroke  Pure sensory stroke, best localizing to L thalamus. Not a tPA nor interventional candidate.    Antithrombotics for secondary stroke prevention: Antiplatelets: Aspirin: 81 mg daily  Clopidogrel: 300 mg loading dose x 1, now  Clopidogrel: 75 mg daily x 21 days    Statins for secondary stroke prevention and hyperlipidemia, if present:   Statins: Rosuvastatin- 20 mg daily    Aggressive risk factor modification:      Rehab efforts: The patient has been evaluated by a stroke team provider and the therapy needs have been fully considered based off the presenting complaints and exam findings. The following therapy evaluations are needed: PT evaluate and treat, OT evaluate and treat    Diagnostics ordered/pending: HgbA1C to assess blood glucose levels, Lipid Profile to assess cholesterol levels, MRA head to assess vasculature, MRA neck/arch to assess vasculature, MRI head without contrast to assess brain parenchyma, TTE to assess cardiac function/status ; if pacemaker is not MR-compatible, rec CTA head/neck     VTE prophylaxis: Enoxaparin 40 mg SQ every 24 hours    BP parameters: Infarct: No intervention, SBP <220    NS @ 75cc/hr overnight        STROKE DOCUMENTATION     Acute Stroke Times:   Acute Stroke Times   Symptom Onset Date: 05/08/22  Symptom Onset Time: 1030  Stroke Team Arrival Time: 1759  CT Interpretation Time: 1803    NIH Scale:  Interval: baseline  1a. Level of Consciousness: 0-->Alert, keenly responsive  1b. LOC Questions:  0-->Answers both questions correctly  1c. LOC Commands: 0-->Performs both tasks correctly  2. Best Gaze: 0-->Normal  3. Visual: 0-->No visual loss  4. Facial Palsy: 0-->Normal symmetrical movements  5a. Motor Arm, Left: 0-->No drift, limb holds 90 (or 45) degrees for full 10 secs  5b. Motor Arm, Right: 0-->No drift, limb holds 90 (or 45) degrees for full 10 secs  6a. Motor Leg, Left: 0-->No drift, leg holds 30 degree position for full 5 secs  6b. Motor Leg, Right: 0-->No drift, leg holds 30 degree position for full 5 secs  7. Limb Ataxia: 0-->Absent  8. Sensory: 1-->Mild-to-moderate sensory loss, patient feels pinprick is less sharp or is dull on the affected side, or there is a loss of superficial pain with pinprick, but patient is aware of being touched  9. Best Language: 0-->No aphasia, normal  10. Dysarthria: 0-->Normal  11. Extinction and Inattention (formerly Neglect): 0-->No abnormality  Total (NIH Stroke Scale): 1     Modified Burke    [place] Coma Scale:    ABCD2 Score:    [id number] Score:   HAS -BLED Score:   ICH Score:   Hunt & Dia Classification:       Blood pressure (!) 161/72, pulse 70, temperature 98.5 °F (36.9 °C), temperature source Oral, resp. rate 18, weight 83 kg (182 lb 15.7 oz), SpO2 96 %.  Alteplase Eligible?: No  Alteplase Recommendation: Alteplase not recommended due to Outside of treatment window  and Mild Non-Disabling Symptoms  Possible Interventional Revascularization Candidate? No; no significant neurologic deficit (NIHSS <6)  and No; at this time symptoms not suggestive of large vessel occlusion    Disposition Recommendation: admit to inpatient    Subjective:     History of Present Illness:  89 y/o WM with R sided numbness and tingling. Onset 10:30a today.  Had similar symptoms in 2017 when he had an ICH.         Woke up with symptoms?: no    Recent bleeding noted: no  Does the patient take any Blood Thinners? no  Medications: No relevant medications    Current  "Facility-Administered Medications on File Prior to Encounter   Medication Dose Route Frequency Provider Last Rate Last Admin    acetaminophen tablet 650 mg  650 mg Oral Once PRN Hesham Monaco MD        albuterol inhaler 2 puff  2 puff Inhalation Q20 Min PRN Hesham Monaco MD        diphenhydrAMINE injection 25 mg  25 mg Intravenous Once PRN Hesham Monaco MD        EPINEPHrine (EPIPEN) 0.3 mg/0.3 mL pen injection 0.3 mg  0.3 mg Intramuscular PRN Hesham Monaco MD        methylPREDNISolone sodium succinate injection 40 mg  40 mg Intravenous Once PRN Hesham Monaco MD        ondansetron disintegrating tablet 4 mg  4 mg Oral Once PRN Hesham Monaco MD        sodium chloride 0.9% 500 mL flush bag   Intravenous PRN Hesham Monaco MD        sodium chloride 0.9% flush 10 mL  10 mL Intravenous PRN Hesham Monaco MD         Current Outpatient Medications on File Prior to Encounter   Medication Sig Dispense Refill    ACCU-CHEK GUIDE TEST STRIPS Strp TEST SIX TIMES DAILY 600 strip 3    ACCU-CHEK MULTICLIX LANCET lancets TEST BLOOD SUGAR THREE TIMES DAILY 200 each 5    ascorbic acid, vitamin C, (VITAMIN C) 100 MG tablet Take 100 mg by mouth once daily.      blood-glucose meter Misc 1 Device by Misc.(Non-Drug; Combo Route) route once. for 1 dose 1 each 0    CARBOXYMETHYLCELLULOSE SODIUM (REFRESH OPHT) Apply to eye.      COMFORT EZ PEN NEEDLES 32 gauge x 5/32" Ndle USE FOUR TIMES DAILY. 200 each 6    diclofenac sodium (VOLTAREN) 1 % Gel Apply 2 g topically once daily. 100 g 2    ferrous sulfate (IRON ORAL) Take 65 mg by mouth.      flecainide (TAMBOCOR) 50 MG Tab TAKE ONE TABLET BY MOUTH EVERY TWELVE HOURS 60 tablet 6    fluticasone propionate (FLONASE) 50 mcg/actuation nasal spray 2 sprays (100 mcg total) by Each Nostril route once daily. 16 g 3    furosemide (LASIX) 40 MG tablet Take 1 tablet (40 mg total) by mouth once daily. 30 tablet 12    guaiFENesin (MUCINEX) 600 mg 12 hr tablet " Take 1 tablet (600 mg total) by mouth 2 (two) times daily.      insulin (LANTUS SOLOSTAR U-100 INSULIN) glargine 100 units/mL (3mL) SubQ pen INJECT 16 UNITS SUBCUTANEOUSLY AT BEDTIME. 94 DAY SUPPLY 15 mL 11    insulin aspart U-100 (NOVOLOG FLEXPEN U-100 INSULIN) 100 unit/mL (3 mL) InPn pen INJECT TEN UNITS AT BREAKFAST, EIGHT AT LUNCH, AND TEN AT DINNER (54 DAYS SUPPLY) 15 mL 3    isosorbide mononitrate (IMDUR) 60 MG 24 hr tablet TAKE ONE TABLET BY MOUTH TWICE DAILY 60 tablet 12    levocetirizine (XYZAL) 5 MG tablet Take 1 tablet (5 mg total) by mouth every evening. 30 tablet 11    losartan (COZAAR) 100 MG tablet TAKE ONE TABLET (100mg) BY MOUTH DAILY 90 tablet 1    metoprolol succinate (TOPROL-XL) 100 MG 24 hr tablet Take 1 tablet (100 mg total) by mouth 2 (two) times daily. 60 tablet 12    mupirocin (BACTROBAN) 2 % ointment Apply topically 2 (two) times daily. 30 g 0    nitroGLYCERIN (NITROSTAT) 0.4 MG SL tablet Place 1 tablet (0.4 mg total) under the tongue every 5 (five) minutes as needed for Chest pain. 25 tablet 12    pantoprazole (PROTONIX) 40 MG tablet TAKE ONE TABLET (40mg) BY MOUTH EVERY DAY 90 tablet 3    saw palmetto 500 MG capsule Take 500 mg by mouth 2 (two) times a day.      simethicone (GAS-X EXTRA STRENGTH) 125 mg Cap capsule Take 1 capsule (125 mg total) by mouth 4 (four) times daily as needed for Flatulence.  0    turmeric 400 mg Cap Take 1 capsule by mouth once daily.      vitamin D (VITAMIN D3) 1000 units Tab Take 1,000 Units by mouth once daily.      vitamin E 100 UNIT capsule Take 100 Units by mouth once daily.       Past Surgical History:   Procedure Laterality Date    ANGIOPLASTY      ATRIAL ABLATION SURGERY N/A     CATARACT EXTRACTION Bilateral     [facility]    CATARACT EXTRACTION W/ INTRAOCULAR LENS IMPLANT Right     CATARACT EXTRACTION W/ INTRAOCULAR LENS IMPLANT Left     COLONOSCOPY N/A 10/16/2018    Procedure: COLONOSCOPY with biopsies;  Surgeon: Anabell DODSON  MD Anson;  Location: [facility] ENDO;  Service: Endoscopy;  Laterality: N/A;    CORONARY ARTERY BYPASS GRAFT  07/2010    x5    EYE SURGERY      pace maker surgrey  10/2010    reposition in 2011/2012 (approx)    REPLACEMENT OF PACEMAKER GENERATOR Left 8/6/2020    Procedure: REPLACEMENT, PULSE GENERATOR, CARDIAC PACEMAKER;  Surgeon: Domenico Grajeda MD;  Location: [facility] CATH LAB;  Service: Cardiology;  Laterality: Left;  [facility]    REVISION OF PROCEDURE INVOLVING PACEMAKER LEAD Bilateral 8/6/2020    Procedure: REVISION, ELECTRODE LEAD, CARDIAC PACEMAKER;  Surgeon: Domenico Grajeda MD;  Location: [facility] CATH LAB;  Service: Cardiology;  Laterality: Bilateral;    VEIN BYPASS SURGERY       Past Surgical History:   Procedure Laterality Date    ANGIOPLASTY      ATRIAL ABLATION SURGERY N/A     CATARACT EXTRACTION Bilateral     [facility]    CATARACT EXTRACTION W/ INTRAOCULAR LENS IMPLANT Right     CATARACT EXTRACTION W/ INTRAOCULAR LENS IMPLANT Left     COLONOSCOPY N/A 10/16/2018    Procedure: COLONOSCOPY with biopsies;  Surgeon: Anabell Griggs MD;  Location: [facility] ENDO;  Service: Endoscopy;  Laterality: N/A;    CORONARY ARTERY BYPASS GRAFT  07/2010    x5    EYE SURGERY      pace maker surgrey  10/2010    reposition in 2011/2012 (approx)    REPLACEMENT OF PACEMAKER GENERATOR Left 8/6/2020    Procedure: REPLACEMENT, PULSE GENERATOR, CARDIAC PACEMAKER;  Surgeon: Domenico Grajeda MD;  Location: [facility] CATH LAB;  Service: Cardiology;  Laterality: Left;  [facility]    REVISION OF PROCEDURE INVOLVING PACEMAKER LEAD Bilateral 8/6/2020    Procedure: REVISION, ELECTRODE LEAD, CARDIAC PACEMAKER;  Surgeon: Domenico Grajeda MD;  Location: [facility] CATH LAB;  Service: Cardiology;  Laterality: Bilateral;    VEIN BYPASS SURGERY       Social History     Tobacco Use    Smoking status: Former Smoker     Packs/day: 2.00     Years: 13.00     Pack years: 26.00     Types: Cigarettes     Start date: 11/25/1954      Quit date: 1967     Years since quittin.9    Smokeless tobacco: Never Used   Substance Use Topics    Alcohol use: No    Drug use: No     Family History   Problem Relation Age of Onset    Arthritis Mother     Diabetes Mother     Kidney disease Mother     Heart disease Mother     Arthritis Father     Diabetes Father     Diabetes Sister     Cancer Sister         breast    Heart disease Sister     Diabetes Brother     Kidney disease Brother     Heart disease Brother     Cancer Daughter         breast    Diabetes Daughter     Miscarriages / Stillbirths Daughter     Fibromyalgia Daughter     Diabetes Son     Diabetes Son     Alcohol abuse Paternal Uncle     Stroke Neg Hx     Hypertension Neg Hx     Hyperlipidemia Neg Hx     COPD Neg Hx      Allergies: Atorvastatin  Codeine  Eliquis [Apixaban]  Norvasc [Amlodipine]  Trulicity [Dulaglutide]  Adhesive     Review of Systems   All other systems reviewed and are negative.    Objective:   Vitals: Blood pressure (!) 177/76, pulse 70, temperature 98.5 °F (36.9 °C), temperature source Oral, resp. rate 19, weight 83 kg (182 lb 15.7 oz), SpO2 96 %.    CT READ: Yes  Abnormal CT multiple remote and age indeterminant infarcts (deep).     Physical Exam  Vitals reviewed.               Recommended the emergency room physician to have a brief discussion with the patient and/or family if available regarding the  risks and benefits of treatment, and to briefly document the occurrence of that discussion in his clinical encounter note.     The attending portion of this evaluation, treatment, and documentation was performed per Alber Andersen MD via audiovisual.    Billing code:  (time dependent stroke, complex case, unstable patient, hemorrhages, any intervention, some mimics)    · This patient has a very critical neurological condition/illness, with very high morbidity and mortality.  · There is a very high probability for acute neurological change  leading to clinical and possibly life-threatening deterioration requiring highest level of physician preparedness for urgent intervention.  · There is possibility that this condition will require treatment with high risk medications as quickly as possible.  · There is also a possibility that the patient may benefit from further, more advance complex therapies (e.g. endovascular therapy) that will require prompt diagnosis and care.  · Care was coordinated with other physicians involved in the patient's care.  · Radiologic studies and laboratory data were reviewed and interpreted, and plan of care was re-assessed based on the results.  · Diagnosis, treatment options and prognosis may have been discussed with the patient and/or family members or caregiver.  · Further advanced medical management and further evaluation is warranted for his care.      In your opinion, this was a: Tier 1 Van Negative    Consult End Time: 6:18 PM     Alber Andersen MD  [facility]  Vascular Neurology   [facility]

## 2022-05-08 NOTE — HPI
89 y/o WM with R sided numbness and tingling. Onset 10:30a today.  Had similar symptoms in 2017 when he had an ICH.

## 2022-05-08 NOTE — SUBJECTIVE & OBJECTIVE
"  Woke up with symptoms?: no    Recent bleeding noted: no  Does the patient take any Blood Thinners? no  Medications: No relevant medications    Current Facility-Administered Medications on File Prior to Encounter   Medication Dose Route Frequency Provider Last Rate Last Admin    acetaminophen tablet 650 mg  650 mg Oral Once PRN Hesham Monaco MD        albuterol inhaler 2 puff  2 puff Inhalation Q20 Min PRN Hesham Monaco MD        diphenhydrAMINE injection 25 mg  25 mg Intravenous Once PRN Hesham Monaco MD        EPINEPHrine (EPIPEN) 0.3 mg/0.3 mL pen injection 0.3 mg  0.3 mg Intramuscular PRN Hesham Monaco MD        methylPREDNISolone sodium succinate injection 40 mg  40 mg Intravenous Once PRN Hesham Monaco MD        ondansetron disintegrating tablet 4 mg  4 mg Oral Once PRN Hesham oMnaco MD        sodium chloride 0.9% 500 mL flush bag   Intravenous PRN Hesham Monaco MD        sodium chloride 0.9% flush 10 mL  10 mL Intravenous PRN Hesham Monaco MD         Current Outpatient Medications on File Prior to Encounter   Medication Sig Dispense Refill    ACCU-CHEK GUIDE TEST STRIPS Strp TEST SIX TIMES DAILY 600 strip 3    ACCU-CHEK MULTICLIX LANCET lancets TEST BLOOD SUGAR THREE TIMES DAILY 200 each 5    ascorbic acid, vitamin C, (VITAMIN C) 100 MG tablet Take 100 mg by mouth once daily.      blood-glucose meter Misc 1 Device by Misc.(Non-Drug; Combo Route) route once. for 1 dose 1 each 0    CARBOXYMETHYLCELLULOSE SODIUM (REFRESH OPHT) Apply to eye.      COMFORT EZ PEN NEEDLES 32 gauge x 5/32" Ndle USE FOUR TIMES DAILY. 200 each 6    diclofenac sodium (VOLTAREN) 1 % Gel Apply 2 g topically once daily. 100 g 2    ferrous sulfate (IRON ORAL) Take 65 mg by mouth.      flecainide (TAMBOCOR) 50 MG Tab TAKE ONE TABLET BY MOUTH EVERY TWELVE HOURS 60 tablet 6    fluticasone propionate (FLONASE) 50 mcg/actuation nasal spray 2 sprays (100 mcg total) by Each Nostril route once " daily. 16 g 3    furosemide (LASIX) 40 MG tablet Take 1 tablet (40 mg total) by mouth once daily. 30 tablet 12    guaiFENesin (MUCINEX) 600 mg 12 hr tablet Take 1 tablet (600 mg total) by mouth 2 (two) times daily.      insulin (LANTUS SOLOSTAR U-100 INSULIN) glargine 100 units/mL (3mL) SubQ pen INJECT 16 UNITS SUBCUTANEOUSLY AT BEDTIME. 94 DAY SUPPLY 15 mL 11    insulin aspart U-100 (NOVOLOG FLEXPEN U-100 INSULIN) 100 unit/mL (3 mL) InPn pen INJECT TEN UNITS AT BREAKFAST, EIGHT AT LUNCH, AND TEN AT DINNER (54 DAYS SUPPLY) 15 mL 3    isosorbide mononitrate (IMDUR) 60 MG 24 hr tablet TAKE ONE TABLET BY MOUTH TWICE DAILY 60 tablet 12    levocetirizine (XYZAL) 5 MG tablet Take 1 tablet (5 mg total) by mouth every evening. 30 tablet 11    losartan (COZAAR) 100 MG tablet TAKE ONE TABLET (100mg) BY MOUTH DAILY 90 tablet 1    metoprolol succinate (TOPROL-XL) 100 MG 24 hr tablet Take 1 tablet (100 mg total) by mouth 2 (two) times daily. 60 tablet 12    mupirocin (BACTROBAN) 2 % ointment Apply topically 2 (two) times daily. 30 g 0    nitroGLYCERIN (NITROSTAT) 0.4 MG SL tablet Place 1 tablet (0.4 mg total) under the tongue every 5 (five) minutes as needed for Chest pain. 25 tablet 12    pantoprazole (PROTONIX) 40 MG tablet TAKE ONE TABLET (40mg) BY MOUTH EVERY DAY 90 tablet 3    saw palmetto 500 MG capsule Take 500 mg by mouth 2 (two) times a day.      simethicone (GAS-X EXTRA STRENGTH) 125 mg Cap capsule Take 1 capsule (125 mg total) by mouth 4 (four) times daily as needed for Flatulence.  0    turmeric 400 mg Cap Take 1 capsule by mouth once daily.      vitamin D (VITAMIN D3) 1000 units Tab Take 1,000 Units by mouth once daily.      vitamin E 100 UNIT capsule Take 100 Units by mouth once daily.       Past Surgical History:   Procedure Laterality Date    ANGIOPLASTY      ATRIAL ABLATION SURGERY N/A     CATARACT EXTRACTION Bilateral     [facility]    CATARACT EXTRACTION W/ INTRAOCULAR LENS  IMPLANT Right     CATARACT EXTRACTION W/ INTRAOCULAR LENS IMPLANT Left     COLONOSCOPY N/A 10/16/2018    Procedure: COLONOSCOPY with biopsies;  Surgeon: Anabell Griggs MD;  Location: [facility] ENDO;  Service: Endoscopy;  Laterality: N/A;    CORONARY ARTERY BYPASS GRAFT  07/2010    x5    EYE SURGERY      pace maker surgrey  10/2010    reposition in 2011/2012 (approx)    REPLACEMENT OF PACEMAKER GENERATOR Left 8/6/2020    Procedure: REPLACEMENT, PULSE GENERATOR, CARDIAC PACEMAKER;  Surgeon: Domenico Grajeda MD;  Location: [facility] CATH LAB;  Service: Cardiology;  Laterality: Left;  [facility]    REVISION OF PROCEDURE INVOLVING PACEMAKER LEAD Bilateral 8/6/2020    Procedure: REVISION, ELECTRODE LEAD, CARDIAC PACEMAKER;  Surgeon: Domenico Grajeda MD;  Location: [facility] CATH LAB;  Service: Cardiology;  Laterality: Bilateral;    VEIN BYPASS SURGERY       Past Surgical History:   Procedure Laterality Date    ANGIOPLASTY      ATRIAL ABLATION SURGERY N/A     CATARACT EXTRACTION Bilateral     [facility]    CATARACT EXTRACTION W/ INTRAOCULAR LENS IMPLANT Right     CATARACT EXTRACTION W/ INTRAOCULAR LENS IMPLANT Left     COLONOSCOPY N/A 10/16/2018    Procedure: COLONOSCOPY with biopsies;  Surgeon: Anabell Griggs MD;  Location: [facility] ENDO;  Service: Endoscopy;  Laterality: N/A;    CORONARY ARTERY BYPASS GRAFT  07/2010    x5    EYE SURGERY      pace maker surgrey  10/2010    reposition in 2011/2012 (approx)    REPLACEMENT OF PACEMAKER GENERATOR Left 8/6/2020    Procedure: REPLACEMENT, PULSE GENERATOR, CARDIAC PACEMAKER;  Surgeon: Domenico Grajeda MD;  Location: [facility] CATH LAB;  Service: Cardiology;  Laterality: Left;  [facility]    REVISION OF PROCEDURE INVOLVING PACEMAKER LEAD Bilateral 8/6/2020    Procedure: REVISION, ELECTRODE LEAD, CARDIAC PACEMAKER;  Surgeon: Domenico Grajeda MD;  Location: [facility] CATH LAB;  Service: Cardiology;  Laterality: Bilateral;    VEIN BYPASS SURGERY       Social History      Tobacco Use    Smoking status: Former Smoker     Packs/day: 2.00     Years: 13.00     Pack years: 26.00     Types: Cigarettes     Start date: 1954     Quit date: 1967     Years since quittin.9    Smokeless tobacco: Never Used   Substance Use Topics    Alcohol use: No    Drug use: No     Family History   Problem Relation Age of Onset    Arthritis Mother     Diabetes Mother     Kidney disease Mother     Heart disease Mother     Arthritis Father     Diabetes Father     Diabetes Sister     Cancer Sister         breast    Heart disease Sister     Diabetes Brother     Kidney disease Brother     Heart disease Brother     Cancer Daughter         breast    Diabetes Daughter     Miscarriages / Stillbirths Daughter     Fibromyalgia Daughter     Diabetes Son     Diabetes Son     Alcohol abuse Paternal Uncle     Stroke Neg Hx     Hypertension Neg Hx     Hyperlipidemia Neg Hx     COPD Neg Hx      Allergies: Atorvastatin  Codeine  Eliquis [Apixaban]  Norvasc [Amlodipine]  Trulicity [Dulaglutide]  Adhesive     Review of Systems   All other systems reviewed and are negative.    Objective:   Vitals: Blood pressure (!) 177/76, pulse 70, temperature 98.5 °F (36.9 °C), temperature source Oral, resp. rate 19, weight 83 kg (182 lb 15.7 oz), SpO2 96 %.    CT READ: Yes  Abnormal CT multiple remote and age indeterminant infarcts (deep).     Physical Exam  Vitals reviewed.

## 2022-05-09 ENCOUNTER — PATIENT MESSAGE (OUTPATIENT)
Dept: FAMILY MEDICINE | Facility: CLINIC | Age: 87
End: 2022-05-09
Payer: MEDICARE

## 2022-05-09 ENCOUNTER — TELEPHONE (OUTPATIENT)
Dept: FAMILY MEDICINE | Facility: CLINIC | Age: 87
End: 2022-05-09
Payer: MEDICARE

## 2022-05-09 VITALS
HEART RATE: 70 BPM | WEIGHT: 182.75 LBS | OXYGEN SATURATION: 93 % | BODY MASS INDEX: 26.16 KG/M2 | RESPIRATION RATE: 16 BRPM | HEIGHT: 70 IN | DIASTOLIC BLOOD PRESSURE: 62 MMHG | TEMPERATURE: 97 F | SYSTOLIC BLOOD PRESSURE: 139 MMHG

## 2022-05-09 LAB
ALBUMIN SERPL BCP-MCNC: 3.3 G/DL (ref 3.5–5.2)
ALP SERPL-CCNC: 115 U/L (ref 55–135)
ALT SERPL W/O P-5'-P-CCNC: 18 U/L (ref 10–44)
ANION GAP SERPL CALC-SCNC: 8 MMOL/L (ref 8–16)
APTT BLDCRRT: 29.8 SEC (ref 21–32)
AST SERPL-CCNC: 30 U/L (ref 10–40)
BASOPHILS # BLD AUTO: 0.03 K/UL (ref 0–0.2)
BASOPHILS NFR BLD: 0.6 % (ref 0–1.9)
BILIRUB SERPL-MCNC: 1.2 MG/DL (ref 0.1–1)
BUN SERPL-MCNC: 21 MG/DL (ref 8–23)
CALCIUM SERPL-MCNC: 10 MG/DL (ref 8.7–10.5)
CHLORIDE SERPL-SCNC: 102 MMOL/L (ref 95–110)
CHOLEST SERPL-MCNC: 170 MG/DL (ref 120–199)
CHOLEST/HDLC SERPL: 4.1 {RATIO} (ref 2–5)
CK MB SERPL-MCNC: 1.2 NG/ML (ref 0.1–6.5)
CK MB SERPL-RTO: 2.3 % (ref 0–5)
CK SERPL-CCNC: 53 U/L (ref 20–200)
CO2 SERPL-SCNC: 29 MMOL/L (ref 23–29)
CREAT SERPL-MCNC: 0.8 MG/DL (ref 0.5–1.4)
DIFFERENTIAL METHOD: ABNORMAL
EOSINOPHIL # BLD AUTO: 0.3 K/UL (ref 0–0.5)
EOSINOPHIL NFR BLD: 5.3 % (ref 0–8)
ERYTHROCYTE [DISTWIDTH] IN BLOOD BY AUTOMATED COUNT: 14.3 % (ref 11.5–14.5)
EST. GFR  (AFRICAN AMERICAN): >60 ML/MIN/1.73 M^2
EST. GFR  (NON AFRICAN AMERICAN): >60 ML/MIN/1.73 M^2
ESTIMATED AVG GLUCOSE: 137 MG/DL (ref 68–131)
GLUCOSE SERPL-MCNC: 90 MG/DL (ref 70–110)
HBA1C MFR BLD: 6.4 % (ref 4–5.6)
HCT VFR BLD AUTO: 35.2 % (ref 40–54)
HDLC SERPL-MCNC: 41 MG/DL (ref 40–75)
HDLC SERPL: 24.1 % (ref 20–50)
HGB BLD-MCNC: 11.7 G/DL (ref 14–18)
IMM GRANULOCYTES # BLD AUTO: 0.01 K/UL (ref 0–0.04)
IMM GRANULOCYTES NFR BLD AUTO: 0.2 % (ref 0–0.5)
INR PPP: 1.1 (ref 0.8–1.2)
LDLC SERPL CALC-MCNC: 106.6 MG/DL (ref 63–159)
LYMPHOCYTES # BLD AUTO: 2 K/UL (ref 1–4.8)
LYMPHOCYTES NFR BLD: 41.5 % (ref 18–48)
MAGNESIUM SERPL-MCNC: 1.8 MG/DL (ref 1.6–2.6)
MCH RBC QN AUTO: 30.7 PG (ref 27–31)
MCHC RBC AUTO-ENTMCNC: 33.2 G/DL (ref 32–36)
MCV RBC AUTO: 92 FL (ref 82–98)
MONOCYTES # BLD AUTO: 0.6 K/UL (ref 0.3–1)
MONOCYTES NFR BLD: 13 % (ref 4–15)
NEUTROPHILS # BLD AUTO: 1.9 K/UL (ref 1.8–7.7)
NEUTROPHILS NFR BLD: 39.4 % (ref 38–73)
NONHDLC SERPL-MCNC: 129 MG/DL
NRBC BLD-RTO: 0 /100 WBC
PHOSPHATE SERPL-MCNC: 2.9 MG/DL (ref 2.7–4.5)
PLATELET # BLD AUTO: 164 K/UL (ref 150–450)
PMV BLD AUTO: 9.6 FL (ref 9.2–12.9)
POCT GLUCOSE: 140 MG/DL (ref 70–110)
POCT GLUCOSE: 88 MG/DL (ref 70–110)
POTASSIUM SERPL-SCNC: 4.1 MMOL/L (ref 3.5–5.1)
PROT SERPL-MCNC: 6.8 G/DL (ref 6–8.4)
PROTHROMBIN TIME: 12.1 SEC (ref 9–12.5)
RBC # BLD AUTO: 3.81 M/UL (ref 4.6–6.2)
SODIUM SERPL-SCNC: 139 MMOL/L (ref 136–145)
TRIGL SERPL-MCNC: 112 MG/DL (ref 30–150)
TROPONIN I SERPL DL<=0.01 NG/ML-MCNC: 0.01 NG/ML (ref 0–0.03)
WBC # BLD AUTO: 4.92 K/UL (ref 3.9–12.7)

## 2022-05-09 PROCEDURE — 85610 PROTHROMBIN TIME: CPT | Performed by: INTERNAL MEDICINE

## 2022-05-09 PROCEDURE — 80053 COMPREHEN METABOLIC PANEL: CPT | Performed by: INTERNAL MEDICINE

## 2022-05-09 PROCEDURE — 97165 OT EVAL LOW COMPLEX 30 MIN: CPT

## 2022-05-09 PROCEDURE — 97161 PT EVAL LOW COMPLEX 20 MIN: CPT

## 2022-05-09 PROCEDURE — 85025 COMPLETE CBC W/AUTO DIFF WBC: CPT | Performed by: INTERNAL MEDICINE

## 2022-05-09 PROCEDURE — 99213 PR OFFICE/OUTPT VISIT, EST, LEVL III, 20-29 MIN: ICD-10-PCS | Mod: ,,, | Performed by: PSYCHIATRY & NEUROLOGY

## 2022-05-09 PROCEDURE — 85730 THROMBOPLASTIN TIME PARTIAL: CPT | Performed by: INTERNAL MEDICINE

## 2022-05-09 PROCEDURE — 99220 PR INITIAL OBSERVATION CARE,LEVL III: ICD-10-PCS | Mod: ,,, | Performed by: INTERNAL MEDICINE

## 2022-05-09 PROCEDURE — 99220 PR INITIAL OBSERVATION CARE,LEVL III: CPT | Mod: ,,, | Performed by: INTERNAL MEDICINE

## 2022-05-09 PROCEDURE — 83036 HEMOGLOBIN GLYCOSYLATED A1C: CPT | Performed by: INTERNAL MEDICINE

## 2022-05-09 PROCEDURE — 97530 THERAPEUTIC ACTIVITIES: CPT

## 2022-05-09 PROCEDURE — 36415 COLL VENOUS BLD VENIPUNCTURE: CPT | Performed by: INTERNAL MEDICINE

## 2022-05-09 PROCEDURE — 84100 ASSAY OF PHOSPHORUS: CPT | Performed by: INTERNAL MEDICINE

## 2022-05-09 PROCEDURE — 82553 CREATINE MB FRACTION: CPT | Performed by: INTERNAL MEDICINE

## 2022-05-09 PROCEDURE — 92610 EVALUATE SWALLOWING FUNCTION: CPT

## 2022-05-09 PROCEDURE — 92523 SPEECH SOUND LANG COMPREHEN: CPT

## 2022-05-09 PROCEDURE — 84484 ASSAY OF TROPONIN QUANT: CPT | Performed by: INTERNAL MEDICINE

## 2022-05-09 PROCEDURE — 25000003 PHARM REV CODE 250: Performed by: INTERNAL MEDICINE

## 2022-05-09 PROCEDURE — 99213 OFFICE O/P EST LOW 20 MIN: CPT | Mod: ,,, | Performed by: PSYCHIATRY & NEUROLOGY

## 2022-05-09 PROCEDURE — G0378 HOSPITAL OBSERVATION PER HR: HCPCS

## 2022-05-09 PROCEDURE — 25500020 PHARM REV CODE 255: Performed by: EMERGENCY MEDICINE

## 2022-05-09 PROCEDURE — 83735 ASSAY OF MAGNESIUM: CPT | Performed by: INTERNAL MEDICINE

## 2022-05-09 RX ORDER — PRAVASTATIN SODIUM 20 MG/1
20 TABLET ORAL DAILY
Status: DISCONTINUED | OUTPATIENT
Start: 2022-05-09 | End: 2022-05-09 | Stop reason: HOSPADM

## 2022-05-09 RX ADMIN — IOHEXOL 100 ML: 350 INJECTION, SOLUTION INTRAVENOUS at 12:05

## 2022-05-09 RX ADMIN — PRAVASTATIN SODIUM 20 MG: 20 TABLET ORAL at 09:05

## 2022-05-09 RX ADMIN — FLECAINIDE ACETATE 50 MG: 50 TABLET ORAL at 09:05

## 2022-05-09 RX ADMIN — LOSARTAN POTASSIUM 25 MG: 25 TABLET, FILM COATED ORAL at 09:05

## 2022-05-09 RX ADMIN — FLECAINIDE ACETATE 50 MG: 50 TABLET ORAL at 01:05

## 2022-05-09 RX ADMIN — METOPROLOL SUCCINATE 100 MG: 50 TABLET, EXTENDED RELEASE ORAL at 01:05

## 2022-05-09 RX ADMIN — ACETAMINOPHEN 650 MG: 325 TABLET ORAL at 03:05

## 2022-05-09 RX ADMIN — FUROSEMIDE 40 MG: 40 TABLET ORAL at 09:05

## 2022-05-09 RX ADMIN — METOPROLOL SUCCINATE 100 MG: 50 TABLET, EXTENDED RELEASE ORAL at 09:05

## 2022-05-09 NOTE — ASSESSMENT & PLAN NOTE
This is suspected -will do CTA of the head/neck, neurology consult .  Will add aspirin, use pravastatin as he had a previous reaction to lipitor .  Check ECH[facility]  He has a pacemaker and MRI might not be an option

## 2022-05-09 NOTE — CONSULTS
[facility]  Cardiology  Consult Note    Patient Name: Anthony Blair  MRN: [id number]  Admission Date: 5/8/2022  Hospital Length of Stay: 0 days  Code Status: Full Code   Attending Provider: Perfecto England MD   Consulting Provider: BASSAM Reyes  Primary Care Physician: Abdulaziz Mccabe MD  Principal Problem:Ischemic stroke    Patient information was obtained from patient, past medical records and ER records.     Inpatient consult to Cardiology  Consult performed by: BASSAM Ramirez  Consult ordered by: Raymundo Virgen MD        Subjective:     Chief Complaint:  dizziness     HPI:   Anthony Blair is a 89 year old male who presented to [facility] due to weakness followed by numbness and t I ngling on right side of his body. His current medical conditions include CAD, PVD, PAF, h/o Hemorrhagic CVA, HTN, HLP, s/p PPM, DM. He was placed in observation under the care of hospital medicine. Cardiology consulted to assist with AC recommendations post CVA in setting of AFIB. Chart reviewed, patient seen and examined. Denies chest pain or anginal equivalents. Feels back to normal today. Sitting up in bedside chair. Denies any cardiac complaints today. Discussed risk of recurrent CVA due to AFIB and bleeding risk, would recommend Plavix 75 mg daily monotherapy due to intolerance to Eliquis previously if cleared with Neurology.       Past Medical History:   Diagnosis Date    A-fib     Anemia     AP (angina pectoris) 1/23/2014    Carotid artery disease without cerebral infarction 8/22/2014    Cataract     Cirrhosis of liver 9/10/2020    Coronary artery disease     Diabetes mellitus     DM (diabetes mellitus) 1970     am 07/06/2020    GERD (gastroesophageal reflux disease) 7/11/2013    Hemorrhagic cerebrovascular accident (CVA) 4/26/2018    Hyperlipidemia     Hypertension     Hypertensive heart disease with heart failure 3/12/2019    Intracranial hemorrhage     Lumbosacral spondylosis 12/24/2014     Pacemaker 1/23/2014    Paroxysmal atrial fibrillation 1/23/2014    Peripheral vascular disease 8/22/2014    Pneumonia of right lung due to infectious organism 3/27/2019    Seizure, late effect of stroke     Stroke     Type 2 diabetes mellitus with ophthalmic manifestations        Past Surgical History:   Procedure Laterality Date    ANGIOPLASTY      ATRIAL ABLATION SURGERY N/A     CATARACT EXTRACTION Bilateral     [facility]    CATARACT EXTRACTION W/ INTRAOCULAR LENS IMPLANT Right     CATARACT EXTRACTION W/ INTRAOCULAR LENS IMPLANT Left     COLONOSCOPY N/A 10/16/2018    Procedure: COLONOSCOPY with biopsies;  Surgeon: Anabell Griggs MD;  Location: [facility] ENDO;  Service: Endoscopy;  Laterality: N/A;    CORONARY ARTERY BYPASS GRAFT  07/2010    x5    EYE SURGERY      pace maker surgrey  10/2010    reposition in 2011/2012 (approx)    REPLACEMENT OF PACEMAKER GENERATOR Left 8/6/2020    Procedure: REPLACEMENT, PULSE GENERATOR, CARDIAC PACEMAKER;  Surgeon: Domenico Grajeda MD;  Location: [facility] CATH LAB;  Service: Cardiology;  Laterality: Left;  [facility]    REVISION OF PROCEDURE INVOLVING PACEMAKER LEAD Bilateral 8/6/2020    Procedure: REVISION, ELECTRODE LEAD, CARDIAC PACEMAKER;  Surgeon: Domenico Grajeda MD;  Location: [facility] CATH LAB;  Service: Cardiology;  Laterality: Bilateral;    VEIN BYPASS SURGERY         Review of patient's allergies indicates:   Allergen Reactions    Atorvastatin      Other reaction(s): Muscle pain  Other reaction(s): Muscle weakness  Other reaction(s): Muscle pain    Codeine      Other reaction(s): Headache  Other reaction(s): Headache    Eliquis [apixaban]     Norvasc [amlodipine] Edema    Trulicity [dulaglutide] Nausea And Vomiting    Adhesive Rash     Other reaction(s): rash       No current facility-administered medications on file prior to encounter.     Current Outpatient Medications on File Prior to Encounter   Medication Sig    ACCU-CHEK GUIDE TEST  "STRIPS Strp TEST SIX TIMES DAILY    ACCU-CHEK MULTICLIX LANCET lancets TEST BLOOD SUGAR THREE TIMES DAILY    ascorbic acid, vitamin C, (VITAMIN C) 100 MG tablet Take 100 mg by mouth once daily.    blood-glucose meter Misc 1 Device by Misc.(Non-Drug; Combo Route) route once. for 1 dose    CARBOXYMETHYLCELLULOSE SODIUM (REFRESH OPHT) Apply to eye.    COMFORT EZ PEN NEEDLES 32 gauge x 5/32" Ndle USE FOUR TIMES DAILY.    diclofenac sodium (VOLTAREN) 1 % Gel Apply 2 g topically once daily.    ferrous sulfate (IRON ORAL) Take 65 mg by mouth.    flecainide (TAMBOCOR) 50 MG Tab TAKE ONE TABLET BY MOUTH EVERY TWELVE HOURS    fluticasone propionate (FLONASE) 50 mcg/actuation nasal spray 2 sprays (100 mcg total) by Each Nostril route once daily.    furosemide (LASIX) 40 MG tablet Take 1 tablet (40 mg total) by mouth once daily.    guaiFENesin (MUCINEX) 600 mg 12 hr tablet Take 1 tablet (600 mg total) by mouth 2 (two) times daily.    insulin (LANTUS SOLOSTAR U-100 INSULIN) glargine 100 units/mL (3mL) SubQ pen INJECT 16 UNITS SUBCUTANEOUSLY AT BEDTIME. 94 DAY SUPPLY    insulin aspart U-100 (NOVOLOG FLEXPEN U-100 INSULIN) 100 unit/mL (3 mL) InPn pen INJECT TEN UNITS AT BREAKFAST, EIGHT AT LUNCH, AND TEN AT DINNER (54 DAYS SUPPLY)    isosorbide mononitrate (IMDUR) 60 MG 24 hr tablet TAKE ONE TABLET BY MOUTH TWICE DAILY    levocetirizine (XYZAL) 5 MG tablet Take 1 tablet (5 mg total) by mouth every evening.    losartan (COZAAR) 100 MG tablet TAKE ONE TABLET (100mg) BY MOUTH DAILY    metoprolol succinate (TOPROL-XL) 100 MG 24 hr tablet Take 1 tablet (100 mg total) by mouth 2 (two) times daily.    mupirocin (BACTROBAN) 2 % ointment Apply topically 2 (two) times daily.    nitroGLYCERIN (NITROSTAT) 0.4 MG SL tablet Place 1 tablet (0.4 mg total) under the tongue every 5 (five) minutes as needed for Chest pain.    pantoprazole (PROTONIX) 40 MG tablet TAKE ONE TABLET (40mg) BY MOUTH EVERY DAY    saw palmetto 500 MG " capsule Take 500 mg by mouth 2 (two) times a day.    simethicone (GAS-X EXTRA STRENGTH) 125 mg Cap capsule Take 1 capsule (125 mg total) by mouth 4 (four) times daily as needed for Flatulence.    turmeric 400 mg Cap Take 1 capsule by mouth once daily.    vitamin D (VITAMIN D3) 1000 units Tab Take 1,000 Units by mouth once daily.    vitamin E 100 UNIT capsule Take 100 Units by mouth once daily.     Family History       Problem Relation (Age of Onset)    Alcohol abuse Paternal Uncle    Arthritis Mother, Father    Cancer Sister, Daughter    Diabetes Mother, Father, Sister, Brother, Daughter, Son, Son    Fibromyalgia Daughter    Heart disease Mother, Sister, Brother    Kidney disease Mother, Brother    Miscarriages / Stillbirths Daughter          Tobacco Use    Smoking status: Former Smoker     Packs/day: 2.00     Years: 13.00     Pack years: 26.00     Types: Cigarettes     Start date: 1954     Quit date: 1967     Years since quittin.9    Smokeless tobacco: Never Used   Substance and Sexual Activity    Alcohol use: No    Drug use: No    Sexual activity: Never     Partners: Female     Birth control/protection: None     ROS  Objective:     Vital Signs (Most Recent):  Temp: 97.1 °F (36.2 °C) (22 1113)  Pulse: 70 (22 1113)  Resp: 16 (22 1113)  BP: 139/62 (22 1113)  SpO2: (!) 93 % (22 1113)   Vital Signs (24h Range):  Temp:  [97.1 °F (36.2 °C)-98.5 °F (36.9 °C)] 97.1 °F (36.2 °C)  Pulse:  [55-72] 70  Resp:  [16-26] 16  SpO2:  [93 %-99 %] 93 %  BP: (124-184)/(56-86) 139/62     Weight: 82.9 kg (182 lb 12.2 oz)  Body mass index is 26.22 kg/m².    SpO2: (!) 93 %  O2 Device (Oxygen Therapy): room air      Intake/Output Summary (Last 24 hours) at 2022 1143  Last data filed at 2022 0830  Gross per 24 hour   Intake 0 ml   Output --   Net 0 ml       Lines/Drains/Airways       Peripheral Intravenous Line  Duration                  Peripheral IV - Single Lumen 22 6923  20 G Left Upper Arm <1 day                    Physical Exam  Vitals and nursing note reviewed.   Constitutional:       General: He is not in acute distress.     Appearance: Normal appearance. He is well-developed. He is not ill-appearing.   HENT:      Head: Normocephalic and atraumatic.      Nose: Nose normal.      Mouth/Throat:      Mouth: Mucous membranes are moist.   Eyes:      Pupils: Pupils are equal, round, and reactive to light.   Neck:      Thyroid: No thyromegaly.      Vascular: No JVD.      Trachea: No tracheal deviation.   Cardiovascular:      Rate and Rhythm: Normal rate and regular rhythm.      Chest Wall: PMI is not displaced.      Pulses: Intact distal pulses.           Carotid pulses are 0 on the right side and  on the left side with bruit.       Radial pulses are 2+ on the right side and 2+ on the left side.        Dorsalis pedis pulses are 2+ on the right side and 2+ on the left side.      Heart sounds: S1 normal and S2 normal. Heart sounds not distant. No murmur heard.     Comments: S/p PPM  Pulmonary:      Effort: Pulmonary effort is normal. No respiratory distress.      Breath sounds: Normal breath sounds. No wheezing.   Abdominal:      General: Bowel sounds are normal.      Palpations: Abdomen is soft.   Musculoskeletal:         General: No swelling. Normal range of motion.      Cervical back: Full passive range of motion without pain, normal range of motion and neck supple.      Right ankle: No swelling.      Left ankle: No swelling.   Skin:     General: Skin is warm and dry.      Capillary Refill: Capillary refill takes less than 2 seconds.      Nails: There is no clubbing.   Neurological:      General: No focal deficit present.      Mental Status: He is alert and oriented to person, place, and time. Mental status is at baseline.   Psychiatric:         Mood and Affect: Mood normal.         Speech: Speech normal.         Behavior: Behavior normal.         Thought Content: Thought content  normal.         Judgment: Judgment normal.       Significant Labs: BMP:   Recent Labs   Lab 05/08/22  1812 05/09/22  0741   * 90    139   K 4.1 4.1   CL 99 102   CO2 30* 29   BUN 24* 21   CREATININE 0.9 0.8   CALCIUM 10.4 10.0   MG  --  1.8   , CBC   Recent Labs   Lab 05/08/22  1812 05/09/22  0741   WBC 5.37 4.92   HGB 12.4* 11.7*   HCT 37.2* 35.2*    164   , Lipid Panel   Recent Labs   Lab 05/08/22 1812   CHOL 170   HDL 41   LDLCALC 106.6   TRIG 112   CHOLHDL 24.1   , Troponin   Recent Labs   Lab 05/09/22  0741   TROPONINI 0.009   , and All pertinent lab results from the last 24 hours have been reviewed.    Significant Imaging: Echocardiogram: Transthoracic echo (TTE) complete (Cupid Only):   Results for orders placed or performed during the hospital encounter of 09/20/21   Echo   Result Value Ref Range    BSA 2.04 m2    TDI SEPTAL 0.08 m/s    LV LATERAL E/E' RATIO 10.33 m/s    LV SEPTAL E/E' RATIO 15.50 m/s    LA WIDTH 3.20 cm    TDI LATERAL 0.12 m/s    LVIDd 3.95 3.5 - 6.0 cm    IVS 1.49 (A) 0.6 - 1.1 cm    Posterior Wall 1.26 (A) 0.6 - 1.1 cm    Ao root annulus 3.80 cm    LVIDs 2.90 2.1 - 4.0 cm    FS 27 28 - 44 %    LA volume 30.02 cm3    Sinus 3.94 cm    STJ 3.15 cm    Ascending aorta 3.58 cm    LV mass 199.56 g    LA size 3.09 cm    TAPSE 1.20 cm    Left Ventricle Relative Wall Thickness 0.64 cm    AV mean gradient 3 mmHg    AV valve area 2.65 cm2    AV Velocity Ratio 0.69     AV index (prosthetic) 0.67     MV valve area p 1/2 method 4.48 cm2    E/A ratio 3.26     Mean e' 0.10 m/s    E wave deceleration time 169.22 msec    IVRT 68.51 msec    LVOT diameter 2.25 cm    LVOT area 4.0 cm2    LVOT peak korey 0.81 m/s    LVOT peak VTI 20.49 cm    Ao peak korey 1.17 m/s    Ao VTI 30.76 cm    RVOT peak korey 0.60 m/s    RVOT peak VTI 15.01 cm    LVOT stroke volume 81.43 cm3    AV peak gradient 5 mmHg    PV mean gradient 1 mmHg    E/E' ratio 12.40 m/s    MV Peak E Korey 1.24 m/s    TR Max Korey 2.84 m/s     MV stenosis pressure 1/2 time 49.07 ms    MV Peak A Korey 0.38 m/s    LV Systolic Volume 32.34 mL    LV Systolic Volume Index 16.0 mL/m2    LV Diastolic Volume 67.94 mL    LV Diastolic Volume Index 33.63 mL/m2    LA Volume Index 14.9 mL/m2    LV Mass Index 99 g/m2    RA Major Axis 4.69 cm    Left Atrium Minor Axis 2.97 cm    Left Atrium Major Axis 4.48 cm    Triscuspid Valve Regurgitation Peak Gradient 32 mmHg    Right Atrial Pressure (from IVC) 15 mmHg    EF 50 %    TV rest pulmonary artery pressure 47 mmHg    Narrative    · The left ventricle is normal in size with concentric hypertrophy and low   normal systolic function.  · The estimated ejection fraction is 50%.  · Indeterminate left ventricular diastolic function.  · Normal right ventricular size with low normal right ventricular systolic   function.  · Mild to moderate tricuspid regurgitation.  · Mild mitral regurgitation.  · Mild aortic regurgitation.  · There is pulmonary hypertension.  · The estimated PA systolic pressure is 47 mmHg.        Assessment and Plan:     * Ischemic stroke  Plan for CTA head/neck today  Appreciate neurology input    Carotid artery disease without cerebral infarction  Plan for CTA head/neck today    Paroxysmal atrial fibrillation  Recommend Plavix 75 mg daily for monotherapy due to intolerance with eliquis and history of hemorrhagic CVA if ok with neurology        VTE Risk Mitigation (From admission, onward)         Ordered     enoxaparin injection 40 mg  Daily         05/08/22 2352     IP VTE HIGH RISK PATIENT  Once         05/08/22 2352     Place sequential compression device  Until discontinued         05/08/22 2352     Place sequential compression device  Until discontinued         05/08/22 2350                Thank you for your consult. I will follow-up with patient. Please contact us if you have any additional questions.    Jennie Mane, TONYP-C  Cardiology   [facility]

## 2022-05-09 NOTE — DISCHARGE SUMMARY
[facility] Medicine  Discharge Summary      Patient Name: Anthony Blair  MRN: [id number]  Patient Class: OP- Observation  Admission Date: 5/8/2022  Hospital Length of Stay: 0 days  Discharge Date and Time: No discharge date for patient encounter.  Attending Physician: Perfecto England MD   Discharging Provider: Jacob Leslie NP  Primary Care Provider: Abdulaziz Mccabe MD      HPI:     89 y.o. male patient with a PMHx of HTN, HLD, CAD w/o cerebral infarction, PVD, lumbosacral spondylosis, A-fib, paroxysmal A fib, AP, pacemaker, stroke, seizure (late effect of stroke), anemia, type 2 DM with ophthalmic manifestations, CVA, GERD, intracranial hemorrhage, hypertensive heart disease with heart failure, Cirrhosis of liver, DM, and pneumonia of R lung due to infectious organism who presents to the Emergency Department for evaluation of weakness followed with numbness and tingling on R side of body.    He reports that he came back from [facility] and noticed facial numbness and right  leg weakness. He had past history of hemorrhagic stroke. At this time, his symptoms hs resolved.   Patient denies any N/V/D, fever, chills, CP, SOB, congestion, and all other sxs at this time.   ED Course - he was  evaluated by vascular neurology in the Ed   Labs and imaging test -  CT head-    No hemorrhage or acute major vascular distribution infarction.  He will be place don observation .         * No surgery found *      Hospital Course:   89 yo male admitted for TIA. Patient has returned back to his baseline with no focal deficiets. Neurology consulted. Since he has a history of cerebral hemorrhage he cannot be placed on platelet inhibitors or AOCs. Patient intrested in receiving home health. Orders placed. He reports severe muscle weakness on Statin meds and wishes to defer. In stable condition at time of discharge and patient agreeable with plan. Instructed to follow up with PCP and cardiology for possible outpatient Holter  monitoring as an outpatient. Referred to NP at home program as well.            Goals of Care Treatment Preferences:  Code Status: Full Code    Living Will: Yes              Consults:   Consults (From admission, onward)        Status Ordering Provider     Inpatient consult to Social Work  Once        Provider:  (Not yet assigned)    Acknowledged DANITZA THOMAS     Inpatient consult to Neurology  Once        Provider:  (Not yet assigned)    Acknowledged ENZO BROWN     Inpatient consult to Cardiology  Once        Provider:  (Not yet assigned)    Completed ENZO BROWN     Inpatient consult to Physical Medicine Rehab  Once        Provider:  (Not yet assigned)    Acknowledged ENZO BROWN     Inpatient consult to Registered Dietitian/Nutritionist  Once        Provider:  (Not yet assigned)    Completed ENZO BROWN     IP consult to case management/social work  Once        Provider:  (Not yet assigned)    Acknowledged ENZO BROWN     Consult to Telemedicine - Acute Stroke  Once        Provider:  Alber Andersen MD    Acknowledged OBEY FRANK          No new Assessment & Plan notes have been filed under this hospital service since the last note was generated.  Service: Hospital Medicine    Final Active Diagnoses:    Diagnosis Date Noted POA    PRINCIPAL PROBLEM:  Ischemic stroke [I63.9] 05/08/2022 Yes    History of hemorrhagic cerebrovascular accident (CVA) with residual deficit [I69.30] 03/25/2022 Not Applicable    Cirrhosis of liver [K74.60] 09/10/2020 Yes    Diabetes mellitus type 2 without retinopathy [E11.9] 01/21/2016 Yes     Chronic    Carotid artery disease without cerebral infarction [I77.9] 08/22/2014 Yes     Chronic    Paroxysmal atrial fibrillation [I48.0] 01/23/2014 Yes     Chronic      Problems Resolved During this Admission:       Discharged Condition: stable    Disposition: Home or Self Care    Follow Up:   Follow-up Information     Abdulaziz Mccabe MD. Go to.    Specialty:  Family Medicine  Why: For post hospitl visit stay check up  Contact information:  [address] [address] [address] [phone]             Seven Frazier MD. Go to.    Specialties: Interventional Cardiology, Cardiology  Why: For possible Holter monitoring after TIA  Contact information:  [address]  [place] [address] [phone]                       Patient Instructions:      Ambulatory referral/consult to Home Health   Standing Status: Future   Referral Priority: Routine Referral Type: Home Health   Referral Reason: Specialty Services Required   Requested Specialty: Home Health Services   Number of Visits Requested: 1     Ambulatory referral/consult to [facility]   Standing Status: Future   Referral Priority: Routine Referral Type: Consultation   Referral Reason: Specialty Services Required   Number of Visits Requested: 1     Diet diabetic     Activity as tolerated       Significant Diagnostic Studies:   Results for orders placed or performed during the hospital encounter of 05/08/22   CBC W/ AUTO DIFFERENTIAL   Result Value Ref Range    WBC 5.37 3.90 - 12.70 K/uL    RBC 4.00 (L) 4.60 - 6.20 M/uL    Hemoglobin 12.4 (L) 14.0 - 18.0 g/dL    Hematocrit 37.2 (L) 40.0 - 54.0 %    MCV 93 82 - 98 fL    MCH 31.0 27.0 - 31.0 pg    MCHC 33.3 32.0 - 36.0 g/dL    RDW 14.3 11.5 - 14.5 %    Platelets 174 150 - 450 K/uL    MPV 9.5 9.2 - 12.9 fL    Immature Granulocytes 0.2 0.0 - 0.5 %    Gran # (ANC) 2.4 1.8 - 7.7 K/uL    Immature Grans (Abs) 0.01 0.00 - 0.04 K/uL    Lymph # 2.0 1.0 - 4.8 K/uL    Mono # 0.6 0.3 - 1.0 K/uL    Eos # 0.3 0.0 - 0.5 K/uL    Baso # 0.03 0.00 - 0.20 K/uL    nRBC 0 0 /100 WBC    Gran % 44.7 38.0 - 73.0 %    Lymph % 37.8 18.0 - 48.0 %    Mono % 11.9 4.0 - 15.0 %    Eosinophil % 4.8 0.0 - 8.0 %    Basophil % 0.6 0.0 - 1.9 %    Differential Method Automated    Comprehensive metabolic panel   Result Value Ref Range    Sodium 139 136 - 145 mmol/L    Potassium 4.1  3.5 - 5.1 mmol/L    Chloride 99 95 - 110 mmol/L    CO2 30 (H) 23 - 29 mmol/L    Glucose 118 (H) 70 - 110 mg/dL    BUN 24 (H) 8 - 23 mg/dL    Creatinine 0.9 0.5 - 1.4 mg/dL    Calcium 10.4 8.7 - 10.5 mg/dL    Total Protein 7.5 6.0 - 8.4 g/dL    Albumin 3.6 3.5 - 5.2 g/dL    Total Bilirubin 0.9 0.1 - 1.0 mg/dL    Alkaline Phosphatase 134 55 - 135 U/L    AST 33 10 - 40 U/L    ALT 18 10 - 44 U/L    Anion Gap 10 8 - 16 mmol/L    eGFR if African American >60 >60 mL/min/1.73 m^2    eGFR if non African American >60 >60 mL/min/1.73 m^2   TSH   Result Value Ref Range    TSH 1.698 0.400 - 4.000 uIU/mL   LDL - Lipid Panel   Result Value Ref Range    Cholesterol 170 120 - 199 mg/dL    Triglycerides 112 30 - 150 mg/dL    HDL 41 40 - 75 mg/dL    LDL Cholesterol 106.6 63.0 - 159.0 mg/dL    HDL/Cholesterol Ratio 24.1 20.0 - 50.0 %    Total Cholesterol/HDL Ratio 4.1 2.0 - 5.0    Non-HDL Cholesterol 129 mg/dL   APTT   Result Value Ref Range    aPTT 27.8 21.0 - 32.0 sec   Protime-INR   Result Value Ref Range    Prothrombin Time 11.9 9.0 - 12.5 sec    INR 1.1 0.8 - 1.2   COVID-19 Rapid Screening   Result Value Ref Range    SARS-CoV-2 RNA, Amplification, Qual Negative Negative   Hemoglobin A1c   Result Value Ref Range    Hemoglobin A1C 6.4 (H) 4.0 - 5.6 %    Estimated Avg Glucose 137 (H) 68 - 131 mg/dL   Comprehensive metabolic panel   Result Value Ref Range    Sodium 139 136 - 145 mmol/L    Potassium 4.1 3.5 - 5.1 mmol/L    Chloride 102 95 - 110 mmol/L    CO2 29 23 - 29 mmol/L    Glucose 90 70 - 110 mg/dL    BUN 21 8 - 23 mg/dL    Creatinine 0.8 0.5 - 1.4 mg/dL    Calcium 10.0 8.7 - 10.5 mg/dL    Total Protein 6.8 6.0 - 8.4 g/dL    Albumin 3.3 (L) 3.5 - 5.2 g/dL    Total Bilirubin 1.2 (H) 0.1 - 1.0 mg/dL    Alkaline Phosphatase 115 55 - 135 U/L    AST 30 10 - 40 U/L    ALT 18 10 - 44 U/L    Anion Gap 8 8 - 16 mmol/L    eGFR if African American >60 >60 mL/min/1.73 m^2    eGFR if non African American >60 >60 mL/min/1.73 m^2   Magnesium    Result Value Ref Range    Magnesium 1.8 1.6 - 2.6 mg/dL   Phosphorus   Result Value Ref Range    Phosphorus 2.9 2.7 - 4.5 mg/dL   CBC auto differential   Result Value Ref Range    WBC 4.92 3.90 - 12.70 K/uL    RBC 3.81 (L) 4.60 - 6.20 M/uL    Hemoglobin 11.7 (L) 14.0 - 18.0 g/dL    Hematocrit 35.2 (L) 40.0 - 54.0 %    MCV 92 82 - 98 fL    MCH 30.7 27.0 - 31.0 pg    MCHC 33.2 32.0 - 36.0 g/dL    RDW 14.3 11.5 - 14.5 %    Platelets 164 150 - 450 K/uL    MPV 9.6 9.2 - 12.9 fL    Immature Granulocytes 0.2 0.0 - 0.5 %    Gran # (ANC) 1.9 1.8 - 7.7 K/uL    Immature Grans (Abs) 0.01 0.00 - 0.04 K/uL    Lymph # 2.0 1.0 - 4.8 K/uL    Mono # 0.6 0.3 - 1.0 K/uL    Eos # 0.3 0.0 - 0.5 K/uL    Baso # 0.03 0.00 - 0.20 K/uL    nRBC 0 0 /100 WBC    Gran % 39.4 38.0 - 73.0 %    Lymph % 41.5 18.0 - 48.0 %    Mono % 13.0 4.0 - 15.0 %    Eosinophil % 5.3 0.0 - 8.0 %    Basophil % 0.6 0.0 - 1.9 %    Differential Method Automated    Troponin I   Result Value Ref Range    Troponin I 0.009 0.000 - 0.026 ng/mL   CK-MB   Result Value Ref Range    CPK 53 20 - 200 U/L    CPK MB 1.2 0.1 - 6.5 ng/mL    MB % 2.3 0.0 - 5.0 %   APTT   Result Value Ref Range    aPTT 29.8 21.0 - 32.0 sec   Protime-INR   Result Value Ref Range    Prothrombin Time 12.1 9.0 - 12.5 sec    INR 1.1 0.8 - 1.2   POCT glucose   Result Value Ref Range    POCT Glucose 136 (H) 70 - 110 mg/dL   POCT glucose   Result Value Ref Range    POCT Glucose 88 70 - 110 mg/dL   POCT glucose   Result Value Ref Range    POCT Glucose 140 (H) 70 - 110 mg/dL         Pending Diagnostic Studies:     None       CTA Head and Neck (xpd)  Narrative: EXAMINATION:  CTA HEAD AND NECK (XPD)    CLINICAL HISTORY:  CVA;    TECHNIQUE:  Standard CTA of the intracranial and extracranial circulation was performed after the administration of intravenous contrast in the arterial phase. This examination was interpreted using multiplanar and 3D reconstructions.    COMPARISON:  Head CT 05/08/2022 with multiple  priors; carotid ultrasound 08/10/2014    FINDINGS:  Extracranial:    Left-sided 3 vessel aortic arch.  Atherosclerotic disease of the great vessels off the arch.  Origin of left common carotid artery is limited by artifact from adjacent high-density contrast in the brachiocephalic vein.  Heavy atherosclerotic disease of the proximal left subclavian artery with roughly 60% stenosis.  The remaining left subclavian artery is patent.  Atherosclerotic disease of the right subclavian artery with a 60% stenosis proximally.  The remaining right subclavian artery is patent.    Atherosclerotic disease of the proximal right common carotid artery with roughly 55% stenosis.  The remaining right common for carotid artery demonstrates additional atherosclerotic disease without additional hemodynamically significant stenosis.    There is heavy atherosclerotic disease of the carotid bifurcation and proximal right ICA with roughly 50% stenosis.  Additional focus of heavy atherosclerotic disease of the distal right cervical ICA without hemodynamically significant stenosis.    Atherosclerotic disease throughout the left common carotid artery without evidence of hemodynamically significant stenosis.  Heavy atherosclerotic disease of the left carotid bifurcation and proximal left ICA with roughly 50% stenosis.  The remaining left cervical ICA is patent without additional hemodynamically significant stenosis.    Extracranial vertebral arteries: The right vertebral artery is dominant.  The right vertebral artery is patent without hemodynamically significant stenosis.  There is atherosclerotic disease at the right vertebral artery origin.    Origin of the left vertebral artery is limited by artifact from adjacent high-density venous contrast.  There appears to be a severe, hemodynamically significant stenosis of the proximal left V2 segment.  There is an additional focus of atherosclerotic calcification and hemodynamically significant  stenosis of the mid left V2 segment.  The V3 segment is patent without atherosclerotic disease.    Salivary glands appear within normal limits.  Thyroid gland is within normal limits.    No pathologic cervical lymphadenopathy.    There are multilevel degenerative changes of the cervical spine.  No acute or concerning osseous abnormalities.    Partially visualized small right pleural effusion.  There are calcified pleural plaques adjacent to the right upper lobe.  Heavy coronary atherosclerotic disease status post CABG.    Intracranial:    Heavy atherosclerotic disease of the intracranial ICAs without focal hemodynamically significant stenosis of the distal right petrous segment.  Likely hemodynamically significant stenosis of the bilateral supraclinoid segments.  No aneurysm visualized.    The right M1 segment is patent without hemodynamically significant stenosis or aneurysm.  The right M2 and visualized M3 branch vessels are patent.    The left M1 segment is patent without hemodynamically significant stenosis or aneurysm the left M2 and visualized M3 branch vessels are patent.    The bilateral A1 segments are present and patent.  There is a patent anterior communicating artery.  The A2 and visualized A3 branch vessels are patent.  No aneurysm.    Atherosclerotic disease of the intracranial vertebral arteries with severe hemodynamically significant stenosis or occlusion of the distal left vertebral artery.  The basilar artery is patent without hemodynamically significant stenosis or aneurysm.    There is a left posterior communicating artery.  The right posterior communicating artery is absent or hypoplastic.  The right P1 segment is present.  The left P1 segment is hypoplastic.  There are focal stenosis of the proximal right P1 and distal P2 segments.  Visualized P3 segments are patent.  Focal stenosis of the left P2 segment.  The left P3 branch vessels are patent.    Origins of the superior cerebellar and  posteroinferior cerebral arteries are within normal limits.    The major dural venous sinuses are patent.    Brain:    No acute intracranial hemorrhage.  Remote bilateral right greater than left basal ganglia infarcts.  Tiny hypodensities in the bilateral thalami also likely reflect remote lacunar type infarcts.  No abnormal area of parenchymal hypoattenuation to suggest acute or recent major vascular territory cerebral infarct.  Ventricles are normal in size and configuration, unchanged from the recent prior.  No hydrocephalus.  No visualized extra-axial mass or fluid collection.  Paranasal sinuses and bilateral mastoid air cells are predominantly clear.  The osseous calvarium.  Small lucent focus within the right frontal bone with internal osseous striations is stable dating back to 2014, likely reflecting a benign process such as a hemangioma.  Impression: Severe, hemodynamically significant stenosis or occlusion of the distal left V4.  Right vertebral artery and basilar artery are patent.    Multifocal hemodynamically significant stenoses of the extracranial left vertebral artery.    Hemodynamically significant stenoses of the bilateral P2 segments.    Hemodynamically significant stenoses of the distal right petrous and bilateral supraclinoid ICAs.    Roughly 50% stenosis of the bilateral proximal ICAs with heavy atherosclerotic calcification.    55-60% stenosis of the bilateral subclavian arteries.    No CT evidence of acute intracranial abnormality.  Small remote basal ganglia and thalamic infarcts.    Heavy coronary atherosclerotic disease status post CABG.    Small right pleural effusion.    All CT scans at this facility use dose modulation, iterative reconstruction, and/or weight base dosing when appropriate to reduce radiation dose to as low as reasonably achievable.    Electronically signed by: Srinivas Akhtar  Date:    05/09/2022  Time:    12:51      Medications:  Reconciled Home Medications:     "  Medication List      CONTINUE taking these medications    ACCU-CHEK GUIDE TEST STRIPS Strp  Generic drug: blood sugar diagnostic  TEST SIX TIMES DAILY     ACCU-CHEK MULTICLIX LANCET Misc  Generic drug: lancets  TEST BLOOD SUGAR THREE TIMES DAILY     ascorbic acid (vitamin C) 100 MG tablet  Commonly known as: VITAMIN C  Take 100 mg by mouth once daily.     blood-glucose meter Misc  1 Device by Misc.(Non-Drug; Combo Route) route once. for 1 dose     COMFORT EZ PEN NEEDLES 32 gauge x 5/32" Ndle  Generic drug: pen needle, diabetic  USE FOUR TIMES DAILY.     diclofenac sodium 1 % Gel  Commonly known as: VOLTAREN  Apply 2 g topically once daily.     flecainide 50 MG Tab  Commonly known as: TAMBOCOR  TAKE ONE TABLET BY MOUTH EVERY TWELVE HOURS     fluticasone propionate 50 mcg/actuation nasal spray  Commonly known as: FLONASE  2 sprays (100 mcg total) by Each Nostril route once daily.     furosemide 40 MG tablet  Commonly known as: LASIX  Take 1 tablet (40 mg total) by mouth once daily.     guaiFENesin 600 mg 12 hr tablet  Commonly known as: MUCINEX  Take 1 tablet (600 mg total) by mouth 2 (two) times daily.     insulin aspart U-100 100 unit/mL (3 mL) Inpn pen  Commonly known as: NovoLOG Flexpen U-100 Insulin  INJECT TEN UNITS AT BREAKFAST, EIGHT AT LUNCH, AND TEN AT DINNER (54 DAYS SUPPLY)     IRON ORAL  Take 65 mg by mouth.     isosorbide mononitrate 60 MG 24 hr tablet  Commonly known as: IMDUR  TAKE ONE TABLET BY MOUTH TWICE DAILY     LANTUS SOLOSTAR U-100 INSULIN glargine 100 units/mL (3mL) SubQ pen  Generic drug: insulin  INJECT 16 UNITS SUBCUTANEOUSLY AT BEDTIME. 94 DAY SUPPLY     levocetirizine 5 MG tablet  Commonly known as: XYZAL  Take 1 tablet (5 mg total) by mouth every evening.     losartan 100 MG tablet  Commonly known as: COZAAR  TAKE ONE TABLET (100mg) BY MOUTH DAILY     metoprolol succinate 100 MG 24 hr tablet  Commonly known as: TOPROL-XL  Take 1 tablet (100 mg total) by mouth 2 (two) times daily.   "   mupirocin 2 % ointment  Commonly known as: BACTROBAN  Apply topically 2 (two) times daily.     nitroGLYCERIN 0.4 MG SL tablet  Commonly known as: NITROSTAT  Place 1 tablet (0.4 mg total) under the tongue every 5 (five) minutes as needed for Chest pain.     pantoprazole 40 MG tablet  Commonly known as: PROTONIX  TAKE ONE TABLET (40mg) BY MOUTH EVERY DAY     REFRESH OPHT  Apply to eye.     saw palmetto 500 MG capsule  Take 500 mg by mouth 2 (two) times a day.     simethicone 125 mg Cap capsule  Commonly known as: GAS-X EXTRA STRENGTH  Take 1 capsule (125 mg total) by mouth 4 (four) times daily as needed for Flatulence.     turmeric 400 mg Cap  Take 1 capsule by mouth once daily.     vitamin D 1000 units Tab  Commonly known as: VITAMIN D3  Take 1,000 Units by mouth once daily.     vitamin E 100 UNIT capsule  Take 100 Units by mouth once daily.            Indwelling Lines/Drains at time of discharge:   Lines/Drains/Airways     None                 Time spent on the discharge of patient: 40 minutes         Jacob Leslie NP  Department of Hospital Medicine  [facility]

## 2022-05-09 NOTE — TELEPHONE ENCOUNTER
----- Message from Sandrine Fox RN sent at 5/9/2022  2:20 PM CDT -----  Patient needs appt for hospital follow up. Please call patient with appt.

## 2022-05-09 NOTE — PLAN OF CARE
[facility]  Discharge Final Note    Primary Care Provider: Abdulaziz Mccabe MD    Expected Discharge Date: 5/9/2022    Final Discharge Note (most recent)     Final Note - 05/09/22 1442        Final Note    Assessment Type Final Discharge Note     Anticipated Discharge Disposition Home-Health Care [facility]     Hospital Resources/Appts/Education Provided Provided patient/caregiver with written discharge plan information;Appointments scheduled and added to AVS        Post-Acute Status    Discharge Delays None known at this time                 Important Message from Medicare             Contact Info     Abdulaziz Mccabe MD   Specialty: Family Medicine   Relationship: PCP - General  Hypertension Digital Medicine Responsible Provider    [address] [address] [address]   Phone: [phone]       Next Steps: Go to    Instructions: For post hospitl visit stay check up. Message sent to nurse to call patient with appt    Seven Frazier MD   Specialty: Interventional Cardiology, Cardiology   Relationship: Consulting Physician    [address] [place] [address]   Phone: [phone]       Next Steps: Go to    Instructions: For possible Holter monitoring after TIA. Message sent to nurse to call patient with appt.

## 2022-05-09 NOTE — PLAN OF CARE
05/09/22 1441   Discharge Planning   Assessment Type Discharge Planning Brief Assessment   Resource/Environmental Concerns none   Support Systems Spouse/significant other;Family members   Equipment Currently Used at Home cane, straight   Current Living Arrangements home/apartment/condo   Patient/Family Anticipates Transition to home with family   Patient/Family Anticipated Services at Transition none   DME Needed Upon Discharge  none   Discharge Plan A Home Health   Discharge Plan B Home Health

## 2022-05-09 NOTE — DISCHARGE INSTRUCTIONS
No changes in medications  Follow up with PCP and cardiology for post hospital visit stay check up

## 2022-05-09 NOTE — PLAN OF CARE
Problem: Adult Inpatient Plan of Care  Goal: Plan of Care Review  Outcome: Ongoing, Progressing  Goal: Patient-Specific Goal (Individualized)  Outcome: Ongoing, Progressing  Goal: Absence of Hospital-Acquired Illness or Injury  Outcome: Ongoing, Progressing  Goal: Optimal Comfort and Wellbeing  Outcome: Ongoing, Progressing  Goal: Readiness for Transition of Care  Outcome: Ongoing, Progressing     Problem: Diabetes Comorbidity  Goal: Blood Glucose Level Within Targeted Range  Outcome: Ongoing, Progressing     Problem: Fall Injury Risk  Goal: Absence of Fall and Fall-Related Injury  Outcome: Ongoing, Progressing     Problem: Adjustment to Illness (Stroke, Ischemic/Transient Ischemic Attack)  Goal: Optimal Coping  Outcome: Ongoing, Progressing     Problem: Bowel Elimination Impaired (Stroke, Ischemic/Transient Ischemic Attack)  Goal: Effective Bowel Elimination  Outcome: Ongoing, Progressing     Problem: Cerebral Tissue Perfusion (Stroke, Ischemic/Transient Ischemic Attack)  Goal: Optimal Cerebral Tissue Perfusion  Outcome: Ongoing, Progressing     Problem: Cognitive Impairment (Stroke, Ischemic/Transient Ischemic Attack)  Goal: Optimal Cognitive Function  Outcome: Ongoing, Progressing     Problem: Communication Impairment (Stroke, Ischemic/Transient Ischemic Attack)  Goal: Improved Communication Skills  Outcome: Ongoing, Progressing     Problem: Functional Ability Impaired (Stroke, Ischemic/Transient Ischemic Attack)  Goal: Optimal Functional Ability  Outcome: Ongoing, Progressing     Problem: Respiratory Compromise (Stroke, Ischemic/Transient Ischemic Attack)  Goal: Effective Oxygenation and Ventilation  Outcome: Ongoing, Progressing     Problem: Sensorimotor Impairment (Stroke, Ischemic/Transient Ischemic Attack)  Goal: Improved Sensorimotor Function  Outcome: Ongoing, Progressing     Problem: Swallowing Impairment (Stroke, Ischemic/Transient Ischemic Attack)  Goal: Optimal Eating and Swallowing without  Aspiration  Outcome: Ongoing, Progressing     Problem: Urinary Elimination Impaired (Stroke, Ischemic/Transient Ischemic Attack)  Goal: Effective Urinary Elimination  Outcome: Ongoing, Progressing     Problem: Infection  Goal: Absence of Infection Signs and Symptoms  Outcome: Ongoing, Progressing

## 2022-05-09 NOTE — CONSULTS
Lorri spoke with pt's daughter Kori to discuss home health. [facility]. Pt's choice form signed and placed into pt's blue folder.       [facility] Accept[facility]. Amanda Frazier LMSW 5/9/2022 2:03 PM

## 2022-05-09 NOTE — ED PROVIDER NOTES
SCRIBE #1 NOTE: I, Marlee Nunes, am scribing for, and in the presence of, Leigha Lewis Do, MD. I have scribed the entire note.       History     Chief Complaint   Patient presents with    Weakness     States started with a headache around 1030 and then started having numbness and tinging on R side.      Review of patient's allergies indicates:   Allergen Reactions    Atorvastatin      Other reaction(s): Muscle pain  Other reaction(s): Muscle weakness  Other reaction(s): Muscle pain    Codeine      Other reaction(s): Headache  Other reaction(s): Headache    Eliquis [apixaban]     Norvasc [amlodipine] Edema    Trulicity [dulaglutide] Nausea And Vomiting    Adhesive Rash     Other reaction(s): rash         History of Present Illness     HPI    5/8/2022, 8:59 PM  History obtained from the patient      History of Present Illness: Anthony Blair is a 89 y.o. male patient with a PMHx of HTN, HLD, CAD w/o cerebral infarction, PVD, lumbosacral spondylosis, A-fib, paroxysmal A fib, AP, pacemaker, stroke, seizure (late effect of stroke), anemia, type 2 DM with ophthalmic manifestations, CVA, GERD, intracranial hemorrhage, hypertensive heart disease with heart failure, Cirrhosis of liver, DM, and pneumonia of R lung due to infectious organism who presents to the Emergency Department for evaluation of weakness followed with numbness and tingling on R side of body.  Pt right leg was dragging also. Symptoms started PTA and are constant and moderate in severity. No mitigating or exacerbating factors reported. Patient denies any N/V/D, fever, chills, CP, SOB, congestion, and all other sxs at this time. No further complaints or concerns at this time. Pt reports symptoms have almost fully resolved.      Arrival mode: Personal vehicle     PCP: Abdulaziz Mccabe MD        Past Medical History:  Past Medical History:   Diagnosis Date    A-fib     Anemia     AP (angina pectoris) 1/23/2014    Carotid artery disease without  cerebral infarction 8/22/2014    Cataract     Cirrhosis of liver 9/10/2020    Coronary artery disease     Diabetes mellitus     DM (diabetes mellitus) 1970     am 07/06/2020    GERD (gastroesophageal reflux disease) 7/11/2013    Hemorrhagic cerebrovascular accident (CVA) 4/26/2018    Hyperlipidemia     Hypertension     Hypertensive heart disease with heart failure 3/12/2019    Intracranial hemorrhage     Lumbosacral spondylosis 12/24/2014    Pacemaker 1/23/2014    Paroxysmal atrial fibrillation 1/23/2014    Peripheral vascular disease 8/22/2014    Pneumonia of right lung due to infectious organism 3/27/2019    Seizure, late effect of stroke     Stroke     Type 2 diabetes mellitus with ophthalmic manifestations        Past Surgical History:  Past Surgical History:   Procedure Laterality Date    ANGIOPLASTY      ATRIAL ABLATION SURGERY N/A     CATARACT EXTRACTION Bilateral     [facility]    CATARACT EXTRACTION W/ INTRAOCULAR LENS IMPLANT Right     CATARACT EXTRACTION W/ INTRAOCULAR LENS IMPLANT Left     COLONOSCOPY N/A 10/16/2018    Procedure: COLONOSCOPY with biopsies;  Surgeon: Anabell Griggs MD;  Location: [facility] ENDO;  Service: Endoscopy;  Laterality: N/A;    CORONARY ARTERY BYPASS GRAFT  07/2010    x5    EYE SURGERY      pace maker surgrey  10/2010    reposition in 2011/2012 (approx)    REPLACEMENT OF PACEMAKER GENERATOR Left 8/6/2020    Procedure: REPLACEMENT, PULSE GENERATOR, CARDIAC PACEMAKER;  Surgeon: Domenico Grajeda MD;  Location: [facility] CATH LAB;  Service: Cardiology;  Laterality: Left;  [facility]    REVISION OF PROCEDURE INVOLVING PACEMAKER LEAD Bilateral 8/6/2020    Procedure: REVISION, ELECTRODE LEAD, CARDIAC PACEMAKER;  Surgeon: Domenico Grajeda MD;  Location: [facility] CATH LAB;  Service: Cardiology;  Laterality: Bilateral;    VEIN BYPASS SURGERY           Family History:  Family History   Problem Relation Age of Onset    Arthritis Mother     Diabetes  Mother     Kidney disease Mother     Heart disease Mother     Arthritis Father     Diabetes Father     Diabetes Sister     Cancer Sister         breast    Heart disease Sister     Diabetes Brother     Kidney disease Brother     Heart disease Brother     Cancer Daughter         breast    Diabetes Daughter     Miscarriages / Stillbirths Daughter     Fibromyalgia Daughter     Diabetes Son     Diabetes Son     Alcohol abuse Paternal Uncle     Stroke Neg Hx     Hypertension Neg Hx     Hyperlipidemia Neg Hx     COPD Neg Hx        Social History:  Social History     Tobacco Use    Smoking status: Former Smoker     Packs/day: 2.00     Years: 13.00     Pack years: 26.00     Types: Cigarettes     Start date: 1954     Quit date: 1967     Years since quittin.9    Smokeless tobacco: Never Used   Substance and Sexual Activity    Alcohol use: No    Drug use: No    Sexual activity: Never     Partners: Female     Birth control/protection: None        Review of Systems     Review of Systems   Constitutional: Negative for chills and fever.   HENT: Negative for congestion and sore throat.    Respiratory: Negative for shortness of breath.    Cardiovascular: Negative for chest pain.   Gastrointestinal: Negative for diarrhea, nausea and vomiting.   Genitourinary: Negative for dysuria.   Musculoskeletal: Negative for back pain.   Skin: Negative for rash.   Neurological: Positive for weakness and numbness (R sided ).   Hematological: Does not bruise/bleed easily.   All other systems reviewed and are negative.     Physical Exam     Initial Vitals [22 1718]   BP Pulse Resp Temp SpO2   (!) 177/76 70 19 98.5 °F (36.9 °C) 96 %      MAP       --          Physical Exam  Nursing Notes and Vital Signs Reviewed.  Constitutional: Patient is in no acute distress. Elderly. Appears stated age.  Head: Atraumatic. Normocephalic.  Eyes: PERRL. EOM intact. Conjunctivae are not pale. No scleral icterus.  ENT:  "Mucous membranes are moist. Oropharynx is clear and symmetric.    Neck: Supple. Full ROM. No lymphadenopathy.  Cardiovascular: Regular rate. Regular rhythm. Murmur. Distal pulses are 2+ and symmetric.  Pulmonary/Chest: No respiratory distress. Clear to auscultation bilaterally. No wheezing or rales.  Abdominal: Soft and non-distended. No tenderness.  No rebound, guarding, or rigidity. Good bowel sounds.  Genitourinary: No CVA tenderness  Musculoskeletal: Moves all extremities. No obvious deformities. No edema. No calf tenderness.  Skin: Warm and dry.  Neurological: Patient is alert and oriented to person, place and time. Pupils ERRL and EOM normal. Able to raise left leg 45 degrees and right leg to almost 40 degrees.There is no pronator drift of outstretched arms. Light touch sense is intact. Speech is clear and normal. No acute focal neurological deficits noted. Cranial nerves II-XII intact.  Psychiatric: Normal affect. Good eye contact. Appropriate in content.     ED Course   Procedures  ED Vital Signs:  Vitals:    05/08/22 1718 05/08/22 1802 05/08/22 1804 05/08/22 1945   BP: (!) 177/76  (!) 161/72 (!) 166/75   Pulse: 70 72 70 60   Resp: 19  18 (!) 26   Temp: 98.5 °F (36.9 °C)      TempSrc: Oral      SpO2: 96%  96% 96%   Weight: 83 kg (182 lb 15.7 oz)      Height:        05/08/22 2045 05/08/22 2309 05/09/22 0038   BP: (!) 149/66 (!) 184/86 136/63   Pulse: (!) 55 70 71   Resp: (!) 21 17 18   Temp:  98.2 °F (36.8 °C) 97.4 °F (36.3 °C)   TempSrc:  Oral Oral   SpO2: 96% 99% 95%   Weight:  82.3 kg (181 lb 7 oz) 82.9 kg (182 lb 12.2 oz)   Height:  5' 10" (1.778 m)        Abnormal Lab Results:  Labs Reviewed   CBC W/ AUTO DIFFERENTIAL - Abnormal; Notable for the following components:       Result Value    RBC 4.00 (*)     Hemoglobin 12.4 (*)     Hematocrit 37.2 (*)     All other components within normal limits   COMPREHENSIVE METABOLIC PANEL - Abnormal; Notable for the following components:    CO2 30 (*)     Glucose 118 " (*)     BUN 24 (*)     All other components within normal limits   POCT GLUCOSE - Abnormal; Notable for the following components:    POCT Glucose 136 (*)     All other components within normal limits   TSH   APTT   PROTIME-INR   SARS-COV-2 RNA AMPLIFICATION, QUAL   LIPID PANEL   POCT GLUCOSE, HAND-HELD DEVICE        All Lab Results:  Results for orders placed or performed during the hospital encounter of 05/08/22   CBC W/ AUTO DIFFERENTIAL   Result Value Ref Range    WBC 5.37 3.90 - 12.70 K/uL    RBC 4.00 (L) 4.60 - 6.20 M/uL    Hemoglobin 12.4 (L) 14.0 - 18.0 g/dL    Hematocrit 37.2 (L) 40.0 - 54.0 %    MCV 93 82 - 98 fL    MCH 31.0 27.0 - 31.0 pg    MCHC 33.3 32.0 - 36.0 g/dL    RDW 14.3 11.5 - 14.5 %    Platelets 174 150 - 450 K/uL    MPV 9.5 9.2 - 12.9 fL    Immature Granulocytes 0.2 0.0 - 0.5 %    Gran # (ANC) 2.4 1.8 - 7.7 K/uL    Immature Grans (Abs) 0.01 0.00 - 0.04 K/uL    Lymph # 2.0 1.0 - 4.8 K/uL    Mono # 0.6 0.3 - 1.0 K/uL    Eos # 0.3 0.0 - 0.5 K/uL    Baso # 0.03 0.00 - 0.20 K/uL    nRBC 0 0 /100 WBC    Gran % 44.7 38.0 - 73.0 %    Lymph % 37.8 18.0 - 48.0 %    Mono % 11.9 4.0 - 15.0 %    Eosinophil % 4.8 0.0 - 8.0 %    Basophil % 0.6 0.0 - 1.9 %    Differential Method Automated    Comprehensive metabolic panel   Result Value Ref Range    Sodium 139 136 - 145 mmol/L    Potassium 4.1 3.5 - 5.1 mmol/L    Chloride 99 95 - 110 mmol/L    CO2 30 (H) 23 - 29 mmol/L    Glucose 118 (H) 70 - 110 mg/dL    BUN 24 (H) 8 - 23 mg/dL    Creatinine 0.9 0.5 - 1.4 mg/dL    Calcium 10.4 8.7 - 10.5 mg/dL    Total Protein 7.5 6.0 - 8.4 g/dL    Albumin 3.6 3.5 - 5.2 g/dL    Total Bilirubin 0.9 0.1 - 1.0 mg/dL    Alkaline Phosphatase 134 55 - 135 U/L    AST 33 10 - 40 U/L    ALT 18 10 - 44 U/L    Anion Gap 10 8 - 16 mmol/L    eGFR if African American >60 >60 mL/min/1.73 m^2    eGFR if non African American >60 >60 mL/min/1.73 m^2   TSH   Result Value Ref Range    TSH 1.698 0.400 - 4.000 uIU/mL   APTT   Result Value Ref Range     aPTT 27.8 21.0 - 32.0 sec   Protime-INR   Result Value Ref Range    Prothrombin Time 11.9 9.0 - 12.5 sec    INR 1.1 0.8 - 1.2   COVID-19 Rapid Screening   Result Value Ref Range    SARS-CoV-2 RNA, Amplification, Qual Negative Negative   POCT glucose   Result Value Ref Range    POCT Glucose 136 (H) 70 - 110 mg/dL         Imaging Results:  Imaging Results          CT Head Without Contrast (Final result)  Result time 05/08/22 18:04:32    Final result by James Rojas MD (05/08/22 18:04:32)                 Impression:      No hemorrhage or acute major vascular distribution infarction.  Further evaluation as clinically warranted.    ASPECT score 10 of 10.    All CT scans at this facility are performed  using dose modulation techniques as appropriate to performed exam including the following:  automated exposure control; adjustment of mA and/or kV according to the patients size (this includes techniques or standardized protocols for targeted exams where dose is matched to indication/reason for exam: i.e. extremities or head);  iterative reconstruction technique.      Electronically signed by: James Rojas  Date:    05/08/2022  Time:    18:04             Narrative:    EXAMINATION:  CT HEAD WITHOUT CONTRAST    CLINICAL HISTORY:  Neuro deficit, acute, stroke suspected;    TECHNIQUE:  Low dose axial CT images obtained throughout the head without intravenous contrast. Sagittal and coronal reconstructions were performed.    COMPARISON:  Multiple priors.    FINDINGS:  Intracranial compartment:    Ventricles and sulci are normal in size for age without evidence of hydrocephalus. No extra-axial blood or fluid collections.    Mild microvascular ischemic change for age.  Remote right basal ganglia lacunar infarct.  No parenchymal mass, hemorrhage, edema or major vascular distribution infarct.    Skull/extracranial contents (limited evaluation): No fracture. Mastoid air cells and paranasal sinuses are essentially clear.                                  The EKG was ordered, reviewed, and independently interpreted by the ED provider.  Interpretation time: 17:57  Rate: 70 BPM  Rhythm: Ventricular-paced rhythm  Interpretation: normal. No STEMI.         The Emergency Provider reviewed the vital signs and test results, which are outlined above.     ED Discussion     10:00 PM: Called [facility] and was told not to perform MRI due to pacemaker not compatible with MRI    10:00 PM: Discussed pt's case with Dr. Andersen (tele-neurology) who recommends CTA of head and neck if pt cannot get an MRI brain and MRA brain and neck. Admit, aspirin and plavix duo therapy.    10:49 PM: Discussed case with Raymundo Virgen (Infectious disease). Dr. Virgen agrees with current care and management of pt and accepts admission.   Admitting Service: Hospital Medicine  Admitting Physician: Dr. Virgen  Admit to: Med/tele, obs     10:59 PM: Re-evaluated pt. I have discussed test results, shared treatment plan, and the need for admission with patient and family at bedside. Pt and family express understanding at this time and agree with all information. All questions answered. Pt and family have no further questions or concerns at this time. Pt is ready for admit.      ED Course as of 05/09/22 0355   Sun May 08, 2022   1814 Spoke with clair Andersen.  He recommended baby aspirin as well as 300 clopidogrel loading dose followed by 75 mg of Plavix daily.  He recommends MRA of head and neck and MRI of brain.    Hold on CTA of head and neck.  [LB]      ED Course User Index  [LB] Ashley Wood, DO     Medical Decision Making:   Clinical Tests:   Lab Tests: Ordered and Reviewed  Radiological Study: Ordered and Reviewed  Medical Tests: Ordered and Reviewed           ED Medication(s):  Medications   flecainide tablet 50 mg (50 mg Oral Given 5/9/22 0135)   furosemide tablet 40 mg (has no administration in time range)   losartan tablet 25 mg (has no administration in time range)    metoprolol succinate (TOPROL-XL) 24 hr tablet 100 mg (100 mg Oral Given 5/9/22 0644)   sodium chloride 0.9% flush 10 mL (has no administration in time range)   melatonin tablet 6 mg (has no administration in time range)   polyethylene glycol packet 17 g (has no administration in time range)   acetaminophen tablet 650 mg (650 mg Oral Given 5/9/22 0965)   HYDROcodone-acetaminophen 5-325 mg per tablet 1 tablet (has no administration in time range)   naloxone 0.4 mg/mL injection 0.02 mg (has no administration in time range)   potassium bicarbonate disintegrating tablet 50 mEq (has no administration in time range)   potassium bicarbonate disintegrating tablet 35 mEq (has no administration in time range)   potassium bicarbonate disintegrating tablet 60 mEq (has no administration in time range)   magnesium oxide tablet 800 mg (has no administration in time range)   magnesium oxide tablet 800 mg (has no administration in time range)   potassium, sodium phosphates 280-160-250 mg packet 2 packet (has no administration in time range)   potassium, sodium phosphates 280-160-250 mg packet 2 packet (has no administration in time range)   potassium, sodium phosphates 280-160-250 mg packet 2 packet (has no administration in time range)   insulin aspart U-100 pen 0-5 Units (has no administration in time range)   glucose chewable tablet 16 g (has no administration in time range)   glucose chewable tablet 24 g (has no administration in time range)   glucagon (human recombinant) injection 1 mg (has no administration in time range)   dextrose 10% bolus 125 mL (has no administration in time range)   dextrose 10% bolus 250 mL (has no administration in time range)   sodium chloride 0.9% flush 10 mL (has no administration in time range)   sodium chloride 0.9% bolus 500 mL (has no administration in time range)   labetaloL injection 10 mg (has no administration in time range)   enoxaparin injection 40 mg (has no administration in time range)    aspirin EC tablet 81 mg (has no administration in time range)   aspirin chewable tablet 81 mg (81 mg Oral Given 5/8/22 1914)   clopidogreL tablet 300 mg (300 mg Oral Given 5/8/22 1914)       Current Discharge Medication List                  Scribe Attestation:   Scribe #1: I performed the above scribed service and the documentation accurately describes the services I performed. I attest to the accuracy of the note.     Attending:   Physician Attestation Statement for Scribe #1: I, Leigha Lewis Do, MD, personally performed the services described in this documentation, as scribed by Marlee Nunes, in my presence, and it is both accurate and complete.           Clinical Impression       ICD-10-CM ICD-9-CM   1. TIA (transient ischemic attack)  G45.9 435.9   2. Stroke  I63.9 434.91       Disposition:   Disposition: Placed in Observation  Condition: Fair         Leigha Lewis Do, MD  05/09/22 0355

## 2022-05-09 NOTE — HPI
89 y.o. male patient with a PMHx of HTN, HLD, CAD w/o cerebral infarction, PVD, lumbosacral spondylosis, A-fib, paroxysmal A fib, AP, pacemaker, stroke, seizure (late effect of stroke), anemia, type 2 DM with ophthalmic manifestations, CVA, GERD, intracranial hemorrhage, hypertensive heart disease with heart failure, Cirrhosis of liver, DM, and pneumonia of R lung due to infectious organism who presents to the Emergency Department for evaluation of weakness followed with numbness and tingling on R side of body.    He reports that he came back from [place] and noticed facial numbness and right  leg weakness. He had past history of hemorrhagic stroke. At this time, his symptoms hs resolved.   Patient denies any N/V/D, fever, chills, CP, SOB, congestion, and all other sxs at this time.   ED Course - he was  evaluated by vascular neurology in the Ed   Labs and imaging test -  CT head-    No hemorrhage or acute major vascular distribution infarction.  He will be place don observation .

## 2022-05-09 NOTE — H&P
[facility] Medicine  History & Physical    Patient Name: Anthony Blair  MRN: [id number]  Patient Class: OP- Observation  Admission Date: 5/8/2022  Attending Physician: Raymundo Virgen MD   Primary Care Provider: Abdulaziz Mccabe MD         Patient information was obtained from patient, past medical records and ER records.     Subjective:     Principal Problem:Ischemic stroke    Chief Complaint:   Chief Complaint   Patient presents with    Weakness     States started with a headache around 1030 and then started having numbness and tinging on R side.         HPI:   89 y.o. male patient with a PMHx of HTN, HLD, CAD w/o cerebral infarction, PVD, lumbosacral spondylosis, A-fib, paroxysmal A fib, AP, pacemaker, stroke, seizure (late effect of stroke), anemia, type 2 DM with ophthalmic manifestations, CVA, GERD, intracranial hemorrhage, hypertensive heart disease with heart failure, Cirrhosis of liver, DM, and pneumonia of R lung due to infectious organism who presents to the Emergency Department for evaluation of weakness followed with numbness and tingling on R side of body.    He reports that he came back from [place] and noticed facial numbness and right  leg weakness. He had past history of hemorrhagic stroke. At this time, his symptoms hs resolved.   Patient denies any N/V/D, fever, chills, CP, SOB, congestion, and all other sxs at this time.   ED Course - he was  evaluated by vascular neurology in the Ed   Labs and imaging test -  CT head-    No hemorrhage or acute major vascular distribution infarction.  He will be place don observation .         Past Medical History:   Diagnosis Date    A-fib     Anemia     AP (angina pectoris) 1/23/2014    Carotid artery disease without cerebral infarction 8/22/2014    Cataract     Cirrhosis of liver 9/10/2020    Coronary artery disease     Diabetes mellitus     DM (diabetes mellitus) 1970     am 07/06/2020    GERD (gastroesophageal reflux disease)  7/11/2013    Hemorrhagic cerebrovascular accident (CVA) 4/26/2018    Hyperlipidemia     Hypertension     Hypertensive heart disease with heart failure 3/12/2019    Intracranial hemorrhage     Lumbosacral spondylosis 12/24/2014    Pacemaker 1/23/2014    Paroxysmal atrial fibrillation 1/23/2014    Peripheral vascular disease 8/22/2014    Pneumonia of right lung due to infectious organism 3/27/2019    Seizure, late effect of stroke     Stroke     Type 2 diabetes mellitus with ophthalmic manifestations        Past Surgical History:   Procedure Laterality Date    ANGIOPLASTY      ATRIAL ABLATION SURGERY N/A     CATARACT EXTRACTION Bilateral     [facility]    CATARACT EXTRACTION W/ INTRAOCULAR LENS IMPLANT Right     CATARACT EXTRACTION W/ INTRAOCULAR LENS IMPLANT Left     COLONOSCOPY N/A 10/16/2018    Procedure: COLONOSCOPY with biopsies;  Surgeon: Anabell Griggs MD;  Location: [facility] ENDO;  Service: Endoscopy;  Laterality: N/A;    CORONARY ARTERY BYPASS GRAFT  07/2010    x5    EYE SURGERY      pace maker surgrey  10/2010    reposition in 2011/2012 (approx)    REPLACEMENT OF PACEMAKER GENERATOR Left 8/6/2020    Procedure: REPLACEMENT, PULSE GENERATOR, CARDIAC PACEMAKER;  Surgeon: Domenico Grajeda MD;  Location: [facility] CATH LAB;  Service: Cardiology;  Laterality: Left;  [facility]    REVISION OF PROCEDURE INVOLVING PACEMAKER LEAD Bilateral 8/6/2020    Procedure: REVISION, ELECTRODE LEAD, CARDIAC PACEMAKER;  Surgeon: Domenico Grajeda MD;  Location: [facility] CATH LAB;  Service: Cardiology;  Laterality: Bilateral;    VEIN BYPASS SURGERY         Review of patient's allergies indicates:   Allergen Reactions    Atorvastatin      Other reaction(s): Muscle pain  Other reaction(s): Muscle weakness  Other reaction(s): Muscle pain    Codeine      Other reaction(s): Headache  Other reaction(s): Headache    Eliquis [apixaban]     Norvasc [amlodipine] Edema    Trulicity [dulaglutide] Nausea And  "Vomiting    Adhesive Rash     Other reaction(s): rash       No current facility-administered medications on file prior to encounter.     Current Outpatient Medications on File Prior to Encounter   Medication Sig    ACCU-CHEK GUIDE TEST STRIPS Strp TEST SIX TIMES DAILY    ACCU-CHEK MULTICLIX LANCET lancets TEST BLOOD SUGAR THREE TIMES DAILY    ascorbic acid, vitamin C, (VITAMIN C) 100 MG tablet Take 100 mg by mouth once daily.    blood-glucose meter Misc 1 Device by Misc.(Non-Drug; Combo Route) route once. for 1 dose    CARBOXYMETHYLCELLULOSE SODIUM (REFRESH OPHT) Apply to eye.    COMFORT EZ PEN NEEDLES 32 gauge x 5/32" Ndle USE FOUR TIMES DAILY.    diclofenac sodium (VOLTAREN) 1 % Gel Apply 2 g topically once daily.    ferrous sulfate (IRON ORAL) Take 65 mg by mouth.    flecainide (TAMBOCOR) 50 MG Tab TAKE ONE TABLET BY MOUTH EVERY TWELVE HOURS    fluticasone propionate (FLONASE) 50 mcg/actuation nasal spray 2 sprays (100 mcg total) by Each Nostril route once daily.    furosemide (LASIX) 40 MG tablet Take 1 tablet (40 mg total) by mouth once daily.    guaiFENesin (MUCINEX) 600 mg 12 hr tablet Take 1 tablet (600 mg total) by mouth 2 (two) times daily.    insulin (LANTUS SOLOSTAR U-100 INSULIN) glargine 100 units/mL (3mL) SubQ pen INJECT 16 UNITS SUBCUTANEOUSLY AT BEDTIME. 94 DAY SUPPLY    insulin aspart U-100 (NOVOLOG FLEXPEN U-100 INSULIN) 100 unit/mL (3 mL) InPn pen INJECT TEN UNITS AT BREAKFAST, EIGHT AT LUNCH, AND TEN AT DINNER (54 DAYS SUPPLY)    isosorbide mononitrate (IMDUR) 60 MG 24 hr tablet TAKE ONE TABLET BY MOUTH TWICE DAILY    levocetirizine (XYZAL) 5 MG tablet Take 1 tablet (5 mg total) by mouth every evening.    losartan (COZAAR) 100 MG tablet TAKE ONE TABLET (100mg) BY MOUTH DAILY    metoprolol succinate (TOPROL-XL) 100 MG 24 hr tablet Take 1 tablet (100 mg total) by mouth 2 (two) times daily.    mupirocin (BACTROBAN) 2 % ointment Apply topically 2 (two) times daily.    " nitroGLYCERIN (NITROSTAT) 0.4 MG SL tablet Place 1 tablet (0.4 mg total) under the tongue every 5 (five) minutes as needed for Chest pain.    pantoprazole (PROTONIX) 40 MG tablet TAKE ONE TABLET (40mg) BY MOUTH EVERY DAY    saw palmetto 500 MG capsule Take 500 mg by mouth 2 (two) times a day.    simethicone (GAS-X EXTRA STRENGTH) 125 mg Cap capsule Take 1 capsule (125 mg total) by mouth 4 (four) times daily as needed for Flatulence.    turmeric 400 mg Cap Take 1 capsule by mouth once daily.    vitamin D (VITAMIN D3) 1000 units Tab Take 1,000 Units by mouth once daily.    vitamin E 100 UNIT capsule Take 100 Units by mouth once daily.     Family History       Problem Relation (Age of Onset)    Alcohol abuse Paternal Uncle    Arthritis Mother, Father    Cancer Sister, Daughter    Diabetes Mother, Father, Sister, Brother, Daughter, Son, Son    Fibromyalgia Daughter    Heart disease Mother, Sister, Brother    Kidney disease Mother, Brother    Miscarriages / Stillbirths Daughter          Tobacco Use    Smoking status: Former Smoker     Packs/day: 2.00     Years: 13.00     Pack years: 26.00     Types: Cigarettes     Start date: 1954     Quit date: 1967     Years since quittin.9    Smokeless tobacco: Never Used   Substance and Sexual Activity    Alcohol use: No    Drug use: No    Sexual activity: Never     Partners: Female     Birth control/protection: None     Review of Systems   Constitutional:  Negative for activity change, appetite change, chills, diaphoresis and fatigue.   HENT:  Negative for congestion, dental problem, ear discharge, ear pain and facial swelling.    Eyes:  Negative for pain, discharge and itching.   Respiratory:  Negative for apnea, cough, chest tightness and shortness of breath.    Cardiovascular:  Negative for chest pain and leg swelling.   Gastrointestinal:  Negative for abdominal distention and abdominal pain.   Endocrine: Negative for cold intolerance, heat intolerance  and polydipsia.   Genitourinary:  Negative for difficulty urinating, dysuria and enuresis.   Musculoskeletal:  Negative for arthralgias and back pain.   Skin:  Negative for color change and pallor.   Allergic/Immunologic: Negative for environmental allergies and food allergies.   Neurological:  Negative for dizziness, facial asymmetry, light-headedness and headaches.   Hematological:  Negative for adenopathy. Does not bruise/bleed easily.   Psychiatric/Behavioral:  Negative for agitation and behavioral problems.    Objective:     Vital Signs (Most Recent):  Temp: 97.7 °F (36.5 °C) (05/09/22 0423)  Pulse: (!) 55 (05/09/22 0423)  Resp: 18 (05/09/22 0423)  BP: (!) 124/56 (05/09/22 0423)  SpO2: 95 % (05/09/22 0423)   Vital Signs (24h Range):  Temp:  [97.4 °F (36.3 °C)-98.5 °F (36.9 °C)] 97.7 °F (36.5 °C)  Pulse:  [55-72] 55  Resp:  [17-26] 18  SpO2:  [95 %-99 %] 95 %  BP: (124-184)/(56-86) 124/56     Weight: 82.9 kg (182 lb 12.2 oz)  Body mass index is 26.22 kg/m².    Physical Exam  Vitals and nursing note reviewed.   Constitutional:       Appearance: He is well-developed.   HENT:      Head: Normocephalic and atraumatic.      Nose: Nose normal.   Eyes:      Pupils: Pupils are equal, round, and reactive to light.   Cardiovascular:      Rate and Rhythm: Normal rate and regular rhythm.      Heart sounds: Normal heart sounds.   Pulmonary:      Effort: Pulmonary effort is normal. No respiratory distress.      Breath sounds: Normal breath sounds. No wheezing or rales.   Abdominal:      General: Abdomen is flat. There is no distension.      Tenderness: There is no abdominal tenderness.   Musculoskeletal:         General: Normal range of motion.      Cervical back: Normal range of motion and neck supple.   Skin:     General: Skin is dry.   Neurological:      Mental Status: He is alert and oriented to person, place, and time.   Psychiatric:         Mood and Affect: Mood normal.         CRANIAL NERVES     CN III, IV, VI   Pupils  are equal, round, and reactive to light.     Significant Labs: All pertinent labs within the past 24 hours have been reviewed.  BMP:   Recent Labs   Lab 05/08/22 1812   *      K 4.1   CL 99   CO2 30*   BUN 24*   CREATININE 0.9   CALCIUM 10.4     CBC:   Recent Labs   Lab 05/08/22 1812   WBC 5.37   HGB 12.4*   HCT 37.2*          Significant Imaging: I have reviewed all pertinent imaging results/findings within the past 24 hours.    Assessment/Plan:     * Ischemic stroke    This is suspected -will do CTA of the head/neck, neurology consult .  Will add aspirin, use pravastatin as he had a previous reaction to lipitor .  Check ECHo  He has a pacemaker and MRI might not be an option        History of hemorrhagic cerebrovascular accident (CVA) with residual deficit    Will continue aspirin, pravastatin  -neurology consult    Cirrhosis of liver    GI follow up on discharge     Diabetes mellitus type 2 without retinopathy    Insulin sliding scale, diabetic diet     Carotid artery disease without cerebral infarction  Will follow CTA of the head and neck - follow imaging results       Paroxysmal atrial fibrillation    Continue flecainde , will follow cardiology for advise on anticoagulation at this time . He had hemorraghic CVA -2019         VTE Risk Mitigation (From admission, onward)         Ordered     enoxaparin injection 40 mg  Daily         05/08/22 2352     IP VTE HIGH RISK PATIENT  Once         05/08/22 2352     Place sequential compression device  Until discontinued         05/08/22 2352     Place sequential compression device  Until discontinued         05/08/22 2350                   Raymundo Virgen MD  Department of Hospital Medicine   [facility] - Med Surg

## 2022-05-09 NOTE — PT/OT/SLP EVAL
Occupational Therapy   Evaluation and Discharge Note    Name: Anthony Blair  MRN: [id number]  Admitting Diagnosis:  Ischemic stroke   Recent Surgery: * No surgery found *      Recommendations:     Discharge Recommendations: home health OT  Discharge Equipment Recommendations:  none  Barriers to discharge:       Assessment:     Anthony Blair is a 89 y.o. male with a medical diagnosis of Ischemic stroke. At this time, patient is functioning at their prior level of function and does not require further acute OT services.     Plan:     During this hospitalization, patient does not require further acute OT services.  Please re-consult if situation changes.    · Plan of Care Reviewed with: patient, significant other    Subjective     Chief Complaint: lives with girl friend in 1 story house   Patient/Family Comments/goals:     Occupational Profile:  Living Environment: lives in 1 story house with no steps to enter  Previous level of function:   Roles and Routines: occupational therapy  Equipment Used at home:     Assistance upon Discharge:     Pain/Comfort:  Pain Rating 1: 0/10    Patients cultural, spiritual, Jewish conflicts given the current situation:      Objective:     Communicated with: nurse and epic chart review prior to session.  Patient found up in chair with   upon OT entry to room.    General Precautions: Standard, fall   Orthopedic Precautions:N/A   Braces: N/A  Respiratory Status: Room air     Occupational Performance:    Bed Mobility:    · Patient completed Rolling/Turning to Left with  modified independence  · Patient completed Scooting/Bridging with modified independence  · Patient completed Supine to Sit with modified independence    Functional Mobility/Transfers:  · Patient completed Sit <> Stand Transfer with modified independence  with  rolling walker   · Patient completed Bed <> Chair Transfer using Step Transfer technique with modified independence with rolling walker  · Functional Mobility: Pt  ambulated 200 feet with  Mod (I)    Activities of Daily Living:  · Upper Body Dressing: modified independence .  · Lower Body Dressing: modified independence .    Cognitive/Visual Perceptual:  Cognitive/Psychosocial Skills:     -       Oriented to: Person, Place, Time and Situation   -       Follows Commands/attention:Follows multistep  commands  -       Communication: clear/fluent  -       Memory: No Deficits noted  -       Safety awareness/insight to disability: intact     Physical Exam:  Upper Extremity Range of Motion:     -       Right Upper Extremity: WFL  -       Left Upper Extremity: WFL  Upper Extremity Strength:    -       Right Upper Extremity: MMT: 4/5 GROSSLY  -       Left Upper Extremity: MMT: 4/5 GROSSLY   Strength:    -       Right Upper Extremity: WFL  -       Left Upper Extremity: WFL    AMPAC 6 Click ADL:  AMPAC Total Score: 24    Treatment & Education:  O.T. EVAL COMPLETED. PT EDUCATED ON O.T. POC. PT MOD (I) WITH ADL'S AND FUNCTIONAL TRANSFER/ MOBILITY Tx: PT EDUCATED ON O.T. HEP . PT VERBALIZED  AND RETURNED DEMONSTRATION . SEE EVAL FOR DETAILS. PT DISCHARGED FROM [facility];LED O.T. AND PLACED ON PEOPLE 'S PROGRAM  Education:    Patient left up in chair with all lines intact, call button in reach, NURSE  notified and GIRFRIEND present    GOALS:   Multidisciplinary Problems     Occupational Therapy Goals     Not on file                History:     Past Medical History:   Diagnosis Date    A-fib     Anemia     AP (angina pectoris) 1/23/2014    Carotid artery disease without cerebral infarction 8/22/2014    Cataract     Cirrhosis of liver 9/10/2020    Coronary artery disease     Diabetes mellitus     DM (diabetes mellitus) 1970     am 07/06/2020    GERD (gastroesophageal reflux disease) 7/11/2013    Hemorrhagic cerebrovascular accident (CVA) 4/26/2018    Hyperlipidemia     Hypertension     Hypertensive heart disease with heart failure 3/12/2019    Intracranial hemorrhage      Lumbosacral spondylosis 12/24/2014    Pacemaker 1/23/2014    Paroxysmal atrial fibrillation 1/23/2014    Peripheral vascular disease 8/22/2014    Pneumonia of right lung due to infectious organism 3/27/2019    Seizure, late effect of stroke     Stroke     Type 2 diabetes mellitus with ophthalmic manifestations          Past Surgical History:   Procedure Laterality Date    ANGIOPLASTY      ATRIAL ABLATION SURGERY N/A     CATARACT EXTRACTION [facility]ilateral     [facility]    CATARACT EXTRACTION W/ INTRAOCULAR LENS IMPLANT Right     CATARACT EXTRACTION W/ INTRAOCULAR LENS IMPLANT Left     COLONOSCOPY N/A 10/16/2018    Procedure: COLONOSCOPY with biopsies;  Surgeon: Anabell Griggs MD;  Location: [facility] ENDO;  Service: Endoscopy;  Laterality: N/A;    CORONARY ARTERY BYPASS GRAFT  07/2010    x5    EYE SURGERY      pace maker surgrey  10/2010    reposition in 2011/2012 (approx)    REPLACEMENT OF PACEMAKER GENERATOR Left 8/6/2020    Procedure: REPLACEMENT, PULSE GENERATOR, CARDIAC PACEMAKER;  Surgeon: Domenico Grajeda MD;  Location: [facility] CATH LAB;  Service: Cardiology;  Laterality: Left;  [facility]    REVISION OF PROCEDURE INVOLVING PACEMAKER LEAD Bilateral 8/6/2020    Procedure: REVISION, ELECTRODE LEAD, CARDIAC PACEMAKER;  Surgeon: Domenico Grajeda MD;  Location: [facility] CATH LAB;  Service: Cardiology;  Laterality: Bilateral;    VEIN BYPASS SURGERY         Time Tracking:     OT Date of Treatment: 05/09/22  OT Start Time: 0915  OT Stop Time: 0940  OT Total Time (min): 25 min    Billable Minutes:Evaluation 15 MINUTES  Therapeutic Activity  10 MINUTES    5/9/2022

## 2022-05-09 NOTE — ASSESSMENT & PLAN NOTE
Recommend Plavix 75 mg daily for monotherapy due to intolerance with eliquis and history of hemorrhagic CVA if ok with neurology

## 2022-05-09 NOTE — HPI
Anthony Blair is a 89 year old male who presented to [facility] due to weakness followed by numbness and t I ngling on right side of his body. His current medical conditions include CAD, PVD, PAF, h/o Hemorrhagic CVA, HTN, HLP, s/p PPM, DM. He was placed in observation under the care of hospital medicine. Cardiology consulted to assist with AC recommendations post CVA in setting of AFIB. Chart reviewed, patient seen and examined. Denies chest pain or anginal equivalents. Feels back to normal today. Sitting up in bedside chair. Denies any cardiac complaints today. Discussed risk of recurrent CVA due to AFIB and bleeding risk, would recommend Plavix 75 mg daily monotherapy due to intolerance to Eliquis previously if cleared with Neurology.

## 2022-05-09 NOTE — HOSPITAL COURSE
88 yo male admitted for TIA. Patient has returned back to his baseline with no focal deficiets. Neurology consulted. Since he has a history of cerebral hemorrhage he cannot be placed on platelet inhibitors or AOCs. Patient intrested in receiving home health. Orders placed. He reports severe muscle weakness on Statin meds and wishes to defer. In stable condition at time of discharge and patient agreeable with plan. Instructed to follow up with PCP and cardiology for possible outpatient Holter monitoring as an outpatient. Referred to NP at home program as well.

## 2022-05-09 NOTE — PROGRESS NOTES
[facility] - Med Surg  Neurology  Progress Note    Patient Name: Anthony Blair  MRN: [id number]  Admission Date: 5/8/2022  Hospital Length of Stay: 0 days  Code Status: Full Code   Attending Provider: Perfecto England MD  Primary Care Physician: Abdulaziz Mccabe MD   Principal Problem:Ischemic stroke    Subjective:     Interval History: Symptoms of numbness have totally resolved, and only lasted about 3-4 hours in total.  No involuntary movements were present.  No obvious weakness, speech deficits or motor symptoms of any kind.  The patient is back to his baseline.    Current Neurological Medications: See below    Current Facility-Administered Medications   Medication Dose Route Frequency Provider Last Rate Last Admin    acetaminophen tablet 650 mg  650 mg Oral Q4H PRN Raymundo Virgen MD   650 mg at 05/09/22 0325    aspirin EC tablet 81 mg  81 mg Oral Daily Raymundo Virgen MD        dextrose 10% bolus 125 mL  12.5 g Intravenous PRN Raymundo Virgen MD        dextrose 10% bolus 250 mL  25 g Intravenous PRN Raymundo Virgen MD        enoxaparin injection 40 mg  40 mg Subcutaneous Daily Raymundo Virgen MD        flecainide tablet 50 mg  50 mg Oral Q12H Raymundo Virgen MD   50 mg at 05/09/22 0943    furosemide tablet 40 mg  40 mg Oral Daily Raymundo Virgen MD   40 mg at 05/09/22 0943    glucagon (human recombinant) injection 1 mg  1 mg Intramuscular PRN Raymundo Virgen MD        glucose chewable tablet 16 g  16 g Oral PRN Raymundo Virgen MD        glucose chewable tablet 24 g  24 g Oral PRN Raymundo Virgen MD        HYDROcodone-acetaminophen 5-325 mg per tablet 1 tablet  1 tablet Oral Q6H PRN Raymundo Virgen MD        insulin aspart U-100 pen 0-5 Units  0-5 Units Subcutaneous QID (AC + HS) PRN Raymundo Virgen MD        labetaloL injection 10 mg  10 mg Intravenous Q15 Min PRN Raymundo Virgen MD        losartan tablet 25 mg  25 mg Oral Daily Raymundo Virgen MD   25 mg at 05/09/22 0943    magnesium oxide tablet 800 mg  800 mg Oral  PRN Raymundo Virgen MD        magnesium oxide tablet 800 mg  800 mg Oral PRN Raymundo Virgen MD        melatonin tablet 6 mg  6 mg Oral Nightly PRN Raymundo Virgen MD        metoprolol succinate (TOPROL-XL) 24 hr tablet 100 mg  100 mg Oral BID Raymundo Virgen MD   100 mg at 05/09/22 0942    naloxone 0.4 mg/mL injection 0.02 mg  0.02 mg Intravenous PRN Raymundo Virgen MD        polyethylene glycol packet 17 g  17 g Oral Daily PRN Raymundo Virgen MD        potassium bicarbonate disintegrating tablet 35 mEq  35 mEq Oral PRN Raymundo Virgen MD        potassium bicarbonate disintegrating tablet 50 mEq  50 mEq Oral PRN Raymundo Virgen MD        potassium bicarbonate disintegrating tablet 60 mEq  60 mEq Oral PRN Raymundo Virgen MD        potassium, sodium phosphates 280-160-250 mg packet 2 packet  2 packet Oral PRN Raymundo Virgen MD        potassium, sodium phosphates 280-160-250 mg packet 2 packet  2 packet Oral PRN Raymundo Virgen MD        potassium, sodium phosphates 280-160-250 mg packet 2 packet  2 packet Oral PRN Raymundo Virgen MD        pravastatin tablet 20 mg  20 mg Oral Daily Raymundo Virgen MD   20 mg at 05/09/22 0942    sodium chloride 0.9% bolus 500 mL  500 mL Intravenous Continuous PRN Raymundo Virgen MD        sodium chloride 0.9% flush 10 mL  10 mL Intravenous Q8H Raymundo Virgen MD        sodium chloride 0.9% flush 10 mL  10 mL Intravenous PRN Raymundo Virgen MD           Review of Systems     ROS:  GENERAL: No fever, chills, fatigability or weight loss.  SKIN: No rashes, itching or changes in color or texture of skin.  HEAD: See comments in present illness  EYES: Visual acuity fine. No photophobia, ocular pain or diplopia.  EARS: Denies ear pain, discharge or vertigo.  NOSE: No loss of smell, no epistaxis or postnasal drip.  MOUTH & THROAT: No hoarseness or change in voice. No excessive gum bleeding.  NODES: Denies swollen glands.  CHEST: Denies OLIVARES, cyanosis, wheezing, cough and sputum  production.  CARDIOVASCULAR: Denies chest pain, PND, orthopnea or reduced exercise tolerance.  ABDOMEN: Appetite fine. No weight loss. Denies diarrhea, abdominal pain, hematemesis or blood in stool.  URINARY: No flank pain, dysuria or hematuria.  PERIPHERAL VASCULAR: No claudication or cyanosis.  MUSCULOSKELETAL: No joint stiffness or swelling. Denies back pain.  NEUROLOGIC: No history of seizures, paralysis, alteration of gait or coordination.      Objective:     Vital Signs (Most Recent):  Temp: 97.1 °F (36.2 °C) (05/09/22 1113)  Pulse: 70 (05/09/22 1113)  Resp: 16 (05/09/22 1113)  BP: 139/62 (05/09/22 1113)  SpO2: (!) 93 % (05/09/22 1113) Vital Signs (24h Range):  Temp:  [97.1 °F (36.2 °C)-98.5 °F (36.9 °C)] 97.1 °F (36.2 °C)  Pulse:  [55-72] 70  Resp:  [16-26] 16  SpO2:  [93 %-99 %] 93 %  BP: (124-184)/(56-86) 139/62     Weight: 82.9 kg (182 lb 12.2 oz)  Body mass index is 26.22 kg/m².    Physical Exam  Brief exam this AM is normal.  No focal or lateralized deficits this AM       Significant Labs:   CBC:   Recent Labs   Lab 05/08/22  1812 05/09/22  0741   WBC 5.37 4.92   HGB 12.4* 11.7*   HCT 37.2* 35.2*    164     CMP:   Recent Labs   Lab 05/08/22  1812 05/09/22  0741   * 90    139   K 4.1 4.1   CL 99 102   CO2 30* 29   BUN 24* 21   CREATININE 0.9 0.8   CALCIUM 10.4 10.0   MG  --  1.8   PROT 7.5 6.8   ALBUMIN 3.6 3.3*   BILITOT 0.9 1.2*   ALKPHOS 134 115   AST 33 30   ALT 18 18   ANIONGAP 10 8   EGFRNONAA >60 >60     All pertinent lab results from the past 24 hours have been reviewed.    Significant Imaging: I have reviewed and interpreted all pertinent imaging results/findings within the past 24 hours.    Assessment and Plan:     1.  TIA, resolved    The patient may go home.  He cannot be placed on platelet inhibitors/anticoagulants because of past history of cerebral hemorrhage.      Active Diagnoses:    Diagnosis Date Noted POA    PRINCIPAL PROBLEM:  Ischemic stroke [I63.9] 05/08/2022  Yes    History of hemorrhagic cerebrovascular accident (CVA) with residual deficit [I69.30] 03/25/2022 Not Applicable    Cirrhosis of liver [K74.60] 09/10/2020 Yes    Diabetes mellitus type 2 without retinopathy [E11.9] 01/21/2016 Yes     Chronic    Carotid artery disease without cerebral infarction [I77.9] 08/22/2014 Yes     Chronic    Paroxysmal atrial fibrillation [I48.0] 01/23/2014 Yes     Chronic      Problems Resolved During this Admission:       VTE Risk Mitigation (From admission, onward)         Ordered     enoxaparin injection 40 mg  Daily         05/08/22 2352     IP VTE HIGH RISK PATIENT  Once         05/08/22 2352     Place sequential compression device  Until discontinued         05/08/22 2352     Place sequential compression device  Until discontinued         05/08/22 2350                Hola Lucero MD  Neurology  [facility] - [facility]

## 2022-05-09 NOTE — PT/OT/SLP EVAL
Speech Language Pathology Evaluation  Cognitive/Bedside Swallow    Patient Name:  Anthony Blair   MRN:  [id number]  Admitting Diagnosis: Ischemic stroke    Recommendations:                  General Recommendations:  Follow-up not indicated  Diet recommendations:  Regular, Thin   Aspiration Precautions: HOB to 90 degrees and Remain upright 30 minutes post meal   General Precautions: Standard, fall  Communication strategies:  none    History:     Past Medical History:   Diagnosis Date    A-fib     Anemia     AP (angina pectoris) 1/23/2014    Carotid artery disease without cerebral infarction 8/22/2014    Cataract     Cirrhosis of liver 9/10/2020    Coronary artery disease     Diabetes mellitus     DM (diabetes mellitus) 1970     am 07/06/2020    GERD (gastroesophageal reflux disease) 7/11/2013    Hemorrhagic cerebrovascular accident (CVA) 4/26/2018    Hyperlipidemia     Hypertension     Hypertensive heart disease with heart failure 3/12/2019    Intracranial hemorrhage     Lumbosacral spondylosis 12/24/2014    Pacemaker 1/23/2014    Paroxysmal atrial fibrillation 1/23/2014    Peripheral vascular disease 8/22/2014    Pneumonia of right lung due to infectious organism 3/27/2019    Seizure, late effect of stroke     Stroke     Type 2 diabetes mellitus with ophthalmic manifestations        Past Surgical History:   Procedure Laterality Date    ANGIOPLASTY      ATRIAL ABLATION SURGERY N/A     CATARACT EXTRACTION Bilateral     [facility]    CATARACT EXTRACTION W/ INTRAOCULAR LENS IMPLANT Right     CATARACT EXTRACTION W/ INTRAOCULAR LENS IMPLANT Left     COLONOSCOPY N/A 10/16/2018    Procedure: COLONOSCOPY with biopsies;  Surgeon: Anabell Griggs MD;  Location: [facility];  Service: Endoscopy;  Laterality: N/A;    CORONARY ARTERY BYPASS GRAFT  07/2010    x5    EYE SURGERY      pace maker surgrey  10/2010    reposition in 2011/2012 (approx)    REPLACEMENT OF PACEMAKER GENERATOR  Left 8/6/2020    Procedure: REPLACEMENT, PULSE GENERATOR, CARDIAC PACEMAKER;  Surgeon: Domenico Grajeda MD;  Location: [facility] CATH LAB;  Service: Cardiology;  Laterality: Left;  st carolee    REVISION OF PROCEDURE INVOLVING PACEMAKER LEAD Bilateral 8/6/2020    Procedure: REVISION, ELECTRODE LEAD, CARDIAC PACEMAKER;  Surgeon: Domenico Grajeda MD;  Location: [facility] CATH LAB;  Service: Cardiology;  Laterality: Bilateral;    VEIN BYPASS SURGERY           Subjective   Pt was seen sitting in a chair at bedside with his fiance present.  Pt reported being Independent with all ADLs PTA.  He was admitted with a headache and right sided tingling.  Pt reports all symptoms have resolved.    Patient goals: pt wants to eat      Pain/Comfort:  · Pain Rating 1: 0/10    Respiratory Status: Room air    Objective:     Cognitive Status:    WFL      Receptive Language:   Comprehension:      WFL    Pragmatics:    WFL    Expressive Language:  Verbal:    WFL      Motor Speech:  WFL    Voice:   WFL    Visual-Spatial:  WFL    Oral Musculature Evaluation  · Oral Musculature: WFL  · Dentition: upper and lower dentures    Bedside Swallow Eval:   Consistencies Assessed:  · Thin liquids and solids      Oral Phase:   · WFL    Pharyngeal Phase:   · no overt clinical signs/symptoms of aspiration  · no overt clinical signs/symptoms of pharyngeal dysphagia      Assessment:     Anthnoy Blair is a 89 y.o. male with a diagnosis of The primary encounter diagnosis was TIA (transient ischemic attack). A diagnosis of Stroke was also pertinent to this visit.  He presents with no acute speech, language, or swallowing deficits.  ST recommends a regular consistency diet with thin liquids.  No  Further ST warranted at this time.      Plan:   · Plan of Care reviewed with:  patient, significant other   · SLP Follow-Up:  No       Time Tracking:     SLP Treatment Date:   05/09/22  Speech Start Time:  1000  Speech Stop Time:  1020     Speech Total Time (min):  20  min    Billable Minutes: Eval 10  and Eval Swallow and Oral Function 10    05/09/2022

## 2022-05-09 NOTE — CONSULTS
Food & Nutrition  Education    Diet Education: Stroke Nutrition Therapy   Time Spent: 15 minutes   Learners: Patient and pt wife       Nutrition Education provided with handouts:   Stroke Nutrition Therapy (NCM)    Comments:  Education was provided to patient and patients wife on Stroke Nutrition Therapy. We discussed low salt food items as well as no salt seasonings. We also discussed checking blood pressures daily. Patient and patients wife voiced understanding. Patient was eating a pudding and stated that speech just went see him. Will continue to monitor and follow up.     All questions and concerns answered. Dietitian's contact information provided.       Please Re-consult as needed      Thanks!  Inocencia King RD,LDN

## 2022-05-09 NOTE — SUBJECTIVE & OBJECTIVE
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    Hyperlipidemia     Hypertension     Hypertensive heart disease with heart failure 3/12/2019    Intracranial hemorrhage     Lumbosacral spondylosis 12/24/2014    Pacemaker 1/23/2014    Paroxysmal atrial fibrillation 1/23/2014    Peripheral vascular disease 8/22/2014    Pneumonia of right lung due to infectious organism 3/27/2019    Seizure, late effect of stroke     Stroke     Type 2 diabetes mellitus with ophthalmic manifestations        Past Surgical History:   Procedure Laterality Date    ANGIOPLASTY      ATRIAL ABLATION SURGERY N/A     CATARACT EXTRACTION Bilateral     [facility]    CATARACT EXTRACTION W/ INTRAOCULAR LENS IMPLANT Right     CATARACT EXTRACTION W/ INTRAOCULAR LENS IMPLANT Left     COLONOSCOPY N/A 10/16/2018    Procedure: COLONOSCOPY with biopsies;  Surgeon: Anabell Griggs MD;  Location: [facility] ENDO;  Service: Endoscopy;  Laterality: N/A;    CORONARY ARTERY BYPASS GRAFT  07/2010    x5    EYE SURGERY      pace maker surgrey  10/2010    reposition in 2011/2012 (approx)    REPLACEMENT OF PACEMAKER GENERATOR Left 8/6/2020    Procedure: REPLACEMENT, PULSE GENERATOR, CARDIAC PACEMAKER;  Surgeon: Domenico Grajeda MD;  Location: [facility] CATH LAB;  Service: Cardiology;  Laterality: Left;  st carolee    REVISION OF PROCEDURE INVOLVING PACEMAKER LEAD Bilateral 8/6/2020    Procedure: REVISION, ELECTRODE LEAD, CARDIAC PACEMAKER;  Surgeon: Domenico Grajeda MD;  Location: [facility] CATH LAB;  Service: Cardiology;  Laterality: Bilateral;    VEIN BYPASS SURGERY         Review of patient's  "allergies indicates:   Allergen Reactions    Atorvastatin      Other reaction(s): Muscle pain  Other reaction(s): Muscle weakness  Other reaction(s): Muscle pain    Codeine      Other reaction(s): Headache  Other reaction(s): Headache    Eliquis [apixaban]     Norvasc [amlodipine] Edema    Trulicity [dulaglutide] Nausea And Vomiting    Adhesive Rash     Other reaction(s): rash       No current facility-administered medications on file prior to encounter.     Current Outpatient Medications on File Prior to Encounter   Medication Sig    ACCU-CHEK GUIDE TEST STRIPS Strp TEST SIX TIMES DAILY    ACCU-CHEK MULTICLIX LANCET lancets TEST BLOOD SUGAR THREE TIMES DAILY    ascorbic acid, vitamin C, (VITAMIN C) 100 MG tablet Take 100 mg by mouth once daily.    blood-glucose meter Misc 1 Device by Misc.(Non-Drug; Combo Route) route once. for 1 dose    CARBOXYMETHYLCELLULOSE SODIUM (REFRESH OPHT) Apply to eye.    COMFORT EZ PEN NEEDLES 32 gauge x 5/32" Ndle USE FOUR TIMES DAILY.    diclofenac sodium (VOLTAREN) 1 % Gel Apply 2 g topically once daily.    ferrous sulfate (IRON ORAL) Take 65 mg by mouth.    flecainide (TAMBOCOR) 50 MG Tab TAKE ONE TABLET BY MOUTH EVERY TWELVE HOURS    fluticasone propionate (FLONASE) 50 mcg/actuation nasal spray 2 sprays (100 mcg total) by Each Nostril route once daily.    furosemide (LASIX) 40 MG tablet Take 1 tablet (40 mg total) by mouth once daily.    guaiFENesin (MUCINEX) 600 mg 12 hr tablet Take 1 tablet (600 mg total) by mouth 2 (two) times daily.    insulin (LANTUS SOLOSTAR U-100 INSULIN) glargine 100 units/mL (3mL) SubQ pen INJECT 16 UNITS SUBCUTANEOUSLY AT BEDTIME. 94 DAY SUPPLY    insulin aspart U-100 (NOVOLOG FLEXPEN U-100 INSULIN) 100 unit/mL (3 mL) InPn pen INJECT TEN UNITS AT BREAKFAST, EIGHT AT LUNCH, AND TEN AT DINNER (54 DAYS SUPPLY)    isosorbide mononitrate (IMDUR) 60 MG 24 hr tablet TAKE ONE TABLET BY MOUTH TWICE DAILY    levocetirizine (XYZAL) 5 MG tablet Take 1 tablet (5 mg " total) by mouth every evening.    losartan (COZAAR) 100 MG tablet TAKE ONE TABLET (100mg) BY MOUTH DAILY    metoprolol succinate (TOPROL-XL) 100 MG 24 hr tablet Take 1 tablet (100 mg total) by mouth 2 (two) times daily.    mupirocin (BACTROBAN) 2 % ointment Apply topically 2 (two) times daily.    nitroGLYCERIN (NITROSTAT) 0.4 MG SL tablet Place 1 tablet (0.4 mg total) under the tongue every 5 (five) minutes as needed for Chest pain.    pantoprazole (PROTONIX) 40 MG tablet TAKE ONE TABLET (40mg) BY MOUTH EVERY DAY    saw palmetto 500 MG capsule Take 500 mg by mouth 2 (two) times a day.    simethicone (GAS-X EXTRA STRENGTH) 125 mg Cap capsule Take 1 capsule (125 mg total) by mouth 4 (four) times daily as needed for Flatulence.    turmeric 400 mg Cap Take 1 capsule by mouth once daily.    vitamin D (VITAMIN D3) 1000 units Tab Take 1,000 Units by mouth once daily.    vitamin E 100 UNIT capsule Take 100 Units by mouth once daily.     Family History       Problem Relation (Age of Onset)    Alcohol abuse Paternal Uncle    Arthritis Mother, Father    Cancer Sister, Daughter    Diabetes Mother, Father, Sister, Brother, Daughter, Son, Son    Fibromyalgia Daughter    Heart disease Mother, Sister, Brother    Kidney disease Mother, Brother    Miscarriages / Stillbirths Daughter          Tobacco Use    Smoking status: Former Smoker     Packs/day: 2.00     Years: 13.00     Pack years: 26.00     Types: Cigarettes     Start date: 1954     Quit date: 1967     Years since quittin.9    Smokeless tobacco: Never Used   Substance and Sexual Activity    Alcohol use: No    Drug use: No    Sexual activity: Never     Partners: Female     Birth control/protection: None     Review of Systems   Constitutional:  Negative for activity change, appetite change, chills, diaphoresis and fatigue.   HENT:  Negative for congestion, dental problem, ear discharge, ear pain and facial swelling.    Eyes:  Negative for pain, discharge and  itching.   Respiratory:  Negative for apnea, cough, chest tightness and shortness of breath.    Cardiovascular:  Negative for chest pain and leg swelling.   Gastrointestinal:  Negative for abdominal distention and abdominal pain.   Endocrine: Negative for cold intolerance, heat intolerance and polydipsia.   Genitourinary:  Negative for difficulty urinating, dysuria and enuresis.   Musculoskeletal:  Negative for arthralgias and back pain.   Skin:  Negative for color change and pallor.   Allergic/Immunologic: Negative for environmental allergies and food allergies.   Neurological:  Negative for dizziness, facial asymmetry, light-headedness and headaches.   Hematological:  Negative for adenopathy. Does not bruise/bleed easily.   Psychiatric/Behavioral:  Negative for agitation and behavioral problems.    Objective:     Vital Signs (Most Recent):  Temp: 97.7 °F (36.5 °C) (05/09/22 0423)  Pulse: (!) 55 (05/09/22 0423)  Resp: 18 (05/09/22 0423)  BP: (!) 124/56 (05/09/22 0423)  SpO2: 95 % (05/09/22 0423)   Vital Signs (24h Range):  Temp:  [97.4 °F (36.3 °C)-98.5 °F (36.9 °C)] 97.7 °F (36.5 °C)  Pulse:  [55-72] 55  Resp:  [17-26] 18  SpO2:  [95 %-99 %] 95 %  BP: (124-184)/(56-86) 124/56     Weight: 82.9 kg (182 lb 12.2 oz)  Body mass index is 26.22 kg/m².    Physical Exam  Vitals and nursing note reviewed.   Constitutional:       Appearance: He is well-developed.   HENT:      Head: Normocephalic and atraumatic.      Nose: Nose normal.   Eyes:      Pupils: Pupils are equal, round, and reactive to light.   Cardiovascular:      Rate and Rhythm: Normal rate and regular rhythm.      Heart sounds: Normal heart sounds.   Pulmonary:      Effort: Pulmonary effort is normal. No respiratory distress.      Breath sounds: Normal breath sounds. No wheezing or rales.   Abdominal:      General: Abdomen is flat. There is no distension.      Tenderness: There is no abdominal tenderness.   Musculoskeletal:         General: Normal range of  motion.      Cervical back: Normal range of motion and neck supple.   Skin:     General: Skin is dry.   Neurological:      Mental Status: He is alert and oriented to person, place, and time.   Psychiatric:         Mood and Affect: Mood normal.         CRANIAL NERVES     CN III, IV, VI   Pupils are equal, round, and reactive to light.     Significant Labs: All pertinent labs within the past 24 hours have been reviewed.  BMP:   Recent Labs   Lab 05/08/22 1812   *      K 4.1   CL 99   CO2 30*   BUN 24*   CREATININE 0.9   CALCIUM 10.4     CBC:   Recent Labs   Lab 05/08/22 1812   WBC 5.37   HGB 12.4*   HCT 37.2*          Significant Imaging: I have reviewed all pertinent imaging results/findings within the past 24 hours.

## 2022-05-09 NOTE — ASSESSMENT & PLAN NOTE
Continue chantell , will follow cardiology for advise on anticoagulation at this time . He had hemorraghic CVA -2019

## 2022-05-09 NOTE — SUBJECTIVE & OBJECTIVE
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    Hyperlipidemia     Hypertension     Hypertensive heart disease with heart failure 3/12/2019    Intracranial hemorrhage     Lumbosacral spondylosis 12/24/2014    Pacemaker 1/23/2014    Paroxysmal atrial fibrillation 1/23/2014    Peripheral vascular disease 8/22/2014    Pneumonia of right lung due to infectious organism 3/27/2019    Seizure, late effect of stroke     Stroke     Type 2 diabetes mellitus with ophthalmic manifestations        Past Surgical History:   Procedure Laterality Date    ANGIOPLASTY      ATRIAL ABLATION SURGERY N/A     CATARACT EXTRACTION Bilateral     [facility]    CATARACT EXTRACTION W/ INTRAOCULAR LENS IMPLANT Right     CATARACT EXTRACTION W/ INTRAOCULAR LENS IMPLANT Left     COLONOSCOPY N/A 10/16/2018    Procedure: COLONOSCOPY with biopsies;  Surgeon: Anabell Griggs MD;  Location: [facility] ENDO;  Service: Endoscopy;  Laterality: N/A;    CORONARY ARTERY BYPASS GRAFT  07/2010    x5    EYE SURGERY      pace maker surgrey  10/2010    reposition in 2011/2012 (approx)    REPLACEMENT OF PACEMAKER GENERATOR Left 8/6/2020    Procedure: REPLACEMENT, PULSE GENERATOR, CARDIAC PACEMAKER;  Surgeon: Domenico Grajeda MD;  Location: [facility] CATH LAB;  Service: Cardiology;  Laterality: Left;  [facility]    REVISION OF PROCEDURE INVOLVING PACEMAKER LEAD Bilateral 8/6/2020    Procedure: REVISION, ELECTRODE LEAD, CARDIAC PACEMAKER;  Surgeon: Domenico Grajeda MD;  Location: [facility] CATH LAB;  Service: Cardiology;  Laterality: Bilateral;    VEIN BYPASS SURGERY         Review of patient's  "allergies indicates:   Allergen Reactions    Atorvastatin      Other reaction(s): Muscle pain  Other reaction(s): Muscle weakness  Other reaction(s): Muscle pain    Codeine      Other reaction(s): Headache  Other reaction(s): Headache    Eliquis [apixaban]     Norvasc [amlodipine] Edema    Trulicity [dulaglutide] Nausea And Vomiting    Adhesive Rash     Other reaction(s): rash       No current facility-administered medications on file prior to encounter.     Current Outpatient Medications on File Prior to Encounter   Medication Sig    ACCU-CHEK GUIDE TEST STRIPS Strp TEST SIX TIMES DAILY    ACCU-CHEK MULTICLIX LANCET lancets TEST BLOOD SUGAR THREE TIMES DAILY    ascorbic acid, vitamin C, (VITAMIN C) 100 MG tablet Take 100 mg by mouth once daily.    blood-glucose meter Misc 1 Device by Misc.(Non-Drug; Combo Route) route once. for 1 dose    CARBOXYMETHYLCELLULOSE SODIUM (REFRESH OPHT) Apply to eye.    COMFORT EZ PEN NEEDLES 32 gauge x 5/32" Ndle USE FOUR TIMES DAILY.    diclofenac sodium (VOLTAREN) 1 % Gel Apply 2 g topically once daily.    ferrous sulfate (IRON ORAL) Take 65 mg by mouth.    flecainide (TAMBOCOR) 50 MG Tab TAKE ONE TABLET BY MOUTH EVERY TWELVE HOURS    fluticasone propionate (FLONASE) 50 mcg/actuation nasal spray 2 sprays (100 mcg total) by Each Nostril route once daily.    furosemide (LASIX) 40 MG tablet Take 1 tablet (40 mg total) by mouth once daily.    guaiFENesin (MUCINEX) 600 mg 12 hr tablet Take 1 tablet (600 mg total) by mouth 2 (two) times daily.    insulin (LANTUS SOLOSTAR U-100 INSULIN) glargine 100 units/mL (3mL) SubQ pen INJECT 16 UNITS SUBCUTANEOUSLY AT BEDTIME. 94 DAY SUPPLY    insulin aspart U-100 (NOVOLOG FLEXPEN U-100 INSULIN) 100 unit/mL (3 mL) InPn pen INJECT TEN UNITS AT BREAKFAST, EIGHT AT LUNCH, AND TEN AT DINNER (54 DAYS SUPPLY)    isosorbide mononitrate (IMDUR) 60 MG 24 hr tablet TAKE ONE TABLET BY MOUTH TWICE DAILY    levocetirizine (XYZAL) 5 MG tablet Take 1 tablet (5 mg " total) by mouth every evening.    losartan (COZAAR) 100 MG tablet TAKE ONE TABLET (100mg) BY MOUTH DAILY    metoprolol succinate (TOPROL-XL) 100 MG 24 hr tablet Take 1 tablet (100 mg total) by mouth 2 (two) times daily.    mupirocin (BACTROBAN) 2 % ointment Apply topically 2 (two) times daily.    nitroGLYCERIN (NITROSTAT) 0.4 MG SL tablet Place 1 tablet (0.4 mg total) under the tongue every 5 (five) minutes as needed for Chest pain.    pantoprazole (PROTONIX) 40 MG tablet TAKE ONE TABLET (40mg) BY MOUTH EVERY DAY    saw palmetto 500 MG capsule Take 500 mg by mouth 2 (two) times a day.    simethicone (GAS-X EXTRA STRENGTH) 125 mg Cap capsule Take 1 capsule (125 mg total) by mouth 4 (four) times daily as needed for Flatulence.    turmeric 400 mg Cap Take 1 capsule by mouth once daily.    vitamin D (VITAMIN D3) 1000 units Tab Take 1,000 Units by mouth once daily.    vitamin E 100 UNIT capsule Take 100 Units by mouth once daily.     Family History       Problem Relation (Age of Onset)    Alcohol abuse Paternal Uncle    Arthritis Mother, Father    Cancer Sister, Daughter    Diabetes Mother, Father, Sister, Brother, Daughter, Son, Son    Fibromyalgia Daughter    Heart disease Mother, Sister, Brother    Kidney disease Mother, Brother    Miscarriages / Stillbirths Daughter          Tobacco Use    Smoking status: Former Smoker     Packs/day: 2.00     Years: 13.00     Pack years: 26.00     Types: Cigarettes     Start date: 1954     Quit date: 1967     Years since quittin.9    Smokeless tobacco: Never Used   Substance and Sexual Activity    Alcohol use: No    Drug use: No    Sexual activity: Never     Partners: Female     Birth control/protection: None     ROS  Objective:     Vital Signs (Most Recent):  Temp: 97.1 °F (36.2 °C) (22 1113)  Pulse: 70 (22)  Resp: 16 (22)  BP: 139/62 (22)  SpO2: (!) 93 % (22)   Vital Signs (24h Range):  Temp:  [97.1 °F (36.2 °C)-98.5  °F (36.9 °C)] 97.1 °F (36.2 °C)  Pulse:  [55-72] 70  Resp:  [16-26] 16  SpO2:  [93 %-99 %] 93 %  BP: (124-184)/(56-86) 139/62     Weight: 82.9 kg (182 lb 12.2 oz)  Body mass index is 26.22 kg/m².    SpO2: (!) 93 %  O2 Device (Oxygen Therapy): room air      Intake/Output Summary (Last 24 hours) at 5/9/2022 1143  Last data filed at 5/9/2022 0830  Gross per 24 hour   Intake 0 ml   Output --   Net 0 ml       Lines/Drains/Airways       Peripheral Intravenous Line  Duration                  Peripheral IV - Single Lumen 05/08/22 1810 20 G Left Upper Arm <1 day                    Physical Exam  Vitals and nursing note reviewed.   Constitutional:       General: He is not in acute distress.     Appearance: Normal appearance. He is well-developed. He is not ill-appearing.   HENT:      Head: Normocephalic and atraumatic.      Nose: Nose normal.      Mouth/Throat:      Mouth: Mucous membranes are moist.   Eyes:      Pupils: Pupils are equal, round, and reactive to light.   Neck:      Thyroid: No thyromegaly.      Vascular: No JVD.      Trachea: No tracheal deviation.   Cardiovascular:      Rate and Rhythm: Normal rate and regular rhythm.      Chest Wall: PMI is not displaced.      Pulses: Intact distal pulses.           Carotid pulses are 0 on the right side and  on the left side with bruit.       Radial pulses are 2+ on the right side and 2+ on the left side.        Dorsalis pedis pulses are 2+ on the right side and 2+ on the left side.      Heart sounds: S1 normal and S2 normal. Heart sounds not distant. No murmur heard.     Comments: S/p PPM  Pulmonary:      Effort: Pulmonary effort is normal. No respiratory distress.      Breath sounds: Normal breath sounds. No wheezing.   Abdominal:      General: Bowel sounds are normal.      Palpations: Abdomen is soft.   Musculoskeletal:         General: No swelling. Normal range of motion.      Cervical back: Full passive range of motion without pain, normal range of motion and neck  supple.      Right ankle: No swelling.      Left ankle: No swelling.   Skin:     General: Skin is warm and dry.      Capillary Refill: Capillary refill takes less than 2 seconds.      Nails: There is no clubbing.   Neurological:      General: No focal deficit present.      Mental Status: He is alert and oriented to person, place, and time. Mental status is at baseline.   Psychiatric:         Mood and Affect: Mood normal.         Speech: Speech normal.         Behavior: Behavior normal.         Thought Content: Thought content normal.         Judgment: Judgment normal.       Significant Labs: BMP:   Recent Labs   Lab 05/08/22 1812 05/09/22  0741   * 90    139   K 4.1 4.1   CL 99 102   CO2 30* 29   BUN 24* 21   CREATININE 0.9 0.8   CALCIUM 10.4 10.0   MG  --  1.8   , CBC   Recent Labs   Lab 05/08/22 1812 05/09/22  0741   WBC 5.37 4.92   HGB 12.4* 11.7*   HCT 37.2* 35.2*    164   , Lipid Panel   Recent Labs   Lab 05/08/22 1812   CHOL 170   HDL 41   LDLCALC 106.6   TRIG 112   CHOLHDL 24.1   , Troponin   Recent Labs   Lab 05/09/22  0741   TROPONINI 0.009   , and All pertinent lab results from the last 24 hours have been reviewed.    Significant Imaging: Echocardiogram: Transthoracic echo (TTE) complete ([facility] Only):   Results for orders placed or performed during the hospital encounter of 09/20/21   Echo   Result Value Ref Range    BSA 2.04 m2    TDI SEPTAL 0.08 m/s    LV LATERAL E/E' RATIO 10.33 m/s    LV SEPTAL E/E' RATIO 15.50 m/s    LA WIDTH 3.20 cm    TDI LATERAL 0.12 m/s    LVIDd 3.95 3.5 - 6.0 cm    IVS 1.49 (A) 0.6 - 1.1 cm    Posterior Wall 1.26 (A) 0.6 - 1.1 cm    Ao root annulus 3.80 cm    LVIDs 2.90 2.1 - 4.0 cm    FS 27 28 - 44 %    LA volume 30.02 cm3    Sinus 3.94 cm    STJ 3.15 cm    Ascending aorta 3.58 cm    LV mass 199.56 g    LA size 3.09 cm    TAPSE 1.20 cm    Left Ventricle Relative Wall Thickness 0.64 cm    AV mean gradient 3 mmHg    AV valve area 2.65 cm2    AV Velocity  Ratio 0.69     AV index (prosthetic) 0.67     MV valve area p 1/2 method 4.48 cm2    E/A ratio 3.26     Mean e' 0.10 m/s    E wave deceleration time 169.22 msec    IVRT 68.51 msec    LVOT diameter 2.25 cm    LVOT area 4.0 cm2    LVOT peak korey 0.81 m/s    LVOT peak VTI 20.49 cm    Ao peak korey 1.17 m/s    Ao VTI 30.76 cm    RVOT peak korey 0.60 m/s    RVOT peak VTI 15.01 cm    LVOT stroke volume 81.43 cm3    AV peak gradient 5 mmHg    PV mean gradient 1 mmHg    E/E' ratio 12.40 m/s    MV Peak E Korey 1.24 m/s    TR Max Korey 2.84 m/s    MV stenosis pressure 1/2 time 49.07 ms    MV Peak A Korey 0.38 m/s    LV Systolic Volume 32.34 mL    LV Systolic Volume Index 16.0 mL/m2    LV Diastolic Volume 67.94 mL    LV Diastolic Volume Index 33.63 mL/m2    LA Volume Index 14.9 mL/m2    LV Mass Index 99 g/m2    RA Major Axis 4.69 cm    Left Atrium Minor Axis 2.97 cm    Left Atrium Major Axis 4.48 cm    Triscuspid Valve Regurgitation Peak Gradient 32 mmHg    Right Atrial Pressure (from IVC) 15 mmHg    EF 50 %    TV rest pulmonary artery pressure 47 mmHg    Narrative    · The left ventricle is normal in size with concentric hypertrophy and low   normal systolic function.  · The estimated ejection fraction is 50%.  · Indeterminate left ventricular diastolic function.  · Normal right ventricular size with low normal right ventricular systolic   function.  · Mild to moderate tricuspid regurgitation.  · Mild mitral regurgitation.  · Mild aortic regurgitation.  · There is pulmonary hypertension.  · The estimated PA systolic pressure is 47 mmHg.

## 2022-05-09 NOTE — PT/OT/SLP EVAL
Physical Therapy Evaluation and Discharge Note    Patient Name:  Anthony Blair   MRN:  [id number]    Recommendations:     Discharge Recommendations:  home   Discharge Equipment Recommendations: none   Barriers to discharge: None    Assessment:     Anthony Blair is a 89 y.o. male admitted with a medical diagnosis of Ischemic stroke. .  At this time, patient is functioning at their prior level of function and does not require further acute PT services.     Recent Surgery: * No surgery found *    Plan:     During this hospitalization, patient does not require further acute PT services.  Please re-consult if situation changes.  D/C PT FROM P.T. SERVICES TO [facility] FOR CONT. GAIT/TE TO TOLERANCE    Subjective     Chief Complaint:   Patient/Family Comments/goals:   Pain/Comfort:  · Pain Rating 1: 0/10    Patients cultural, spiritual, Shinto conflicts given the current situation:      Living Environment:  PT LIVES WITH SON WHO IS ABLE TO PROVIDE 24 HOUR CARE AS NEEDED, 1 STORY HOUSE 1 STEP TO ENTER, PT AMB INDEP OR WITH SPC AS NEEDED COMMUNITY DISTANCES, DRIVES, INDEP WITH ADL'S  Prior to admission, patients level of function was INDEP.  Equipment used at home: cane, straight (PT HAS ACCESS TO RW, W/C, BSC BUT DID NOT USE PTA).  DME owned (not currently used): none.  Upon discharge, patient will have assistance from SON OR GIRLFRIEND.    Objective:     Communicated with NURSE LARIOS prior to session.  Patient found supine with telemetry, peripheral IV upon PT entry to room.    General Precautions: Standard, fall, hearing impaired -PT HAS B HEARING AIDES  Orthopedic Precautions:N/A   Braces: N/A   Respiratory Status: Room air    Exams:  · Cognitive Exam:  Patient is oriented to Person, Place, Time and Situation  · Postural Exam:  Patient presented with the following abnormalities:    · -       Rounded shoulders  · Sensation:    · -       Intact  BLE  · RLE ROM: WFL  · RLE Strength: GROSSLY 4/5  · LLE  ROM: WFL  · LLE Strength: GROSSLY 4/5    Functional Mobility:  · Bed Mobility:     · Rolling Left:  modified independence  · Scooting: modified independence  · Supine to Sit: modified independence  · Transfers:     · Sit to Stand:  modified independence with no AD  · Bed to Chair: modified independence with  no AD  using  Step Transfer  · Gait: PT ' NO AD GOKUL, NO LOB OR SOB ON ROOM AIR  · Balance: GOOD    AM-PAC 6 CLICK MOBILITY  Total Score:21     Therapeutic Activities and Exercises:   PT EDUCATED IN ROLE OF P.T., PT ENCOURAGED TO INCREASE TIME OOB IN CHAIR, PT EDUCATED IN BLE THEREX TO PERFORM WHILE SEATED IN CHAIR: HIP FLEX/EXT, LAQ, AP'S    AM-PAC 6 CLICK MOBILITY  Total Score:21     Patient left up in chair with all lines intact, call button in reach and NURSE notified.    GOALS:   Multidisciplinary Problems     Physical Therapy Goals     Not on file                History:     Past Medical History:   Diagnosis Date    A-fib     Anemia     AP (angina pectoris) 1/23/2014    Carotid artery disease without cerebral infarction 8/22/2014    Cataract     Cirrhosis of liver 9/10/2020    Coronary artery disease     Diabetes mellitus     DM (diabetes mellitus) 1970     am 07/06/2020    GERD (gastroesophageal reflux disease) 7/11/2013    Hemorrhagic cerebrovascular accident (CVA) 4/26/2018    Hyperlipidemia     Hypertension     Hypertensive heart disease with heart failure 3/12/2019    Intracranial hemorrhage     Lumbosacral spondylosis 12/24/2014    Pacemaker 1/23/2014    Paroxysmal atrial fibrillation 1/23/2014    Peripheral vascular disease 8/22/2014    Pneumonia of right lung due to infectious organism 3/27/2019    Seizure, late effect of stroke     Stroke     Type 2 diabetes mellitus with ophthalmic manifestations        Past Surgical History:   Procedure Laterality Date    ANGIOPLASTY      ATRIAL ABLATION SURGERY N/A     [facility] EX[facility]CTION [facility]     [facility]    CATARACT EXTRACTION W/ INTRAOCULAR LENS IMPLANT Right     CATARACT EXTRACTION W/ INTRAOCULAR LENS IMPLANT Left     COLONOSCOPY N/A 10/16/2018    Procedure: COLONOSCOPY with biopsies;  Surgeon: Anabell Griggs MD;  Location: [facility] ENDO;  Service: Endoscopy;  Laterality: N/A;    CORONARY ARTERY BYPASS GRAFT  07/2010    x5    EYE SURGERY      pace maker surgrey  10/2010    reposition in 2011/2012 (approx)    REPLACEMENT OF PACEMAKER GENERATOR Left 8/6/2020    Procedure: REPLACEMENT, PULSE GENERATOR, CARDIAC PACEMAKER;  Surgeon: Domenico Grajeda MD;  Location: [facility] CATH LAB;  Service: Cardiology;  Laterality: Left;  [facility]    REVISION OF PROCEDURE INVOLVING PACEMAKER LEAD Bilateral 8/6/2020    Procedure: REVISION, ELECTRODE LEAD, CARDIAC PACEMAKER;  Surgeon: Domenico Grajeda MD;  Location: [facility] CATH LAB;  Service: Cardiology;  Laterality: Bilateral;    VEIN BYPASS SURGERY         Time Tracking:     PT Received On: 05/09/22  PT Start Time: 0920     PT Stop Time: 0943  PT Total Time (min): 23 min     Billable Minutes: Evaluation 15 and Therapeutic Activity 8    05/09/2022

## 2022-05-10 PROCEDURE — G0180 MD CERTIFICATION HHA PATIENT: HCPCS | Mod: ,,, | Performed by: NURSE PRACTITIONER

## 2022-05-10 PROCEDURE — G0180 PR HOME HEALTH MD CERTIFICATION: ICD-10-PCS | Mod: ,,, | Performed by: NURSE PRACTITIONER

## 2022-05-10 NOTE — PROGRESS NOTES
[facility]: [facility]  Follow-Up    The history is provided by the patient.                   Additional Follow-up details: Patient daughter in law called  to report that his bp monitor is not charging. He was in the ED over the weekend, he thought he had a stroke, but everything came back okay. He will be getting home health 2-3x per week, and they will check his bp. They will take bp monitor to [facility] for assistance. Overall, she reports his bp has been staying where his doctors want it to be.              Tech support needed.        There are no preventive care reminders to display for this patient.       Last 5 Patient Entered Readings                Current 30 Day Average: 146/75  Recent Readings 4/28/2022 4/21/2022 4/12/2022 4/12/2022 4/9/2022   SBP (mmHg) 150 141 155 141 153   DBP (mmHg) 76 71 78 75 86   Pulse 69 70 69 69 69

## 2022-05-12 ENCOUNTER — E-CONSULT (OUTPATIENT)
Dept: NEUROLOGY | Facility: CLINIC | Age: 87
End: 2022-05-12
Payer: MEDICARE

## 2022-05-12 ENCOUNTER — OFFICE VISIT (OUTPATIENT)
Dept: FAMILY MEDICINE | Facility: CLINIC | Age: 87
End: 2022-05-12
Payer: MEDICARE

## 2022-05-12 ENCOUNTER — TELEPHONE (OUTPATIENT)
Dept: FAMILY MEDICINE | Facility: CLINIC | Age: 87
End: 2022-05-12

## 2022-05-12 VITALS
WEIGHT: 182.56 LBS | DIASTOLIC BLOOD PRESSURE: 66 MMHG | TEMPERATURE: 99 F | HEART RATE: 80 BPM | SYSTOLIC BLOOD PRESSURE: 138 MMHG | OXYGEN SATURATION: 96 % | BODY MASS INDEX: 26.14 KG/M2 | HEIGHT: 70 IN

## 2022-05-12 DIAGNOSIS — I65.29 STENOSIS OF CAROTID ARTERY, UNSPECIFIED LATERALITY: Primary | ICD-10-CM

## 2022-05-12 DIAGNOSIS — E11.9 DIABETES MELLITUS TYPE 2 WITHOUT RETINOPATHY: Chronic | ICD-10-CM

## 2022-05-12 DIAGNOSIS — I10 HYPERTENSION, UNSPECIFIED TYPE: ICD-10-CM

## 2022-05-12 DIAGNOSIS — G45.9 TIA (TRANSIENT ISCHEMIC ATTACK): ICD-10-CM

## 2022-05-12 DIAGNOSIS — G45.9 TIA (TRANSIENT ISCHEMIC ATTACK): Primary | ICD-10-CM

## 2022-05-12 PROCEDURE — 99999 PR PBB SHADOW E&M-EST. PATIENT-LVL V: ICD-10-PCS | Mod: PBBFAC,,, | Performed by: FAMILY MEDICINE

## 2022-05-12 PROCEDURE — 1126F AMNT PAIN NOTED NONE PRSNT: CPT | Mod: CPTII,S$GLB,, | Performed by: FAMILY MEDICINE

## 2022-05-12 PROCEDURE — 1101F PR PT FALLS ASSESS DOC 0-1 FALLS W/OUT INJ PAST YR: ICD-10-PCS | Mod: CPTII,S$GLB,, | Performed by: FAMILY MEDICINE

## 2022-05-12 PROCEDURE — 99499 RISK ADDL DX/OHS AUDIT: ICD-10-PCS | Mod: HCNC,,, | Performed by: FAMILY MEDICINE

## 2022-05-12 PROCEDURE — 1157F PR ADVANCE CARE PLAN OR EQUIV PRESENT IN MEDICAL RECORD: ICD-10-PCS | Mod: CPTII,S$GLB,, | Performed by: FAMILY MEDICINE

## 2022-05-12 PROCEDURE — 99447 PR INTERPROF, PHONE/INTERNET/EHR, CONSULT, 11-20 MINS: ICD-10-PCS | Mod: S$GLB,,, | Performed by: STUDENT IN AN ORGANIZED HEALTH CARE EDUCATION/TRAINING PROGRAM

## 2022-05-12 PROCEDURE — 99499 UNLISTED E&M SERVICE: CPT | Mod: HCNC,,, | Performed by: FAMILY MEDICINE

## 2022-05-12 PROCEDURE — 1101F PT FALLS ASSESS-DOCD LE1/YR: CPT | Mod: CPTII,S$GLB,, | Performed by: FAMILY MEDICINE

## 2022-05-12 PROCEDURE — 99447 NTRPROF PH1/NTRNET/EHR 11-20: CPT | Mod: S$GLB,,, | Performed by: STUDENT IN AN ORGANIZED HEALTH CARE EDUCATION/TRAINING PROGRAM

## 2022-05-12 PROCEDURE — 3288F PR FALLS RISK ASSESSMENT DOCUMENTED: ICD-10-PCS | Mod: CPTII,S$GLB,, | Performed by: FAMILY MEDICINE

## 2022-05-12 PROCEDURE — 1157F ADVNC CARE PLAN IN RCRD: CPT | Mod: CPTII,S$GLB,, | Performed by: FAMILY MEDICINE

## 2022-05-12 PROCEDURE — 99214 OFFICE O/P EST MOD 30 MIN: CPT | Mod: S$GLB,,, | Performed by: FAMILY MEDICINE

## 2022-05-12 PROCEDURE — 3072F PR LOW RISK FOR RETINOPATHY: ICD-10-PCS | Mod: CPTII,S$GLB,, | Performed by: FAMILY MEDICINE

## 2022-05-12 PROCEDURE — 1126F PR PAIN SEVERITY QUANTIFIED, NO PAIN PRESENT: ICD-10-PCS | Mod: CPTII,S$GLB,, | Performed by: FAMILY MEDICINE

## 2022-05-12 PROCEDURE — 3288F FALL RISK ASSESSMENT DOCD: CPT | Mod: CPTII,S$GLB,, | Performed by: FAMILY MEDICINE

## 2022-05-12 PROCEDURE — 1159F MED LIST DOCD IN RCRD: CPT | Mod: CPTII,S$GLB,, | Performed by: FAMILY MEDICINE

## 2022-05-12 PROCEDURE — 99999 PR PBB SHADOW E&M-EST. PATIENT-LVL V: CPT | Mod: PBBFAC,,, | Performed by: FAMILY MEDICINE

## 2022-05-12 PROCEDURE — 99214 PR OFFICE/OUTPT VISIT, EST, LEVL IV, 30-39 MIN: ICD-10-PCS | Mod: S$GLB,,, | Performed by: FAMILY MEDICINE

## 2022-05-12 PROCEDURE — 1159F PR MEDICATION LIST DOCUMENTED IN MEDICAL RECORD: ICD-10-PCS | Mod: CPTII,S$GLB,, | Performed by: FAMILY MEDICINE

## 2022-05-12 PROCEDURE — 3072F LOW RISK FOR RETINOPATHY: CPT | Mod: CPTII,S$GLB,, | Performed by: FAMILY MEDICINE

## 2022-05-12 NOTE — PROGRESS NOTES
[facility]  Response for E-Consult     Patient Name: Anthony Blair  MRN: [id number]  Primary Care Provider: Abdulaziz Mccabe MD   Requesting Provider: Abdulaziz Mccabe MD      Findings: Per note patient had left foot weakness and right arm and face weakness in recent ED visit. CTA showed nayana grade right ICA calcified plaque ans stenosis.     Rec MRI brain wo to r/o any stroke.   Knowing the information of previous ICH, would still rec asa 81 mg daily   Lifestyle modification counseling   Refer to Vascular surgery for eval for Right ICA high grade stenosis.     I did not speak to the requesting provider verbally about this.     Total time of Consultation: 20 minute    Percentage of time spent on verbal/written discussion: 75%     Thank you for your consult.     Chay Pruitt MD  [facility]

## 2022-05-12 NOTE — TELEPHONE ENCOUNTER
Rec MRI brain wo to r/o any stroke.   Knowing the information of previous ICH, would still rec asa 81 mg daily   Lifestyle modification counseling   Refer to Vascular surgery for eval for Right ICA high grade stenosis.      I did not speak to the requesting provider verbally about this.      Total time of Consultation: 20 minute     Percentage of time spent on verbal/written discussion: 75%      Thank you for your consult.      Chay Pruitt MD  [facility]      Please let patient know these of than recommendations from neurologist that he needs to take a baby aspirin every day, need to get MRI of the brain to look for stroke.  He also needs to be referred to vascular surgery for evaluation of the right internal carotid artery high-grade stenosis.  Please refer

## 2022-05-12 NOTE — PROGRESS NOTES
Chief Complaint:    Chief Complaint   Patient presents with    Hospital Follow Up       History of Present Illness:  Family and/or Caretaker present at visit?  Yes.  Diagnostic tests reviewed/disposition: I have reviewed all completed as well as pending diagnostic tests at the time of discharge.  Disease/illness education: y  Home health/community services discussion/referrals: Patient does not have home health established from hospital visit.  They do not need home health.  If needed, we will set up home health for the patient.   Establishment or re-establishment of referral orders for community resources: No other necessary community resources.   Discussion with other health care providers: No discussion with other health care providers necessary.       Patient with hemorrhagic CVA, DDD, hemiplegia and hemiparesis, PAF, HLD, PVD, CAD, DM2, GERD, and cirrhosis of liver presents today for a hospital follow up on 05/08 for TIA      Had a headache. Missed his morning insulin before breakfast. R arm/face, L leg started tingling; daughter-in-law says he was dragging his L foot. Similar to his stroke in 2017 so he went to the ER, dx with TIA. No medications were changed. His weakness has improved, having no problems.   CT head shows blockages in vertebral artery. He knows he has a blockages in his neck.   Hasn't been on any blood thinners since 2017. History of cerebral hemorrhage in the past  Also unable to take statins because of myalgias     ROS:  Review of Systems   Constitutional: Negative for appetite change, chills and fever.   HENT: Negative for congestion, ear pain, postnasal drip, rhinorrhea, sinus pressure and sinus pain.    Eyes: Negative for pain.   Respiratory: Negative for cough, chest tightness and shortness of breath.    Cardiovascular: Negative for chest pain and palpitations.   Gastrointestinal: Negative for abdominal pain, blood in stool, constipation, diarrhea and nausea.   Genitourinary: Negative  for difficulty urinating, dysuria, flank pain and hematuria.   Musculoskeletal: Negative for arthralgias and myalgias.   Skin: Negative for pallor and wound.   Neurological: Negative for dizziness, tremors, facial asymmetry, speech difficulty, weakness, light-headedness and headaches.   Psychiatric/Behavioral: Negative for behavioral problems, dysphoric mood and sleep disturbance. The patient is not nervous/anxious.    All other systems reviewed and are negative.      Past Medical History:   Diagnosis Date    A-fib     Anemia     AP (angina pectoris) 2014    Carotid artery disease without cerebral infarction 2014    Cataract     Cirrhosis of liver 9/10/2020    Coronary artery disease     Diabetes mellitus     DM (diabetes mellitus) 1970     am 2020    GERD (gastroesophageal reflux disease) 2013    Hemorrhagic cerebrovascular accident (CVA) 2018    Hyperlipidemia     Hypertension     Hypertensive heart disease with heart failure 3/12/2019    Intracranial hemorrhage     Lumbosacral spondylosis 2014    Pacemaker 2014    Paroxysmal atrial fibrillation 2014    Peripheral vascular disease 2014    Pneumonia of right lung due to infectious organism 3/27/2019    Seizure, late effect of stroke     Stroke     Type 2 diabetes mellitus with ophthalmic manifestations        Social History:  Social History     Socioeconomic History    Marital status:     Number of children: 3    Highest education level: GED or equivalent   Tobacco Use    Smoking status: Former Smoker     Packs/day: 2.00     Years: 13.00     Pack years: 26.00     Types: Cigarettes     Start date: 1954     Quit date: 1967     Years since quittin.9    Smokeless tobacco: Never Used   Substance and Sexual Activity    Alcohol use: No    Drug use: No    Sexual activity: Never     Partners: Female     Birth control/protection: None   Social History Narrative     "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       Family History:   family history includes Alcohol abuse in his paternal uncle; Arthritis in his father and mother; Cancer in his daughter and sister; Diabetes in his brother, daughter, father, mother, sister, son, and son; Fibromyalgia in his daughter; Heart disease in his brother, mother, and sister; Kidney disease in his brother and mother; Miscarriages / Stillbirths in his daughter.    Health Maintenance   Topic Date Due    Foot Exam  06/02/2022    Eye Exam  07/12/2022    Hemoglobin A1c  11/09/2022    Lipid Panel  05/08/2023    TETANUS VACCINE  01/23/2024       Physical Exam:    Vital Signs  Temp: 98.6 °F (37 °C)  Pulse: 80  SpO2: 96 %  BP: 138/66  Pain Score: 0-No pain  Height and Weight  Height: 5' 10" (177.8 cm)  Weight: 82.8 kg (182 lb 8.7 oz)  BSA (Calculated - sq m): 2.02 sq meters  BMI (Calculated): 26.2  Weight in (lb) to have BMI = 25: 173.9]    Body mass index is 26.19 kg/m².    Physical Exam  Vitals and nursing note reviewed.   Constitutional:       Appearance: Normal appearance.   HENT:      Head: Normocephalic and atraumatic.      Right Ear: Tympanic membrane normal.      Left Ear: Tympanic membrane normal.   Eyes:      Extraocular Movements: Extraocular movements intact.      Pupils: Pupils are equal, round, and reactive to light.   Cardiovascular:      Rate and Rhythm: Normal rate and regular rhythm.      Pulses: Normal pulses.      Heart sounds: Normal heart sounds. No murmur heard.    No gallop.   Pulmonary:      Effort: Pulmonary effort is normal. No respiratory distress.      Breath sounds: Normal breath sounds. No wheezing, rhonchi or rales.   Abdominal:      General: There is no distension.      Palpations: Abdomen is soft.      Tenderness: There is no abdominal tenderness.   Musculoskeletal:         General: No swelling, deformity or signs of injury. Normal range of motion.      Cervical back: Normal range of motion. "   Skin:     General: Skin is warm and dry.      Capillary Refill: Capillary refill takes less than 2 seconds.      Coloration: Skin is not jaundiced or pale.   Neurological:      General: No focal deficit present.      Mental Status: He is alert and oriented to person, place, and time.      Gait: Gait is intact. Gait and tandem walk normal.   Psychiatric:         Mood and Affect: Mood normal.         Behavior: Behavior normal.           Diabetes Management Status    Statin: Not taking  ACE/ARB: Taking    Screening or Prevention Patient's value Goal Complete/Controlled?   HgA1C Testing and Control   Lab Results   Component Value Date    HGBA1C 6.4 (H) 05/09/2022      Annually/Less than 8% Yes   Lipid profile : 05/08/2022 Annually Yes   LDL control Lab Results   Component Value Date    LDLCALC 106.6 05/08/2022    Annually/Less than 100 mg/dl  No   Nephropathy screening Lab Results   Component Value Date    LABMICR 19.0 09/09/2021     Lab Results   Component Value Date    PROTEINUA Negative 12/12/2021    Annually Yes   Blood pressure BP Readings from Last 1 Encounters:   05/09/22 139/62    Less than 140/90 Yes   Dilated retinal exam : 07/12/2021 Annually Yes   Foot exam   : 06/02/2021 Annually Yes       Assessment:      ICD-10-CM ICD-9-CM   1. TIA (transient ischemic attack)  G45.9 435.9         Plan:    Discussed results of CTA with patient.  He consult Neurology about the results of CT of the head  Follow up with Dr. Frazier on 05/13 at 9:40 AM.  Discuss cardiac monitor placement  Continue monitoring blood sugar fasting level and sugar level two hours after you eat. Fasting sugar levels should be . Postprandial sugars should be < 150. Comply with insulin.   Optimize blood pressure monitoring  No orders of the defined types were placed in this encounter.      Current Outpatient Medications   Medication Sig Dispense Refill    ACCU-CHEK GUIDE TEST STRIPS Strp TEST SIX TIMES DAILY 600 strip 3    ACCU-CHEK MULTICLIX  "LANCET lancets TEST BLOOD SUGAR THREE TIMES DAILY 200 each 5    ascorbic acid, vitamin C, (VITAMIN C) 100 MG tablet Take 100 mg by mouth once daily.      CARBOXYMETHYLCELLULOSE SODIUM (REFRESH OPHT) Apply to eye.      COMFORT EZ PEN NEEDLES 32 gauge x 5/32" Ndle USE FOUR TIMES DAILY. 200 each 6    diclofenac sodium (VOLTAREN) 1 % Gel Apply 2 g topically once daily. 100 g 2    ferrous sulfate (IRON ORAL) Take 65 mg by mouth.      flecainide (TAMBOCOR) 50 MG Tab TAKE ONE TABLET BY MOUTH EVERY TWELVE HOURS 60 tablet 6    fluticasone propionate (FLONASE) 50 mcg/actuation nasal spray 2 sprays (100 mcg total) by Each Nostril route once daily. 16 g 3    furosemide (LASIX) 40 MG tablet Take 1 tablet (40 mg total) by mouth once daily. 30 tablet 12    guaiFENesin (MUCINEX) 600 mg 12 hr tablet Take 1 tablet (600 mg total) by mouth 2 (two) times daily.      insulin (LANTUS SOLOSTAR U-100 INSULIN) glargine 100 units/mL (3mL) SubQ pen INJECT 16 UNITS SUBCUTANEOUSLY AT BEDTIME. 94 DAY SUPPLY 15 mL 11    insulin aspart U-100 (NOVOLOG FLEXPEN U-100 INSULIN) 100 unit/mL (3 mL) InPn pen INJECT TEN UNITS AT BREAKFAST, EIGHT AT LUNCH, AND TEN AT DINNER (54 DAYS SUPPLY) 15 mL 3    isosorbide mononitrate (IMDUR) 60 MG 24 hr tablet TAKE ONE TABLET BY MOUTH TWICE DAILY 60 tablet 12    levocetirizine (XYZAL) 5 MG tablet Take 1 tablet (5 mg total) by mouth every evening. 30 tablet 11    losartan (COZAAR) 100 MG tablet TAKE ONE TABLET (100mg) BY MOUTH DAILY 90 tablet 1    metoprolol succinate (TOPROL-XL) 100 MG 24 hr tablet Take 1 tablet (100 mg total) by mouth 2 (two) times daily. 60 tablet 12    mupirocin (BACTROBAN) 2 % ointment Apply topically 2 (two) times daily. 30 g 0    nitroGLYCERIN (NITROSTAT) 0.4 MG SL tablet Place 1 tablet (0.4 mg total) under the tongue every 5 (five) minutes as needed for Chest pain. 25 tablet 12    pantoprazole (PROTONIX) 40 MG tablet TAKE ONE TABLET (40mg) BY MOUTH EVERY DAY 90 tablet 3    " saw palmetto 500 MG capsule Take 500 mg by mouth 2 (two) times a day.      simethicone (GAS-X EXTRA STRENGTH) 125 mg Cap capsule Take 1 capsule (125 mg total) by mouth 4 (four) times daily as needed for Flatulence.  0    turmeric 400 mg Cap Take 1 capsule by mouth once daily.      vitamin D (VITAMIN D3) 1000 units Tab Take 1,000 Units by mouth once daily.      vitamin E 100 UNIT capsule Take 100 Units by mouth once daily.      blood-glucose meter Misc 1 Device by Misc.(Non-Drug; Combo Route) route once. for 1 dose 1 each 0     Current Facility-Administered Medications   Medication Dose Route Frequency Provider Last Rate Last Admin    acetaminophen tablet 650 mg  650 mg Oral Once PRN Hesham Moanco MD        albuterol inhaler 2 puff  2 puff Inhalation Q20 Min PRN Hesham Monaco MD        diphenhydrAMINE injection 25 mg  25 mg Intravenous Once PRN Hesham Monaco MD        EPINEPHrine (EPIPEN) 0.3 mg/0.3 mL pen injection 0.3 mg  0.3 mg Intramuscular PRN Hesham Monaco MD        methylPREDNISolone sodium succinate injection 40 mg  40 mg Intravenous Once PRN Hesham Monaco MD        ondansetron disintegrating tablet 4 mg  4 mg Oral Once PRN Hesham Monaco MD        sodium chloride 0.9% 500 mL flush bag   Intravenous PRN Hesham Monaco MD        sodium chloride 0.9% flush 10 mL  10 mL Intravenous PRN Hesham Monaco MD           There are no discontinued medications.    No follow-ups on file.      Abdulaziz Mccabe MD  Scribe Attestation:   I, Ani Frazier, am scribing for, and in the presence of, Dr.Arif Mccabe I performed the above scribed service and the documentation accurately describes the services I performed. I attest to the accuracy of the note.    I, Dr. Abdulaziz Mccabe, reviewed documentation as scribed above. I performed the services described in this documentation.  I agree that the record reflects my personal performance and is accurate and complete. Abdulaziz Mccabe MD.   05/12/2022

## 2022-05-13 ENCOUNTER — TELEPHONE (OUTPATIENT)
Dept: CARDIOLOGY | Facility: CLINIC | Age: 87
End: 2022-05-13

## 2022-05-13 ENCOUNTER — OFFICE VISIT (OUTPATIENT)
Dept: CARDIOLOGY | Facility: CLINIC | Age: 87
End: 2022-05-13
Payer: MEDICARE

## 2022-05-13 ENCOUNTER — TELEPHONE (OUTPATIENT)
Dept: CARDIOLOGY | Facility: HOSPITAL | Age: 87
End: 2022-05-13
Payer: MEDICARE

## 2022-05-13 VITALS
HEIGHT: 70 IN | DIASTOLIC BLOOD PRESSURE: 66 MMHG | RESPIRATION RATE: 16 BRPM | HEART RATE: 86 BPM | OXYGEN SATURATION: 98 % | SYSTOLIC BLOOD PRESSURE: 130 MMHG | BODY MASS INDEX: 26.2 KG/M2 | WEIGHT: 183 LBS

## 2022-05-13 DIAGNOSIS — I36.1 NONRHEUMATIC TRICUSPID VALVE REGURGITATION: ICD-10-CM

## 2022-05-13 DIAGNOSIS — I50.32 CHRONIC DIASTOLIC HEART FAILURE: ICD-10-CM

## 2022-05-13 DIAGNOSIS — I50.42 CHRONIC COMBINED SYSTOLIC AND DIASTOLIC CONGESTIVE HEART FAILURE: ICD-10-CM

## 2022-05-13 DIAGNOSIS — R94.31 ABNORMAL ECG: ICD-10-CM

## 2022-05-13 DIAGNOSIS — I71.21 ANEURYSM OF ASCENDING AORTA: ICD-10-CM

## 2022-05-13 DIAGNOSIS — Z95.0 PACEMAKER: Primary | ICD-10-CM

## 2022-05-13 DIAGNOSIS — I25.708 CORONARY ARTERY DISEASE OF BYPASS GRAFT OF NATIVE HEART WITH STABLE ANGINA PECTORIS: ICD-10-CM

## 2022-05-13 DIAGNOSIS — I48.0 PAROXYSMAL ATRIAL FIBRILLATION: ICD-10-CM

## 2022-05-13 DIAGNOSIS — E78.2 MIXED HYPERLIPIDEMIA: Chronic | ICD-10-CM

## 2022-05-13 DIAGNOSIS — I20.9 AP (ANGINA PECTORIS): ICD-10-CM

## 2022-05-13 DIAGNOSIS — I61.9 HEMORRHAGIC CEREBROVASCULAR ACCIDENT (CVA): ICD-10-CM

## 2022-05-13 DIAGNOSIS — Z78.9 STATIN INTOLERANCE: ICD-10-CM

## 2022-05-13 DIAGNOSIS — I10 ESSENTIAL HYPERTENSION: ICD-10-CM

## 2022-05-13 DIAGNOSIS — I77.9 CAROTID ARTERY DISEASE WITHOUT CEREBRAL INFARCTION: Chronic | ICD-10-CM

## 2022-05-13 DIAGNOSIS — I35.0 NONRHEUMATIC AORTIC VALVE STENOSIS: ICD-10-CM

## 2022-05-13 DIAGNOSIS — E08.51 DIABETES MELLITUS DUE TO UNDERLYING CONDITION WITH DIABETIC PERIPHERAL ANGIOPATHY WITHOUT GANGRENE, WITHOUT LONG-TERM CURRENT USE OF INSULIN: ICD-10-CM

## 2022-05-13 DIAGNOSIS — G45.9 TIA (TRANSIENT ISCHEMIC ATTACK): Primary | ICD-10-CM

## 2022-05-13 DIAGNOSIS — I73.9 PERIPHERAL VASCULAR DISEASE: Chronic | ICD-10-CM

## 2022-05-13 DIAGNOSIS — Z95.0 PACEMAKER: ICD-10-CM

## 2022-05-13 DIAGNOSIS — I48.0 PAROXYSMAL ATRIAL FIBRILLATION: Chronic | ICD-10-CM

## 2022-05-13 DIAGNOSIS — I73.9 CLAUDICATION IN PERIPHERAL VASCULAR DISEASE: Chronic | ICD-10-CM

## 2022-05-13 PROCEDURE — 1157F ADVNC CARE PLAN IN RCRD: CPT | Mod: CPTII,S$GLB,, | Performed by: INTERNAL MEDICINE

## 2022-05-13 PROCEDURE — 3072F LOW RISK FOR RETINOPATHY: CPT | Mod: CPTII,S$GLB,, | Performed by: INTERNAL MEDICINE

## 2022-05-13 PROCEDURE — 1101F PT FALLS ASSESS-DOCD LE1/YR: CPT | Mod: CPTII,S$GLB,, | Performed by: INTERNAL MEDICINE

## 2022-05-13 PROCEDURE — 99999 PR PBB SHADOW E&M-EST. PATIENT-LVL III: CPT | Mod: PBBFAC,,, | Performed by: INTERNAL MEDICINE

## 2022-05-13 PROCEDURE — 3288F FALL RISK ASSESSMENT DOCD: CPT | Mod: CPTII,S$GLB,, | Performed by: INTERNAL MEDICINE

## 2022-05-13 PROCEDURE — 1126F PR PAIN SEVERITY QUANTIFIED, NO PAIN PRESENT: ICD-10-PCS | Mod: CPTII,S$GLB,, | Performed by: INTERNAL MEDICINE

## 2022-05-13 PROCEDURE — 1101F PR PT FALLS ASSESS DOC 0-1 FALLS W/OUT INJ PAST YR: ICD-10-PCS | Mod: CPTII,S$GLB,, | Performed by: INTERNAL MEDICINE

## 2022-05-13 PROCEDURE — 99214 OFFICE O/P EST MOD 30 MIN: CPT | Mod: S$GLB,,, | Performed by: INTERNAL MEDICINE

## 2022-05-13 PROCEDURE — 99999 PR PBB SHADOW E&M-EST. PATIENT-LVL III: ICD-10-PCS | Mod: PBBFAC,,, | Performed by: INTERNAL MEDICINE

## 2022-05-13 PROCEDURE — 99214 PR OFFICE/OUTPT VISIT, EST, LEVL IV, 30-39 MIN: ICD-10-PCS | Mod: S$GLB,,, | Performed by: INTERNAL MEDICINE

## 2022-05-13 PROCEDURE — 3072F PR LOW RISK FOR RETINOPATHY: ICD-10-PCS | Mod: CPTII,S$GLB,, | Performed by: INTERNAL MEDICINE

## 2022-05-13 PROCEDURE — 1126F AMNT PAIN NOTED NONE PRSNT: CPT | Mod: CPTII,S$GLB,, | Performed by: INTERNAL MEDICINE

## 2022-05-13 PROCEDURE — 3288F PR FALLS RISK ASSESSMENT DOCUMENTED: ICD-10-PCS | Mod: CPTII,S$GLB,, | Performed by: INTERNAL MEDICINE

## 2022-05-13 PROCEDURE — 1157F PR ADVANCE CARE PLAN OR EQUIV PRESENT IN MEDICAL RECORD: ICD-10-PCS | Mod: CPTII,S$GLB,, | Performed by: INTERNAL MEDICINE

## 2022-05-13 NOTE — TELEPHONE ENCOUNTER
Spoke with daughter in law, Kori, she states pt was diagnosed with recent TIA.  Scheduled device check 5/16/22 per Dr. Frazier request, appt at [place] location, start time 11:20am.  She repeated back appt information correctly, all questions answered.

## 2022-05-13 NOTE — TELEPHONE ENCOUNTER
----- Message from Sienna Schofield MA sent at 5/13/2022 10:10 AM CDT -----  Hi Good morning Dr. Frazier wants this patient to have a interrogation Asap, can you reach out to him and schedule thanks so much.

## 2022-05-13 NOTE — TELEPHONE ENCOUNTER
----- Message from Melissa Proctor LPN sent at 5/13/2022  5:01 PM CDT -----  Regarding: Referral  Good afternoon,    A referral to Vascular Surgery was placed for Mr. Blair by Dr. Mccabe to eval and treat carotid stenosis. Pt is unsure if he needs to be see by surgeon and would like for you to give an opinion regarding this referral.    Thank you,  Melissa

## 2022-05-13 NOTE — TELEPHONE ENCOUNTER
F/u with Vascular Surgery as advised by PCP to see if there are any new recommendations if pt is interested in going.  I don't think surgery will be done and will just be medical treatment as best as possible.    Dr Frazier

## 2022-05-13 NOTE — PROGRESS NOTES
Subjective:    Patient ID:  Anthony Blair is a 89 y.o. male who presents for evaluation of Hyperlipidemia, Hypertension, Atrial Fibrillation, Coronary Artery Disease, Carotid Artery Disease, Peripheral Arterial Disease, and Hospital Follow Up      HPI pt presents for eval.  His current medical conditions include combined CHF, PAF/PSVT, CAD (PTCA 1980's), HTN, PPM, PHTN, LVH, LAE, carotid artery disease, DM, atrial septal aneurysm/PFO, PAD, dyslipidemia. Nonsmoker.  Past details pertinent for following:   Statin intolerant.  s/p CABG 7/10 (LIMA-LAD, SVG-OM1, SVG-OM3, SVG-PDA) for increasing OLIVARES and multivessel CAD on [facility].   S/p PPM 10/10 for SSS/PAF. Was hospitalized 1/11 for PSVT (Atrial tachycardia) and was started on Amiodarone but was stopped for GI discomfort.   He also did not tolerate Multaq and has not tolerated multiple statins.   s/p EPS/ablation of his atrial tachycardia 6/11.   S/p abd w runoff March 2015 and had significant B LE infrapopliteal disease. Atherectomy/pta performed of critical R tibeoperoneal trunk stenosis. Also has L tibeoperoneal trunk stenosis and his PTA/MIGUEL are occluded bilaterally.  Started on Eliquis Feb 2017 for suspicious left internal jugular vein thrombus.  Stress MPI 3/17 showed no ischemia.  Echo 3/17 showed normal LV function, left-right atrial shunt.   Carotid u/s 6/17 showed mild bilateral disease, known L IJ thrombus noted.  Pt called clinic Sept 2017 stated he had stopped Eliquis couple weeks before his call due to diarrhea, weakness, bloating and also off Amlodipine due to sleepiness.  He stated sxs subsided when he stopped the meds.   He was advised by cardiology on 9/6/17 to take 81 mg ASA since he stopped his Eliquis.  He had acute hemorrhage of basal ganglia right side Sept 2017, causing weakness on left side.  Tx at [facility] [address] 9/24/17. He was on ASA daily when CVA occurred and was not on Eliquis. Also noted to have mild thoracic aneurysm 4.1 cm, 60% R ICA stenosis, 30%  LICA stenosis, patent vertebral arteries but mod-severe distal left vertebral disease, by CTA per PCP note.  He was taken off his asa.  Followed by Dr. Weaver, neurosurgery. No surgery was needed.  Echo showed normal LVEF.  Stress test 8/18 showed no ischemia, apical scar.    Echo 3/19 normal EF, LAE, LVH, DD, PHTN 76 mmHg, mild TR.  There was some consideration of restarting NOAC for a fib CVA protection after pt evaluated by EP service.  Started on Flecainide 6/20 by Dr. Grajeda.  I expressed reluctance to put pt back on anticoagulation in light of prior hemorrhagic CVA with residual deficits.  S/p CRT device 8/20 with Dr. Grajeda.  Echo 7/20 EF 40%, DD, RVE, mild RV dysfunction, mod-severe TR, mild MR, LVH, mild AS, PFO.  Echo 5/21: Normal EF, DD, LAE, ASD, mild TR, PAP 42 mmHg.  Carotid u/s 5/21 20 - 39% bilateral stenosis.  Echo 9/21 EF 50%, DD, mild-mod TR, mild AI/MR, PAP 47 mmHg.  Had Covid 12/21 then flu a while later.  Now here.  Pt admitted May 2022 with TIA.  Pt states he initially had a headache, then veered off walking to right, spots in his eyes, tingling on right face/arm/leg, dragged right foot.  Chart reviewed.  Neurology evaluated pt.  Deemed not to be good candidate for anticoagulation or antiplatelet tx with his h/o cerebral hemorrhage/CVA.  CTA brain, neck:  50% bilateral carotid disease, 50 -  60% bilateral subclavian stenosis, occluded left vertebral artery.  ecg V paced rhythm.  Back to normal.  No angina.  No acute CHF sxs.  BP stable.  Lipids/DM stable.      Past Medical History:   Diagnosis Date    A-fib     Anemia     AP (angina pectoris) 1/23/2014    Carotid artery disease without cerebral infarction 8/22/2014    Cataract     Cirrhosis of liver 9/10/2020    Coronary artery disease     Diabetes mellitus     DM (diabetes mellitus) 1970     am 07/06/2020    GERD (gastroesophageal reflux disease) 7/11/2013    Hemorrhagic cerebrovascular accident (CVA) 4/26/2018     "Hyperlipidemia     Hypertension     Hypertensive heart disease with heart failure 3/12/2019    Intracranial hemorrhage     Lumbosacral spondylosis 12/24/2014    Pacemaker 1/23/2014    Paroxysmal atrial fibrillation 1/23/2014    Peripheral vascular disease 8/22/2014    Pneumonia of right lung due to infectious organism 3/27/2019    Seizure, late effect of stroke     Stroke     Type 2 diabetes mellitus with ophthalmic manifestations      Current Outpatient Medications   Medication Instructions    ACCU-CHEK GUIDE TEST STRIPS Strp TEST SIX TIMES DAILY    ACCU-CHEK MULTICLIX LANCET lancets TEST BLOOD SUGAR THREE TIMES DAILY    ascorbic acid (vitamin C) (VITAMIN C) 100 mg, Oral, Daily    blood-glucose meter Misc 1 Device, Misc.(Non-Drug; Combo Route), Once    CARBOXYMETHYLCELLULOSE SODIUM (REFRESH OPHT) Ophthalmic    COMFORT EZ PEN NEEDLES 32 gauge x 5/32" Ndle USE FOUR TIMES DAILY.    diclofenac sodium (VOLTAREN) 2 g, Topical (Top), Daily    ferrous sulfate (IRON ORAL) 65 mg, Oral    flecainide (TAMBOCOR) 50 MG Tab TAKE ONE TABLET BY MOUTH EVERY TWELVE HOURS    fluticasone propionate (FLONASE) 100 mcg, Each Nostril, Daily    furosemide (LASIX) 40 mg, Oral, Daily    guaiFENesin (MUCINEX) 600 mg, Oral, 2 times daily    insulin (LANTUS SOLOSTAR U-100 INSULIN) glargine 100 units/mL (3mL) SubQ pen INJECT 16 UNITS SUBCUTANEOUSLY AT BEDTIME. 94 DAY SUPPLY    insulin aspart U-100 (NOVOLOG FLEXPEN U-100 INSULIN) 100 unit/mL (3 mL) InPn pen INJECT TEN UNITS AT BREAKFAST, EIGHT AT LUNCH, AND TEN AT DINNER (54 DAYS SUPPLY)    isosorbide mononitrate (IMDUR) 60 MG 24 hr tablet TAKE ONE TABLET BY MOUTH TWICE DAILY    levocetirizine (XYZAL) 5 mg, Oral, Nightly    losartan (COZAAR) 100 MG tablet TAKE ONE TABLET (100mg) BY MOUTH DAILY    metoprolol succinate (TOPROL-XL) 100 mg, Oral, 2 times daily    mupirocin (BACTROBAN) 2 % ointment Topical (Top), 2 times daily    nitroGLYCERIN (NITROSTAT) 0.4 mg, " "Sublingual, Every 5 min PRN    pantoprazole (PROTONIX) 40 MG tablet TAKE ONE TABLET (40mg) BY MOUTH EVERY DAY    saw palmetto 500 mg, Oral, 2 times daily    simethicone (GAS-X EXTRA STRENGTH) 125 mg, Oral, 4 times daily PRN    turmeric 400 mg Cap 1 capsule, Oral, Daily    vitamin D (VITAMIN D3) 1,000 Units, Oral, Daily    vitamin E 100 Units, Oral, Daily         Review of Systems   Constitutional: Negative.   HENT: Negative.    Eyes: Negative.    Cardiovascular: Negative.    Respiratory: Negative.    Endocrine: Negative.    Hematologic/Lymphatic: Negative.    Skin: Negative.    Musculoskeletal: Positive for arthritis and joint pain.   Gastrointestinal: Negative.    Genitourinary: Negative.    Neurological: Positive for focal weakness and weakness.   Psychiatric/Behavioral: Negative.    Allergic/Immunologic: Negative.        /66 (BP Location: Right arm, Patient Position: Sitting, BP Method: Medium (Manual))   Pulse 86   Resp 16   Ht 5' 10" (1.778 m)   Wt 83 kg (182 lb 15.7 oz)   SpO2 98%   BMI 26.26 kg/m²     Wt Readings from Last 3 Encounters:   05/13/22 83 kg (182 lb 15.7 oz)   05/09/22 82.9 kg (182 lb 12.2 oz)   04/18/22 85.2 kg (187 lb 13.3 oz)     Temp Readings from Last 3 Encounters:   05/09/22 97.1 °F (36.2 °C) (Axillary)   04/18/22 98.6 °F (37 °C)   04/01/22 98.1 °F (36.7 °C)     BP Readings from Last 3 Encounters:   05/13/22 130/66   05/09/22 139/62   04/18/22 138/66     Pulse Readings from Last 3 Encounters:   05/13/22 86   05/09/22 70   04/18/22 85          Objective:    Physical Exam  Vitals and nursing note reviewed.   Constitutional:       Appearance: He is well-developed.   HENT:      Head: Normocephalic.   Neck:      Thyroid: No thyromegaly.      Vascular: Normal carotid pulses. No carotid bruit, hepatojugular reflux or JVD.   Cardiovascular:      Rate and Rhythm: Normal rate and regular rhythm.      Chest Wall: PMI is not displaced.      Pulses:           Radial pulses are 2+ on the " right side and 2+ on the left side.      Heart sounds: S1 normal and S2 normal. Heart sounds not distant. No midsystolic click and no opening snap. Murmur heard.    Medium-pitched midsystolic murmur is present with a grade of 1/6.    No friction rub. No S3 or S4 sounds.   Pulmonary:      Effort: Pulmonary effort is normal.      Breath sounds: Normal breath sounds. No wheezing or rales.   Abdominal:      General: Bowel sounds are normal. There is no distension or abdominal bruit.      Palpations: Abdomen is soft. There is no mass.      Tenderness: There is no abdominal tenderness.   Musculoskeletal:      Cervical back: Normal range of motion and neck supple.   Skin:     General: Skin is warm.   Neurological:      Mental Status: He is alert and oriented to person, place, and time.   Psychiatric:         Behavior: Behavior normal.       I have reviewed all pertinent labs and cardiac studies.        Chemistry        Component Value Date/Time     05/09/2022 0741    K 4.1 05/09/2022 0741     05/09/2022 0741    CO2 29 05/09/2022 0741    BUN 21 05/09/2022 0741    CREATININE 0.8 05/09/2022 0741    GLU 90 05/09/2022 0741        Component Value Date/Time    CALCIUM 10.0 05/09/2022 0741    ALKPHOS 115 05/09/2022 0741    AST 30 05/09/2022 0741    ALT 18 05/09/2022 0741    BILITOT 1.2 (H) 05/09/2022 0741    ESTGFRAFRICA >60 05/09/2022 0741    EGFRNONAA >60 05/09/2022 0741        Lab Results   Component Value Date    WBC 4.92 05/09/2022    HGB 11.7 (L) 05/09/2022    HCT 35.2 (L) 05/09/2022    MCV 92 05/09/2022     05/09/2022         Lab Results   Component Value Date    HGBA1C 6.4 (H) 05/09/2022       Lab Results   Component Value Date    CHOL 170 05/08/2022    CHOL 163 09/09/2021    CHOL 183 05/26/2021     Lab Results   Component Value Date    HDL 41 05/08/2022    HDL 41 09/09/2021    HDL 44 05/26/2021     Lab Results   Component Value Date    LDLCALC 106.6 05/08/2022    LDLCALC 94.4 09/09/2021    LDLCALC 108.4  05/26/2021     Lab Results   Component Value Date    TRIG 112 05/08/2022    TRIG 138 09/09/2021    TRIG 153 (H) 05/26/2021     Lab Results   Component Value Date    CHOLHDL 24.1 05/08/2022    CHOLHDL 25.2 09/09/2021    CHOLHDL 24.0 05/26/2021         Results for orders placed during the hospital encounter of 09/20/21    Echo    Interpretation Summary  · The left ventricle is normal in size with concentric hypertrophy and low normal systolic function.  · The estimated ejection fraction is 50%.  · Indeterminate left ventricular diastolic function.  · Normal right ventricular size with low normal right ventricular systolic function.  · Mild to moderate tricuspid regurgitation.  · Mild mitral regurgitation.  · Mild aortic regurgitation.  · There is pulmonary hypertension.  · The estimated PA systolic pressure is 47 mmHg.        Results for orders placed or performed during the hospital encounter of 05/08/22 (from the past 2160 hour(s))   CTA Head and Neck (xpd)    Impression    Severe, hemodynamically significant stenosis or occlusion of the distal left V4.  Right vertebral artery and basilar artery are patent.    Multifocal hemodynamically significant stenoses of the extracranial left vertebral artery.    Hemodynamically significant stenoses of the bilateral P2 segments.    Hemodynamically significant stenoses of the distal right petrous and bilateral supraclinoid ICAs.    Roughly 50% stenosis of the bilateral proximal ICAs with heavy atherosclerotic calcification.    55-60% stenosis of the bilateral subclavian arteries.    No CT evidence of acute intracranial abnormality.  Small remote basal ganglia and thalamic infarcts.    Heavy coronary atherosclerotic disease status post CABG.    Small right pleural effusion.    All CT scans at this facility use dose modulation, iterative reconstruction, and/or weight base dosing when appropriate to reduce radiation dose to as low as reasonably achievable.      Electronically  signed by: Srinivas Akhtar  Date:    05/09/2022  Time:    12:51   CT Head Without Contrast    Impression    No hemorrhage or acute major vascular distribution infarction.  Further evaluation as clinically warranted.    ASPECT score 10 of 10.    All CT scans at this facility are performed  using dose modulation techniques as appropriate to performed exam including the following:  automated exposure control; adjustment of mA and/or kV according to the patients size (this includes techniques or standardized protocols for targeted exams where dose is matched to indication/reason for exam: i.e. extremities or head);  iterative reconstruction technique.      Electronically signed by: James Rojas  Date:    05/08/2022  Time:    18:04     *Note: Due to a large number of results and/or encounters for the requested time period, some results have not been displayed. A complete set of results can be found in Results Review.           Assessment:       1. TIA (transient ischemic attack)    2. Abnormal ECG    3. Aneurysm of ascending aorta    4. Nonrheumatic aortic valve stenosis    5. AP (angina pectoris)    6. Carotid artery disease without cerebral infarction    7. Chronic combined systolic and diastolic congestive heart failure    8. Chronic diastolic heart failure    9. Claudication in peripheral vascular disease    10. Diabetes mellitus due to underlying condition with diabetic peripheral angiopathy without gangrene, without long-term current use of insulin    11. Coronary artery disease of bypass graft of native heart with stable angina pectoris    12. Hemorrhagic cerebrovascular accident (CVA)    13. Essential hypertension    14. Mixed hyperlipidemia    15. Peripheral vascular disease    16. Paroxysmal atrial fibrillation    17. Nonrheumatic tricuspid valve regurgitation    18. Statin intolerance    19. Pacemaker         Plan:             Difficult situation with this pt.  Long discussion.  Recurrent TIA.  H/o  hemorrhagic CVA on asa.  Poor candidate for DOAC.  Not good candidate for Watchman right now; cannot reliably take antiplatelet tx in past even for short duration.  Could try to reinstitute 81 mg asa but not take daily but every other day.  He understands risks either way, with asa or not with risk of recurrent TIA or impending CVA.  He will think about it.  Schedule PPM interrogation.  Med tx for CAD.  Continue current meds.  Cardiac diet.  Med tx for vasc disease.  Statin intolerant.  F/u 3 months.

## 2022-05-16 ENCOUNTER — PATIENT MESSAGE (OUTPATIENT)
Dept: ADMINISTRATIVE | Facility: OTHER | Age: 87
End: 2022-05-16
Payer: MEDICARE

## 2022-05-16 ENCOUNTER — HOSPITAL ENCOUNTER (OUTPATIENT)
Dept: CARDIOLOGY | Facility: HOSPITAL | Age: 87
Discharge: HOME | End: 2022-05-16
Attending: INTERNAL MEDICINE
Payer: MEDICARE

## 2022-05-16 DIAGNOSIS — Z95.0 PACEMAKER: ICD-10-CM

## 2022-05-16 DIAGNOSIS — I50.42 CHRONIC COMBINED SYSTOLIC AND DIASTOLIC CONGESTIVE HEART FAILURE: ICD-10-CM

## 2022-05-16 DIAGNOSIS — I48.0 PAROXYSMAL ATRIAL FIBRILLATION: ICD-10-CM

## 2022-05-16 PROCEDURE — 93281 CARDIAC DEVICE CHECK - IN CLINIC & HOSPITAL: ICD-10-PCS | Mod: 26,,, | Performed by: INTERNAL MEDICINE

## 2022-05-16 PROCEDURE — 93281 PM DEVICE PROGR EVAL MULTI: CPT

## 2022-05-16 PROCEDURE — 93281 PM DEVICE PROGR EVAL MULTI: CPT | Mod: 26,,, | Performed by: INTERNAL MEDICINE

## 2022-05-22 ENCOUNTER — EXTERNAL HOME HEALTH (OUTPATIENT)
Dept: HOME HEALTH SERVICES | Facility: HOSPITAL | Age: 87
End: 2022-05-22
Payer: MEDICARE

## 2022-05-24 ENCOUNTER — PATIENT MESSAGE (OUTPATIENT)
Dept: ADMINISTRATIVE | Facility: OTHER | Age: 87
End: 2022-05-24
Payer: MEDICARE

## 2022-07-01 ENCOUNTER — OFFICE VISIT (OUTPATIENT)
Dept: FAMILY MEDICINE | Facility: CLINIC | Age: 87
End: 2022-07-01
Payer: MEDICARE

## 2022-07-01 ENCOUNTER — LAB VISIT (OUTPATIENT)
Dept: LAB | Facility: HOSPITAL | Age: 87
End: 2022-07-01
Attending: FAMILY MEDICINE
Payer: MEDICARE

## 2022-07-01 VITALS
HEIGHT: 70 IN | BODY MASS INDEX: 26.01 KG/M2 | TEMPERATURE: 98 F | HEART RATE: 70 BPM | WEIGHT: 181.69 LBS | OXYGEN SATURATION: 99 % | SYSTOLIC BLOOD PRESSURE: 137 MMHG | DIASTOLIC BLOOD PRESSURE: 87 MMHG

## 2022-07-01 DIAGNOSIS — R43.2 ALTERED TASTE: ICD-10-CM

## 2022-07-01 DIAGNOSIS — R53.83 FATIGUE, UNSPECIFIED TYPE: ICD-10-CM

## 2022-07-01 DIAGNOSIS — K11.7 DROOLING: Primary | ICD-10-CM

## 2022-07-01 DIAGNOSIS — Z79.4 DIABETES MELLITUS DUE TO UNDERLYING CONDITION WITH DIABETIC PERIPHERAL ANGIOPATHY WITHOUT GANGRENE, WITH LONG-TERM CURRENT USE OF INSULIN: ICD-10-CM

## 2022-07-01 DIAGNOSIS — E08.51 DIABETES MELLITUS DUE TO UNDERLYING CONDITION WITH DIABETIC PERIPHERAL ANGIOPATHY WITHOUT GANGRENE, WITH LONG-TERM CURRENT USE OF INSULIN: ICD-10-CM

## 2022-07-01 LAB
CRP SERPL-MCNC: 5 MG/L (ref 0–8.2)
ERYTHROCYTE [SEDIMENTATION RATE] IN BLOOD BY PHOTOMETRIC METHOD: 27 MM/HR (ref 0–23)
TESTOST SERPL-MCNC: 989 NG/DL (ref 304–1227)
VIT B12 SERPL-MCNC: 637 PG/ML (ref 210–950)

## 2022-07-01 PROCEDURE — 1126F AMNT PAIN NOTED NONE PRSNT: CPT | Mod: CPTII,S$GLB,, | Performed by: FAMILY MEDICINE

## 2022-07-01 PROCEDURE — 84403 ASSAY OF TOTAL TESTOSTERONE: CPT | Performed by: FAMILY MEDICINE

## 2022-07-01 PROCEDURE — 1101F PR PT FALLS ASSESS DOC 0-1 FALLS W/OUT INJ PAST YR: ICD-10-PCS | Mod: CPTII,S$GLB,, | Performed by: FAMILY MEDICINE

## 2022-07-01 PROCEDURE — 1159F PR MEDICATION LIST DOCUMENTED IN MEDICAL RECORD: ICD-10-PCS | Mod: CPTII,S$GLB,, | Performed by: FAMILY MEDICINE

## 2022-07-01 PROCEDURE — 99999 PR PBB SHADOW E&M-EST. PATIENT-LVL V: CPT | Mod: PBBFAC,,, | Performed by: FAMILY MEDICINE

## 2022-07-01 PROCEDURE — 1157F PR ADVANCE CARE PLAN OR EQUIV PRESENT IN MEDICAL RECORD: ICD-10-PCS | Mod: CPTII,S$GLB,, | Performed by: FAMILY MEDICINE

## 2022-07-01 PROCEDURE — 82607 VITAMIN B-12: CPT | Mod: DBM | Performed by: FAMILY MEDICINE

## 2022-07-01 PROCEDURE — 99214 PR OFFICE/OUTPT VISIT, EST, LEVL IV, 30-39 MIN: ICD-10-PCS | Mod: S$GLB,,, | Performed by: FAMILY MEDICINE

## 2022-07-01 PROCEDURE — 86140 C-REACTIVE PROTEIN: CPT | Performed by: FAMILY MEDICINE

## 2022-07-01 PROCEDURE — 85652 RBC SED RATE AUTOMATED: CPT | Performed by: FAMILY MEDICINE

## 2022-07-01 PROCEDURE — 3072F PR LOW RISK FOR RETINOPATHY: ICD-10-PCS | Mod: CPTII,S$GLB,, | Performed by: FAMILY MEDICINE

## 2022-07-01 PROCEDURE — 3072F LOW RISK FOR RETINOPATHY: CPT | Mod: CPTII,S$GLB,, | Performed by: FAMILY MEDICINE

## 2022-07-01 PROCEDURE — 3288F FALL RISK ASSESSMENT DOCD: CPT | Mod: CPTII,S$GLB,, | Performed by: FAMILY MEDICINE

## 2022-07-01 PROCEDURE — 84630 ASSAY OF ZINC: CPT | Performed by: FAMILY MEDICINE

## 2022-07-01 PROCEDURE — 1101F PT FALLS ASSESS-DOCD LE1/YR: CPT | Mod: CPTII,S$GLB,, | Performed by: FAMILY MEDICINE

## 2022-07-01 PROCEDURE — 1159F MED LIST DOCD IN RCRD: CPT | Mod: CPTII,S$GLB,, | Performed by: FAMILY MEDICINE

## 2022-07-01 PROCEDURE — 99214 OFFICE O/P EST MOD 30 MIN: CPT | Mod: S$GLB,,, | Performed by: FAMILY MEDICINE

## 2022-07-01 PROCEDURE — 99999 PR PBB SHADOW E&M-EST. PATIENT-LVL V: ICD-10-PCS | Mod: PBBFAC,,, | Performed by: FAMILY MEDICINE

## 2022-07-01 PROCEDURE — 3288F PR FALLS RISK ASSESSMENT DOCUMENTED: ICD-10-PCS | Mod: CPTII,S$GLB,, | Performed by: FAMILY MEDICINE

## 2022-07-01 PROCEDURE — 1157F ADVNC CARE PLAN IN RCRD: CPT | Mod: CPTII,S$GLB,, | Performed by: FAMILY MEDICINE

## 2022-07-01 PROCEDURE — 1126F PR PAIN SEVERITY QUANTIFIED, NO PAIN PRESENT: ICD-10-PCS | Mod: CPTII,S$GLB,, | Performed by: FAMILY MEDICINE

## 2022-07-01 RX ORDER — GLYCOPYRROLATE 1 MG/1
1 TABLET ORAL 3 TIMES DAILY
Qty: 90 TABLET | Refills: 0 | Status: SHIPPED | OUTPATIENT
Start: 2022-07-01 | End: 2022-07-31

## 2022-07-01 NOTE — PROGRESS NOTES
Chief Complaint:    Chief Complaint   Patient presents with    Follow-up       History of Present Illness:  Patient with PAF, HLD, claudication in PVD, CAD, and type 2 DM without retinopathy presents today for a three month follow up     Had a stroke in 2017. His drooling is gradually getting worse. On both sides. It can happen anytime. Every once in a while, he has trouble swallowing but is able to swallow his food.   Says he feels tired and has no energy for six months. It was present before in COVID infection in December 2021/January 2022. Denies depression, anxiety, or sleep disturbance. The more he walks, the more tired he gets. He will see vascular surgery in July  Denies constipation.   His taste has changed since having COVID. More sweet now.   Blood pressure elevated today.   A1C is 6.4, on Novolog and Lantis. He would like a continuous glucose monitor.          ROS:  Review of Systems   Constitutional: Positive for fatigue. Negative for appetite change, chills and fever.   HENT: Negative for congestion, ear pain, postnasal drip, rhinorrhea, sinus pressure and sinus pain.    Eyes: Negative for pain.   Respiratory: Negative for cough, chest tightness and shortness of breath.    Cardiovascular: Negative for chest pain and palpitations.   Gastrointestinal: Negative for abdominal pain, blood in stool, constipation, diarrhea and nausea.   Genitourinary: Negative for difficulty urinating, dysuria, flank pain and hematuria.   Musculoskeletal: Negative for arthralgias and myalgias.   Skin: Negative for pallor and wound.   Neurological: Negative for dizziness, tremors, facial asymmetry, speech difficulty, weakness, light-headedness and headaches.   Psychiatric/Behavioral: Negative for behavioral problems, dysphoric mood and sleep disturbance. The patient is not nervous/anxious.    All other systems reviewed and are negative.      Past Medical History:   Diagnosis Date    A-fib     Anemia     AP (angina pectoris)  2014    Carotid artery disease without cerebral infarction 2014    Cataract     Cirrhosis of liver 9/10/2020    Coronary artery disease     Diabetes mellitus     DM (diabetes mellitus) 1970     am 2020    GERD (gastroesophageal reflux disease) 2013    Hemorrhagic cerebrovascular accident (CVA) 2018    Hyperlipidemia     Hypertension     Hypertensive heart disease with heart failure 3/12/2019    Intracranial hemorrhage     Lumbosacral spondylosis 2014    Pacemaker 2014    Paroxysmal atrial fibrillation 2014    Peripheral vascular disease 2014    Pneumonia of right lung due to infectious organism 3/27/2019    Seizure, late effect of stroke     Stroke     Type 2 diabetes mellitus with ophthalmic manifestations        Social History:  Social History     Socioeconomic History    Marital status:     Number of children: 3    Highest education level: GED or equivalent   Tobacco Use    Smoking status: Former Smoker     Packs/day: 2.00     Years: 13.00     Pack years: 26.00     Types: Cigarettes     Start date: 1954     Quit date: 1967     Years since quittin.1    Smokeless tobacco: Never Used   Substance and Sexual Activity    Alcohol use: No    Drug use: No    Sexual activity: Never     Partners: Female     Birth control/protection: None   Social History Narrative    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       Family History:   family history includes Alcohol abuse in his paternal uncle; Arthritis in his father and mother; Cancer in his daughter and sister; Diabetes in his brother, daughter, father, mother, sister, son, and son; Fibromyalgia in his daughter; Heart disease in his brother, mother, and sister; Kidney disease in his brother and mother; Miscarriages / Stillbirths in his daughter.    Health Maintenance   Topic Date Due    Foot Exam  2022    Eye Exam  2022    Hemoglobin A1c  " 11/09/2022    Lipid Panel  05/08/2023    TETANUS VACCINE  01/23/2024       Physical Exam:    Vital Signs  Temp: 97.7 °F (36.5 °C)  Temp src: Temporal  Pulse: 70  SpO2: 99 %  BP: 137/87  BP Location: Left arm  Patient Position: Sitting  Pain Score: 0-No pain  Height and Weight  Height: 5' 10" (177.8 cm)  Weight: 82.4 kg (181 lb 10.5 oz)  BSA (Calculated - sq m): 2.02 sq meters  BMI (Calculated): 26.1  Weight in (lb) to have BMI = 25: 173.9]    Body mass index is 26.07 kg/m².    Physical Exam  Vitals and nursing note reviewed.   Constitutional:       Appearance: Normal appearance.   HENT:      Head: Normocephalic and atraumatic.      Right Ear: Tympanic membrane normal.      Left Ear: Tympanic membrane normal.   Eyes:      Extraocular Movements: Extraocular movements intact.      Pupils: Pupils are equal, round, and reactive to light.   Cardiovascular:      Rate and Rhythm: Normal rate and regular rhythm.      Pulses: Normal pulses.      Heart sounds: Normal heart sounds. No murmur heard.    No gallop.   Pulmonary:      Effort: Pulmonary effort is normal. No respiratory distress.      Breath sounds: Normal breath sounds. No wheezing, rhonchi or rales.   Abdominal:      General: There is no distension.      Palpations: Abdomen is soft.      Tenderness: There is no abdominal tenderness.   Musculoskeletal:         General: No swelling, deformity or signs of injury. Normal range of motion.      Cervical back: Normal range of motion.   Skin:     General: Skin is warm and dry.      Capillary Refill: Capillary refill takes less than 2 seconds.      Coloration: Skin is not jaundiced or pale.   Neurological:      General: No focal deficit present.      Mental Status: He is alert and oriented to person, place, and time.      Gait: Gait is intact. Gait and tandem walk normal.   Psychiatric:         Mood and Affect: Mood normal.         Behavior: Behavior normal.           Diabetes Management Status    Statin: Not " taking  ACE/ARB: Taking    Screening or Prevention Patient's value Goal Complete/Controlled?   HgA1C Testing and Control   Lab Results   Component Value Date    HGBA1C 6.4 (H) 05/09/2022      Annually/Less than 8% Yes   Lipid profile : 05/08/2022 Annually Yes   LDL control Lab Results   Component Value Date    LDLCALC 106.6 05/08/2022    Annually/Less than 100 mg/dl  No   Nephropathy screening Lab Results   Component Value Date    LABMICR 19.0 09/09/2021     Lab Results   Component Value Date    PROTEINUA Negative 12/12/2021    Annually Yes   Blood pressure BP Readings from Last 1 Encounters:   07/01/22 137/87    Less than 140/90 Yes   Dilated retinal exam : 07/12/2021 Annually Yes   Foot exam   : 06/02/2021 Annually Yes       Assessment:      ICD-10-CM ICD-9-CM   1. Drooling  K11.7 527.7   2. Fatigue, unspecified type  R53.83 780.79   3. Altered taste  R43.2 781.1   4. Diabetes mellitus due to underlying condition with diabetic peripheral angiopathy without gangrene, with long-term current use of insulin  E08.51 249.70    Z79.4 443.81     V58.67     Plan:  Recommend glycopyrrolate 1 mg for drooling.   Continue to monitor blood pressure, sugar levels at home.  Refer to Diabetic Advanced Practice Providers for DM.  Stay active as tolerated.  Follow up with vascular surgery on 07/20 at 9:30 AM.  See labs below.   Follow up 3 weeks.  Orders Placed This Encounter   Procedures    ZINC    Vitamin B12    Testosterone    Sedimentation rate    C-Reactive Protein    Ambulatory referral/consult to Diabetic Advanced Practice Providers (Medical Management)       Current Outpatient Medications   Medication Sig Dispense Refill    ACCU-CHEK GUIDE TEST STRIPS Strp TEST SIX TIMES DAILY 600 strip 3    ACCU-CHEK MULTICLIX LANCET lancets TEST BLOOD SUGAR THREE TIMES DAILY 200 each 5    ascorbic acid, vitamin C, (VITAMIN C) 100 MG tablet Take 100 mg by mouth once daily.      blood-glucose meter Misc 1 Device by Misc.(Non-Drug;  "Combo Route) route once. for 1 dose 1 each 0    CARBOXYMETHYLCELLULOSE SODIUM (REFRESH OPHT) Apply to eye.      COMFORT EZ PEN NEEDLES 32 gauge x 5/32" Ndle USE FOUR TIMES DAILY. 200 each 6    diclofenac sodium (VOLTAREN) 1 % Gel Apply 2 g topically once daily. 100 g 2    ferrous sulfate (IRON ORAL) Take 65 mg by mouth.      flecainide (TAMBOCOR) 50 MG Tab TAKE ONE TABLET BY MOUTH EVERY TWELVE HOURS 60 tablet 6    fluticasone propionate (FLONASE) 50 mcg/actuation nasal spray 2 sprays (100 mcg total) by Each Nostril route once daily. 16 g 3    furosemide (LASIX) 40 MG tablet Take 1 tablet (40 mg total) by mouth once daily. 30 tablet 12    guaiFENesin (MUCINEX) 600 mg 12 hr tablet Take 1 tablet (600 mg total) by mouth 2 (two) times daily.      insulin (LANTUS SOLOSTAR U-100 INSULIN) glargine 100 units/mL (3mL) SubQ pen INJECT 16 UNITS SUBCUTANEOUSLY AT BEDTIME. 94 DAY SUPPLY 15 mL 11    insulin aspart U-100 (NOVOLOG FLEXPEN U-100 INSULIN) 100 unit/mL (3 mL) InPn pen INJECT TEN UNITS AT BREAKFAST, EIGHT AT LUNCH, AND TEN AT DINNER (54 DAYS SUPPLY) 15 mL 3    isosorbide mononitrate (IMDUR) 60 MG 24 hr tablet TAKE ONE TABLET BY MOUTH TWICE DAILY 60 tablet 12    levocetirizine (XYZAL) 5 MG tablet Take 1 tablet (5 mg total) by mouth every evening. 30 tablet 11    losartan (COZAAR) 100 MG tablet TAKE ONE TABLET (100mg) BY MOUTH DAILY 90 tablet 1    metoprolol succinate (TOPROL-XL) 100 MG 24 hr tablet Take 1 tablet (100 mg total) by mouth 2 (two) times daily. 60 tablet 12    mupirocin (BACTROBAN) 2 % ointment Apply topically 2 (two) times daily. 30 g 0    nitroGLYCERIN (NITROSTAT) 0.4 MG SL tablet Place 1 tablet (0.4 mg total) under the tongue every 5 (five) minutes as needed for Chest pain. 25 tablet 12    pantoprazole (PROTONIX) 40 MG tablet TAKE ONE TABLET (40mg) BY MOUTH EVERY DAY 90 tablet 3    saw palmetto 500 MG capsule Take 500 mg by mouth 2 (two) times a day.      simethicone (GAS-X EXTRA " STRENGTH) 125 mg Cap capsule Take 1 capsule (125 mg total) by mouth 4 (four) times daily as needed for Flatulence.  0    turmeric 400 mg Cap Take 1 capsule by mouth once daily.      vitamin D (VITAMIN D3) 1000 units Tab Take 1,000 Units by mouth once daily.      vitamin E 100 UNIT capsule Take 100 Units by mouth once daily.      glycopyrrolate (ROBINUL) 1 mg Tab Take 1 tablet (1 mg total) by mouth 3 (three) times daily. 90 tablet 0     Current Facility-Administered Medications   Medication Dose Route Frequency Provider Last Rate Last Admin    acetaminophen tablet 650 mg  650 mg Oral Once PRN Hesham Monaco MD        albuterol inhaler 2 puff  2 puff Inhalation Q20 Min PRN Hesham Monaco MD        diphenhydrAMINE injection 25 mg  25 mg Intravenous Once PRN Hesham Monaco MD        EPINEPHrine (EPIPEN) 0.3 mg/0.3 mL pen injection 0.3 mg  0.3 mg Intramuscular PRN Hesham Monaco MD        methylPREDNISolone sodium succinate injection 40 mg  40 mg Intravenous Once PRN Hesham Monaco MD        ondansetron disintegrating tablet 4 mg  4 mg Oral Once PRN Hesham Monaco MD        sodium chloride 0.9% 500 mL flush bag   Intravenous PRN Hesham Monaco MD        sodium chloride 0.9% flush 10 mL  10 mL Intravenous PRN Hesham Monaco MD           There are no discontinued medications.    Follow up in about 3 weeks (around 7/22/2022).      Abdulaziz Mccabe MD  Scribe Attestation:   I, Ani Frazier, am scribing for, and in the presence of, Dr.Arif Mccabe I performed the above scribed service and the documentation accurately describes the services I performed. I attest to the accuracy of the note.    I, Dr. Abdulaziz Mccabe, reviewed documentation as scribed above. I performed the services described in this documentation.  I agree that the record reflects my personal performance and is accurate and complete. Abdulaziz Mccabe MD.  07/01/2022

## 2022-07-05 LAB — ZINC SERPL-MCNC: 50 UG/DL (ref 60–130)

## 2022-07-06 ENCOUNTER — PATIENT MESSAGE (OUTPATIENT)
Dept: FAMILY MEDICINE | Facility: CLINIC | Age: 87
End: 2022-07-06
Payer: MEDICARE

## 2022-07-15 ENCOUNTER — OFFICE VISIT (OUTPATIENT)
Dept: FAMILY MEDICINE | Facility: CLINIC | Age: 87
End: 2022-07-15
Payer: MEDICARE

## 2022-07-15 VITALS
HEIGHT: 70 IN | BODY MASS INDEX: 26.22 KG/M2 | TEMPERATURE: 98 F | DIASTOLIC BLOOD PRESSURE: 56 MMHG | HEART RATE: 70 BPM | OXYGEN SATURATION: 97 % | SYSTOLIC BLOOD PRESSURE: 114 MMHG | WEIGHT: 183.19 LBS

## 2022-07-15 DIAGNOSIS — E60 ZINC DEFICIENCY: Primary | ICD-10-CM

## 2022-07-15 DIAGNOSIS — K11.7 DROOLING: ICD-10-CM

## 2022-07-15 PROCEDURE — 3072F LOW RISK FOR RETINOPATHY: CPT | Mod: CPTII,S$GLB,, | Performed by: FAMILY MEDICINE

## 2022-07-15 PROCEDURE — 99999 PR PBB SHADOW E&M-EST. PATIENT-LVL V: CPT | Mod: PBBFAC,,, | Performed by: FAMILY MEDICINE

## 2022-07-15 PROCEDURE — 1126F AMNT PAIN NOTED NONE PRSNT: CPT | Mod: CPTII,S$GLB,, | Performed by: FAMILY MEDICINE

## 2022-07-15 PROCEDURE — 1157F ADVNC CARE PLAN IN RCRD: CPT | Mod: CPTII,S$GLB,, | Performed by: FAMILY MEDICINE

## 2022-07-15 PROCEDURE — 99214 OFFICE O/P EST MOD 30 MIN: CPT | Mod: S$GLB,,, | Performed by: FAMILY MEDICINE

## 2022-07-15 PROCEDURE — 99999 PR PBB SHADOW E&M-EST. PATIENT-LVL V: ICD-10-PCS | Mod: PBBFAC,,, | Performed by: FAMILY MEDICINE

## 2022-07-15 PROCEDURE — 1126F PR PAIN SEVERITY QUANTIFIED, NO PAIN PRESENT: ICD-10-PCS | Mod: CPTII,S$GLB,, | Performed by: FAMILY MEDICINE

## 2022-07-15 PROCEDURE — 1159F PR MEDICATION LIST DOCUMENTED IN MEDICAL RECORD: ICD-10-PCS | Mod: CPTII,S$GLB,, | Performed by: FAMILY MEDICINE

## 2022-07-15 PROCEDURE — 99214 PR OFFICE/OUTPT VISIT, EST, LEVL IV, 30-39 MIN: ICD-10-PCS | Mod: S$GLB,,, | Performed by: FAMILY MEDICINE

## 2022-07-15 PROCEDURE — 1101F PR PT FALLS ASSESS DOC 0-1 FALLS W/OUT INJ PAST YR: ICD-10-PCS | Mod: CPTII,S$GLB,, | Performed by: FAMILY MEDICINE

## 2022-07-15 PROCEDURE — 1157F PR ADVANCE CARE PLAN OR EQUIV PRESENT IN MEDICAL RECORD: ICD-10-PCS | Mod: CPTII,S$GLB,, | Performed by: FAMILY MEDICINE

## 2022-07-15 PROCEDURE — 3288F PR FALLS RISK ASSESSMENT DOCUMENTED: ICD-10-PCS | Mod: CPTII,S$GLB,, | Performed by: FAMILY MEDICINE

## 2022-07-15 PROCEDURE — 1101F PT FALLS ASSESS-DOCD LE1/YR: CPT | Mod: CPTII,S$GLB,, | Performed by: FAMILY MEDICINE

## 2022-07-15 PROCEDURE — 1159F MED LIST DOCD IN RCRD: CPT | Mod: CPTII,S$GLB,, | Performed by: FAMILY MEDICINE

## 2022-07-15 PROCEDURE — 3288F FALL RISK ASSESSMENT DOCD: CPT | Mod: CPTII,S$GLB,, | Performed by: FAMILY MEDICINE

## 2022-07-15 PROCEDURE — 3072F PR LOW RISK FOR RETINOPATHY: ICD-10-PCS | Mod: CPTII,S$GLB,, | Performed by: FAMILY MEDICINE

## 2022-07-15 NOTE — PROGRESS NOTES
Chief Complaint:    Chief Complaint   Patient presents with    Follow-up       History of Present Illness:  Patient with PAF, HLD, claudication in PVD, CAD, and type 2 DM without retinopathy presents today for a three week follow up     Has started taking zinc 50 mg.   His energy level is improving, he's seeing progress.   His fasting sugar was 155 today. Most of the time, it's <150. He says his sugars drop to 75-85 throughout the day. He takes his noontime insulin at lunch, has cut back on his Novolog.   Testosterone levels are good.   Started glycopyrrolate for drooling. Taking too many gives him dry mouth. Had a stroke in 2017. His drooling is gradually getting worse. On both sides. It can happen anytime. Every once in a while, he has trouble swallowing but is able to swallow his food.   Will see diabetes in August, with Jessika Dixon.            ROS:  Review of Systems   Constitutional: Negative for appetite change, chills and fever.   HENT: Negative for congestion, ear pain, postnasal drip, rhinorrhea, sinus pressure and sinus pain.    Eyes: Negative for pain.   Respiratory: Negative for cough, chest tightness and shortness of breath.    Cardiovascular: Negative for chest pain and palpitations.   Gastrointestinal: Negative for abdominal pain, blood in stool, constipation, diarrhea and nausea.   Genitourinary: Negative for difficulty urinating, dysuria, flank pain and hematuria.   Musculoskeletal: Negative for arthralgias and myalgias.   Skin: Negative for pallor and wound.   Neurological: Negative for dizziness, tremors, speech difficulty, light-headedness and headaches.   Psychiatric/Behavioral: Negative for behavioral problems, dysphoric mood and sleep disturbance. The patient is not nervous/anxious.    All other systems reviewed and are negative.      Past Medical History:   Diagnosis Date    A-fib     Anemia     AP (angina pectoris) 1/23/2014    Carotid artery disease without cerebral infarction  2014    Cataract     Cirrhosis of liver 9/10/2020    Coronary artery disease     Diabetes mellitus     DM (diabetes mellitus) 1970     am 2020    GERD (gastroesophageal reflux disease) 2013    Hemorrhagic cerebrovascular accident (CVA) 2018    Hyperlipidemia     Hypertension     Hypertensive heart disease with heart failure 3/12/2019    Intracranial hemorrhage     Lumbosacral spondylosis 2014    Pacemaker 2014    Paroxysmal atrial fibrillation 2014    Peripheral vascular disease 2014    Pneumonia of right lung due to infectious organism 3/27/2019    Seizure, late effect of stroke     Stroke     Type 2 diabetes mellitus with ophthalmic manifestations        Social History:  Social History     Socioeconomic History    Marital status:     Number of children: 3    Highest education level: GED or equivalent   Tobacco Use    Smoking status: Former Smoker     Packs/day: 2.00     Years: 13.00     Pack years: 26.00     Types: Cigarettes     Start date: 1954     Quit date: 1967     Years since quittin.1    Smokeless tobacco: Never Used   Substance and Sexual Activity    Alcohol use: No    Drug use: No    Sexual activity: Never     Partners: Female     Birth control/protection: None   Social History Narrative    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       Family History:   family history includes Alcohol abuse in his paternal uncle; Arthritis in his father and mother; Cancer in his daughter and sister; Diabetes in his brother, daughter, father, mother, sister, son, and son; Fibromyalgia in his daughter; Heart disease in his brother, mother, and sister; Kidney disease in his brother and mother; Miscarriages / Stillbirths in his daughter.    Health Maintenance   Topic Date Due    Foot Exam  2022    Eye Exam  2022    Hemoglobin A1c  2022    Lipid Panel  2023    TETANUS VACCINE   "01/23/2024       Physical Exam:    Vital Signs  Temp: 98.1 °F (36.7 °C)  Temp src: Temporal  Pulse: 70  SpO2: 97 %  BP: (!) 114/56  BP Location: Left arm  Patient Position: Sitting  Pain Score: 0-No pain  Height and Weight  Height: 5' 10" (177.8 cm)  Weight: 83.1 kg (183 lb 3.2 oz)  BSA (Calculated - sq m): 2.03 sq meters  BMI (Calculated): 26.3  Weight in (lb) to have BMI = 25: 173.9]    Body mass index is 26.29 kg/m².    Physical Exam  Vitals and nursing note reviewed.   Constitutional:       Appearance: Normal appearance.   HENT:      Head: Normocephalic and atraumatic.      Right Ear: Tympanic membrane normal.      Left Ear: Tympanic membrane normal.   Eyes:      Extraocular Movements: Extraocular movements intact.      Pupils: Pupils are equal, round, and reactive to light.   Cardiovascular:      Rate and Rhythm: Normal rate and regular rhythm.      Pulses: Normal pulses.      Heart sounds: Normal heart sounds. No murmur heard.    No gallop.   Pulmonary:      Effort: Pulmonary effort is normal. No respiratory distress.      Breath sounds: Normal breath sounds. No wheezing, rhonchi or rales.   Abdominal:      General: There is no distension.      Palpations: Abdomen is soft.      Tenderness: There is no abdominal tenderness.   Musculoskeletal:         General: No swelling, deformity or signs of injury. Normal range of motion.      Cervical back: Normal range of motion.   Skin:     General: Skin is warm and dry.      Capillary Refill: Capillary refill takes less than 2 seconds.      Coloration: Skin is not jaundiced or pale.   Neurological:      General: No focal deficit present.      Mental Status: He is alert and oriented to person, place, and time.      Gait: Gait is intact. Gait and tandem walk normal.   Psychiatric:         Mood and Affect: Mood normal.         Behavior: Behavior normal.           Diabetes Management Status    Statin: Not taking  ACE/ARB: Taking    Screening or Prevention Patient's value " "Goal Complete/Controlled?   HgA1C Testing and Control   Lab Results   Component Value Date    HGBA1C 6.4 (H) 05/09/2022      Annually/Less than 8% Yes   Lipid profile : 05/08/2022 Annually Yes   LDL control Lab Results   Component Value Date    LDLCALC 106.6 05/08/2022    Annually/Less than 100 mg/dl  No   Nephropathy screening Lab Results   Component Value Date    LABMICR 19.0 09/09/2021     Lab Results   Component Value Date    PROTEINUA Negative 12/12/2021    Annually Yes   Blood pressure BP Readings from Last 1 Encounters:   07/15/22 (!) 114/56    Less than 140/90 Yes   Dilated retinal exam : 07/12/2021 Annually Yes   Foot exam   : 06/02/2021 Annually Yes       Assessment:      ICD-10-CM ICD-9-CM   1. Zinc deficiency  E60 269.3   2. Drooling  K11.7 527.7     Plan:  Only take zinc for 4-6 weeks.    Use glycopyrrolate as needed for drooling.   Follow up with diabetes management on 08/22 at 8:00 AM.  Continue to monitor blood pressure, sugar levels at home.  Stay active as tolerated.  No orders of the defined types were placed in this encounter.      Current Outpatient Medications   Medication Sig Dispense Refill    ACCU-CHEK GUIDE TEST STRIPS Strp TEST SIX TIMES DAILY 600 strip 3    ACCU-CHEK MULTICLIX LANCET lancets TEST BLOOD SUGAR THREE TIMES DAILY 200 each 5    ascorbic acid, vitamin C, (VITAMIN C) 100 MG tablet Take 100 mg by mouth once daily.      blood-glucose meter Misc 1 Device by Misc.(Non-Drug; Combo Route) route once. for 1 dose 1 each 0    CARBOXYMETHYLCELLULOSE SODIUM (REFRESH OPHT) Apply to eye.      COMFORT EZ PEN NEEDLES 32 gauge x 5/32" Ndle USE FOUR TIMES DAILY. 200 each 6    diclofenac sodium (VOLTAREN) 1 % Gel Apply 2 g topically once daily. 100 g 2    ferrous sulfate (IRON ORAL) Take 65 mg by mouth.      flecainide (TAMBOCOR) 50 MG Tab TAKE ONE TABLET BY MOUTH EVERY TWELVE HOURS 60 tablet 6    fluticasone propionate (FLONASE) 50 mcg/actuation nasal spray 2 sprays (100 mcg total) by " Each Nostril route once daily. 16 g 3    furosemide (LASIX) 40 MG tablet Take 1 tablet (40 mg total) by mouth once daily. 30 tablet 12    glycopyrrolate (ROBINUL) 1 mg Tab Take 1 tablet (1 mg total) by mouth 3 (three) times daily. 90 tablet 0    guaiFENesin (MUCINEX) 600 mg 12 hr tablet Take 1 tablet (600 mg total) by mouth 2 (two) times daily.      insulin (LANTUS SOLOSTAR U-100 INSULIN) glargine 100 units/mL (3mL) SubQ pen INJECT 16 UNITS SUBCUTANEOUSLY AT BEDTIME. 94 DAY SUPPLY 15 mL 11    insulin aspart U-100 (NOVOLOG FLEXPEN U-100 INSULIN) 100 unit/mL (3 mL) InPn pen INJECT TEN UNITS AT BREAKFAST, EIGHT AT LUNCH, AND TEN AT DINNER (54 DAYS SUPPLY) 15 mL 3    isosorbide mononitrate (IMDUR) 60 MG 24 hr tablet TAKE ONE TABLET BY MOUTH TWICE DAILY 60 tablet 12    levocetirizine (XYZAL) 5 MG tablet Take 1 tablet (5 mg total) by mouth every evening. 30 tablet 11    losartan (COZAAR) 100 MG tablet TAKE ONE TABLET (100mg) BY MOUTH DAILY 90 tablet 1    metoprolol succinate (TOPROL-XL) 100 MG 24 hr tablet Take 1 tablet (100 mg total) by mouth 2 (two) times daily. 60 tablet 12    mupirocin (BACTROBAN) 2 % ointment Apply topically 2 (two) times daily. 30 g 0    nitroGLYCERIN (NITROSTAT) 0.4 MG SL tablet Place 1 tablet (0.4 mg total) under the tongue every 5 (five) minutes as needed for Chest pain. 25 tablet 12    pantoprazole (PROTONIX) 40 MG tablet TAKE ONE TABLET (40mg) BY MOUTH EVERY DAY 90 tablet 3    saw palmetto 500 MG capsule Take 500 mg by mouth 2 (two) times a day.      simethicone (GAS-X EXTRA STRENGTH) 125 mg Cap capsule Take 1 capsule (125 mg total) by mouth 4 (four) times daily as needed for Flatulence.  0    turmeric 400 mg Cap Take 1 capsule by mouth once daily.      vitamin D (VITAMIN D3) 1000 units Tab Take 1,000 Units by mouth once daily.      vitamin E 100 UNIT capsule Take 100 Units by mouth once daily.       Current Facility-Administered Medications   Medication Dose Route Frequency  Provider Last Rate Last Admin    acetaminophen tablet 650 mg  650 mg Oral Once PRN Hesham Monaco MD        albuterol inhaler 2 puff  2 puff Inhalation Q20 Min PRN Hesham Monaco MD        diphenhydrAMINE injection 25 mg  25 mg Intravenous Once PRN Hesham Monaco MD        EPINEPHrine (EPIPEN) 0.3 mg/0.3 mL pen injection 0.3 mg  0.3 mg Intramuscular PRN Hesham Monaco MD        methylPREDNISolone sodium succinate injection 40 mg  40 mg Intravenous Once PRN Hesham Monaco MD        ondansetron disintegrating tablet 4 mg  4 mg Oral Once PRN Hesham Monaco MD        sodium chloride 0.9% 500 mL flush bag   Intravenous PRN Hesham Monaco MD        sodium chloride 0.9% flush 10 mL  10 mL Intravenous PRN Hesham Monaco MD           There are no discontinued medications.    No follow-ups on file.      Abdulaziz Mccabe MD  Scribe Attestation:   I, Ani Frazier, am scribing for, and in the presence of, Dr.Arif Mccabe I performed the above scribed service and the documentation accurately describes the services I performed. I attest to the accuracy of the note.    I, Dr. Abdulaziz Mccabe, reviewed documentation as scribed above. I performed the services described in this documentation.  I agree that the record reflects my personal performance and is accurate and complete. Abdulaziz Mccabe MD.  07/15/2022

## 2022-07-20 ENCOUNTER — INITIAL CONSULT (OUTPATIENT)
Dept: VASCULAR SURGERY | Facility: CLINIC | Age: 87
End: 2022-07-20
Payer: MEDICARE

## 2022-07-20 VITALS
DIASTOLIC BLOOD PRESSURE: 78 MMHG | SYSTOLIC BLOOD PRESSURE: 166 MMHG | WEIGHT: 184.94 LBS | BODY MASS INDEX: 26.54 KG/M2 | TEMPERATURE: 98 F | HEART RATE: 70 BPM

## 2022-07-20 DIAGNOSIS — I65.23 CAROTID STENOSIS, ASYMPTOMATIC, BILATERAL: Primary | ICD-10-CM

## 2022-07-20 DIAGNOSIS — I65.29 STENOSIS OF CAROTID ARTERY, UNSPECIFIED LATERALITY: ICD-10-CM

## 2022-07-20 PROCEDURE — 99999 PR PBB SHADOW E&M-EST. PATIENT-LVL V: ICD-10-PCS | Mod: PBBFAC,,, | Performed by: SURGERY

## 2022-07-20 PROCEDURE — 99999 PR PBB SHADOW E&M-EST. PATIENT-LVL V: CPT | Mod: PBBFAC,,, | Performed by: SURGERY

## 2022-07-20 PROCEDURE — 99204 OFFICE O/P NEW MOD 45 MIN: CPT | Mod: S$GLB,,, | Performed by: SURGERY

## 2022-07-20 PROCEDURE — 99204 PR OFFICE/OUTPT VISIT, NEW, LEVL IV, 45-59 MIN: ICD-10-PCS | Mod: S$GLB,,, | Performed by: SURGERY

## 2022-07-20 NOTE — PROGRESS NOTES
"[facility] Vascular Surgery  Congenital Cardiovascular Surgery  Consult Note    Patient Name: Anthony Blair  MRN: [id number]  Admission Date: (Not on file)  Hospital Length of Stay: 0 days   Attending Physician: No att. providers found  Primary Care Provider: Abdulaziz Mccabe MD    Patient information was obtained from patient and ER records.     Consults  Subjective:     Chief Complaint carotid stenosis    History of Present Illness:  89-year-old male with a past medical history significant for atrial fibrillation, previous hemorrhagic stroke in 2017.  Here today for evaluation of bilateral 50% carotid stenosis with occluded left vertebral artery.  Patient was recently hospitalized in May of 2022 for an episode of weakness and right-sided pleural.  Head CT was negative for acute stroke or hemorrhage.  CT scan of the neck did confirm bilateral 50% carotid stenosis with an occluded the for segment of the vertebral artery.  Patient was not started on anticoagulation or anti-platelet given his history of cerebral hemorrhage.  Since that time symptoms have resolved.  Been asymptomatic.    Current Outpatient Medications   Medication    ACCU-CHEK GUIDE TEST STRIPS Strp    ACCU-CHEK MULTICLIX LANCET lancets    ascorbic acid, vitamin C, (VITAMIN C) 100 MG tablet    CARBOXYMETHYLCELLULOSE SODIUM (REFRESH OPHT)    COMFORT EZ PEN NEEDLES 32 gauge x 5/32" Ndle    diclofenac sodium (VOLTAREN) 1 % Gel    ferrous sulfate (IRON ORAL)    flecainide (TAMBOCOR) 50 MG Tab    fluticasone propionate (FLONASE) 50 mcg/actuation nasal spray    furosemide (LASIX) 40 MG tablet    glycopyrrolate (ROBINUL) 1 mg Tab    guaiFENesin (MUCINEX) 600 mg 12 hr tablet    insulin (LANTUS SOLOSTAR U-100 INSULIN) glargine 100 units/mL (3mL) SubQ pen    insulin aspart U-100 (NOVOLOG FLEXPEN U-100 INSULIN) 100 unit/mL (3 mL) InPn pen    isosorbide mononitrate (IMDUR) 60 MG 24 hr tablet    levocetirizine (XYZAL) 5 MG tablet    losartan (COZAAR) " 100 MG tablet    metoprolol succinate (TOPROL-XL) 100 MG 24 hr tablet    mupirocin (BACTROBAN) 2 % ointment    nitroGLYCERIN (NITROSTAT) 0.4 MG SL tablet    pantoprazole (PROTONIX) 40 MG tablet    saw palmetto 500 MG capsule    simethicone (GAS-X EXTRA STRENGTH) 125 mg Cap capsule    turmeric 400 mg Cap    vitamin D (VITAMIN D3) 1000 units Tab    vitamin E 100 UNIT capsule    blood-glucose meter Misc     Current Facility-Administered Medications   Medication Frequency    acetaminophen tablet 650 mg Once PRN    albuterol inhaler 2 puff Q20 Min PRN    diphenhydrAMINE injection 25 mg Once PRN    EPINEPHrine (EPIPEN) 0.3 mg/0.3 mL pen injection 0.3 mg PRN    methylPREDNISolone sodium succinate injection 40 mg Once PRN    ondansetron disintegrating tablet 4 mg Once PRN    sodium chloride 0.9% 500 mL flush bag PRN    sodium chloride 0.9% flush 10 mL PRN       Review of patient's allergies indicates:   Allergen Reactions    Atorvastatin      Other reaction(s): Muscle pain  Other reaction(s): Muscle weakness  Other reaction(s): Muscle pain    Codeine      Other reaction(s): Headache  Other reaction(s): Headache    Eliquis [apixaban]     Norvasc [amlodipine] Edema    Trulicity [dulaglutide] Nausea And Vomiting    Adhesive Rash     Other reaction(s): rash       Past Medical History:   Diagnosis Date    A-fib     Anemia     AP (angina pectoris) 1/23/2014    Carotid artery disease without cerebral infarction 8/22/2014    Cataract     Cirrhosis of liver 9/10/2020    Coronary artery disease     Diabetes mellitus     DM (diabetes mellitus) 1970     am 07/06/2020    GERD (gastroesophageal reflux disease) 7/11/2013    Hemorrhagic cerebrovascular accident (CVA) 4/26/2018    Hyperlipidemia     Hypertension     Hypertensive heart disease with heart failure 3/12/2019    Intracranial hemorrhage     Lumbosacral spondylosis 12/24/2014    Pacemaker 1/23/2014    Paroxysmal atrial fibrillation  2014    Peripheral vascular disease 2014    Pneumonia of right lung due to infectious organism 3/27/2019    Seizure, late effect of stroke     Stroke     Type 2 diabetes mellitus with ophthalmic manifestations      Past Surgical History:   Procedure Laterality Date    ANGIOPLASTY      ATRIAL ABLATION SURGERY N/A     CATARACT EXTRACTION Bilateral     [facility]    CATARACT EXTRACTION W/ INTRAOCULAR LENS IMPLANT Right     CATARACT EXTRACTION W/ INTRAOCULAR LENS IMPLANT Left     COLONOSCOPY N/A 10/16/2018    Procedure: COLONOSCOPY with biopsies;  Surgeon: Anabell Griggs MD;  Location: [facility] ENDO;  Service: Endoscopy;  Laterality: N/A;    CORONARY ARTERY BYPASS GRAFT  07/2010    x5    EYE SURGERY      pace maker surgrey  10/2010    reposition in  (approx)    REPLACEMENT OF PACEMAKER GENERATOR Left 2020    Procedure: REPLACEMENT, PULSE GENERATOR, CARDIAC PACEMAKER;  Surgeon: Domenico Grajeda MD;  Location: [facility] CATH LAB;  Service: Cardiology;  Laterality: Left;  [facility]    REVISION OF PROCEDURE INVOLVING PACEMAKER LEAD Bilateral 2020    Procedure: REVISION, ELECTRODE LEAD, CARDIAC PACEMAKER;  Surgeon: Domenico Grajeda MD;  Location: [facility] CATH LAB;  Service: Cardiology;  Laterality: Bilateral;    VEIN BYPASS SURGERY       Family History     Problem Relation (Age of Onset)    Alcohol abuse Paternal Uncle    Arthritis Mother, Father    Cancer Sister, Daughter    Diabetes Mother, Father, Sister, Brother, Daughter, Son, Son    Fibromyalgia Daughter    Heart disease Mother, Sister, Brother    Kidney disease Mother, Brother    Miscarriages / Stillbirths Daughter        Tobacco Use    Smoking status: Former Smoker     Packs/day: 2.00     Years: 13.00     Pack years: 26.00     Types: Cigarettes     Start date: 1954     Quit date: 1967     Years since quittin.1    Smokeless tobacco: Never Used   Substance and Sexual Activity    Alcohol use: No     Drug use: No    Sexual activity: Never     Partners: Female     Birth control/protection: None     Review of Systems   Constitutional: Negative for activity change, appetite change, fatigue and fever.   HENT: Negative for congestion.    Eyes: Negative for photophobia, redness and visual disturbance.   Respiratory: Negative for apnea, cough, chest tightness and shortness of breath.    Cardiovascular: Negative for chest pain and leg swelling.   Gastrointestinal: Negative for abdominal pain, nausea and vomiting.   Genitourinary: Negative for difficulty urinating.   Musculoskeletal: Negative for gait problem and myalgias.   Skin: Negative for color change, rash and wound.   Neurological: Negative for syncope, facial asymmetry, speech difficulty, weakness and numbness.     Objective:     Vital Signs (Most Recent):  Temp: 98 °F (36.7 °C) (07/20/22 0912)  Pulse: 70 (07/20/22 0912)  BP: (!) 166/78 (07/20/22 0912) Vital Signs (24h Range):  [unfilled]     Weight: 83.9 kg (184 lb 15.5 oz)  Body mass index is 26.54 kg/m².            [unfilled]    Physical Exam  Vitals and nursing note reviewed.   Constitutional:       Appearance: Normal appearance. He is normal weight.   HENT:      Head: Normocephalic and atraumatic.      Mouth/Throat:      Mouth: Mucous membranes are moist.   Eyes:      Extraocular Movements: Extraocular movements intact.      Conjunctiva/sclera: Conjunctivae normal.      Pupils: Pupils are equal, round, and reactive to light.   Neck:      Vascular: No carotid bruit.   Cardiovascular:      Rate and Rhythm: Normal rate and regular rhythm.      Pulses: Normal pulses.   Pulmonary:      Effort: Pulmonary effort is normal.      Breath sounds: Normal breath sounds.   Abdominal:      General: Abdomen is flat. Bowel sounds are normal.      Palpations: Abdomen is soft.   Musculoskeletal:      Cervical back: Neck supple.   Skin:     General: Skin is warm.      Capillary Refill: Capillary refill takes 2 to 3 seconds.    Neurological:      General: No focal deficit present.      Mental Status: He is alert and oriented to person, place, and time. Mental status is at baseline.      Cranial Nerves: No cranial nerve deficit.      Sensory: No sensory deficit.      Motor: No weakness.   Psychiatric:         Mood and Affect: Mood normal.         Behavior: Behavior normal.         Significant Labs:  I have reviewed all pertinent lab results within the past 24 hours.    Significant Diagnostics:  I have reviewed all pertinent imaging results/findings within the past 24 hours.  As above.  See HPI    Assessment/Plan:     There are no hospital problems to display for this patient.      Mild 50% bilateral carotid artery stenosis.  Would hold off on surgical intervention at this time.  Patient was started on 81 mg aspirin every other day by Cardiology.  Left patient return in 6 months    Thank you for your consult. I will follow-up with patient. Please contact us if you have any additional questions.    Jose Regan IV, MD  Vascular Surgery  [facility] Vascular Surgery

## 2022-07-22 NOTE — TELEPHONE ENCOUNTER
Forgot to mention today during office visit that he was taken to the emergency room for Blood pressure dropping and dehydrated 08/01. Lasix was increased today and she is wanting to know what signs to look for to make sure he doesn't get dehydrated?    .

## 2022-08-01 ENCOUNTER — CLINICAL SUPPORT (OUTPATIENT)
Dept: CARDIOLOGY | Facility: HOSPITAL | Age: 87
End: 2022-08-01
Payer: MEDICARE

## 2022-08-01 DIAGNOSIS — Z95.0 PRESENCE OF CARDIAC PACEMAKER: ICD-10-CM

## 2022-08-01 PROCEDURE — 93296 REM INTERROG EVL PM/IDS: CPT | Performed by: INTERNAL MEDICINE

## 2022-08-01 PROCEDURE — 93294 CARDIAC DEVICE CHECK - REMOTE: ICD-10-PCS | Mod: S$GLB,,, | Performed by: INTERNAL MEDICINE

## 2022-08-01 PROCEDURE — 93294 REM INTERROG EVL PM/LDLS PM: CPT | Mod: S$GLB,,, | Performed by: INTERNAL MEDICINE

## 2022-08-04 ENCOUNTER — TELEPHONE (OUTPATIENT)
Dept: ADMINISTRATIVE | Facility: HOSPITAL | Age: 87
End: 2022-08-04
Payer: MEDICARE

## 2022-08-12 ENCOUNTER — OFFICE VISIT (OUTPATIENT)
Dept: CARDIOLOGY | Facility: CLINIC | Age: 87
End: 2022-08-12
Payer: MEDICARE

## 2022-08-12 VITALS
OXYGEN SATURATION: 98 % | HEART RATE: 87 BPM | DIASTOLIC BLOOD PRESSURE: 78 MMHG | BODY MASS INDEX: 26.04 KG/M2 | WEIGHT: 181.88 LBS | SYSTOLIC BLOOD PRESSURE: 150 MMHG | HEIGHT: 70 IN

## 2022-08-12 DIAGNOSIS — I69.30 HISTORY OF HEMORRHAGIC CEREBROVASCULAR ACCIDENT (CVA) WITH RESIDUAL DEFICIT: ICD-10-CM

## 2022-08-12 DIAGNOSIS — I71.21 ANEURYSM OF ASCENDING AORTA: ICD-10-CM

## 2022-08-12 DIAGNOSIS — I36.1 NONRHEUMATIC TRICUSPID VALVE REGURGITATION: ICD-10-CM

## 2022-08-12 DIAGNOSIS — I35.0 NONRHEUMATIC AORTIC VALVE STENOSIS: ICD-10-CM

## 2022-08-12 DIAGNOSIS — I65.23 ASYMPTOMATIC STENOSIS OF BOTH CAROTID ARTERIES WITHOUT INFARCTION: ICD-10-CM

## 2022-08-12 DIAGNOSIS — E08.51 DIABETES MELLITUS DUE TO UNDERLYING CONDITION WITH DIABETIC PERIPHERAL ANGIOPATHY WITHOUT GANGRENE, WITH LONG-TERM CURRENT USE OF INSULIN: ICD-10-CM

## 2022-08-12 DIAGNOSIS — I50.42 CHRONIC COMBINED SYSTOLIC AND DIASTOLIC CONGESTIVE HEART FAILURE: ICD-10-CM

## 2022-08-12 DIAGNOSIS — I20.9 AP (ANGINA PECTORIS): ICD-10-CM

## 2022-08-12 DIAGNOSIS — I48.0 PAROXYSMAL ATRIAL FIBRILLATION: Chronic | ICD-10-CM

## 2022-08-12 DIAGNOSIS — Z79.4 DIABETES MELLITUS DUE TO UNDERLYING CONDITION WITH DIABETIC PERIPHERAL ANGIOPATHY WITHOUT GANGRENE, WITH LONG-TERM CURRENT USE OF INSULIN: ICD-10-CM

## 2022-08-12 DIAGNOSIS — I25.10 CAD (CORONARY ARTERY DISEASE): ICD-10-CM

## 2022-08-12 DIAGNOSIS — R94.31 ABNORMAL ECG: ICD-10-CM

## 2022-08-12 DIAGNOSIS — E66.3 OVERWEIGHT (BMI 25.0-29.9): ICD-10-CM

## 2022-08-12 DIAGNOSIS — I73.9 CLAUDICATION IN PERIPHERAL VASCULAR DISEASE: Chronic | ICD-10-CM

## 2022-08-12 DIAGNOSIS — I73.9 PERIPHERAL VASCULAR DISEASE: Chronic | ICD-10-CM

## 2022-08-12 DIAGNOSIS — Z95.0 PACEMAKER: ICD-10-CM

## 2022-08-12 DIAGNOSIS — I25.708 CORONARY ARTERY DISEASE OF BYPASS GRAFT OF NATIVE HEART WITH STABLE ANGINA PECTORIS: Primary | ICD-10-CM

## 2022-08-12 DIAGNOSIS — I10 ESSENTIAL HYPERTENSION: ICD-10-CM

## 2022-08-12 PROCEDURE — 1126F AMNT PAIN NOTED NONE PRSNT: CPT | Mod: CPTII,S$GLB,, | Performed by: INTERNAL MEDICINE

## 2022-08-12 PROCEDURE — 1160F RVW MEDS BY RX/DR IN RCRD: CPT | Mod: CPTII,S$GLB,, | Performed by: INTERNAL MEDICINE

## 2022-08-12 PROCEDURE — 99999 PR PBB SHADOW E&M-EST. PATIENT-LVL III: ICD-10-PCS | Mod: PBBFAC,,, | Performed by: INTERNAL MEDICINE

## 2022-08-12 PROCEDURE — 1157F ADVNC CARE PLAN IN RCRD: CPT | Mod: CPTII,S$GLB,, | Performed by: INTERNAL MEDICINE

## 2022-08-12 PROCEDURE — 1126F PR PAIN SEVERITY QUANTIFIED, NO PAIN PRESENT: ICD-10-PCS | Mod: CPTII,S$GLB,, | Performed by: INTERNAL MEDICINE

## 2022-08-12 PROCEDURE — 1159F PR MEDICATION LIST DOCUMENTED IN MEDICAL RECORD: ICD-10-PCS | Mod: CPTII,S$GLB,, | Performed by: INTERNAL MEDICINE

## 2022-08-12 PROCEDURE — 3072F LOW RISK FOR RETINOPATHY: CPT | Mod: CPTII,S$GLB,, | Performed by: INTERNAL MEDICINE

## 2022-08-12 PROCEDURE — 99214 PR OFFICE/OUTPT VISIT, EST, LEVL IV, 30-39 MIN: ICD-10-PCS | Mod: S$GLB,,, | Performed by: INTERNAL MEDICINE

## 2022-08-12 PROCEDURE — 99214 OFFICE O/P EST MOD 30 MIN: CPT | Mod: S$GLB,,, | Performed by: INTERNAL MEDICINE

## 2022-08-12 PROCEDURE — 3072F PR LOW RISK FOR RETINOPATHY: ICD-10-PCS | Mod: CPTII,S$GLB,, | Performed by: INTERNAL MEDICINE

## 2022-08-12 PROCEDURE — 99999 PR PBB SHADOW E&M-EST. PATIENT-LVL III: CPT | Mod: PBBFAC,,, | Performed by: INTERNAL MEDICINE

## 2022-08-12 PROCEDURE — 1157F PR ADVANCE CARE PLAN OR EQUIV PRESENT IN MEDICAL RECORD: ICD-10-PCS | Mod: CPTII,S$GLB,, | Performed by: INTERNAL MEDICINE

## 2022-08-12 PROCEDURE — 1159F MED LIST DOCD IN RCRD: CPT | Mod: CPTII,S$GLB,, | Performed by: INTERNAL MEDICINE

## 2022-08-12 PROCEDURE — 1160F PR REVIEW ALL MEDS BY PRESCRIBER/CLIN PHARMACIST DOCUMENTED: ICD-10-PCS | Mod: CPTII,S$GLB,, | Performed by: INTERNAL MEDICINE

## 2022-08-12 RX ORDER — ASPIRIN 81 MG/1
81 TABLET ORAL EVERY OTHER DAY
COMMUNITY

## 2022-08-12 RX ORDER — NITROGLYCERIN 0.4 MG/1
0.4 TABLET SUBLINGUAL EVERY 5 MIN PRN
Qty: 25 TABLET | Refills: 12 | Status: SHIPPED | OUTPATIENT
Start: 2022-08-12 | End: 2024-01-11 | Stop reason: SDUPTHER

## 2022-08-12 NOTE — PROGRESS NOTES
Subjective:    Patient ID:  Anthony Blair is a 89 y.o. male who presents for evaluation of Hyperlipidemia, Carotid Artery Disease, Coronary Artery Disease, Atrial Fibrillation, Hypertension, and Peripheral Arterial Disease      HPI pt presents for eval.  His current medical conditions include combined CHF, PAF/PSVT, CAD (PTCA 1980's), HTN, PPM, PHTN, LVH, LAE, carotid artery disease, DM, atrial septal aneurysm/PFO, PAD, dyslipidemia. Nonsmoker.  Past details pertinent for following:   Statin intolerant.  s/p CABG 7/10 (LIMA-LAD, SVG-OM1, SVG-OM3, SVG-PDA) for increasing OLIVARES and multivessel CAD on [facility].   S/p PPM 10/10 for SSS/PAF. Was hospitalized 1/11 for PSVT (Atrial tachycardia) and was started on Amiodarone but was stopped for GI discomfort.   s/p EPS/ablation of his atrial tachycardia 6/11.   S/p abd w runoff March 2015 and had significant B LE infrapopliteal disease. Atherectomy/pta performed of critical R tibeoperoneal trunk stenosis. Also has L tibeoperoneal trunk stenosis and his PTA/MIGUEL are occluded bilaterally.  Started on Eliquis Feb 2017 for suspicious left internal jugular vein thrombus.  Stress MPI 3/17 showed no ischemia.  Echo 3/17 showed normal LV function, left-right atrial shunt.   Carotid u/s 6/17 showed mild bilateral disease, known L IJ thrombus noted.  Pt called clinic Sept 2017 stated he had stopped Eliquis couple weeks before his call due to diarrhea, weakness, bloating and also off Amlodipine due to sleepiness.  He stated sxs subsided when he stopped the meds.   He was advised by cardiology on 9/6/17 to take 81 mg ASA since he stopped his Eliquis.  He had acute hemorrhage of basal ganglia right side Sept 2017, causing weakness on left side.  Tx at [facility] 9/24/17. He was on ASA daily when CVA occurred and was not on Eliquis. Also noted to have mild thoracic aneurysm 4.1 cm, 60% R ICA stenosis, 30% LICA stenosis, patent vertebral arteries but mod-severe distal left vertebral disease, by CTA  per PCP note.  He was taken off his asa.  Followed by Dr. Weaver, neurosurgery. No surgery was needed.  Echo showed normal LVEF.  Stress test 8/18 showed no ischemia, apical scar.    Echo 3/19 normal EF, LAE, LVH, DD, PHTN 76 mmHg, mild TR.  There was some consideration of restarting NOAC for a fib CVA protection after pt evaluated by EP service.  Started on Flecainide 6/20 by Dr. Grajeda.  I expressed reluctance to put pt back on anticoagulation in light of prior hemorrhagic CVA with residual deficits.  S/p CRT device 8/20 with Dr. Grajeda.  Echo 7/20 EF 40%, DD, RVE, mild RV dysfunction, mod-severe TR, mild MR, LVH, mild AS, PFO.  Echo 5/21: Normal EF, DD, LAE, ASD, mild TR, PAP 42 mmHg.  Carotid u/s 5/21 20 - 39% bilateral stenosis.  Echo 9/21 EF 50%, DD, mild-mod TR, mild AI/MR, PAP 47 mmHg.  Had Covid 12/21 then flu a while later.  Pt admitted May 2022 with TIA.  Pt states he initially had a headache, then veered off walking to right, spots in his eyes, tingling on right face/arm/leg, dragged right foot.  Chart reviewed.  Neurology evaluated pt.  Deemed not to be good candidate for anticoagulation or antiplatelet tx with his h/o cerebral hemorrhage/CVA.  CTA brain, neck:  50% bilateral carotid disease, 50 -  60% bilateral subclavian stenosis, occluded left vertebral artery.  Now here.  Pt last seen 5/22.  Asa every other day added.  Saw Vasc Surgery, Dr. Regan, 7/22 with 6 month f/u appt advised and med tx.  Angina controlled on current med tx.  CHF is stable.  No acute CHF sxs.  BP controlled overall.  Lipids stable.  HGAIC at goal.  Stable claudication sxs.  No recurrent TIA/CVA sxs.  PPM well functioning last interrogation.  Compliant w meds.      Past Medical History:   Diagnosis Date    A-fib     Anemia     AP (angina pectoris) 1/23/2014    Carotid artery disease without cerebral infarction 8/22/2014    Cataract     Cirrhosis of liver 9/10/2020    Coronary artery disease     Diabetes mellitus  "    DM (diabetes mellitus) 1970     am 07/06/2020    GERD (gastroesophageal reflux disease) 7/11/2013    Hemorrhagic cerebrovascular accident (CVA) 4/26/2018    Hyperlipidemia     Hypertension     Hypertensive heart disease with heart failure 3/12/2019    Intracranial hemorrhage     Lumbosacral spondylosis 12/24/2014    Pacemaker 1/23/2014    Paroxysmal atrial fibrillation 1/23/2014    Peripheral vascular disease 8/22/2014    Pneumonia of right lung due to infectious organism 3/27/2019    Seizure, late effect of stroke     Stroke     Type 2 diabetes mellitus with ophthalmic manifestations      Current Outpatient Medications   Medication Instructions    ACCU-CHEK GUIDE TEST STRIPS Strp TEST SIX TIMES DAILY    ACCU-CHEK MULTICLIX LANCET lancets TEST BLOOD SUGAR THREE TIMES DAILY    ascorbic acid (vitamin C) (VITAMIN C) 100 mg, Oral, Daily    aspirin (ECOTRIN) 81 mg, Oral, Every other day    blood-glucose meter Misc 1 Device, Misc.(Non-Drug; Combo Route), Once    CARBOXYMETHYLCELLULOSE SODIUM (REFRESH OPHT) Ophthalmic    COMFORT EZ PEN NEEDLES 32 gauge x 5/32" Ndle USE FOUR TIMES DAILY.    diclofenac sodium (VOLTAREN) 2 g, Topical (Top), Daily    ferrous sulfate (IRON ORAL) 65 mg, Oral    flecainide (TAMBOCOR) 50 MG Tab TAKE ONE TABLET BY MOUTH EVERY TWELVE HOURS    fluticasone propionate (FLONASE) 100 mcg, Each Nostril, Daily    furosemide (LASIX) 40 mg, Oral, Daily    guaiFENesin (MUCINEX) 600 mg, Oral, 2 times daily    insulin (LANTUS SOLOSTAR U-100 INSULIN) glargine 100 units/mL (3mL) SubQ pen INJECT 16 UNITS SUBCUTANEOUSLY AT BEDTIME. 94 DAY SUPPLY    insulin aspart U-100 (NOVOLOG FLEXPEN U-100 INSULIN) 100 unit/mL (3 mL) InPn pen INJECT TEN UNITS AT BREAKFAST, EIGHT AT LUNCH, AND TEN AT DINNER (54 DAYS SUPPLY)    isosorbide mononitrate (IMDUR) 60 MG 24 hr tablet TAKE ONE TABLET BY MOUTH TWICE DAILY    levocetirizine (XYZAL) 5 mg, Oral, Nightly    losartan (COZAAR) 100 MG " "tablet TAKE ONE TABLET (100mg) BY MOUTH DAILY    metoprolol succinate (TOPROL-XL) 100 mg, Oral, 2 times daily    mupirocin (BACTROBAN) 2 % ointment Topical (Top), 2 times daily    nitroGLYCERIN (NITROSTAT) 0.4 mg, Sublingual, Every 5 min PRN    pantoprazole (PROTONIX) 40 MG tablet TAKE ONE TABLET (40mg) BY MOUTH EVERY DAY    saw palmetto 500 mg, Oral, 2 times daily    simethicone (GAS-X EXTRA STRENGTH) 125 mg, Oral, 4 times daily PRN    turmeric 400 mg Cap 1 capsule, Oral, Daily    vitamin D (VITAMIN D3) 1,000 Units, Oral, Daily    vitamin E 100 Units, Oral, Daily         Review of Systems   Constitutional: Positive for malaise/fatigue.   HENT: Negative.    Eyes: Negative.    Cardiovascular: Positive for claudication.   Respiratory: Negative.    Endocrine: Negative.    Hematologic/Lymphatic: Negative.    Skin: Negative.    Musculoskeletal: Positive for arthritis and joint pain.   Gastrointestinal: Negative.    Genitourinary: Negative.    Neurological: Positive for weakness.   Psychiatric/Behavioral: Negative.    Allergic/Immunologic: Negative.        BP (!) 150/78 (BP Location: Left arm, Patient Position: Sitting, BP Method: Large (Manual))   Pulse 87   Ht 5' 10" (1.778 m)   Wt 82.5 kg (181 lb 14.1 oz)   SpO2 98%   BMI 26.10 kg/m²     Wt Readings from Last 3 Encounters:   08/12/22 82.5 kg (181 lb 14.1 oz)   07/20/22 83.9 kg (184 lb 15.5 oz)   07/15/22 83.1 kg (183 lb 3.2 oz)     Temp Readings from Last 3 Encounters:   07/20/22 98 °F (36.7 °C) (Oral)   07/15/22 98.1 °F (36.7 °C) (Temporal)   07/01/22 97.7 °F (36.5 °C) (Temporal)     BP Readings from Last 3 Encounters:   08/12/22 (!) 150/78   07/20/22 (!) 166/78   07/15/22 (!) 114/56     Pulse Readings from Last 3 Encounters:   08/12/22 87   07/20/22 70   07/15/22 70          Objective:    Physical Exam  Vitals and nursing note reviewed.   Constitutional:       Appearance: He is well-developed.   HENT:      Head: Normocephalic.   Neck:      Thyroid: No " thyromegaly.      Vascular: No carotid bruit or JVD.   Cardiovascular:      Rate and Rhythm: Normal rate and regular rhythm.      Pulses:           Radial pulses are 2+ on the right side and 2+ on the left side.      Heart sounds: S1 normal and S2 normal. Heart sounds not distant. No midsystolic click and no opening snap. No murmur heard.    No friction rub. No S3 or S4 sounds.   Pulmonary:      Effort: Pulmonary effort is normal.      Breath sounds: Normal breath sounds. No wheezing or rales.   Abdominal:      General: Bowel sounds are normal. There is no distension or abdominal bruit.      Palpations: Abdomen is soft.      Tenderness: There is no abdominal tenderness.   Musculoskeletal:      Cervical back: Neck supple.   Skin:     General: Skin is warm.   Neurological:      Mental Status: He is alert and oriented to person, place, and time.   Psychiatric:         Behavior: Behavior normal.         I have reviewed all pertinent labs and cardiac studies.     Chemistry        Component Value Date/Time     05/09/2022 0741    K 4.1 05/09/2022 0741     05/09/2022 0741    CO2 29 05/09/2022 0741    BUN 21 05/09/2022 0741    CREATININE 0.8 05/09/2022 0741    GLU 90 05/09/2022 0741        Component Value Date/Time    CALCIUM 10.0 05/09/2022 0741    ALKPHOS 115 05/09/2022 0741    AST 30 05/09/2022 0741    ALT 18 05/09/2022 0741    BILITOT 1.2 (H) 05/09/2022 0741    ESTGFRAFRICA >60 05/09/2022 0741    EGFRNONAA >60 05/09/2022 0741        Lab Results   Component Value Date    WBC 4.92 05/09/2022    HGB 11.7 (L) 05/09/2022    HCT 35.2 (L) 05/09/2022    MCV 92 05/09/2022     05/09/2022       Lab Results   Component Value Date    HGBA1C 6.4 (H) 05/09/2022     Lab Results   Component Value Date    CHOL 170 05/08/2022    CHOL 163 09/09/2021    CHOL 183 05/26/2021     Lab Results   Component Value Date    HDL 41 05/08/2022    HDL 41 09/09/2021    HDL 44 05/26/2021     Lab Results   Component Value Date    LDLCALC  106.6 05/08/2022    LDLCALC 94.4 09/09/2021    LDLCALC 108.4 05/26/2021     Lab Results   Component Value Date    TRIG 112 05/08/2022    TRIG 138 09/09/2021    TRIG 153 (H) 05/26/2021     Lab Results   Component Value Date    CHOLHDL 24.1 05/08/2022    CHOLHDL 25.2 09/09/2021    CHOLHDL 24.0 05/26/2021           Assessment:       1. Coronary artery disease of bypass graft of native heart with stable angina pectoris    2. Abnormal ECG    3. Aneurysm of ascending aorta    4. Nonrheumatic aortic valve stenosis    5. AP (angina pectoris)    6. Asymptomatic stenosis of both carotid arteries without infarction    7. Diabetes mellitus due to underlying condition with diabetic peripheral angiopathy without gangrene, with long-term current use of insulin    8. Claudication in peripheral vascular disease    9. Chronic combined systolic and diastolic congestive heart failure    10. History of hemorrhagic cerebrovascular accident (CVA) with residual deficit    11. Essential hypertension    12. Pacemaker    13. Overweight (BMI 25.0-29.9)    14. Peripheral vascular disease    15. Paroxysmal atrial fibrillation    16. Nonrheumatic tricuspid valve regurgitation    17. CAD (coronary artery disease)         Plan:             Stable cardiovascular conditions at present time on current medical treatment.  Reviewed all tests and above medical conditions with patient in detail and formulated treatment plan.  Continue optimal medical treatment for cardiovascular conditions.  F/u PPM clinic as scheduled.  F/u w Vasc Surgery as scheduled.  Cardiac low salt diet advised.  Daily exercise as tolerated.  Maintaining healthy weight and weight loss goals (if needed) were discussed in clinic.  Need for BP control and HTN goals (if needed) were discussed and tx plan formulated.  Importance of optimal lipid control were discussed in detail as well as possible pharmacologic and lifestyle changes that may be needed.  Asa qod.  DM HGAIC control.  F/u  in 6 months.      I have reviewed all pertinent labs and cardiac studies independently. Plans and recommendations have been formulated under my direct supervision. All questions answered and patient voiced understanding.

## 2022-08-19 NOTE — PROGRESS NOTES
PCP: Abdulaziz Mccabe MD    Subjective:     Chief Complaint: Diabetes Consult    History of Present Illness: 89 y.o.  male for diabetes consult.   Type II diabetes since 1969.   Comorbidities: HTN, HLD, CAD, PVD and PAF, liver cirrhosis, anemia, pacemaker, CVA 2017, OA both knees    Family History of Type 1/2 DM: parents, siblings  Known diabetes complications:  DKA/HHS: no   Retinopathy: no  Peripheral neuropathy: yes  Nephropathy: no    Denies recent hospital admissions or ER visits.   Voices having hypoglycemia. BG reading dropped afer lunch 66.  Endorses diabetes related symptoms: Burning both feet.     Blood glucose monitoring: fingerstick: 4 x/day   Per patient's BG log, over the last 3 weeks   Fasting BG 87,  average 100-120's   AC lunch 72, 81, 83,103, 108, 111, 140,162, 224   PP lunch 73, 99, 125, 162   AC dinner 76, 115-169 average 110-140's   PP dinner 66, 76, 90, 133, 155, 159   Bedtime 87, 93,  average 's    BG log will be uploaded into [employer] [facility].  Overall, BG readings are stable, few low postprandial readings.    Pt admits to have self-adjusted insulin.  Stopped taking Lantus for 1 1/2 weeks due to low readings.   Also admits to taking Novolog about 15 minutes after meals.    Activity Level: Sedentary- none   Meal Planning:3 meals/day;0 snacks/day.    Medication compliance all of the time, diet compliance most of the time.   Has attended diabetes education in the past.     Previous regimen: Humalog    Past failed treatment: Metformin - tolerated     Current DM Medications:  Diabetes Medications             insulin (LANTUS SOLOSTAR U-100 INSULIN) glargine 100 units/mL (3mL) SubQ pen INJECT 16 UNITS SUBCUTANEOUSLY AT BEDTIME. Not taking for 1 week    insulin aspart U-100 (NOVOLOG FLEXPEN U-100 INSULIN) 100 unit/mL (3 mL) InPn pen INJECT TEN UNITS AT BREAKFAST, EIGHT AT LUNCH, AND TEN AT DINNER           Allergies  Review of patient's allergies indicates:   Allergen Reactions     Atorvastatin      Other reaction(s): Muscle pain  Other reaction(s): Muscle weakness  Other reaction(s): Muscle pain    Codeine      Other reaction(s): Headache  Other reaction(s): Headache    Eliquis [apixaban]     Norvasc [amlodipine] Edema    Trulicity [dulaglutide] Nausea And Vomiting    Adhesive Rash     Other reaction(s): rash       Labs Reviewed:  His most recent A1C is:  Lab Results   Component Value Date    HGBA1C 6.4 (H) 05/09/2022    HGBA1C 8.3 (H) 03/16/2022    HGBA1C 6.2 (H) 09/09/2021       His most recent BMP is:  Lab Results   Component Value Date     05/09/2022    K 4.1 05/09/2022     05/09/2022    CO2 29 05/09/2022    BUN 21 05/09/2022    CREATININE 0.8 05/09/2022    CALCIUM 10.0 05/09/2022    ANIONGAP 8 05/09/2022    ESTGFRAFRICA >60 05/09/2022    EGFRNONAA >60 05/09/2022       No results found for: CPEPTIDE  No results found for: GLUTAMICACID    There is no height or weight on file to calculate BMI.    Wt Readings from Last 3 Encounters:   08/12/22 0816 82.5 kg (181 lb 14.1 oz)   07/20/22 0912 83.9 kg (184 lb 15.5 oz)   07/15/22 0814 83.1 kg (183 lb 3.2 oz)        His blood sugar in clinic today is:  Lab Results   Component Value Date    POCGLU 207 (A) 09/20/2021       Diabetes Management Status  Statin: Not taking  ACE/ARB: Taking    Screening or Prevention Patient's value Goal Complete/Controlled?   HgA1C Testing and Control   Lab Results   Component Value Date    HGBA1C 6.4 (H) 05/09/2022      Annually/Less than 8% Yes   Lipid profile : 05/08/2022 Annually Yes   LDL control Lab Results   Component Value Date    LDLCALC 106.6 05/08/2022    Annually/Less than 100 mg/dl  No   Nephropathy screening Lab Results   Component Value Date    MICALBCREAT 18.6 09/09/2021     Lab Results   Component Value Date    PROTEINUA Negative 12/12/2021    Annually Yes   Blood pressure BP Readings from Last 1 Encounters:   08/12/22 (!) 150/78    Less than 140/90 No   Dilated retinal exam :  2021 Annually No    Foot exam   : 2021 Annually No     Social History     Socioeconomic History    Marital status:     Number of children: 3    Highest education level: GED or equivalent   Tobacco Use    Smoking status: Former Smoker     Packs/day: 2.00     Years: 13.00     Pack years: 26.00     Types: Cigarettes     Start date: 1954     Quit date: 1967     Years since quittin.2    Smokeless tobacco: Never Used   Substance and Sexual Activity    Alcohol use: No    Drug use: No    Sexual activity: Never     Partners: Female     Birth control/protection: None   Social History Narrative    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     Past Medical History:   Diagnosis Date    A-fib     Anemia     AP (angina pectoris) 2014    Carotid artery disease without cerebral infarction 2014    Cataract     Cirrhosis of liver 9/10/2020    Coronary artery disease     Diabetes mellitus     DM (diabetes mellitus) 1970     am 2020    GERD (gastroesophageal reflux disease) 2013    Hemorrhagic cerebrovascular accident (CVA) 2018    Hyperlipidemia     Hypertension     Hypertensive heart disease with heart failure 3/12/2019    Intracranial hemorrhage     Lumbosacral spondylosis 2014    Pacemaker 2014    Paroxysmal atrial fibrillation 2014    Peripheral vascular disease 2014    Pneumonia of right lung due to infectious organism 3/27/2019    Seizure, late effect of stroke     Stroke     Type 2 diabetes mellitus with ophthalmic manifestations      Review of Systems   Constitutional: Negative for activity change, appetite change, fatigue and unexpected weight change.   HENT: Negative for dental problem and trouble swallowing.    Eyes: Negative for visual disturbance.        [place], wears hearing aid   Respiratory: Negative for chest tightness and shortness of breath.    Cardiovascular: Negative for chest  pain, palpitations and leg swelling.   Gastrointestinal: Negative for abdominal pain, constipation, diarrhea, nausea and vomiting.   Endocrine: Negative for polydipsia, polyphagia and polyuria.   Genitourinary: Negative for difficulty urinating, dysuria, frequency and urgency.        Negative yeast infection   Musculoskeletal: Negative for gait problem, myalgias and neck pain.   Integumentary:  Negative for rash and wound.   Allergic/Immunologic: Negative.    Neurological: Negative for dizziness, syncope, weakness, numbness and headaches.        Burning both feet intermittently   Hematological: Negative.    Psychiatric/Behavioral: Negative for confusion and sleep disturbance.   All other systems reviewed and are negative.       Objective:      Physical Exam  Vitals reviewed.   Constitutional:       Appearance: Normal appearance.   HENT:      Head: Normocephalic and atraumatic.   Eyes:      General: Vision grossly intact.      Extraocular Movements: Extraocular movements intact.      Pupils: Pupils are equal, round, and reactive to light.   Neck:      Thyroid: No thyroid mass or thyroid tenderness.   Cardiovascular:      Rate and Rhythm: Normal rate and regular rhythm.      Pulses: Normal pulses.           Radial pulses are 2+ on the right side and 2+ on the left side.        Dorsalis pedis pulses are 2+ on the right side and 2+ on the left side.      Heart sounds: Normal heart sounds.      Comments: Pacemaker on left upper chest wall, left ankle trace edema  Pulmonary:      Effort: Pulmonary effort is normal.      Breath sounds: Normal breath sounds.   Abdominal:      General: Bowel sounds are normal.      Palpations: Abdomen is soft.   Musculoskeletal:         General: Normal range of motion.      Cervical back: Normal range of motion and neck supple.      Right lower leg: No edema.      Left lower leg: No edema.      Right foot: No deformity or bunion.      Left foot: No deformity or bunion.   Feet:      Right  foot:      Protective Sensation: 6 sites tested. 6 sites sensed.      Skin integrity: Skin integrity normal. No ulcer, skin breakdown, callus or dry skin.      Toenail Condition: Right toenails are normal.      Left foot:      Protective Sensation: 6 sites tested. 6 sites sensed.      Skin integrity: Skin integrity normal. No ulcer, skin breakdown, callus or dry skin.      Toenail Condition: Left toenails are normal.      Comments: Pinprick exam completed with 10-g monofilament. Vibration sensation intact.  Lymphadenopathy:      Cervical: No cervical adenopathy.   Skin:     General: Skin is warm and dry.      Findings: No rash or wound.      Comments: Negative for acanthosis nigricans. Well-healed SQ injection sites.   Neurological:      Mental Status: He is alert and oriented to person, place, and time.      Coordination: Coordination is intact.      Gait: Gait is intact.   Psychiatric:         Attention and Perception: Attention normal.         Mood and Affect: Mood normal.         Speech: Speech normal.         Behavior: Behavior normal. Behavior is cooperative.         Thought Content: Thought content normal.         Cognition and Memory: Cognition normal.         Assessment / Plan:   Anthony was seen today for diabetes mellitus.    Diagnoses and all orders for this visit:    Diabetes mellitus type 2 without retinopathy  -     POCT Glucose, Hand-Held Device  -     Hemoglobin A1C; Future  -     insulin aspart U-100 (NOVOLOG FLEXPEN U-100 INSULIN) 100 unit/mL (3 mL) InPn pen; Inject 8 units before breakfast, 6 units before lunch and 8 units before dinner.    Mixed hyperlipidemia    Essential hypertension    Pacemaker    Additional Plan Details:  - Condition: controlled, most recent A1C of 6.4% was completed 3 months ago.   - Monitor blood glucose:  4x daily.Goals reviewed. Maintain BG log. Interested in CGM use. Will arrange for sample Dexcom in clinic. Refused Vanessa Pro, does not want monitor placed on arm. Will  also process for Dexcom supplies with .  - CGM note: Patient is testing 4 times per day. Patient is injecting meal time insulin 3 times daily and is self adjusting insulin based on blood glucose reading. Patient requires CGM for blood sugar monitoring due to frequent insulin dose changes. Patient reports hypoglycemia.   - Hypoglycemia protocol: Reviewed and risk discussed.  Notify if BG readings below 80. Protocol printout provided.   - Meal Planning: Limit carbohydrate intake 30-45 grams/meal, 3x daily and 15 grams/snack, limit 2/day.   - Activity level: Recommend at least 150 minutes of exercise in a week.  - BP and LDL: Recommend lifestyle modifications and follow up with PCP for management.   - Medication Changes:   - Resume Lantus 16 units every evening  - Decrease Humalog 8 units before breakfast, 6 units before lunch and 8 units before dinner    - Medication consideration: Resumed Lantus and decreased Humalog dose. Titrate to goal BG. Explained timing of mealtime insulin to be given ac meals.  - Health Maintenance standards addressed today: Foot Exam - completed today and documented in physical exam with feedback to patient about proper diabetic foot care and findings, A1c to be scheduled and Eye exam UTD..   - Risks of uncontrolled DM explained. Importance of routine foot and eye exams reviewed. Scheduled as appropriate.  -The patient was explained the above plan and given opportunity to ask questions.  He understands, chooses and consents to this plan and accepts all the risks, which include but are not limited to the risks mentioned above.   - Labs ordered as above.  - CDE referral: Will schedule once CGM supplies are available.  - Follow up: 2-3 weeks for CGM data download and interpretation and 6 also in 6 weeks with A1c lab prior.       Charlotte T. Delacruz, FNP-C [facility]    A total of 60 minutes was spent in face to face time, of which over 50 % was spent in counseling  patient on disease process, complications, treatment, and side effects of medications.

## 2022-08-22 ENCOUNTER — TELEPHONE (OUTPATIENT)
Dept: DIABETES | Facility: CLINIC | Age: 87
End: 2022-08-22
Payer: MEDICARE

## 2022-08-22 ENCOUNTER — OFFICE VISIT (OUTPATIENT)
Dept: DIABETES | Facility: CLINIC | Age: 87
End: 2022-08-22
Payer: MEDICARE

## 2022-08-22 VITALS
SYSTOLIC BLOOD PRESSURE: 147 MMHG | HEART RATE: 80 BPM | BODY MASS INDEX: 21.34 KG/M2 | DIASTOLIC BLOOD PRESSURE: 75 MMHG | WEIGHT: 149.06 LBS | HEIGHT: 70 IN

## 2022-08-22 DIAGNOSIS — Z95.0 PACEMAKER: ICD-10-CM

## 2022-08-22 DIAGNOSIS — E78.2 MIXED HYPERLIPIDEMIA: Chronic | ICD-10-CM

## 2022-08-22 DIAGNOSIS — E11.9 DIABETES MELLITUS TYPE 2 WITHOUT RETINOPATHY: Primary | Chronic | ICD-10-CM

## 2022-08-22 DIAGNOSIS — I10 ESSENTIAL HYPERTENSION: ICD-10-CM

## 2022-08-22 LAB — GLUCOSE SERPL-MCNC: 121 MG/DL (ref 70–110)

## 2022-08-22 PROCEDURE — 99205 OFFICE O/P NEW HI 60 MIN: CPT | Mod: S$GLB,,, | Performed by: NURSE PRACTITIONER

## 2022-08-22 PROCEDURE — 3288F PR FALLS RISK ASSESSMENT DOCUMENTED: ICD-10-PCS | Mod: CPTII,S$GLB,, | Performed by: NURSE PRACTITIONER

## 2022-08-22 PROCEDURE — 1101F PR PT FALLS ASSESS DOC 0-1 FALLS W/OUT INJ PAST YR: ICD-10-PCS | Mod: CPTII,S$GLB,, | Performed by: NURSE PRACTITIONER

## 2022-08-22 PROCEDURE — 3072F LOW RISK FOR RETINOPATHY: CPT | Mod: CPTII,S$GLB,, | Performed by: NURSE PRACTITIONER

## 2022-08-22 PROCEDURE — 3288F FALL RISK ASSESSMENT DOCD: CPT | Mod: CPTII,S$GLB,, | Performed by: NURSE PRACTITIONER

## 2022-08-22 PROCEDURE — 82962 GLUCOSE BLOOD TEST: CPT | Mod: S$GLB,,, | Performed by: NURSE PRACTITIONER

## 2022-08-22 PROCEDURE — 99999 PR PBB SHADOW E&M-EST. PATIENT-LVL V: CPT | Mod: PBBFAC,,, | Performed by: NURSE PRACTITIONER

## 2022-08-22 PROCEDURE — 1159F MED LIST DOCD IN RCRD: CPT | Mod: CPTII,S$GLB,, | Performed by: NURSE PRACTITIONER

## 2022-08-22 PROCEDURE — 3072F PR LOW RISK FOR RETINOPATHY: ICD-10-PCS | Mod: CPTII,S$GLB,, | Performed by: NURSE PRACTITIONER

## 2022-08-22 PROCEDURE — 1160F PR REVIEW ALL MEDS BY PRESCRIBER/CLIN PHARMACIST DOCUMENTED: ICD-10-PCS | Mod: CPTII,S$GLB,, | Performed by: NURSE PRACTITIONER

## 2022-08-22 PROCEDURE — 1101F PT FALLS ASSESS-DOCD LE1/YR: CPT | Mod: CPTII,S$GLB,, | Performed by: NURSE PRACTITIONER

## 2022-08-22 PROCEDURE — 1159F PR MEDICATION LIST DOCUMENTED IN MEDICAL RECORD: ICD-10-PCS | Mod: CPTII,S$GLB,, | Performed by: NURSE PRACTITIONER

## 2022-08-22 PROCEDURE — 1157F PR ADVANCE CARE PLAN OR EQUIV PRESENT IN MEDICAL RECORD: ICD-10-PCS | Mod: CPTII,S$GLB,, | Performed by: NURSE PRACTITIONER

## 2022-08-22 PROCEDURE — 99205 PR OFFICE/OUTPT VISIT, NEW, LEVL V, 60-74 MIN: ICD-10-PCS | Mod: S$GLB,,, | Performed by: NURSE PRACTITIONER

## 2022-08-22 PROCEDURE — 1126F PR PAIN SEVERITY QUANTIFIED, NO PAIN PRESENT: ICD-10-PCS | Mod: CPTII,S$GLB,, | Performed by: NURSE PRACTITIONER

## 2022-08-22 PROCEDURE — 1160F RVW MEDS BY RX/DR IN RCRD: CPT | Mod: CPTII,S$GLB,, | Performed by: NURSE PRACTITIONER

## 2022-08-22 PROCEDURE — 1126F AMNT PAIN NOTED NONE PRSNT: CPT | Mod: CPTII,S$GLB,, | Performed by: NURSE PRACTITIONER

## 2022-08-22 PROCEDURE — 99999 PR PBB SHADOW E&M-EST. PATIENT-LVL V: ICD-10-PCS | Mod: PBBFAC,,, | Performed by: NURSE PRACTITIONER

## 2022-08-22 PROCEDURE — 82962 POCT GLUCOSE, HAND-HELD DEVICE: ICD-10-PCS | Mod: S$GLB,,, | Performed by: NURSE PRACTITIONER

## 2022-08-22 PROCEDURE — 1157F ADVNC CARE PLAN IN RCRD: CPT | Mod: CPTII,S$GLB,, | Performed by: NURSE PRACTITIONER

## 2022-08-22 RX ORDER — INSULIN ASPART 100 [IU]/ML
INJECTION, SOLUTION INTRAVENOUS; SUBCUTANEOUS
Qty: 15 ML | Refills: 3
Start: 2022-08-22 | End: 2022-10-18

## 2022-08-22 NOTE — PATIENT INSTRUCTIONS
Medication Regimen/Changes:   - Resume Lantus 16 units every evening  - Decrease Humalog 8 units before breakfast, 6 units before lunch and 8 units before dinner    Patient Instructions:  Carbohydrate Count: 30-45 grams/meal and 15 grams/snack with limit of 2 snacks per day.  Exercise: Goal is 150 minutes or more per week.  Bring meter and blood sugar log to each appointment.     Goals for Blood Sugar:  Fastin-130 mg/dl  2 hours after meal:  mg/dl  Before Bed: 100-150 mg/dl  If Blood sugar is below 70 mg/dl, DO NOT take diabetes medication. Eat first and then recheck blood sugar in 20 minutes.  Foods to eat if blood sugar is below 70 mg/dl  1/2 glass of orange juice   1/2 regular cola can   3-4 hard candies   3-4 small glucose tabs.   Foods to eat if blood sugar is below 50 mg/dl  1 glass of orange juice  1 regular cola can  8 hard candies  8 small glucose tabs.   If you have repeated eating/blood sugar recheck process 2 times and blood sugar still below 70 mg/dl, call 911.

## 2022-08-23 ENCOUNTER — TELEPHONE (OUTPATIENT)
Dept: DIABETES | Facility: CLINIC | Age: 87
End: 2022-08-23
Payer: MEDICARE

## 2022-08-23 NOTE — TELEPHONE ENCOUNTER
----- Message from Bia Beach sent at 8/23/2022  1:09 PM CDT -----  Contact: Cuca/Spouse  Patient's spouse is calling to speak with a nurse regarding device. Patient's spouse reports discrepancy in readings for patient's Dexcom device, with higher reading of 89 compared to 83 using test strips. Please give Ms. Thurman a call back at [phone] as soon as possible.  Thank you,  GH

## 2022-08-23 NOTE — TELEPHONE ENCOUNTER
Late entry 8/22/2022 DEXCOM order    Bernardo Woods   [facility]  8/22 ? 2:20PM  accepted order successfully. #[id number]

## 2022-09-29 ENCOUNTER — PATIENT MESSAGE (OUTPATIENT)
Dept: DIABETES | Facility: CLINIC | Age: 87
End: 2022-09-29
Payer: MEDICARE

## 2022-10-30 ENCOUNTER — CLINICAL SUPPORT (OUTPATIENT)
Dept: CARDIOLOGY | Facility: HOSPITAL | Age: 87
End: 2022-10-30
Payer: MEDICARE

## 2022-10-30 DIAGNOSIS — Z95.0 PRESENCE OF CARDIAC PACEMAKER: ICD-10-CM

## 2022-10-30 PROCEDURE — 93296 REM INTERROG EVL PM/IDS: CPT | Performed by: INTERNAL MEDICINE

## 2022-11-03 ENCOUNTER — TELEPHONE (OUTPATIENT)
Dept: ADMINISTRATIVE | Facility: HOSPITAL | Age: 87
End: 2022-11-03
Payer: MEDICARE

## 2022-11-21 ENCOUNTER — PATIENT MESSAGE (OUTPATIENT)
Dept: OPHTHALMOLOGY | Facility: CLINIC | Age: 87
End: 2022-11-21
Payer: MEDICARE

## 2022-11-21 ENCOUNTER — OFFICE VISIT (OUTPATIENT)
Dept: OPHTHALMOLOGY | Facility: CLINIC | Age: 87
End: 2022-11-21
Payer: MEDICARE

## 2022-11-21 DIAGNOSIS — Z96.1 PSEUDOPHAKIA: ICD-10-CM

## 2022-11-21 DIAGNOSIS — H52.7 REFRACTIVE ERROR: ICD-10-CM

## 2022-11-21 DIAGNOSIS — E11.9 DIABETES MELLITUS TYPE 2 WITHOUT RETINOPATHY: Primary | ICD-10-CM

## 2022-11-21 PROCEDURE — 1157F PR ADVANCE CARE PLAN OR EQUIV PRESENT IN MEDICAL RECORD: ICD-10-PCS | Mod: CPTII,S$GLB,, | Performed by: OPTOMETRIST

## 2022-11-21 PROCEDURE — 92015 DETERMINE REFRACTIVE STATE: CPT | Mod: S$GLB,,, | Performed by: OPTOMETRIST

## 2022-11-21 PROCEDURE — 99999 PR PBB SHADOW E&M-EST. PATIENT-LVL III: ICD-10-PCS | Mod: PBBFAC,,, | Performed by: OPTOMETRIST

## 2022-11-21 PROCEDURE — 99999 PR PBB SHADOW E&M-EST. PATIENT-LVL III: CPT | Mod: PBBFAC,,, | Performed by: OPTOMETRIST

## 2022-11-21 PROCEDURE — 92014 COMPRE OPH EXAM EST PT 1/>: CPT | Mod: S$GLB,,, | Performed by: OPTOMETRIST

## 2022-11-21 PROCEDURE — 2023F PR DILATED RETINAL EXAM W/O EVID OF RETINOPATHY: ICD-10-PCS | Mod: CPTII,S$GLB,, | Performed by: OPTOMETRIST

## 2022-11-21 PROCEDURE — 1159F PR MEDICATION LIST DOCUMENTED IN MEDICAL RECORD: ICD-10-PCS | Mod: CPTII,S$GLB,, | Performed by: OPTOMETRIST

## 2022-11-21 PROCEDURE — 92015 PR REFRACTION: ICD-10-PCS | Mod: S$GLB,,, | Performed by: OPTOMETRIST

## 2022-11-21 PROCEDURE — 92014 PR EYE EXAM, EST PATIENT,COMPREHESV: ICD-10-PCS | Mod: S$GLB,,, | Performed by: OPTOMETRIST

## 2022-11-21 PROCEDURE — 1159F MED LIST DOCD IN RCRD: CPT | Mod: CPTII,S$GLB,, | Performed by: OPTOMETRIST

## 2022-11-21 PROCEDURE — 1157F ADVNC CARE PLAN IN RCRD: CPT | Mod: CPTII,S$GLB,, | Performed by: OPTOMETRIST

## 2022-11-21 PROCEDURE — 2023F DILAT RTA XM W/O RTNOPTHY: CPT | Mod: CPTII,S$GLB,, | Performed by: OPTOMETRIST

## 2022-11-21 NOTE — PROGRESS NOTES
HPI    NIDDM exam  Watery eyes a lot.  Blurred vision at near and distance.  Last eye exam 07/12/2021 TRF.  Update glasses RX.  Lab Results       Component                Value               Date                       HGBA1C                   6.4 (H)             05/09/2022                Last seen 06/25/18 TRF    1. PCIOL OU  YAG OD 2014 w/[facility]  YAG OS 2013 w/ [facility], 12/11/14 w/[facility]  2. DM x 1969  3. Dermatochalasis  4. Dry Eyes  Last edited by Giulia Starkey MA on 11/21/2022  9:07 AM.            Assessment /Plan     For exam results, see Encounter Report.    Diabetes mellitus type 2 without retinopathy    Pseudophakia    Refractive error      No Background Diabetic Retinopathy    Stable IOL OU.    Dispense Final Rx for glasses.  RTC 1 year  Discussed above and answered questions.

## 2022-12-21 ENCOUNTER — OFFICE VISIT (OUTPATIENT)
Dept: FAMILY MEDICINE | Facility: CLINIC | Age: 87
End: 2022-12-21
Payer: MEDICARE

## 2022-12-21 VITALS
BODY MASS INDEX: 26.01 KG/M2 | DIASTOLIC BLOOD PRESSURE: 62 MMHG | HEIGHT: 70 IN | WEIGHT: 181.69 LBS | HEART RATE: 73 BPM | SYSTOLIC BLOOD PRESSURE: 128 MMHG | TEMPERATURE: 98 F | OXYGEN SATURATION: 96 %

## 2022-12-21 DIAGNOSIS — J06.9 UPPER RESPIRATORY TRACT INFECTION, UNSPECIFIED TYPE: ICD-10-CM

## 2022-12-21 DIAGNOSIS — M54.16 LUMBAR RADICULOPATHY, ACUTE: Primary | ICD-10-CM

## 2022-12-21 LAB
CTP QC/QA: YES
CTP QC/QA: YES
FLUAV AG NPH QL: NEGATIVE
FLUBV AG NPH QL: NEGATIVE
SARS-COV-2 RDRP RESP QL NAA+PROBE: NEGATIVE

## 2022-12-21 PROCEDURE — 1125F AMNT PAIN NOTED PAIN PRSNT: CPT | Mod: HCNC,CPTII,S$GLB, | Performed by: FAMILY MEDICINE

## 2022-12-21 PROCEDURE — 3072F LOW RISK FOR RETINOPATHY: CPT | Mod: HCNC,CPTII,S$GLB, | Performed by: FAMILY MEDICINE

## 2022-12-21 PROCEDURE — 1157F ADVNC CARE PLAN IN RCRD: CPT | Mod: HCNC,CPTII,S$GLB, | Performed by: FAMILY MEDICINE

## 2022-12-21 PROCEDURE — 87635: ICD-10-PCS | Mod: QW,HCNC,S$GLB, | Performed by: FAMILY MEDICINE

## 2022-12-21 PROCEDURE — 3288F PR FALLS RISK ASSESSMENT DOCUMENTED: ICD-10-PCS | Mod: HCNC,CPTII,S$GLB, | Performed by: FAMILY MEDICINE

## 2022-12-21 PROCEDURE — 87804 INFLUENZA ASSAY W/OPTIC: CPT | Mod: QW,HCNC,S$GLB, | Performed by: FAMILY MEDICINE

## 2022-12-21 PROCEDURE — 1159F MED LIST DOCD IN RCRD: CPT | Mod: HCNC,CPTII,S$GLB, | Performed by: FAMILY MEDICINE

## 2022-12-21 PROCEDURE — 1159F PR MEDICATION LIST DOCUMENTED IN MEDICAL RECORD: ICD-10-PCS | Mod: HCNC,CPTII,S$GLB, | Performed by: FAMILY MEDICINE

## 2022-12-21 PROCEDURE — 1101F PT FALLS ASSESS-DOCD LE1/YR: CPT | Mod: HCNC,CPTII,S$GLB, | Performed by: FAMILY MEDICINE

## 2022-12-21 PROCEDURE — 87635 SARS-COV-2 COVID-19 AMP PRB: CPT | Mod: QW,HCNC,S$GLB, | Performed by: FAMILY MEDICINE

## 2022-12-21 PROCEDURE — 3072F PR LOW RISK FOR RETINOPATHY: ICD-10-PCS | Mod: HCNC,CPTII,S$GLB, | Performed by: FAMILY MEDICINE

## 2022-12-21 PROCEDURE — 87804 POCT INFLUENZA A/B: ICD-10-PCS | Mod: QW,HCNC,S$GLB, | Performed by: FAMILY MEDICINE

## 2022-12-21 PROCEDURE — 99214 PR OFFICE/OUTPT VISIT, EST, LEVL IV, 30-39 MIN: ICD-10-PCS | Mod: HCNC,S$GLB,, | Performed by: FAMILY MEDICINE

## 2022-12-21 PROCEDURE — 99214 OFFICE O/P EST MOD 30 MIN: CPT | Mod: HCNC,S$GLB,, | Performed by: FAMILY MEDICINE

## 2022-12-21 PROCEDURE — 1125F PR PAIN SEVERITY QUANTIFIED, PAIN PRESENT: ICD-10-PCS | Mod: HCNC,CPTII,S$GLB, | Performed by: FAMILY MEDICINE

## 2022-12-21 PROCEDURE — 99999 PR PBB SHADOW E&M-EST. PATIENT-LVL IV: ICD-10-PCS | Mod: PBBFAC,HCNC,, | Performed by: FAMILY MEDICINE

## 2022-12-21 PROCEDURE — 1157F PR ADVANCE CARE PLAN OR EQUIV PRESENT IN MEDICAL RECORD: ICD-10-PCS | Mod: HCNC,CPTII,S$GLB, | Performed by: FAMILY MEDICINE

## 2022-12-21 PROCEDURE — 1101F PR PT FALLS ASSESS DOC 0-1 FALLS W/OUT INJ PAST YR: ICD-10-PCS | Mod: HCNC,CPTII,S$GLB, | Performed by: FAMILY MEDICINE

## 2022-12-21 PROCEDURE — 99499 UNLISTED E&M SERVICE: CPT | Mod: HCNC,S$GLB,, | Performed by: FAMILY MEDICINE

## 2022-12-21 PROCEDURE — 99999 PR PBB SHADOW E&M-EST. PATIENT-LVL IV: CPT | Mod: PBBFAC,HCNC,, | Performed by: FAMILY MEDICINE

## 2022-12-21 PROCEDURE — 99499 RISK ADDL DX/OHS AUDIT: ICD-10-PCS | Mod: HCNC,S$GLB,, | Performed by: FAMILY MEDICINE

## 2022-12-21 PROCEDURE — 3288F FALL RISK ASSESSMENT DOCD: CPT | Mod: HCNC,CPTII,S$GLB, | Performed by: FAMILY MEDICINE

## 2022-12-21 RX ORDER — PREDNISONE 50 MG/1
50 TABLET ORAL DAILY
Qty: 5 TABLET | Refills: 0 | Status: SHIPPED | OUTPATIENT
Start: 2022-12-21 | End: 2022-12-26

## 2022-12-21 RX ORDER — HYDROCODONE BITARTRATE AND ACETAMINOPHEN 5; 325 MG/1; MG/1
1 TABLET ORAL EVERY 8 HOURS PRN
Qty: 21 TABLET | Refills: 0 | Status: SHIPPED | OUTPATIENT
Start: 2022-12-21 | End: 2023-10-18

## 2022-12-21 NOTE — PROGRESS NOTES
Patient: Donna Edwards Date: 2020   : 1964 Attending: Keith Rojo MD   56 year old female        Chief complaint: ESRD, SOB, right foot ischemic pain    Subjective/Interim Events: no new events. Denies CP, SOB. Appetite is good.    Reviewed: Vital signs, labs, imaging studies, medications, notes    Vital Last Value 24 Hour Range   Temperature 97.7 °F (36.5 °C) (20 0545) Temp  Min: 97.4 °F (36.3 °C)  Max: 97.7 °F (36.5 °C)   Pulse 79 (20 0545) Pulse  Min: 62  Max: 79   Respiratory 15 (20 0545) Resp  Min: 15  Max: 20   Non-Invasive  Blood Pressure 100/62 (20 0840) BP  Min: 100/62  Max: 131/92   Arterial   Blood Pressure   No data recorded   PulseOximetry 100 % (20 0545) SpO2  Min: 100 %  Max: 100 %     Vital Today Admitted   Weight 93.7 kg (20 0545) Weight: 98.6 kg (20 0614)   Height N/A Height: 5' 5\" (165.1 cm) (20 1900)   BMI N/A BMI (Calculated): 36.17 (20 0614)     Weight over the past 48 Hours:  Patient Vitals for the past 48 hrs:   Weight   20 0534 94.5 kg   20 1510 94.3 kg   20 1920 93.6 kg   20 0545 93.7 kg       Intake/Output:    Last Stool Occurrence: 1 (20)    No intake/output data recorded.    I/O last 3 completed shifts:  In: 390 [P.O.:390]  Out: 1000 [Other:1000]      Intake/Output Summary (Last 24 hours) at 2020 1811  Last data filed at 2020 0547  Gross per 24 hour   Intake 390 ml   Output 1000 ml   Net -610 ml       Medications/Infusions:  Scheduled:   • fluticasone  2 spray Each Nare Daily   • loratadine  10 mg Oral QAM AC   • insulin lispro   Subcutaneous TID WC   • aspirin  81 mg Oral Daily   • B complex-vitamin C-folic acid  1 tablet Oral Daily   • cinacalcet  30 mg Oral Daily   • clopidogrel  75 mg Oral Daily   • docusate sodium-sennosides  2 tablet Oral Nightly   • doxercalciferol  7 mcg Intravenous Once per day on    • epoetin alycia  15,000 Units Subcutaneous Once  "Chief Complaint:    Chief Complaint   Patient presents with    Back Pain       History of Present Illness:  Patient with PAF, HLD, claudication in PVD, CAD, and type 2 DM without retinopathy presents today for back pain for three days    He can usually walk to relieve his back pain but this episode worsened with progression. He went to the ER on 12/18 for lumbar strain because he was unable to get out of his bed without assistance. The pain sometimes radiate down his legs; he had tingling in his legs two days ago. Today the pain is "bearable". Changing posture makes the pain worse. Denies fever. CT scan showed no fractures.  Degenerative Disc at lower lumbar levels Medications include Zanaflex.   He reports a productive cough with thick phlegm for 3-4 days. Denies fever.      ROS:  Review of Systems   Constitutional:  Negative for appetite change, chills and fever.   HENT:  Negative for congestion, ear pain, postnasal drip, rhinorrhea, sinus pressure and sinus pain.    Eyes:  Negative for pain.   Respiratory:  Positive for cough. Negative for chest tightness and shortness of breath.    Cardiovascular:  Negative for chest pain and palpitations.   Gastrointestinal:  Negative for abdominal pain, blood in stool, constipation, diarrhea and nausea.   Genitourinary:  Negative for difficulty urinating, dysuria, flank pain and hematuria.   Musculoskeletal:  Positive for back pain. Negative for arthralgias and myalgias.   Skin:  Negative for pallor and wound.   Neurological:  Positive for weakness and numbness. Negative for dizziness, tremors, speech difficulty, light-headedness and headaches.   Psychiatric/Behavioral:  Negative for behavioral problems, dysphoric mood and sleep disturbance. The patient is not nervous/anxious.    All other systems reviewed and are negative.    Past Medical History:   Diagnosis Date    A-fib     Anemia     AP (angina pectoris) 01/23/2014    Carotid artery disease without cerebral infarction " 2014    Cataract     Cirrhosis of liver 09/10/2020    Coronary artery disease     Diabetes mellitus 1970     am 2022  Insulin x 6-7 years.    DM (diabetes mellitus) 1970     am 2020    GERD (gastroesophageal reflux disease) 2013    Hemorrhagic cerebrovascular accident (CVA) 2018    Hyperlipidemia     Hypertension     Hypertensive heart disease with heart failure 2019    Intracranial hemorrhage     Lumbosacral spondylosis 2014    Pacemaker 2014    Paroxysmal atrial fibrillation 2014    Peripheral vascular disease 2014    Pneumonia of right lung due to infectious organism 2019    Seizure, late effect of stroke     Stroke     Type 2 diabetes mellitus with ophthalmic manifestations        Social History:  Social History     Socioeconomic History    Marital status:     Number of children: 3    Highest education level: GED or equivalent   Tobacco Use    Smoking status: Former     Packs/day: 2.00     Years: 13.00     Pack years: 26.00     Types: Cigarettes     Start date: 1954     Quit date: 1967     Years since quittin.5    Smokeless tobacco: Never   Substance and Sexual Activity    Alcohol use: No    Drug use: No    Sexual activity: Never     Partners: Female     Birth control/protection: None   Social History Narrative    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       Family History:   family history includes Alcohol abuse in his paternal uncle; Arthritis in his father and mother; Cancer in his daughter and sister; Cataracts in his mother; Diabetes in his brother, daughter, father, mother, sister, son, and son; Fibromyalgia in his daughter; Heart disease in his brother, mother, and sister; Kidney disease in his brother and mother; Miscarriages / Stillbirths in his daughter.    Health Maintenance   Topic Date Due    Hemoglobin A1c  2022    Lipid Panel  2023    Eye Exam  2023     per day on Mon Wed Fri   • gabapentin  100 mg Oral Nightly   • iron sucrose (VENOFER) IVPB  100 mg Intravenous Once per day on Mon Wed Fri   • midodrine  15 mg Oral TID AC   • pantoprazole  40 mg Oral Nightly   • PARoxetine  20 mg Oral QHS   • polyethylene glycol  17 g Oral Daily   • pravastatin  40 mg Oral Daily   • sevelamer carbonate  3,200 mg Oral TID WC   • tenofovir  300 mg Oral Once per day on Mon       Continuous Infusions:  • dextrose 5 % infusion         Physical assessment  Gen NAD, at rest.   HEENT MMM, sclerae anicteric  Cardiac RRR   Resp  No increased work of breathing on room air. Lungs CTA bilaterally  ABD  Soft, NT, +BS  Ext  Trace LE edema. Right BKA, stump dressing C/D/I. VAC in place right groin wound  KD site  Right UE AVF with good bruit.    Laboratory Results:    Recent Labs   Lab 05/17/20  0504 05/17/20  0503 05/14/20  0544   SODIUM 135  --  140   POTASSIUM 4.5  --  4.1   CHLORIDE 100  --  103   CO2 32  --  33*   ANIONGAP 8*  --  8*   BUN 39*  --  21*   CREATININE 4.86*  --  3.48*   CALCIUM 8.7  --  8.9   GLUCOSE 113*  --  146*   WBC  --  6.8 7.3   HCT  --  30.8* 29.5*     Recent Labs   Lab 05/17/20  0503 05/14/20  0544 05/13/20  1606   HGB 9.5* 9.0* 9.9*    263  --        Urine Panel  Lab Results   Component Value Date    5UNITR negative 08/08/2014    UKET Trace (A) 06/14/2015    USPG 1.025 06/14/2015    UPROT >=300 (A) 06/14/2015    UWBC Moderate (A) 06/14/2015    URBC Lame (A) 06/14/2015    UBILI Small (A) 06/14/2015    UPH 6.0 06/14/2015    UROB 0.2 06/14/2015    UBACTR Moderate (A) 06/14/2015         Diagnosis/Plan:  ESRD on HD (MWF), consent obtained.   EDW 98.5 kg PTA.     # Hypotension, chronic. Continue scheduled midodrine     # Anemia, of ESRD.   Below goal but improving  Continue Epogen, [place] [address] units TIW.  Continue Venofer    # Secondary hyperparathyroidism, with h/o hypocalcemia and hyperphosphatemia  On Hectorol and Renvela and Cinacalcet PTA. Continue as IP.      # Hypoalbuminemia, for optimization as able. Is on a nutritional supplement   Switched from renal to regular diet due to poor PO intake     # R foot pain,with ulcer. MRI and angiogram findings noted. RLE bypass surgery done Fri 4/17.  Foot was debrided 4/19. Right BKA 4/24/20. BKA Completion and wound VAC right groin on 4/29.     # DM II, on insulin.      MD Rani                                         "TETANUS VACCINE  01/23/2024       Physical Exam:    Vital Signs  Temp: 97.7 °F (36.5 °C)  Temp src: Temporal  Pulse: 73  SpO2: 96 %  BP: 128/62  Pain Score:   5  Pain Loc: Back  Height and Weight  Height: 5' 10" (177.8 cm)  Weight: 82.4 kg (181 lb 10.5 oz)  BSA (Calculated - sq m): 2.02 sq meters  BMI (Calculated): 26.1  Weight in (lb) to have BMI = 25: 173.9]    Body mass index is 26.07 kg/m².    Physical Exam  Vitals and nursing note reviewed.   Constitutional:       Appearance: Normal appearance.   HENT:      Head: Normocephalic and atraumatic.      Right Ear: Tympanic membrane normal.      Left Ear: Tympanic membrane normal.      Nose:      Right Sinus: No maxillary sinus tenderness or frontal sinus tenderness.      Left Sinus: No maxillary sinus tenderness or frontal sinus tenderness.   Eyes:      Extraocular Movements: Extraocular movements intact.      Pupils: Pupils are equal, round, and reactive to light.   Cardiovascular:      Rate and Rhythm: Normal rate and regular rhythm.      Pulses: Normal pulses.      Heart sounds: Normal heart sounds. No murmur heard.    No gallop.   Pulmonary:      Effort: Pulmonary effort is normal. No respiratory distress.      Breath sounds: Normal breath sounds. No wheezing, rhonchi or rales.   Abdominal:      General: There is no distension.      Palpations: Abdomen is soft.      Tenderness: There is no abdominal tenderness.   Musculoskeletal:         General: No swelling, deformity or signs of injury. Normal range of motion.      Cervical back: Normal range of motion.      Lumbar back: Tenderness present.        Back:    Skin:     General: Skin is warm and dry.      Capillary Refill: Capillary refill takes less than 2 seconds.      Coloration: Skin is not jaundiced or pale.   Neurological:      General: No focal deficit present.      Mental Status: He is alert and oriented to person, place, and time.      Gait: Gait is intact. Gait and tandem walk normal.   Psychiatric:       " "  Mood and Affect: Mood normal.         Behavior: Behavior normal.         Diabetes Management Status    Statin: Not taking  ACE/ARB: Taking    Screening or Prevention Patient's value Goal Complete/Controlled?   HgA1C Testing and Control   Lab Results   Component Value Date    HGBA1C 6.4 (H) 05/09/2022      Annually/Less than 8% Yes   Lipid profile : 05/08/2022 Annually Yes   LDL control Lab Results   Component Value Date    LDLCALC 106.6 05/08/2022    Annually/Less than 100 mg/dl  No   Nephropathy screening Lab Results   Component Value Date    LABMICR 19.0 09/09/2021     Lab Results   Component Value Date    PROTEINUA Negative 12/12/2021    Annually Yes   Blood pressure BP Readings from Last 1 Encounters:   12/21/22 128/62    Less than 140/90 Yes   Dilated retinal exam : 11/21/2022 Annually Yes   Foot exam   Most Recent Foot Exam Date: Not Found Annually Yes       Assessment:      ICD-10-CM ICD-9-CM   1. Lumbar radiculopathy, acute  M54.16 724.4   2. Upper respiratory tract infection, unspecified type  J06.9 465.9     Plan:  Reviewed and discussed hospital visit.   Recommend Norco 5-325 mg for lumbar radiculopathy.  Also given prednisone watch for blood sugar carefully If no improvement in one week, will refer patient to pain management.   COVID and flu negative continue Tessalon Perles.    Orders Placed This Encounter   Procedures    POCT COVID-19 Rapid Screening    POCT Influenza A/B       Current Outpatient Medications   Medication Sig Dispense Refill    ACCU-CHEK GUIDE TEST STRIPS Strp TEST SIX TIMES DAILY 600 strip 3    ACCU-CHEK MULTICLIX LANCET lancets TEST BLOOD SUGAR THREE TIMES DAILY 200 each 5    ascorbic acid, vitamin C, (VITAMIN C) 100 MG tablet Take 100 mg by mouth once daily.      aspirin (ECOTRIN) 81 MG EC tablet Take 81 mg by mouth every other day.      CARBOXYMETHYLCELLULOSE SODIUM (REFRESH OPHT) Apply to eye.      COMFORT EZ PEN NEEDLES 32 gauge x 5/32" Ndle USE FOUR TIMES DAILY. 200 each 6    " diclofenac sodium (VOLTAREN) 1 % Gel Apply 2 g topically once daily. 100 g 2    ferrous sulfate (IRON ORAL) Take 65 mg by mouth.      flecainide (TAMBOCOR) 50 MG Tab TAKE ONE TABLET BY MOUTH EVERY TWELVE HOURS 60 tablet 6    fluticasone propionate (FLONASE) 50 mcg/actuation nasal spray 2 sprays (100 mcg total) by Each Nostril route once daily. 16 g 3    furosemide (LASIX) 40 MG tablet Take 1 tablet (40 mg total) by mouth once daily. 30 tablet 12    guaiFENesin (MUCINEX) 600 mg 12 hr tablet Take 1 tablet (600 mg total) by mouth 2 (two) times daily.      insulin (LANTUS SOLOSTAR U-100 INSULIN) glargine 100 units/mL (3mL) SubQ pen INJECT 16 UNITS SUBCUTANEOUSLY AT BEDTIME. 94 DAY SUPPLY 15 mL 11    insulin aspart U-100 (NOVOLOG FLEXPEN U-100 INSULIN) 100 unit/mL (3 mL) InPn pen Inject 8 units before breakfast, 6 units before lunch and 8 units before dinner. 30 mL 0    isosorbide mononitrate (IMDUR) 60 MG 24 hr tablet TAKE ONE TABLET BY MOUTH TWICE DAILY 60 tablet 12    levocetirizine (XYZAL) 5 MG tablet Take 1 tablet (5 mg total) by mouth every evening. 30 tablet 11    losartan (COZAAR) 100 MG tablet TAKE ONE TABLET BY MOUTH EVERY DAY 90 tablet 1    metoprolol succinate (TOPROL-XL) 100 MG 24 hr tablet TAKE ONE TABLET BY MOUTH TWICE DAILY 60 tablet 12    mupirocin (BACTROBAN) 2 % ointment Apply topically 2 (two) times daily. 30 g 0    nitroGLYCERIN (NITROSTAT) 0.4 MG SL tablet Place 1 tablet (0.4 mg total) under the tongue every 5 (five) minutes as needed for Chest pain. 25 tablet 12    pantoprazole (PROTONIX) 40 MG tablet TAKE ONE TABLET (40mg) BY MOUTH EVERY DAY 90 tablet 3    saw palmetto 500 MG capsule Take 500 mg by mouth 2 (two) times a day.      simethicone (GAS-X EXTRA STRENGTH) 125 mg Cap capsule Take 1 capsule (125 mg total) by mouth 4 (four) times daily as needed for Flatulence.  0    tiZANidine (ZANAFLEX) 4 MG tablet Take 1 tablet (4 mg total) by mouth every 8 (eight) hours as needed (severe back pain/  stiffness). 15 tablet 0    turmeric 400 mg Cap Take 1 capsule by mouth once daily.      vitamin D (VITAMIN D3) 1000 units Tab Take 1,000 Units by mouth once daily.      vitamin E 100 UNIT capsule Take 100 Units by mouth once daily.      blood-glucose meter Misc 1 Device by Misc.(Non-Drug; Combo Route) route once. for 1 dose 1 each 0    HYDROcodone-acetaminophen (NORCO) 5-325 mg per tablet Take 1 tablet by mouth every 8 (eight) hours as needed for Pain. 21 tablet 0    predniSONE (DELTASONE) 50 MG Tab Take 1 tablet (50 mg total) by mouth once daily. for 5 days 5 tablet 0     Current Facility-Administered Medications   Medication Dose Route Frequency Provider Last Rate Last Admin    acetaminophen tablet 650 mg  650 mg Oral Once PRN Hesham Monaco MD        albuterol inhaler 2 puff  2 puff Inhalation Q20 Min PRN Hesham Monaco MD        diphenhydrAMINE injection 25 mg  25 mg Intravenous Once PRN Hesham Monaco MD        EPINEPHrine (EPIPEN) 0.3 mg/0.3 mL pen injection 0.3 mg  0.3 mg Intramuscular PRN Hesham Monaco MD        methylPREDNISolone sodium succinate injection 40 mg  40 mg Intravenous Once PRN Hesham Monaco MD        ondansetron disintegrating tablet 4 mg  4 mg Oral Once PRN Hesham Monaco MD        sodium chloride 0.9% 500 mL flush bag   Intravenous PRN Hesham Monaco MD        sodium chloride 0.9% flush 10 mL  10 mL Intravenous PRN Hesham Monaco MD           There are no discontinued medications.    No follow-ups on file.      Abdulaziz Mccabe MD  Scribe Attestation:   I, Ani Frazier, am scribing for, and in the presence of, Dr.Arif Mccabe I performed the above scribed service and the documentation accurately describes the services I performed. I attest to the accuracy of the note.    I, Dr. Abdulaziz Mccabe, reviewed documentation as scribed above. I performed the services described in this documentation.  I agree that the record reflects my personal performance and is accurate and  complete. Abdulaziz Mccabe MD.  12/21/2022

## 2023-01-04 ENCOUNTER — PATIENT MESSAGE (OUTPATIENT)
Dept: FAMILY MEDICINE | Facility: CLINIC | Age: 88
End: 2023-01-04
Payer: MEDICARE

## 2023-01-25 ENCOUNTER — OFFICE VISIT (OUTPATIENT)
Dept: VASCULAR SURGERY | Facility: CLINIC | Age: 88
End: 2023-01-25
Payer: MEDICARE

## 2023-01-25 VITALS — BODY MASS INDEX: 25.78 KG/M2 | WEIGHT: 179.69 LBS

## 2023-01-25 DIAGNOSIS — I65.23 CAROTID STENOSIS, ASYMPTOMATIC, BILATERAL: Primary | ICD-10-CM

## 2023-01-25 PROCEDURE — 99214 PR OFFICE/OUTPT VISIT, EST, LEVL IV, 30-39 MIN: ICD-10-PCS | Mod: S$GLB,,, | Performed by: SURGERY

## 2023-01-25 PROCEDURE — 99999 PR PBB SHADOW E&M-EST. PATIENT-LVL IV: CPT | Mod: PBBFAC,,, | Performed by: SURGERY

## 2023-01-25 PROCEDURE — 1126F AMNT PAIN NOTED NONE PRSNT: CPT | Mod: CPTII,S$GLB,, | Performed by: SURGERY

## 2023-01-25 PROCEDURE — 1159F MED LIST DOCD IN RCRD: CPT | Mod: CPTII,S$GLB,, | Performed by: SURGERY

## 2023-01-25 PROCEDURE — 3072F PR LOW RISK FOR RETINOPATHY: ICD-10-PCS | Mod: CPTII,S$GLB,, | Performed by: SURGERY

## 2023-01-25 PROCEDURE — 1126F PR PAIN SEVERITY QUANTIFIED, NO PAIN PRESENT: ICD-10-PCS | Mod: CPTII,S$GLB,, | Performed by: SURGERY

## 2023-01-25 PROCEDURE — 1157F PR ADVANCE CARE PLAN OR EQUIV PRESENT IN MEDICAL RECORD: ICD-10-PCS | Mod: CPTII,S$GLB,, | Performed by: SURGERY

## 2023-01-25 PROCEDURE — 99999 PR PBB SHADOW E&M-EST. PATIENT-LVL IV: ICD-10-PCS | Mod: PBBFAC,,, | Performed by: SURGERY

## 2023-01-25 PROCEDURE — 1160F PR REVIEW ALL MEDS BY PRESCRIBER/CLIN PHARMACIST DOCUMENTED: ICD-10-PCS | Mod: CPTII,S$GLB,, | Performed by: SURGERY

## 2023-01-25 PROCEDURE — 99214 OFFICE O/P EST MOD 30 MIN: CPT | Mod: S$GLB,,, | Performed by: SURGERY

## 2023-01-25 PROCEDURE — 1159F PR MEDICATION LIST DOCUMENTED IN MEDICAL RECORD: ICD-10-PCS | Mod: CPTII,S$GLB,, | Performed by: SURGERY

## 2023-01-25 PROCEDURE — 1157F ADVNC CARE PLAN IN RCRD: CPT | Mod: CPTII,S$GLB,, | Performed by: SURGERY

## 2023-01-25 PROCEDURE — 3072F LOW RISK FOR RETINOPATHY: CPT | Mod: CPTII,S$GLB,, | Performed by: SURGERY

## 2023-01-25 PROCEDURE — 1160F RVW MEDS BY RX/DR IN RCRD: CPT | Mod: CPTII,S$GLB,, | Performed by: SURGERY

## 2023-01-25 NOTE — PROGRESS NOTES
"[facility] Vascular Surgery  Congenital Cardiovascular Surgery  Consult Note    Patient Name: Anthony Blair  MRN: [id number]  Admission Date: (Not on file)  Hospital Length of Stay: 0 days   Attending Physician: No att. providers found  Primary Care Provider: Abdulaziz Mccabe MD    Patient information was obtained from patient and ER records.     Consults  Subjective:     Chief Complaint: Follow up    History of Present Illness:   89-year-old male with a past medical history significant for atrial fibrillation, previous hemorrhagic stroke in 2017.  Here today for evaluation of bilateral 50% carotid stenosis with occluded left vertebral artery.  Patient was recently hospitalized in May of 2022 for an episode of weakness and right-sided pleural.  Head CT was negative for acute stroke or hemorrhage.  CT scan of the neck did confirm bilateral 50% carotid stenosis with an occluded the for segment of the vertebral artery.  Patient was not started on anticoagulation or anti-platelet given his history of cerebral hemorrhage.  Since that time symptoms have resolved.  Been asymptomatic    1/25/23  Here today for six-month follow-up.  Remains neurologically asymptomatic.  Compliant with antiplatelet therapy.  Patient is allergic to statins.  No complaints    Current Outpatient Medications   Medication    ACCU-CHEK GUIDE TEST STRIPS Strp    ACCU-CHEK MULTICLIX LANCET lancets    ascorbic acid, vitamin C, (VITAMIN C) 100 MG tablet    aspirin (ECOTRIN) 81 MG EC tablet    CARBOXYMETHYLCELLULOSE SODIUM (REFRESH OPHT)    COMFORT EZ PEN NEEDLES 32 gauge x 5/32" Ndle    diclofenac sodium (VOLTAREN) 1 % Gel    ferrous sulfate (IRON ORAL)    flecainide (TAMBOCOR) 50 MG Tab    fluticasone propionate (FLONASE) 50 mcg/actuation nasal spray    furosemide (LASIX) 40 MG tablet    guaiFENesin (MUCINEX) 600 mg 12 hr tablet    HYDROcodone-acetaminophen (NORCO) 5-325 mg per tablet    insulin (LANTUS SOLOSTAR U-100 INSULIN) glargine 100 units/mL (3mL) " SubQ pen    insulin aspart U-100 (NOVOLOG FLEXPEN U-100 INSULIN) 100 unit/mL (3 mL) InPn pen    isosorbide mononitrate (IMDUR) 60 MG 24 hr tablet    levocetirizine (XYZAL) 5 MG tablet    losartan (COZAAR) 100 MG tablet    metoprolol succinate (TOPROL-XL) 100 MG 24 hr tablet    mupirocin (BACTROBAN) 2 % ointment    nitroGLYCERIN (NITROSTAT) 0.4 MG SL tablet    pantoprazole (PROTONIX) 40 MG tablet    saw palmetto 500 MG capsule    simethicone (GAS-X EXTRA STRENGTH) 125 mg Cap capsule    turmeric 400 mg Cap    vitamin D (VITAMIN D3) 1000 units Tab    vitamin E 100 UNIT capsule    blood-glucose meter Misc     Current Facility-Administered Medications   Medication Frequency    acetaminophen tablet 650 mg Once PRN    albuterol inhaler 2 puff Q20 Min PRN    diphenhydrAMINE injection 25 mg Once PRN    EPINEPHrine (EPIPEN) 0.3 mg/0.3 mL pen injection 0.3 mg PRN    methylPREDNISolone sodium succinate injection 40 mg Once PRN    ondansetron disintegrating tablet 4 mg Once PRN    sodium chloride 0.9% 500 mL flush bag PRN    sodium chloride 0.9% flush 10 mL PRN       Review of patient's allergies indicates:   Allergen Reactions    Atorvastatin      Other reaction(s): Muscle pain  Other reaction(s): Muscle weakness  Other reaction(s): Muscle pain    Codeine      Other reaction(s): Headache  Other reaction(s): Headache    Eliquis [apixaban]     Norvasc [amlodipine] Edema    Trulicity [dulaglutide] Nausea And Vomiting    Adhesive Rash     Other reaction(s): rash       Past Medical History:   Diagnosis Date    A-fib     Anemia     AP (angina pectoris) 01/23/2014    Carotid artery disease without cerebral infarction 08/22/2014    Cataract     Cirrhosis of liver 09/10/2020    Coronary artery disease     Diabetes mellitus 1970     am 11/21/2022  Insulin x 6-7 years.    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     Hyperlipidemia     Hypertension     Hypertensive heart disease with heart failure 03/12/2019    Intracranial hemorrhage     Lumbosacral spondylosis 12/24/2014    Pacemaker 01/23/2014    Paroxysmal atrial fibrillation 01/23/2014    Peripheral vascular disease 08/22/2014    Pneumonia of right lung due to infectious organism 03/27/2019    Seizure, late effect of stroke     Stroke     Type 2 diabetes mellitus with ophthalmic manifestations      Past Surgical History:   Procedure Laterality Date    ANGIOPLASTY      ATRIAL ABLATION SURGERY N/A     CATARACT EXTRACTION Bilateral     [facility]    CATARACT EXTRACTION W/ INTRAOCULAR LENS IMPLANT Right     CATARACT EXTRACTION W/ INTRAOCULAR LENS IMPLANT Left     COLONOSCOPY N/A 10/16/2018    Procedure: COLONOSCOPY with biopsies;  Surgeon: Anabell Griggs MD;  Location: [facility] ENDO;  Service: Endoscopy;  Laterality: N/A;    CORONARY ARTERY BYPASS GRAFT  07/2010    x5    EYE SURGERY      [facility]  10/2010    reposition in 2011/2012 (approx)    REPLACEMENT OF PACEMAKER GENERATOR Left 8/6/2020    Procedure: REPLACEMENT, PULSE GENERATOR, CARDIAC PACEMAKER;  Surgeon: Domenico Grajeda MD;  Location: [facility] CATH LAB;  Service: Cardiology;  Laterality: Left;  [facility]    REVISION OF PROCEDURE INVOLVING PACEMAKER LEAD Bilateral 8/6/2020    Procedure: REVISION, ELECTRODE LEAD, CARDIAC PACEMAKER;  Surgeon: Domenico Grajeda MD;  Location: [facility] CATH LAB;  Service: Cardiology;  Laterality: Bilateral;    VEIN BYPASS SURGERY       Family History       Problem Relation (Age of Onset)    Alcohol abuse Paternal Uncle    Arthritis Mother, Father    Cancer Sister, Daughter    Cataracts Mother    Diabetes Mother, Father, Sister, Brother, Daughter, Son, Son    Fibromyalgia Daughter    Heart disease Mother, Sister, Brother    Kidney disease Mother, Brother    Miscarriages / Stillbirths Daughter          Tobacco Use    Smoking status: Former     Packs/day: 2.00     Years:  13.00     Pack years: 26.00     Types: Cigarettes     Start date: 1954     Quit date: 1967     Years since quittin.6    Smokeless tobacco: Never   Substance and Sexual Activity    Alcohol use: No    Drug use: No    Sexual activity: Never     Partners: Female     Birth control/protection: None     Review of Systems   Constitutional:  Negative for activity change, appetite change, fatigue and fever.   HENT:  Negative for congestion.    Eyes:  Negative for photophobia, redness and visual disturbance.   Respiratory:  Negative for apnea, cough, chest tightness and shortness of breath.    Cardiovascular:  Negative for chest pain and leg swelling.   Gastrointestinal:  Negative for abdominal pain, nausea and vomiting.   Genitourinary:  Negative for difficulty urinating.   Musculoskeletal:  Negative for gait problem and myalgias.   Skin:  Negative for color change, rash and wound.   Neurological:  Negative for syncope, facial asymmetry, speech difficulty, weakness and numbness.   Objective:     Vital Signs (Most Recent):    Vital Signs (24h Range):  [unfilled]     Weight: 81.5 kg (179 lb 10.8 oz)  Body mass index is 25.78 kg/m².            [unfilled]    Physical Exam  Constitutional:       Appearance: Normal appearance. He is normal weight.   HENT:      Head: Normocephalic and atraumatic.      Mouth/Throat:      Mouth: Mucous membranes are moist.   Eyes:      Extraocular Movements: Extraocular movements intact.      Conjunctiva/sclera: Conjunctivae normal.      Pupils: Pupils are equal, round, and reactive to light.   Neck:      Vascular: No carotid bruit.   Cardiovascular:      Rate and Rhythm: Normal rate and regular rhythm.      Pulses: Normal pulses.           Femoral pulses are 2+ on the right side and 2+ on the left side.       Dorsalis pedis pulses are 2+ on the right side and 2+ on the left side.   Pulmonary:      Effort: Pulmonary effort is normal.      Breath sounds: Normal breath sounds.   Abdominal:       General: Abdomen is flat. Bowel sounds are normal.      Palpations: Abdomen is soft.   Musculoskeletal:      Cervical back: Neck supple.   Skin:     General: Skin is warm.      Capillary Refill: Capillary refill takes less than 2 seconds.   Neurological:      General: No focal deficit present.      Mental Status: He is alert and oriented to person, place, and time. Mental status is at baseline.      Cranial Nerves: No cranial nerve deficit.      Sensory: No sensory deficit.      Motor: No weakness.   Psychiatric:         Mood and Affect: Mood normal.         Behavior: Behavior normal.       Significant Labs:  I have reviewed all pertinent lab results within the past 24 hours.    Significant Diagnostics:  I have reviewed all pertinent imaging results/findings within the past 24 hours.    Assessment/Plan:     There are no hospital problems to display for this patient.      Known asymptomatic bilateral carotid stenosis  Will obtain bilateral carotid ultrasound for surveillance in 6 months  Continue antiplatelet therapy    Thank you for your consult. I will follow-up with patient. Please contact us if you have any additional questions.    Jose Regan IV, MD  Vascular Surgery  [facility] Vascular Surgery

## 2023-01-28 ENCOUNTER — CLINICAL SUPPORT (OUTPATIENT)
Dept: CARDIOLOGY | Facility: HOSPITAL | Age: 88
End: 2023-01-28
Payer: MEDICARE

## 2023-01-28 DIAGNOSIS — Z95.0 PRESENCE OF CARDIAC PACEMAKER: ICD-10-CM

## 2023-01-28 PROCEDURE — 93294 CARDIAC DEVICE CHECK - REMOTE: ICD-10-PCS | Mod: S$GLB,,, | Performed by: INTERNAL MEDICINE

## 2023-01-28 PROCEDURE — 93294 REM INTERROG EVL PM/LDLS PM: CPT | Mod: S$GLB,,, | Performed by: INTERNAL MEDICINE

## 2023-01-28 PROCEDURE — 93296 REM INTERROG EVL PM/IDS: CPT | Performed by: INTERNAL MEDICINE

## 2023-02-02 ENCOUNTER — OFFICE VISIT (OUTPATIENT)
Dept: CARDIOLOGY | Facility: CLINIC | Age: 88
End: 2023-02-02
Payer: MEDICARE

## 2023-02-02 VITALS
BODY MASS INDEX: 25.79 KG/M2 | DIASTOLIC BLOOD PRESSURE: 60 MMHG | OXYGEN SATURATION: 99 % | SYSTOLIC BLOOD PRESSURE: 158 MMHG | HEART RATE: 86 BPM | HEIGHT: 70 IN | WEIGHT: 180.13 LBS

## 2023-02-02 DIAGNOSIS — I71.21 ANEURYSM OF ASCENDING AORTA WITHOUT RUPTURE: ICD-10-CM

## 2023-02-02 DIAGNOSIS — E78.2 MIXED HYPERLIPIDEMIA: Chronic | ICD-10-CM

## 2023-02-02 DIAGNOSIS — I10 ESSENTIAL HYPERTENSION: ICD-10-CM

## 2023-02-02 DIAGNOSIS — R94.31 ABNORMAL ECG: ICD-10-CM

## 2023-02-02 DIAGNOSIS — I25.708 CORONARY ARTERY DISEASE OF BYPASS GRAFT OF NATIVE HEART WITH STABLE ANGINA PECTORIS: Primary | ICD-10-CM

## 2023-02-02 DIAGNOSIS — I73.9 CLAUDICATION IN PERIPHERAL VASCULAR DISEASE: Chronic | ICD-10-CM

## 2023-02-02 DIAGNOSIS — Z78.9 STATIN INTOLERANCE: ICD-10-CM

## 2023-02-02 DIAGNOSIS — Z45.018 BIVENTRICULAR PACEMAKER CHECK: ICD-10-CM

## 2023-02-02 DIAGNOSIS — I48.0 PAROXYSMAL ATRIAL FIBRILLATION: Chronic | ICD-10-CM

## 2023-02-02 DIAGNOSIS — I20.9 AP (ANGINA PECTORIS): ICD-10-CM

## 2023-02-02 DIAGNOSIS — I50.32 CHRONIC DIASTOLIC HEART FAILURE: ICD-10-CM

## 2023-02-02 DIAGNOSIS — I73.9 PERIPHERAL VASCULAR DISEASE: Chronic | ICD-10-CM

## 2023-02-02 DIAGNOSIS — E13.51 PERIPHERAL VASCULAR DISEASE DUE TO SECONDARY DIABETES MELLITUS: ICD-10-CM

## 2023-02-02 DIAGNOSIS — Z95.0 PACEMAKER: ICD-10-CM

## 2023-02-02 DIAGNOSIS — I69.30 HISTORY OF HEMORRHAGIC CEREBROVASCULAR ACCIDENT (CVA) WITH RESIDUAL DEFICIT: ICD-10-CM

## 2023-02-02 PROCEDURE — 99214 PR OFFICE/OUTPT VISIT, EST, LEVL IV, 30-39 MIN: ICD-10-PCS | Mod: S$GLB,,, | Performed by: INTERNAL MEDICINE

## 2023-02-02 PROCEDURE — 1157F PR ADVANCE CARE PLAN OR EQUIV PRESENT IN MEDICAL RECORD: ICD-10-PCS | Mod: CPTII,S$GLB,, | Performed by: INTERNAL MEDICINE

## 2023-02-02 PROCEDURE — 3072F LOW RISK FOR RETINOPATHY: CPT | Mod: CPTII,S$GLB,, | Performed by: INTERNAL MEDICINE

## 2023-02-02 PROCEDURE — 99999 PR PBB SHADOW E&M-EST. PATIENT-LVL II: CPT | Mod: PBBFAC,,, | Performed by: INTERNAL MEDICINE

## 2023-02-02 PROCEDURE — 1157F ADVNC CARE PLAN IN RCRD: CPT | Mod: CPTII,S$GLB,, | Performed by: INTERNAL MEDICINE

## 2023-02-02 PROCEDURE — 1126F AMNT PAIN NOTED NONE PRSNT: CPT | Mod: CPTII,S$GLB,, | Performed by: INTERNAL MEDICINE

## 2023-02-02 PROCEDURE — 99214 OFFICE O/P EST MOD 30 MIN: CPT | Mod: S$GLB,,, | Performed by: INTERNAL MEDICINE

## 2023-02-02 PROCEDURE — 99999 PR PBB SHADOW E&M-EST. PATIENT-LVL II: ICD-10-PCS | Mod: PBBFAC,,, | Performed by: INTERNAL MEDICINE

## 2023-02-02 PROCEDURE — 1126F PR PAIN SEVERITY QUANTIFIED, NO PAIN PRESENT: ICD-10-PCS | Mod: CPTII,S$GLB,, | Performed by: INTERNAL MEDICINE

## 2023-02-02 PROCEDURE — 3072F PR LOW RISK FOR RETINOPATHY: ICD-10-PCS | Mod: CPTII,S$GLB,, | Performed by: INTERNAL MEDICINE

## 2023-02-02 NOTE — PROGRESS NOTES
Subjective:    Patient ID:  Anthony Blair is a 89 y.o. male who presents for evaluation of Hyperlipidemia, Hypertension, Coronary Artery Disease, Peripheral Arterial Disease, and Carotid Artery Disease      HPIpt presents for eval.  His current medical conditions include combined CHF, PAF/PSVT, CAD (PTCA 1980's), HTN, PPM, PHTN, LVH, LAE, carotid artery disease, DM, atrial septal aneurysm/PFO, PAD, dyslipidemia. Nonsmoker.  Past details pertinent for following:   Statin intolerant.  s/p CABG 7/10 (LIMA-LAD, SVG-OM1, SVG-OM3, SVG-PDA) for increasing OLIVARES and multivessel CAD on [facility].   S/p PPM 10/10 for SSS/PAF. Was hospitalized 1/11 for PSVT (Atrial tachycardia) and was started on Amiodarone but was stopped for GI discomfort.   s/p EPS/ablation of his atrial tachycardia 6/11.   S/p abd w runoff March 2015 and had significant B LE infrapopliteal disease. Atherectomy/pta performed of critical R tibeoperoneal trunk stenosis. Also has L tibeoperoneal trunk stenosis and his PTA/MIGUEL are occluded bilaterally.  Started on Eliquis Feb 2017 for suspicious left internal jugular vein thrombus.  Echo 3/17 showed normal LV function, left-right atrial shunt.   Carotid u/s 6/17 showed mild bilateral disease, known L IJ thrombus noted.  Pt called clinic Sept 2017 stated he had stopped Eliquis couple weeks before his call due to diarrhea, weakness, bloating and also off Amlodipine due to sleepiness.  He stated sxs subsided when he stopped the meds.   He was advised by cardiology on 9/6/17 to take 81 mg ASA since he stopped his Eliquis.  He had acute hemorrhage of basal ganglia right side Sept 2017, causing weakness on left side.  Tx at [facility] 9/24/17. He was on ASA daily when CVA occurred and was not on Eliquis. Also noted to have mild thoracic aneurysm 4.1 cm, 60% R ICA stenosis, 30% LICA stenosis, patent vertebral arteries but mod-severe distal left vertebral disease, by CTA per PCP note.  He was taken off his asa.  Followed by   Meg, neurosurgery. No surgery was needed.  Echo showed normal LVEF.  Stress test 8/18 showed no ischemia, apical scar.    Echo 3/19 normal EF, LAE, LVH, DD, PHTN 76 mmHg, mild TR.  There was some consideration of restarting NOAC for a fib CVA protection after pt evaluated by EP service.  Started on Flecainide 6/20 by Dr. Grajeda.  I expressed reluctance to put pt back on anticoagulation in light of prior hemorrhagic CVA with residual deficits.  S/p CRT device 8/20 with Dr. Grajeda.  Echo 7/20 EF 40%, DD, RVE, mild RV dysfunction, mod-severe TR, mild MR, LVH, mild AS, PFO.  Echo 5/21: Normal EF, DD, LAE, ASD, mild TR, PAP 42 mmHg.  Echo 9/21 EF 50%, DD, mild-mod TR, mild AI/MR, PAP 47 mmHg.  Had Covid 12/21 then flu a while later.  Pt admitted May 2022 with TIA.  Pt states he initially had a headache, then veered off walking to right, spots in his eyes, tingling on right face/arm/leg, dragged right foot.  Chart reviewed.  Neurology evaluated pt.  Deemed not to be good candidate for anticoagulation or antiplatelet tx with his h/o cerebral hemorrhage/CVA.  CTA brain, neck:  50% bilateral carotid disease, 50 -  60% bilateral subclavian stenosis, occluded left vertebral artery.  Now here.  CAD is stable.  Angina controlled on current med tx.  No acute anginal sxs.  No acute CHF sxs.  No pnd/orthopnea.  No recurrent strokes since last visit.  Has been on asa qod since May 2022.  BP stable.  Stable PAD/claudication.  Recently saw Dr. Regan, [facility], with continued med tx advised.  Labs reviewed and are stable.  Latest CRT interrogation 12/22 normal device function.      Past Medical History:   Diagnosis Date    A-fib     Anemia     AP (angina pectoris) 01/23/2014    Carotid artery disease without cerebral infarction 08/22/2014    Cataract     Cirrhosis of liver 09/10/2020    Coronary artery disease     Diabetes mellitus 1970     am 11/21/2022  Insulin x 6-7 years.    DM (diabetes mellitus) 1970    BS  "134 am 07/06/2020    GERD (gastroesophageal reflux disease) 07/11/2013    Hemorrhagic cerebrovascular accident (CVA) 04/26/2018    Hyperlipidemia     Hypertension     Hypertensive heart disease with heart failure 03/12/2019    Intracranial hemorrhage     Lumbosacral spondylosis 12/24/2014    Pacemaker 01/23/2014    Paroxysmal atrial fibrillation 01/23/2014    Peripheral vascular disease 08/22/2014    Pneumonia of right lung due to infectious organism 03/27/2019    Seizure, late effect of stroke     Stroke     Type 2 diabetes mellitus with ophthalmic manifestations        Current Outpatient Medications:     ACCU-CHEK GUIDE TEST STRIPS Strp, TEST SIX TIMES DAILY, Disp: 600 strip, Rfl: 3    ACCU-CHEK MULTICLIX LANCET lancets, TEST BLOOD SUGAR THREE TIMES DAILY, Disp: 200 each, Rfl: 5    ascorbic acid, vitamin C, (VITAMIN C) 100 MG tablet, Take 100 mg by mouth once daily., Disp: , Rfl:     aspirin (ECOTRIN) 81 MG EC tablet, Take 81 mg by mouth every other day., Disp: , Rfl:     blood-glucose meter Misc, 1 Device by Misc.(Non-Drug; Combo Route) route once. for 1 dose, Disp: 1 each, Rfl: 0    CARBOXYMETHYLCELLULOSE SODIUM (REFRESH OPHT), Apply to eye., Disp: , Rfl:     COMFORT EZ PEN NEEDLES 32 gauge x 5/32" Ndle, USE FOUR TIMES DAILY., Disp: 200 each, Rfl: 6    diclofenac sodium (VOLTAREN) 1 % Gel, Apply 2 g topically once daily., Disp: 100 g, Rfl: 2    ferrous sulfate (IRON ORAL), Take 65 mg by mouth., Disp: , Rfl:     flecainide (TAMBOCOR) 50 MG Tab, TAKE ONE TABLET BY MOUTH EVERY TWELVE HOURS, Disp: 60 tablet, Rfl: 6    fluticasone propionate (FLONASE) 50 mcg/actuation nasal spray, 2 sprays (100 mcg total) by Each Nostril route once daily., Disp: 16 g, Rfl: 3    furosemide (LASIX) 40 MG tablet, Take 1 tablet (40 mg total) by mouth once daily., Disp: 30 tablet, Rfl: 12    guaiFENesin (MUCINEX) 600 mg 12 hr tablet, Take 1 tablet (600 mg total) by mouth 2 (two) times daily., Disp: , Rfl:     HYDROcodone-acetaminophen " (NORCO) 5-325 mg per tablet, Take 1 tablet by mouth every 8 (eight) hours as needed for Pain., Disp: 21 tablet, Rfl: 0    insulin (LANTUS SOLOSTAR U-100 INSULIN) glargine 100 units/mL (3mL) SubQ pen, INJECT 16 UNITS SUBCUTANEOUSLY AT BEDTIME. 94 DAY SUPPLY, Disp: 15 mL, Rfl: 11    insulin aspart U-100 (NOVOLOG FLEXPEN U-100 INSULIN) 100 unit/mL (3 mL) InPn pen, Inject 8 units before breakfast, 6 units before lunch and 8 units before dinner., Disp: 30 mL, Rfl: 0    isosorbide mononitrate (IMDUR) 60 MG 24 hr tablet, TAKE ONE TABLET BY MOUTH TWICE DAILY, Disp: 60 tablet, Rfl: 12    levocetirizine (XYZAL) 5 MG tablet, Take 1 tablet (5 mg total) by mouth every evening., Disp: 30 tablet, Rfl: 11    losartan (COZAAR) 100 MG tablet, TAKE ONE TABLET BY MOUTH EVERY DAY, Disp: 90 tablet, Rfl: 1    metoprolol succinate (TOPROL-XL) 100 MG 24 hr tablet, TAKE ONE TABLET BY MOUTH TWICE DAILY, Disp: 60 tablet, Rfl: 12    mupirocin (BACTROBAN) 2 % ointment, Apply topically 2 (two) times daily., Disp: 30 g, Rfl: 0    nitroGLYCERIN (NITROSTAT) 0.4 MG SL tablet, Place 1 tablet (0.4 mg total) under the tongue every 5 (five) minutes as needed for Chest pain., Disp: 25 tablet, Rfl: 12    pantoprazole (PROTONIX) 40 MG tablet, TAKE ONE TABLET (40mg) BY MOUTH EVERY DAY, Disp: 90 tablet, Rfl: 3    saw palmetto 500 MG capsule, Take 500 mg by mouth 2 (two) times a day., Disp: , Rfl:     simethicone (GAS-X EXTRA STRENGTH) 125 mg Cap capsule, Take 1 capsule (125 mg total) by mouth 4 (four) times daily as needed for Flatulence., Disp: , Rfl: 0    turmeric 400 mg Cap, Take 1 capsule by mouth once daily., Disp: , Rfl:     vitamin D (VITAMIN D3) 1000 units Tab, Take 1,000 Units by mouth once daily., Disp: , Rfl:     vitamin E 100 UNIT capsule, Take 100 Units by mouth once daily., Disp: , Rfl:     Current Facility-Administered Medications:     acetaminophen tablet 650 mg, 650 mg, Oral, Once PRN, Hesham Monaco MD    albuterol inhaler 2 puff, 2 puff,  "Inhalation, Q20 Min PRN, Hesham Monaco MD    diphenhydrAMINE injection 25 mg, 25 mg, Intravenous, Once PRN, Hesham Monaco MD    EPINEPHrine (EPIPEN) 0.3 mg/0.3 mL pen injection 0.3 mg, 0.3 mg, Intramuscular, PRN, Hesham Monaco MD    methylPREDNISolone sodium succinate injection 40 mg, 40 mg, Intravenous, Once PRN, Hesham Monaco MD    ondansetron disintegrating tablet 4 mg, 4 mg, Oral, Once PRN, Hesham Monaco MD    sodium chloride 0.9% 500 mL flush bag, , Intravenous, PRN, Hesham Monaco MD    sodium chloride 0.9% flush 10 mL, 10 mL, Intravenous, PRN, Hesham Monaco MD      Review of Systems   Constitutional: Negative.   HENT: Negative.     Eyes: Negative.    Cardiovascular:  Positive for claudication.   Respiratory: Negative.     Endocrine: Negative.    Hematologic/Lymphatic: Negative.    Skin: Negative.    Musculoskeletal:  Positive for arthritis and joint pain.   Gastrointestinal: Negative.    Genitourinary: Negative.    Neurological:  Positive for light-headedness and weakness.   Psychiatric/Behavioral: Negative.     Allergic/Immunologic: Negative.         BP (!) 158/60 (BP Location: Right arm, Patient Position: Sitting, BP Method: Large (Manual))   Pulse 86   Ht 5' 10" (1.778 m)   Wt 81.7 kg (180 lb 1.9 oz)   SpO2 99%   BMI 25.84 kg/m²       Wt Readings from Last 3 Encounters:   02/02/23 81.7 kg (180 lb 1.9 oz)   01/25/23 81.5 kg (179 lb 10.8 oz)   12/21/22 82.4 kg (181 lb 10.5 oz)     Temp Readings from Last 3 Encounters:   12/21/22 97.7 °F (36.5 °C) (Temporal)   12/18/22 98.1 °F (36.7 °C) (Oral)   07/20/22 98 °F (36.7 °C) (Oral)     BP Readings from Last 3 Encounters:   02/02/23 (!) 158/60   12/21/22 128/62   12/18/22 (!) 151/67     Pulse Readings from Last 3 Encounters:   02/02/23 86   12/21/22 73   12/18/22 73       Objective:    Physical Exam  Vitals and nursing note reviewed.   Constitutional:       Appearance: He is well-developed.   HENT:      Head: Normocephalic.   Neck: "      Thyroid: No thyromegaly.      Vascular: Normal carotid pulses. No carotid bruit or JVD.   Cardiovascular:      Rate and Rhythm: Normal rate and regular rhythm.      Pulses:           Radial pulses are 2+ on the right side and 2+ on the left side.      Heart sounds: S1 normal and S2 normal. Heart sounds not distant. No midsystolic click and no opening snap. No murmur heard.    No friction rub. No S3 or S4 sounds.   Pulmonary:      Effort: Pulmonary effort is normal.      Breath sounds: Normal breath sounds. No wheezing or rales.   Abdominal:      General: Bowel sounds are normal. There is no distension or abdominal bruit.      Palpations: Abdomen is soft. There is no mass.      Tenderness: There is no abdominal tenderness.   Musculoskeletal:      Cervical back: Neck supple.   Skin:     General: Skin is warm.   Neurological:      Mental Status: He is alert and oriented to person, place, and time.   Psychiatric:         Behavior: Behavior normal.       I have reviewed all pertinent labs and cardiac studies.      Chemistry        Component Value Date/Time     05/09/2022 0741    K 4.1 05/09/2022 0741     05/09/2022 0741    CO2 29 05/09/2022 0741    BUN 21 05/09/2022 0741    CREATININE 0.8 05/09/2022 0741    GLU 90 05/09/2022 0741        Component Value Date/Time    CALCIUM 10.0 05/09/2022 0741    ALKPHOS 115 05/09/2022 0741    AST 30 05/09/2022 0741    ALT 18 05/09/2022 0741    BILITOT 1.2 (H) 05/09/2022 0741    ESTGFRAFRICA >60 05/09/2022 0741    EGFRNONAA >60 05/09/2022 0741        Lab Results   Component Value Date    WBC 4.92 05/09/2022    HGB 11.7 (L) 05/09/2022    HCT 35.2 (L) 05/09/2022    MCV 92 05/09/2022     05/09/2022       Lab Results   Component Value Date    HGBA1C 6.4 (H) 05/09/2022     Lab Results   Component Value Date    CHOL 170 05/08/2022    CHOL 163 09/09/2021    CHOL 183 05/26/2021     Lab Results   Component Value Date    HDL 41 05/08/2022    HDL 41 09/09/2021    HDL 44  05/26/2021     Lab Results   Component Value Date    LDLCALC 106.6 05/08/2022    LDLCALC 94.4 09/09/2021    LDLCALC 108.4 05/26/2021     Lab Results   Component Value Date    TRIG 112 05/08/2022    TRIG 138 09/09/2021    TRIG 153 (H) 05/26/2021     Lab Results   Component Value Date    CHOLHDL 24.1 05/08/2022    CHOLHDL 25.2 09/09/2021    CHOLHDL 24.0 05/26/2021           Assessment:       1. Coronary artery disease of bypass graft of native heart with stable angina pectoris    2. Abnormal ECG    3. AP (angina pectoris)    4. Aneurysm of ascending aorta without rupture    5. Chronic diastolic heart failure    6. Biventricular pacemaker check    7. Claudication in peripheral vascular disease    8. Essential hypertension    9. History of hemorrhagic cerebrovascular accident (CVA) with residual deficit    10. Pacemaker    11. Mixed hyperlipidemia    12. Peripheral vascular disease due to secondary diabetes mellitus    13. Paroxysmal atrial fibrillation    14. Peripheral vascular disease    15. Statin intolerance         Plan:             Stable cardiovascular conditions at present time on current medical treatment.  Reviewed all tests and above medical conditions with patient in detail and formulated treatment plan.  Continue optimal medical treatment for cardiovascular conditions.  Cardiac low salt diet advised.  Daily exercise as tolerated.  F/u w Vasc Surgery as advised.  F/u with PPM clinic as scheduled.   Maintaining healthy weight and weight loss goals (if needed) were discussed in clinic.  Need for BP control and HTN goals (if needed) were discussed and tx plan formulated.  Importance of optimal lipid control were discussed in detail as well as possible pharmacologic and lifestyle changes that may be needed.  DM control.  F/u in 6 months.      I have reviewed all pertinent labs and cardiac studies independently. Plans and recommendations have been formulated under my direct supervision. All questions answered  and patient voiced understanding.

## 2023-02-07 ENCOUNTER — PATIENT MESSAGE (OUTPATIENT)
Dept: ADMINISTRATIVE | Facility: HOSPITAL | Age: 88
End: 2023-02-07
Payer: MEDICARE

## 2023-02-07 DIAGNOSIS — Z00.00 ENCOUNTER FOR MEDICARE ANNUAL WELLNESS EXAM: ICD-10-CM

## 2023-02-09 DIAGNOSIS — Z00.00 ENCOUNTER FOR MEDICARE ANNUAL WELLNESS EXAM: ICD-10-CM

## 2023-03-01 ENCOUNTER — TELEPHONE (OUTPATIENT)
Dept: ADMINISTRATIVE | Facility: HOSPITAL | Age: 88
End: 2023-03-01
Payer: MEDICARE

## 2023-03-10 ENCOUNTER — HOSPITAL ENCOUNTER (EMERGENCY)
Facility: HOSPITAL | Age: 88
End: 2023-03-11
Attending: FAMILY MEDICINE
Payer: MEDICARE

## 2023-03-10 DIAGNOSIS — R20.0 NUMBNESS: ICD-10-CM
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
## 2023-03-10 DIAGNOSIS — S22.009A CLOSED FRACTURE OF TRANSVERSE PROCESS OF THORACIC VERTEBRA, INITIAL ENCOUNTER: ICD-10-CM

## 2023-03-10 DIAGNOSIS — S22.43XA CLOSED FRACTURE OF MULTIPLE RIBS OF BOTH SIDES, INITIAL ENCOUNTER: ICD-10-CM

## 2023-03-10 DIAGNOSIS — S22.068A OTHER CLOSED FRACTURE OF EIGHTH THORACIC VERTEBRA, INITIAL ENCOUNTER: ICD-10-CM

## 2023-03-10 DIAGNOSIS — S22.068A OTHER CLOSED FRACTURE OF SEVENTH THORACIC VERTEBRA, INITIAL ENCOUNTER: Primary | ICD-10-CM

## 2023-03-10 LAB
ALBUMIN SERPL BCP-MCNC: 4.1 G/DL (ref 3.5–5.2)
ALP SERPL-CCNC: 137 U/L (ref 55–135)
ALT SERPL W/O P-5'-P-CCNC: 23 U/L (ref 10–44)
ANION GAP SERPL CALC-SCNC: 13 MMOL/L (ref 8–16)
AST SERPL-CCNC: 44 U/L (ref 10–40)
BASOPHILS # BLD AUTO: 0.05 K/UL (ref 0–0.2)
BASOPHILS NFR BLD: 0.6 % (ref 0–1.9)
BILIRUB SERPL-MCNC: 0.7 MG/DL (ref 0.1–1)
BNP SERPL-MCNC: 92 PG/ML (ref 0–99)
BUN SERPL-MCNC: 28 MG/DL (ref 8–23)
CALCIUM SERPL-MCNC: 10.5 MG/DL (ref 8.7–10.5)
CHLORIDE SERPL-SCNC: 103 MMOL/L (ref 95–110)
CO2 SERPL-SCNC: 24 MMOL/L (ref 23–29)
CREAT SERPL-MCNC: 0.9 MG/DL (ref 0.5–1.4)
DIFFERENTIAL METHOD: ABNORMAL
EOSINOPHIL # BLD AUTO: 0.3 K/UL (ref 0–0.5)
EOSINOPHIL NFR BLD: 3.6 % (ref 0–8)
ERYTHROCYTE [DISTWIDTH] IN BLOOD BY AUTOMATED COUNT: 13.4 % (ref 11.5–14.5)
EST. GFR  (NO RACE VARIABLE): >60 ML/MIN/1.73 M^2
GLUCOSE SERPL-MCNC: 128 MG/DL (ref 70–110)
HCT VFR BLD AUTO: 37.2 % (ref 40–54)
HGB BLD-MCNC: 12.5 G/DL (ref 14–18)
IMM GRANULOCYTES # BLD AUTO: 0.07 K/UL (ref 0–0.04)
IMM GRANULOCYTES NFR BLD AUTO: 0.9 % (ref 0–0.5)
LYMPHOCYTES # BLD AUTO: 1.6 K/UL (ref 1–4.8)
LYMPHOCYTES NFR BLD: 19.3 % (ref 18–48)
MCH RBC QN AUTO: 31.2 PG (ref 27–31)
MCHC RBC AUTO-ENTMCNC: 33.6 G/DL (ref 32–36)
MCV RBC AUTO: 93 FL (ref 82–98)
MONOCYTES # BLD AUTO: 0.5 K/UL (ref 0.3–1)
MONOCYTES NFR BLD: 6.7 % (ref 4–15)
NEUTROPHILS # BLD AUTO: 5.6 K/UL (ref 1.8–7.7)
NEUTROPHILS NFR BLD: 68.9 % (ref 38–73)
NRBC BLD-RTO: 0 /100 WBC
PLATELET # BLD AUTO: 158 K/UL (ref 150–450)
PMV BLD AUTO: 9.3 FL (ref 9.2–12.9)
POTASSIUM SERPL-SCNC: 4.2 MMOL/L (ref 3.5–5.1)
PROT SERPL-MCNC: 8 G/DL (ref 6–8.4)
RBC # BLD AUTO: 4.01 M/UL (ref 4.6–6.2)
SODIUM SERPL-SCNC: 140 MMOL/L (ref 136–145)
TROPONIN I SERPL DL<=0.01 NG/ML-MCNC: 0.01 NG/ML (ref 0–0.03)
WBC # BLD AUTO: 8.1 K/UL (ref 3.9–12.7)

## 2023-03-10 PROCEDURE — 99291 CRITICAL CARE FIRST HOUR: CPT | Mod: 25,HCNC

## 2023-03-10 PROCEDURE — 83880 ASSAY OF NATRIURETIC PEPTIDE: CPT | Mod: HCNC | Performed by: NURSE PRACTITIONER

## 2023-03-10 PROCEDURE — 93010 EKG 12-LEAD: ICD-10-PCS | Mod: HCNC,,, | Performed by: INTERNAL MEDICINE

## 2023-03-10 PROCEDURE — 85025 COMPLETE CBC W/AUTO DIFF WBC: CPT | Mod: HCNC | Performed by: NURSE PRACTITIONER

## 2023-03-10 PROCEDURE — 84484 ASSAY OF TROPONIN QUANT: CPT | Mod: HCNC | Performed by: NURSE PRACTITIONER

## 2023-03-10 PROCEDURE — 80053 COMPREHEN METABOLIC PANEL: CPT | Mod: HCNC | Performed by: NURSE PRACTITIONER

## 2023-03-10 PROCEDURE — 93005 ELECTROCARDIOGRAM TRACING: CPT | Mod: HCNC

## 2023-03-10 PROCEDURE — 93010 ELECTROCARDIOGRAM REPORT: CPT | Mod: HCNC,,, | Performed by: INTERNAL MEDICINE

## 2023-03-10 RX ORDER — MORPHINE SULFATE 4 MG/ML
4 INJECTION, SOLUTION INTRAMUSCULAR; INTRAVENOUS
Status: COMPLETED | OUTPATIENT
Start: 2023-03-11 | End: 2023-03-11

## 2023-03-11 VITALS
BODY MASS INDEX: 25.77 KG/M2 | DIASTOLIC BLOOD PRESSURE: 62 MMHG | RESPIRATION RATE: 17 BRPM | HEART RATE: 63 BPM | HEIGHT: 70 IN | OXYGEN SATURATION: 99 % | TEMPERATURE: 98 F | WEIGHT: 180 LBS | SYSTOLIC BLOOD PRESSURE: 140 MMHG

## 2023-03-11 PROCEDURE — 63600175 PHARM REV CODE 636 W HCPCS: Mod: HCNC | Performed by: FAMILY MEDICINE

## 2023-03-11 PROCEDURE — 96374 THER/PROPH/DIAG INJ IV PUSH: CPT | Mod: HCNC

## 2023-03-11 RX ADMIN — MORPHINE SULFATE 4 MG: 4 INJECTION INTRAVENOUS at 12:03

## 2023-03-11 NOTE — ED PROVIDER NOTES
SCRIBE #1 NOTE: I, Beth Rodriguez, am scribing for, and in the presence of, Ashley Heath MD. I have scribed the entire note.       History     Chief Complaint   Patient presents with    Fall     Slipped in shower, c/o neck , back pain and he states his legs are numb     Review of patient's allergies indicates:   Allergen Reactions    Atorvastatin      Other reaction(s): Muscle pain  Other reaction(s): Muscle weakness  Other reaction(s): Muscle pain    Codeine      Other reaction(s): Headache  Other reaction(s): Headache    Eliquis [apixaban]     Norvasc [amlodipine] Edema    Trulicity [dulaglutide] Nausea And Vomiting    Adhesive Rash     Other reaction(s): rash         History of Present Illness     HPI    3/10/2023, 11:52 PM  History obtained from the patient      History of Present Illness: Anthony Blair is a 89 y.o. male patient with a PMHx of HTN, HLD, Afib, CAD, and DM who presents to the Emergency Department for evaluation after slipping and falling in the shower. Pt complains of back pain, neck pain, and numbness to legs. Symptoms are constant and moderate in severity. No mitigating or exacerbating factors reported. No further complaints or concerns at this time.       Arrival mode: Personal vehicle      PCP: Abdulaziz Mccabe MD        Past Medical History:  Past Medical History:   Diagnosis Date    A-fib     Anemia     AP (angina pectoris) 01/23/2014    Carotid artery disease without cerebral infarction 08/22/2014    Cataract     Cirrhosis of liver 09/10/2020    Coronary artery disease     Diabetes mellitus 1970     am 11/21/2022  Insulin x 6-7 years.    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    Hyperlipidemia     Hypertension     Hypertensive heart disease with heart failure 03/12/2019    Intracranial hemorrhage     Lumbosacral spondylosis 12/24/2014    Pacemaker 01/23/2014     Paroxysmal atrial fibrillation 01/23/2014    Peripheral vascular disease 08/22/2014    Pneumonia of right lung due to infectious organism 03/27/2019    Seizure, late effect of stroke     Stroke     Type 2 diabetes mellitus with ophthalmic manifestations        Past Surgical History:  Past Surgical History:   Procedure Laterality Date    ANGIOPLASTY      ATRIAL ABLATION SURGERY N/A     CATARACT EXTRACTION Bilateral     [facility]    CATARACT EXTRACTION W/ INTRAOCULAR LENS IMPLANT Right     CATARACT EXTRACTION W/ INTRAOCULAR LENS IMPLANT Left     COLONOSCOPY N/A 10/16/2018    Procedure: COLONOSCOPY with biopsies;  Surgeon: Anabell Griggs MD;  Location: [facility];  Service: Endoscopy;  Laterality: N/A;    CORONARY ARTERY BYPASS GRAFT  07/2010    x5    EYE SURGERY      pace maker surgrey  10/2010    reposition in 2011/2012 (approx)    REPLACEMENT OF PACEMAKER GENERATOR Left 8/6/2020    Procedure: REPLACEMENT, PULSE GENERATOR, CARDIAC PACEMAKER;  Surgeon: Domenico Grajeda MD;  Location: [facility];  Service: Cardiology;  Laterality: Left;  [facility]    REVISION OF PROCEDURE INVOLVING PACEMAKER LEAD Bilateral 8/6/2020    Procedure: REVISION, ELECTRODE LEAD, CARDIAC PACEMAKER;  Surgeon: Domenico Grajeda MD;  Location: [facility];  Service: Cardiology;  Laterality: Bilateral;    VEIN BYPASS SURGERY           Family History:  Family History   Problem Relation Age of Onset    Cataracts Mother     Arthritis Mother     Diabetes Mother     Kidney disease Mother     Heart disease Mother     Arthritis Father     Diabetes Father     Diabetes Sister     Cancer Sister         breast    Heart disease Sister     Diabetes Brother     Kidney disease Brother     Heart disease Brother     Alcohol abuse Paternal Uncle     Cancer Daughter         breast    Diabetes Daughter     Miscarriages / Stillbirths Daughter     Fibromyalgia Daughter     Diabetes Son     Diabetes Son     Stroke Neg Hx     Hypertension Neg Hx      Hyperlipidemia Neg Hx     COPD Neg Hx        Social History:  Social History     Tobacco Use    Smoking status: Former     Packs/day: 2.00     Years: 13.00     Pack years: 26.00     Types: Cigarettes     Start date: 1954     Quit date: 1967     Years since quittin.7    Smokeless tobacco: Never   Substance and Sexual Activity    Alcohol use: No    Drug use: No    Sexual activity: Never     Partners: Female     Birth control/protection: None        Review of Systems     Review of Systems   Musculoskeletal:  Positive for back pain and neck pain.   Neurological:  Positive for numbness.    Physical Exam     Initial Vitals [03/10/23 2005]   BP Pulse Resp Temp SpO2   (!) 163/77 70 20 97.6 °F (36.4 °C) 99 %      MAP       --          Physical Exam  Nursing Notes and Vital Signs Reviewed.  Constitutional: Patient is in no acute distress. Well-developed and well-nourished.  Head: Atraumatic. Normocephalic.  Eyes: PERRL. EOM intact. Conjunctivae are not pale. No scleral icterus.  ENT: Mucous membranes are moist. Oropharynx is clear and symmetric.    Neck: Supple. Full ROM. No lymphadenopathy.  Cardiovascular: Regular rate. Regular rhythm. No murmurs, rubs, or gallops. Distal pulses are 2+ and symmetric.  Pulmonary/Chest: No respiratory distress. Clear to auscultation bilaterally. No wheezing or rales. Tenderness to R and L sides of chest wall.   Abdominal: Soft and non-distended.  There is no tenderness.  No rebound, guarding, or rigidity. Good bowel sounds.  Genitourinary: No CVA tenderness  Musculoskeletal: Moves all extremities. No obvious deformities. No edema. No calf tenderness.  Skin: Warm and dry.  Neurological:  Alert, awake, and appropriate.  Normal speech.  No acute focal neurological deficits are appreciated. Tenderness to thoracic vertebra. ROM of spine limited by pain.  Psychiatric: Normal affect. Good eye contact. Appropriate in content.     ED Course   Critical Care    Date/Time: 3/11/2023 12:16  "AM  Performed by: Ashley Heath MD  Authorized by: Ashley Heath MD   Direct patient critical care time: 16 minutes  Additional history critical care time: 11 minutes  Ordering / reviewing critical care time: 6 minutes  Documentation critical care time: 7 minutes  Consulting other physicians critical care time: 7 minutes  Total critical care time (exclusive of procedural time) : 47 minutes  Critical care time was exclusive of separately billable procedures and treating other patients and teaching time.  Critical care was necessary to treat or prevent imminent or life-threatening deterioration of the following conditions: vertebral fractures.  Critical care was time spent personally by me on the following activities: blood draw for specimens, development of treatment plan with patient or surrogate, discussions with consultants, interpretation of cardiac output measurements, evaluation of patient's response to treatment, examination of patient, obtaining history from patient or surrogate, ordering and performing treatments and interventions, ordering and review of laboratory studies, re-evaluation of patient's condition, review of old charts, pulse oximetry and ordering and review of radiographic studies.      ED Vital Signs:  Vitals:    03/10/23 2005 03/10/23 2321 03/11/23 0016 03/11/23 0030   BP: (!) 163/77 129/67 135/61 (!) 126/58   Pulse: 70 86 68 69   Resp: 20 16 15 16   Temp: 97.6 °F (36.4 °C)      TempSrc: Oral      SpO2: 99% 95% 100% 97%   Weight: 81.6 kg (180 lb)      Height: 5' 10" (1.778 m)       03/11/23 0045   BP: (!) 140/62   Pulse: 63   Resp: 17   Temp:    TempSrc:    SpO2: 99%   Weight:    Height:        Abnormal Lab Results:  Labs Reviewed   CBC W/ AUTO DIFFERENTIAL - Abnormal; Notable for the following components:       Result Value    RBC 4.01 (*)     Hemoglobin 12.5 (*)     Hematocrit 37.2 (*)     MCH 31.2 (*)     Immature Granulocytes 0.9 (*)     Immature Grans (Abs) 0.07 (*)     All " other components within normal limits   COMPREHENSIVE METABOLIC PANEL - Abnormal; Notable for the following components:    Glucose 128 (*)     BUN 28 (*)     Alkaline Phosphatase 137 (*)     AST 44 (*)     All other components within normal limits   TROPONIN I   B-TYPE NATRIURETIC PEPTIDE        All Lab Results:  Results for orders placed or performed during the hospital encounter of 03/10/23   CBC auto differential   Result Value Ref Range    WBC 8.10 3.90 - 12.70 K/uL    RBC 4.01 (L) 4.60 - 6.20 M/uL    Hemoglobin 12.5 (L) 14.0 - 18.0 g/dL    Hematocrit 37.2 (L) 40.0 - 54.0 %    MCV 93 82 - 98 fL    MCH 31.2 (H) 27.0 - 31.0 pg    MCHC 33.6 32.0 - 36.0 g/dL    RDW 13.4 11.5 - 14.5 %    Platelets 158 150 - 450 K/uL    MPV 9.3 9.2 - 12.9 fL    Immature Granulocytes 0.9 (H) 0.0 - 0.5 %    Gran # (ANC) 5.6 1.8 - 7.7 K/uL    Immature Grans (Abs) 0.07 (H) 0.00 - 0.04 K/uL    Lymph # 1.6 1.0 - 4.8 K/uL    Mono # 0.5 0.3 - 1.0 K/uL    Eos # 0.3 0.0 - 0.5 K/uL    Baso # 0.05 0.00 - 0.20 K/uL    nRBC 0 0 /100 WBC    Gran % 68.9 38.0 - 73.0 %    Lymph % 19.3 18.0 - 48.0 %    Mono % 6.7 4.0 - 15.0 %    Eosinophil % 3.6 0.0 - 8.0 %    Basophil % 0.6 0.0 - 1.9 %    Differential Method Automated    Comprehensive metabolic panel   Result Value Ref Range    Sodium 140 136 - 145 mmol/L    Potassium 4.2 3.5 - 5.1 mmol/L    Chloride 103 95 - 110 mmol/L    CO2 24 23 - 29 mmol/L    Glucose 128 (H) 70 - 110 mg/dL    BUN 28 (H) 8 - 23 mg/dL    Creatinine 0.9 0.5 - 1.4 mg/dL    Calcium 10.5 8.7 - 10.5 mg/dL    Total Protein 8.0 6.0 - 8.4 g/dL    Albumin 4.1 3.5 - 5.2 g/dL    Total Bilirubin 0.7 0.1 - 1.0 mg/dL    Alkaline Phosphatase 137 (H) 55 - 135 U/L    AST 44 (H) 10 - 40 U/L    ALT 23 10 - 44 U/L    Anion Gap 13 8 - 16 mmol/L    eGFR >60 >60 mL/min/1.73 m^2   Troponin I   Result Value Ref Range    Troponin I 0.011 0.000 - 0.026 ng/mL   Brain natriuretic peptide   Result Value Ref Range    BNP 92 0 - 99 pg/mL     *Note: Due to a large  number of results and/or encounters for the requested time period, some results have not been displayed. A complete set of results can be found in Results Review.         Imaging Results:  Imaging Results              X-Ray Chest PA And Lateral (Final result)  Result time 03/10/23 21:19:23      Final result by Lili Cuadra MD (03/10/23 21:19:23)                   Impression:      No active or adverse finding      Electronically signed by: Lili Cuadra  Date:    03/10/2023  Time:    21:19               Narrative:    EXAMINATION:  XR CHEST PA AND LATERAL    CLINICAL HISTORY:  Unspecified fall, initial encounter    TECHNIQUE:  Single frontal portable view of the chest was performed.    COMPARISON:  March 15, March 2022    FINDINGS:  Lungs unchanged no new consolidation.  No sizable pleural effusion.  Mediastinal contour stable                                       CT Head Without Contrast (Final result)  Result time 03/10/23 20:57:22      Final result by Lili Cuadra MD (03/10/23 20:57:22)                   Impression:      No acute or adverse intracranial finding      Electronically signed by: Lili Cuadra  Date:    03/10/2023  Time:    20:57               Narrative:    EXAMINATION:  CT HEAD WITHOUT CONTRAST    CLINICAL HISTORY:  Head trauma, moderate-severe;    TECHNIQUE:  Low dose axial images were obtained through the head.  Coronal and sagittal reformations were also performed. Contrast was not administered.    COMPARISON:  May 2022    Automated exposure control technique utilized iterative technique    FINDINGS:  No acute intracranial hemorrhage or mass effect.  Ventricles stable caliber.  No displaced calvarial fracture calvarial lesions stable right frontal                                       CT Cervical Spine Without Contrast (Final result)  Result time 03/10/23 21:03:02      Final result by Lili Cuadra MD (03/10/23 21:03:02)                   Impression:      No acute osseous  finding; ligamentous injury not excluded by CT      Electronically signed by: Lili Cuadra  Date:    03/10/2023  Time:    21:03               Narrative:    EXAMINATION:  CT CERVICAL SPINE WITHOUT CONTRAST    CLINICAL HISTORY:  Neck trauma (Age >= 65y);Fall;    TECHNIQUE:  Low dose axial images, sagittal and coronal reformations were performed though the cervical spine.  Contrast was not administered.    COMPARISON:  Two thousand fourteen comparison    Iterative technique automated exposure technique for diminishing radiation dose    FINDINGS:  Bones are demineralized.  Multilevel spondylosis.  No overt acute fracture or traumatic malalignment.                                       CT Thoracic Spine Without Contrast (Final result)  Result time 03/10/23 21:16:56      Final result by Lili Cuadra MD (03/10/23 21:16:56)                   Narrative:    EXAMINATION:  CT THORACIC SPINE WITHOUT CONTRAST    CLINICAL HISTORY:  Back trauma, no prior imaging (Age >= 16y);fall;    Serial axial images were obtained of the thoracic spine without the administration of intravenous contrast. Both soft tissue and bone windows were obtained.    TECHNIQUE:  Direct axial with sagittal and coronal reconstructions noncontrast    COMPARISON:  No comparison    IMPRESSION  Bones are demineralized.  There is bony ankylosis.  Nondisplaced fracture involving anterior T7 and T8 vertebral bodies carrot stick type.  No malalignment identified.  Fracture proximal ribs right 5th 6 7th minimally displaced.  Proximal left 6 rib fracture with callus.  Fracture right transverse process nondisplaced 7th and 8th vertebral levels      Electronically signed by: Lili Cuadra  Date:    03/10/2023  Time:    21:16                                     The EKG was ordered, reviewed, and independently interpreted by the ED provider.  Interpretation time: 23:35  Rate: 73 BPM  Rhythm:  Sinus rhythm, with short [place] with frequent ventricular-paced  complexes  Interpretation: Nonspecific ST abnormality. No STEMI.             The Emergency Provider reviewed the vital signs and test results, which are outlined above.     ED Discussion     12:32 AM: Consult with Dr. Brown (Neurology) at [facility] concerning pt. There are no neurological services, which the patient requires, offered at [facility] at this time. Dr. Brown expresses understanding and will accept transfer.  Accepting Facility: [facility]  Accepting Physician: Dr. Brown    12:33 AM: Re-evaluated pt. Informed pt and family that there are no  services available at this time. I have discussed test results, shared treatment plan, and the need for transfer with patient and family at bedside. All historical, clinical, radiographic, and laboratory findings were reviewed with the patient/family in detail. Patient will be transferred by [facility] services with  care required en route. Patient understands that there could be unforeseen motor vehicle accidents or loss of vital signs that could result in potential death or permanent disability. Pt and family express understanding at this time and agree with all information. All questions answered. Pt and family have no further questions or concerns at this time. Pt is ready for transfer.            Medical Decision Making:   Clinical Tests:   Lab Tests: Ordered and Reviewed  Radiological Study: Ordered and Reviewed  Medical Tests: Ordered and Reviewed         ED Medication(s):  Medications   morphine injection 4 mg (4 mg Intravenous Given 3/11/23 0016)       Discharge Medication List as of 3/11/2023  2:15 AM                  Scribe Attestation:   Scribe #1: I performed the above scribed service and the documentation accurately describes the services I performed. I attest to the accuracy of the note.     Attending:   Physician Attestation Statement for Scribe #1: I, Ashley Heath MD, personally performed the services  described in this documentation, as scribed by Beth Rodriguez, in my presence, and it is both accurate and complete.           Clinical Impression       ICD-10-CM ICD-9-CM   1. Other closed fracture of seventh thoracic vertebra, initial encounter  S22.068A 805.2   2. Numbness  R20.0 782.0   3. Fall  W19.XXXA E888.9   4. Other closed fracture of eighth thoracic vertebra, initial encounter  S22.068A 805.2   5. Closed fracture of transverse process of thoracic vertebra, initial encounter  S22.009A 805.2   6. Closed fracture of multiple ribs of both sides, initial encounter  S22.43XA 807.09       Disposition:   Disposition: Transferred  Condition: Stable       Ashley Heath MD  03/11/23 8280

## 2023-03-11 NOTE — FIRST PROVIDER EVALUATION
"Medical screening examination initiated.  I have conducted a focused provider triage encounter, findings are as follows:    Brief history of present illness:  Patient presents to ER for slip and fall while trying to get into the shower prior to arrival.  Patient reports neck pain, midback pain, right-sided chest pain and numbness to bilateral lower extremities.    Vitals:    03/10/23 2005   BP: (!) 163/77   BP Location: Right arm   Patient Position: Sitting   Pulse: 70   Resp: 20   Temp: 97.6 °F (36.4 °C)   TempSrc: Oral   SpO2: 99%   Weight: 81.6 kg (180 lb)   Height: 5' 10" (1.778 m)       Pertinent physical exam:  No acute distress.    Brief workup plan:  Workup    Preliminary workup initiated; this workup will be continued and followed by the physician or advanced practice provider that is assigned to the patient when roomed.  "

## 2023-03-14 ENCOUNTER — EXTERNAL HOSPITAL ADMISSION (OUTPATIENT)
Dept: ADMINISTRATIVE | Facility: CLINIC | Age: 88
End: 2023-03-14
Payer: MEDICARE

## 2023-03-14 ENCOUNTER — PATIENT OUTREACH (OUTPATIENT)
Dept: ADMINISTRATIVE | Facility: CLINIC | Age: 88
End: 2023-03-14
Payer: MEDICARE

## 2023-03-24 NOTE — ANESTHESIA POSTPROCEDURE EVALUATION
Anesthesia Post Evaluation    Patient: Anthony Blair    Procedure(s) Performed: Procedure(s) (LRB):  REPLACEMENT, PULSE GENERATOR, CARDIAC PACEMAKER (Left)  VENOGRAM, CATH LAB/bilateral (Bilateral)  REVISION, ELECTRODE LEAD, CARDIAC PACEMAKER (Bilateral)    Final Anesthesia Type: MAC    Patient location during evaluation: Cath Lab  Patient participation: Yes- Able to Participate  Level of consciousness: awake and alert  Post-procedure vital signs: reviewed and stable  Pain management: adequate  Airway patency: patent    PONV status at discharge: No PONV      Cardiovascular status: hemodynamically stable  Respiratory status: unassisted, spontaneous ventilation and room air  Hydration status: euvolemic  Follow-up not needed.          Vitals Value Taken Time   /55 08/06/20 1446   Temp 36.7 °C (98.1 °F) 08/06/20 1300   Pulse 59 08/06/20 1451   Resp 8 08/06/20 1451   SpO2 99 % 08/06/20 1451   Vitals shown include unvalidated device data.      No case tracking events are documented in the log.      Pain/Josefa Score: Josefa Score: 10 (8/6/2020  1:00 PM)         Pt remains hypertensive but not symptomatic. Denies HA/vision changes/ N&V. Currently complaining of pain s/p  incision. Tylenol and Ibuprofen given per pt request. PRN hydralazine given according to BP parameters. Will continue to monitor

## 2023-04-28 ENCOUNTER — CLINICAL SUPPORT (OUTPATIENT)
Dept: CARDIOLOGY | Facility: HOSPITAL | Age: 88
End: 2023-04-28
Payer: MEDICARE

## 2023-04-28 DIAGNOSIS — Z95.0 PRESENCE OF CARDIAC PACEMAKER: ICD-10-CM

## 2023-04-28 PROCEDURE — 93296 REM INTERROG EVL PM/IDS: CPT | Mod: HCNC | Performed by: INTERNAL MEDICINE

## 2023-05-09 ENCOUNTER — PATIENT MESSAGE (OUTPATIENT)
Dept: ADMINISTRATIVE | Facility: HOSPITAL | Age: 88
End: 2023-05-09
Payer: MEDICARE

## 2023-05-11 ENCOUNTER — PATIENT MESSAGE (OUTPATIENT)
Dept: ADMINISTRATIVE | Facility: OTHER | Age: 88
End: 2023-05-11
Payer: MEDICARE

## 2023-05-12 ENCOUNTER — PES CALL (OUTPATIENT)
Dept: ADMINISTRATIVE | Facility: CLINIC | Age: 88
End: 2023-05-12
Payer: MEDICARE

## 2023-05-16 ENCOUNTER — OFFICE VISIT (OUTPATIENT)
Dept: FAMILY MEDICINE | Facility: CLINIC | Age: 88
End: 2023-05-16
Payer: MEDICARE

## 2023-05-16 ENCOUNTER — LAB VISIT (OUTPATIENT)
Dept: LAB | Facility: HOSPITAL | Age: 88
End: 2023-05-16
Attending: FAMILY MEDICINE
Payer: MEDICARE

## 2023-05-16 VITALS
HEART RATE: 70 BPM | OXYGEN SATURATION: 95 % | BODY MASS INDEX: 26.79 KG/M2 | HEIGHT: 69 IN | DIASTOLIC BLOOD PRESSURE: 53 MMHG | WEIGHT: 180.88 LBS | SYSTOLIC BLOOD PRESSURE: 111 MMHG

## 2023-05-16 DIAGNOSIS — E08.51 DIABETES MELLITUS DUE TO UNDERLYING CONDITION WITH DIABETIC PERIPHERAL ANGIOPATHY WITHOUT GANGRENE, WITH LONG-TERM CURRENT USE OF INSULIN: ICD-10-CM

## 2023-05-16 DIAGNOSIS — K11.7 DROOLING FROM LEFT SIDE OF MOUTH: Primary | ICD-10-CM

## 2023-05-16 DIAGNOSIS — G47.10 EXCESSIVE SLEEPINESS: ICD-10-CM

## 2023-05-16 DIAGNOSIS — Z79.4 DIABETES MELLITUS DUE TO UNDERLYING CONDITION WITH DIABETIC PERIPHERAL ANGIOPATHY WITHOUT GANGRENE, WITH LONG-TERM CURRENT USE OF INSULIN: ICD-10-CM

## 2023-05-16 DIAGNOSIS — K74.60 HEPATIC CIRRHOSIS, UNSPECIFIED HEPATIC CIRRHOSIS TYPE, UNSPECIFIED WHETHER ASCITES PRESENT: ICD-10-CM

## 2023-05-16 DIAGNOSIS — K11.7 DROOLING FROM LEFT SIDE OF MOUTH: ICD-10-CM

## 2023-05-16 PROBLEM — I69.398 SEIZURE, LATE EFFECT OF STROKE: Status: RESOLVED | Noted: 2018-05-08 | Resolved: 2023-05-16

## 2023-05-16 PROBLEM — I69.154 HEMIPLEGIA AND HEMIPARESIS FOLLOWING NONTRAUMATIC INTRACEREBRAL HEMORRHAGE AFFECTING LEFT NON-DOMINANT SIDE: Status: RESOLVED | Noted: 2021-02-26 | Resolved: 2023-05-16

## 2023-05-16 PROBLEM — R56.9 SEIZURE, LATE EFFECT OF STROKE: Status: RESOLVED | Noted: 2018-05-08 | Resolved: 2023-05-16

## 2023-05-16 LAB
ALBUMIN SERPL BCP-MCNC: 3.7 G/DL (ref 3.5–5.2)
ALP SERPL-CCNC: 164 U/L (ref 55–135)
ALT SERPL W/O P-5'-P-CCNC: 21 U/L (ref 10–44)
ANION GAP SERPL CALC-SCNC: 13 MMOL/L (ref 8–16)
AST SERPL-CCNC: 37 U/L (ref 10–40)
BILIRUB SERPL-MCNC: 0.5 MG/DL (ref 0.1–1)
BUN SERPL-MCNC: 37 MG/DL (ref 8–23)
CALCIUM SERPL-MCNC: 10.2 MG/DL (ref 8.7–10.5)
CHLORIDE SERPL-SCNC: 101 MMOL/L (ref 95–110)
CO2 SERPL-SCNC: 26 MMOL/L (ref 23–29)
CREAT SERPL-MCNC: 1.2 MG/DL (ref 0.5–1.4)
EST. GFR  (NO RACE VARIABLE): 57.4 ML/MIN/1.73 M^2
ESTIMATED AVG GLUCOSE: 131 MG/DL (ref 68–131)
GLUCOSE SERPL-MCNC: 150 MG/DL (ref 70–110)
HBA1C MFR BLD: 6.2 % (ref 4–5.6)
POTASSIUM SERPL-SCNC: 4.3 MMOL/L (ref 3.5–5.1)
PROT SERPL-MCNC: 7 G/DL (ref 6–8.4)
SODIUM SERPL-SCNC: 140 MMOL/L (ref 136–145)
VIT B12 SERPL-MCNC: 767 PG/ML (ref 210–950)

## 2023-05-16 PROCEDURE — 82607 VITAMIN B-12: CPT | Mod: DBM,HCNC | Performed by: FAMILY MEDICINE

## 2023-05-16 PROCEDURE — 1157F ADVNC CARE PLAN IN RCRD: CPT | Mod: HCNC,CPTII,, | Performed by: FAMILY MEDICINE

## 2023-05-16 PROCEDURE — 1157F PR ADVANCE CARE PLAN OR EQUIV PRESENT IN MEDICAL RECORD: ICD-10-PCS | Mod: HCNC,CPTII,, | Performed by: FAMILY MEDICINE

## 2023-05-16 PROCEDURE — 1159F MED LIST DOCD IN RCRD: CPT | Mod: HCNC,CPTII,, | Performed by: FAMILY MEDICINE

## 2023-05-16 PROCEDURE — 1100F PTFALLS ASSESS-DOCD GE2>/YR: CPT | Mod: HCNC,CPTII,, | Performed by: FAMILY MEDICINE

## 2023-05-16 PROCEDURE — 1100F PR PT FALLS ASSESS DOC 2+ FALLS/FALL W/INJURY/YR: ICD-10-PCS | Mod: HCNC,CPTII,, | Performed by: FAMILY MEDICINE

## 2023-05-16 PROCEDURE — 1159F PR MEDICATION LIST DOCUMENTED IN MEDICAL RECORD: ICD-10-PCS | Mod: HCNC,CPTII,, | Performed by: FAMILY MEDICINE

## 2023-05-16 PROCEDURE — 3072F PR LOW RISK FOR RETINOPATHY: ICD-10-PCS | Mod: HCNC,CPTII,, | Performed by: FAMILY MEDICINE

## 2023-05-16 PROCEDURE — 36415 COLL VENOUS BLD VENIPUNCTURE: CPT | Mod: HCNC,PO | Performed by: FAMILY MEDICINE

## 2023-05-16 PROCEDURE — 99214 PR OFFICE/OUTPT VISIT, EST, LEVL IV, 30-39 MIN: ICD-10-PCS | Mod: HCNC,,, | Performed by: FAMILY MEDICINE

## 2023-05-16 PROCEDURE — 99999 PR PBB SHADOW E&M-EST. PATIENT-LVL V: CPT | Mod: PBBFAC,HCNC,, | Performed by: FAMILY MEDICINE

## 2023-05-16 PROCEDURE — 99214 OFFICE O/P EST MOD 30 MIN: CPT | Mod: HCNC,,, | Performed by: FAMILY MEDICINE

## 2023-05-16 PROCEDURE — 99999 PR PBB SHADOW E&M-EST. PATIENT-LVL V: ICD-10-PCS | Mod: PBBFAC,HCNC,, | Performed by: FAMILY MEDICINE

## 2023-05-16 PROCEDURE — 80053 COMPREHEN METABOLIC PANEL: CPT | Mod: HCNC | Performed by: FAMILY MEDICINE

## 2023-05-16 PROCEDURE — 3288F FALL RISK ASSESSMENT DOCD: CPT | Mod: HCNC,CPTII,, | Performed by: FAMILY MEDICINE

## 2023-05-16 PROCEDURE — 84630 ASSAY OF ZINC: CPT | Mod: HCNC | Performed by: FAMILY MEDICINE

## 2023-05-16 PROCEDURE — 3288F PR FALLS RISK ASSESSMENT DOCUMENTED: ICD-10-PCS | Mod: HCNC,CPTII,, | Performed by: FAMILY MEDICINE

## 2023-05-16 PROCEDURE — 3072F LOW RISK FOR RETINOPATHY: CPT | Mod: HCNC,CPTII,, | Performed by: FAMILY MEDICINE

## 2023-05-16 PROCEDURE — 83036 HEMOGLOBIN GLYCOSYLATED A1C: CPT | Mod: HCNC | Performed by: FAMILY MEDICINE

## 2023-05-16 RX ORDER — GLYCOPYRROLATE 1 MG/1
1 TABLET ORAL 3 TIMES DAILY
Qty: 90 TABLET | Refills: 6 | Status: SHIPPED | OUTPATIENT
Start: 2023-05-16

## 2023-05-16 NOTE — PROGRESS NOTES
Chief Complaint:    Chief Complaint   Patient presents with    Annual Exam     Pt.really tired       History of Present Illness:  Patient with PAF, HLD, claudication in PVD, CAD, and type 2 DM without retinopathy presents today for a three week follow up     He says he has been drooling again he has not been taking the glyco fibrate.    He wants to know if he has deficiency of zinc or B12 used to be deficiency and zinc many months back   Complain of excessive sleepiness  He also has diabetes has not had his A1c checked in a while.              ROS:  Review of Systems   Constitutional:  Negative for appetite change, chills and fever.   HENT:  Negative for congestion, ear pain, postnasal drip, rhinorrhea, sinus pressure and sinus pain.    Eyes:  Negative for pain.   Respiratory:  Negative for cough, chest tightness and shortness of breath.    Cardiovascular:  Negative for chest pain and palpitations.   Gastrointestinal:  Negative for abdominal pain, blood in stool, constipation, diarrhea and nausea.   Genitourinary:  Negative for difficulty urinating, dysuria, flank pain and hematuria.   Musculoskeletal:  Negative for arthralgias and myalgias.   Skin:  Negative for pallor and wound.   Neurological:  Negative for dizziness, tremors, speech difficulty, light-headedness and headaches.   Psychiatric/Behavioral:  Negative for behavioral problems, dysphoric mood and sleep disturbance. The patient is not nervous/anxious.    All other systems reviewed and are negative.      Past Medical History:   Diagnosis Date    A-fib     Anemia     AP (angina pectoris) 01/23/2014    Carotid artery disease without cerebral infarction 08/22/2014    Cataract     Cirrhosis of liver 09/10/2020    Coronary artery disease     Diabetes mellitus 1970     am 11/21/2022  Insulin x 6-7 years.    DM (diabetes mellitus) 1970     am 07/06/2020    GERD (gastroesophageal reflux disease) 07/11/2013    Hemorrhagic cerebrovascular accident (CVA)  "2018    Hyperlipidemia     Hypertension     Hypertensive heart disease with heart failure 2019    Intracranial hemorrhage     Lumbosacral spondylosis 2014    Pacemaker 2014    Paroxysmal atrial fibrillation 2014    Peripheral vascular disease 2014    Pneumonia of right lung due to infectious organism 2019    Seizure, late effect of stroke     Stroke     Type 2 diabetes mellitus with ophthalmic manifestations        Social History:  Social History     Socioeconomic History    Marital status:     Number of children: 3    Highest education level: GED or equivalent   Tobacco Use    Smoking status: Former     Packs/day: 2.00     Years: 13.00     Pack years: 26.00     Types: Cigarettes     Start date: 1954     Quit date: 1967     Years since quittin.9    Smokeless tobacco: Never   Substance and Sexual Activity    Alcohol use: No    Drug use: No    Sexual activity: Never     Partners: Female     Birth control/protection: None   Social History Narrative    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       Family History:   family history includes Alcohol abuse in his paternal uncle; Arthritis in his father and mother; Cancer in his daughter and sister; Cataracts in his mother; Diabetes in his brother, daughter, father, mother, sister, son, and son; Fibromyalgia in his daughter; Heart disease in his brother, mother, and sister; Kidney disease in his brother and mother; Miscarriages / Stillbirths in his daughter.    Health Maintenance   Topic Date Due    Hemoglobin A1c  2022    Lipid Panel  2023    Eye Exam  2023    TETANUS VACCINE  2024    Aspirin/Antiplatelet Therapy  Discontinued       Physical Exam:    Vital Signs  Pulse: 70  SpO2: 95 %  BP: (!) 111/53  BP Location: Right arm  Patient Position: Sitting  Height and Weight  Height: 5' 9" (175.3 cm)  Weight: 82.1 kg (180 lb 14.2 oz)  BSA (Calculated - sq m): 2 sq " meters  BMI (Calculated): 26.7  Weight in (lb) to have BMI = 25: 168.9]    Body mass index is 26.71 kg/m².    Physical Exam  Vitals and nursing note reviewed.   Constitutional:       Appearance: Normal appearance.   HENT:      Head: Normocephalic and atraumatic.      Right Ear: Tympanic membrane normal.      Left Ear: Tympanic membrane normal.   Eyes:      Extraocular Movements: Extraocular movements intact.      Pupils: Pupils are equal, round, and reactive to light.   Cardiovascular:      Rate and Rhythm: Normal rate and regular rhythm.      Pulses: Normal pulses.      Heart sounds: Normal heart sounds. No murmur heard.    No gallop.   Pulmonary:      Effort: Pulmonary effort is normal. No respiratory distress.      Breath sounds: Normal breath sounds. No wheezing, rhonchi or rales.   Abdominal:      General: There is no distension.      Palpations: Abdomen is soft.      Tenderness: There is no abdominal tenderness.   Musculoskeletal:         General: No swelling, deformity or signs of injury. Normal range of motion.      Cervical back: Normal range of motion.   Skin:     General: Skin is warm and dry.      Capillary Refill: Capillary refill takes less than 2 seconds.      Coloration: Skin is not jaundiced or pale.   Neurological:      General: No focal deficit present.      Mental Status: He is alert and oriented to person, place, and time.      Gait: Gait is intact. Gait and tandem walk normal.   Psychiatric:         Mood and Affect: Mood normal.         Behavior: Behavior normal.         Diabetes Management Status    Statin: Not taking  ACE/ARB: Taking    Screening or Prevention Patient's value Goal Complete/Controlled?   HgA1C Testing and Control   Lab Results   Component Value Date    HGBA1C 6.4 (H) 05/09/2022      Annually/Less than 8% Yes   Lipid profile : 05/08/2022 Annually Yes   LDL control Lab Results   Component Value Date    LDLCALC 106.6 05/08/2022    Annually/Less than 100 mg/dl  No   Nephropathy  screening Lab Results   Component Value Date    LABMICR 19.0 09/09/2021     Lab Results   Component Value Date    PROTEINUA Negative 12/12/2021    Annually Yes   Blood pressure BP Readings from Last 1 Encounters:   05/16/23 (!) 111/53    Less than 140/90 Yes   Dilated retinal exam : 11/21/2022 Annually Yes   Foot exam   Most Recent Foot Exam Date: Not Found Annually Yes       Assessment:      ICD-10-CM ICD-9-CM   1. Drooling from left side of mouth  K11.7 527.7   2. Excessive sleepiness  G47.10 780.54   3. Diabetes mellitus due to underlying condition with diabetic peripheral angiopathy without gangrene, with long-term current use of insulin  E08.51 249.70    Z79.4 443.81     V58.67   4. Hepatic cirrhosis, unspecified hepatic cirrhosis type, unspecified whether ascites present  K74.60 571.5     Plan:  Resume glycopyrrolate as needed for drooling.   Schedule a sleep study   Check labs as below         Orders Placed This Encounter   Procedures    Comprehensive Metabolic Panel    Hemoglobin A1C    ZINC    Vitamin B12    Home Sleep Study       Current Outpatient Medications   Medication Sig Dispense Refill    ACCU-CHEK GUIDE TEST STRIPS Strp TEST SIX TIMES DAILY 600 strip 3    ACCU-CHEK MULTICLIX LANCET lancets TEST BLOOD SUGAR THREE TIMES DAILY 200 each 5    ascorbic acid, vitamin C, (VITAMIN C) 100 MG tablet Take 100 mg by mouth once daily.      aspirin (ECOTRIN) 81 MG EC tablet Take 81 mg by mouth every other day.      CARBOXYMETHYLCELLULOSE SODIUM (REFRESH OPHT) Apply to eye.      diclofenac sodium (VOLTAREN) 1 % Gel Apply 2 g topically once daily. 100 g 2    ferrous sulfate (IRON ORAL) Take 65 mg by mouth.      flecainide (TAMBOCOR) 50 MG Tab TAKE ONE TABLET BY MOUTH EVERY TWELVE HOURS 60 tablet 6    fluticasone propionate (FLONASE) 50 mcg/actuation nasal spray 2 sprays (100 mcg total) by Each Nostril route once daily. 16 g 3    furosemide (LASIX) 40 MG tablet Take 1 tablet (40 mg total) by mouth once daily. 30  "tablet 12    guaiFENesin (MUCINEX) 600 mg 12 hr tablet Take 1 tablet (600 mg total) by mouth 2 (two) times daily.      HYDROcodone-acetaminophen (NORCO) 5-325 mg per tablet Take 1 tablet by mouth every 8 (eight) hours as needed for Pain. 21 tablet 0    insulin (LANTUS SOLOSTAR U-100 INSULIN) glargine 100 units/mL (3mL) SubQ pen INJECT 16 UNITS SUBCUTANEOUSLY AT BEDTIME. 94 DAY SUPPLY 15 mL 11    insulin aspart U-100 (NOVOLOG FLEXPEN U-100 INSULIN) 100 unit/mL (3 mL) InPn pen Inject 8 units before breakfast, 6 units before lunch and 8 units before dinner. 30 mL 0    isosorbide mononitrate (IMDUR) 60 MG 24 hr tablet TAKE ONE TABLET BY MOUTH TWICE DAILY 60 tablet 12    losartan (COZAAR) 100 MG tablet TAKE ONE TABLET BY MOUTH EVERY DAY 90 tablet 3    metoprolol succinate (TOPROL-XL) 100 MG 24 hr tablet TAKE ONE TABLET BY MOUTH TWICE DAILY 60 tablet 12    mupirocin (BACTROBAN) 2 % ointment Apply topically 2 (two) times daily. 30 g 0    nitroGLYCERIN (NITROSTAT) 0.4 MG SL tablet Place 1 tablet (0.4 mg total) under the tongue every 5 (five) minutes as needed for Chest pain. 25 tablet 12    pantoprazole (PROTONIX) 40 MG tablet Take 1 tablet (40 mg total) by mouth once daily. 90 tablet 3    pen needle, diabetic (COMFORT EZ PEN NEEDLES) 32 gauge x 5/32" Ndle USE FOUR TIMES DAILY. 200 each 6    saw palmetto 500 MG capsule Take 500 mg by mouth 2 (two) times a day.      simethicone (GAS-X EXTRA STRENGTH) 125 mg Cap capsule Take 1 capsule (125 mg total) by mouth 4 (four) times daily as needed for Flatulence.  0    turmeric 400 mg Cap Take 1 capsule by mouth once daily.      vitamin D (VITAMIN D3) 1000 units Tab Take 1,000 Units by mouth once daily.      vitamin E 100 UNIT capsule Take 100 Units by mouth once daily.      blood-glucose meter Misc 1 Device by Misc.(Non-Drug; Combo Route) route once. for 1 dose 1 each 0    glycopyrrolate (ROBINUL) 1 mg Tab Take 1 tablet (1 mg total) by mouth 3 (three) times daily. 90 tablet 6    " levocetirizine (XYZAL) 5 MG tablet Take 1 tablet (5 mg total) by mouth every evening. 30 tablet 11     Current Facility-Administered Medications   Medication Dose Route Frequency Provider Last Rate Last Admin    acetaminophen tablet 650 mg  650 mg Oral Once PRN Hesham Monaco MD        albuterol inhaler 2 puff  2 puff Inhalation Q20 Min PRN Hesham Monaco MD        diphenhydrAMINE injection 25 mg  25 mg Intravenous Once PRN Hesham Monaco MD        EPINEPHrine (EPIPEN) 0.3 mg/0.3 mL pen injection 0.3 mg  0.3 mg Intramuscular PRN Hesham Monaco MD        methylPREDNISolone sodium succinate injection 40 mg  40 mg Intravenous Once PRN Hesham Monaco MD        ondansetron disintegrating tablet 4 mg  4 mg Oral Once PRN Hesham Monaco MD        sodium chloride 0.9% 500 mL flush bag   Intravenous PRN Hesham Monaco MD        sodium chloride 0.9% flush 10 mL  10 mL Intravenous PRN Hesham Monaco MD           There are no discontinued medications.    No follow-ups on file.      Abdulaziz Mccabe MD  Scribe Attestation:   I, Ani Frazier, am scribing for, and in the presence of, Dr.Arif Mccabe I performed the above scribed service and the documentation accurately describes the services I performed. I attest to the accuracy of the note.    I, Dr. Abdulaziz Mccabe, reviewed documentation as scribed above. I performed the services described in this documentation.  I agree that the record reflects my personal performance and is accurate and complete. Abdulaziz Mccabe MD.  05/16/2023

## 2023-05-19 LAB — ZINC SERPL-MCNC: 55 UG/DL (ref 60–130)

## 2023-06-08 RX ORDER — BLOOD SUGAR DIAGNOSTIC
STRIP MISCELLANEOUS
Qty: 600 STRIP | Refills: 3 | Status: SHIPPED | OUTPATIENT
Start: 2023-06-08

## 2023-06-08 NOTE — TELEPHONE ENCOUNTER
Refill Routing Note   Medication(s) are not appropriate for processing by [facility] for the following reason(s):      No active prescription written by PCP    [facility] action(s):  Defer None identified   Medication Therapy Plan:  2023      Appointments  past 12m or future 3m with PCP    Date Provider   Last Visit   2023 Abdulaziz Mccabe MD   Next Visit   Visit date not found Abdulaziz Mccabe MD   ED visits in past 90 days: 1        Note composed:10:35 AM 2023

## 2023-06-08 NOTE — TELEPHONE ENCOUNTER
No care due was identified.  [facility] Embedded Care Due Messages. Reference number: [id number].   6/08/2023 10:18:08 AM CDT

## 2023-06-13 NOTE — PROGRESS NOTES
Chief Complaint:    Chief Complaint   Patient presents with    Follow-up       History of Present Illness:  Patient with a hx of CVA, DDD, hemiplegia and hemiparesis, PAF, HLD, PVD, CAD, DM2, GERD, and bilateral osteoarthritis of knees presents today to discuss A1C    Reports cough for 1.5 weeks. Had the flu.   A1C is 8.3. Takes 16 units of Lantus at night, 10 units Novalog at breakfast, 8 units Novalog at lunch, and 10 units Novalog at night. His fasting sugar was 113 today, usually runs in the 90s. He doesn't check his sugars consisently after a meal. But when he does, they range between 150-200.  Not counting his carbs. He says when he eats grits, it raises his sugars.  He is eating waffles and bread in banana sandwiches for breakfast and sandwiches at lunch.  BP is elevated today. Patient says his home readings are higher, but it does come down. He's set up with [facility].  Started back walking. 1/4 mile a day. Says his legs start to hurt sometime with walking.      ROS:  Review of Systems   Constitutional: Negative for activity change, chills, fatigue, fever and unexpected weight change.   HENT: Negative for congestion, ear discharge, ear pain, hearing loss, postnasal drip and rhinorrhea.    Eyes: Negative for pain and visual disturbance.   Respiratory: Positive for cough. Negative for chest tightness and shortness of breath.    Cardiovascular: Negative for chest pain and palpitations.   Gastrointestinal: Negative for abdominal pain, diarrhea and vomiting.   Endocrine: Negative for heat intolerance.   Genitourinary: Negative for dysuria, flank pain, frequency and hematuria.   Musculoskeletal: Negative for back pain, gait problem and neck pain.   Skin: Negative for color change and rash.   Neurological: Negative for dizziness, tremors, seizures, numbness and headaches.   Psychiatric/Behavioral: Negative for agitation, hallucinations, self-injury, sleep disturbance and suicidal ideas. The patient is not  Eliberto Burden, [place] Assunta Bloch, DO    Office Visit            Future Appointments         Provider Department Appt Notes    [facility] [address], [place] itch for more than 2 weeks, problem with throat. [facility] [address] [facility] - [facility] Follow up hospital admission.     In 2 [place] [id number] [facility], [address], [address] [facility],[address] nervous/anxious.    All other systems reviewed and are negative.      Past Medical History:   Diagnosis Date    A-fib     Anemia     AP (angina pectoris) 2014    Carotid artery disease without cerebral infarction 2014    Cataract     Cirrhosis of liver 9/10/2020    Coronary artery disease     Diabetes mellitus     DM (diabetes mellitus) 1970     am 2020    GERD (gastroesophageal reflux disease) 2013    Hemorrhagic cerebrovascular accident (CVA) 2018    Hyperlipidemia     Hypertension     Hypertensive heart disease with heart failure 3/12/2019    Intracranial hemorrhage     Lumbosacral spondylosis 2014    Pacemaker 2014    Paroxysmal atrial fibrillation 2014    Peripheral vascular disease 2014    Pneumonia of right lung due to infectious organism 3/27/2019    Seizure, late effect of stroke     Stroke     Type 2 diabetes mellitus with ophthalmic manifestations        Social History:  Social History     Socioeconomic History    Marital status:     Number of children: 3    Highest education level: GED or equivalent   Tobacco Use    Smoking status: Former Smoker     Packs/day: 2.00     Years: 13.00     Pack years: 26.00     Types: Cigarettes     Start date: 1954     Quit date: 1967     Years since quittin.8    Smokeless tobacco: Never Used   Substance and Sexual Activity    Alcohol use: No    Drug use: No    Sexual activity: Never     Partners: Female     Birth control/protection: None   Social History Narrative    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       Family History:   family history includes Alcohol abuse in his paternal uncle; Arthritis in his father and mother; Cancer in his daughter and sister; Diabetes in his brother, daughter, father, mother, sister, son, and son; Fibromyalgia in his daughter; Heart disease in his brother, mother, and sister; Kidney disease in his brother and  "mother; Miscarriages / Stillbirths in his daughter.    Health Maintenance   Topic Date Due    Foot Exam  06/02/2022    Hemoglobin A1c  06/16/2022    Eye Exam  07/12/2022    Lipid Panel  09/09/2022    TETANUS VACCINE  01/23/2024       Physical Exam:    Vital Signs  Temp: 98.1 °F (36.7 °C)  Pulse: 78  SpO2: 98 %  BP: (!) 156/70  Height and Weight  Height: 5' 10" (177.8 cm)  Weight: 84.9 kg (187 lb 2.7 oz)  BSA (Calculated - sq m): 2.05 sq meters  BMI (Calculated): 26.9  Weight in (lb) to have BMI = 25: 173.9]    Body mass index is 26.86 kg/m².    Physical Exam  Vitals and nursing note reviewed.   Constitutional:       Appearance: Normal appearance. He is well-developed.   HENT:      Head: Normocephalic and atraumatic.      Right Ear: Tympanic membrane normal. Decreased hearing noted.      Left Ear: Tympanic membrane normal. Decreased hearing noted.   Eyes:      Extraocular Movements: Extraocular movements intact.      Conjunctiva/sclera: Conjunctivae normal.      Pupils: Pupils are equal, round, and reactive to light.   Cardiovascular:      Rate and Rhythm: Normal rate and regular rhythm.      Pulses: Normal pulses.      Heart sounds: Normal heart sounds. No murmur heard.    No gallop.   Pulmonary:      Effort: Pulmonary effort is normal. No respiratory distress.      Breath sounds: Normal breath sounds. No wheezing, rhonchi or rales.   Chest:      Chest wall: No tenderness.   Abdominal:      General: There is no distension.      Palpations: Abdomen is soft. There is no mass.      Tenderness: There is no abdominal tenderness. There is no guarding.   Musculoskeletal:         General: No swelling, tenderness, deformity or signs of injury. Normal range of motion.      Cervical back: Normal range of motion and neck supple.   Lymphadenopathy:      Cervical: No cervical adenopathy.   Skin:     General: Skin is warm and dry.      Capillary Refill: Capillary refill takes less than 2 seconds.      Coloration: Skin is not " jaundiced or pale.   Neurological:      General: No focal deficit present.      Mental Status: He is alert and oriented to person, place, and time.      Deep Tendon Reflexes: Reflexes are normal and symmetric.   Psychiatric:         Mood and Affect: Mood normal.         Behavior: Behavior normal.         Thought Content: Thought content normal.         Judgment: Judgment normal.           Diabetes Management Status    Statin: Not taking  ACE/ARB: Taking    Screening or Prevention Patient's value Goal Complete/Controlled?   HgA1C Testing and Control   Lab Results   Component Value Date    HGBA1C 8.3 (H) 03/16/2022      Annually/Less than 8% Yes   Lipid profile : 09/09/2021 Annually Yes   LDL control Lab Results   Component Value Date    LDLCALC 94.4 09/09/2021    Annually/Less than 100 mg/dl  No   Nephropathy screening Lab Results   Component Value Date    LABMICR 19.0 09/09/2021     Lab Results   Component Value Date    PROTEINUA Negative 12/12/2021    Annually Yes   Blood pressure BP Readings from Last 1 Encounters:   04/01/22 (!) 156/70    Less than 140/90 Yes   Dilated retinal exam : 07/12/2021 Annually Yes   Foot exam   : 06/02/2021 Annually Yes       Assessment:      ICD-10-CM ICD-9-CM   1. Cough  R05.9 786.2   2. Essential hypertension  I10 401.9   3. Diabetes mellitus due to underlying condition with diabetic peripheral angiopathy without gangrene, without long-term current use of insulin  E08.51 249.70     443.81   4. Coronary artery disease of bypass graft of native heart with stable angina pectoris  I25.708 414.05     413.9         Plan:    Recommend Benzonatate for cough.   Continue monitoring blood pressure. Keep < 140/90.  Continue monitoring blood sugar fasting level and sugar level two hours after you eat. Fasting sugar levels should be . Two hours after eating should be < 150.  Keep balanced diet. Monitor carb intake. Limit bread and waffle consumption. When skipping meals, don't take insulin.   "Do recommend skipping breakfast  Record blood pressure and sugar levels and bring readings to office.   Continue walking for exercise.   See labs below.  Follow up in 2 weeks for hypertension and hyperglycemia.   Orders Placed This Encounter   Procedures    Hemoglobin A1C    Comprehensive Metabolic Panel    CBC Auto Differential    Microalbumin/Creatinine Ratio, Urine    Lipid Panel       Current Outpatient Medications   Medication Sig Dispense Refill    ACCU-CHEK TOMAS PLUS TEST STRP Strp TEST SIX TIMES A  strip 6    ACCU-CHEK GUIDE TEST STRIPS Strp TEST SIX TIMES DAILY 100 strip 0    ACCU-CHEK MULTICLIX LANCET lancets TEST BLOOD SUGAR THREE TIMES DAILY 200 each 5    ascorbic acid, vitamin C, (VITAMIN C) 100 MG tablet Take 100 mg by mouth once daily.      COMFORT EZ PEN NEEDLES 32 gauge x 5/32" Ndle USE FOUR TIMES DAILY. 200 each 6    ferrous sulfate (IRON ORAL) Take 65 mg by mouth.      flecainide (TAMBOCOR) 50 MG Tab TAKE ONE TABLET BY MOUTH EVERY TWELVE HOURS 60 tablet 6    furosemide (LASIX) 40 MG tablet Take 1 tablet (40 mg total) by mouth once daily. 30 tablet 12    insulin (LANTUS SOLOSTAR U-100 INSULIN) glargine 100 units/mL (3mL) SubQ pen INJECT 16 UNITS SUBCUTANEOUSLY AT BEDTIME. 94 DAY SUPPLY 15 mL 11    insulin aspart U-100 (NOVOLOG FLEXPEN U-100 INSULIN) 100 unit/mL (3 mL) InPn pen INJECT TEN UNITS AT BREAKFAST, EIGHT AT LUNCH, AND TEN AT DINNER (54 DAYS SUPPLY) 15 mL 3    isosorbide mononitrate (IMDUR) 60 MG 24 hr tablet TAKE ONE TABLET BY MOUTH TWICE DAILY 60 tablet 12    losartan (COZAAR) 100 MG tablet TAKE ONE TABLET (100mg) BY MOUTH DAILY 90 tablet 1    metoprolol succinate (TOPROL-XL) 100 MG 24 hr tablet Take 1 tablet (100 mg total) by mouth 2 (two) times daily. 60 tablet 12    nitroGLYCERIN (NITROSTAT) 0.4 MG SL tablet Place 1 tablet (0.4 mg total) under the tongue every 5 (five) minutes as needed for Chest pain. 25 tablet 12    pantoprazole (PROTONIX) 40 MG tablet " TAKE ONE TABLET (40mg) BY MOUTH EVERY DAY 90 tablet 3    saw palmetto 500 MG capsule Take 500 mg by mouth 2 (two) times a day.      turmeric 400 mg Cap Take 1 capsule by mouth once daily.      vitamin D (VITAMIN D3) 1000 units Tab Take 1,000 Units by mouth once daily.      vitamin E 100 UNIT capsule Take 100 Units by mouth once daily.      benzonatate (TESSALON) 200 MG capsule Take 1 capsule (200 mg total) by mouth 3 (three) times daily as needed for Cough. 30 capsule 0    blood-glucose meter Misc 1 Device by Misc.(Non-Drug; Combo Route) route once. for 1 dose 1 each 0    CARBOXYMETHYLCELLULOSE SODIUM (REFRESH OPHT) Apply to eye.      diclofenac sodium (VOLTAREN) 1 % Gel Apply 2 g topically once daily. (Patient not taking: Reported on 4/1/2022) 100 g 2    fluticasone propionate (FLONASE) 50 mcg/actuation nasal spray 2 sprays (100 mcg total) by Each Nostril route once daily. (Patient not taking: Reported on 4/1/2022) 16 g 0    guaiFENesin (MUCINEX) 600 mg 12 hr tablet Take 1 tablet (600 mg total) by mouth 2 (two) times daily. (Patient not taking: Reported on 4/1/2022)      levocetirizine (XYZAL) 5 MG tablet Take 1 tablet (5 mg total) by mouth every evening. (Patient not taking: No sig reported) 30 tablet 11    mupirocin (BACTROBAN) 2 % ointment Apply topically 2 (two) times daily. (Patient not taking: Reported on 4/1/2022) 30 g 0    simethicone (GAS-X EXTRA STRENGTH) 125 mg Cap capsule Take 1 capsule (125 mg total) by mouth 4 (four) times daily as needed for Flatulence. (Patient not taking: Reported on 4/1/2022)  0     Current Facility-Administered Medications   Medication Dose Route Frequency Provider Last Rate Last Admin    acetaminophen tablet 650 mg  650 mg Oral Once PRN Hesham Monaco MD        albuterol inhaler 2 puff  2 puff Inhalation Q20 Min PRN Hesham Monaco MD        diphenhydrAMINE injection 25 mg  25 mg Intravenous Once PRN Hesham Monaco MD        EPINEPHrine (EPIPEN) 0.3  mg/0.3 mL pen injection 0.3 mg  0.3 mg Intramuscular PRN Hesham Monaco MD        methylPREDNISolone sodium succinate injection 40 mg  40 mg Intravenous Once PRN Hesham Monaco MD        ondansetron disintegrating tablet 4 mg  4 mg Oral Once PRN Hesham Monaco MD        sodium chloride 0.9% 500 mL flush bag   Intravenous PRN Hesham Monaco MD        sodium chloride 0.9% flush 10 mL  10 mL Intravenous PRN Hesham Monaco MD           There are no discontinued medications.    Follow up in about 3 months (around 7/1/2022) for 2 wk.      Abdulaziz Mccabe MD  Scribe Attestation:   I, Ani Frazier, am scribing for, and in the presence of, Dr.Arif Mccabe I performed the above scribed service and the documentation accurately describes the services I performed. I attest to the accuracy of the note.    I, Dr. Abdulaziz Mccabe, reviewed documentation as scribed above. I performed the services described in this documentation.  I agree that the record reflects my personal performance and is accurate and complete. Abdulaziz Mccabe MD.  10/04/2021

## 2023-06-16 ENCOUNTER — HOSPITAL ENCOUNTER (OUTPATIENT)
Dept: SLEEP MEDICINE | Facility: HOSPITAL | Age: 88
Discharge: HOME | End: 2023-06-16
Attending: FAMILY MEDICINE
Payer: MEDICARE

## 2023-06-16 DIAGNOSIS — G47.33 OSA (OBSTRUCTIVE SLEEP APNEA): Primary | ICD-10-CM

## 2023-06-16 DIAGNOSIS — G47.10 EXCESSIVE SLEEPINESS: ICD-10-CM

## 2023-06-16 PROCEDURE — 95806 SLEEP STUDY UNATT&RESP EFFT: CPT | Performed by: INTERNAL MEDICINE

## 2023-06-16 PROCEDURE — 95800 SLP STDY UNATTENDED: CPT | Mod: 26,,, | Performed by: INTERNAL MEDICINE

## 2023-06-16 PROCEDURE — 95800 PR SLEEP STUDY, UNATTENDED, RECORD HEART RATE/O2 SAT/RESP ANAL/SLEEP TIME: ICD-10-PCS | Mod: 26,,, | Performed by: INTERNAL MEDICINE

## 2023-06-16 NOTE — Clinical Note
PHYSICIAN INTERPRETATION AND COMMENTS: Findings are consistent with severe, positional obstructive sleep apnea(ML). Therapy with CPAP indicated , Please refer to sleep disorders for prompt attention CLINICAL HISTORY: 89 year old male presented with: 15.5 inch neck, BMI of 26.3, an [place] sleepiness score of 7, history of hypertension, heart disease, history of stroke, diabetes and symptoms of nocturnal snoring, waking up choking and witnessed apneas. Based on the clinical history, the patient has a high pre-test probability of having Severe ML.

## 2023-06-19 DIAGNOSIS — E11.9 DIABETES MELLITUS TYPE 2 WITHOUT RETINOPATHY: Chronic | ICD-10-CM

## 2023-06-19 PROBLEM — G47.10 EXCESSIVE SLEEPINESS: Status: ACTIVE | Noted: 2023-06-19

## 2023-06-19 NOTE — PROCEDURES
PHYSICIAN INTERPRETATION AND COMMENTS: Findings are consistent with severe, positional obstructive sleep  apnea(ML). Therapy with CPAP indicated , Please refer to sleep disorders for prompt attention  CLINICAL HISTORY: 89 year old male presented with: 15.5 inch neck, BMI of 26.3, an [place] sleepiness score of 7, history  of hypertension, heart disease, history of stroke, diabetes and symptoms of nocturnal snoring, waking up choking and  witnessed apneas. Based on the clinical history, the patient has a high pre-test probability of having Severe ML.  SLEEP STUDY FINDINGS: Patient underwent a 1 night Home Sleep Test and by behavioral criteria, slept for approximately  6.78 hours , with a sleep efficiency of 97%. Severe sleep disordered breathing (AHI=33) is noted based on a 4% hypopnea  desaturation criteria, predominantly in the supine position (52 events/hour). The patient slept supine 58.8% of the night  based on valid recording time of 6.92 hours and is 8.7 times as likely to have apneas/hypopneas when supine. The  apneas/hypopneas are accompanied by minimal oxygen desaturation (percent time below 90% SpO2: 4%, Min SpO2:  69.5%). The average desaturation across all sleep disordered breathing events is 6.7%. Snoring occurs for 8.8% (30 dB) of  the study, 2.8% is very loud. The mean pulse rate is 55.8 BPM.  TREATMENT CONSIDERATIONS: Consider an attended CPAP titration study, given the reported Heart disease. Consider nasal  continuous positive airway pressure for Severe obstructive sleep apnea. A mandibular advancement splint (MAS) or  referral to an ENT surgeon for modification to the airway should be considered to reduce the risk of mortality caused by  Severe ML if the patient prefers an alternative therapy or the CPAP trial is unsuccessful. Based on the ML Supine data in  the study, a Mandibular Advancement Splint (MAS) will likely provide treatment benefit independent of ML severity. The  patient should  "avoid sleeping supine; the non-supine AHI is 8.7 times less severe than the supine AHI.  DISEASE MANAGEMENT CONSIDERATIONS: Morning headaches are symptoms that can be associated with untreated ML      Dear Abdulaziz Mccabe MD  [id number] [address] [address]/Abdulaziz Mccabe MD         The sleep study that you ordered is complete.  You have ordered sleep LAB services to perform the sleep study for Anthony Blair .      Please find Sleep Study result in  the "Media tab" of Chart Review menu.        You can look  for the report in the  Media by the document type "Sleep Study Documents". Alphabetizing  "Document type" column helps to find the SLEEP STUDY report  Faster.       As the ordering provider, you are responsible for reviewing the results and implementing a treatment plan with your patient.    If you need a Sleep Medicine provider to explain the sleep study findings and arrange treatment for the patient, please refer patient for consultation to our Sleep Clinic via [facility] with Ambulatory Consult Sleep.     To do that please place an order for an  "Ambulatory Consult Sleep" -  order , it will go to our clinic work queue for our staff  to contact the patient for an appointment.      For any questions, please contact our sleep lab  staff at [phone] to talk to clinical staff          Vern Nieves MD   "

## 2023-06-19 NOTE — TELEPHONE ENCOUNTER
No care due was identified.  Health [facility] Embedded Care Due Messages. Reference number: [id number].   6/19/2023 4:09:07 PM CDT

## 2023-06-20 RX ORDER — INSULIN ASPART 100 [IU]/ML
INJECTION, SOLUTION INTRAVENOUS; SUBCUTANEOUS
Qty: 30 ML | Refills: 3 | Status: SHIPPED | OUTPATIENT
Start: 2023-06-20

## 2023-06-20 NOTE — TELEPHONE ENCOUNTER
Refill Decision Note   Anthony Blair  is requesting a refill authorization.  Brief Assessment and Rationale for Refill:  Approve     Medication Therapy Plan:       Medication Reconciliation Completed: No   Comments:     No Care Gaps recommended.     Note composed:7:29 AM 06/20/2023

## 2023-06-26 ENCOUNTER — TELEPHONE (OUTPATIENT)
Dept: FAMILY MEDICINE | Facility: CLINIC | Age: 88
End: 2023-06-26
Payer: MEDICARE

## 2023-06-26 DIAGNOSIS — G47.30 SLEEP APNEA, UNSPECIFIED TYPE: Primary | ICD-10-CM

## 2023-06-27 ENCOUNTER — HOSPITAL ENCOUNTER (OUTPATIENT)
Dept: RADIOLOGY | Facility: HOSPITAL | Age: 88
Discharge: HOME | End: 2023-06-27
Attending: SURGERY
Payer: MEDICARE

## 2023-06-27 DIAGNOSIS — I65.23 CAROTID STENOSIS, ASYMPTOMATIC, BILATERAL: ICD-10-CM

## 2023-06-27 PROCEDURE — 93880 US CAROTID BILATERAL: ICD-10-PCS | Mod: 26,HCNC,, | Performed by: RADIOLOGY

## 2023-06-27 PROCEDURE — 93880 EXTRACRANIAL BILAT STUDY: CPT | Mod: TC,HCNC

## 2023-06-27 PROCEDURE — 93880 EXTRACRANIAL BILAT STUDY: CPT | Mod: 26,HCNC,, | Performed by: RADIOLOGY

## 2023-06-29 ENCOUNTER — PATIENT MESSAGE (OUTPATIENT)
Dept: VASCULAR SURGERY | Facility: CLINIC | Age: 88
End: 2023-06-29
Payer: MEDICARE

## 2023-07-22 ENCOUNTER — TELEPHONE (OUTPATIENT)
Dept: ADMINISTRATIVE | Facility: HOSPITAL | Age: 88
End: 2023-07-22
Payer: MEDICARE

## 2023-07-22 ENCOUNTER — PATIENT MESSAGE (OUTPATIENT)
Dept: CARDIOLOGY | Facility: CLINIC | Age: 88
End: 2023-07-22
Payer: MEDICARE

## 2023-07-26 ENCOUNTER — OFFICE VISIT (OUTPATIENT)
Dept: VASCULAR SURGERY | Facility: CLINIC | Age: 88
End: 2023-07-26
Payer: MEDICARE

## 2023-07-26 VITALS
DIASTOLIC BLOOD PRESSURE: 72 MMHG | HEART RATE: 70 BPM | SYSTOLIC BLOOD PRESSURE: 122 MMHG | BODY MASS INDEX: 26.7 KG/M2 | WEIGHT: 180.75 LBS

## 2023-07-26 DIAGNOSIS — I65.23 CAROTID STENOSIS, ASYMPTOMATIC, BILATERAL: Primary | ICD-10-CM

## 2023-07-26 PROCEDURE — 99214 OFFICE O/P EST MOD 30 MIN: CPT | Mod: HCNC,S$GLB,, | Performed by: SURGERY

## 2023-07-26 PROCEDURE — 1159F MED LIST DOCD IN RCRD: CPT | Mod: HCNC,CPTII,S$GLB, | Performed by: SURGERY

## 2023-07-26 PROCEDURE — 99999 PR PBB SHADOW E&M-EST. PATIENT-LVL IV: CPT | Mod: PBBFAC,HCNC,, | Performed by: SURGERY

## 2023-07-26 PROCEDURE — 1160F PR REVIEW ALL MEDS BY PRESCRIBER/CLIN PHARMACIST DOCUMENTED: ICD-10-PCS | Mod: HCNC,CPTII,S$GLB, | Performed by: SURGERY

## 2023-07-26 PROCEDURE — 1159F PR MEDICATION LIST DOCUMENTED IN MEDICAL RECORD: ICD-10-PCS | Mod: HCNC,CPTII,S$GLB, | Performed by: SURGERY

## 2023-07-26 PROCEDURE — 3072F LOW RISK FOR RETINOPATHY: CPT | Mod: HCNC,CPTII,S$GLB, | Performed by: SURGERY

## 2023-07-26 PROCEDURE — 1126F AMNT PAIN NOTED NONE PRSNT: CPT | Mod: HCNC,CPTII,S$GLB, | Performed by: SURGERY

## 2023-07-26 PROCEDURE — 1157F ADVNC CARE PLAN IN RCRD: CPT | Mod: HCNC,CPTII,S$GLB, | Performed by: SURGERY

## 2023-07-26 PROCEDURE — 1126F PR PAIN SEVERITY QUANTIFIED, NO PAIN PRESENT: ICD-10-PCS | Mod: HCNC,CPTII,S$GLB, | Performed by: SURGERY

## 2023-07-26 PROCEDURE — 99214 PR OFFICE/OUTPT VISIT, EST, LEVL IV, 30-39 MIN: ICD-10-PCS | Mod: HCNC,S$GLB,, | Performed by: SURGERY

## 2023-07-26 PROCEDURE — 99999 PR PBB SHADOW E&M-EST. PATIENT-LVL IV: ICD-10-PCS | Mod: PBBFAC,HCNC,, | Performed by: SURGERY

## 2023-07-26 PROCEDURE — 1160F RVW MEDS BY RX/DR IN RCRD: CPT | Mod: HCNC,CPTII,S$GLB, | Performed by: SURGERY

## 2023-07-26 PROCEDURE — 3072F PR LOW RISK FOR RETINOPATHY: ICD-10-PCS | Mod: HCNC,CPTII,S$GLB, | Performed by: SURGERY

## 2023-07-26 PROCEDURE — 1157F PR ADVANCE CARE PLAN OR EQUIV PRESENT IN MEDICAL RECORD: ICD-10-PCS | Mod: HCNC,CPTII,S$GLB, | Performed by: SURGERY

## 2023-07-26 NOTE — PROGRESS NOTES
[facility] Vascular Surgery  Congenital Cardiovascular Surgery  Consult Note    Patient Name: Anthony Blair  MRN: [id number]  Admission Date: (Not on file)  Hospital Length of Stay: 0 days   Attending Physician: No att. providers found  Primary Care Provider: Abdulaziz Mccabe MD    Patient information was obtained from patient and ER records.     Consults  Subjective:     Chief Complaint six-month follow-up    History of Present Illness: 89-year-old male with a past medical history significant for atrial fibrillation, previous hemorrhagic stroke in 2017.  Here today for evaluation of bilateral 50% carotid stenosis with occluded left vertebral artery.  Patient was recently hospitalized in May of 2022 for an episode of weakness and right-sided pleural.  Head CT was negative for acute stroke or hemorrhage.  CT scan of the neck did confirm bilateral 50% carotid stenosis with an occluded the for segment of the vertebral artery.  Patient was not started on anticoagulation or anti-platelet given his history of cerebral hemorrhage.  Since that time symptoms have resolved.  Been asymptomatic     1/25/23  Here today for six-month follow-up.  Remains neurologically asymptomatic.  Compliant with antiplatelet therapy.  Patient is allergic to statins.  No complaints     7/26/23  Patient returns today for six-month follow-up.  Remains neurologically asymptomatic.  No issues.  Compliant with antiplatelet therapy.  No complaints.  Duplex ultrasound shows no significant change    Current Outpatient Medications   Medication    ACCU-CHEK GUIDE TEST STRIPS Strp    ACCU-CHEK MULTICLIX LANCET lancets    ascorbic acid, vitamin C, (VITAMIN C) 100 MG tablet    aspirin (ECOTRIN) 81 MG EC tablet    CARBOXYMETHYLCELLULOSE SODIUM (REFRESH OPHT)    diclofenac sodium (VOLTAREN) 1 % Gel    ferrous sulfate (IRON ORAL)    flecainide (TAMBOCOR) 50 MG Tab    fluticasone propionate (FLONASE) 50 mcg/actuation nasal spray    furosemide (LASIX) 40 MG tablet     "glycopyrrolate (ROBINUL) 1 mg Tab    guaiFENesin (MUCINEX) 600 mg 12 hr tablet    HYDROcodone-acetaminophen (NORCO) 5-325 mg per tablet    insulin (LANTUS SOLOSTAR U-100 INSULIN) glargine 100 units/mL (3mL) SubQ pen    insulin aspart U-100 (NOVOLOG FLEXPEN U-100 INSULIN) 100 unit/mL (3 mL) InPn pen    isosorbide mononitrate (IMDUR) 60 MG 24 hr tablet    losartan (COZAAR) 100 MG tablet    metoprolol succinate (TOPROL-XL) 100 MG 24 hr tablet    mupirocin (BACTROBAN) 2 % ointment    nitroGLYCERIN (NITROSTAT) 0.4 MG SL tablet    pantoprazole (PROTONIX) 40 MG tablet    pen needle, diabetic (COMFORT EZ PEN NEEDLES) 32 gauge x 5/32" Ndle    saw palmetto 500 MG capsule    simethicone (GAS-X EXTRA STRENGTH) 125 mg Cap capsule    turmeric 400 mg Cap    vitamin D (VITAMIN D3) 1000 units Tab    vitamin E 100 UNIT capsule    blood-glucose meter Misc    levocetirizine (XYZAL) 5 MG tablet     Current Facility-Administered Medications   Medication Frequency    acetaminophen tablet 650 mg Once PRN    albuterol inhaler 2 puff Q20 Min PRN    diphenhydrAMINE injection 25 mg Once PRN    EPINEPHrine (EPIPEN) 0.3 mg/0.3 mL pen injection 0.3 mg PRN    methylPREDNISolone sodium succinate injection 40 mg Once PRN    ondansetron disintegrating tablet 4 mg Once PRN    sodium chloride 0.9% 500 mL flush bag PRN    sodium chloride 0.9% flush 10 mL PRN       Review of patient's allergies indicates:   Allergen Reactions    Atorvastatin      Other reaction(s): Muscle pain  Other reaction(s): Muscle weakness  Other reaction(s): Muscle pain    Codeine      Other reaction(s): Headache  Other reaction(s): Headache    Eliquis [apixaban]     Norvasc [amlodipine] Edema    Trulicity [dulaglutide] Nausea And Vomiting    Adhesive Rash     Other reaction(s): rash       Past Medical History:   Diagnosis Date    A-fib     Anemia     AP (angina pectoris) 01/23/2014    Carotid artery disease without cerebral infarction 08/22/2014    Cataract     Cirrhosis of liver " 09/10/2020    Coronary artery disease     Diabetes mellitus 1970     am 11/21/2022  Insulin x 6-7 years.    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    Hyperlipidemia     Hypertension     Hypertensive heart disease with heart failure 03/12/2019    Intracranial hemorrhage     Lumbosacral spondylosis 12/24/2014    Pacemaker 01/23/2014    Paroxysmal atrial fibrillation 01/23/2014    Peripheral vascular disease 08/22/2014    Pneumonia of right lung due to infectious organism 03/27/2019    Seizure, late effect of stroke     Stroke     Type 2 diabetes mellitus with ophthalmic manifestations      Past Surgical History:   Procedure Laterality Date    ANGIOPLASTY      ATRIAL ABLATION SURGERY N/A     CATARACT EXTRACTION Bilateral     [facility]    CATARACT EXTRACTION W/ INTRAOCULAR LENS IMPLANT Right     CATARACT EXTRACTION W/ INTRAOCULAR LENS IMPLANT Left     COLONOSCOPY N/A 10/16/2018    Procedure: COLONOSCOPY with biopsies;  Surgeon: Anabell Griggs MD;  Location: [facility] ENDO;  Service: Endoscopy;  Laterality: N/A;    CORONARY ARTERY BYPASS GRAFT  07/2010    x5    EYE SURGERY      pace maker surgrey  10/2010    reposition in 2011/2012 (approx)    REPLACEMENT OF PACEMAKER GENERATOR Left 8/6/2020    Procedure: REPLACEMENT, PULSE GENERATOR, CARDIAC PACEMAKER;  Surgeon: Domenico Grajeda MD;  Location: [facility] CATH LAB;  Service: Cardiology;  Laterality: Left;  [facility]    REVISION OF PROCEDURE INVOLVING PACEMAKER LEAD Bilateral 8/6/2020    Procedure: REVISION, ELECTRODE LEAD, CARDIAC PACEMAKER;  Surgeon: Domenico Grajeda MD;  Location: [facility] CATH LAB;  Service: Cardiology;  Laterality: Bilateral;    VEIN BYPASS SURGERY       Family History       Problem Relation (Age of Onset)    Alcohol abuse Paternal Uncle    Arthritis Mother, Father    Cancer Sister,  Daughter    Cataracts Mother    Diabetes Mother, Father, Sister, Brother, Daughter, Son, Son    Fibromyalgia Daughter    Heart disease Mother, Sister, Brother    Kidney disease Mother, Brother    Miscarriages / Stillbirths Daughter          Tobacco Use    Smoking status: Former     Packs/day: 2.00     Years: 13.00     Pack years: 26.00     Types: Cigarettes     Start date: 1954     Quit date: 1967     Years since quittin.1    Smokeless tobacco: Never   Substance and Sexual Activity    Alcohol use: No    Drug use: No    Sexual activity: Never     Partners: Female     Birth control/protection: None     Review of Systems   Constitutional:  Negative for activity change, appetite change, fatigue and fever.   HENT:  Negative for congestion.    Eyes:  Negative for photophobia, redness and visual disturbance.   Respiratory:  Negative for apnea, cough, chest tightness and shortness of breath.    Cardiovascular:  Negative for chest pain and leg swelling.   Gastrointestinal:  Negative for abdominal pain, nausea and vomiting.   Genitourinary:  Negative for difficulty urinating.   Musculoskeletal:  Negative for gait problem and myalgias.   Skin:  Negative for color change, rash and wound.   Neurological:  Negative for syncope, facial asymmetry, speech difficulty, weakness and numbness.   Objective:     Vital Signs (Most Recent):  Pulse: 70 (23 0940)  BP: 122/72 (23 0940) Vital Signs (24h Range):  [unfilled]     Weight: 82 kg (180 lb 12.4 oz)  Body mass index is 26.7 kg/m².            [unfilled]    Physical Exam  Constitutional:       Appearance: Normal appearance. He is normal weight.   HENT:      Head: Normocephalic and atraumatic.      Mouth/Throat:      Mouth: Mucous membranes are moist.   Eyes:      Extraocular Movements: Extraocular movements intact.      Conjunctiva/sclera: Conjunctivae normal.      Pupils: Pupils are equal, round, and reactive to light.   Neck:      Vascular: No carotid bruit.    Cardiovascular:      Rate and Rhythm: Normal rate and regular rhythm.      Pulses: Normal pulses.   Pulmonary:      Effort: Pulmonary effort is normal.      Breath sounds: Normal breath sounds.   Abdominal:      General: Abdomen is flat. Bowel sounds are normal.      Palpations: Abdomen is soft.   Musculoskeletal:      Cervical back: Neck supple.   Skin:     General: Skin is warm.      Capillary Refill: Capillary refill takes 2 to 3 seconds.   Neurological:      General: No focal deficit present.      Mental Status: He is alert and oriented to person, place, and time. Mental status is at baseline.      Cranial Nerves: No cranial nerve deficit.      Sensory: No sensory deficit.      Motor: No weakness.   Psychiatric:         Mood and Affect: Mood normal.         Behavior: Behavior normal.       Significant Labs:  I have reviewed all pertinent lab results within the past 24 hours.    Significant Diagnostics:  I have reviewed all pertinent imaging results/findings within the past 24 hours.    Assessment/Plan:     There are no hospital problems to display for this patient.      Stable bilateral internal carotid artery stenosis.  Remains asymptomatic.  Continue antiplatelet therapy.  Follow up in 1 year    Thank you for your consult. I will follow-up with patient. Please contact us if you have any additional questions.    Jose Regan IV, MD  Vascular Surgery  [facility] Surgery

## 2023-07-27 ENCOUNTER — CLINICAL SUPPORT (OUTPATIENT)
Dept: CARDIOLOGY | Facility: HOSPITAL | Age: 88
End: 2023-07-27
Payer: MEDICARE

## 2023-07-27 DIAGNOSIS — Z95.0 PRESENCE OF CARDIAC PACEMAKER: ICD-10-CM

## 2023-07-27 PROCEDURE — 93294 CARDIAC DEVICE CHECK - REMOTE: ICD-10-PCS | Mod: HCNC,S$GLB,, | Performed by: INTERNAL MEDICINE

## 2023-07-27 PROCEDURE — 93294 REM INTERROG EVL PM/LDLS PM: CPT | Mod: HCNC,S$GLB,, | Performed by: INTERNAL MEDICINE

## 2023-07-27 PROCEDURE — 93296 REM INTERROG EVL PM/IDS: CPT | Mod: HCNC | Performed by: INTERNAL MEDICINE

## 2023-08-15 ENCOUNTER — PES CALL (OUTPATIENT)
Dept: ADMINISTRATIVE | Facility: CLINIC | Age: 88
End: 2023-08-15

## 2023-08-16 ENCOUNTER — TELEPHONE (OUTPATIENT)
Dept: CARDIOLOGY | Facility: CLINIC | Age: 88
End: 2023-08-16

## 2023-08-16 ENCOUNTER — HOSPITAL ENCOUNTER (OUTPATIENT)
Dept: CARDIOLOGY | Facility: HOSPITAL | Age: 88
Discharge: HOME | End: 2023-08-16
Attending: INTERNAL MEDICINE
Payer: MEDICARE

## 2023-08-16 ENCOUNTER — OFFICE VISIT (OUTPATIENT)
Dept: CARDIOLOGY | Facility: CLINIC | Age: 88
End: 2023-08-16
Payer: MEDICARE

## 2023-08-16 ENCOUNTER — HOSPITAL ENCOUNTER (OUTPATIENT)
Dept: CARDIOLOGY | Facility: HOSPITAL | Age: 88
Discharge: HOME | End: 2023-08-16
Payer: MEDICARE

## 2023-08-16 VITALS
SYSTOLIC BLOOD PRESSURE: 120 MMHG | HEIGHT: 69 IN | OXYGEN SATURATION: 100 % | RESPIRATION RATE: 16 BRPM | HEART RATE: 91 BPM | BODY MASS INDEX: 27.56 KG/M2 | DIASTOLIC BLOOD PRESSURE: 76 MMHG | WEIGHT: 186.06 LBS

## 2023-08-16 DIAGNOSIS — I48.0 PAROXYSMAL ATRIAL FIBRILLATION: ICD-10-CM

## 2023-08-16 DIAGNOSIS — I48.0 PAROXYSMAL ATRIAL FIBRILLATION: Primary | ICD-10-CM

## 2023-08-16 DIAGNOSIS — E66.3 OVERWEIGHT (BMI 25.0-29.9): ICD-10-CM

## 2023-08-16 DIAGNOSIS — Z95.0 PACEMAKER: ICD-10-CM

## 2023-08-16 DIAGNOSIS — I11.0 HYPERTENSIVE HEART DISEASE WITH HEART FAILURE: ICD-10-CM

## 2023-08-16 DIAGNOSIS — I10 ESSENTIAL HYPERTENSION: ICD-10-CM

## 2023-08-16 DIAGNOSIS — I48.19 PERSISTENT ATRIAL FIBRILLATION: ICD-10-CM

## 2023-08-16 DIAGNOSIS — I50.42 CHRONIC COMBINED SYSTOLIC AND DIASTOLIC CONGESTIVE HEART FAILURE: ICD-10-CM

## 2023-08-16 DIAGNOSIS — I50.32 CHRONIC DIASTOLIC HEART FAILURE: Primary | ICD-10-CM

## 2023-08-16 DIAGNOSIS — Z45.018 BIVENTRICULAR PACEMAKER CHECK: ICD-10-CM

## 2023-08-16 PROCEDURE — 93010 ELECTROCARDIOGRAM REPORT: CPT | Mod: HCNC,,, | Performed by: INTERNAL MEDICINE

## 2023-08-16 PROCEDURE — 3072F PR LOW RISK FOR RETINOPATHY: ICD-10-PCS | Mod: HCNC,CPTII,S$GLB, | Performed by: NURSE PRACTITIONER

## 2023-08-16 PROCEDURE — 1157F ADVNC CARE PLAN IN RCRD: CPT | Mod: HCNC,CPTII,S$GLB, | Performed by: NURSE PRACTITIONER

## 2023-08-16 PROCEDURE — 93281 PM DEVICE PROGR EVAL MULTI: CPT | Mod: HCNC

## 2023-08-16 PROCEDURE — 93010 EKG 12-LEAD: ICD-10-PCS | Mod: HCNC,,, | Performed by: INTERNAL MEDICINE

## 2023-08-16 PROCEDURE — 99214 OFFICE O/P EST MOD 30 MIN: CPT | Mod: HCNC,S$GLB,, | Performed by: NURSE PRACTITIONER

## 2023-08-16 PROCEDURE — 1157F PR ADVANCE CARE PLAN OR EQUIV PRESENT IN MEDICAL RECORD: ICD-10-PCS | Mod: HCNC,CPTII,S$GLB, | Performed by: NURSE PRACTITIONER

## 2023-08-16 PROCEDURE — 93281 CARDIAC DEVICE CHECK - IN CLINIC & HOSPITAL: ICD-10-PCS | Mod: 26,HCNC,, | Performed by: INTERNAL MEDICINE

## 2023-08-16 PROCEDURE — 3072F LOW RISK FOR RETINOPATHY: CPT | Mod: HCNC,CPTII,S$GLB, | Performed by: NURSE PRACTITIONER

## 2023-08-16 PROCEDURE — 99214 PR OFFICE/OUTPT VISIT, EST, LEVL IV, 30-39 MIN: ICD-10-PCS | Mod: HCNC,S$GLB,, | Performed by: NURSE PRACTITIONER

## 2023-08-16 PROCEDURE — 1159F MED LIST DOCD IN RCRD: CPT | Mod: HCNC,CPTII,S$GLB, | Performed by: NURSE PRACTITIONER

## 2023-08-16 PROCEDURE — 93281 PM DEVICE PROGR EVAL MULTI: CPT | Mod: 26,HCNC,, | Performed by: INTERNAL MEDICINE

## 2023-08-16 PROCEDURE — 1159F PR MEDICATION LIST DOCUMENTED IN MEDICAL RECORD: ICD-10-PCS | Mod: HCNC,CPTII,S$GLB, | Performed by: NURSE PRACTITIONER

## 2023-08-16 PROCEDURE — 93005 ELECTROCARDIOGRAM TRACING: CPT | Mod: HCNC

## 2023-08-16 PROCEDURE — 99999 PR PBB SHADOW E&M-EST. PATIENT-LVL V: CPT | Mod: PBBFAC,HCNC,, | Performed by: NURSE PRACTITIONER

## 2023-08-16 PROCEDURE — 99999 PR PBB SHADOW E&M-EST. PATIENT-LVL V: ICD-10-PCS | Mod: PBBFAC,HCNC,, | Performed by: NURSE PRACTITIONER

## 2023-08-16 RX ORDER — DAPAGLIFLOZIN 10 MG/1
10 TABLET, FILM COATED ORAL DAILY
Qty: 30 TABLET | Refills: 3 | Status: SHIPPED | OUTPATIENT
Start: 2023-08-16

## 2023-08-16 RX ORDER — FUROSEMIDE 40 MG/1
80 TABLET ORAL DAILY
Qty: 60 TABLET | Refills: 12 | Status: SHIPPED | OUTPATIENT
Start: 2023-08-16 | End: 2024-08-15

## 2023-08-16 NOTE — TELEPHONE ENCOUNTER
Attempted to reach the patient. LVM                  Tell patient   Labs reviewed   Renal function stable   Potassium WNL   BNP slightly elevated   Magnesium stable     Increase Lasix to 80 mg daily   Add Farxiga 10 mg daily   Update echo prior to next visit with Dr Taras Evans    Physical Therapy Visit    Visit Type: Daily Treatment Note  Visit: 6  Referring Provider: Damian Juarez MD  Medical Diagnosis (from order): Diagnosis Information    Diagnosis  715.96 (ICD-9-CM) - M17.12 (ICD-10-CM) - Osteoarthritis of left knee       Patient alert and oriented X3.    SUBJECTIVE                                                                                                               Patient reports that he feels sudden twinges of pain at times.  Last night he felt it when he went to bed.  He also notes feeling a lot of fatigue after physical therapy.    Functional Change: Able to descend stairs reciprocally      OBJECTIVE                                                                                                                     Range of Motion (ROM)   (degrees unless noted; active unless noted; norms in ( ); negative=lacking to 0, positive=beyond 0)  Knee:   - Flexion (150):      • Left:  110    - Extension (0-10):      • Left:  -5     Strength  (out of 5 unless noted, standard test position unless noted)   Knee:    - Flexion:        • Left: 4+    - Extension:        • Left: 4+        Circumference  Knee   - Joint Line: • Left: 43 cm           Stair Ambulation  - Number of steps: 4  Ascend  - Assist Level: independent  - Pattern: reciprocal  Descend   - Assist Level: independent  - Pattern: reciprocal  - Description: decreased eccentric control           Treatment     Therapeutic Exercise  Supine passive range of motion left knee - supine only today into extension  Calf raises x 15 - defer, discharge   Prone quad stretch 3 x 20 sec  Prone hang x 2 min  Bike x 5 minutes  Hamstring stretch with strap in supine 2 x 20 sec  IT band stretch with strap 2 x 20 sec  Reassessment of objective findings    Manual Therapy   Soft tissue mobilization of distal lateral hamstrings and distal IT band in prone  Gentle tibial AP glides grade II for improved extension    Neuromuscular Re-Education  Short  arc quad 5 sec 2 x 10 - 2# added today - defer, discharge   Quad set with heel prop x 20- defer, discharge   Sidelying hip abduction with quad set x 15 - defer, discharge   Long arc quad x 15 - defer, discharge   Straight leg raise x 10  Standing hip abduction and extension x 10 each -red band  Sidestepping red band in gym x 1 lap  Prone quad set 3 sec x 15  Terminal knee extension with red band 3 sec x 10  Green band seated ham curl x 15  Sit to stand x 10 - working on gluteal recruitment and then standing quad set at full stance  Standing tiltboard x 20 each direction-working on control  4 inch step ups x 10 - defer  6 inch step downs and retro step ups x 10 - deferred retro step up due to lateral knee pain  6 inch lateral step up x 10  Marching on airex x 1 min  New:  6 inch reciprocal stairs x 3 (no handrails)  Low raul step over x 5 each leg  Tandem balance on airex 2 x 20 sec  Monster walk with red band x 1 lap      Skilled input: verbal instruction/cues, tactile instruction/cues and facilitation    Writer verbally educated and received verbal consent for hand placement, positioning of patient, and techniques to be performed today from patient for hand placement and palpation for techniques and therapist position for techniques as described above and how they are pertinent to the patient's plan of care.    Home Exercise Program  Access Code: [email or web address]  URL: [email or web address]  Date: 12/13/2022  Prepared by: Lucille Ramirez    Exercises  Supine Quad Set - 2 x daily - 5-7 x weekly - 1 sets - 10 reps - 5 seconds hold  Supine Heel Slide - 2 x daily - 5-7 x weekly - 1-2 sets - 10 reps  Seated Hamstring Stretch - 2 x daily - 5-7 x weekly - 2-3 reps - 20-30 seconds hold  Seated Knee Flexion Stretch - 2 x daily - 5-7 x weekly - 1 sets - 10 reps - 5-10 seconds hold  Standing Knee Flexion Stretch on Step - 2 x daily - 5-7 x weekly - 1 sets - 10 reps  Standing Terminal Knee Extension with  Resistance - 2 x daily - 5 x weekly - 1-2 sets - 10 reps - 3 seconds hold  Hip Abduction with Resistance Loop - 2 x daily - 5 x weekly - 1 sets - 10 reps  Hip Extension with Resistance Loop - 2 x daily - 5 x weekly - 1 sets - 10 reps  Prone Quadriceps Set - 2 x daily - 5 x weekly - 1 sets - 15 reps - 3 seconds hold  Sit to Stand - 2 x daily - 5 x weekly - 1 sets - 10 reps          ASSESSMENT                                                                                                            Patient presents to physical therapy with improved strength and knee flexion; however, he continues to lack eccentric quad control as well as terminal knee extension.  Progressed balance exercises today on dynamic surfaces.  Patient required minimal use of hand support to maintain balance today.  Worked on eccentric quad control with step downs today but deferred retro step ups due to lateral knee pain.  Patient demonstrates compensations with stepping over raul today when leading with right lower extremity due to lack of knee flexion.  Patient continues to benefit from physical therapy.  I recommend decreasing treatment frequency to 2x/week.    Education:   - Results of above outlined education: Verbalizes understanding and Demonstrates understanding    PLAN                                                                                                                           Suggestions for next session as indicated: Progress per plan of care       Therapy procedure time and total treatment time can be found documented on the Time Entry flowsheet

## 2023-08-16 NOTE — PROGRESS NOTES
Subjective:   Patient ID:  Anthony Blair is a 89 y.o. male who presents for evaluation of Coronary Artery Disease      HPI    Anthony Blair is a 88 year old female who presents to Cardiology for hospital follow up. He was recently admitted to [facility] due to chest pain, bilateral arm numbness, dizziness, weakness, SOB, headache.     He feels Almost back to normal today.   No recurrent chest pain episodes  Has regained strength in his legs since hospital discharge  Completed project at home  Does not sleep well nightly worsened about 3-4 weeks  Discussed sleep hygiene practices in office  Willing to try melatonin again to attempt to improve insomnia      BP at home 135/70, 154/85, 144/76, 146/70, 138/71, 109/61    Denies chest pain or anginal equivalents. No shortness of breath, OLIVARES or palpitations. Denies orthopnea, PND or abdominal bloating. Reports regular walking without any issues lately. NO leg swelling or claudications. No recent falls, syncope or near syncopal events. Reports compliance with medications and dietary restrictions. NO CNS complaints to suggest TIA or CVA today. No signs of abnormal bleeding.     8/16/2023    Anthony Blair returns for follow up with device check.     Device check- well functioning device, no arrhythmias. Decrease in TI over the last 9 days.     EKG today reveals AsVp    He has gained about 6 pounds since last visit. He just returned from a 9 day bus trip and has increased leg swelling and shortness of breath. He does endorse PND nightly. BP stable today in office.     No chest pain or anginal equivalents.   He does endorse dizziness with position changes and bendopnea.     Reports compliance with medications and dietary restrictions.     Past Medical History:   Diagnosis Date    A-fib     Anemia     AP (angina pectoris) 01/23/2014    Carotid artery disease without cerebral infarction 08/22/2014    Cataract     Cirrhosis of liver 09/10/2020    Coronary artery disease     Diabetes  mellitus 1970     am 11/21/2022  Insulin x 6-7 years.    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    Hyperlipidemia     Hypertension     Hypertensive heart disease with heart failure 03/12/2019    Intracranial hemorrhage     Lumbosacral spondylosis 12/24/2014    Pacemaker 01/23/2014    Paroxysmal atrial fibrillation 01/23/2014    Peripheral vascular disease 08/22/2014    Pneumonia of right lung due to infectious organism 03/27/2019    Seizure, late effect of stroke     Stroke     Type 2 diabetes mellitus with ophthalmic manifestations        Past Surgical History:   Procedure Laterality Date    ANGIOPLASTY      ATRIAL ABLATION SURGERY N/A     CATARACT EXTRACTION Bilateral     [facility]    CATARACT EXTRACTION W/ INTRAOCULAR LENS IMPLANT Right     CATARACT EXTRACTION W/ INTRAOCULAR LENS IMPLANT Left     COLONOSCOPY N/A 10/16/2018    Procedure: COLONOSCOPY with biopsies;  Surgeon: Anabell Griggs MD;  Location: [facility] ENDO;  Service: Endoscopy;  Laterality: N/A;    CORONARY ARTERY BYPASS GRAFT  07/2010    x5    EYE SURGERY      pace maker surgrey  10/2010    reposition in 2011/2012 (approx)    REPLACEMENT OF PACEMAKER GENERATOR Left 8/6/2020    Procedure: REPLACEMENT, PULSE GENERATOR, CARDIAC PACEMAKER;  Surgeon: Domenico Grajeda MD;  Location: [facility] CATH LAB;  Service: Cardiology;  Laterality: Left;  [facility]    REVISION OF PROCEDURE INVOLVING PACEMAKER LEAD Bilateral 8/6/2020    Procedure: REVISION, ELECTRODE LEAD, CARDIAC PACEMAKER;  Surgeon: Domenico Grajeda MD;  Location: [facility] CATH LAB;  Service: Cardiology;  Laterality: Bilateral;    VEIN BYPASS SURGERY         Social History     Tobacco Use    Smoking status: Former     Current packs/day: 0.00     Average packs/day: 2.0 packs/day for 13.0 years (26.1 ttl pk-yrs)     Types: Cigarettes      Start date: 1954     Quit date: 1967     Years since quittin.2    Smokeless tobacco: Never   Substance Use Topics    Alcohol use: No    Drug use: No       Family History   Problem Relation Age of Onset    Cataracts Mother     Arthritis Mother     Diabetes Mother     Kidney disease Mother     Heart disease Mother     Arthritis Father     Diabetes Father     Diabetes Sister     Cancer Sister         breast    Heart disease Sister     Diabetes Brother     Kidney disease Brother     Heart disease Brother     Alcohol abuse Paternal Uncle     Cancer Daughter         breast    Diabetes Daughter     Miscarriages / Stillbirths Daughter     Fibromyalgia Daughter     Diabetes Son     Diabetes Son     Stroke Neg Hx     Hypertension Neg Hx     Hyperlipidemia Neg Hx     COPD Neg Hx      Wt Readings from Last 3 Encounters:   23 84.4 kg (186 lb 1.1 oz)   23 82 kg (180 lb 12.4 oz)   23 82.1 kg (180 lb 14.2 oz)     Temp Readings from Last 3 Encounters:   03/10/23 97.6 °F (36.4 °C) (Oral)   22 97.7 °F (36.5 °C) (Temporal)   22 98.1 °F (36.7 °C) (Oral)     BP Readings from Last 3 Encounters:   23 120/76   23 122/72   23 (!) 111/53     Pulse Readings from Last 3 Encounters:   23 91   23 70   23 70       Review of Systems   Constitutional: Positive for malaise/fatigue and weight gain.   HENT:  Negative for hearing loss and hoarse voice.    Eyes:  Negative for blurred vision and visual disturbance.   Cardiovascular:  Positive for leg swelling and paroxysmal nocturnal dyspnea. Negative for chest pain, claudication, dyspnea on exertion, irregular heartbeat, near-syncope, orthopnea, palpitations and syncope.   Respiratory:  Negative for cough, hemoptysis, shortness of breath, sleep disturbances due to breathing, snoring and wheezing.    Endocrine: Negative for cold intolerance and heat intolerance.   Hematologic/Lymphatic: Does not bruise/bleed easily.   Skin:   "Negative for color change, dry skin and nail changes.   Musculoskeletal:  Positive for arthritis. Negative for back pain, joint pain and myalgias.   Gastrointestinal:  Negative for bloating, abdominal pain, constipation, nausea and vomiting.   Genitourinary:  Negative for dysuria, flank pain, hematuria and hesitancy.   Neurological:  Negative for headaches, light-headedness, loss of balance, numbness, paresthesias and weakness.   Psychiatric/Behavioral:  Negative for altered mental status.    Allergic/Immunologic: Negative for environmental allergies.   /76   Pulse 91   Resp 16   Ht 5' 9" (1.753 m)   Wt 84.4 kg (186 lb 1.1 oz)   SpO2 100%   BMI 27.48 kg/m²       Objective:   Physical Exam  Vitals and nursing note reviewed.   Constitutional:       General: He is not in acute distress.     Appearance: Normal appearance. He is well-developed. He is not ill-appearing.   HENT:      Head: Normocephalic and atraumatic.      Nose: Nose normal.      Mouth/Throat:      Mouth: Mucous membranes are moist.      Pharynx: Oropharynx is clear.   Eyes:      Extraocular Movements: Extraocular movements intact.      Conjunctiva/sclera: Conjunctivae normal.      Pupils: Pupils are equal, round, and reactive to light.   Neck:      Thyroid: No thyromegaly.      Vascular: No JVD.      Trachea: No tracheal deviation.   Cardiovascular:      Rate and Rhythm: Normal rate and regular rhythm.      Chest Wall: PMI is not displaced.      Pulses: Intact distal pulses.           Radial pulses are 2+ on the right side and 2+ on the left side.        Dorsalis pedis pulses are 2+ on the right side and 2+ on the left side.      Heart sounds: S1 normal and S2 normal. Heart sounds not distant. No murmur heard.  Pulmonary:      Effort: Pulmonary effort is normal. No respiratory distress.      Breath sounds: Normal breath sounds. No wheezing.   Abdominal:      General: Bowel sounds are normal. There is no distension.      Palpations: Abdomen is " soft.      Tenderness: There is no abdominal tenderness.   Musculoskeletal:         General: Normal range of motion.      Cervical back: Full passive range of motion without pain, normal range of motion and neck supple.      Right lower leg: Edema present.      Left lower leg: Edema present.      Right ankle: No swelling.      Left ankle: No swelling.   Skin:     General: Skin is warm and dry.      Capillary Refill: Capillary refill takes less than 2 seconds.      Nails: There is no clubbing.   Neurological:      General: No focal deficit present.      Mental Status: He is alert and oriented to person, place, and time.   Psychiatric:         Mood and Affect: Mood normal.         Speech: Speech normal.         Behavior: Behavior normal.         Thought Content: Thought content normal.         Judgment: Judgment normal.         Lab Results   Component Value Date    CHOL 170 05/08/2022    CHOL 163 09/09/2021    CHOL 183 05/26/2021     Lab Results   Component Value Date    HDL 41 05/08/2022    HDL 41 09/09/2021    HDL 44 05/26/2021     Lab Results   Component Value Date    LDLCALC 106.6 05/08/2022    LDLCALC 94.4 09/09/2021    LDLCALC 108.4 05/26/2021     Lab Results   Component Value Date    TRIG 112 05/08/2022    TRIG 138 09/09/2021    TRIG 153 (H) 05/26/2021     Lab Results   Component Value Date    CHOLHDL 24.1 05/08/2022    CHOLHDL 25.2 09/09/2021    CHOLHDL 24.0 05/26/2021       Chemistry        Component Value Date/Time     05/16/2023 1403    K 4.3 05/16/2023 1403     05/16/2023 1403    CO2 26 05/16/2023 1403    BUN 37 (H) 05/16/2023 1403    CREATININE 1.2 05/16/2023 1403     (H) 05/16/2023 1403        Component Value Date/Time    CALCIUM 10.2 05/16/2023 1403    ALKPHOS 164 (H) 05/16/2023 1403    AST 37 05/16/2023 1403    ALT 21 05/16/2023 1403    BILITOT 0.5 05/16/2023 1403    ESTGFRAFRICA 84 03/12/2023 0436    ESTGFRAFRICA >60 05/09/2022 0741    EGFRNONAA >60 05/09/2022 0741          Lab  Results   Component Value Date    TSH 1.698 05/08/2022     Lab Results   Component Value Date    INR 1.1 05/09/2022    INR 1.1 05/08/2022    INR 1.1 09/28/2021     Lab Results   Component Value Date    WBC 8.10 03/10/2023    HGB 12.5 (L) 03/10/2023    HCT 37.2 (L) 03/10/2023    MCV 93 03/10/2023     03/10/2023        Assessment:      1. Chronic diastolic heart failure    2. Persistent atrial fibrillation    3. Chronic combined systolic and diastolic congestive heart failure    4. Biventricular pacemaker check    5. Essential hypertension    6. Overweight (BMI 25.0-29.9)          Plan:     Increase lasix to 80 mg daily  CMP BNP PRo BNP Mag today  Continue all other medications  Dash diet 2 gm sodium restrictions  Keep next f/u on August 28th as scheduled     BASSAM Greene  [facility]

## 2023-08-17 ENCOUNTER — TELEPHONE (OUTPATIENT)
Dept: CARDIOLOGY | Facility: CLINIC | Age: 88
End: 2023-08-17

## 2023-08-17 NOTE — TELEPHONE ENCOUNTER
Attempted to reach the patient's spouse, LVM            Tell patient   Labs reviewed   Renal function stable   Potassium WNL   BNP slightly elevated   Magnesium stable     Increase Lasix to 80 mg daily   Add Farxiga 10 mg daily   Update echo prior to next visit with Dr Taras Evans

## 2023-08-17 NOTE — TELEPHONE ENCOUNTER
Notified the patient and his spouse of lab results. Echo has been scheduled                    Tell patient   Labs reviewed   Renal function stable   Potassium WNL   BNP slightly elevated   Magnesium stable     Increase Lasix to 80 mg daily   Add Farxiga 10 mg daily   Update echo prior to next visit with Dr Grajeda

## 2023-08-21 ENCOUNTER — TELEPHONE (OUTPATIENT)
Dept: FAMILY MEDICINE | Facility: CLINIC | Age: 88
End: 2023-08-21

## 2023-08-21 NOTE — TELEPHONE ENCOUNTER
----- Message from Pat Pan sent at 8/21/2023  8:36 AM CDT -----  Contact: Anthony  Patient is calling to speak with the nurse regarding upcoming appt. Reports having questions and concerns about appt. Please give patient a call at [phone].

## 2023-08-22 ENCOUNTER — PATIENT MESSAGE (OUTPATIENT)
Dept: CARDIOLOGY | Facility: CLINIC | Age: 88
End: 2023-08-22

## 2023-08-22 ENCOUNTER — TELEPHONE (OUTPATIENT)
Dept: CARDIOLOGY | Facility: HOSPITAL | Age: 88
End: 2023-08-22

## 2023-08-22 ENCOUNTER — TELEPHONE (OUTPATIENT)
Dept: CARDIOLOGY | Facility: CLINIC | Age: 88
End: 2023-08-22

## 2023-08-22 NOTE — TELEPHONE ENCOUNTER
Called patient to confirm appointment date and time. KEILA    ----- Message from Sonia Looney sent at 8/22/2023  8:50 AM CDT -----  Contact: Anthony Willoughby was returning the phone call. Please call him back at [phone].    Thanks  TS

## 2023-08-24 ENCOUNTER — CLINICAL SUPPORT (OUTPATIENT)
Dept: CARDIOLOGY | Facility: HOSPITAL | Age: 88
End: 2023-08-24
Attending: NURSE PRACTITIONER
Payer: MEDICARE

## 2023-08-24 VITALS — WEIGHT: 186 LBS | HEIGHT: 69 IN | BODY MASS INDEX: 27.55 KG/M2

## 2023-08-24 DIAGNOSIS — I11.0 HYPERTENSIVE HEART DISEASE WITH HEART FAILURE: ICD-10-CM

## 2023-08-24 DIAGNOSIS — I48.19 PERSISTENT ATRIAL FIBRILLATION: ICD-10-CM

## 2023-08-24 DIAGNOSIS — I50.32 CHRONIC DIASTOLIC HEART FAILURE: ICD-10-CM

## 2023-08-24 LAB
AV INDEX (PROSTH): 0.62
AV MEAN GRADIENT: 6 MMHG
AV PEAK GRADIENT: 11 MMHG
AV VALVE AREA BY VELOCITY RATIO: 2.27 CM²
AV VALVE AREA: 2.57 CM²
AV VELOCITY RATIO: 0.55
BSA FOR ECHO PROCEDURE: 2.03 M2
CV ECHO LV RWT: 0.7 CM
DOP CALC AO PEAK VEL: 1.63 M/S
DOP CALC AO VTI: 35.6 CM
DOP CALC LVOT AREA: 4.1 CM2
DOP CALC LVOT DIAMETER: 2.29 CM
DOP CALC LVOT PEAK VEL: 0.9 M/S
DOP CALC LVOT STROKE VOLUME: 91.39 CM3
DOP CALC RVOT AREA: 5.43 CM2
DOP CALC RVOT DIAMETER: 2.63 CM
DOP CALCLVOT PEAK VEL VTI: 22.2 CM
E/E' RATIO: 15.88 M/S
ECHO LV POSTERIOR WALL: 1.36 CM (ref 0.6–1.1)
FRACTIONAL SHORTENING: 37 % (ref 28–44)
INTERVENTRICULAR SEPTUM: 1.38 CM (ref 0.6–1.1)
LA MAJOR: 5.74 CM
LA MINOR: 5.64 CM
LA WIDTH: 4.7 CM
LEFT ATRIUM SIZE: 3.99 CM
LEFT ATRIUM VOLUME INDEX: 45.3 ML/M2
LEFT ATRIUM VOLUME: 90.69 CM3
LEFT INTERNAL DIMENSION IN SYSTOLE: 2.47 CM (ref 2.1–4)
LEFT VENTRICLE DIASTOLIC VOLUME INDEX: 32.78 ML/M2
LEFT VENTRICLE DIASTOLIC VOLUME: 65.56 ML
LEFT VENTRICLE MASS INDEX: 97 G/M2
LEFT VENTRICLE SYSTOLIC VOLUME INDEX: 10.9 ML/M2
LEFT VENTRICLE SYSTOLIC VOLUME: 21.7 ML
LEFT VENTRICULAR INTERNAL DIMENSION IN DIASTOLE: 3.89 CM (ref 3.5–6)
LEFT VENTRICULAR MASS: 194.1 G
LV LATERAL E/E' RATIO: 12.27 M/S
LV SEPTAL E/E' RATIO: 22.5 M/S
LVOT MG: 1.75 MMHG
LVOT MV: 0.62 CM/S
MV PEAK E VEL: 1.35 M/S
PISA TR MAX VEL: 2.68 M/S
RA MAJOR: 5.61 CM
RA PRESSURE ESTIMATED: 3 MMHG
RIGHT VENTRICULAR END-DIASTOLIC DIMENSION: 3.79 CM
RIGHT VENTRICULAR LENGTH IN DIASTOLE (APICAL 4-CHAMBER VIEW): 7.48 CM
RV MID DIAMA: 2.98 CM
RV TB RVSP: 6 MMHG
SINUS: 3.55 CM
STJ: 3.17 CM
TDI LATERAL: 0.11 M/S
TDI SEPTAL: 0.06 M/S
TDI: 0.09 M/S
TR MAX PG: 29 MMHG
TV REST PULMONARY ARTERY PRESSURE: 32 MMHG
Z-SCORE OF LEFT VENTRICULAR DIMENSION IN END DIASTOLE: -4.05
Z-SCORE OF LEFT VENTRICULAR DIMENSION IN END SYSTOLE: -2.93

## 2023-08-24 PROCEDURE — 93306 TTE W/DOPPLER COMPLETE: CPT | Mod: HCNC,PO

## 2023-08-24 PROCEDURE — 93306 TTE W/DOPPLER COMPLETE: CPT | Mod: 26,HCNC,, | Performed by: INTERNAL MEDICINE

## 2023-08-24 PROCEDURE — 93306 ECHO (CUPID ONLY): ICD-10-PCS | Mod: 26,HCNC,, | Performed by: INTERNAL MEDICINE

## 2023-08-28 ENCOUNTER — OFFICE VISIT (OUTPATIENT)
Dept: CARDIOLOGY | Facility: CLINIC | Age: 88
End: 2023-08-28
Payer: MEDICARE

## 2023-08-28 VITALS
RESPIRATION RATE: 14 BRPM | HEIGHT: 69 IN | HEART RATE: 68 BPM | DIASTOLIC BLOOD PRESSURE: 60 MMHG | SYSTOLIC BLOOD PRESSURE: 122 MMHG | WEIGHT: 177.5 LBS | BODY MASS INDEX: 26.29 KG/M2

## 2023-08-28 DIAGNOSIS — I48.19 PERSISTENT ATRIAL FIBRILLATION: ICD-10-CM

## 2023-08-28 DIAGNOSIS — I50.42 CHRONIC COMBINED SYSTOLIC AND DIASTOLIC CONGESTIVE HEART FAILURE: Primary | ICD-10-CM

## 2023-08-28 DIAGNOSIS — Z86.16 HISTORY OF COVID-19: ICD-10-CM

## 2023-08-28 DIAGNOSIS — Z45.018 BIVENTRICULAR PACEMAKER CHECK: ICD-10-CM

## 2023-08-28 PROCEDURE — 99215 PR OFFICE/OUTPT VISIT, EST, LEVL V, 40-54 MIN: ICD-10-PCS | Mod: S$PBB,,, | Performed by: INTERNAL MEDICINE

## 2023-08-28 PROCEDURE — 1157F PR ADVANCE CARE PLAN OR EQUIV PRESENT IN MEDICAL RECORD: ICD-10-PCS | Mod: ,,, | Performed by: INTERNAL MEDICINE

## 2023-08-28 PROCEDURE — 3072F LOW RISK FOR RETINOPATHY: CPT | Mod: ,,, | Performed by: INTERNAL MEDICINE

## 2023-08-28 PROCEDURE — 99999 PR PBB SHADOW E&M-EST. PATIENT-LVL V: ICD-10-PCS | Mod: PBBFAC,,, | Performed by: INTERNAL MEDICINE

## 2023-08-28 PROCEDURE — 99215 OFFICE O/P EST HI 40 MIN: CPT | Mod: S$PBB,,, | Performed by: INTERNAL MEDICINE

## 2023-08-28 PROCEDURE — 3072F PR LOW RISK FOR RETINOPATHY: ICD-10-PCS | Mod: ,,, | Performed by: INTERNAL MEDICINE

## 2023-08-28 PROCEDURE — 99999 PR PBB SHADOW E&M-EST. PATIENT-LVL V: CPT | Mod: PBBFAC,,, | Performed by: INTERNAL MEDICINE

## 2023-08-28 PROCEDURE — 1157F ADVNC CARE PLAN IN RCRD: CPT | Mod: ,,, | Performed by: INTERNAL MEDICINE

## 2023-08-28 NOTE — PROGRESS NOTES
"  Subjective:   Patient ID:  Anthony Blair is a 89 y.o. male     Chief complaint:Congestive Heart Failure and Atrial Fibrillation      HPI  Background (for extensive clinical details, see my note dated 10/24/2018):  In short, he is 87 years old, he has a pacemaker for heart block and is pacemaker dependent.  He has an history of persistent atrial fibrillation there is also a prior history of intracerebral bleed.  Is currently on no anticoagulants (as per Dr. Seven Frazier recommendations). He also has an AVM of the stomach.  He has CAD, hypertension, mixed hyperlipidemia, peripheral vascular disease and diabetes type 2.     Update 07/20/2020:  He has been stable.  His pacemaker battery has been at DOLORES for some time.  Today it was nearly at EOS.   In addition, his atrial need is been malfunctioning.       Update 9/14/2020:   I recommended immediate pacemaker replacement and revision of his atrial lead.)  Venography as needed 1st).   He wanted to wait for a couple weeks but eventually had his procedure done on 08/06/2020.  His chronic RA lead was not removed due to lead to lead adhesions especially at the level of the SVC.  A new RA lead was added and an LV lead was placed.  Since then,he has been feeling great. "I feel as improved as I did after my bypass surgery". Wife says "it feels so good to see him feel like that".   He seems to be unrestricted now.   His sleep is also of better quality now  >>   Excellent clinical response to CRT -- he is in persiatent AT/AF and therefore in a VVIR BiV paced mode . PPM in excellent repair.      Update 10/5/2021:  Recent echo - 9/21/21  The left ventricle is normal in size with concentric hypertrophy and low normal systolic function.  The estimated ejection fraction is 50%.  Indeterminate left ventricular diastolic function.  Normal right ventricular size with low normal right ventricular systolic function.  Mild to moderate tricuspid regurgitation.  Mild mitral " regurgitation.  Mild aortic regurgitation.  There is pulmonary hypertension.  The estimated PA systolic pressure is 47 mmHg.     BNP 40s in May  CBC OK  BMP OK     Clinically:  Doing well in general but has some decrease in energy - @ 1 month - [place] is OK - he has been coughing and not sleeping well     He was able to walk from parking to clinic w/o issues   ICD eval - all OK - 99% His pacing, SB is underlying @ 37 bpm  >>  Doing well in general - he seems to be having post nasal drip   >>  Use wedge pillow during sleep    Update 09/18/2023 :  After a long trip to [place] with a bus tour of 9 days he had some bilateral leg swelling.  He increased his Lasix from 40 to 80 mg a day.  This helped.  Otherwise is feeling reasonably well except with some dyspnea on exertion and some fatigue.  He mentioned that he is the black tarry stools by his ROS sheet completion.  Current Outpatient Medications   Medication Sig    ACCU-CHEK GUIDE TEST STRIPS Strp TEST BLOOD SUGAR SIX TIMES A DAY    ACCU-CHEK MULTICLIX LANCET lancets TEST BLOOD SUGAR THREE TIMES DAILY    ascorbic acid, vitamin C, (VITAMIN C) 100 MG tablet Take 100 mg by mouth once daily.    aspirin (ECOTRIN) 81 MG EC tablet Take 81 mg by mouth every other day.    blood-glucose meter Misc 1 Device by Misc.(Non-Drug; Combo Route) route once. for 1 dose    CARBOXYMETHYLCELLULOSE SODIUM (REFRESH OPHT) Apply to eye.    diclofenac sodium (VOLTAREN) 1 % Gel Apply 2 g topically once daily.    ferrous sulfate (IRON ORAL) Take 65 mg by mouth.    flecainide (TAMBOCOR) 50 MG Tab TAKE ONE TABLET BY MOUTH EVERY TWELVE HOURS    fluticasone propionate (FLONASE) 50 mcg/actuation nasal spray 2 sprays (100 mcg total) by Each Nostril route once daily.    furosemide (LASIX) 40 MG tablet Take 2 tablets (80 mg total) by mouth once daily.    glycopyrrolate (ROBINUL) 1 mg Tab Take 1 tablet (1 mg total) by mouth 3 (three) times daily.    guaiFENesin (MUCINEX) 600 mg 12 hr tablet Take 1  "tablet (600 mg total) by mouth 2 (two) times daily.    insulin (LANTUS SOLOSTAR U-100 INSULIN) glargine 100 units/mL (3mL) SubQ pen INJECT 16 UNITS SUBCUTANEOUSLY AT BEDTIME. 94 DAY SUPPLY    insulin aspart U-100 (NOVOLOG FLEXPEN U-100 INSULIN) 100 unit/mL (3 mL) InPn pen INJECT EIGHT UNITS BEFORE BREAKFAST, SIX UNITS BEFORE LUNCH, AND EIGHT UNITS BEFORE DINNER. MAY TITRATE TO A MAX DOSE OF 50 UNITS PER DAY    isosorbide mononitrate (IMDUR) 60 MG 24 hr tablet TAKE ONE TABLET BY MOUTH TWICE DAILY    losartan (COZAAR) 100 MG tablet TAKE ONE TABLET BY MOUTH EVERY DAY    metoprolol succinate (TOPROL-XL) 100 MG 24 hr tablet TAKE ONE TABLET BY MOUTH TWICE DAILY    mupirocin (BACTROBAN) 2 % ointment Apply topically 2 (two) times daily.    nitroGLYCERIN (NITROSTAT) 0.4 MG SL tablet Place 1 tablet (0.4 mg total) under the tongue every 5 (five) minutes as needed for Chest pain.    pantoprazole (PROTONIX) 40 MG tablet Take 1 tablet (40 mg total) by mouth once daily.    pen needle, diabetic (COMFORT EZ PEN NEEDLES) 32 gauge x 5/32" Ndle USE FOUR TIMES DAILY.    saw palmetto 500 MG capsule Take 500 mg by mouth 2 (two) times a day.    simethicone (GAS-X EXTRA STRENGTH) 125 mg Cap capsule Take 1 capsule (125 mg total) by mouth 4 (four) times daily as needed for Flatulence.    turmeric 400 mg Cap Take 1 capsule by mouth once daily.    vitamin D (VITAMIN D3) 1000 units Tab Take 1,000 Units by mouth once daily.    vitamin E 100 UNIT capsule Take 100 Units by mouth once daily.    dapagliflozin propanediol (FARXIGA) 10 mg tablet Take 1 tablet (10 mg total) by mouth once daily. (Patient not taking: Reported on 8/28/2023)    HYDROcodone-acetaminophen (NORCO) 5-325 mg per tablet Take 1 tablet by mouth every 8 (eight) hours as needed for Pain.    levocetirizine (XYZAL) 5 MG tablet Take 1 tablet (5 mg total) by mouth every evening.     Current Facility-Administered Medications   Medication    acetaminophen tablet 650 mg    albuterol inhaler " 2 puff    diphenhydrAMINE injection 25 mg    EPINEPHrine (EPIPEN) 0.3 mg/0.3 mL pen injection 0.3 mg    methylPREDNISolone sodium succinate injection 40 mg    ondansetron disintegrating tablet 4 mg    sodium chloride 0.9% 500 mL flush bag    sodium chloride 0.9% flush 10 mL       Review of Systems     Constitutional: Reviewed  for decreased appetite, weight gain and weight loss.   HENT: Reviewed for nosebleeds.    Eyes:  Reviewed for blurred vision and visual disturbance.   Cardiovascular: Reviewed for chest pain, claudication, cyanosis,dyspnea on exertion, leg swelling, orthopnea,paroxysmal nocturnal dyspnearregular heartbeats, palpitations, near-syncope, and syncope.   Respiratory: Reviewed for cough, shortness of breath, wheezing, sleep disturbances due to breathing and snoring, .    Endocrine: Reviewed for heat intolerance.   Hematologic/Lymphatic: Reviewed for easy bruisability/bleeding.   Skin: Reviewed for rash.   Musculoskeletal: Reviewed for muscle weakness and myalgias.   Gastrointestinal: Reviewed for abdominal pain, anorexia, melena, nausea and vomiting.   Genitourinary: Reviewed for hesitancy, frequency, nocturia and incontinence.   Neurological: Reviewed for excessive daytime sleepiness, dizziness, vertigo, weakness, headaches, loss of balance and seizures,   Psychiatric/Behavioral:  Reviewed for insomnia, altered mental status, depression, anxiety and nervousness.       All symptoms reviewed above were negative except for daytime sleepiness, headaches, lightheadedness and loss of balance, arthritic complaints.       Social History     Tobacco Use   Smoking Status Former    Current packs/day: 0.00    Average packs/day: 2.0 packs/day for 13.0 years (26.1 ttl pk-yrs)    Types: Cigarettes    Start date: 1954    Quit date: 1967    Years since quittin.3   Smokeless Tobacco Never        Objective:     Physical Exam  Vitals and nursing note reviewed.   Constitutional:       Appearance: Normal  appearance. He is well-developed.   HENT:      Head: Normocephalic and atraumatic.      Right Ear: External ear normal.      Left Ear: External ear normal.   Eyes:      Conjunctiva/sclera: Conjunctivae normal.      Left eye: Left conjunctiva is not injected. No hemorrhage.     Pupils: Pupils are equal, round, and reactive to light.   Neck:      Thyroid: No thyromegaly.      Vascular: No JVD.   Cardiovascular:      Rate and Rhythm: Normal rate and regular rhythm.      Chest Wall: PMI is not displaced.      Pulses: Intact distal pulses.           Carotid pulses are 2+ on the right side and 2+ on the left side.       Radial pulses are 2+ on the right side and 2+ on the left side.        Dorsalis pedis pulses are 2+ on the right side and 2+ on the left side.        Posterior tibial pulses are 2+ on the right side and 2+ on the left side.      Heart sounds: No midsystolic click and no opening snap. Murmur heard.      Midsystolic murmur is present with a grade of 2/6 at the upper left sternal border and lower left sternal border.      No friction rub. No gallop.   Pulmonary:      Effort: Pulmonary effort is normal. No respiratory distress.      Breath sounds: Normal breath sounds. No wheezing or rales.   Chest:      Chest wall: No tenderness.      Comments: Device pocket is in excellent repair.  Abdominal:      Palpations: Abdomen is soft. Abdomen is not rigid. There is no hepatomegaly.      Tenderness: There is no abdominal tenderness.   Musculoskeletal:         General: No tenderness. Normal range of motion.      Cervical back: Neck supple.      Right knee: No swelling.      Left knee: No swelling.      Right lower leg: No swelling.      Left lower leg: No swelling.      Right ankle: No swelling.      Left ankle: No swelling.      Right foot: No swelling.      Left foot: No swelling.   Skin:     General: Skin is warm and dry.      Findings: No rash.   Neurological:      Mental Status: He is alert and oriented to person,  "place, and time.      Cranial Nerves: No cranial nerve deficit.      Coordination: Coordination normal.      Deep Tendon Reflexes: Reflexes are normal and symmetric.   Psychiatric:         Behavior: Behavior normal.       /60   Pulse 68   Resp 14   Ht 5' 9" (1.753 m)   Wt 80.5 kg (177 lb 7.5 oz)   BMI 26.21 kg/m²       Results for orders placed in visit on 08/24/23    Echo    Interpretation Summary    Left Ventricle: The left ventricle is normal in size. Normal wall thickness. There is concentric remodeling. Normal wall motion. There is normal systolic function with a visually estimated ejection fraction of 55 - 60%. There is diastolic dysfunction.    Left Atrium: Left atrium is moderately dilated. Likely patent foramen ovale visualized with predominant left to right shunting indicated by color flow Doppler.    Right Ventricle: Normal right ventricular cavity size. Wall thickness is normal. Right ventricle wall motion  is normal. Systolic function is normal.    Aortic Valve: There is mild aortic valve sclerosis without stenosis.    Tricuspid Valve: There is mild regurgitation.    Pulmonary Artery: The estimated pulmonary artery systolic pressure is 32 mmHg.    IVC/SVC: Normal venous pressure at 3 mmHg.    WBC   Date Value Ref Range Status   03/10/2023 8.10 3.90 - 12.70 K/uL Final     Hematocrit   Date Value Ref Range Status   03/10/2023 37.2 (L) 40.0 - 54.0 % Final     Hemoglobin   Date Value Ref Range Status   03/10/2023 12.5 (L) 14.0 - 18.0 g/dL Final     Lab Results   Component Value Date     03/10/2023     Lab Results   Component Value Date    CREATININE 1.7 (H) 08/29/2023    EGFRNORACEVR 37.8 (A) 08/29/2023    K 4.4 08/29/2023     Lab Results   Component Value Date    BNP 79 09/13/2023            reports no history of alcohol use.  Past Medical History:   Diagnosis Date    A-fib     Anemia     AP (angina pectoris) 01/23/2014    Carotid artery disease without cerebral infarction 08/22/2014    " Cataract     Cirrhosis of liver 09/10/2020    Coronary artery disease     Diabetes mellitus 1970     am 11/21/2022  Insulin x 6-7 years.    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    Hyperlipidemia     Hypertension     Hypertensive heart disease with heart failure 03/12/2019    Intracranial hemorrhage     Lumbosacral spondylosis 12/24/2014    Pacemaker 01/23/2014    Paroxysmal atrial fibrillation 01/23/2014    Peripheral vascular disease 08/22/2014    Pneumonia of right lung due to infectious organism 03/27/2019    Seizure, late effect of stroke     Stroke     Type 2 diabetes mellitus with ophthalmic manifestations      Past Surgical History:   Procedure Laterality Date    ANGIOPLASTY      ATRIAL ABLATION SURGERY N/A     CATARACT EXTRACTION Bilateral     [facility]    CATARACT EXTRACTION W/ INTRAOCULAR LENS IMPLANT Right     CATARACT EXTRACTION W/ INTRAOCULAR LENS IMPLANT Left     COLONOSCOPY N/A 10/16/2018    Procedure: COLONOSCOPY with biopsies;  Surgeon: Anabell Griggs MD;  Location: [facility] ENDO;  Service: Endoscopy;  Laterality: N/A;    CORONARY ARTERY BYPASS GRAFT  07/2010    x5    EYE SURGERY      pace maker surgrey  10/2010    reposition in 2011/2012 (approx)    REPLACEMENT OF PACEMAKER GENERATOR Left 8/6/2020    Procedure: REPLACEMENT, PULSE GENERATOR, CARDIAC PACEMAKER;  Surgeon: Domenico Grajeda MD;  Location: [facility] CATH LAB;  Service: Cardiology;  Laterality: Left;  [facility]    REVISION OF PROCEDURE INVOLVING PACEMAKER LEAD Bilateral 8/6/2020    Procedure: REVISION, ELECTRODE LEAD, CARDIAC PACEMAKER;  Surgeon: Domenico Grajeda MD;  Location: [facility] CATH LAB;  Service: Cardiology;  Laterality: Bilateral;    VEIN BYPASS SURGERY       Family History   Problem Relation Age of Onset    Cataracts Mother     Arthritis Mother     Diabetes  Mother     Kidney disease Mother     Heart disease Mother     Arthritis Father     Diabetes Father     Diabetes Sister     Cancer Sister         breast    Heart disease Sister     Diabetes Brother     Kidney disease Brother     Heart disease Brother     Alcohol abuse Paternal Uncle     Cancer Daughter         breast    Diabetes Daughter     Miscarriages / Stillbirths Daughter     Fibromyalgia Daughter     Diabetes Son     Diabetes Son     Stroke Neg Hx     Hypertension Neg Hx     Hyperlipidemia Neg Hx     COPD Neg Hx        Assessment:    Possible effect of altitude causing some diastolic failure.  He seems to have recovered.  1. Chronic combined systolic and diastolic congestive heart failure    2. Biventricular pacemaker check    3. History of COVID-19    4. Persistent atrial fibrillation        Plan:    Decrease Lasix to 80/40 q.o.d. for the next 2 weeks then go back to 40 mg a day.  Orders Placed This Encounter   Procedures    Basic metabolic panel     Standing Status:   Future     Number of Occurrences:   1     Standing Expiration Date:   10/26/2024       No follow-ups on file.    There are no discontinued medications.         Medication List with Changes/Refills   Current Medications    ACCU-CHEK GUIDE TEST STRIPS STRP    TEST BLOOD SUGAR SIX TIMES A DAY    ACCU-CHEK MULTICLIX LANCET LANCETS    TEST BLOOD SUGAR THREE TIMES DAILY    ASCORBIC ACID, VITAMIN C, (VITAMIN C) 100 MG TABLET    Take 100 mg by mouth once daily.    ASPIRIN (ECOTRIN) 81 MG EC TABLET    Take 81 mg by mouth every other day.    BLOOD-GLUCOSE METER MISC    1 Device by Misc.(Non-Drug; Combo Route) route once. for 1 dose    CARBOXYMETHYLCELLULOSE SODIUM (REFRESH OPHT)    Apply to eye.    DAPAGLIFLOZIN PROPANEDIOL (FARXIGA) 10 MG TABLET    Take 1 tablet (10 mg total) by mouth once daily.    DICLOFENAC SODIUM (VOLTAREN) 1 % GEL    Apply 2 g topically once daily.    FERROUS SULFATE (IRON ORAL)    Take 65 mg by mouth.    FLECAINIDE (TAMBOCOR) 50 MG  "TAB    TAKE ONE TABLET BY MOUTH EVERY TWELVE HOURS    FLUTICASONE PROPIONATE (FLONASE) 50 MCG/ACTUATION NASAL SPRAY    2 sprays (100 mcg total) by Each Nostril route once daily.    FUROSEMIDE (LASIX) 40 MG TABLET    Take 2 tablets (80 mg total) by mouth once daily.    GLYCOPYRROLATE (ROBINUL) 1 MG TAB    Take 1 tablet (1 mg total) by mouth 3 (three) times daily.    GUAIFENESIN (MUCINEX) 600 MG 12 HR TABLET    Take 1 tablet (600 mg total) by mouth 2 (two) times daily.    HYDROCODONE-ACETAMINOPHEN (NORCO) 5-325 MG PER TABLET    Take 1 tablet by mouth every 8 (eight) hours as needed for Pain.    INSULIN (LANTUS SOLOSTAR U-100 INSULIN) GLARGINE 100 UNITS/ML (3ML) SUBQ PEN    INJECT 16 UNITS SUBCUTANEOUSLY AT BEDTIME. 94 DAY SUPPLY    INSULIN ASPART U-100 (NOVOLOG FLEXPEN U-100 INSULIN) 100 UNIT/ML (3 ML) INPN PEN    INJECT EIGHT UNITS BEFORE BREAKFAST, SIX UNITS BEFORE LUNCH, AND EIGHT UNITS BEFORE DINNER. MAY TITRATE TO A MAX DOSE OF 50 UNITS PER DAY    ISOSORBIDE MONONITRATE (IMDUR) 60 MG 24 HR TABLET    TAKE ONE TABLET BY MOUTH TWICE DAILY    LEVOCETIRIZINE (XYZAL) 5 MG TABLET    Take 1 tablet (5 mg total) by mouth every evening.    LOSARTAN (COZAAR) 100 MG TABLET    TAKE ONE TABLET BY MOUTH EVERY DAY    METOPROLOL SUCCINATE (TOPROL-XL) 100 MG 24 HR TABLET    TAKE ONE TABLET BY MOUTH TWICE DAILY    MUPIROCIN (BACTROBAN) 2 % OINTMENT    Apply topically 2 (two) times daily.    NITROGLYCERIN (NITROSTAT) 0.4 MG SL TABLET    Place 1 tablet (0.4 mg total) under the tongue every 5 (five) minutes as needed for Chest pain.    PANTOPRAZOLE (PROTONIX) 40 MG TABLET    Take 1 tablet (40 mg total) by mouth once daily.    PEN NEEDLE, DIABETIC (COMFORT EZ PEN NEEDLES) 32 GAUGE X 5/32" NDLE    USE FOUR TIMES DAILY.    SAW PALMETTO 500 MG CAPSULE    Take 500 mg by mouth 2 (two) times a day.    SIMETHICONE (GAS-X EXTRA STRENGTH) 125 MG CAP CAPSULE    Take 1 capsule (125 mg total) by mouth 4 (four) times daily as needed for Flatulence. "    TURMERIC 400 MG CAP    Take 1 capsule by mouth once daily.    VITAMIN D (VITAMIN D3) 1000 UNITS TAB    Take 1,000 Units by mouth once daily.    VITAMIN E 100 UNIT CAPSULE    Take 100 Units by mouth once daily.       This note is at least partially dictated using the M*Modal Fluency Direct word recognition program. There are word recognition mistakes that are occasionally missed on review.

## 2023-08-29 ENCOUNTER — LAB VISIT (OUTPATIENT)
Dept: LAB | Facility: HOSPITAL | Age: 88
End: 2023-08-29
Attending: INTERNAL MEDICINE
Payer: MEDICARE

## 2023-08-29 DIAGNOSIS — I50.42 CHRONIC COMBINED SYSTOLIC AND DIASTOLIC CONGESTIVE HEART FAILURE: ICD-10-CM

## 2023-08-29 LAB
ANION GAP SERPL CALC-SCNC: 9 MMOL/L (ref 8–16)
BUN SERPL-MCNC: 41 MG/DL (ref 8–23)
CALCIUM SERPL-MCNC: 10.2 MG/DL (ref 8.7–10.5)
CHLORIDE SERPL-SCNC: 99 MMOL/L (ref 95–110)
CO2 SERPL-SCNC: 29 MMOL/L (ref 23–29)
CREAT SERPL-MCNC: 1.7 MG/DL (ref 0.5–1.4)
EST. GFR  (NO RACE VARIABLE): 37.8 ML/MIN/1.73 M^2
GLUCOSE SERPL-MCNC: 123 MG/DL (ref 70–110)
POTASSIUM SERPL-SCNC: 4.4 MMOL/L (ref 3.5–5.1)
SODIUM SERPL-SCNC: 137 MMOL/L (ref 136–145)

## 2023-08-29 PROCEDURE — 36415 COLL VENOUS BLD VENIPUNCTURE: CPT | Mod: HCNC,PO | Performed by: INTERNAL MEDICINE

## 2023-08-29 PROCEDURE — 80048 BASIC METABOLIC PNL TOTAL CA: CPT | Mod: HCNC | Performed by: INTERNAL MEDICINE

## 2023-09-01 ENCOUNTER — PATIENT MESSAGE (OUTPATIENT)
Dept: CARDIOLOGY | Facility: CLINIC | Age: 88
End: 2023-09-01
Payer: MEDICARE

## 2023-09-01 DIAGNOSIS — I50.42 CHRONIC COMBINED SYSTOLIC AND DIASTOLIC CONGESTIVE HEART FAILURE: Primary | ICD-10-CM

## 2023-09-01 NOTE — PROGRESS NOTES
See comments below and call patient to discuss.   Please close encounter when done -- no need to route back to me.  Thanks  With a recent increase in his diuretic dose, his renal function has suffered some.  I have already decreased his Lasix dose during his visit with me on Friday.  I think I also ordered a repeat BNP in 2 weeks.  No change in current plans-just make sure his BMP is repeated in 2 weeks

## 2023-09-13 ENCOUNTER — LAB VISIT (OUTPATIENT)
Dept: LAB | Facility: HOSPITAL | Age: 88
End: 2023-09-13
Attending: INTERNAL MEDICINE
Payer: MEDICARE

## 2023-09-13 DIAGNOSIS — I50.42 CHRONIC COMBINED SYSTOLIC AND DIASTOLIC CONGESTIVE HEART FAILURE: ICD-10-CM

## 2023-09-13 LAB — BNP SERPL-MCNC: 79 PG/ML (ref 0–99)

## 2023-09-13 PROCEDURE — 36415 COLL VENOUS BLD VENIPUNCTURE: CPT | Mod: PO | Performed by: INTERNAL MEDICINE

## 2023-09-13 PROCEDURE — 83880 ASSAY OF NATRIURETIC PEPTIDE: CPT | Performed by: INTERNAL MEDICINE

## 2023-09-19 ENCOUNTER — PATIENT MESSAGE (OUTPATIENT)
Dept: CARDIOLOGY | Facility: CLINIC | Age: 88
End: 2023-09-19
Payer: MEDICARE

## 2023-09-19 DIAGNOSIS — I50.42 CHRONIC COMBINED SYSTOLIC AND DIASTOLIC CONGESTIVE HEART FAILURE: Primary | ICD-10-CM

## 2023-09-19 DIAGNOSIS — I50.32 CHRONIC DIASTOLIC HEART FAILURE: ICD-10-CM

## 2023-09-19 NOTE — PROGRESS NOTES
See comments below and call patient to discuss.   Please close encounter when done -- no need to route back to me.  Thanks  BNP is within normal at 79.  Plan:  Keep same plan as listed in my note.  Get updated CBC.

## 2023-09-21 ENCOUNTER — PATIENT MESSAGE (OUTPATIENT)
Dept: OTHER | Facility: OTHER | Age: 88
End: 2023-09-21
Payer: MEDICARE

## 2023-09-21 ENCOUNTER — PATIENT MESSAGE (OUTPATIENT)
Dept: CARDIOLOGY | Facility: CLINIC | Age: 88
End: 2023-09-21
Payer: MEDICARE

## 2023-09-21 ENCOUNTER — LAB VISIT (OUTPATIENT)
Dept: LAB | Facility: HOSPITAL | Age: 88
End: 2023-09-21
Attending: INTERNAL MEDICINE
Payer: MEDICARE

## 2023-09-21 DIAGNOSIS — I50.42 CHRONIC COMBINED SYSTOLIC AND DIASTOLIC CONGESTIVE HEART FAILURE: ICD-10-CM

## 2023-09-21 DIAGNOSIS — I50.32 CHRONIC DIASTOLIC HEART FAILURE: ICD-10-CM

## 2023-09-21 LAB
BASOPHILS # BLD AUTO: 0.03 K/UL (ref 0–0.2)
BASOPHILS NFR BLD: 0.5 % (ref 0–1.9)
DIFFERENTIAL METHOD: ABNORMAL
EOSINOPHIL # BLD AUTO: 0.3 K/UL (ref 0–0.5)
EOSINOPHIL NFR BLD: 4.8 % (ref 0–8)
ERYTHROCYTE [DISTWIDTH] IN BLOOD BY AUTOMATED COUNT: 14.4 % (ref 11.5–14.5)
HCT VFR BLD AUTO: 35.7 % (ref 40–54)
HGB BLD-MCNC: 11.8 G/DL (ref 14–18)
IMM GRANULOCYTES # BLD AUTO: 0.01 K/UL (ref 0–0.04)
IMM GRANULOCYTES NFR BLD AUTO: 0.2 % (ref 0–0.5)
LYMPHOCYTES # BLD AUTO: 2.3 K/UL (ref 1–4.8)
LYMPHOCYTES NFR BLD: 39.6 % (ref 18–48)
MCH RBC QN AUTO: 31.9 PG (ref 27–31)
MCHC RBC AUTO-ENTMCNC: 33.1 G/DL (ref 32–36)
MCV RBC AUTO: 97 FL (ref 82–98)
MONOCYTES # BLD AUTO: 0.7 K/UL (ref 0.3–1)
MONOCYTES NFR BLD: 11.7 % (ref 4–15)
NEUTROPHILS # BLD AUTO: 2.5 K/UL (ref 1.8–7.7)
NEUTROPHILS NFR BLD: 43.2 % (ref 38–73)
NRBC BLD-RTO: 0 /100 WBC
PLATELET # BLD AUTO: 159 K/UL (ref 150–450)
PMV BLD AUTO: 11.9 FL (ref 9.2–12.9)
RBC # BLD AUTO: 3.7 M/UL (ref 4.6–6.2)
WBC # BLD AUTO: 5.81 K/UL (ref 3.9–12.7)

## 2023-09-21 PROCEDURE — 85025 COMPLETE CBC W/AUTO DIFF WBC: CPT | Performed by: INTERNAL MEDICINE

## 2023-09-21 PROCEDURE — 36415 COLL VENOUS BLD VENIPUNCTURE: CPT | Mod: PO | Performed by: INTERNAL MEDICINE

## 2023-09-28 NOTE — ED PROVIDER NOTES
"SCRIBE #1 NOTE: I, Adelaida Adkins, am scribing for, and in the presence of, Peewee Chandler MD. I have scribed the HPI, ROS, PEx, and parts of ED discussion.    SCRIBE #2 NOTE: I, Inocencia Covarrubias, am scribing for, and in the presence of,  Lizzie Funez MD. I have scribed the remaining portions of the note not scribed by Scribe #1.      History     Chief Complaint   Patient presents with    Chest Pain     Review of patient's allergies indicates:   Allergen Reactions    Atorvastatin      Other reaction(s): Muscle pain  Other reaction(s): Muscle weakness  Other reaction(s): Muscle pain    Codeine      Other reaction(s): Headache  Other reaction(s): Headache    Eliquis [apixaban]     Norvasc [amlodipine] Edema    Trulicity [dulaglutide] Nausea And Vomiting    Adhesive Rash     Other reaction(s): rash         History of Present Illness     HPI    1/24/2019, 5:20 PM  History obtained from the patient      History of Present Illness: Anthony Blair is a 85 y.o. male patient with a PMHx of Afib, CAD, GERD, HLD, HTN, DM, and stroke who presents to the Emergency Department for evaluation of CP which onset gradually around 4 PM today. Pt states "I'm burying my wife tomorrow." Symptoms are constant and moderate in severity. No mitigating or exacerbating factors reported. Associated sxs include increased stress. Patient denies any fever, chills, SOB, diaphoresis, palpitations, extremity weakness/numbness, leg swelling, dizziness, cough, n/v, and all other sxs at this time. Prior Tx includes 3 NTG w/ no relief. Pt notes multiple bypasses in the past. No further complaints or concerns at this time.       Arrival mode: AASI    PCP: Abdulaziz Mccabe MD        Past Medical History:  Past Medical History:   Diagnosis Date    A-fib     Anemia     AP (angina pectoris) 1/23/2014    Carotid artery disease without cerebral infarction 8/22/2014    Cataract     Coronary artery disease     GERD (gastroesophageal reflux disease) " Rx Refill Note  Requested Prescriptions     Pending Prescriptions Disp Refills    warfarin (COUMADIN) 5 MG tablet 90 tablet 3     Sig: Take 1 tablet by mouth Daily.      Last office visit with prescribing clinician: 11/17/2022   Last telemedicine visit with prescribing clinician: Visit date not found   Next office visit with prescribing clinician: 11/17/2023                         Would you like a call back once the refill request has been completed: [] Yes [] No    If the office needs to give you a call back, can they leave a voicemail: [] Yes [] No    Magen Bueno MA  09/28/23, 09:58 CDT   2013    Hemorrhagic cerebrovascular accident (CVA) 2018    Hyperlipidemia     Hypertension     Intracranial hemorrhage     Lumbosacral spondylosis 2014    Pacemaker 2014    Paroxysmal atrial fibrillation 2014    Peripheral vascular disease 2014    Seizure, late effect of stroke     Stroke     Type 2 diabetes mellitus with ophthalmic manifestations        Past Surgical History:  Past Surgical History:   Procedure Laterality Date    ANGIOPLASTY      APPENDECTOMY      ATRIAL ABLATION SURGERY N/A     CATARACT EXTRACTION Bilateral     [facility]    CATARACT EXTRACTION W/ INTRAOCULAR LENS IMPLANT Right     CATARACT EXTRACTION W/ INTRAOCULAR LENS IMPLANT Left     COLONOSCOPY with biopsies N/A 10/16/2018    Performed [facility]gs [facility] at [facility]    CORONARY ARTERY BYPASS GRAFT  07/2010    x5    ESOPHAGOGASTRODUODENOSCOPY (EGD) N/A 2018    Performed by Betty Leonardo [facility] at [facility]    EYE SURGERY      pace maker surgrey  10/2010    reposition in  (approx)    VEIN BYPASS SURGERY           Family History:  Family History   Problem Relation Age of Onset    Arthritis Mother     Diabetes Mother     Kidney disease Mother     Heart disease Mother     Arthritis Father     Diabetes Father     Diabetes Sister     Cancer Sister         breast    Heart disease Sister     Diabetes Brother     Kidney disease Brother     Heart disease Brother     Cancer Daughter         breast    Diabetes Daughter     Miscarriages / Stillbirths Daughter     Fibromyalgia Daughter     Diabetes Son     Diabetes Son     Alcohol abuse Paternal Uncle     Stroke Neg Hx        Social History:  Social History     Tobacco Use    Smoking status: Former Smoker     Packs/day: 2.00     Years: 13.00     Pack years: 26.00     Types: Cigarettes     Start date: 1954     Last attempt to quit: 1967     Years since quittin.6    Smokeless tobacco: Never Used    Substance and Sexual Activity    Alcohol use: No    Drug use: No    Sexual activity: No     Partners: Female     Birth control/protection: None        Review of Systems     Review of Systems   Constitutional: Negative for chills, diaphoresis and fever.   HENT: Negative for sore throat.    Respiratory: Negative for cough and shortness of breath.    Cardiovascular: Positive for chest pain. Negative for palpitations and leg swelling.   Gastrointestinal: Negative for nausea and vomiting.   Genitourinary: Negative for dysuria.   Musculoskeletal: Negative for back pain.   Skin: Negative for rash.   Neurological: Negative for dizziness, weakness and numbness.   Hematological: Does not bruise/bleed easily.   Psychiatric/Behavioral:        (+) increased stress   All other systems reviewed and are negative.       Physical Exam     Initial Vitals [01/24/19 1658]   BP Pulse Resp Temp SpO2   (!) 175/93 82 18 98.1 °F (36.7 °C) 98 %      MAP       --          Physical Exam  Nursing Notes and Vital Signs Reviewed.  Constitutional: Patient is in no acute distress. Well-developed and well-nourished.  Head: Atraumatic. Normocephalic.  Eyes: PERRL. EOM intact. Conjunctivae are not pale. No scleral icterus.  ENT: Mucous membranes are moist. Oropharynx is clear and symmetric.    Neck: Supple. Full ROM. No lymphadenopathy.  Cardiovascular: Regular rate. Regular rhythm. No murmurs, rubs, or gallops. Distal pulses are 2+ and symmetric.  Pulmonary/Chest: No respiratory distress. Clear to auscultation bilaterally. No wheezing or rales.   Abdominal: Soft and non-distended.  There is no tenderness.  No rebound, guarding, or rigidity. Good bowel sounds.  Genitourinary: No CVA tenderness  Musculoskeletal: Moves all extremities. No obvious deformities. No edema. No calf tenderness.   Skin: Warm and dry.  Neurological:  Alert, awake, and appropriate.  Normal speech.  No acute focal neurological deficits are appreciated.  Psychiatric: Normal  affect. Good eye contact. Appropriate in content.     ED Course   Procedures  ED Vital Signs:  Vitals:    01/24/19 1658 01/24/19 1720 01/24/19 2032 01/24/19 2102   BP: (!) 175/93  (!) 172/72 (!) 165/76   Pulse: 82 74 83 74   Resp: 18  (!) 26 18   Temp: 98.1 °F (36.7 °C)      TempSrc: Oral      SpO2: 98%  98% 97%       Abnormal Lab Results:  Labs Reviewed   CBC W/ AUTO DIFFERENTIAL - Abnormal; Notable for the following components:       Result Value    RBC 4.22 (*)     Hemoglobin 13.0 (*)     Hematocrit 38.9 (*)     All other components within normal limits   COMPREHENSIVE METABOLIC PANEL - Abnormal; Notable for the following components:    Glucose 210 (*)     All other components within normal limits   B-TYPE NATRIURETIC PEPTIDE - Abnormal; Notable for the following components:     (*)     All other components within normal limits   CULTURE, BLOOD   CULTURE, BLOOD   TROPONIN I   PROTIME-INR   TROPONIN I   LACTIC ACID, PLASMA        All Lab Results:  Results for orders placed or performed during the hospital encounter of 01/24/19   CBC auto differential   Result Value Ref Range    WBC 6.41 3.90 - 12.70 K/uL    RBC 4.22 (L) 4.60 - 6.20 M/uL    Hemoglobin 13.0 (L) 14.0 - 18.0 g/dL    Hematocrit 38.9 (L) 40.0 - 54.0 %    MCV 92 82 - 98 fL    MCH 30.8 27.0 - 31.0 pg    MCHC 33.4 32.0 - 36.0 g/dL    RDW 14.0 11.5 - 14.5 %    Platelets 162 150 - 350 K/uL    MPV 9.6 9.2 - 12.9 fL    Gran # (ANC) 3.3 1.8 - 7.7 K/uL    Lymph # 2.2 1.0 - 4.8 K/uL    Mono # 0.7 0.3 - 1.0 K/uL    Eos # 0.2 0.0 - 0.5 K/uL    Baso # 0.02 0.00 - 0.20 K/uL    Gran% 51.7 38.0 - 73.0 %    Lymph% 34.8 18.0 - 48.0 %    Mono% 10.1 4.0 - 15.0 %    Eosinophil% 3.1 0.0 - 8.0 %    Basophil% 0.3 0.0 - 1.9 %    Differential Method Automated    Comprehensive metabolic panel   Result Value Ref Range    Sodium 137 136 - 145 mmol/L    Potassium 3.8 3.5 - 5.1 mmol/L    Chloride 102 95 - 110 mmol/L    CO2 25 23 - 29 mmol/L    Glucose 210 (H) 70 - 110 mg/dL     BUN, Bld 12 8 - 23 mg/dL    Creatinine 0.8 0.5 - 1.4 mg/dL    Calcium 10.4 8.7 - 10.5 mg/dL    Total Protein 7.0 6.0 - 8.4 g/dL    Albumin 3.7 3.5 - 5.2 g/dL    Total Bilirubin 0.6 0.1 - 1.0 mg/dL    Alkaline Phosphatase 121 55 - 135 U/L    AST 25 10 - 40 U/L    ALT 17 10 - 44 U/L    Anion Gap 10 8 - 16 mmol/L    eGFR if African American >60 >60 mL/min/1.73 m^2    eGFR if non African American >60 >60 mL/min/1.73 m^2   Brain natriuretic peptide   Result Value Ref Range     (H) 0 - 99 pg/mL   Troponin I   Result Value Ref Range    Troponin I 0.012 0.000 - 0.026 ng/mL   Protime-INR   Result Value Ref Range    Prothrombin Time 11.4 9.0 - 12.5 sec    INR 1.1 0.8 - 1.2   Troponin I #2   Result Value Ref Range    Troponin I 0.013 0.000 - 0.026 ng/mL   Lactic acid, plasma   Result Value Ref Range    Lactate (Lactic Acid) 1.9 0.5 - 2.2 mmol/L         Imaging Results:  Imaging Results          X-Ray Chest 1 View (Final result)  Result time 01/24/19 17:46:45   Procedure changed from X-Ray Chest PA And Lateral     Final result by Robbin Preciado MD (01/24/19 17:46:45)                 Impression:      Patchy infiltrate left lower lobe which could reflect airspace disease or aspiration. Trace left pleural effusion. No pneumothorax      Electronically signed by: Robbin Preciado MD  Date:    01/24/2019  Time:    17:46             Narrative:    EXAMINATION:  XR CHEST 1 VIEW    CLINICAL HISTORY:  Chest Pain;    FINDINGS:  Single view of the chest.    Cardiac silhouette is mildly enlarged with stable.  Patchy infiltrate left lower lobe which could reflect airspace disease or aspiration.  Trace left pleural effusion.  No pneumothorax.  Pacing wires demonstrates similar configuration.  Sternotomy wires are intact..  Bones demonstrate degenerative changes..                                 The EKG was ordered, reviewed, and independently interpreted by the ED provider.  Interpretation time: 1706  Rate: 75 BPM  Rhythm:  ventricular-paced rhythm  Interpretation: No acute ST changes. No STEMI.           The Emergency Provider reviewed the vital signs and test results, which are outlined above.     ED Discussion     6:59 PM: Re-evaluated pt. Pt is resting comfortably and is in no acute distress.  Pt has CURB-65 score of 1.      8:00 PM: Dr. Chandler transfers care of pt to Dr. Funez, pending lab results.    8:55 PM: Dr. Funez assessed pt at this time.  Pt states his condition has improved at this time. Patient denies any SOB at this time. Discussed with pt all pertinent ED information and results. Discussed pt dx and plan of tx. Gave pt all f/u and return to the ED instructions. All questions and concerns were addressed at this time. Pt expresses understanding of information and instructions, and is comfortable with plan to discharge. Pt is stable for discharge.    I have discussed with patient and/or family/caretaker chest pain precautions, specifically to return for worsening chest pain, shortness of breath, fever, or any concern.  I have low suspicion for cardiopulmonary, vascular, infectious, respiratory, or other emergent medical condition based on my evaluation in the ED.    I discussed with patient and/or family/caretaker that evaluation in the ED does not suggest any emergent or life threatening medical conditions requiring immediate intervention beyond what was provided in the ED, and I believe patient is safe for discharge.  Regardless, an unremarkable evaluation in the ED does not preclude the development or presence of a serious of life threatening condition. As such, patient was instructed to return immediately for any worsening or change in current symptoms.      ED Medication(s):  Medications   nitroGLYCERIN 2% TD oint ointment 0.5 inch (0.5 inches Topical (Top) Given 1/24/19 8855)   levoFLOXacin tablet 750 mg (750 mg Oral Given 1/24/19 2129)     Current Discharge Medication List      START taking these medications     Details   levoFLOXacin (LEVAQUIN) 750 MG tablet Take 1 tablet (750 mg total) by mouth once daily. for 4 days  Qty: 4 tablet, Refills: 0             Follow-up Information     Abdulaziz Mccabe MD In 1 day.    Specialty:  Family Medicine  Contact information:  [address] [address] [address]  [phone]             [facility] - .    Specialty:  Emergency Medicine  Why:  As needed, If symptoms worsen  Contact information:  [address]  [facility] [address] [phone]                       Medical Decision Making:   Clinical Tests:   Lab Tests: Ordered and Reviewed  Radiological Study: Ordered and Reviewed  Medical Tests: Ordered and Reviewed             Scribe Attestation:   Scribe #1: I performed the above scribed service and the documentation accurately describes the services I performed. I attest to the accuracy of the note.     Attending:   Physician Attestation Statement for Scribe #1: I, Peewee Chandler MD, personally performed the services described in this documentation, as scribed by Adelaida Adkins, in my presence, and it is both accurate and complete.       Scribe Attestation:   Scribe #2: I performed the above scribed service and the documentation accurately describes the services I performed. I attest to the accuracy of the note.    Attending Attestation:           Physician Attestation for Scribe:    Physician Attestation Statement for Scribe #2: I, Lizzie Funez MD, reviewed documentation, as scribed by Inocencia Covarrubias in my presence, and it is both accurate and complete. I also acknowledge and confirm the content of the note done by Scribe #1.           Clinical Impression       ICD-10-CM ICD-9-CM   1. Pneumonia of left lower lobe due to infectious organism J18.1 486   2. Chest pain R07.9 786.50       Disposition:   Disposition: Discharged  Condition: Stable         Lizzie Funez MD  01/25/19 0518

## 2023-10-04 ENCOUNTER — TELEPHONE (OUTPATIENT)
Dept: CARDIOLOGY | Facility: CLINIC | Age: 88
End: 2023-10-04
Payer: MEDICARE

## 2023-10-04 ENCOUNTER — PATIENT MESSAGE (OUTPATIENT)
Dept: CARDIOLOGY | Facility: CLINIC | Age: 88
End: 2023-10-04
Payer: MEDICARE

## 2023-10-04 NOTE — TELEPHONE ENCOUNTER
Pt called  in stating that his BP has been running low his BP readings are in digital medicine program. Pt states gets dizzy when this happens and has been dropping low for the past couple of months.  Please advise.

## 2023-10-05 NOTE — TELEPHONE ENCOUNTER
Please call pt.  Cut Losartan back to 50 mg qd from 100 mg qd dosing due to pt concerns over low BP.    Dr Frazier    Called patient to advise per Dr. Frazier     Spoke to patient verbalized understanding

## 2023-10-05 NOTE — TELEPHONE ENCOUNTER
Please call pt.  Cut Losartan back to 50 mg qd from 100 mg qd dosing due to pt concerns over low BP.    Dr Frazier

## 2023-10-09 ENCOUNTER — TELEPHONE (OUTPATIENT)
Dept: CARDIOLOGY | Facility: CLINIC | Age: 88
End: 2023-10-09
Payer: MEDICARE

## 2023-10-09 NOTE — TELEPHONE ENCOUNTER
Message    Please call and schedule-can offer HUE   ----- Message -----   From: Anthony Blair   Sent: 10/4/2023   3:02 PM CDT   To: [facility] Clinical Support   Subject: Appointment Request                                Appointment Request From: Anthony Blair      With Provider: Seven Frazier MD [[facility] - Cardiology]      Preferred Date Range: Any date 10/4/2023 or later      Preferred Times: Any Time      Reason for visit: Extremely low blood pressure need to speak with dr Frazier Now       Returned call no answer lvm to return call

## 2023-10-11 DIAGNOSIS — I48.0 PAF (PAROXYSMAL ATRIAL FIBRILLATION): ICD-10-CM

## 2023-10-11 RX ORDER — METOPROLOL SUCCINATE 100 MG/1
TABLET, EXTENDED RELEASE ORAL
Qty: 60 TABLET | Refills: 12 | Status: SHIPPED | OUTPATIENT
Start: 2023-10-11

## 2023-10-18 ENCOUNTER — OFFICE VISIT (OUTPATIENT)
Dept: FAMILY MEDICINE | Facility: CLINIC | Age: 88
End: 2023-10-18
Payer: MEDICARE

## 2023-10-18 VITALS
BODY MASS INDEX: 26.71 KG/M2 | HEART RATE: 70 BPM | SYSTOLIC BLOOD PRESSURE: 126 MMHG | WEIGHT: 180.31 LBS | DIASTOLIC BLOOD PRESSURE: 68 MMHG | OXYGEN SATURATION: 97 % | HEIGHT: 69 IN

## 2023-10-18 DIAGNOSIS — E13.51 PERIPHERAL VASCULAR DISEASE DUE TO SECONDARY DIABETES MELLITUS: ICD-10-CM

## 2023-10-18 DIAGNOSIS — Z00.00 ENCOUNTER FOR PREVENTIVE HEALTH EXAMINATION: Primary | ICD-10-CM

## 2023-10-18 DIAGNOSIS — E11.9 DIABETES MELLITUS TYPE 2 WITHOUT RETINOPATHY: Chronic | ICD-10-CM

## 2023-10-18 DIAGNOSIS — I69.30 HISTORY OF HEMORRHAGIC CEREBROVASCULAR ACCIDENT (CVA) WITH RESIDUAL DEFICIT: ICD-10-CM

## 2023-10-18 DIAGNOSIS — E78.2 MIXED HYPERLIPIDEMIA: Chronic | ICD-10-CM

## 2023-10-18 DIAGNOSIS — K74.60 CIRRHOSIS OF LIVER WITHOUT ASCITES, UNSPECIFIED HEPATIC CIRRHOSIS TYPE: ICD-10-CM

## 2023-10-18 DIAGNOSIS — Z79.4 DIABETES MELLITUS DUE TO UNDERLYING CONDITION WITH DIABETIC PERIPHERAL ANGIOPATHY WITHOUT GANGRENE, WITH LONG-TERM CURRENT USE OF INSULIN: ICD-10-CM

## 2023-10-18 DIAGNOSIS — I48.0 PAROXYSMAL ATRIAL FIBRILLATION: Chronic | ICD-10-CM

## 2023-10-18 DIAGNOSIS — N18.32 STAGE 3B CHRONIC KIDNEY DISEASE: ICD-10-CM

## 2023-10-18 DIAGNOSIS — I73.9 PERIPHERAL VASCULAR DISEASE: Chronic | ICD-10-CM

## 2023-10-18 DIAGNOSIS — I11.0 HYPERTENSIVE HEART DISEASE WITH HEART FAILURE: ICD-10-CM

## 2023-10-18 DIAGNOSIS — I70.0 CALCIFICATION OF AORTA: ICD-10-CM

## 2023-10-18 DIAGNOSIS — E08.51 DIABETES MELLITUS DUE TO UNDERLYING CONDITION WITH DIABETIC PERIPHERAL ANGIOPATHY WITHOUT GANGRENE, WITH LONG-TERM CURRENT USE OF INSULIN: ICD-10-CM

## 2023-10-18 DIAGNOSIS — I73.9 CLAUDICATION IN PERIPHERAL VASCULAR DISEASE: Chronic | ICD-10-CM

## 2023-10-18 DIAGNOSIS — I10 ESSENTIAL HYPERTENSION: ICD-10-CM

## 2023-10-18 DIAGNOSIS — Z95.0 PACEMAKER: ICD-10-CM

## 2023-10-18 DIAGNOSIS — I77.9 CAROTID ARTERY DISEASE WITHOUT CEREBRAL INFARCTION: Chronic | ICD-10-CM

## 2023-10-18 DIAGNOSIS — I71.21 ANEURYSM OF ASCENDING AORTA WITHOUT RUPTURE: ICD-10-CM

## 2023-10-18 DIAGNOSIS — I50.42 CHRONIC COMBINED SYSTOLIC AND DIASTOLIC CONGESTIVE HEART FAILURE: ICD-10-CM

## 2023-10-18 PROBLEM — R07.9 CHEST PAIN: Status: RESOLVED | Noted: 2021-09-20 | Resolved: 2023-10-18

## 2023-10-18 PROBLEM — R09.82 POST-NASAL DRIP: Status: RESOLVED | Noted: 2021-10-05 | Resolved: 2023-10-18

## 2023-10-18 PROBLEM — I50.32 CHRONIC DIASTOLIC HEART FAILURE: Status: RESOLVED | Noted: 2020-07-24 | Resolved: 2023-10-18

## 2023-10-18 PROBLEM — I61.9 HEMORRHAGIC CEREBROVASCULAR ACCIDENT (CVA): Status: RESOLVED | Noted: 2018-04-26 | Resolved: 2023-10-18

## 2023-10-18 PROBLEM — Z91.81 HISTORY OF FALL: Status: RESOLVED | Noted: 2021-10-25 | Resolved: 2023-10-18

## 2023-10-18 PROBLEM — Z86.16 HISTORY OF COVID-19: Status: RESOLVED | Noted: 2022-03-25 | Resolved: 2023-10-18

## 2023-10-18 PROBLEM — I48.19 PERSISTENT ATRIAL FIBRILLATION: Status: RESOLVED | Noted: 2018-10-24 | Resolved: 2023-10-18

## 2023-10-18 PROCEDURE — 1160F PR REVIEW ALL MEDS BY PRESCRIBER/CLIN PHARMACIST DOCUMENTED: ICD-10-PCS | Mod: CPTII,S$GLB,, | Performed by: NURSE PRACTITIONER

## 2023-10-18 PROCEDURE — 3288F FALL RISK ASSESSMENT DOCD: CPT | Mod: CPTII,S$GLB,, | Performed by: NURSE PRACTITIONER

## 2023-10-18 PROCEDURE — 1101F PT FALLS ASSESS-DOCD LE1/YR: CPT | Mod: CPTII,S$GLB,, | Performed by: NURSE PRACTITIONER

## 2023-10-18 PROCEDURE — 1157F ADVNC CARE PLAN IN RCRD: CPT | Mod: CPTII,S$GLB,, | Performed by: NURSE PRACTITIONER

## 2023-10-18 PROCEDURE — G0439 PPPS, SUBSEQ VISIT: HCPCS | Mod: S$GLB,,, | Performed by: NURSE PRACTITIONER

## 2023-10-18 PROCEDURE — 3072F PR LOW RISK FOR RETINOPATHY: ICD-10-PCS | Mod: CPTII,S$GLB,, | Performed by: NURSE PRACTITIONER

## 2023-10-18 PROCEDURE — 1126F PR PAIN SEVERITY QUANTIFIED, NO PAIN PRESENT: ICD-10-PCS | Mod: CPTII,S$GLB,, | Performed by: NURSE PRACTITIONER

## 2023-10-18 PROCEDURE — 1160F RVW MEDS BY RX/DR IN RCRD: CPT | Mod: CPTII,S$GLB,, | Performed by: NURSE PRACTITIONER

## 2023-10-18 PROCEDURE — 1126F AMNT PAIN NOTED NONE PRSNT: CPT | Mod: CPTII,S$GLB,, | Performed by: NURSE PRACTITIONER

## 2023-10-18 PROCEDURE — 99999 PR PBB SHADOW E&M-EST. PATIENT-LVL V: ICD-10-PCS | Mod: PBBFAC,,, | Performed by: NURSE PRACTITIONER

## 2023-10-18 PROCEDURE — 1159F PR MEDICATION LIST DOCUMENTED IN MEDICAL RECORD: ICD-10-PCS | Mod: CPTII,S$GLB,, | Performed by: NURSE PRACTITIONER

## 2023-10-18 PROCEDURE — 1159F MED LIST DOCD IN RCRD: CPT | Mod: CPTII,S$GLB,, | Performed by: NURSE PRACTITIONER

## 2023-10-18 PROCEDURE — 1170F PR FUNCTIONAL STATUS ASSESSED: ICD-10-PCS | Mod: CPTII,S$GLB,, | Performed by: NURSE PRACTITIONER

## 2023-10-18 PROCEDURE — 1101F PR PT FALLS ASSESS DOC 0-1 FALLS W/OUT INJ PAST YR: ICD-10-PCS | Mod: CPTII,S$GLB,, | Performed by: NURSE PRACTITIONER

## 2023-10-18 PROCEDURE — 3288F PR FALLS RISK ASSESSMENT DOCUMENTED: ICD-10-PCS | Mod: CPTII,S$GLB,, | Performed by: NURSE PRACTITIONER

## 2023-10-18 PROCEDURE — G0439 PR MEDICARE ANNUAL WELLNESS SUBSEQUENT VISIT: ICD-10-PCS | Mod: S$GLB,,, | Performed by: NURSE PRACTITIONER

## 2023-10-18 PROCEDURE — 3072F LOW RISK FOR RETINOPATHY: CPT | Mod: CPTII,S$GLB,, | Performed by: NURSE PRACTITIONER

## 2023-10-18 PROCEDURE — 99999 PR PBB SHADOW E&M-EST. PATIENT-LVL V: CPT | Mod: PBBFAC,,, | Performed by: NURSE PRACTITIONER

## 2023-10-18 PROCEDURE — 1157F PR ADVANCE CARE PLAN OR EQUIV PRESENT IN MEDICAL RECORD: ICD-10-PCS | Mod: CPTII,S$GLB,, | Performed by: NURSE PRACTITIONER

## 2023-10-18 PROCEDURE — 1170F FXNL STATUS ASSESSED: CPT | Mod: CPTII,S$GLB,, | Performed by: NURSE PRACTITIONER

## 2023-10-18 RX ORDER — ZINC GLUCONATE 50 MG
50 TABLET ORAL DAILY
COMMUNITY

## 2023-10-18 RX ORDER — MULTIVITAMIN
1 TABLET ORAL DAILY
COMMUNITY

## 2023-10-18 NOTE — PATIENT INSTRUCTIONS
Counseling and Referral of Other Preventative  (Italic type indicates deductible and co-insurance are waived)    Patient Name: Anthony Blair  Today's Date: 10/18/2023    Health Maintenance       Date Due Completion Date    Shingles Vaccine (1 of 2) Never done ---    Diabetes Urine Screening 09/09/2022 9/9/2021    Lipid Panel 05/08/2023 5/8/2022    Influenza Vaccine (1) 09/01/2023 10/21/2019    Override on 4/26/2018: Declined    COVID-19 Vaccine (3 - 2023-24 season) 09/01/2023 5/10/2021    Hemoglobin A1c 11/16/2023 5/16/2023    Eye Exam 11/21/2023 11/21/2022    Override on 7/6/2020: Done    Override on 7/1/2019: Done    Override on 6/25/2018: Done    Override on 1/21/2016: Done    Override on 1/21/2016: Done    Override on 12/11/2014: Done    Override on 1/23/2014: Done    TETANUS VACCINE 01/23/2024 1/23/2014        No orders of the defined types were placed in this encounter.      The following information is provided to all patients.  This information is to help you find resources for any of the problems found today that may be affecting your health:                Living healthy guide: www.[facility].[place].gov      Understanding Diabetes: www.diabetes.org      Eating healthy: www.cdc.gov/healthyweight      CDC home safety checklist: www.cdc.gov/steadi/patient.html      Agency on Aging: www.goea.[place].[facility]      Alcoholics anonymous (AA): www.aa.org      Physical Activity: www.nathan.nih.gov/cn9razj      Tobacco use: www.quitwithusla.org

## 2023-10-25 ENCOUNTER — CLINICAL SUPPORT (OUTPATIENT)
Dept: CARDIOLOGY | Facility: HOSPITAL | Age: 88
End: 2023-10-25
Payer: MEDICARE

## 2023-10-25 DIAGNOSIS — Z95.0 PRESENCE OF CARDIAC PACEMAKER: ICD-10-CM

## 2023-10-25 PROCEDURE — 93296 REM INTERROG EVL PM/IDS: CPT | Performed by: INTERNAL MEDICINE

## 2023-11-16 ENCOUNTER — PATIENT MESSAGE (OUTPATIENT)
Dept: OPTOMETRY | Facility: CLINIC | Age: 88
End: 2023-11-16
Payer: MEDICARE

## 2023-11-29 ENCOUNTER — PATIENT MESSAGE (OUTPATIENT)
Dept: ADMINISTRATIVE | Facility: OTHER | Age: 88
End: 2023-11-29
Payer: MEDICARE

## 2024-01-11 ENCOUNTER — OFFICE VISIT (OUTPATIENT)
Dept: FAMILY MEDICINE | Facility: CLINIC | Age: 89
End: 2024-01-11
Payer: MEDICARE

## 2024-01-11 ENCOUNTER — LAB VISIT (OUTPATIENT)
Dept: LAB | Facility: HOSPITAL | Age: 89
End: 2024-01-11
Attending: FAMILY MEDICINE
Payer: MEDICARE

## 2024-01-11 ENCOUNTER — OFFICE VISIT (OUTPATIENT)
Dept: CARDIOLOGY | Facility: CLINIC | Age: 89
End: 2024-01-11
Payer: MEDICARE

## 2024-01-11 VITALS
SYSTOLIC BLOOD PRESSURE: 140 MMHG | DIASTOLIC BLOOD PRESSURE: 65 MMHG | WEIGHT: 180.13 LBS | HEART RATE: 70 BPM | BODY MASS INDEX: 26.6 KG/M2 | OXYGEN SATURATION: 98 %

## 2024-01-11 VITALS
BODY MASS INDEX: 26.71 KG/M2 | SYSTOLIC BLOOD PRESSURE: 136 MMHG | OXYGEN SATURATION: 99 % | HEIGHT: 69 IN | WEIGHT: 180.31 LBS | HEART RATE: 69 BPM | DIASTOLIC BLOOD PRESSURE: 60 MMHG

## 2024-01-11 DIAGNOSIS — I36.1 NONRHEUMATIC TRICUSPID VALVE REGURGITATION: ICD-10-CM

## 2024-01-11 DIAGNOSIS — K31.819 AVM (ARTERIOVENOUS MALFORMATION) OF STOMACH, ACQUIRED: ICD-10-CM

## 2024-01-11 DIAGNOSIS — Z78.9 STATIN INTOLERANCE: ICD-10-CM

## 2024-01-11 DIAGNOSIS — E13.51 PERIPHERAL VASCULAR DISEASE DUE TO SECONDARY DIABETES MELLITUS: ICD-10-CM

## 2024-01-11 DIAGNOSIS — K21.9 GASTROESOPHAGEAL REFLUX DISEASE WITHOUT ESOPHAGITIS: ICD-10-CM

## 2024-01-11 DIAGNOSIS — E78.2 MIXED HYPERLIPIDEMIA: Chronic | ICD-10-CM

## 2024-01-11 DIAGNOSIS — N18.32 STAGE 3B CHRONIC KIDNEY DISEASE: ICD-10-CM

## 2024-01-11 DIAGNOSIS — E11.9 DIABETES MELLITUS TYPE 2 WITHOUT RETINOPATHY: Chronic | ICD-10-CM

## 2024-01-11 DIAGNOSIS — I48.0 PAROXYSMAL ATRIAL FIBRILLATION: Chronic | ICD-10-CM

## 2024-01-11 DIAGNOSIS — I11.0 HYPERTENSIVE HEART DISEASE WITH HEART FAILURE: ICD-10-CM

## 2024-01-11 DIAGNOSIS — I50.42 CHRONIC COMBINED SYSTOLIC AND DIASTOLIC CONGESTIVE HEART FAILURE: Primary | ICD-10-CM

## 2024-01-11 DIAGNOSIS — E11.9 DIABETES MELLITUS TYPE 2 WITHOUT RETINOPATHY: Primary | Chronic | ICD-10-CM

## 2024-01-11 DIAGNOSIS — Z79.4 DIABETES MELLITUS DUE TO UNDERLYING CONDITION WITH DIABETIC PERIPHERAL ANGIOPATHY WITHOUT GANGRENE, WITH LONG-TERM CURRENT USE OF INSULIN: ICD-10-CM

## 2024-01-11 DIAGNOSIS — I73.9 PERIPHERAL VASCULAR DISEASE: ICD-10-CM

## 2024-01-11 DIAGNOSIS — I20.9 AP (ANGINA PECTORIS): ICD-10-CM

## 2024-01-11 DIAGNOSIS — I50.42 CHRONIC COMBINED SYSTOLIC AND DIASTOLIC CONGESTIVE HEART FAILURE: ICD-10-CM

## 2024-01-11 DIAGNOSIS — I73.9 CLAUDICATION IN PERIPHERAL VASCULAR DISEASE: Chronic | ICD-10-CM

## 2024-01-11 DIAGNOSIS — K74.60 CIRRHOSIS OF LIVER WITHOUT ASCITES, UNSPECIFIED HEPATIC CIRRHOSIS TYPE: ICD-10-CM

## 2024-01-11 DIAGNOSIS — I25.10 CAD (CORONARY ARTERY DISEASE): ICD-10-CM

## 2024-01-11 DIAGNOSIS — E66.3 OVERWEIGHT (BMI 25.0-29.9): ICD-10-CM

## 2024-01-11 DIAGNOSIS — E08.51 DIABETES MELLITUS DUE TO UNDERLYING CONDITION WITH DIABETIC PERIPHERAL ANGIOPATHY WITHOUT GANGRENE, WITH LONG-TERM CURRENT USE OF INSULIN: ICD-10-CM

## 2024-01-11 DIAGNOSIS — I10 ESSENTIAL HYPERTENSION: ICD-10-CM

## 2024-01-11 DIAGNOSIS — I48.0 PAROXYSMAL ATRIAL FIBRILLATION: ICD-10-CM

## 2024-01-11 DIAGNOSIS — Z95.0 PACEMAKER: ICD-10-CM

## 2024-01-11 DIAGNOSIS — H81.393 PERIPHERAL VERTIGO OF BOTH EARS: ICD-10-CM

## 2024-01-11 PROBLEM — I25.708 CORONARY ARTERY DISEASE OF BYPASS GRAFT OF NATIVE HEART WITH STABLE ANGINA PECTORIS: Status: RESOLVED | Noted: 2020-11-04 | Resolved: 2024-01-11

## 2024-01-11 LAB
AFP SERPL-MCNC: 3.5 NG/ML (ref 0–8.4)
ALBUMIN SERPL BCP-MCNC: 3.6 G/DL (ref 3.5–5.2)
ALP SERPL-CCNC: 162 U/L (ref 55–135)
ALT SERPL W/O P-5'-P-CCNC: 18 U/L (ref 10–44)
ANION GAP SERPL CALC-SCNC: 8 MMOL/L (ref 8–16)
AST SERPL-CCNC: 35 U/L (ref 10–40)
BASOPHILS # BLD AUTO: 0.04 K/UL (ref 0–0.2)
BASOPHILS NFR BLD: 0.7 % (ref 0–1.9)
BILIRUB SERPL-MCNC: 0.4 MG/DL (ref 0.1–1)
BUN SERPL-MCNC: 55 MG/DL (ref 8–23)
CALCIUM SERPL-MCNC: 10.1 MG/DL (ref 8.7–10.5)
CHLORIDE SERPL-SCNC: 105 MMOL/L (ref 95–110)
CHOLEST SERPL-MCNC: 165 MG/DL (ref 120–199)
CHOLEST/HDLC SERPL: 4.2 {RATIO} (ref 2–5)
CO2 SERPL-SCNC: 26 MMOL/L (ref 23–29)
CREAT SERPL-MCNC: 1.5 MG/DL (ref 0.5–1.4)
DIFFERENTIAL METHOD BLD: ABNORMAL
EOSINOPHIL # BLD AUTO: 0.3 K/UL (ref 0–0.5)
EOSINOPHIL NFR BLD: 4.1 % (ref 0–8)
ERYTHROCYTE [DISTWIDTH] IN BLOOD BY AUTOMATED COUNT: 13.5 % (ref 11.5–14.5)
EST. GFR  (NO RACE VARIABLE): 44 ML/MIN/1.73 M^2
GLUCOSE SERPL-MCNC: 169 MG/DL (ref 70–110)
HCT VFR BLD AUTO: 32.7 % (ref 40–54)
HDLC SERPL-MCNC: 39 MG/DL (ref 40–75)
HDLC SERPL: 23.6 % (ref 20–50)
HGB BLD-MCNC: 10.7 G/DL (ref 14–18)
IMM GRANULOCYTES # BLD AUTO: 0.01 K/UL (ref 0–0.04)
IMM GRANULOCYTES NFR BLD AUTO: 0.2 % (ref 0–0.5)
LDLC SERPL CALC-MCNC: 107.6 MG/DL (ref 63–159)
LYMPHOCYTES # BLD AUTO: 2.1 K/UL (ref 1–4.8)
LYMPHOCYTES NFR BLD: 34.9 % (ref 18–48)
MCH RBC QN AUTO: 31.3 PG (ref 27–31)
MCHC RBC AUTO-ENTMCNC: 32.7 G/DL (ref 32–36)
MCV RBC AUTO: 96 FL (ref 82–98)
MONOCYTES # BLD AUTO: 0.7 K/UL (ref 0.3–1)
MONOCYTES NFR BLD: 10.7 % (ref 4–15)
NEUTROPHILS # BLD AUTO: 3 K/UL (ref 1.8–7.7)
NEUTROPHILS NFR BLD: 49.4 % (ref 38–73)
NONHDLC SERPL-MCNC: 126 MG/DL
NRBC BLD-RTO: 0 /100 WBC
PLATELET # BLD AUTO: 154 K/UL (ref 150–450)
PMV BLD AUTO: 12 FL (ref 9.2–12.9)
POTASSIUM SERPL-SCNC: 4.2 MMOL/L (ref 3.5–5.1)
PROT SERPL-MCNC: 7 G/DL (ref 6–8.4)
RBC # BLD AUTO: 3.42 M/UL (ref 4.6–6.2)
SODIUM SERPL-SCNC: 139 MMOL/L (ref 136–145)
TRIGL SERPL-MCNC: 92 MG/DL (ref 30–150)
WBC # BLD AUTO: 6.08 K/UL (ref 3.9–12.7)

## 2024-01-11 PROCEDURE — 1159F MED LIST DOCD IN RCRD: CPT | Mod: CPTII,S$GLB,, | Performed by: NURSE PRACTITIONER

## 2024-01-11 PROCEDURE — 85025 COMPLETE CBC W/AUTO DIFF WBC: CPT | Performed by: FAMILY MEDICINE

## 2024-01-11 PROCEDURE — 1126F AMNT PAIN NOTED NONE PRSNT: CPT | Mod: CPTII,S$GLB,, | Performed by: FAMILY MEDICINE

## 2024-01-11 PROCEDURE — 99999 PR PBB SHADOW E&M-EST. PATIENT-LVL III: CPT | Mod: PBBFAC,,, | Performed by: FAMILY MEDICINE

## 2024-01-11 PROCEDURE — 99214 OFFICE O/P EST MOD 30 MIN: CPT | Mod: S$GLB,,, | Performed by: NURSE PRACTITIONER

## 2024-01-11 PROCEDURE — 80053 COMPREHEN METABOLIC PANEL: CPT | Performed by: FAMILY MEDICINE

## 2024-01-11 PROCEDURE — 82105 ALPHA-FETOPROTEIN SERUM: CPT | Performed by: FAMILY MEDICINE

## 2024-01-11 PROCEDURE — 3288F FALL RISK ASSESSMENT DOCD: CPT | Mod: CPTII,S$GLB,, | Performed by: NURSE PRACTITIONER

## 2024-01-11 PROCEDURE — 3288F FALL RISK ASSESSMENT DOCD: CPT | Mod: CPTII,S$GLB,, | Performed by: FAMILY MEDICINE

## 2024-01-11 PROCEDURE — 36415 COLL VENOUS BLD VENIPUNCTURE: CPT | Mod: PO | Performed by: FAMILY MEDICINE

## 2024-01-11 PROCEDURE — 1101F PT FALLS ASSESS-DOCD LE1/YR: CPT | Mod: CPTII,S$GLB,, | Performed by: FAMILY MEDICINE

## 2024-01-11 PROCEDURE — 99214 OFFICE O/P EST MOD 30 MIN: CPT | Mod: S$GLB,,, | Performed by: FAMILY MEDICINE

## 2024-01-11 PROCEDURE — 99999 PR PBB SHADOW E&M-EST. PATIENT-LVL IV: CPT | Mod: PBBFAC,,, | Performed by: NURSE PRACTITIONER

## 2024-01-11 PROCEDURE — 1101F PT FALLS ASSESS-DOCD LE1/YR: CPT | Mod: CPTII,S$GLB,, | Performed by: NURSE PRACTITIONER

## 2024-01-11 PROCEDURE — 83036 HEMOGLOBIN GLYCOSYLATED A1C: CPT | Performed by: FAMILY MEDICINE

## 2024-01-11 PROCEDURE — 80061 LIPID PANEL: CPT | Performed by: FAMILY MEDICINE

## 2024-01-11 PROCEDURE — 1157F ADVNC CARE PLAN IN RCRD: CPT | Mod: CPTII,S$GLB,, | Performed by: NURSE PRACTITIONER

## 2024-01-11 PROCEDURE — 1157F ADVNC CARE PLAN IN RCRD: CPT | Mod: CPTII,S$GLB,, | Performed by: FAMILY MEDICINE

## 2024-01-11 RX ORDER — NITROGLYCERIN 0.4 MG/1
0.4 TABLET SUBLINGUAL EVERY 5 MIN PRN
Qty: 25 TABLET | Refills: 12 | Status: SHIPPED | OUTPATIENT
Start: 2024-01-11

## 2024-01-11 RX ORDER — FLUTICASONE PROPIONATE 50 MCG
2 SPRAY, SUSPENSION (ML) NASAL DAILY
Qty: 16 G | Refills: 3 | Status: SHIPPED | OUTPATIENT
Start: 2024-01-11

## 2024-01-11 NOTE — PROGRESS NOTES
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 The sxs will last for about 30 seconds to a minute.     Gets very cold easily.     Doing well otherwise. Weight stable and blood pressure good.     Takes a baby aspirin, no other blood thinners.     Cirrhosis of liver. US abd annually.     He also has diabetes has not had his A1c checked in a while.              ROS:  Review of Systems   Constitutional:  Negative for appetite change, chills and fever.   HENT:  Negative for congestion, ear pain, postnasal drip, rhinorrhea, sinus pressure and sinus pain.    Eyes:  Negative for pain.   Respiratory:  Negative for cough, chest tightness and shortness of breath.    Cardiovascular:  Negative for chest pain and palpitations.   Gastrointestinal:  Negative for abdominal pain, blood in stool, constipation, diarrhea and nausea.   Endocrine: Positive for cold intolerance.   Genitourinary:  Negative for difficulty urinating, dysuria, flank pain and hematuria.   Musculoskeletal:  Negative for arthralgias and myalgias.   Skin:  Negative for pallor and wound.   Neurological:  Positive for dizziness. Negative for tremors, speech difficulty, light-headedness and headaches.   Psychiatric/Behavioral:  Negative for behavioral problems, dysphoric mood and sleep disturbance. The patient is not nervous/anxious.    All other systems reviewed and are negative.      Past Medical History:   Diagnosis Date    A-fib     Anemia     AP (angina pectoris) 01/23/2014    Carotid artery disease without cerebral infarction 08/22/2014    Cataract     Cirrhosis of liver 09/10/2020    Coronary artery disease     Diabetes mellitus 1970     am 11/21/2022  Insulin x 6-7 years.    DM  (diabetes mellitus) 1970     am 2020    Excessive sleepiness 2023    GERD (gastroesophageal reflux disease) 2013    Hemorrhagic cerebrovascular accident (CVA) 2018    Hyperlipidemia     Hypertension     Hypertensive heart disease with heart failure 2019    Intracranial hemorrhage     Lumbosacral spondylosis 2014    Pacemaker 2014    Paroxysmal atrial fibrillation 2014    Peripheral vascular disease 2014    Pneumonia of right lung due to infectious organism 2019    Seizure, late effect of stroke     Stage 3b chronic kidney disease 10/18/2023    Stroke     Type 2 diabetes mellitus with ophthalmic manifestations        Social History:  Social History     Socioeconomic History    Marital status:     Number of children: 3    Highest education level: GED or equivalent   Tobacco Use    Smoking status: Former     Current packs/day: 0.00     Average packs/day: 2.0 packs/day for 13.0 years (26.1 ttl pk-yrs)     Types: Cigarettes     Start date: 1954     Quit date: 1967     Years since quittin.6    Smokeless tobacco: Never   Substance and Sexual Activity    Alcohol use: No    Drug use: No    Sexual activity: Never     Partners: Female     Birth control/protection: None   Social History Narrative    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     Social Determinants of Health     Financial Resource Strain: Patient Declined (1/10/2024)    Overall Financial Resource Strain (CARDIA)     Difficulty of Paying Living Expenses: Patient declined   Food Insecurity: Unknown (1/10/2024)    Hunger Vital Sign     Worried About Running Out of Food in the Last Year: Never true     Ran Out of Food in the Last Year: Patient declined   Transportation Needs: No Transportation Needs (1/10/2024)    PRAPARE - Transportation     Lack of Transportation (Medical): No     Lack of Transportation (Non-Medical): No   Physical Activity: Inactive  "(1/10/2024)    Exercise Vital Sign     Days of Exercise per Week: 0 days     Minutes of Exercise per Session: 0 min   Stress: No Stress Concern Present (1/10/2024)    [employer] - Occupational Stress Questionnaire     Feeling of Stress : Not at all   Social Connections: Unknown (1/10/2024)    Social Connection and Isolation Panel [NHANES]     Frequency of Communication with Friends and Family: More than three times a week     Frequency of Social Gatherings with Friends and Family: Twice a week     Attends [employer] Services: More than 4 times per year     Active Member of Clubs or Organizations: Patient declined     Attends Club or Organization Meetings: Patient declined     Marital Status: Living with partner   Housing Stability: Low Risk  (1/10/2024)    Housing Stability Vital Sign     Unable to Pay for Housing in the Last Year: No     Number of Places Lived in the Last Year: 1     Unstable Housing in the Last Year: No       Family History:   family history includes Alcohol abuse in his paternal uncle; Arthritis in his father and mother; Cancer in his daughter and sister; Cataracts in his mother; Diabetes in his brother, daughter, father, mother, sister, son, and son; Fibromyalgia in his daughter; Heart disease in his brother, mother, and sister; Kidney disease in his brother and mother; Miscarriages / Stillbirths in his daughter.    Health Maintenance   Topic Date Due    Shingles Vaccine (1 of 2) Never done    Lipid Panel  05/08/2023    Hemoglobin A1c  11/16/2023    Eye Exam  11/21/2023    TETANUS VACCINE  01/23/2024    Aspirin/Antiplatelet Therapy  Discontinued       Physical Exam:    Vital Signs  Pulse: 69  SpO2: 99 %  BP: 136/60  BP Location: Left arm  Patient Position: Sitting  Pain Score: 0-No pain  Height and Weight  Height: 5' 9" (175.3 cm)  Weight: 81.8 kg (180 lb 5.4 oz)  BSA (Calculated - sq m): 2 sq meters  BMI (Calculated): 26.6  Weight in (lb) to have BMI = 25: " 168.9]    Body mass index is 26.63 kg/m².    Physical Exam  Vitals and nursing note reviewed.   Constitutional:       Appearance: Normal appearance.   HENT:      Head: Normocephalic and atraumatic.      Right Ear: Tympanic membrane normal.      Left Ear: Tympanic membrane normal.      Ears:      Comments: Brayan-Hallpike maneuver positive bilaterally    Eyes:      Extraocular Movements: Extraocular movements intact.      Pupils: Pupils are equal, round, and reactive to light.   Cardiovascular:      Rate and Rhythm: Normal rate and regular rhythm.      Pulses: Normal pulses.      Heart sounds: Normal heart sounds. No murmur heard.     No gallop.   Pulmonary:      Effort: Pulmonary effort is normal. No respiratory distress.      Breath sounds: Normal breath sounds. No wheezing, rhonchi or rales.   Abdominal:      General: There is no distension.      Palpations: Abdomen is soft.      Tenderness: There is no abdominal tenderness.   Musculoskeletal:         General: No swelling, deformity or signs of injury. Normal range of motion.      Cervical back: Normal range of motion.   Skin:     General: Skin is warm and dry.      Capillary Refill: Capillary refill takes less than 2 seconds.      Coloration: Skin is not jaundiced or pale.   Neurological:      General: No focal deficit present.      Mental Status: He is alert and oriented to person, place, and time.      Gait: Gait is intact. Gait and tandem walk normal.   Psychiatric:         Mood and Affect: Mood normal.         Behavior: Behavior normal.           Diabetes Management Status    Statin: Not taking  ACE/ARB: Taking    Screening or Prevention Patient's value Goal Complete/Controlled?   HgA1C Testing and Control   Lab Results   Component Value Date    HGBA1C 6.2 (H) 05/16/2023      Annually/Less than 8% Yes   Lipid profile : 05/08/2022 Annually Yes   LDL control Lab Results   Component Value Date    LDLCALC 106.6 05/08/2022    Annually/Less than 100 mg/dl  No    Nephropathy screening Lab Results   Component Value Date    LABMICR 19.0 09/09/2021     Lab Results   Component Value Date    PROTEINUA Negative 12/12/2021    Annually Yes   Blood pressure BP Readings from Last 1 Encounters:   01/11/24 136/60    Less than 140/90 Yes   Dilated retinal exam : 11/21/2022 Annually Yes   Foot exam   Most Recent Foot Exam Date: Not Found Annually Yes       Assessment:      ICD-10-CM ICD-9-CM   1. Diabetes mellitus type 2 without retinopathy  E11.9 250.00   2. Peripheral vertigo of both ears  H81.393 386.10   3. Mixed hyperlipidemia  E78.2 272.2   4. Gastroesophageal reflux disease without esophagitis  K21.9 530.81   5. Chronic combined systolic and diastolic congestive heart failure  I50.42 428.42     428.0   6. Peripheral vascular disease due to secondary diabetes mellitus  E13.51 249.70     443.81   7. Cirrhosis of liver without ascites, unspecified hepatic cirrhosis type  K74.60 571.5   8. Paroxysmal atrial fibrillation  I48.0 427.31   9. Stage 3b chronic kidney disease  N18.32 585.3   10. Peripheral vascular disease  I73.9 443.9     Plan:  Continue current meds and plan.  Gave patient Epley maneuver handout.  Refer to audiology for vertigo.    Order US abdomen limited for cirrhosis of liver  To help with cold feeling best to continue to be active. Walk around every so often to warm yourself up.   Medical problems as above stable.  Check labs as below   Follow up 6 months.      Orders Placed This Encounter   Procedures    US Abdomen Limited    CBC Auto Differential    Comprehensive Metabolic Panel    Hemoglobin A1C    Lipid Panel    AFP TUMOR MARKER    Ambulatory referral/consult to Audiology       Current Outpatient Medications   Medication Sig Dispense Refill    ACCU-CHEK GUIDE TEST STRIPS Strp TEST BLOOD SUGAR SIX TIMES A  strip 3    ACCU-CHEK MULTICLIX LANCET lancets TEST BLOOD SUGAR THREE TIMES DAILY 200 each 5    blood-glucose meter Misc 1 Device by Misc.(Non-Drug; Combo  "Route) route once. for 1 dose 1 each 0    furosemide (LASIX) 40 MG tablet Take 2 tablets (80 mg total) by mouth once daily. 60 tablet 12    insulin (LANTUS SOLOSTAR U-100 INSULIN) glargine 100 units/mL (3mL) SubQ pen INJECT 16 UNITS SUBCUTANEOUSLY AT BEDTIME. 94 DAY SUPPLY 15 mL 11    insulin aspart U-100 (NOVOLOG FLEXPEN U-100 INSULIN) 100 unit/mL (3 mL) InPn pen INJECT EIGHT UNITS BEFORE BREAKFAST, SIX UNITS BEFORE LUNCH, AND EIGHT UNITS BEFORE DINNER. MAY TITRATE TO A MAX DOSE OF 50 UNITS PER DAY 30 mL 3    isosorbide mononitrate (IMDUR) 60 MG 24 hr tablet TAKE ONE TABLET BY MOUTH TWICE DAILY 60 tablet 12    losartan (COZAAR) 100 MG tablet TAKE ONE TABLET BY MOUTH EVERY DAY 90 tablet 3    metoprolol succinate (TOPROL-XL) 100 MG 24 hr tablet TAKE ONE TABLET BY MOUTH TWICE DAILY 60 tablet 12    multivitamin (ONE DAILY MULTIVITAMIN) per tablet Take 1 tablet by mouth once daily.      nitroGLYCERIN (NITROSTAT) 0.4 MG SL tablet Place 1 tablet (0.4 mg total) under the tongue every 5 (five) minutes as needed for Chest pain. 25 tablet 12    pantoprazole (PROTONIX) 40 MG tablet Take 1 tablet (40 mg total) by mouth once daily. 90 tablet 3    pen needle, diabetic (COMFORT EZ PEN NEEDLES) 32 gauge x 5/32" Ndle USE FOUR TIMES DAILY. 200 each 6    saw palmetto 500 MG capsule Take 500 mg by mouth 2 (two) times a day.      simethicone (GAS-X EXTRA STRENGTH) 125 mg Cap capsule Take 1 capsule (125 mg total) by mouth 4 (four) times daily as needed for Flatulence.  0    turmeric 400 mg Cap Take 1 capsule by mouth once daily.      vitamin D (VITAMIN D3) 1000 units Tab Take 1,000 Units by mouth once daily.      vitamin E 100 UNIT capsule Take 100 Units by mouth once daily.      zinc gluconate 50 mg tablet Take 50 mg by mouth once daily.      ascorbic acid, vitamin C, (VITAMIN C) 100 MG tablet Take 100 mg by mouth once daily.      aspirin (ECOTRIN) 81 MG EC tablet Take 81 mg by mouth every other day.      CARBOXYMETHYLCELLULOSE SODIUM " (REFRESH OPHT) Apply to eye.      dapagliflozin propanediol (FARXIGA) 10 mg tablet Take 1 tablet (10 mg total) by mouth once daily. (Patient not taking: Reported on 8/28/2023) 30 tablet 3    diclofenac sodium (VOLTAREN) 1 % Gel Apply 2 g topically once daily. 100 g 2    ferrous sulfate (IRON ORAL) Take 65 mg by mouth.      flecainide (TAMBOCOR) 50 MG Tab TAKE ONE TABLET BY MOUTH EVERY TWELVE HOURS (Patient not taking: Reported on 10/18/2023) 60 tablet 6    fluticasone propionate (FLONASE) 50 mcg/actuation nasal spray 2 sprays (100 mcg total) by Each Nostril route once daily. 16 g 3    glycopyrrolate (ROBINUL) 1 mg Tab Take 1 tablet (1 mg total) by mouth 3 (three) times daily. (Patient taking differently: Take 1 mg by mouth 3 (three) times a week.) 90 tablet 6    guaiFENesin (MUCINEX) 600 mg 12 hr tablet Take 1 tablet (600 mg total) by mouth 2 (two) times daily.       No current facility-administered medications for this visit.       Medications Discontinued During This Encounter   Medication Reason    acetaminophen tablet 650 mg Patient no longer taking    albuterol inhaler 2 puff Patient no longer taking    diphenhydrAMINE injection 25 mg Patient no longer taking    EPINEPHrine (EPIPEN) 0.3 mg/0.3 mL pen injection 0.3 mg Patient no longer taking    methylPREDNISolone sodium succinate injection 40 mg Patient no longer taking    mupirocin (BACTROBAN) 2 % ointment Patient no longer taking    ondansetron disintegrating tablet 4 mg Patient no longer taking    sodium chloride 0.9% 500 mL flush bag Patient no longer taking    sodium chloride 0.9% flush 10 mL Patient no longer taking    levocetirizine (XYZAL) 5 MG tablet Patient no longer taking    fluticasone propionate (FLONASE) 50 mcg/actuation nasal spray Reorder       Follow up in about 6 months (around 7/11/2024).      Abdulaziz Mccabe MD  Scribe Attestation:   Piter LARSON, am scribing for, and in the presence of, Dr.Arif Mccabe I performed the above scribed service  and the documentation accurately describes the services I performed. I attest to the accuracy of the note.    I, Dr. Abdulaziz Mccabe, reviewed documentation as scribed above. I performed the services described in this documentation.  I agree that the record reflects my personal performance and is accurate and complete. Abdulaziz Mccabe MD.  01/11/2024

## 2024-01-11 NOTE — PROGRESS NOTES
Subjective:   Patient ID:  Anthony Blair is a 89 y.o. male who presents for evaluation of No chief complaint on file.      HPI    Anthony Blair is a 88 year old female who presents to Cardiology for hospital follow up. He was recently admitted to [facility] due to chest pain, bilateral arm numbness, dizziness, weakness, SOB, headache.     He feels Almost back to normal today.   No recurrent chest pain episodes  Has regained strength in his legs since hospital discharge  Completed project at home  Does not sleep well nightly worsened about 3-4 weeks  Discussed sleep hygiene practices in office  Willing to try melatonin again to attempt to improve insomnia      BP at home 135/70, 154/85, 144/76, 146/70, 138/71, 109/61    Denies chest pain or anginal equivalents. No shortness of breath, OLIVARES or palpitations. Denies orthopnea, PND or abdominal bloating. Reports regular walking without any issues lately. NO leg swelling or claudications. No recent falls, syncope or near syncopal events. Reports compliance with medications and dietary restrictions. NO CNS complaints to suggest TIA or CVA today. No signs of abnormal bleeding.     8/16/2023    Anthony Blair returns for follow up with device check.     Device check- well functioning device, no arrhythmias. Decrease in TI over the last 9 days.     EKG today reveals AsVp    He has gained about 6 pounds since last visit. He just returned from a 9 day bus trip and has increased leg swelling and shortness of breath. He does endorse PND nightly. BP stable today in office.     No chest pain or anginal equivalents.   He does endorse dizziness with position changes and bendopnea.     Reports compliance with medications and dietary restrictions.     1/11/2024 update    Anthony Blair returns for 3 month follow up.     He is feeling much better from last office visit.   BP stable today in office. Feels back to normal today in office.     Main complaint is bilateral leg weakness, worse  with increased activities.     Denies chest pain or anginal equivalents. No shortness of breath, OLIVARES or palpitations. Denies orthopnea, PND or abdominal bloating. Reports regular walking without any issues lately. NO leg swelling or claudications. No recent falls, syncope or near syncopal events. Reports compliance with medications and dietary restrictions. NO CNS complaints to suggest TIA or CVA today. No signs of abnormal bleeding on ASA daily.     Weight stable at 180 lbs today, dry weight 81 kg.     Has trace edema to BLE today in office.     Past Medical History:   Diagnosis Date    A-fib     Anemia     AP (angina pectoris) 01/23/2014    Carotid artery disease without cerebral infarction 08/22/2014    Cataract     Cirrhosis of liver 09/10/2020    Coronary artery disease     Diabetes mellitus 1970     am 11/21/2022  Insulin x 6-7 years.    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    Hyperlipidemia     Hypertension     Hypertensive heart disease with heart failure 03/12/2019    Intracranial hemorrhage     Lumbosacral spondylosis 12/24/2014    Pacemaker 01/23/2014    Paroxysmal atrial fibrillation 01/23/2014    Peripheral vascular disease 08/22/2014    Pneumonia of right lung due to infectious organism 03/27/2019    Seizure, late effect of stroke     Stage 3b chronic kidney disease 10/18/2023    Stroke     Type 2 diabetes mellitus with ophthalmic manifestations        Past Surgical History:   Procedure Laterality Date    ANGIOPLASTY      ATRIAL ABLATION SURGERY N/A     CATARACT EXTRACTION Bilateral     [facility]    CATARACT EXTRACTION W/ INTRAOCULAR LENS IMPLANT Right     CATARACT EXTRACTION W/ INTRAOCULAR LENS IMPLANT Left     COLONOSCOPY N/A 10/16/2018    Procedure: COLONOSCOPY with biopsies;  Surgeon: Anabell Griggs MD;  Location: [facility]  ENDO;  Service: Endoscopy;  Laterality: N/A;    CORONARY ARTERY BYPASS GRAFT  07/2010    x5    EYE SURGERY      pace maker surgrey  10/2010    reposition in  (approx)    REPLACEMENT OF PACEMAKER GENERATOR Left 2020    Procedure: REPLACEMENT, PULSE GENERATOR, CARDIAC PACEMAKER;  Surgeon: Domenico Grajeda MD;  Location: [facility] CATH LAB;  Service: Cardiology;  Laterality: Left;  [facility]    REVISION OF PROCEDURE INVOLVING PACEMAKER LEAD Bilateral 2020    Procedure: REVISION, ELECTRODE LEAD, CARDIAC PACEMAKER;  Surgeon: Domenico Grajeda MD;  Location: [facility] CATH LAB;  Service: Cardiology;  Laterality: Bilateral;    VEIN BYPASS SURGERY         Social History     Tobacco Use    Smoking status: Former     Current packs/day: 0.00     Average packs/day: 2.0 packs/day for 13.0 years (26.1 ttl pk-yrs)     Types: Cigarettes     Start date: 1954     Quit date: 1967     Years since quittin.6    Smokeless tobacco: Never   Substance Use Topics    Alcohol use: No    Drug use: No       Family History   Problem Relation Age of Onset    Cataracts Mother     Arthritis Mother     Diabetes Mother     Kidney disease Mother     Heart disease Mother     Arthritis Father     Diabetes Father     Diabetes Sister     Cancer Sister         breast    Heart disease Sister     Diabetes Brother     Kidney disease Brother     Heart disease Brother     Alcohol abuse Paternal Uncle     Cancer Daughter         breast    Diabetes Daughter     Miscarriages / Stillbirths Daughter     Fibromyalgia Daughter     Diabetes Son     Diabetes Son     Stroke Neg Hx     Hypertension Neg Hx     Hyperlipidemia Neg Hx     COPD Neg Hx      Wt Readings from Last 3 Encounters:   24 81.7 kg (180 lb 1.9 oz)   24 81.8 kg (180 lb 5.4 oz)   10/18/23 81.8 kg (180 lb 5.4 oz)     Temp Readings from Last 3 Encounters:   03/10/23 97.6 °F (36.4 °C) (Oral)   22 97.7 °F (36.5 °C) (Temporal)   22 98.1 °F (36.7 °C) (Oral)     BP  Readings from Last 3 Encounters:   01/11/24 (!) 140/65   01/11/24 136/60   10/18/23 126/68     Pulse Readings from Last 3 Encounters:   01/11/24 70   01/11/24 69   10/18/23 70       Review of Systems   Constitutional: Positive for malaise/fatigue. Negative for weight gain.   HENT:  Negative for hearing loss and hoarse voice.    Eyes:  Negative for blurred vision and visual disturbance.   Cardiovascular:  Positive for leg swelling. Negative for chest pain, claudication, dyspnea on exertion, irregular heartbeat, near-syncope, orthopnea, palpitations, paroxysmal nocturnal dyspnea and syncope.   Respiratory:  Negative for cough, hemoptysis, shortness of breath, sleep disturbances due to breathing, snoring and wheezing.    Endocrine: Negative for cold intolerance and heat intolerance.   Hematologic/Lymphatic: Does not bruise/bleed easily.   Skin:  Negative for color change, dry skin and nail changes.   Musculoskeletal:  Positive for arthritis. Negative for back pain, joint pain and myalgias.   Gastrointestinal:  Negative for bloating, abdominal pain, constipation, nausea and vomiting.   Genitourinary:  Negative for dysuria, flank pain, hematuria and hesitancy.   Neurological:  Negative for headaches, light-headedness, loss of balance, numbness, paresthesias and weakness.   Psychiatric/Behavioral:  Negative for altered mental status.    Allergic/Immunologic: Negative for environmental allergies.   BP (!) 140/65 (BP Location: Left arm, Patient Position: Sitting)   Pulse 70   Wt 81.7 kg (180 lb 1.9 oz)   SpO2 98%   BMI 26.60 kg/m²       Objective:   Physical Exam  Vitals and nursing note reviewed.   Constitutional:       General: He is not in acute distress.     Appearance: Normal appearance. He is well-developed. He is not ill-appearing.   HENT:      Head: Normocephalic and atraumatic.      Nose: Nose normal.      Mouth/Throat:      Mouth: Mucous membranes are moist.      Pharynx: Oropharynx is clear.   Eyes:       Extraocular Movements: Extraocular movements intact.      Conjunctiva/sclera: Conjunctivae normal.      Pupils: Pupils are equal, round, and reactive to light.   Neck:      Thyroid: No thyromegaly.      Vascular: No JVD.      Trachea: No tracheal deviation.   Cardiovascular:      Rate and Rhythm: Normal rate and regular rhythm.      Chest Wall: PMI is not displaced.      Pulses: Intact distal pulses.           Radial pulses are 2+ on the right side and 2+ on the left side.        Dorsalis pedis pulses are 2+ on the right side and 2+ on the left side.      Heart sounds: S1 normal and S2 normal. Heart sounds not distant. No murmur heard.  Pulmonary:      Effort: Pulmonary effort is normal. No respiratory distress.      Breath sounds: Normal breath sounds. No wheezing.   Abdominal:      General: Bowel sounds are normal. There is no distension.      Palpations: Abdomen is soft.      Tenderness: There is no abdominal tenderness.   Musculoskeletal:         General: Normal range of motion.      Cervical back: Full passive range of motion without pain, normal range of motion and neck supple.      Right lower leg: Edema present.      Left lower leg: Edema present.      Right ankle: No swelling.      Left ankle: No swelling.   Skin:     General: Skin is warm and dry.      Capillary Refill: Capillary refill takes less than 2 seconds.      Nails: There is no clubbing.   Neurological:      General: No focal deficit present.      Mental Status: He is alert and oriented to person, place, and time.   Psychiatric:         Mood and Affect: Mood normal.         Speech: Speech normal.         Behavior: Behavior normal.         Thought Content: Thought content normal.         Judgment: Judgment normal.         Lab Results   Component Value Date    CHOL 170 05/08/2022    CHOL 163 09/09/2021    CHOL 183 05/26/2021     Lab Results   Component Value Date    HDL 41 05/08/2022    HDL 41 09/09/2021    HDL 44 05/26/2021     Lab Results    Component Value Date    LDLCALC 106.6 05/08/2022    LDLCALC 94.4 09/09/2021    LDLCALC 108.4 05/26/2021     Lab Results   Component Value Date    TRIG 112 05/08/2022    TRIG 138 09/09/2021    TRIG 153 (H) 05/26/2021     Lab Results   Component Value Date    CHOLHDL 24.1 05/08/2022    CHOLHDL 25.2 09/09/2021    CHOLHDL 24.0 05/26/2021       Chemistry        Component Value Date/Time     08/29/2023 0738    K 4.4 08/29/2023 0738    CL 99 08/29/2023 0738    CO2 29 08/29/2023 0738    BUN 41 (H) 08/29/2023 0738    CREATININE 1.7 (H) 08/29/2023 0738     (H) 08/29/2023 0738        Component Value Date/Time    CALCIUM 10.2 08/29/2023 0738    ALKPHOS 158 (H) 08/16/2023 0935    AST 42 (H) 08/16/2023 0935    ALT 24 08/16/2023 0935    BILITOT 0.8 08/16/2023 0935    ESTGFRAFRICA 84 03/12/2023 0436    ESTGFRAFRICA >60 05/09/2022 0741    EGFRNONAA >60 05/09/2022 0741          Lab Results   Component Value Date    TSH 1.698 05/08/2022     Lab Results   Component Value Date    INR 1.1 05/09/2022    INR 1.1 05/08/2022    INR 1.1 09/28/2021     Lab Results   Component Value Date    WBC 5.81 09/21/2023    HGB 11.8 (L) 09/21/2023    HCT 35.7 (L) 09/21/2023    MCV 97 09/21/2023     09/21/2023        Assessment:      1. Chronic combined systolic and diastolic congestive heart failure    2. AP (angina pectoris)    3. CAD (coronary artery disease)    4. Pacemaker    5. Hypertensive heart disease with heart failure    6. Claudication in peripheral vascular disease    7. Nonrheumatic tricuspid valve regurgitation    8. Essential hypertension    9. Paroxysmal atrial fibrillation    10. Stage 3b chronic kidney disease    11. Diabetes mellitus due to underlying condition with diabetic peripheral angiopathy without gangrene, with long-term current use of insulin    12. Overweight (BMI 25.0-29.9)    13. AVM (arteriovenous malformation) of stomach, acquired    14. Statin intolerance          Plan:     Continue current dosing of  lasix  Continue Losartan, BB, Imdur, Farxiga, ASA   Dash diet 2 gm sodium restriction  Daily walking as tolerated  RTC In 6 months or sooner if needed.     Nicole May, FNP-C [facility]

## 2024-01-12 LAB
ESTIMATED AVG GLUCOSE: 126 MG/DL (ref 68–131)
HBA1C MFR BLD: 6 % (ref 4–5.6)

## 2024-01-16 DIAGNOSIS — E61.1 IRON DEFICIENCY: Primary | ICD-10-CM

## 2024-01-16 RX ORDER — FERROUS GLUCONATE 324(38)MG
324 TABLET ORAL 2 TIMES DAILY WITH MEALS
Qty: 60 TABLET | Refills: 3 | Status: SHIPPED | OUTPATIENT
Start: 2024-01-16 | End: 2024-05-01

## 2024-01-17 ENCOUNTER — CLINICAL SUPPORT (OUTPATIENT)
Dept: AUDIOLOGY | Facility: CLINIC | Age: 89
End: 2024-01-17
Payer: MEDICARE

## 2024-01-17 DIAGNOSIS — H81.12 BPPV (BENIGN PAROXYSMAL POSITIONAL VERTIGO), LEFT: Primary | ICD-10-CM

## 2024-01-17 DIAGNOSIS — H81.393 PERIPHERAL VERTIGO OF BOTH EARS: ICD-10-CM

## 2024-01-17 PROCEDURE — 99999 PR PBB SHADOW E&M-EST. PATIENT-LVL I: CPT | Mod: PBBFAC,,, | Performed by: AUDIOLOGIST

## 2024-01-17 NOTE — PROGRESS NOTES
Anthony MELINDA Blair was seen 01/18/2024 for a BPPV evaluation    Patient reported dizziness when rolling to his left side in bed, bending down and laying down.  He has limited cervical ROM and mobility.       Side-lying head left Brayan-Hallpike was positive    A Semont maneuver was performed for the left ear.  Patient tolerated the maneuver well and was asymptomatic upon discharge.  Post maneuver instructions were reviewed with the patient verbally.  Understanding was voiced.    A follow-up appointment (Melody) has been scheduled for one week to ensure that BPPV has resolved.    Diagnosis:  BPPV left

## 2024-01-24 ENCOUNTER — HOSPITAL ENCOUNTER (OUTPATIENT)
Dept: RADIOLOGY | Facility: HOSPITAL | Age: 89
Discharge: HOME | End: 2024-01-24
Attending: FAMILY MEDICINE
Payer: MEDICARE

## 2024-01-24 ENCOUNTER — CLINICAL SUPPORT (OUTPATIENT)
Dept: AUDIOLOGY | Facility: CLINIC | Age: 89
End: 2024-01-24
Payer: MEDICARE

## 2024-01-24 DIAGNOSIS — H81.12 BPPV (BENIGN PAROXYSMAL POSITIONAL VERTIGO), LEFT: Primary | ICD-10-CM

## 2024-01-24 DIAGNOSIS — K74.60 CIRRHOSIS OF LIVER WITHOUT ASCITES, UNSPECIFIED HEPATIC CIRRHOSIS TYPE: ICD-10-CM

## 2024-01-24 PROCEDURE — 76705 ECHO EXAM OF ABDOMEN: CPT | Mod: TC,PO

## 2024-01-24 PROCEDURE — 76705 ECHO EXAM OF ABDOMEN: CPT | Mod: 26,,, | Performed by: RADIOLOGY

## 2024-01-24 PROCEDURE — 92542 POSITIONAL NYSTAGMUS TEST: CPT | Mod: S$GLB,,, | Performed by: AUDIOLOGIST

## 2024-01-24 NOTE — PROGRESS NOTES
Anthony Blair was seen 01/24/2024 for a BPPV evaluation    Patient reported that he had several days without dizziness following his last appointment.  He reported that he did feel vertigo two times this morning - once when laying down in bed to put in eye drops and again when getting up from a table after a test this morning.       VAT was negative right and negative left     Head Right Positional was negative  Head Left Positional yielded 6 d/s RB nystagmus with 10 d/s UB torsional nystagmus with fixation  Hallpike Right was negative  Hallpike Left was positive - 10 d/s RB nystagmus with 21 d/s UB torsional nystagmus with fixation     A 5-position Epley maneuver was performed for the left ear.  Patient tolerated the maneuver well and was asymptomatic upon discharge.  Post maneuver instructions were reviewed with him.  Understanding was voiced.    Recommend that a referral be placed to PT for canalith repositioning maneuvers to be completed until pt's BPPV has resolved.    Diagnosis:  PSCC BPPV left

## 2024-01-24 NOTE — Clinical Note
Anthony Parnell was diagnosed with and initially treated for left-sided PSCC BPPV. He will need a referral placed to Physical Therapy at your earliest convenience for continued canalith repositioning maneuvers to treat his BPPV until his symptoms are fully resolved.    Thank you,  Erendira

## 2024-01-29 ENCOUNTER — OFFICE VISIT (OUTPATIENT)
Dept: OPTOMETRY | Facility: CLINIC | Age: 89
End: 2024-01-29
Payer: MEDICARE

## 2024-01-29 DIAGNOSIS — H52.4 HYPEROPIA WITH ASTIGMATISM AND PRESBYOPIA, BILATERAL: ICD-10-CM

## 2024-01-29 DIAGNOSIS — Z79.4 DIABETES MELLITUS DUE TO UNDERLYING CONDITION WITH DIABETIC PERIPHERAL ANGIOPATHY WITHOUT GANGRENE, WITH LONG-TERM CURRENT USE OF INSULIN: ICD-10-CM

## 2024-01-29 DIAGNOSIS — H35.371 EPIRETINAL MEMBRANE, RIGHT: ICD-10-CM

## 2024-01-29 DIAGNOSIS — Z96.1 PSEUDOPHAKIA OF BOTH EYES: ICD-10-CM

## 2024-01-29 DIAGNOSIS — E11.9 TYPE 2 DIABETES MELLITUS WITHOUT RETINOPATHY: Primary | ICD-10-CM

## 2024-01-29 DIAGNOSIS — H52.03 HYPEROPIA WITH ASTIGMATISM AND PRESBYOPIA, BILATERAL: ICD-10-CM

## 2024-01-29 DIAGNOSIS — H43.813 POSTERIOR VITREOUS DETACHMENT OF BOTH EYES: ICD-10-CM

## 2024-01-29 DIAGNOSIS — H52.203 HYPEROPIA WITH ASTIGMATISM AND PRESBYOPIA, BILATERAL: ICD-10-CM

## 2024-01-29 DIAGNOSIS — E08.51 DIABETES MELLITUS DUE TO UNDERLYING CONDITION WITH DIABETIC PERIPHERAL ANGIOPATHY WITHOUT GANGRENE, WITH LONG-TERM CURRENT USE OF INSULIN: ICD-10-CM

## 2024-01-29 PROCEDURE — 99999 PR PBB SHADOW E&M-EST. PATIENT-LVL IV: CPT | Mod: PBBFAC,,,

## 2024-01-29 PROCEDURE — 1157F ADVNC CARE PLAN IN RCRD: CPT | Mod: CPTII,S$GLB,,

## 2024-01-29 PROCEDURE — 92015 DETERMINE REFRACTIVE STATE: CPT | Mod: S$GLB,,,

## 2024-01-29 PROCEDURE — 92014 COMPRE OPH EXAM EST PT 1/>: CPT | Mod: S$GLB,,,

## 2024-01-29 PROCEDURE — 1101F PT FALLS ASSESS-DOCD LE1/YR: CPT | Mod: CPTII,S$GLB,,

## 2024-01-29 PROCEDURE — 2023F DILAT RTA XM W/O RTNOPTHY: CPT | Mod: CPTII,S$GLB,,

## 2024-01-29 PROCEDURE — 3288F FALL RISK ASSESSMENT DOCD: CPT | Mod: CPTII,S$GLB,,

## 2024-01-29 PROCEDURE — 1159F MED LIST DOCD IN RCRD: CPT | Mod: CPTII,S$GLB,,

## 2024-01-29 PROCEDURE — 92134 CPTRZ OPH DX IMG PST SGM RTA: CPT | Mod: S$GLB,,,

## 2024-01-29 PROCEDURE — 1126F AMNT PAIN NOTED NONE PRSNT: CPT | Mod: CPTII,S$GLB,,

## 2024-01-30 NOTE — PROGRESS NOTES
HPI    Pt here for complete DM exam with the complaint of blurred VA at both   distance and near. Last exam: 11/2022.    Pt states floaters are present and at times appear like a white orb in   center of vision, no flashes noted. Using refresh daily with minimal   relief. Cataracts removed OU. Sugars have been pretty unstable recently   and attempting to gain control with medications.    LBS:   Hemoglobin A1C       Date                     Value               Ref Range             Status                01/11/2024               6.0 (H)             4.0 - 5.6 %           Final                   05/16/2023               6.2 (H)             4.0 - 5.6 %           Final            Last edited by Nida Chacko, OD on 1/29/2024  8:40 PM.            Assessment /Plan     For exam results, see Encounter Report.    Type 2 diabetes mellitus without retinopathy  -     Ambulatory referral/consult to Optometry    Diabetes mellitus due to underlying condition with diabetic peripheral angiopathy without gangrene, with long-term current use of insulin  -     Ambulatory referral/consult to Optometry    Posterior vitreous detachment of both eyes    Epiretinal membrane, right  -     Posterior Segment OCT Retina-Both eyes    Pseudophakia of both eyes    Hyperopia with astigmatism and presbyopia, bilateral      1-2. One small dot heme OD; otherwise, no hemorrhages or diabetic macular edema present OD, OS. Ed pt on importance of maintaining strict BS control due to potential for visual fluctuations and/or vision loss. Monitor with yearly dilated exam.    3. Stable PVD OU. Pt notices increase in floaters when blood sugar fluctuating. Ed pt on benign nature of vitreous floaters. Reviewed signs and symptoms of a retinal detachment thoroughly and ed pt to RTC asap if experienced.    4. Longstanding epiretinal membrane OD with corresponding reduced BCVA. Ed pt on findings and on benign nature of epiretinal membrane. BCVA stable to  previous. Baseline Mac OCT obtained today. Ed pt to RTC asap if sudden changes in vision, flashes, or floaters experienced. Otherwise, continue to monitor yearly for changes, sooner prn.    5. PCIOL OU, open capsules. Stable. Monitor yearly for changes.    6. All options on manifest refraction OD yielded reduced acuity to current specs, so kept OD the same. Updated OS power. Discussed spectacle options with pt and released final spec rx. Ed pt on change in rx and adaptation.      RTC: 1 year for comprehensive exam or sooner prn

## 2024-02-01 ENCOUNTER — TELEPHONE (OUTPATIENT)
Dept: CARDIOLOGY | Facility: HOSPITAL | Age: 89
End: 2024-02-01
Payer: MEDICARE

## 2024-02-01 DIAGNOSIS — I50.42 CHRONIC COMBINED SYSTOLIC AND DIASTOLIC CONGESTIVE HEART FAILURE: ICD-10-CM

## 2024-02-01 DIAGNOSIS — Z45.018 BIVENTRICULAR PACEMAKER CHECK: Primary | ICD-10-CM

## 2024-02-05 RX ORDER — ISOSORBIDE MONONITRATE 60 MG/1
TABLET, EXTENDED RELEASE ORAL
Qty: 60 TABLET | Refills: 12 | Status: SHIPPED | OUTPATIENT
Start: 2024-02-05 | End: 2024-05-06 | Stop reason: SDUPTHER

## 2024-02-28 RX ORDER — LOSARTAN POTASSIUM 100 MG/1
TABLET ORAL
Qty: 90 TABLET | Refills: 3 | Status: SHIPPED | OUTPATIENT
Start: 2024-02-28 | End: 2024-05-06 | Stop reason: SDUPTHER

## 2024-02-28 RX ORDER — PANTOPRAZOLE SODIUM 40 MG/1
40 TABLET, DELAYED RELEASE ORAL
Qty: 90 TABLET | Refills: 3 | Status: SHIPPED | OUTPATIENT
Start: 2024-02-28 | End: 2024-05-06 | Stop reason: SDUPTHER

## 2024-02-28 NOTE — TELEPHONE ENCOUNTER
Refill Routing Note   Medication(s) are not appropriate for processing by [facility] for the following reason(s):        Required vitals abnormal    [facility] action(s):  Defer  Approve        Medication Therapy Plan: Digital Med patient, no previous order other members of the care team, PCP only      Appointments  past 12m or future 3m with PCP    Date Provider   Last Visit   1/11/2024 Abdulaziz Mccabe MD   Next Visit   7/11/2024 Abdulaziz Mccabe MD   ED visits in past 90 days: 0        Note composed:11:00 AM 02/28/2024

## 2024-02-28 NOTE — TELEPHONE ENCOUNTER
No care due was identified.  [facility] Embedded Care Due Messages. Reference number: [id number].   2/28/2024 8:04:47 AM CST

## 2024-03-15 ENCOUNTER — PATIENT MESSAGE (OUTPATIENT)
Dept: CARDIOLOGY | Facility: CLINIC | Age: 89
End: 2024-03-15
Payer: MEDICARE

## 2024-03-15 DIAGNOSIS — I50.42 CHRONIC COMBINED SYSTOLIC AND DIASTOLIC CONGESTIVE HEART FAILURE: Primary | ICD-10-CM

## 2024-03-15 DIAGNOSIS — I25.10 CORONARY ARTERY DISEASE, UNSPECIFIED VESSEL OR LESION TYPE, UNSPECIFIED WHETHER ANGINA PRESENT, UNSPECIFIED WHETHER NATIVE OR TRANSPLANTED HEART: ICD-10-CM

## 2024-03-18 ENCOUNTER — HOSPITAL ENCOUNTER (OUTPATIENT)
Dept: CARDIOLOGY | Facility: HOSPITAL | Age: 89
Discharge: HOME | End: 2024-03-18
Attending: INTERNAL MEDICINE
Payer: MEDICARE

## 2024-03-18 ENCOUNTER — OFFICE VISIT (OUTPATIENT)
Dept: CARDIOLOGY | Facility: CLINIC | Age: 89
End: 2024-03-18
Payer: MEDICARE

## 2024-03-18 VITALS
BODY MASS INDEX: 26.55 KG/M2 | WEIGHT: 179.25 LBS | OXYGEN SATURATION: 98 % | DIASTOLIC BLOOD PRESSURE: 80 MMHG | HEART RATE: 76 BPM | HEIGHT: 69 IN | SYSTOLIC BLOOD PRESSURE: 142 MMHG

## 2024-03-18 DIAGNOSIS — Z45.018 BIVENTRICULAR PACEMAKER CHECK: ICD-10-CM

## 2024-03-18 DIAGNOSIS — I11.0 HYPERTENSIVE HEART DISEASE WITH HEART FAILURE: ICD-10-CM

## 2024-03-18 DIAGNOSIS — I65.23 ASYMPTOMATIC STENOSIS OF BOTH CAROTID ARTERIES WITHOUT INFARCTION: ICD-10-CM

## 2024-03-18 DIAGNOSIS — K31.819 AVM (ARTERIOVENOUS MALFORMATION) OF STOMACH, ACQUIRED: ICD-10-CM

## 2024-03-18 DIAGNOSIS — I48.21 PERMANENT ATRIAL FIBRILLATION: ICD-10-CM

## 2024-03-18 DIAGNOSIS — I35.0 NONRHEUMATIC AORTIC VALVE STENOSIS: ICD-10-CM

## 2024-03-18 DIAGNOSIS — I25.10 CORONARY ARTERY DISEASE, UNSPECIFIED VESSEL OR LESION TYPE, UNSPECIFIED WHETHER ANGINA PRESENT, UNSPECIFIED WHETHER NATIVE OR TRANSPLANTED HEART: ICD-10-CM

## 2024-03-18 DIAGNOSIS — I50.42 CHRONIC COMBINED SYSTOLIC AND DIASTOLIC CONGESTIVE HEART FAILURE: ICD-10-CM

## 2024-03-18 DIAGNOSIS — I50.42 CHRONIC COMBINED SYSTOLIC AND DIASTOLIC CONGESTIVE HEART FAILURE: Primary | ICD-10-CM

## 2024-03-18 DIAGNOSIS — I63.9 ISCHEMIC STROKE: ICD-10-CM

## 2024-03-18 DIAGNOSIS — G45.9 TIA (TRANSIENT ISCHEMIC ATTACK): ICD-10-CM

## 2024-03-18 DIAGNOSIS — N18.32 STAGE 3B CHRONIC KIDNEY DISEASE: ICD-10-CM

## 2024-03-18 DIAGNOSIS — I71.21 ANEURYSM OF ASCENDING AORTA WITHOUT RUPTURE: ICD-10-CM

## 2024-03-18 PROCEDURE — 1101F PT FALLS ASSESS-DOCD LE1/YR: CPT | Mod: CPTII,S$GLB,, | Performed by: INTERNAL MEDICINE

## 2024-03-18 PROCEDURE — 93281 PM DEVICE PROGR EVAL MULTI: CPT | Mod: 26,,, | Performed by: INTERNAL MEDICINE

## 2024-03-18 PROCEDURE — 1160F RVW MEDS BY RX/DR IN RCRD: CPT | Mod: CPTII,S$GLB,, | Performed by: INTERNAL MEDICINE

## 2024-03-18 PROCEDURE — 99999 PR PBB SHADOW E&M-EST. PATIENT-LVL V: CPT | Mod: PBBFAC,,, | Performed by: INTERNAL MEDICINE

## 2024-03-18 PROCEDURE — 1126F AMNT PAIN NOTED NONE PRSNT: CPT | Mod: CPTII,S$GLB,, | Performed by: INTERNAL MEDICINE

## 2024-03-18 PROCEDURE — 93281 PM DEVICE PROGR EVAL MULTI: CPT

## 2024-03-18 PROCEDURE — 99214 OFFICE O/P EST MOD 30 MIN: CPT | Mod: S$GLB,,, | Performed by: INTERNAL MEDICINE

## 2024-03-18 PROCEDURE — 3288F FALL RISK ASSESSMENT DOCD: CPT | Mod: CPTII,S$GLB,, | Performed by: INTERNAL MEDICINE

## 2024-03-18 PROCEDURE — 1157F ADVNC CARE PLAN IN RCRD: CPT | Mod: CPTII,S$GLB,, | Performed by: INTERNAL MEDICINE

## 2024-03-18 PROCEDURE — 1159F MED LIST DOCD IN RCRD: CPT | Mod: CPTII,S$GLB,, | Performed by: INTERNAL MEDICINE

## 2024-03-18 NOTE — PROGRESS NOTES
"  Subjective:   Patient ID:  Anthony Blair is a 89 y.o. male     Chief complaint:Congestive Heart Failure      HPI  Background (for extensive clinical details, see my note dated 10/24/2018):  In short, he is 87 years old, he has a pacemaker for heart block and is pacemaker dependent.  He has a history of persistent atrial fibrillation and there is also a prior history of intracerebral bleed.    He is currently on no anticoagulants (as per Dr. Seven Frazier recommendations). He also has an AVM of the stomach.  He has CAD, hypertension, mixed hyperlipidemia, peripheral vascular disease and diabetes type 2.     Update 9/14/2020:   When PPM reached DOLORES, I recommended immediate pacemaker replacement and revision of his atrial lead.)  Venography as needed 1st).   He wanted to wait for a couple weeks but eventually had his procedure done on 08/06/2020.  His chronic RA lead was not removed due to lead to lead adhesions especially at the level of the SVC.  A new RA lead was added and an LV lead was placed.  Since then,he has been feeling great. "I feel as improved as I did after my bypass surgery". Wife says "it feels so good to see him feel like that".   He seems to be unrestricted now.   His sleep is also of better quality now  >>   Excellent clinical response to CRT via His pacing -- he is in persisent AT/AF and therefore in a VVIR BiV paced mode . PPM in excellent repair.      Echo - 9/21/21  The left ventricle is normal in size with concentric hypertrophy and low normal systolic function.  The estimated ejection fraction is 50%.  Indeterminate left ventricular diastolic function.  Normal right ventricular size with low normal right ventricular systolic function.  Mild to moderate tricuspid regurgitation.  Mild mitral regurgitation.  Mild aortic regurgitation.  There is pulmonary hypertension.  The estimated PA systolic pressure is 47 mmHg.     BNP 40s in May  CBC OK  BMP OK     Clinically:  Doing well in general but has " some decrease in energy - @ 1 month - [place] is OK - he has been coughing and not sleeping well     He was able to walk from parking to clinic w/o issues   ICD eval - all OK - 99% His pacing, SB is underlying @ 37 bpm  >>  Doing well in general - he seems to be having post nasal drip   >>  Use wedge pillow during sleep     Update 09/18/2023 :  After a long trip to [place] with a bus tour of 9 days he had some bilateral leg swelling.  He increased his Lasix from 40 to 80 mg a day.  This helped.  Otherwise, he is feeling reasonably well except with some dyspnea on exertion and some fatigue.  He mentioned that he has black tarry stools by his ROS sheet completion.    >>   Possible effect of altitude causing some diastolic failure.  He seems to have recovered.   >>  Decrease Lasix to 80/40 q.o.d. for the next 2 weeks then go back to 40 mg a day.       Update 03/18/2024 :  Patient's device (VVI BiV -His/RV)was fully evaluated today under my direct supervision and real-time feedback.  Summary of findings are as listed below:  Device is in good repair.   The battery is not near DOLORES.  The sensing and pacing thresholds are favorable with well maintained safety margins. HV @ 40 msec, non selective His pacing   There were no significant tachy-dysrhythmias noted.  There were no device data indicating possible ongoing fluid retention.    Recommendation:  Device follow up as per clinic routine with remote and in house checks  I personally reviewed his medication list with him and his  and he had in fact stopped flecainide as instructed.  Otherwise, he feels well.  His main complaint has been dizziness in the morning when he gets up.  Has to sit the the edge of the bed for a while.  Also 2 to 3 weeks ago he had some itching around the pacemaker in some acute chest pain around that area.  This since subsided.  He says that he sleeps a lot in the afternoon.    Current Outpatient Medications   Medication Sig    ACCU-CHEK  GUIDE TEST STRIPS Strp TEST BLOOD SUGAR SIX TIMES A DAY    ACCU-CHEK MULTICLIX LANCET lancets TEST BLOOD SUGAR THREE TIMES DAILY    ascorbic acid, vitamin C, (VITAMIN C) 100 MG tablet Take 100 mg by mouth once daily.    aspirin (ECOTRIN) 81 MG EC tablet Take 81 mg by mouth every other day.    blood-glucose meter Misc 1 Device by Misc.(Non-Drug; Combo Route) route once. for 1 dose    CARBOXYMETHYLCELLULOSE SODIUM (REFRESH OPHT) Apply to eye.    diclofenac sodium (VOLTAREN) 1 % Gel Apply 2 g topically once daily.    ferrous gluconate (FERGON) 324 MG tablet Take 1 tablet (324 mg total) by mouth 2 (two) times daily with meals.    ferrous sulfate (IRON ORAL) Take 65 mg by mouth.    flecainide (TAMBOCOR) 50 MG Tab TAKE ONE TABLET BY MOUTH EVERY TWELVE HOURS    fluticasone propionate (FLONASE) 50 mcg/actuation nasal spray 2 sprays (100 mcg total) by Each Nostril route once daily.    furosemide (LASIX) 40 MG tablet Take 2 tablets (80 mg total) by mouth once daily.    glycopyrrolate (ROBINUL) 1 mg Tab Take 1 tablet (1 mg total) by mouth 3 (three) times daily. (Patient taking differently: Take 1 mg by mouth 3 (three) times a week.)    guaiFENesin (MUCINEX) 600 mg 12 hr tablet Take 1 tablet (600 mg total) by mouth 2 (two) times daily.    insulin (LANTUS SOLOSTAR U-100 INSULIN) glargine 100 units/mL (3mL) SubQ pen INJECT 16 UNITS SUBCUTANEOUSLY AT BEDTIME. 94 DAY SUPPLY    insulin aspart U-100 (NOVOLOG FLEXPEN U-100 INSULIN) 100 unit/mL (3 mL) InPn pen INJECT EIGHT UNITS BEFORE BREAKFAST, SIX UNITS BEFORE LUNCH, AND EIGHT UNITS BEFORE DINNER. MAY TITRATE TO A MAX DOSE OF 50 UNITS PER DAY    isosorbide mononitrate (IMDUR) 60 MG 24 hr tablet TAKE ONE TABLET BY MOUTH TWICE DAILY    losartan (COZAAR) 100 MG tablet TAKE ONE TABLET BY MOUTH EVERY DAY    metoprolol succinate (TOPROL-XL) 100 MG 24 hr tablet TAKE ONE TABLET BY MOUTH TWICE DAILY    multivitamin (ONE DAILY MULTIVITAMIN) per tablet Take 1 tablet by mouth once daily.     "nitroGLYCERIN (NITROSTAT) 0.4 MG SL tablet Place 1 tablet (0.4 mg total) under the tongue every 5 (five) minutes as needed for Chest pain.    pantoprazole (PROTONIX) 40 MG tablet TAKE ONE TABLET BY MOUTH ONCE DAILY    pen needle, diabetic (COMFORT EZ PEN NEEDLES) 32 gauge x 5/32" Ndle USE FOUR TIMES DAILY.    saw palmetto 500 MG capsule Take 500 mg by mouth 2 (two) times a day.    simethicone (GAS-X EXTRA STRENGTH) 125 mg Cap capsule Take 1 capsule (125 mg total) by mouth 4 (four) times daily as needed for Flatulence.    turmeric 400 mg Cap Take 1 capsule by mouth once daily.    vitamin D (VITAMIN D3) 1000 units Tab Take 1,000 Units by mouth once daily.    vitamin E 100 UNIT capsule Take 100 Units by mouth once daily.    zinc gluconate 50 mg tablet Take 50 mg by mouth once daily.    dapagliflozin propanediol (FARXIGA) 10 mg tablet Take 1 tablet (10 mg total) by mouth once daily. (Patient not taking: Reported on 3/18/2024)    nitroGLYCERIN (NITROSTAT) 0.4 MG SL tablet Place 1 tablet (0.4 mg total) under the tongue every 5 (five) minutes as needed for Chest pain.     No current facility-administered medications for this visit.       Review of Systems     Constitutional: Reviewed  for decreased appetite, weight gain and weight loss.   HENT: Reviewed for nosebleeds.    Eyes:  Reviewed for blurred vision and visual disturbance.   Cardiovascular: Reviewed for chest pain, claudication, cyanosis,dyspnea on exertion, leg swelling, orthopnea,paroxysmal nocturnal dyspnearregular heartbeats, palpitations, near-syncope, and syncope.   Respiratory: Reviewed for cough, shortness of breath, wheezing, sleep disturbances due to breathing and snoring, .    Endocrine: Reviewed for heat intolerance.   Hematologic/Lymphatic: Reviewed for easy bruisability/bleeding.   Skin: Reviewed for rash.   Musculoskeletal: Reviewed for muscle weakness and myalgias.   Gastrointestinal: Reviewed for abdominal pain, anorexia, melena, nausea and " vomiting.   Genitourinary: Reviewed for hesitancy, frequency, nocturia and incontinence.   Neurological: Reviewed for excessive daytime sleepiness, dizziness, vertigo, weakness, headaches, loss of balance and seizures,   Psychiatric/Behavioral:  Reviewed for insomnia, altered mental status, depression, anxiety and nervousness.          Positive complaints includes intermittent dyspnea on exertion, deep time sleepiness, lightheadedness and loss of balance as well as dizziness in the morning as he sits on the edge of the bed.  Social History     Tobacco Use   Smoking Status Former    Current packs/day: 0.00    Average packs/day: 2.0 packs/day for 13.0 years (26.1 ttl pk-yrs)    Types: Cigarettes    Start date: 1954    Quit date: 1967    Years since quittin.8   Smokeless Tobacco Never        Objective:     Physical Exam  Vitals and nursing note reviewed.   Constitutional:       Appearance: Normal appearance. He is well-developed.   HENT:      Head: Normocephalic and atraumatic.      Right Ear: External ear normal.      Left Ear: External ear normal.   Eyes:      Conjunctiva/sclera: Conjunctivae normal.      Left eye: Left conjunctiva is not injected. No hemorrhage.     Pupils: Pupils are equal, round, and reactive to light.   Neck:      Thyroid: No thyromegaly.      Vascular: No JVD.   Cardiovascular:      Rate and Rhythm: Normal rate and regular rhythm.      Chest Wall: PMI is not displaced.      Pulses: Intact distal pulses.           Carotid pulses are 2+ on the right side and 2+ on the left side.       Radial pulses are 2+ on the right side and 2+ on the left side.        Dorsalis pedis pulses are 2+ on the right side and 2+ on the left side.        Posterior tibial pulses are 2+ on the right side and 2+ on the left side.      Heart sounds: No midsystolic click and no opening snap. Murmur heard.      Systolic murmur is present with a grade of 2/6.      No friction rub. No gallop.      Comments:  "Orthostatic testing performed in the clinic yielded normal results over 5 minutes observation.  Pulmonary:      Effort: Pulmonary effort is normal. No respiratory distress.      Breath sounds: Normal breath sounds. No wheezing or rales.   Chest:      Chest wall: No tenderness.      Comments: Device pocket is intact although a bit more prominent than I would like.    Abdominal:      Palpations: Abdomen is soft. Abdomen is not rigid. There is no hepatomegaly.      Tenderness: There is no abdominal tenderness.   Musculoskeletal:         General: No tenderness. Normal range of motion.      Cervical back: Neck supple.      Right knee: No swelling.      Left knee: No swelling.      Right lower leg: No swelling.      Left lower leg: No swelling.      Right ankle: No swelling.      Left ankle: No swelling.      Right foot: No swelling.      Left foot: No swelling.   Skin:     General: Skin is warm and dry.      Findings: No rash.   Neurological:      Mental Status: He is alert and oriented to person, place, and time.      Cranial Nerves: No cranial nerve deficit.      Coordination: Coordination normal.      Deep Tendon Reflexes: Reflexes are normal and symmetric.   Psychiatric:         Behavior: Behavior normal.       BP (!) 142/80   Pulse 76   Ht 5' 9" (1.753 m)   Wt 81.3 kg (179 lb 3.7 oz)   SpO2 98%   BMI 26.47 kg/m²       Results for orders placed in visit on 08/24/23    Echo    Interpretation Summary    Left Ventricle: The left ventricle is normal in size. Normal wall thickness. There is concentric remodeling. Normal wall motion. There is normal systolic function with a visually estimated ejection fraction of 55 - 60%. There is diastolic dysfunction.    Left Atrium: Left atrium is moderately dilated. Likely patent foramen ovale visualized with predominant left to right shunting indicated by color flow Doppler.    Right Ventricle: Normal right ventricular cavity size. Wall thickness is normal. Right ventricle wall " motion  is normal. Systolic function is normal.    Aortic Valve: There is mild aortic valve sclerosis without stenosis.    Tricuspid Valve: There is mild regurgitation.    Pulmonary Artery: The estimated pulmonary artery systolic pressure is 32 mmHg.    IVC/SVC: Normal venous pressure at 3 mmHg.    WBC   Date Value Ref Range Status   01/11/2024 6.08 3.90 - 12.70 K/uL Final     Hematocrit   Date Value Ref Range Status   01/11/2024 32.7 (L) 40.0 - 54.0 % Final     Hemoglobin   Date Value Ref Range Status   01/11/2024 10.7 (L) 14.0 - 18.0 g/dL Final     Lab Results   Component Value Date     01/11/2024     Lab Results   Component Value Date    CREATININE 1.5 (H) 01/11/2024    EGFRNORACEVR 44.0 (A) 01/11/2024    K 4.2 01/11/2024     Lab Results   Component Value Date    BNP 79 09/13/2023            reports no history of alcohol use.  Past Medical History:   Diagnosis Date    A-fib     Anemia     AP (angina pectoris) 01/23/2014    Carotid artery disease without cerebral infarction 08/22/2014    Cataract     Cirrhosis of liver 09/10/2020    Coronary artery disease     Diabetes mellitus 1970     am 11/21/2022  Insulin x 6-7 years.    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    Hyperlipidemia     Hypertension     Hypertensive heart disease with heart failure 03/12/2019    Intracranial hemorrhage     Lumbosacral spondylosis 12/24/2014    Pacemaker 01/23/2014    Paroxysmal atrial fibrillation 01/23/2014    Peripheral vascular disease 08/22/2014    Pneumonia of right lung due to infectious organism 03/27/2019    Seizure, late effect of stroke     Stage 3b chronic kidney disease 10/18/2023    Stroke     Type 2 diabetes mellitus with ophthalmic manifestations      Past Surgical History:   Procedure Laterality Date    ANGIOPLASTY      ATRIAL ABLATION SURGERY N/A      CATARACT EXTRACTION Bilateral     [facility]    CATARACT EXTRACTION W/ INTRAOCULAR LENS IMPLANT Right     CATARACT EXTRACTION W/ INTRAOCULAR LENS IMPLANT Left     COLONOSCOPY N/A 10/16/2018    Procedure: COLONOSCOPY with biopsies;  Surgeon: Anabell Griggs MD;  Location: [facility] ENDO;  Service: Endoscopy;  Laterality: N/A;    CORONARY ARTERY BYPASS GRAFT  07/2010    x5    EYE SURGERY      pace maker surgrey  10/2010    reposition in 2011/2012 (approx)    REPLACEMENT OF PACEMAKER GENERATOR Left 8/6/2020    Procedure: REPLACEMENT, PULSE GENERATOR, CARDIAC PACEMAKER;  Surgeon: Domenico Grajeda MD;  Location: [facility] CATH LAB;  Service: Cardiology;  Laterality: Left;  [facility]    REVISION OF PROCEDURE INVOLVING PACEMAKER LEAD Bilateral 8/6/2020    Procedure: REVISION, ELECTRODE LEAD, CARDIAC PACEMAKER;  Surgeon: Domenico Grajeda MD;  Location: [facility] CATH LAB;  Service: Cardiology;  Laterality: Bilateral;    VEIN BYPASS SURGERY       Family History   Problem Relation Age of Onset    Cataracts Mother     Arthritis Mother     Diabetes Mother     Kidney disease Mother     Heart disease Mother     Arthritis Father     Diabetes Father     Diabetes Sister     Cancer Sister         breast    Heart disease Sister     Diabetes Brother     Kidney disease Brother     Heart disease Brother     Alcohol abuse Paternal Uncle     Cancer Daughter         breast    Diabetes Daughter     Miscarriages / Stillbirths Daughter     Fibromyalgia Daughter     Diabetes Son     Diabetes Son     Stroke Neg Hx     Hypertension Neg Hx     Hyperlipidemia Neg Hx     COPD Neg Hx        Assessment:    History of pacing induced cardiomyopathy, 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  Now in permanent atrial fibrillation.  He is not anticoagulated due to history of recurrent bleeds of his intestinal AVMs.  He may want to consider left atrial appendage closure.  Pacemaker is in good shape including the pacemaker pocket.  I recommended that he refrain from  scrubbing the area or scratching it.    1. Chronic combined systolic and diastolic congestive heart failure    2. Permanent atrial fibrillation    3. Biventricular pacemaker check    4. Ischemic stroke    5. TIA (transient ischemic attack)    6. Nonrheumatic aortic valve stenosis    7. Aneurysm of ascending aorta without rupture    8. Asymptomatic stenosis of both carotid arteries without infarction    9. Hypertensive heart disease with heart failure    10. Stage 3b chronic kidney disease    11. AVM (arteriovenous malformation) of stomach, acquired        Plan:    I discussed routine device follow up including quarterly to bi-annual device checks for device function as well as yearly follow up in the EP clinic. The patient  was advised to call with any concerns regarding their device. Device clinic follow up as scheduled. RTC 1y       No orders of the defined types were placed in this encounter.      Follow up in about 1 year (around 3/18/2025), or if symptoms worsen or fail to improve.    There are no discontinued medications.         Medication List with Changes/Refills   Current Medications    ACCU-CHEK GUIDE TEST STRIPS STRP    TEST BLOOD SUGAR SIX TIMES A DAY    ACCU-CHEK MULTICLIX LANCET LANCETS    TEST BLOOD SUGAR THREE TIMES DAILY    ASCORBIC ACID, VITAMIN C, (VITAMIN C) 100 MG TABLET    Take 100 mg by mouth once daily.    ASPIRIN (ECOTRIN) 81 MG EC TABLET    Take 81 mg by mouth every other day.    BLOOD-GLUCOSE METER MISC    1 Device by Misc.(Non-Drug; Combo Route) route once. for 1 dose    CARBOXYMETHYLCELLULOSE SODIUM (REFRESH OPHT)    Apply to eye.    DAPAGLIFLOZIN PROPANEDIOL (FARXIGA) 10 MG TABLET    Take 1 tablet (10 mg total) by mouth once daily.    DICLOFENAC SODIUM (VOLTAREN) 1 % GEL    Apply 2 g topically once daily.    FERROUS GLUCONATE (FERGON) 324 MG TABLET    Take 1 tablet (324 mg total) by mouth 2 (two) times daily with meals.    FERROUS SULFATE (IRON ORAL)    Take 65 mg by mouth.     "FLECAINIDE (TAMBOCOR) 50 MG TAB    TAKE ONE TABLET BY MOUTH EVERY TWELVE HOURS    FLUTICASONE PROPIONATE (FLONASE) 50 MCG/ACTUATION NASAL SPRAY    2 sprays (100 mcg total) by Each Nostril route once daily.    FUROSEMIDE (LASIX) 40 MG TABLET    Take 2 tablets (80 mg total) by mouth once daily.    GLYCOPYRROLATE (ROBINUL) 1 MG TAB    Take 1 tablet (1 mg total) by mouth 3 (three) times daily.    GUAIFENESIN (MUCINEX) 600 MG 12 HR TABLET    Take 1 tablet (600 mg total) by mouth 2 (two) times daily.    INSULIN (LANTUS SOLOSTAR U-100 INSULIN) GLARGINE 100 UNITS/ML (3ML) SUBQ PEN    INJECT 16 UNITS SUBCUTANEOUSLY AT BEDTIME. 94 DAY SUPPLY    INSULIN ASPART U-100 (NOVOLOG FLEXPEN U-100 INSULIN) 100 UNIT/ML (3 ML) INPN PEN    INJECT EIGHT UNITS BEFORE BREAKFAST, SIX UNITS BEFORE LUNCH, AND EIGHT UNITS BEFORE DINNER. MAY TITRATE TO A MAX DOSE OF 50 UNITS PER DAY    ISOSORBIDE MONONITRATE (IMDUR) 60 MG 24 HR TABLET    TAKE ONE TABLET BY MOUTH TWICE DAILY    LOSARTAN (COZAAR) 100 MG TABLET    TAKE ONE TABLET BY MOUTH EVERY DAY    METOPROLOL SUCCINATE (TOPROL-XL) 100 MG 24 HR TABLET    TAKE ONE TABLET BY MOUTH TWICE DAILY    MULTIVITAMIN (ONE DAILY MULTIVITAMIN) PER TABLET    Take 1 tablet by mouth once daily.    NITROGLYCERIN (NITROSTAT) 0.4 MG SL TABLET    Place 1 tablet (0.4 mg total) under the tongue every 5 (five) minutes as needed for Chest pain.    NITROGLYCERIN (NITROSTAT) 0.4 MG SL TABLET    Place 1 tablet (0.4 mg total) under the tongue every 5 (five) minutes as needed for Chest pain.    PANTOPRAZOLE (PROTONIX) 40 MG TABLET    TAKE ONE TABLET BY MOUTH ONCE DAILY    PEN NEEDLE, DIABETIC (COMFORT EZ PEN NEEDLES) 32 GAUGE X 5/32" NDLE    USE FOUR TIMES DAILY.    SAW PALMETTO 500 MG CAPSULE    Take 500 mg by mouth 2 (two) times a day.    SIMETHICONE (GAS-X EXTRA STRENGTH) 125 MG CAP CAPSULE    Take 1 capsule (125 mg total) by mouth 4 (four) times daily as needed for Flatulence.    TURMERIC 400 MG CAP    Take 1 capsule by " mouth once daily.    VITAMIN D (VITAMIN D3) 1000 UNITS TAB    Take 1,000 Units by mouth once daily.    VITAMIN E 100 UNIT CAPSULE    Take 100 Units by mouth once daily.    ZINC GLUCONATE 50 MG TABLET    Take 50 mg by mouth once daily.       This note is at least partially dictated using the M*Modal Fluency Direct word recognition program. There are word recognition mistakes that are occasionally missed on review.

## 2024-03-23 ENCOUNTER — PATIENT MESSAGE (OUTPATIENT)
Dept: CARDIOLOGY | Facility: CLINIC | Age: 89
End: 2024-03-23
Payer: MEDICARE

## 2024-04-21 LAB
OHS CV BIV PACING PERCENT: 98 %
OHS CV DC REMOTE DEVICE TYPE: NORMAL
OHS CV RV PACING PERCENT: 98 %

## 2024-04-24 ENCOUNTER — CLINICAL SUPPORT (OUTPATIENT)
Dept: CARDIOLOGY | Facility: HOSPITAL | Age: 89
End: 2024-04-24
Attending: INTERNAL MEDICINE
Payer: MEDICARE

## 2024-04-24 ENCOUNTER — CLINICAL SUPPORT (OUTPATIENT)
Dept: CARDIOLOGY | Facility: HOSPITAL | Age: 89
End: 2024-04-24
Payer: MEDICARE

## 2024-04-24 DIAGNOSIS — I44.2 ATRIOVENTRICULAR BLOCK, COMPLETE: ICD-10-CM

## 2024-04-24 DIAGNOSIS — Z95.0 PRESENCE OF CARDIAC PACEMAKER: ICD-10-CM

## 2024-04-24 DIAGNOSIS — I50.42 CHRONIC COMBINED SYSTOLIC (CONGESTIVE) AND DIASTOLIC (CONGESTIVE) HEART FAILURE: ICD-10-CM

## 2024-04-24 PROCEDURE — 93296 REM INTERROG EVL PM/IDS: CPT | Performed by: INTERNAL MEDICINE

## 2024-04-24 PROCEDURE — 93294 REM INTERROG EVL PM/LDLS PM: CPT | Mod: S$GLB,,, | Performed by: INTERNAL MEDICINE

## 2024-04-25 ENCOUNTER — OFFICE VISIT (OUTPATIENT)
Dept: FAMILY MEDICINE | Facility: CLINIC | Age: 89
End: 2024-04-25
Payer: MEDICARE

## 2024-04-25 VITALS
HEART RATE: 71 BPM | OXYGEN SATURATION: 96 % | BODY MASS INDEX: 26.42 KG/M2 | HEIGHT: 69 IN | DIASTOLIC BLOOD PRESSURE: 60 MMHG | WEIGHT: 178.38 LBS | SYSTOLIC BLOOD PRESSURE: 124 MMHG

## 2024-04-25 DIAGNOSIS — I48.21 PERMANENT ATRIAL FIBRILLATION: ICD-10-CM

## 2024-04-25 DIAGNOSIS — I50.42 CHRONIC COMBINED SYSTOLIC AND DIASTOLIC CONGESTIVE HEART FAILURE: ICD-10-CM

## 2024-04-25 DIAGNOSIS — N18.32 STAGE 3B CHRONIC KIDNEY DISEASE: ICD-10-CM

## 2024-04-25 DIAGNOSIS — N18.32 TYPE 2 DIABETES MELLITUS WITH STAGE 3B CHRONIC KIDNEY DISEASE, WITH LONG-TERM CURRENT USE OF INSULIN: ICD-10-CM

## 2024-04-25 DIAGNOSIS — E11.9 DIABETES MELLITUS TYPE 2 WITHOUT RETINOPATHY: Chronic | ICD-10-CM

## 2024-04-25 DIAGNOSIS — I73.9 CLAUDICATION IN PERIPHERAL VASCULAR DISEASE: Chronic | ICD-10-CM

## 2024-04-25 DIAGNOSIS — I73.9 PERIPHERAL VASCULAR DISEASE: Chronic | ICD-10-CM

## 2024-04-25 DIAGNOSIS — E11.22 TYPE 2 DIABETES MELLITUS WITH STAGE 3B CHRONIC KIDNEY DISEASE, WITH LONG-TERM CURRENT USE OF INSULIN: ICD-10-CM

## 2024-04-25 DIAGNOSIS — Z79.4 DIABETES MELLITUS DUE TO UNDERLYING CONDITION WITH DIABETIC PERIPHERAL ANGIOPATHY WITHOUT GANGRENE, WITH LONG-TERM CURRENT USE OF INSULIN: ICD-10-CM

## 2024-04-25 DIAGNOSIS — Z00.00 ENCOUNTER FOR MEDICARE ANNUAL WELLNESS EXAM: Primary | ICD-10-CM

## 2024-04-25 DIAGNOSIS — E08.51 DIABETES MELLITUS DUE TO UNDERLYING CONDITION WITH DIABETIC PERIPHERAL ANGIOPATHY WITHOUT GANGRENE, WITH LONG-TERM CURRENT USE OF INSULIN: ICD-10-CM

## 2024-04-25 DIAGNOSIS — Z79.4 TYPE 2 DIABETES MELLITUS WITH STAGE 3B CHRONIC KIDNEY DISEASE, WITH LONG-TERM CURRENT USE OF INSULIN: ICD-10-CM

## 2024-04-25 DIAGNOSIS — I65.23 BILATERAL CAROTID ARTERY STENOSIS: ICD-10-CM

## 2024-04-25 DIAGNOSIS — D69.2 SENILE PURPURA: ICD-10-CM

## 2024-04-25 PROCEDURE — 99999 PR PBB SHADOW E&M-EST. PATIENT-LVL V: CPT | Mod: PBBFAC,,, | Performed by: NURSE PRACTITIONER

## 2024-04-25 PROCEDURE — 1159F MED LIST DOCD IN RCRD: CPT | Mod: CPTII,S$GLB,, | Performed by: NURSE PRACTITIONER

## 2024-04-25 PROCEDURE — 3288F FALL RISK ASSESSMENT DOCD: CPT | Mod: CPTII,S$GLB,, | Performed by: NURSE PRACTITIONER

## 2024-04-25 PROCEDURE — G0439 PPPS, SUBSEQ VISIT: HCPCS | Mod: S$GLB,,, | Performed by: NURSE PRACTITIONER

## 2024-04-25 PROCEDURE — 1160F RVW MEDS BY RX/DR IN RCRD: CPT | Mod: CPTII,S$GLB,, | Performed by: NURSE PRACTITIONER

## 2024-04-25 PROCEDURE — 1123F ACP DISCUSS/DSCN MKR DOCD: CPT | Mod: CPTII,S$GLB,, | Performed by: NURSE PRACTITIONER

## 2024-04-25 PROCEDURE — 1101F PT FALLS ASSESS-DOCD LE1/YR: CPT | Mod: CPTII,S$GLB,, | Performed by: NURSE PRACTITIONER

## 2024-04-25 NOTE — PATIENT INSTRUCTIONS
Counseling and Referral of Other Preventative  (Italic type indicates deductible and co-insurance are waived)    Patient Name: Anthony Blair  Today's Date: 4/25/2024    Health Maintenance       Date Due Completion Date    Shingles Vaccine (1 of 2) Never done ---    RSV Vaccine (Age 60+ and Pregnant patients) (1 - 1-dose 60+ series) Never done ---    Diabetes Urine Screening 09/09/2022 9/9/2021    TETANUS VACCINE 01/23/2024 1/23/2014    Hemoglobin A1c 07/11/2024 1/11/2024    Lipid Panel 01/11/2025 1/11/2024    Eye Exam 01/29/2025 1/29/2024    Override on 7/6/2020: Done    Override on 7/1/2019: Done    Override on 6/25/2018: Done    Override on 1/21/2016: Done    Override on 1/21/2016: Done    Override on 12/11/2014: Done    Override on 1/23/2014: Done        No orders of the defined types were placed in this encounter.      The following information is provided to all patients.  This information is to help you find resources for any of the problems found today that may be affecting your health:                  Living healthy guide: www.[facility].[place].gov      Understanding Diabetes: www.diabetes.org      Eating healthy: www.cdc.gov/healthyweight      CDC home safety checklist: www.cdc.gov/steadi/patient.html      Agency on Aging: www.goea.[place].[facility]      Alcoholics anonymous (AA): www.aa.org      Physical Activity: www.nathan.nih.gov/hq7ashe      Tobacco use: www.quitwithusla.org

## 2024-04-28 LAB
OHS CV BIV PACING PERCENT: 99 %
OHS CV DC REMOTE DEVICE TYPE: NORMAL

## 2024-05-01 DIAGNOSIS — E61.1 IRON DEFICIENCY: ICD-10-CM

## 2024-05-01 RX ORDER — FERROUS GLUCONATE 324(38)MG
1 TABLET ORAL 2 TIMES DAILY WITH MEALS
Qty: 60 TABLET | Refills: 3 | Status: SHIPPED | OUTPATIENT
Start: 2024-05-01 | End: 2024-05-21 | Stop reason: SDUPTHER

## 2024-05-02 PROBLEM — E11.22 TYPE 2 DIABETES MELLITUS WITH STAGE 3B CHRONIC KIDNEY DISEASE, WITH LONG-TERM CURRENT USE OF INSULIN: Status: ACTIVE | Noted: 2024-05-02

## 2024-05-02 PROBLEM — Z79.4 TYPE 2 DIABETES MELLITUS WITH STAGE 3B CHRONIC KIDNEY DISEASE, WITH LONG-TERM CURRENT USE OF INSULIN: Status: ACTIVE | Noted: 2024-05-02

## 2024-05-02 PROBLEM — D69.2 SENILE PURPURA: Status: ACTIVE | Noted: 2024-05-02

## 2024-05-02 PROBLEM — N18.32 TYPE 2 DIABETES MELLITUS WITH STAGE 3B CHRONIC KIDNEY DISEASE, WITH LONG-TERM CURRENT USE OF INSULIN: Status: ACTIVE | Noted: 2024-05-02

## 2024-05-02 NOTE — PROGRESS NOTES
"Anthony Blair presented for an initial Medicare AWV today. The following components were reviewed and updated:    Medical history  Family History  Social history  Allergies and Current Medications  Health Risk Assessment  Health Maintenance  Care Team    **See Completed Assessments for Annual Wellness visit with in the encounter summary    The following assessments were completed:  Depression Screening  Cognitive function Screening    Timed Get Up Test  Whisper Test  Reports symptoms of hypoglycemia approx 4-5x/week.  Pt is educated on interventions to implement when experiencing low blood sugar:  check BG, drink approx 4-6 oz of juice.   Follow with complex carb and protein to maintain BG level.  Encouraged pt to avoid skipping meals.          Opioid documentation:      Patient does not have a current opioid prescription.          Vitals:    04/25/24 0930   BP: 124/60   Pulse: 71   SpO2: 96%   Weight: 80.9 kg (178 lb 5.6 oz)   Height: 5' 9" (1.753 m)     Body mass index is 26.34 kg/m².       Physical Exam  Vitals reviewed.   Constitutional:       General: He is not in acute distress.  HENT:      Head: Normocephalic.   Cardiovascular:      Rate and Rhythm: Normal rate.   Pulmonary:      Effort: Pulmonary effort is normal. No respiratory distress.   Skin:     General: Skin is warm.   Neurological:      General: No focal deficit present.      Mental Status: He is alert.   Psychiatric:         Mood and Affect: Mood normal.           Diagnoses and health risks identified today and associated recommendations/orders:  1. Encounter for Medicare annual wellness exam  Reviewed health maintenance and provided recommendations    Recommend shingrix, rsv and tdap vaccines    - Ambulatory Referral/Consult to Enhanced Annual Wellness Visit (eAWV)    2. Type 2 diabetes mellitus with stage 3b chronic kidney disease, with long-term current use of insulin  Continue to monitor  Followed by Abdulaziz Mccabe MD   Last A1c 6.0  Taking " lantus, novolog and farxiga  Highly recommend f/u with pcp due to frequency of hypoglycemic episodes  Reviewed interventions for hypoglycemic symptoms, pt verbalized understanding  .      3. Peripheral vascular disease  Continue to monitor  Followed by Dr Fine.      4. Chronic combined systolic and diastolic congestive heart failure  Continue to monitor  Followed by Dr Fine.      5. Permanent atrial fibrillation  Continue to monitor  Followed by Dr Fine  Taking metoprolol and flecainide, .      6. Stage 3b chronic kidney disease  Continue to monitor  Followed by Abdulaziz Mccabe MD   Avoid nsadis.      7. Diabetes mellitus type 2 without retinopathy  Continue to monitor  Followed by Abdulaziz Mccabe MD .      8. Diabetes mellitus due to underlying condition with diabetic peripheral angiopathy without gangrene, with long-term current use of insulin  Continue to monitor  Followed by Abdulaziz Mccabe MD   Last A1c 6.0.      9. Senile purpura  Continue to monitor  Followed by Abdulaziz Mccabe MD .      10. Bilateral carotid artery stenosis  Continue to monitor  Followed by Dr Fine.      11. Claudication in peripheral vascular disease  Continue to monitor  Followed by Dr Fine.        Provided Luzon with a 5-10 year written screening schedule and personal prevention plan. Recommendations were developed using the USPSTF age appropriate recommendations. Education, counseling, and referrals were provided as needed.  After Visit Summary printed and given to patient which includes a list of additional screenings\tests needed.    No follow-ups on file.      Idalia Vergara NP

## 2024-05-06 ENCOUNTER — PATIENT MESSAGE (OUTPATIENT)
Dept: FAMILY MEDICINE | Facility: CLINIC | Age: 89
End: 2024-05-06
Payer: MEDICARE

## 2024-05-06 DIAGNOSIS — I50.32 CHRONIC DIASTOLIC HEART FAILURE: ICD-10-CM

## 2024-05-06 DIAGNOSIS — E11.51 TYPE 2 DIABETES MELLITUS WITH DIABETIC PERIPHERAL ANGIOPATHY WITHOUT GANGRENE, WITH LONG-TERM CURRENT USE OF INSULIN: ICD-10-CM

## 2024-05-06 DIAGNOSIS — E11.9 DIABETES MELLITUS TYPE 2 WITHOUT RETINOPATHY: Chronic | ICD-10-CM

## 2024-05-06 DIAGNOSIS — I11.0 HYPERTENSIVE HEART DISEASE WITH HEART FAILURE: ICD-10-CM

## 2024-05-06 DIAGNOSIS — I48.0 PAF (PAROXYSMAL ATRIAL FIBRILLATION): ICD-10-CM

## 2024-05-06 DIAGNOSIS — Z79.4 TYPE 2 DIABETES MELLITUS WITH DIABETIC PERIPHERAL ANGIOPATHY WITHOUT GANGRENE, WITH LONG-TERM CURRENT USE OF INSULIN: ICD-10-CM

## 2024-05-06 RX ORDER — METOPROLOL SUCCINATE 100 MG/1
100 TABLET, EXTENDED RELEASE ORAL 2 TIMES DAILY
Qty: 180 TABLET | Refills: 3 | Status: SHIPPED | OUTPATIENT
Start: 2024-05-06

## 2024-05-06 RX ORDER — FUROSEMIDE 40 MG/1
80 TABLET ORAL DAILY
Qty: 180 TABLET | Refills: 3 | Status: SHIPPED | OUTPATIENT
Start: 2024-05-06 | End: 2025-05-06

## 2024-05-06 RX ORDER — LOSARTAN POTASSIUM 100 MG/1
100 TABLET ORAL DAILY
Qty: 90 TABLET | Refills: 3 | Status: SHIPPED | OUTPATIENT
Start: 2024-05-06

## 2024-05-06 RX ORDER — ISOSORBIDE MONONITRATE 60 MG/1
60 TABLET, EXTENDED RELEASE ORAL 2 TIMES DAILY
Qty: 180 TABLET | Refills: 3 | Status: SHIPPED | OUTPATIENT
Start: 2024-05-06

## 2024-05-06 RX ORDER — PANTOPRAZOLE SODIUM 40 MG/1
40 TABLET, DELAYED RELEASE ORAL DAILY
Qty: 90 TABLET | Refills: 3 | Status: SHIPPED | OUTPATIENT
Start: 2024-05-06

## 2024-05-06 RX ORDER — PEN NEEDLE, DIABETIC 30 GX3/16"
NEEDLE, DISPOSABLE MISCELLANEOUS
Qty: 200 EACH | Refills: 6 | Status: SHIPPED | OUTPATIENT
Start: 2024-05-06

## 2024-05-06 RX ORDER — INSULIN ASPART 100 [IU]/ML
INJECTION, SOLUTION INTRAVENOUS; SUBCUTANEOUS
Qty: 30 ML | Refills: 3 | Status: SHIPPED | OUTPATIENT
Start: 2024-05-06 | End: 2024-05-06 | Stop reason: SDUPTHER

## 2024-05-06 RX ORDER — INSULIN GLARGINE 100 [IU]/ML
INJECTION, SOLUTION SUBCUTANEOUS
Qty: 15 ML | Refills: 11 | Status: SHIPPED | OUTPATIENT
Start: 2024-05-06

## 2024-05-06 RX ORDER — INSULIN ASPART 100 [IU]/ML
INJECTION, SOLUTION INTRAVENOUS; SUBCUTANEOUS
Qty: 30 ML | Refills: 3 | Status: SHIPPED | OUTPATIENT
Start: 2024-05-06

## 2024-05-06 NOTE — TELEPHONE ENCOUNTER
No care due was identified.  [facility] Embedded Care Due Messages. Reference number: [id number].   5/06/2024 2:30:53 PM CDT

## 2024-05-06 NOTE — TELEPHONE ENCOUNTER
Care Due:                  Date            Visit Type   Department     Provider  --------------------------------------------------------------------------------                                MYCHART                              ANNUAL                              CHECKUP/PHY  [facility]  Last Visit: 01-      S            SONAL Mccabe                              EP -                              PRIMARY      [facility]  Next Visit: 07-      CARE ([facility])   MEDICINE       Abdulaziz Mccabe                                                            Last  Test          Frequency    Reason                     Performed    Due Date  --------------------------------------------------------------------------------    HBA1C.......  6 months...  insulin..................  01-   07-    [employer] Embedded Care Due Messages. Reference number: [id number].   5/06/2024 9:42:47 AM CDT

## 2024-05-06 NOTE — TELEPHONE ENCOUNTER
----- Message from Thom Chaudhary sent at 5/6/2024  8:39 AM CDT -----  Contact: Anthony Willoughby is calling in regard to requesting refills on the medication insulin aspart U-100 (NOVOLOG FLEXPEN U-100 INSULIN) 100 unit/mL (3 mL) InPn pen and would like this medication and all other prescriptions to be transferred to [facility].  Please call back at .[phone]      [facility] pharmacy fax # [phone]  (mail Order)    Thanks

## 2024-05-21 DIAGNOSIS — E61.1 IRON DEFICIENCY: ICD-10-CM

## 2024-05-21 RX ORDER — FERROUS GLUCONATE 324(38)MG
1 TABLET ORAL 2 TIMES DAILY WITH MEALS
Qty: 60 TABLET | Refills: 3 | Status: SHIPPED | OUTPATIENT
Start: 2024-05-21 | End: 2024-06-03 | Stop reason: SDUPTHER

## 2024-05-21 NOTE — TELEPHONE ENCOUNTER
----- Message from Nereida Galindo sent at 5/21/2024  1:43 PM CDT -----  If you want him to still take this medicine ferrous gluconate 324mg can you send it to [facility]. Please call him a let him know.

## 2024-05-28 DIAGNOSIS — E61.1 IRON DEFICIENCY: ICD-10-CM

## 2024-05-28 RX ORDER — FERROUS GLUCONATE 324(38)MG
TABLET ORAL
Qty: 90 TABLET | Refills: 0 | OUTPATIENT
Start: 2024-05-28

## 2024-06-01 ENCOUNTER — PATIENT MESSAGE (OUTPATIENT)
Dept: FAMILY MEDICINE | Facility: CLINIC | Age: 89
End: 2024-06-01
Payer: MEDICARE

## 2024-06-01 DIAGNOSIS — E61.1 IRON DEFICIENCY: ICD-10-CM

## 2024-06-03 RX ORDER — FERROUS GLUCONATE 324(38)MG
1 TABLET ORAL 2 TIMES DAILY WITH MEALS
Qty: 180 TABLET | Refills: 3 | Status: SHIPPED | OUTPATIENT
Start: 2024-06-03

## 2024-07-10 DIAGNOSIS — I10 ESSENTIAL HYPERTENSION: Primary | ICD-10-CM

## 2024-07-10 NOTE — PROGRESS NOTES
Subjective:   Patient ID:  Anthony Blair is a 89 y.o. male who presents for evaluation of No chief complaint on file.      HPI    Anthony Blair is a 88 year old female who presents to Cardiology for hospital follow up. He was recently admitted to [facility] due to chest pain, bilateral arm numbness, dizziness, weakness, SOB, headache.     He feels Almost back to normal today.   No recurrent chest pain episodes  Has regained strength in his legs since hospital discharge  Completed project at home  Does not sleep well nightly worsened about 3-4 weeks  Discussed sleep hygiene practices in office  Willing to try melatonin again to attempt to improve insomnia      BP at home 135/70, 154/85, 144/76, 146/70, 138/71, 109/61    Denies chest pain or anginal equivalents. No shortness of breath, OLIVARES or palpitations. Denies orthopnea, PND or abdominal bloating. Reports regular walking without any issues lately. NO leg swelling or claudications. No recent falls, syncope or near syncopal events. Reports compliance with medications and dietary restrictions. NO CNS complaints to suggest TIA or CVA today. No signs of abnormal bleeding.     8/16/2023    Anthony Blair returns for follow up with device check.     Device check- well functioning device, no arrhythmias. Decrease in TI over the last 9 days.     EKG today reveals AsVp    He has gained about 6 pounds since last visit. He just returned from a 9 day bus trip and has increased leg swelling and shortness of breath. He does endorse PND nightly. BP stable today in office.     No chest pain or anginal equivalents.   He does endorse dizziness with position changes and bendopnea.     Reports compliance with medications and dietary restrictions.     1/11/2024 update    Anthony Blair returns for 3 month follow up.     He is feeling much better from last office visit.   BP stable today in office. Feels back to normal today in office.     Main complaint is bilateral leg weakness, worse  with increased activities.     Denies chest pain or anginal equivalents. No shortness of breath, OLIVARES or palpitations. Denies orthopnea, PND or abdominal bloating. Reports regular walking without any issues lately. NO leg swelling or claudications. No recent falls, syncope or near syncopal events. Reports compliance with medications and dietary restrictions. NO CNS complaints to suggest TIA or CVA today. No signs of abnormal bleeding on ASA daily.     Weight stable at 180 lbs today, dry weight 81 kg.     Has trace edema to BLE today in office.     7/10/2024 update    Anthony LAWRENCE Blair returns for 6 month follow up.    Next CRT P device check due in Sept 2024    He does endorse episodes of dizziness when he first gets up.   He has had some issues with fluctuations in BP at home over the past month.   Some days with SBP <100. Does note notice an appreciable difference with dizziness with low BP episodes.     Does have some episodes of low blood sugar with associated weakness and fatigue with glucose <100 mg/dL.    EKG- V paced, HR 70, QTc 503 ms    Very mindful of dietary restrictions. Stays active throughout the day.     Denies chest pain or anginal equivalents. No shortness of breath, OLIVARES or palpitations. Denies orthopnea, PND or abdominal bloating. Reports regular walking without any issues lately. NO leg swelling or claudications. No recent falls, syncope or near syncopal events. Reports compliance with medications and dietary restrictions. NO CNS complaints to suggest TIA or CVA today. No signs of abnormal bleeding on ASA daily.      6/6/2024 6/12/2024 6/13/2024 6/14/2024 6/15/2024 6/22/2024   Recent Readings         SBP (mmHg) 111  107  104  126  129  96    SBP (mmHg) 86  107  141   98     SBP (mmHg)  106    85     SBP (mmHg)  89        SBP (mmHg)  92        DBP (mmHg) 53  55  57  61  66  49    DBP (mmHg) 50  61  68   53     DBP (mmHg)  59    50     DBP (mmHg)  47        DBP (mmHg)  50        Pulse 69  69  70  69   69  69    Pulse 70  75  69   70     Pulse  87    70     Pulse  69        Pulse  70        Patient Comments            6/23/2024 6/26/2024 6/27/2024 7/2/2024 7/7/2024   Recent Readings        SBP (mmHg) 145  109  127  118  139    DBP (mmHg) 71  55  64  62  66    Pulse 74  55  70  70  69    Patient Comments              Past Medical History:   Diagnosis Date    A-fib     Anemia     AP (angina pectoris) 01/23/2014    Carotid artery disease without cerebral infarction 08/22/2014    Cataract     Cirrhosis of liver 09/10/2020    Coronary artery disease     Diabetes mellitus 1970     am 11/21/2022  Insulin x 6-7 years.    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    Hyperlipidemia     Hypertension     Hypertensive heart disease with heart failure 03/12/2019    Intracranial hemorrhage     Lumbosacral spondylosis 12/24/2014    Pacemaker 01/23/2014    Paroxysmal atrial fibrillation 01/23/2014    Peripheral vascular disease 08/22/2014    Pneumonia of right lung due to infectious organism 03/27/2019    Seizure, late effect of stroke     Stage 3b chronic kidney disease 10/18/2023    Stroke     Type 2 diabetes mellitus with ophthalmic manifestations        Past Surgical History:   Procedure Laterality Date    ANGIOPLASTY      ATRIAL ABLATION SURGERY N/A     CATARACT EXTRACTION Bilateral     [facility]    CATARACT EXTRACTION W/ INTRAOCULAR LENS IMPLANT Right     CATARACT EXTRACTION W/ INTRAOCULAR LENS IMPLANT Left     COLONOSCOPY N/A 10/16/2018    Procedure: COLONOSCOPY with biopsies;  Surgeon: Anabell Griggs MD;  Location: [facility];  Service: Endoscopy;  Laterality: N/A;    CORONARY ARTERY BYPASS GRAFT  07/2010    x5    EYE SURGERY      pace maker surgrey  10/2010    reposition in 2011/2012 (approx)    REPLACEMENT OF PACEMAKER GENERATOR Left 8/6/2020     Procedure: REPLACEMENT, PULSE GENERATOR, CARDIAC PACEMAKER;  Surgeon: Domenico Grajeda MD;  Location: [facility] CATH LAB;  Service: Cardiology;  Laterality: Left;  [facility]    REVISION OF PROCEDURE INVOLVING PACEMAKER LEAD Bilateral 2020    Procedure: REVISION, ELECTRODE LEAD, CARDIAC PACEMAKER;  Surgeon: Domenico Grajeda MD;  Location: [facility] CATH LAB;  Service: Cardiology;  Laterality: Bilateral;    VEIN BYPASS SURGERY         Social History     Tobacco Use    Smoking status: Former     Current packs/day: 0.00     Average packs/day: 2.0 packs/day for 13.0 years (26.1 ttl pk-yrs)     Types: Cigarettes     Start date: 1954     Quit date: 1967     Years since quittin.1    Smokeless tobacco: Never   Substance Use Topics    Alcohol use: No    Drug use: No       Family History   Problem Relation Name Age of Onset    Cataracts Mother      Arthritis Mother      Diabetes Mother      Kidney disease Mother      Heart disease Mother      Arthritis Father      Diabetes Father      Diabetes Sister      Cancer Sister          breast    Heart disease Sister      Diabetes Brother      Kidney disease Brother      Heart disease Brother      Alcohol abuse Paternal Uncle      Cancer Daughter          breast    Diabetes Daughter      Miscarriages / Stillbirths Daughter      Fibromyalgia Daughter      Diabetes Son      Diabetes Son      Stroke Neg Hx      Hypertension Neg Hx      Hyperlipidemia Neg Hx      COPD Neg Hx       Wt Readings from Last 3 Encounters:   24 79.1 kg (174 lb 6.1 oz)   24 80.9 kg (178 lb 5.6 oz)   24 81.3 kg (179 lb 3.7 oz)     Temp Readings from Last 3 Encounters:   03/10/23 97.6 °F (36.4 °C) (Oral)   22 97.7 °F (36.5 °C) (Temporal)   22 98.1 °F (36.7 °C) (Oral)     BP Readings from Last 3 Encounters:   24 (!) 150/70   24 124/60   24 (!) 142/80     Pulse Readings from Last 3 Encounters:   24 70   24 71   24 76       Review of  Systems   Constitutional: Positive for malaise/fatigue. Negative for weight gain.   HENT:  Negative for hearing loss and hoarse voice.    Eyes:  Negative for blurred vision and visual disturbance.   Cardiovascular:  Negative for chest pain, claudication, dyspnea on exertion, irregular heartbeat, leg swelling, near-syncope, orthopnea, palpitations, paroxysmal nocturnal dyspnea and syncope.   Respiratory:  Negative for cough, hemoptysis, shortness of breath, sleep disturbances due to breathing, snoring and wheezing.    Endocrine: Negative for cold intolerance and heat intolerance.   Hematologic/Lymphatic: Does not bruise/bleed easily.   Skin:  Negative for color change, dry skin and nail changes.   Musculoskeletal:  Positive for arthritis. Negative for back pain, joint pain and myalgias.   Gastrointestinal:  Negative for bloating, abdominal pain, constipation, nausea and vomiting.   Genitourinary:  Negative for dysuria, flank pain, hematuria and hesitancy.   Neurological:  Positive for dizziness. Negative for headaches, light-headedness, loss of balance, numbness, paresthesias and weakness.   Psychiatric/Behavioral:  Negative for altered mental status.    Allergic/Immunologic: Negative for environmental allergies.   BP (!) 150/70 (BP Location: Left arm, Patient Position: Sitting)   Pulse 70   Wt 79.1 kg (174 lb 6.1 oz)   SpO2 99%   BMI 25.75 kg/m²       Objective:   Physical Exam  Vitals and nursing note reviewed.   Constitutional:       General: He is not in acute distress.     Appearance: Normal appearance. He is well-developed. He is not ill-appearing.   HENT:      Head: Normocephalic and atraumatic.      Nose: Nose normal.      Mouth/Throat:      Mouth: Mucous membranes are moist.      Pharynx: Oropharynx is clear.   Eyes:      Extraocular Movements: Extraocular movements intact.      Conjunctiva/sclera: Conjunctivae normal.      Pupils: Pupils are equal, round, and reactive to light.   Neck:      Thyroid: No  thyromegaly.      Vascular: No JVD.      Trachea: No tracheal deviation.   Cardiovascular:      Rate and Rhythm: Normal rate and regular rhythm.      Chest Wall: PMI is not displaced.      Pulses: Intact distal pulses.           Radial pulses are 2+ on the right side and 2+ on the left side.        Dorsalis pedis pulses are 2+ on the right side and 2+ on the left side.      Heart sounds: S1 normal and S2 normal. Heart sounds not distant. No murmur heard.  Pulmonary:      Effort: Pulmonary effort is normal. No respiratory distress.      Breath sounds: Normal breath sounds. No wheezing.   Abdominal:      General: Bowel sounds are normal. There is no distension.      Palpations: Abdomen is soft.      Tenderness: There is no abdominal tenderness.   Musculoskeletal:         General: Normal range of motion.      Cervical back: Full passive range of motion without pain, normal range of motion and neck supple.      Right lower leg: No edema.      Left lower leg: No edema.      Right ankle: No swelling.      Left ankle: No swelling.   Skin:     General: Skin is warm and dry.      Capillary Refill: Capillary refill takes less than 2 seconds.      Nails: There is no clubbing.   Neurological:      General: No focal deficit present.      Mental Status: He is alert and oriented to person, place, and time.   Psychiatric:         Mood and Affect: Mood normal.         Speech: Speech normal.         Behavior: Behavior normal.         Thought Content: Thought content normal.         Judgment: Judgment normal.         Lab Results   Component Value Date    CHOL 165 01/11/2024    CHOL 170 05/08/2022    CHOL 163 09/09/2021     Lab Results   Component Value Date    HDL 39 (L) 01/11/2024    HDL 41 05/08/2022    HDL 41 09/09/2021     Lab Results   Component Value Date    LDLCALC 107.6 01/11/2024    LDLCALC 106.6 05/08/2022    LDLCALC 94.4 09/09/2021     Lab Results   Component Value Date    TRIG 92 01/11/2024    TRIG 112 05/08/2022    TRIG  138 09/09/2021     Lab Results   Component Value Date    CHOLHDL 23.6 01/11/2024    CHOLHDL 24.1 05/08/2022    CHOLHDL 25.2 09/09/2021       Chemistry        Component Value Date/Time     01/11/2024 1114    K 4.2 01/11/2024 1114     01/11/2024 1114    CO2 26 01/11/2024 1114    BUN 55 (H) 01/11/2024 1114    CREATININE 1.5 (H) 01/11/2024 1114     (H) 01/11/2024 1114        Component Value Date/Time    CALCIUM 10.1 01/11/2024 1114    ALKPHOS 162 (H) 01/11/2024 1114    AST 35 01/11/2024 1114    ALT 18 01/11/2024 1114    BILITOT 0.4 01/11/2024 1114    ESTGFRAFRICA 84 03/12/2023 0436    ESTGFRAFRICA >60 05/09/2022 0741    EGFRNONAA >60 05/09/2022 0741          Lab Results   Component Value Date    TSH 1.698 05/08/2022     Lab Results   Component Value Date    INR 1.1 05/09/2022    INR 1.1 05/08/2022    INR 1.1 09/28/2021     Lab Results   Component Value Date    WBC 6.08 01/11/2024    HGB 10.7 (L) 01/11/2024    HCT 32.7 (L) 01/11/2024    MCV 96 01/11/2024     01/11/2024        Assessment:      1. Essential hypertension    2. Chronic diastolic heart failure    3. Hypertensive heart disease with heart failure    4. TIA (transient ischemic attack)    5. Peripheral vascular disease    6. Nonrheumatic aortic valve stenosis    7. Chronic combined systolic and diastolic congestive heart failure    8. Pacemaker    9. Diabetes mellitus type 2 without retinopathy    10. Statin intolerance            Plan:   CHF SYNOPSIS:    GDMT:  ACE/ARB/ARNI: Decrease Losartan 25 mg daily due to hypotension  Beta Blocker: Toprol  mg BID  MRA: none  SGLT2: none due to cost  Diuretic: Lasix 40 mg daily, 80 mg on M/W/F and PRN    PREV in 2 weeks for BP review and dizziness  Dash diet 2 gm sodium restriction  Device check in September Nicole May, FNP-C [facility]

## 2024-07-11 ENCOUNTER — OFFICE VISIT (OUTPATIENT)
Dept: CARDIOLOGY | Facility: CLINIC | Age: 89
End: 2024-07-11
Payer: MEDICARE

## 2024-07-11 ENCOUNTER — HOSPITAL ENCOUNTER (OUTPATIENT)
Dept: CARDIOLOGY | Facility: HOSPITAL | Age: 89
Discharge: HOME | End: 2024-07-11
Payer: MEDICARE

## 2024-07-11 ENCOUNTER — OFFICE VISIT (OUTPATIENT)
Dept: FAMILY MEDICINE | Facility: CLINIC | Age: 89
End: 2024-07-11
Payer: MEDICARE

## 2024-07-11 VITALS
OXYGEN SATURATION: 99 % | BODY MASS INDEX: 25.75 KG/M2 | HEART RATE: 70 BPM | DIASTOLIC BLOOD PRESSURE: 70 MMHG | SYSTOLIC BLOOD PRESSURE: 150 MMHG | WEIGHT: 174.38 LBS

## 2024-07-11 VITALS
DIASTOLIC BLOOD PRESSURE: 80 MMHG | WEIGHT: 173.94 LBS | SYSTOLIC BLOOD PRESSURE: 126 MMHG | OXYGEN SATURATION: 100 % | HEART RATE: 70 BPM | BODY MASS INDEX: 25.69 KG/M2

## 2024-07-11 DIAGNOSIS — E60 LOW ZINC LEVEL: ICD-10-CM

## 2024-07-11 DIAGNOSIS — I50.32 CHRONIC DIASTOLIC HEART FAILURE: ICD-10-CM

## 2024-07-11 DIAGNOSIS — G47.30 SLEEP APNEA, UNSPECIFIED TYPE: ICD-10-CM

## 2024-07-11 DIAGNOSIS — I11.0 HYPERTENSIVE HEART DISEASE WITH HEART FAILURE: ICD-10-CM

## 2024-07-11 DIAGNOSIS — I35.0 NONRHEUMATIC AORTIC VALVE STENOSIS: ICD-10-CM

## 2024-07-11 DIAGNOSIS — I10 ESSENTIAL HYPERTENSION: ICD-10-CM

## 2024-07-11 DIAGNOSIS — E78.2 MIXED HYPERLIPIDEMIA: Chronic | ICD-10-CM

## 2024-07-11 DIAGNOSIS — Z00.00 WELL ADULT EXAM: Primary | ICD-10-CM

## 2024-07-11 DIAGNOSIS — I10 ESSENTIAL HYPERTENSION: Primary | ICD-10-CM

## 2024-07-11 DIAGNOSIS — I73.9 PERIPHERAL VASCULAR DISEASE: Chronic | ICD-10-CM

## 2024-07-11 DIAGNOSIS — E11.9 DIABETES MELLITUS TYPE 2 WITHOUT RETINOPATHY: Chronic | ICD-10-CM

## 2024-07-11 DIAGNOSIS — G45.9 TIA (TRANSIENT ISCHEMIC ATTACK): ICD-10-CM

## 2024-07-11 DIAGNOSIS — Z95.0 PACEMAKER: ICD-10-CM

## 2024-07-11 DIAGNOSIS — E61.1 IRON DEFICIENCY: ICD-10-CM

## 2024-07-11 DIAGNOSIS — Z78.9 STATIN INTOLERANCE: ICD-10-CM

## 2024-07-11 DIAGNOSIS — I50.42 CHRONIC COMBINED SYSTOLIC AND DIASTOLIC CONGESTIVE HEART FAILURE: ICD-10-CM

## 2024-07-11 LAB
OHS QRS DURATION: 172 MS
OHS QTC CALCULATION: 503 MS

## 2024-07-11 PROCEDURE — 99999 PR PBB SHADOW E&M-EST. PATIENT-LVL IV: CPT | Mod: PBBFAC,,, | Performed by: FAMILY MEDICINE

## 2024-07-11 PROCEDURE — 1157F ADVNC CARE PLAN IN RCRD: CPT | Mod: CPTII,S$GLB,, | Performed by: FAMILY MEDICINE

## 2024-07-11 PROCEDURE — 99397 PER PM REEVAL EST PAT 65+ YR: CPT | Mod: S$GLB,,, | Performed by: FAMILY MEDICINE

## 2024-07-11 PROCEDURE — 93005 ELECTROCARDIOGRAM TRACING: CPT

## 2024-07-11 PROCEDURE — 99999 PR PBB SHADOW E&M-EST. PATIENT-LVL IV: CPT | Mod: PBBFAC,,, | Performed by: NURSE PRACTITIONER

## 2024-07-11 PROCEDURE — 1159F MED LIST DOCD IN RCRD: CPT | Mod: CPTII,S$GLB,, | Performed by: FAMILY MEDICINE

## 2024-07-11 RX ORDER — FUROSEMIDE 40 MG/1
40 TABLET ORAL DAILY
Start: 2024-07-11 | End: 2025-07-11

## 2024-07-11 RX ORDER — LOSARTAN POTASSIUM 25 MG/1
25 TABLET ORAL DAILY
Qty: 90 TABLET | Refills: 3 | Status: SHIPPED | OUTPATIENT
Start: 2024-07-11 | End: 2025-07-11

## 2024-07-11 NOTE — PROGRESS NOTES
Chief Complaint:  No chief complaint on file.      History of Present Illness:    Patient presents today    History of Present Illness    CHIEF COMPLAINT:  Patient presents today for follow up.    OVERALL HEALTH:  He reports feeling good overall and capable of doing the work of a 20-year-old. However, he experiences a significant energy decline after 12 o'clock, necessitating a 2-3 hour nap. He denies chest pain or trouble breathing.    SLEEP APNEA:  He underwent a sleep study approximately 1.5 years ago but never received the results. Upon review, the sleep study revealed severe sleep apnea. He has not been using a CPAP machine as he was unaware of the diagnosis and had not been provided with the equipment.    DIABETES:  He checks his blood sugar at least 4 times daily, with average readings around 140. He takes 14 units of Lantus insulin at night if his sugar is over 110. For mealtime insulin, he uses Novolo units at breakfast, 4-6 units at lunch depending on pre-meal sugar levels, and 8 units at night. He experiences low blood sugar episodes 3-4 times per week, mostly in the afternoon around 4 PM, with levels dropping as low as 63. These hypoglycemic episodes occur primarily after lunch.    MEDICATIONS:  He is adherent to prescribed iron tablets and a multivitamin containing zinc. He was previously taking zinc supplements but ran out recently and acknowledges difficulty finding them. His blood pressure medication was recently decreased to 25mg.    HYPOTENSION:  His blood pressure stays pretty low.             ROS:  Review of Systems   Constitutional:  Negative for activity change, chills, fatigue, fever and unexpected weight change.   HENT:  Negative for congestion, ear discharge, ear pain, hearing loss, postnasal drip and rhinorrhea.    Eyes:  Negative for pain and visual disturbance.   Respiratory:  Negative for cough, chest tightness and shortness of breath.    Cardiovascular:  Negative for chest pain  and palpitations.   Gastrointestinal:  Negative for abdominal pain, diarrhea and vomiting.   Endocrine: Negative for heat intolerance.   Genitourinary:  Negative for dysuria, flank pain, frequency and hematuria.   Musculoskeletal:  Negative for back pain, gait problem and neck pain.   Skin:  Negative for color change and rash.   Neurological:  Negative for dizziness, tremors, seizures, numbness and headaches.   Psychiatric/Behavioral:  Negative for agitation, hallucinations, self-injury, sleep disturbance and suicidal ideas. The patient is not nervous/anxious.        Past Medical History:   Diagnosis Date    A-fib     Anemia     AP (angina pectoris) 01/23/2014    Carotid artery disease without cerebral infarction 08/22/2014    Cataract     Cirrhosis of liver 09/10/2020    Coronary artery disease     Diabetes mellitus 1970     am 11/21/2022  Insulin x 6-7 years.    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    Hyperlipidemia     Hypertension     Hypertensive heart disease with heart failure 03/12/2019    Intracranial hemorrhage     Lumbosacral spondylosis 12/24/2014    Pacemaker 01/23/2014    Paroxysmal atrial fibrillation 01/23/2014    Peripheral vascular disease 08/22/2014    Pneumonia of right lung due to infectious organism 03/27/2019    Seizure, late effect of stroke     Stage 3b chronic kidney disease 10/18/2023    Stroke     Type 2 diabetes mellitus with ophthalmic manifestations        Social History:  Social History     Socioeconomic History    Marital status:     Number of children: 3    Highest education level: GED or equivalent   Tobacco Use    Smoking status: Former     Current packs/day: 0.00     Average packs/day: 2.0 packs/day for 13.0 years (26.1 ttl pk-yrs)     Types: Cigarettes     Start date: 11/25/1954     Quit date: 6/7/1967      Years since quittin.1    Smokeless tobacco: Never   Substance and Sexual Activity    Alcohol use: No    Drug use: No    Sexual activity: Never     Partners: Female     Birth control/protection: None   Social History Narrative    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     Social Determinants of Health     Financial Resource Strain: Low Risk  (2024)    Overall Financial Resource Strain (CARDIA)     Difficulty of Paying Living Expenses: Not hard at all   Food Insecurity: No Food Insecurity (2024)    Hunger Vital Sign     Worried About Running Out of Food in the Last Year: Never true     Ran Out of Food in the Last Year: Never true   Transportation Needs: No Transportation Needs (2024)    PRAPARE - Transportation     Lack of Transportation (Medical): No     Lack of Transportation (Non-Medical): No   Physical Activity: Sufficiently Active (2024)    Exercise Vital Sign     Days of Exercise per Week: 7 days     Minutes of Exercise per Session: 90 min   Stress: No Stress Concern Present (2024)    [employer] - Occupational Stress Questionnaire     Feeling of Stress : Not at all   Housing Stability: Low Risk  (2024)    Housing Stability Vital Sign     Unable to Pay for Housing in the Last Year: No     Homeless in the Last Year: No       Family History:   family history includes Alcohol abuse in his paternal uncle; Arthritis in his father and mother; Cancer in his daughter and sister; Cataracts in his mother; Diabetes in his brother, daughter, father, mother, sister, son, and son; Fibromyalgia in his daughter; Heart disease in his brother, mother, and sister; Kidney disease in his brother and mother; Miscarriages / Stillbirths in his daughter.    Health Maintenance   Topic Date Due    Shingles Vaccine (1 of 2) Never done    TETANUS VACCINE  2024    Hemoglobin A1c  2024    Lipid Panel  2025    Eye Exam  2025     Aspirin/Antiplatelet Therapy  Discontinued       Exam:Physical     Vital Signs  Pulse: 70  SpO2: 100 %  BP: 126/80  BP Location: Left arm  Patient Position: Sitting  Height and Weight  Weight: 78.9 kg (173 lb 15.1 oz)]    Body mass index is 25.69 kg/m².    Physical Exam  Constitutional:       Appearance: He is well-developed.   Eyes:      Conjunctiva/sclera: Conjunctivae normal.      Pupils: Pupils are equal, round, and reactive to light.   Cardiovascular:      Rate and Rhythm: Normal rate and regular rhythm.      Heart sounds: Normal heart sounds. No murmur heard.  Pulmonary:      Effort: Pulmonary effort is normal. No respiratory distress.      Breath sounds: Normal breath sounds. No wheezing or rales.   Chest:      Chest wall: No tenderness.   Abdominal:      General: There is no distension.      Palpations: Abdomen is soft. There is no mass.      Tenderness: There is no abdominal tenderness. There is no guarding.   Musculoskeletal:         General: No tenderness.      Cervical back: Normal range of motion and neck supple.   Lymphadenopathy:      Cervical: No cervical adenopathy.   Skin:     General: Skin is warm and dry.   Neurological:      Mental Status: He is alert and oriented to person, place, and time.      Deep Tendon Reflexes: Reflexes are normal and symmetric.   Psychiatric:         Behavior: Behavior normal.         Thought Content: Thought content normal.         Judgment: Judgment normal.           Assessment:      ICD-10-CM ICD-9-CM   1. Well adult exam  Z00.00 V70.0   2. Mixed hyperlipidemia  E78.2 272.2   3. Diabetes mellitus type 2 without retinopathy  E11.9 250.00   4. Essential hypertension  I10 401.9   5. Sleep apnea, unspecified type  G47.30 780.57   6. Iron deficiency  E61.1 280.9   7. Low zinc level  E60 269.3         Plan:    Assessment & Plan    - Patient has severe sleep apnea based on prior sleep study ordered by Dr. Mane  - Will refer patient to sleep specialist for CPAP titration and  initiation given severity of sleep apnea and persistent daytime fatigue  - Patient having frequent afternoon hypoglycemic episodes, will decrease Lantus insulin dose to address this  SLEEP APNEA:  1. Explained sleep apnea physiology, airway closure during sleep leading to desaturations and micro-arousals, resulting in non-restorative sleep and daytime fatigue.  2. Referred to sleep medicine specialist in [place] for CPAP titration study and initiation of CPAP therapy.  HYPERTENSION:  1. Discussed importance of keeping blood pressure in goal range of 130-150 systolic, may need to be slightly higher given patient's age.  2. Decreased blood pressure medication to 25 mg.  3. Decreased metoprolol dose from 100 mg to 50 mg.  4. Follow up with blood pressure readings after medication changes.  DIABETES:  1. Patient to resume checking and recording blood sugars at least 4 times daily.  2. Decreased Lantus insulin from 14 units to 10-11 units at bedtime due to frequent afternoon hypoglycemic episodes with sugars as low as 63.  3. Continued Novolog insulin 8 units at breakfast, 4-6 units at lunch, and 8 units at dinner, dosing based on pre-meal blood sugar.  4. Follow up with blood sugar log after decreasing Lantus insulin dose.  LABS:  1. Ordered CBC, CMP, Hemoglobin A1c, Iron studies.  MEDICATIONS/SUPPLEMENTS:  1. Continued multivitamin containing zinc.  OTHER INSTRUCTIONS:  1. Patient to maintain active lifestyle.                 Follow up in about 6 months (around 1/11/2025).      Abdulaziz Mccabe MD

## 2024-07-16 ENCOUNTER — TELEPHONE (OUTPATIENT)
Dept: FAMILY MEDICINE | Facility: CLINIC | Age: 89
End: 2024-07-16
Payer: MEDICARE

## 2024-07-16 NOTE — TELEPHONE ENCOUNTER
----- Message from Abdulaziz Mccabe MD sent at 7/15/2024 10:15 PM CDT -----  Iron levels are low, please supplement with ferrous gluconate 325 mg twice a day   Diabetes stable   Kidney function is also stable   Rest of the labs are stable.

## 2024-07-24 ENCOUNTER — CLINICAL SUPPORT (OUTPATIENT)
Dept: CARDIOLOGY | Facility: HOSPITAL | Age: 89
End: 2024-07-24
Attending: INTERNAL MEDICINE
Payer: MEDICARE

## 2024-07-24 ENCOUNTER — CLINICAL SUPPORT (OUTPATIENT)
Dept: CARDIOLOGY | Facility: HOSPITAL | Age: 89
End: 2024-07-24
Payer: MEDICARE

## 2024-07-24 DIAGNOSIS — I50.42 CHRONIC COMBINED SYSTOLIC (CONGESTIVE) AND DIASTOLIC (CONGESTIVE) HEART FAILURE: ICD-10-CM

## 2024-07-24 DIAGNOSIS — I44.2 ATRIOVENTRICULAR BLOCK, COMPLETE: ICD-10-CM

## 2024-07-24 DIAGNOSIS — Z95.0 PRESENCE OF CARDIAC PACEMAKER: ICD-10-CM

## 2024-07-24 PROCEDURE — 93294 REM INTERROG EVL PM/LDLS PM: CPT | Mod: S$GLB,,, | Performed by: INTERNAL MEDICINE

## 2024-07-24 PROCEDURE — 93296 REM INTERROG EVL PM/IDS: CPT | Performed by: INTERNAL MEDICINE

## 2024-07-26 ENCOUNTER — OFFICE VISIT (OUTPATIENT)
Dept: PULMONOLOGY | Facility: CLINIC | Age: 89
End: 2024-07-26
Payer: MEDICARE

## 2024-07-26 VITALS
RESPIRATION RATE: 21 BRPM | HEART RATE: 75 BPM | WEIGHT: 173.06 LBS | OXYGEN SATURATION: 98 % | SYSTOLIC BLOOD PRESSURE: 126 MMHG | DIASTOLIC BLOOD PRESSURE: 76 MMHG | HEIGHT: 69 IN | BODY MASS INDEX: 25.63 KG/M2

## 2024-07-26 DIAGNOSIS — Z95.0 PACEMAKER: ICD-10-CM

## 2024-07-26 DIAGNOSIS — G47.34 NOCTURNAL HYPOXEMIA: ICD-10-CM

## 2024-07-26 DIAGNOSIS — G47.33 SEVERE OBSTRUCTIVE SLEEP APNEA: Primary | ICD-10-CM

## 2024-07-26 DIAGNOSIS — G47.19 EXCESSIVE DAYTIME SLEEPINESS: ICD-10-CM

## 2024-07-26 PROCEDURE — 99999 PR PBB SHADOW E&M-EST. PATIENT-LVL V: CPT | Mod: PBBFAC,,, | Performed by: INTERNAL MEDICINE

## 2024-07-26 NOTE — PROGRESS NOTES
Initial Outpatient Pulmonary Evaluation       SUBJECTIVE:     Chief Complaint   Patient presents with    Sleep Apnea       History of Present Illness:    Patient is a 89 y.o. male presenting for evaluation of obstructive sleep apnea.      [place] Sleepiness Scale score 12.      Underwent HST in 2023 that showed an AASM RDI of 36 CMS AHI of 33 and a shiloh O2 sat of 69%.  There was a clear supine predominance of his sleep disorder breathing.          Review of Systems   Respiratory:  Positive for apnea, snoring and somnolence.    Psychiatric/Behavioral:  Positive for sleep disturbance.        Review of patient's allergies indicates:   Allergen Reactions    Adhesive tape-silicones     Atorvastatin      Other reaction(s): Muscle pain  Other reaction(s): Muscle weakness  Other reaction(s): Muscle pain    Codeine      Other reaction(s): Headache  Other reaction(s): Headache    Eliquis [apixaban]     Norvasc [amlodipine] Edema    Trulicity [dulaglutide] Nausea And Vomiting    Adhesive Rash     Other reaction(s): rash       Current Outpatient Medications   Medication Sig Dispense Refill    ACCU-CHEK MULTICLIX LANCET lancets TEST BLOOD SUGAR THREE TIMES DAILY 200 each 5    ascorbic acid, vitamin C, (VITAMIN C) 100 MG tablet Take 100 mg by mouth once daily.      aspirin (ECOTRIN) 81 MG EC tablet Take 81 mg by mouth every other day.      blood sugar diagnostic (ACCU-CHEK GUIDE TEST STRIPS) Strp Inject 1 each into the skin 4 (four) times daily. 600 strip 3    ferrous gluconate (FERGON) 324 MG tablet Take 1 tablet (324 mg total) by mouth 2 (two) times daily with meals. 180 tablet 3    furosemide (LASIX) 40 MG tablet Take 1 tablet (40 mg total) by mouth once daily.      insulin (LANTUS SOLOSTAR U-100 INSULIN) glargine 100 units/mL SubQ pen INJECT 16 UNITS SUBCUTANEOUSLY AT BEDTIME. 94 DAY SUPPLY 15 mL 11    insulin aspart U-100 (NOVOLOG FLEXPEN U-100 INSULIN) 100 unit/mL (3 mL) InPn pen  "INJECT EIGHT UNITS BEFORE BREAKFAST, SIX UNITS BEFORE LUNCH, AND EIGHT UNITS BEFORE DINNER. MAY TITRATE TO A MAX DOSE OF 50 UNITS PER DAY 30 mL 3    isosorbide mononitrate (IMDUR) 60 MG 24 hr tablet Take 1 tablet (60 mg total) by mouth 2 (two) times daily. 180 tablet 3    losartan (COZAAR) 25 MG tablet Take 1 tablet (25 mg total) by mouth once daily. 90 tablet 3    metoprolol succinate (TOPROL-XL) 100 MG 24 hr tablet Take 1 tablet (100 mg total) by mouth 2 (two) times daily. 180 tablet 3    multivitamin (ONE DAILY MULTIVITAMIN) per tablet Take 1 tablet by mouth once daily.      pantoprazole (PROTONIX) 40 MG tablet Take 1 tablet (40 mg total) by mouth once daily. 90 tablet 3    pen needle, diabetic (COMFORT EZ PEN NEEDLES) 32 gauge x 5/32" Ndle Use to inject insulin 4 times a day 200 each 6    saw palmetto 500 MG capsule Take 500 mg by mouth 2 (two) times a day.      turmeric 400 mg Cap Take 1 capsule by mouth once daily.      vitamin D (VITAMIN D3) 1000 units Tab Take 1,000 Units by mouth once daily.      vitamin E 100 UNIT capsule Take 100 Units by mouth once daily.      zinc gluconate 50 mg tablet Take 50 mg by mouth once daily.      blood-glucose meter Misc 1 Device by Misc.(Non-Drug; Combo Route) route once. for 1 dose 1 each 0    CARBOXYMETHYLCELLULOSE SODIUM (REFRESH OPHT) Apply to eye. (Patient not taking: Reported on 7/11/2024)       No current facility-administered medications for this visit.       Past Medical History:   Diagnosis Date    A-fib     Anemia     AP (angina pectoris) 01/23/2014    Carotid artery disease without cerebral infarction 08/22/2014    Cataract     Cirrhosis of liver 09/10/2020    Coronary artery disease     Diabetes mellitus 1970     am 11/21/2022  Insulin x 6-7 years.    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    " Hyperlipidemia     Hypertension     Hypertensive heart disease with heart failure 03/12/2019    Intracranial hemorrhage     Lumbosacral spondylosis 12/24/2014    Pacemaker 01/23/2014    Paroxysmal atrial fibrillation 01/23/2014    Peripheral vascular disease 08/22/2014    Pneumonia of right lung due to infectious organism 03/27/2019    Seizure, late effect of stroke     Stage 3b chronic kidney disease 10/18/2023    Stroke     Type 2 diabetes mellitus with ophthalmic manifestations      Past Surgical History:   Procedure Laterality Date    ANGIOPLASTY      ATRIAL ABLATION SURGERY N/A     CATARACT EXTRACTION Bilateral     [facility]    CATARACT EXTRACTION W/ INTRAOCULAR LENS IMPLANT Right     CATARACT EXTRACTION W/ INTRAOCULAR LENS IMPLANT Left     COLONOSCOPY N/A 10/16/2018    Procedure: COLONOSCOPY with biopsies;  Surgeon: Anabell Griggs MD;  Location: [facility] ENDO;  Service: Endoscopy;  Laterality: N/A;    CORONARY ARTERY BYPASS GRAFT  07/2010    x5    EYE SURGERY      pace [facility] surgrey  10/2010    reposition in 2011/2012 (approx)    REPLACEMENT OF PACEMAKER GENERATOR Left 8/6/2020    Procedure: REPLACEMENT, PULSE GENERATOR, CARDIAC PACEMAKER;  Surgeon: Domenico Grajeda MD;  Location: [facility] CATH LAB;  Service: Cardiology;  Laterality: Left;  [facility]    REVISION OF PROCEDURE INVOLVING PACEMAKER LEAD Bilateral 8/6/2020    Procedure: REVISION, ELECTRODE LEAD, CARDIAC PACEMAKER;  Surgeon: Domenico Grajeda MD;  Location: [facility] CATH LAB;  Service: Cardiology;  Laterality: Bilateral;    VEIN BYPASS SURGERY       Family History   Problem Relation Name Age of Onset    Cataracts Mother      Arthritis Mother      Diabetes Mother      Kidney disease Mother      Heart disease Mother      Arthritis Father      Diabetes Father      Diabetes Sister      Cancer Sister          breast    Heart disease Sister      Diabetes Brother      Kidney disease Brother      Heart disease Brother      Alcohol abuse Paternal  "Uncle      Cancer Daughter          breast    Diabetes Daughter      Miscarriages / Stillbirths Daughter      Fibromyalgia Daughter      Diabetes Son      Diabetes Son      Stroke Neg Hx      Hypertension Neg Hx      Hyperlipidemia Neg Hx      COPD Neg Hx       Social History     Tobacco Use    Smoking status: Former     Current packs/day: 0.00     Average packs/day: 2.0 packs/day for 13.0 years (26.1 ttl pk-yrs)     Types: Cigarettes     Start date: 1954     Quit date: 1967     Years since quittin.1    Smokeless tobacco: Never   Substance Use Topics    Alcohol use: No    Drug use: No          OBJECTIVE:     Vital Signs (Most Recent)  Vital Signs  Pulse: 75  Resp: (!) 21  SpO2: 98 %  BP: 126/76  Pain Score: 0-No pain  Height and Weight  Height: 5' 9" (175.3 cm)  Weight: 78.5 kg (173 lb 1 oz)  BSA (Calculated - sq m): 1.95 sq meters  BMI (Calculated): 25.5  Weight in (lb) to have BMI = 25: 168.9]  Wt Readings from Last 2 Encounters:   24 78.5 kg (173 lb 1 oz)   24 78.9 kg (173 lb 15.1 oz)         Physical Exam:  Physical Exam   Constitutional: He is oriented to person, place, and time. He appears well-developed and well-nourished.   Cardiovascular: Normal rate.   Pulmonary/Chest: Normal expansion and breath sounds normal.   Neurological: He is alert and oriented to person, place, and time. Gait normal.   Psychiatric: His behavior is normal.       Laboratory  Lab Results   Component Value Date    WBC 5.11 2024    RBC 3.67 (L) 2024    HGB 11.8 (L) 2024    HCT 35.5 (L) 2024    MCV 97 2024    MCH 32.2 (H) 2024    MCHC 33.2 2024    RDW 14.6 (H) 2024     2024    MPV 11.0 2024    GRAN 2.2 2024    GRAN 43.3 2024    LYMPH 2.0 2024    LYMPH 39.9 2024    MONO 0.6 2024    MONO 11.9 2024    EOS 0.2 2024    BASO 0.02 2024    EOSINOPHIL 4.3 2024    BASOPHIL 0.4 2024       [facility]  Lab " "Results   Component Value Date     07/11/2024    K 3.5 07/11/2024     07/11/2024    CO2 24 07/11/2024    BUN 36 (H) 07/11/2024    CREATININE 1.3 07/11/2024    CALCIUM 10.5 07/11/2024    ANIONGAP 11 07/11/2024    ESTGFRAFRICA 84 03/12/2023    EGFRNONAA >60 05/09/2022    AST 45 (H) 07/11/2024    ALT 21 07/11/2024    PROT 7.4 07/11/2024       Lab Results   Component Value Date    BNP 79 09/13/2023    BNP 99 08/16/2023    BNP 92 03/10/2023    BNP 59 03/16/2022     (H) 12/12/2021    BNP 48 05/12/2021       Lab Results   Component Value Date    TSH 1.698 05/08/2022       Lab Results   Component Value Date    SEDRATE 27 (H) 07/01/2022       Lab Results   Component Value Date    CRP 5.0 07/01/2022       No results found for: "IGE"    No results found for: "ASPERGILLUS"  No results found for: "AFUMIGATUSCL"     No results found for: "ACE"    Diagnostic Results:  I have personally reviewed today the following studies :            ASSESSMENT/PLAN:     Severe obstructive sleep apnea  -     Ambulatory referral/consult to Pulmonology  -     CPAP FOR HOME USE    Nocturnal hypoxemia  -     CPAP FOR HOME USE    Excessive daytime sleepiness    Pacemaker        Auto PAP 5-20 cm water ordered.      Will decide on need for titration after reviewing his data next visit.       The patient should avoid sleeping in the supine position as this appears to increase the severity of the obstructive sleep apnea. This may be achieved by using a commercially available positional device like backpack or wedge or by sewing an object (e.g. tennis balls) in the back of his night shirt.    Follow-up with cardiology    Follow up in about 3 months (around 10/26/2024).    This note was prepared using voice recognition system and is likely to have sound alike errors that may have been overlooked even after proof reading.  Please call me with any questions    Discussed diagnosis, its evaluation, treatment and usual course. All questions " answered.    Thank you for the courtesy of participating in the care of this patient    Miriam Saez MD

## 2024-07-29 DIAGNOSIS — G47.19 EXCESSIVE DAYTIME SLEEPINESS: ICD-10-CM

## 2024-07-29 DIAGNOSIS — G47.33 SEVERE OBSTRUCTIVE SLEEP APNEA: Primary | ICD-10-CM

## 2024-07-29 DIAGNOSIS — G47.34 NOCTURNAL HYPOXEMIA: ICD-10-CM

## 2024-07-30 ENCOUNTER — TELEPHONE (OUTPATIENT)
Dept: SLEEP MEDICINE | Facility: CLINIC | Age: 89
End: 2024-07-30
Payer: MEDICARE

## 2024-07-30 NOTE — TELEPHONE ENCOUNTER
----- Message from Adolph Cardenas sent at 7/29/2024  2:49 PM CDT -----  [facility]

## 2024-08-04 LAB
OHS CV BIV PACING PERCENT: 99 %
OHS CV DC REMOTE DEVICE TYPE: NORMAL

## 2024-09-09 ENCOUNTER — HOSPITAL ENCOUNTER (OUTPATIENT)
Dept: CARDIOLOGY | Facility: HOSPITAL | Age: 89
Discharge: HOME | End: 2024-09-09
Attending: INTERNAL MEDICINE
Payer: MEDICARE

## 2024-09-09 DIAGNOSIS — Z45.018 BIVENTRICULAR PACEMAKER CHECK: ICD-10-CM

## 2024-09-09 DIAGNOSIS — I50.42 CHRONIC COMBINED SYSTOLIC AND DIASTOLIC CONGESTIVE HEART FAILURE: ICD-10-CM

## 2024-09-09 PROCEDURE — 93281 PM DEVICE PROGR EVAL MULTI: CPT

## 2024-09-17 RX ORDER — FLUTICASONE PROPIONATE 50 MCG
2 SPRAY, SUSPENSION (ML) NASAL DAILY
Qty: 16 G | Refills: 3 | Status: SHIPPED | OUTPATIENT
Start: 2024-09-17

## 2024-09-17 NOTE — TELEPHONE ENCOUNTER
No care due was identified.  Health [facility] Embedded Care Due Messages. Reference number: [id number].   9/17/2024 10:11:27 AM CDT

## 2024-09-18 ENCOUNTER — HOSPITAL ENCOUNTER (OUTPATIENT)
Dept: SLEEP MEDICINE | Facility: HOSPITAL | Age: 89
Discharge: HOME | End: 2024-09-18
Attending: INTERNAL MEDICINE
Payer: MEDICARE

## 2024-09-18 DIAGNOSIS — G47.33 OSA (OBSTRUCTIVE SLEEP APNEA): Primary | ICD-10-CM

## 2024-09-18 DIAGNOSIS — G47.33 SEVERE OBSTRUCTIVE SLEEP APNEA: ICD-10-CM

## 2024-09-18 DIAGNOSIS — G47.19 EXCESSIVE DAYTIME SLEEPINESS: ICD-10-CM

## 2024-09-18 DIAGNOSIS — G47.34 NOCTURNAL HYPOXEMIA: ICD-10-CM

## 2024-09-18 PROCEDURE — 95806 SLEEP STUDY UNATT&RESP EFFT: CPT | Performed by: INTERNAL MEDICINE

## 2024-09-18 NOTE — PROCEDURES
"PHYSICIAN INTERPRETATION AND COMMENTS: Findings are consistent with severe obstructive sleep apnea (ML), with  indications of respiratory control instability.  CLINICAL HISTORY: 89 year old male.[place] Sleepiness Scale score 12. Underwent HST in 2023 that showed an AASM RDI  of 36 CMS AHI of 33 and a shiloh O2 sat of 69%. There was a clear supine predominance of his sleep disorder breathing.  SLEEP STUDY FINDINGS: Patient underwent a 1 night Home Sleep Test and by behavioral criteria, slept for approximately  6.54 hours, with a sleep latency of 6 minutes and a sleep efficiency of 99%. Severe sleep disordered breathing (AHI=32) is  noted based on a 4% hypopnea desaturation criteria, with indications of respiratory control instability. The  apneas/hypopneas are accompanied by minimal oxygen desaturation (percent time below 90% SpO2: 2%, Min SpO2:  78.3%). The average desaturation across all sleep disordered breathing events is 4.8%. Snoring occurs for 9.8% (30 dB) of  the study, 4.2% is very loud. The mean pulse rate is 60.3 BPM.  TREATMENT CONSIDERATIONS: in lab CPAP titration.  DISEASE MANAGEMENT CONSIDERATIONS: None.  NOTE: In the absence of complete MEGHAN Questionnaire data, the standard treatment and disease management  considerations cannot be given. Treatment and disease management decisions should be based on the results of a  complete clinical evaluation of which the MEGHAN overnight study results are only a part.    Dear Miriam Saez MD  [address] [place] [address] [place] [id number]/Abdulaziz Mccabe MD         The sleep study that you ordered is complete.  You have ordered sleep LAB services to perform the sleep study for Anthony Blair .      Please find Sleep Study result in  the "Media tab" of Chart Review menu.        You can look  for the report in the  Media by the document type "Sleep Study Documents". Alphabetizing  "Document type" column helps to find the SLEEP STUDY report  Faster.       As " "the ordering provider, you are responsible for reviewing the results and implementing a treatment plan with your patient.    If you need a Sleep Medicine provider to explain the sleep study findings and arrange treatment for the patient, please refer patient for consultation to our Sleep Clinic via [facility] with Ambulatory Consult Sleep.     To do that please place an order for an  "Ambulatory Consult Sleep" -  order , it will go to our clinic work queue for our staff  to contact the patient for an appointment.      For any questions, please contact our sleep lab  staff at [phone] to talk to clinical staff          Vern Nieves MD    "

## 2024-10-23 ENCOUNTER — CLINICAL SUPPORT (OUTPATIENT)
Dept: CARDIOLOGY | Facility: HOSPITAL | Age: 89
End: 2024-10-23
Attending: INTERNAL MEDICINE
Payer: MEDICARE

## 2024-10-23 ENCOUNTER — CLINICAL SUPPORT (OUTPATIENT)
Dept: CARDIOLOGY | Facility: HOSPITAL | Age: 89
End: 2024-10-23
Payer: MEDICARE

## 2024-10-23 DIAGNOSIS — I44.2 ATRIOVENTRICULAR BLOCK, COMPLETE: ICD-10-CM

## 2024-10-23 DIAGNOSIS — Z95.0 PRESENCE OF CARDIAC PACEMAKER: ICD-10-CM

## 2024-10-23 DIAGNOSIS — I50.42 CHRONIC COMBINED SYSTOLIC (CONGESTIVE) AND DIASTOLIC (CONGESTIVE) HEART FAILURE: ICD-10-CM

## 2024-10-23 LAB
OHS CV BIV PACING PERCENT: 99 %
OHS CV DC REMOTE DEVICE TYPE: NORMAL

## 2024-10-23 PROCEDURE — 93296 REM INTERROG EVL PM/IDS: CPT | Performed by: INTERNAL MEDICINE

## 2024-10-23 PROCEDURE — 93294 REM INTERROG EVL PM/LDLS PM: CPT | Mod: S$GLB,,, | Performed by: INTERNAL MEDICINE

## 2024-10-25 ENCOUNTER — OFFICE VISIT (OUTPATIENT)
Dept: PULMONOLOGY | Facility: CLINIC | Age: 89
End: 2024-10-25
Payer: MEDICARE

## 2024-10-25 VITALS
BODY MASS INDEX: 25.22 KG/M2 | OXYGEN SATURATION: 97 % | SYSTOLIC BLOOD PRESSURE: 120 MMHG | WEIGHT: 170.31 LBS | DIASTOLIC BLOOD PRESSURE: 70 MMHG | HEART RATE: 77 BPM | HEIGHT: 69 IN | RESPIRATION RATE: 20 BRPM

## 2024-10-25 DIAGNOSIS — G47.19 EXCESSIVE DAYTIME SLEEPINESS: ICD-10-CM

## 2024-10-25 DIAGNOSIS — Z95.0 PACEMAKER: ICD-10-CM

## 2024-10-25 DIAGNOSIS — R06.83 SNORING: ICD-10-CM

## 2024-10-25 DIAGNOSIS — G47.33 SEVERE OBSTRUCTIVE SLEEP APNEA: Primary | ICD-10-CM

## 2024-10-25 DIAGNOSIS — G47.34 NOCTURNAL HYPOXEMIA: ICD-10-CM

## 2024-10-25 PROCEDURE — 99999 PR PBB SHADOW E&M-EST. PATIENT-LVL V: CPT | Mod: PBBFAC,,, | Performed by: INTERNAL MEDICINE

## 2024-10-25 RX ORDER — POTASSIUM CHLORIDE 20 MEQ/1
1 TABLET, EXTENDED RELEASE ORAL EVERY MORNING
COMMUNITY

## 2024-10-25 RX ORDER — NITROGLYCERIN 0.4 MG/1
0.4 TABLET SUBLINGUAL
COMMUNITY

## 2024-10-25 NOTE — PATIENT INSTRUCTIONS
No eating / drinking for 3 hours before going to bed.  Elevate head of bed 30 - 45 %     CPAP HABITUATION PROCEDURE     Ton Norton, Ph.D., [facility] and Grey Hernandez M.D.  [facility], [facility]     Some people have difficulty adjusting to CPAP/BiPAP/AutoCPAP.  This is not unusual or hard to understand: Breathing with CPAP is different from ordinary breathing, and this difference is aversive to some. The problem can be overcome, however, and the benefits CPAP confers are certainly worth the effort.  Below, you will find a simple and gradual way to get used to CPAP before you try to use it all night, every night.  The essence of this procedure is to relax and let breathing with CPAP become a habit.  It may take about 2 weeks, and involves the following:      CPAP while awake and comfortably seated, during the late evening.     CPAP in bed while attempting sleep at night.     If your discomfort is too great at any time, discontinue and attempt again later the same night, for the same amount of time.   You and your physician may alter the times and pressures if necessary.     If you find that it is very easy to get used to CPAP, you may start using it every night when you are comfortable enough to do so.  IMPORTANT REMINDER: If you have a cold or sinus congestion it is okay to miss a night or two of CPAP. Consider using antihistamines or decongestants to clear up your sinus congestion prior to sleeping.     DAYS  1-3   Start CPAP while awake and comfortably seated during the late evening, after having prepared for bed.  You may do this while watching television, listening to music or reading. Use for 1 hour, then take off CPAP and go directly to bed to sleep     DAYS  4-6     Start CPAP when you go to bed and use for 1 hour, or until you fall asleep.  If your discomfort is too great at any time, discontinue and attempt again later the same night, for the same designated  amount of time (1 hour).      DAYS  7-9     Increase time with CPAP to 2 hours a night.  If your discomfort is too great at any time, discontinue and attempt again later the same night, for the same designated amount of time (2 hours).      DAYS 10-12    Increase time with CPAP to 3 hours a night. If your discomfort is too great at any time, discontinue and attempt again later the same night, for the same designated amount of time (3 hours).      DAYS 13-15     Sleep the entire night with CPAP.      OPTIONAL: You may use Progressive Muscle Relaxation (PMR) to help put you at ease when using CPAP; do PMR twice each day, once in the morning or afternoon, and once in the evening just before using CPAP. You may do PMR prior to any attempt until you are comfortable with CPAP.        Continuous Positive Air Pressure (CPAP)  Continuous positive air pressure (CPAP) uses gentle air pressure to hold the airway open. CPAP is often the most effective treatment for sleep apnea and severe snoring. It works very well for many people. But keep in mind that it can take several adjustments before the setup is right for you.       How CPAP Works  CPAP is a small portable pump beside the bed. The pump sends air through a hose, which is held over your nose and/or mouth by a mask. Mild air pressure is gently pushed through your airway. The air pressure nudges sagging tissues aside. This widens the airway so you can breathe better. CPAP may be combined with other kinds of therapy for sleep apnea.       Types of Air Pressure Treatments  There are different types of CPAP. Your doctor or CPAP technician will help you decide which type is best for you:  Basic CPAP keeps the pressure constant all night long.  A bilevel device (BiPAP) provides more pressure when you breathe in and less when you breathe out. A BiPAP machine also may be set to provide automatic breaths to maintain breathing if you stop breathing while sleeping.  An autoCPAP  device automatically adjusts pressure throughout the night and in response to changes such as body position, sleep stage, and snoring.  © 4795-6305 [employer]. [address] [place] [address]. All rights reserved. This information is not intended as a substitute for professional medical care. Always follow your healthcare professional's instructions.        Snoring and Sleep Apnea: Notes for a Partner  Snoring and sleep apnea affect your life, as well as your partners. You can help in the treatment of the problem. Be supportive. Encourage your partner both to get treatment and to make the adjustments needed to follow through.       Adjusting to Changes  Your partners treatment may involve making changes to certain life habits. You can help your partner make and stick with these changes. For example:  Support and even join your partners exercise program.  Be supportive if your partner gets CPAP (continuous positive airway pressure). He or she may feel self-conscious at first. Remind your partner to expect adjustments to CPAP before it feels just right.  Consider joining a snoring and sleep apnea support group.  Go Along to See the Health Care Provider  You can give the health care provider the best account of your partners nighttime breathing and snoring patterns. Try to go along to health care providers appointments. If you cant go, write notes for your partner to give to the health care provider. Describe your partners snoring and sleep breathing patterns in detail.  Tips for Sleeping with a Snorer  Until treatment takes care of your partners snoring:  Try to go to bed first. It may help if youre already asleep when your partner starts to snore.  Wear earplugs to bed. A fan or other source of background noise may also help drown out snoring.   © 6943-1175 [employer]. [address] [place] [address]. All rights reserved. This information is not intended as a  substitute for professional medical care. Always follow your healthcare professional's instructions.        Continuous Positive Airway Pressure (CPAP)  Your health care provider has prescribed continuous positive airway pressure (CPAP) therapy for you. A CPAP device helps you breathe better at night. The device delivers air through your nose or mouth when you breathe in to keep your air passages open. CPAP is:  Used most often to treat sleep apnea and some other problems (Sleep apnea is a chronic condition with periods of sleep in which you briefly stop breathing.)  Safe and very effective, but it takes time to get used to the mask.   Your health care provider, nurse, or medical supplier will give you tips for wearing and caring for your CPAP device.  General guidelines  It's very important not to give up! It takes time to get used to wearing the mask at night.  Practice using your CPAP device during the day, especially whenever you take a nap.  Remember, there are several different types of masks. If you cant get used to your mask, ask your provider or medical supply company about trying another style.  If you have nasal stuffiness or dryness when using your CPAP device, talk with your provider or medical supply company. There are ways to lessen these problems. For example, your provider may recommend moistening nasal spray or the medical supply company may recommend a device with a humidifier.  The goal is to use your CPAP all night, every night, during all naps, and even when you travel.  Keep your mask clean. Wash it with soap and water. Be sure to rinse the mask and tubing well with water to remove any soap. Let them air-dry thoroughly before using.  Make yourself comfortable when sleeping with CPAP. Try using extra pillows.  Work with your medical supply company so that you know how to correctly use your CPAP. Their representative will be able to help you:  Use the CPAP correctly  Troubleshoot any problems  that come up  Learn to clean and maintain the device  Adjust to regular use of the CPAP  © 7613-4395 [employer]. [address] [place] [address], [place] [address]. All rights reserved. This information is not intended as a substitute for professional medical care. Always follow your healthcare professional's instructions.

## 2024-10-25 NOTE — PROGRESS NOTES
Pulmonary Outpatient Follow Up Visit     Subjective:       Patient ID: Anthony Blair is a 89 y.o. male.    Chief Complaint: Sleep Apnea      HPI      89-year-old male patient presenting for follow-up post repeat HST.      Severe obstructive sleep apnea noted.  Complains of snoring gasping for breath during sleep frequent nocturnal awakenings affecting his sleep maintenance.      [place] Sleepiness Scale score 9.     Review of Systems   Constitutional:  Positive for fatigue.   Respiratory:  Positive for apnea, snoring and somnolence.    Psychiatric/Behavioral:  Positive for sleep disturbance.        Outpatient Encounter Medications as of 10/25/2024   Medication Sig Dispense Refill    ACCU-CHEK MULTICLIX LANCET lancets TEST BLOOD SUGAR THREE TIMES DAILY 200 each 5    ascorbic acid, vitamin C, (VITAMIN C) 100 MG tablet Take 100 mg by mouth once daily.      aspirin (ECOTRIN) 81 MG EC tablet Take 81 mg by mouth every other day.      blood sugar diagnostic (ACCU-CHEK GUIDE TEST STRIPS) Strp Inject 1 each into the skin 4 (four) times daily. 600 strip 3    blood-glucose meter Misc 1 Device by Misc.(Non-Drug; Combo Route) route once. for 1 dose 1 each 0    CARBOXYMETHYLCELLULOSE SODIUM (REFRESH OPHT) Apply to eye.      ferrous gluconate (FERGON) 324 MG tablet Take 1 tablet (324 mg total) by mouth 2 (two) times daily with meals. 180 tablet 3    fluticasone propionate (FLONASE) 50 mcg/actuation nasal spray 2 sprays (100 mcg total) by Each Nostril route once daily. 16 g 3    furosemide (LASIX) 40 MG tablet Take 1 tablet (40 mg total) by mouth once daily.      insulin (LANTUS SOLOSTAR U-100 INSULIN) glargine 100 units/mL SubQ pen INJECT 16 UNITS SUBCUTANEOUSLY AT BEDTIME. 94 DAY SUPPLY 15 mL 11    insulin aspart U-100 (NOVOLOG FLEXPEN U-100 INSULIN) 100 unit/mL (3 mL) InPn pen INJECT EIGHT UNITS BEFORE BREAKFAST, SIX UNITS BEFORE LUNCH, AND EIGHT UNITS BEFORE DINNER. MAY TITRATE TO A MAX  "DOSE OF 50 UNITS PER DAY 30 mL 3    isosorbide mononitrate (IMDUR) 60 MG 24 hr tablet Take 1 tablet (60 mg total) by mouth 2 (two) times daily. 180 tablet 3    losartan (COZAAR) 25 MG tablet Take 1 tablet (25 mg total) by mouth once daily. 90 tablet 3    metoprolol succinate (TOPROL-XL) 100 MG 24 hr tablet Take 1 tablet (100 mg total) by mouth 2 (two) times daily. 180 tablet 3    multivitamin (ONE DAILY MULTIVITAMIN) per tablet Take 1 tablet by mouth once daily.      pantoprazole (PROTONIX) 40 MG tablet Take 1 tablet (40 mg total) by mouth once daily. 90 tablet 3    pen needle, diabetic (COMFORT EZ PEN NEEDLES) 32 gauge x 5/32" Ndle Use to inject insulin 4 times a day 200 each 6    saw palmetto 500 MG capsule Take 500 mg by mouth 2 (two) times a day.      turmeric 400 mg Cap Take 1 capsule by mouth once daily.      vitamin D (VITAMIN D3) 1000 units Tab Take 1,000 Units by mouth once daily.      vitamin E 100 UNIT capsule Take 100 Units by mouth once daily.      [DISCONTINUED] zinc gluconate 50 mg tablet Take 50 mg by mouth once daily.      nitroGLYCERIN (NITROSTAT) 0.4 MG SL tablet Place 0.4 mg under the tongue.      potassium chloride SA (K-DUR,KLOR-CON) 20 MEQ tablet Take 1 tablet by mouth every morning.       No facility-administered encounter medications on file as of 10/25/2024.       Objective:     Vital Signs (Most Recent)  Vital Signs  Pulse: 77  Resp: 20  SpO2: 97 %  BP: 120/70  Pain Score: 0-No pain  Height and Weight  Height: 5' 9" (175.3 cm)  Weight: 77.3 kg (170 lb 4.9 oz)  BSA (Calculated - sq m): 1.94 sq meters  BMI (Calculated): 25.1  Weight in (lb) to have BMI = 25: 168.9]  Wt Readings from Last 2 Encounters:   10/25/24 77.3 kg (170 lb 4.9 oz)   07/26/24 78.5 kg (173 lb 1 oz)       Physical Exam   Constitutional: He is oriented to person, place, and time. He appears well-developed.   Pulmonary/Chest: No respiratory distress.   Neurological: He is alert and oriented to person, place, and time. " "  Psychiatric: His behavior is normal.       Laboratory  Lab Results   Component Value Date    WBC 5.11 07/11/2024    RBC 3.67 (L) 07/11/2024    HGB 11.8 (L) 07/11/2024    HCT 35.5 (L) 07/11/2024    MCV 97 07/11/2024    MCH 32.2 (H) 07/11/2024    MCHC 33.2 07/11/2024    RDW 14.6 (H) 07/11/2024     07/11/2024    MPV 11.0 07/11/2024    GRAN 2.2 07/11/2024    GRAN 43.3 07/11/2024    LYMPH 2.0 07/11/2024    LYMPH 39.9 07/11/2024    MONO 0.6 07/11/2024    MONO 11.9 07/11/2024    EOS 0.2 07/11/2024    BASO 0.02 07/11/2024    EOSINOPHIL 4.3 07/11/2024    BASOPHIL 0.4 07/11/2024       BMP  Lab Results   Component Value Date     07/11/2024    K 3.5 07/11/2024     07/11/2024    CO2 24 07/11/2024    BUN 36 (H) 07/11/2024    CREATININE 1.3 07/11/2024    CALCIUM 10.5 07/11/2024    ANIONGAP 11 07/11/2024    ESTGFRAFRICA 84 03/12/2023    EGFRNONAA >60 05/09/2022    AST 45 (H) 07/11/2024    ALT 21 07/11/2024    PROT 7.4 07/11/2024       Lab Results   Component Value Date    BNP 79 09/13/2023    BNP 99 08/16/2023    BNP 92 03/10/2023    BNP 59 03/16/2022     (H) 12/12/2021    BNP 48 05/12/2021       Lab Results   Component Value Date    TSH 1.698 05/08/2022       Lab Results   Component Value Date    SEDRATE 27 (H) 07/01/2022       Lab Results   Component Value Date    CRP 5.0 07/01/2022     No results found for: "IGE"     No results found for: "ASPERGILLUS"  No results found for: "AFUMIGATUSCL"     No results found for: "ACE"     Diagnostic Results:  I have personally reviewed today the following studies:      Assessment/Plan:   Severe obstructive sleep apnea  -     CPAP FOR HOME USE    Excessive daytime sleepiness  -     CPAP FOR HOME USE    Nocturnal hypoxemia  -     CPAP FOR HOME USE    Pacemaker  -     CPAP FOR HOME USE    Snoring  -     CPAP FOR HOME USE      Start auto PAP.  Evaluate after modem data review next visit.    Follow up in about 3 months (around 1/25/2025).    This note was prepared using " voice recognition system and is likely to have sound alike errors that may have been overlooked even after proof reading.  Please call me with any questions    Discussed diagnosis, its evaluation, treatment and usual course. All questions answered.      Miriam Saez MD

## 2025-01-07 ENCOUNTER — PATIENT MESSAGE (OUTPATIENT)
Dept: CARDIOLOGY | Facility: CLINIC | Age: OVER 89
End: 2025-01-07
Payer: MEDICARE

## 2025-01-09 DIAGNOSIS — Z79.4 TYPE 2 DIABETES MELLITUS WITH DIABETIC PERIPHERAL ANGIOPATHY WITHOUT GANGRENE, WITH LONG-TERM CURRENT USE OF INSULIN: ICD-10-CM

## 2025-01-09 DIAGNOSIS — E11.51 TYPE 2 DIABETES MELLITUS WITH DIABETIC PERIPHERAL ANGIOPATHY WITHOUT GANGRENE, WITH LONG-TERM CURRENT USE OF INSULIN: ICD-10-CM

## 2025-01-09 RX ORDER — PEN NEEDLE, DIABETIC 32GX 5/32"
NEEDLE, DISPOSABLE MISCELLANEOUS
Qty: 400 EACH | Refills: 3 | Status: SHIPPED | OUTPATIENT
Start: 2025-01-09

## 2025-01-09 NOTE — TELEPHONE ENCOUNTER
Refill Decision Note   Anthony Blair  is requesting a refill authorization.  Brief Assessment and Rationale for Refill:  Approve     Medication Therapy Plan:        Comments:     Note composed:8:29 AM 01/09/2025

## 2025-01-09 NOTE — TELEPHONE ENCOUNTER
Care Due:                  Date            Visit Type   Department     Provider  --------------------------------------------------------------------------------                                EP -                              PRIMARY      [facility]  Last Visit: 07-      CARE ([facility])   MEDICINE       Abdulaziz Mccabe  Next Visit: None Scheduled  None         None Found                                                            Last  Test          Frequency    Reason                     Performed    Due Date  --------------------------------------------------------------------------------    HBA1C.......  6 months...  insulin..................  07- 01-    [facility] Embedded Care Due Messages. Reference number: [id number].   1/09/2025 4:29:29 AM CST

## 2025-01-22 ENCOUNTER — CLINICAL SUPPORT (OUTPATIENT)
Dept: CARDIOLOGY | Facility: HOSPITAL | Age: OVER 89
End: 2025-01-22
Payer: MEDICARE

## 2025-01-22 ENCOUNTER — CLINICAL SUPPORT (OUTPATIENT)
Dept: CARDIOLOGY | Facility: HOSPITAL | Age: OVER 89
End: 2025-01-22
Attending: INTERNAL MEDICINE
Payer: MEDICARE

## 2025-01-22 DIAGNOSIS — I44.2 ATRIOVENTRICULAR BLOCK, COMPLETE: ICD-10-CM

## 2025-01-22 DIAGNOSIS — Z95.0 PRESENCE OF CARDIAC PACEMAKER: ICD-10-CM

## 2025-01-22 DIAGNOSIS — I50.42 CHRONIC COMBINED SYSTOLIC (CONGESTIVE) AND DIASTOLIC (CONGESTIVE) HEART FAILURE: ICD-10-CM

## 2025-01-22 PROCEDURE — 93296 REM INTERROG EVL PM/IDS: CPT | Performed by: INTERNAL MEDICINE

## 2025-01-22 PROCEDURE — 93294 REM INTERROG EVL PM/LDLS PM: CPT | Mod: S$GLB,,, | Performed by: INTERNAL MEDICINE

## 2025-01-28 ENCOUNTER — OFFICE VISIT (OUTPATIENT)
Dept: FAMILY MEDICINE | Facility: CLINIC | Age: OVER 89
End: 2025-01-28
Payer: MEDICARE

## 2025-01-28 VITALS
DIASTOLIC BLOOD PRESSURE: 62 MMHG | HEIGHT: 69 IN | TEMPERATURE: 98 F | OXYGEN SATURATION: 96 % | SYSTOLIC BLOOD PRESSURE: 150 MMHG | RESPIRATION RATE: 17 BRPM | HEART RATE: 86 BPM | WEIGHT: 176.25 LBS | BODY MASS INDEX: 26.11 KG/M2

## 2025-01-28 DIAGNOSIS — I48.21 PERMANENT ATRIAL FIBRILLATION: ICD-10-CM

## 2025-01-28 DIAGNOSIS — N18.32 STAGE 3B CHRONIC KIDNEY DISEASE: ICD-10-CM

## 2025-01-28 DIAGNOSIS — R42 VERTIGO: Primary | ICD-10-CM

## 2025-01-28 DIAGNOSIS — I50.32 CHRONIC DIASTOLIC HEART FAILURE: ICD-10-CM

## 2025-01-28 DIAGNOSIS — Z79.899 ENCOUNTER FOR LONG-TERM CURRENT USE OF MEDICATION: ICD-10-CM

## 2025-01-28 DIAGNOSIS — I25.10 CORONARY ARTERY DISEASE, UNSPECIFIED VESSEL OR LESION TYPE, UNSPECIFIED WHETHER ANGINA PRESENT, UNSPECIFIED WHETHER NATIVE OR TRANSPLANTED HEART: ICD-10-CM

## 2025-01-28 DIAGNOSIS — E08.51 DIABETES MELLITUS DUE TO UNDERLYING CONDITION WITH DIABETIC PERIPHERAL ANGIOPATHY WITHOUT GANGRENE, WITH LONG-TERM CURRENT USE OF INSULIN: ICD-10-CM

## 2025-01-28 DIAGNOSIS — Z79.4 DIABETES MELLITUS DUE TO UNDERLYING CONDITION WITH DIABETIC PERIPHERAL ANGIOPATHY WITHOUT GANGRENE, WITH LONG-TERM CURRENT USE OF INSULIN: ICD-10-CM

## 2025-01-28 DIAGNOSIS — K74.60 CIRRHOSIS OF LIVER WITHOUT ASCITES, UNSPECIFIED HEPATIC CIRRHOSIS TYPE: ICD-10-CM

## 2025-01-28 PROBLEM — M45.0 ANKYLOSING SPONDYLITIS OF MULTIPLE SITES IN SPINE: Status: ACTIVE | Noted: 2025-01-28

## 2025-01-28 PROBLEM — M45.0 ANKYLOSING SPONDYLITIS OF MULTIPLE SITES IN SPINE: Status: RESOLVED | Noted: 2025-01-28 | Resolved: 2025-01-28

## 2025-01-28 LAB
ALBUMIN SERPL BCP-MCNC: 3.4 G/DL (ref 3.5–5.2)
ALP SERPL-CCNC: 142 U/L (ref 40–150)
ALT SERPL W/O P-5'-P-CCNC: 18 U/L (ref 10–44)
ANION GAP SERPL CALC-SCNC: 9 MMOL/L (ref 8–16)
AST SERPL-CCNC: 42 U/L (ref 10–40)
BASOPHILS # BLD AUTO: 0.03 K/UL (ref 0–0.2)
BASOPHILS NFR BLD: 0.7 % (ref 0–1.9)
BILIRUB SERPL-MCNC: 0.5 MG/DL (ref 0.1–1)
BUN SERPL-MCNC: 30 MG/DL (ref 10–30)
CALCIUM SERPL-MCNC: 10.1 MG/DL (ref 8.7–10.5)
CHLORIDE SERPL-SCNC: 105 MMOL/L (ref 95–110)
CO2 SERPL-SCNC: 27 MMOL/L (ref 23–29)
CREAT SERPL-MCNC: 1.1 MG/DL (ref 0.5–1.4)
DIFFERENTIAL METHOD BLD: ABNORMAL
EOSINOPHIL # BLD AUTO: 0.2 K/UL (ref 0–0.5)
EOSINOPHIL NFR BLD: 4.8 % (ref 0–8)
ERYTHROCYTE [DISTWIDTH] IN BLOOD BY AUTOMATED COUNT: 14.2 % (ref 11.5–14.5)
EST. GFR  (NO RACE VARIABLE): >60 ML/MIN/1.73 M^2
ESTIMATED AVG GLUCOSE: 123 MG/DL (ref 68–131)
GLUCOSE SERPL-MCNC: 199 MG/DL (ref 70–110)
HBA1C MFR BLD: 5.9 % (ref 4–5.6)
HCT VFR BLD AUTO: 38.4 % (ref 40–54)
HGB BLD-MCNC: 12 G/DL (ref 14–18)
IMM GRANULOCYTES # BLD AUTO: 0.01 K/UL (ref 0–0.04)
IMM GRANULOCYTES NFR BLD AUTO: 0.2 % (ref 0–0.5)
LYMPHOCYTES # BLD AUTO: 1.6 K/UL (ref 1–4.8)
LYMPHOCYTES NFR BLD: 35.2 % (ref 18–48)
MCH RBC QN AUTO: 31 PG (ref 27–31)
MCHC RBC AUTO-ENTMCNC: 31.3 G/DL (ref 32–36)
MCV RBC AUTO: 99 FL (ref 82–98)
MONOCYTES # BLD AUTO: 0.5 K/UL (ref 0.3–1)
MONOCYTES NFR BLD: 10.6 % (ref 4–15)
NEUTROPHILS # BLD AUTO: 2.2 K/UL (ref 1.8–7.7)
NEUTROPHILS NFR BLD: 48.5 % (ref 38–73)
NRBC BLD-RTO: 0 /100 WBC
PLATELET # BLD AUTO: 148 K/UL (ref 150–450)
PMV BLD AUTO: 11.9 FL (ref 9.2–12.9)
POTASSIUM SERPL-SCNC: 4.2 MMOL/L (ref 3.5–5.1)
PROT SERPL-MCNC: 7.1 G/DL (ref 6–8.4)
RBC # BLD AUTO: 3.87 M/UL (ref 4.6–6.2)
SODIUM SERPL-SCNC: 141 MMOL/L (ref 136–145)
TSH SERPL DL<=0.005 MIU/L-ACNC: 1.45 UIU/ML (ref 0.4–4)
WBC # BLD AUTO: 4.54 K/UL (ref 3.9–12.7)

## 2025-01-28 PROCEDURE — 3288F FALL RISK ASSESSMENT DOCD: CPT | Mod: CPTII,S$GLB,, | Performed by: NURSE PRACTITIONER

## 2025-01-28 PROCEDURE — 36415 COLL VENOUS BLD VENIPUNCTURE: CPT | Mod: S$GLB,,, | Performed by: NURSE PRACTITIONER

## 2025-01-28 PROCEDURE — 1101F PT FALLS ASSESS-DOCD LE1/YR: CPT | Mod: CPTII,S$GLB,, | Performed by: NURSE PRACTITIONER

## 2025-01-28 PROCEDURE — 83036 HEMOGLOBIN GLYCOSYLATED A1C: CPT | Performed by: NURSE PRACTITIONER

## 2025-01-28 PROCEDURE — 1126F AMNT PAIN NOTED NONE PRSNT: CPT | Mod: CPTII,S$GLB,, | Performed by: NURSE PRACTITIONER

## 2025-01-28 PROCEDURE — 1159F MED LIST DOCD IN RCRD: CPT | Mod: CPTII,S$GLB,, | Performed by: NURSE PRACTITIONER

## 2025-01-28 PROCEDURE — G2211 COMPLEX E/M VISIT ADD ON: HCPCS | Mod: S$GLB,,, | Performed by: NURSE PRACTITIONER

## 2025-01-28 PROCEDURE — 1160F RVW MEDS BY RX/DR IN RCRD: CPT | Mod: CPTII,S$GLB,, | Performed by: NURSE PRACTITIONER

## 2025-01-28 PROCEDURE — 3072F LOW RISK FOR RETINOPATHY: CPT | Mod: CPTII,S$GLB,, | Performed by: NURSE PRACTITIONER

## 2025-01-28 PROCEDURE — 99215 OFFICE O/P EST HI 40 MIN: CPT | Mod: S$GLB,,, | Performed by: NURSE PRACTITIONER

## 2025-01-28 PROCEDURE — 1157F ADVNC CARE PLAN IN RCRD: CPT | Mod: CPTII,S$GLB,, | Performed by: NURSE PRACTITIONER

## 2025-01-28 PROCEDURE — 80053 COMPREHEN METABOLIC PANEL: CPT | Performed by: NURSE PRACTITIONER

## 2025-01-28 PROCEDURE — 85025 COMPLETE CBC W/AUTO DIFF WBC: CPT | Performed by: NURSE PRACTITIONER

## 2025-01-28 PROCEDURE — 84443 ASSAY THYROID STIM HORMONE: CPT | Performed by: NURSE PRACTITIONER

## 2025-01-28 RX ORDER — SCOLOPAMINE TRANSDERMAL SYSTEM 1 MG/1
1 PATCH, EXTENDED RELEASE TRANSDERMAL
Qty: 10 PATCH | Refills: 0 | Status: SHIPPED | OUTPATIENT
Start: 2025-01-28 | End: 2025-01-28

## 2025-01-28 NOTE — PATIENT INSTRUCTIONS
Your provider has requested that you make an appointment with Dermatology, to  schedule an appointment, please contact one of the following providers:    Dr. Osvaldo Khan & MD Dr. Cher Pagan PA  [address] [place]  [address]  Phone:  [phone]    Dr. Jihan Khan & Dr. Erendira Lentz   [address] [place]  [address]  Phone: [phone]    Dr. Osvaldo Giraldo & DANGELO Mazariegos  [address], [address]  [place]   [address]  Phone: [phone]    Dr. Sari Aranda  [address]   [place]   [address]  Phone: [phone]    [facility]  [id number]  [place] [address] [phone]

## 2025-01-28 NOTE — Clinical Note
Can you schedule carotid ultrasound, ultrasound of the liver and I AFP lab tests prior to our next visit in 3 months.

## 2025-01-28 NOTE — PROGRESS NOTES
Patient ID: Anthony Blair is a 89 y.o. male.     History of Present Illness    CHIEF COMPLAINT:  Patient presents today to establish care, he is here with his wife. He now lives in [place].  He is a deacon      CARDIOVASCULAR:  CAD, AFIB, PACEMAKER, HTN, CHF, PAD  HE DOES TELL ME THAT HE IS CURRENTLY ON THE DIGITAL HYPERTENSION PROGRAM.  Blood pressure is slightly elevated today but when we discuss increasing his losartan he tells me that he became very dizzy when this was done in the past and he has already suffering with dizziness.  He has a history of bypass surgery with 5 bypasses performed approximately 13-14 years ago. He experiences occasional fluid overload manifesting as foot swelling, which he manages with diuretics on Monday, Wednesday, and Friday. His current pacemaker, which is his second one, has been in place for 3 years.. His first pacemaker lasted approximately 10 years before wearing out.  He is currently followed by Dr. Delphine Broussard but will need a new one because he states that his cardiologist is leaving.  He is also on Toprol, Lasix, potassium, aspirin, Imdur.    NEUROLOGICAL:  He has a history of stroke in 2000.     Carotid Artery disease  2023  Impression:     1.  There are elevated velocities involving the left internal carotid artery, consistent with 50-69% stenosis.  2.  Negative for elevated velocities or grayscale images to suggest significant stenosis or occlusion otherwise.  Antegrade flow seen in all vessels imaged.  3.  Moderate to marked scattered hard and soft plaque involving the right greater the left carotid arterial systems.    CKD  Last GFR 51.9, BUN 36 and creatinine 1.3.    Cirrhosis  Cirrhosis of liver 09/10/2020   Patient has ultrasound and AFP annually.  Last AFP?US January of 2024  AST 45, ALT 21    DIABETES:  He reports variable blood sugar, with periods of stability lasting about a week followed by significant fluctuations. He had an elevated blood sugar reading last  "night. He takes lantus insulin 16 units at night and follows a sliding scale regular insulin regimen at lunchtime.    SLEEP APNEA:  He has been diagnosed with sleep apnea but declined CPAP therapy.    VETRIGO   He has had manipulation in past, having similar symptoms.    SKIN LESION  Patient has 2 skin lesions that he would like evaluated.  He has never been seen by a dermatologist.  He did have a great deal of sun exposure throughout his life.      GERD-on Protonix without any complaints          ROS:  General: -fever, -chills, -fatigue, -weight gain, -weight loss  Eyes: -vision changes, -redness, -discharge  ENT: -ear pain, -nasal congestion, -sore throat  Cardiovascular: -chest pain, -palpitations, -lower extremity edema  Respiratory: -cough, -shortness of breath  Gastrointestinal: -abdominal pain, -nausea, -vomiting, -diarrhea, -constipation, -blood in stool  Genitourinary: -dysuria, -hematuria, -frequency  Musculoskeletal: +joint pain, -muscle pain  Skin: -rash, -lesion, +itching  Neurological: -headache, +dizziness, -numbness, -tingling  Psychiatric: -anxiety, -depression, -sleep difficulty         Physical Exam    Vitals:    01/28/25 0904   BP: (!) 150/62   Pulse: 86   Resp: 17   Temp: 97.7 °F (36.5 °C)   TempSrc: Temporal   SpO2: 96%   Weight: 79.9 kg (176 lb 4.1 oz)   Height: 5' 9" (1.753 m)   PainSc: 0-No pain     Body mass index is 26.03 kg/m².  Physical Exam    General: No acute distress. Elderly male   Eyes: . Sclerae anicteric.  HENT: Normocephalic. Atraumatic. Nares patent. Moist oral mucosa.  Ears: hearing aids in place  Cardiovascular: Regular rate. Regular rhythm. No murmurs  Respiratory: Normal respiratory effort. Clear to auscultation bilaterally. No rales. No rhonchi. No wheezing.  Abdomen: Soft. Non-tender. Non-distended. Normoactive bowel sounds.  Musculoskeletal: No  obvious deformity.  Extremities: No lower extremity edema.  Neurological: Alert & oriented x3. No slurred speech. Normal " gait.  Psychiatric: Normal mood. Normal affect. Good insight. Good judgment.  Skin: Warm. Dry.             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  - Ordered A1C test to assess diabetes control and monitor blood sugar fluctuations, noting a recent high spike.  - Plan to attempt regulating blood sugar over the next 2 months.  - If needed, consider referral to an endocrinology nurse practitioner.    CHRONIC DIASTOLIC HEART FAILURE:  - Patient reports no swelling in legs or feet for about 2 weeks, though occasional fluid overload with feet swelling has occurred in the past.  - Auscultation of heart sounds good.  - Continue fluid pills on Mondays, Wednesdays, and Fridays to manage fluid retention.  Referred to new cardiologist    PERMANENT ATRIAL FIBRILLATION:  - Referred patient to cardiologist, Dr. Trimble, for further evaluation and management.    PACEMAKER:  - Examined pacemaker site, which is in good condition.  - Noted patient has had this pacemaker for 3 years, replacing a previous one that lasted 10 years.    DERMATOLOGY REFERRAL:  - Referred patient to dermatology for removal of skin lesions, evaluation of sun damage, and overall skin check.  - Noted patient has a mole on the neck and a growth on head    SLEEP ISSUES:  - Noted patient was prescribed a CPAP machine but refused due to  cost.    DIABETES:  - Monitored patient's reports of fluctuating blood sugar.  - Will review A1C results and adjust plan accordingly.    CARDIOVASCULAR HISTORY:  - Noted patient had bypass surgery approximately 13-14 years ago, with 5 bypasses performed instead of the planned 3.    HYPERTENSION:  - Continued Losartan at current dose.  - Decided not to change blood pressure medication due to patient's dizziness.  Continue all other medications    FOLLOW UP:  - Follow up in 3 months.  - Patient instructed to contact the office if dizziness persists or worsens.         Cirrhosis  We will order ultrasound and AFP prior to next appointment    Carotid artery disease    We will order carotid ultrasound prior to next appointment as well.    Of note, patient has statin intolerance.    This note was generated with the assistance of ambient listening technology. Verbal consent was obtained by the patient and accompanying visitor(s) for the recording of patient appointment to facilitate this note. I attest to having reviewed and edited the generated note for accuracy, though some syntax or spelling errors may persist. Please contact the author of this note for any clarification.    I spent 59 minutes on this encounter, time includes face-to-face, chart review, documentation, test review and orders.

## 2025-01-30 ENCOUNTER — TELEPHONE (OUTPATIENT)
Dept: FAMILY MEDICINE | Facility: CLINIC | Age: OVER 89
End: 2025-01-30
Payer: MEDICARE

## 2025-01-30 ENCOUNTER — HOSPITAL ENCOUNTER (OUTPATIENT)
Dept: RADIOLOGY | Facility: HOSPITAL | Age: OVER 89
Discharge: HOME | End: 2025-01-30
Attending: NURSE PRACTITIONER
Payer: MEDICARE

## 2025-01-30 DIAGNOSIS — K74.60 CIRRHOSIS OF LIVER WITHOUT ASCITES, UNSPECIFIED HEPATIC CIRRHOSIS TYPE: ICD-10-CM

## 2025-01-30 PROCEDURE — 76705 ECHO EXAM OF ABDOMEN: CPT | Mod: TC,PO

## 2025-01-30 PROCEDURE — 76705 ECHO EXAM OF ABDOMEN: CPT | Mod: 26,,, | Performed by: RADIOLOGY

## 2025-01-30 NOTE — TELEPHONE ENCOUNTER
----- Message from Maryanne Alexander NP sent at 1/28/2025 12:31 PM CST -----  Can you schedule carotid ultrasound, ultrasound of the liver and I AFP lab tests prior to our next visit in 3 months.

## 2025-02-06 DIAGNOSIS — I48.0 PAF (PAROXYSMAL ATRIAL FIBRILLATION): ICD-10-CM

## 2025-02-06 RX ORDER — METOPROLOL SUCCINATE 100 MG/1
100 TABLET, EXTENDED RELEASE ORAL 2 TIMES DAILY
Qty: 180 TABLET | Refills: 3 | Status: SHIPPED | OUTPATIENT
Start: 2025-02-06

## 2025-02-06 NOTE — TELEPHONE ENCOUNTER
Refill Routing Note   Medication(s) are not appropriate for processing by [facility] for the following reason(s):        Non-participating provider    [facility] action(s):  Route               Appointments  past 12m or future 3m with PCP    Date Provider   Last Visit   1/28/2025 Maryanne Alexander NP   Next Visit   4/17/2025 Maryanne Alexander NP   ED visits in past 90 days: 0        Note composed:6:18 AM 02/06/2025

## 2025-02-11 ENCOUNTER — HOSPITAL ENCOUNTER (OUTPATIENT)
Dept: RADIOLOGY | Facility: HOSPITAL | Age: OVER 89
Discharge: HOME | End: 2025-02-11
Attending: NURSE PRACTITIONER
Payer: MEDICARE

## 2025-02-11 DIAGNOSIS — I25.10 CORONARY ARTERY DISEASE, UNSPECIFIED VESSEL OR LESION TYPE, UNSPECIFIED WHETHER ANGINA PRESENT, UNSPECIFIED WHETHER NATIVE OR TRANSPLANTED HEART: ICD-10-CM

## 2025-02-11 PROCEDURE — 93880 EXTRACRANIAL BILAT STUDY: CPT | Mod: TC,PO

## 2025-02-11 PROCEDURE — 93880 EXTRACRANIAL BILAT STUDY: CPT | Mod: 26,,, | Performed by: STUDENT IN AN ORGANIZED HEALTH CARE EDUCATION/TRAINING PROGRAM

## 2025-02-17 LAB
OHS CV BIV PACING PERCENT: 98 %
OHS CV DC REMOTE DEVICE TYPE: NORMAL

## 2025-02-22 DIAGNOSIS — I50.32 CHRONIC DIASTOLIC HEART FAILURE: ICD-10-CM

## 2025-02-22 DIAGNOSIS — I11.0 HYPERTENSIVE HEART DISEASE WITH HEART FAILURE: ICD-10-CM

## 2025-02-22 NOTE — TELEPHONE ENCOUNTER
Refill Routing Note   Medication(s) are not appropriate for processing by [facility] for the following reason(s):        Non-participating provider    [facility] action(s):  Route               Appointments  past 12m or future 3m with PCP    Date Provider   Last Visit   1/28/2025 Maryanne Alexander NP   Next Visit   4/17/2025 Maryanne Alexander NP   ED visits in past 90 days: 0        Note composed:9:22 AM 02/22/2025

## 2025-02-23 RX ORDER — PANTOPRAZOLE SODIUM 40 MG/1
40 TABLET, DELAYED RELEASE ORAL
Qty: 90 TABLET | Refills: 3 | Status: SHIPPED | OUTPATIENT
Start: 2025-02-23

## 2025-02-23 RX ORDER — ISOSORBIDE MONONITRATE 60 MG/1
60 TABLET, EXTENDED RELEASE ORAL 2 TIMES DAILY
Qty: 180 TABLET | Refills: 3 | Status: SHIPPED | OUTPATIENT
Start: 2025-02-23

## 2025-02-23 RX ORDER — FUROSEMIDE 40 MG/1
80 TABLET ORAL
Qty: 180 TABLET | Refills: 3 | Status: SHIPPED | OUTPATIENT
Start: 2025-02-23

## 2025-02-24 ENCOUNTER — OFFICE VISIT (OUTPATIENT)
Dept: OPTOMETRY | Facility: CLINIC | Age: OVER 89
End: 2025-02-24
Payer: MEDICARE

## 2025-02-24 DIAGNOSIS — H04.123 DRY EYE SYNDROME OF BILATERAL LACRIMAL GLANDS: ICD-10-CM

## 2025-02-24 DIAGNOSIS — H43.813 POSTERIOR VITREOUS DETACHMENT OF BOTH EYES: ICD-10-CM

## 2025-02-24 DIAGNOSIS — H52.4 HYPEROPIA WITH ASTIGMATISM AND PRESBYOPIA, BILATERAL: ICD-10-CM

## 2025-02-24 DIAGNOSIS — H35.371 EPIRETINAL MEMBRANE, RIGHT: ICD-10-CM

## 2025-02-24 DIAGNOSIS — E11.9 TYPE 2 DIABETES MELLITUS WITHOUT RETINOPATHY: Primary | ICD-10-CM

## 2025-02-24 DIAGNOSIS — Z96.1 PSEUDOPHAKIA OF BOTH EYES: ICD-10-CM

## 2025-02-24 DIAGNOSIS — H52.203 HYPEROPIA WITH ASTIGMATISM AND PRESBYOPIA, BILATERAL: ICD-10-CM

## 2025-02-24 DIAGNOSIS — H52.03 HYPEROPIA WITH ASTIGMATISM AND PRESBYOPIA, BILATERAL: ICD-10-CM

## 2025-02-24 PROBLEM — H52.7 REFRACTIVE ERROR: Status: RESOLVED | Noted: 2017-05-16 | Resolved: 2025-02-24

## 2025-02-24 PROCEDURE — 1157F ADVNC CARE PLAN IN RCRD: CPT | Mod: CPTII,S$GLB,,

## 2025-02-24 PROCEDURE — 2023F DILAT RTA XM W/O RTNOPTHY: CPT | Mod: CPTII,S$GLB,,

## 2025-02-24 PROCEDURE — 99999 PR PBB SHADOW E&M-EST. PATIENT-LVL III: CPT | Mod: PBBFAC,,,

## 2025-02-24 PROCEDURE — 1126F AMNT PAIN NOTED NONE PRSNT: CPT | Mod: CPTII,S$GLB,,

## 2025-02-24 PROCEDURE — 1101F PT FALLS ASSESS-DOCD LE1/YR: CPT | Mod: CPTII,S$GLB,,

## 2025-02-24 PROCEDURE — 92134 CPTRZ OPH DX IMG PST SGM RTA: CPT | Mod: S$GLB,,,

## 2025-02-24 PROCEDURE — 3288F FALL RISK ASSESSMENT DOCD: CPT | Mod: CPTII,S$GLB,,

## 2025-02-24 PROCEDURE — 92014 COMPRE OPH EXAM EST PT 1/>: CPT | Mod: S$GLB,,,

## 2025-02-24 PROCEDURE — 1159F MED LIST DOCD IN RCRD: CPT | Mod: CPTII,S$GLB,,

## 2025-02-24 PROCEDURE — 92015 DETERMINE REFRACTIVE STATE: CPT | Mod: S$GLB,,,

## 2025-02-24 NOTE — PROGRESS NOTES
HPI    New pt here for annual diabetic exam with OCT. Last exam - 1 year    Pt sts VA OU blurry occasionally, sugars are fluctuating. Pt BS this am   was 79. Pt has floaters when sugar is low. Pt denies flashes/pain. Pt   using Refresh PRN.     Hemoglobin A1C       Date                     Value               Ref Range             Status                01/28/2025               5.9 (H)             4.0 - 5.6 %           Final                 07/11/2024               5.8 (H)             4.0 - 5.6 %           Final                01/11/2024               6.0 (H)             4.0 - 5.6 %           Final                Last edited by Maddie Drew on 2/24/2025  9:38 AM.            Assessment /Plan     For exam results, see Encounter Report.    Type 2 diabetes mellitus without retinopathy    Epiretinal membrane, right  -     Posterior Segment OCT Retina-Both eyes    Dry eye syndrome of bilateral lacrimal glands    Pseudophakia of both eyes    Posterior vitreous detachment of both eyes    Hyperopia with astigmatism and presbyopia, bilateral      No diabetic retinopathy or macular edema evident on today's exam OD, OS. Ed pt on importance of maintaining strict BS control due to potential for visual fluctuations and/or vision loss. Monitor with yearly dilated exam.  Longstanding epiretinal membrane OD with corresponding reduced BCVA. Ed pt on findings and on benign nature of epiretinal membrane. BCVA stable to previous at 20/30. Repeat Mac OCT done today largely stable to previous with no significant progression OD, OS. Ed pt to RTC asap if sudden changes in vision, flashes, or floaters experienced. Otherwise, continue to monitor yearly for changes, sooner prn.  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 Ed pt on findings and on nature/chronicity of dry eye. Recommended pt increase use of Refresh to BID-QID OU daily for relief. Monitor yearly for changes, sooner if any worsening signs or  symptoms.  PCIOL OU. Stable. Monitor yearly for changes.  Stable PVD OU. No holes, tears, or detachments 360 OD, OS. Ed pt on the nature and etiology of PVDs. Reviewed signs and symptoms of a retinal detachment thoroughly and ed pt to RTC asap if experienced.  Discussed spectacle options with pt and released final spec rx. Ed pt on change in rx and adaptation.    RTC: 1 year for comprehensive exam or sooner prn

## 2025-03-05 RX ORDER — INSULIN GLARGINE 100 [IU]/ML
16 INJECTION, SOLUTION SUBCUTANEOUS NIGHTLY
Qty: 15 ML | Refills: 3 | Status: SHIPPED | OUTPATIENT
Start: 2025-03-05

## 2025-03-05 NOTE — TELEPHONE ENCOUNTER
Refill Routing Note   Medication(s) are not appropriate for processing by [facility] for the following reason(s):        Non-participating provider    [facility] action(s):  Route             Appointments  past 12m or future 3m with PCP    Date Provider   Last Visit   1/28/2025 Maryanne Alexander NP   Next Visit   4/17/2025 Maryanne Alexander NP   ED visits in past 90 days: 0        Note composed:3:53 PM 03/05/2025

## 2025-03-10 ENCOUNTER — HOSPITAL ENCOUNTER (OUTPATIENT)
Dept: CARDIOLOGY | Facility: HOSPITAL | Age: OVER 89
Discharge: HOME | End: 2025-03-10
Attending: INTERNAL MEDICINE
Payer: MEDICARE

## 2025-03-10 ENCOUNTER — OFFICE VISIT (OUTPATIENT)
Dept: CARDIOLOGY | Facility: CLINIC | Age: OVER 89
End: 2025-03-10
Payer: MEDICARE

## 2025-03-10 VITALS — DIASTOLIC BLOOD PRESSURE: 80 MMHG | SYSTOLIC BLOOD PRESSURE: 160 MMHG

## 2025-03-10 DIAGNOSIS — I44.2 ATRIOVENTRICULAR BLOCK, COMPLETE: ICD-10-CM

## 2025-03-10 DIAGNOSIS — Z95.0 PACEMAKER: ICD-10-CM

## 2025-03-10 DIAGNOSIS — I50.42 CHRONIC COMBINED SYSTOLIC AND DIASTOLIC CONGESTIVE HEART FAILURE: ICD-10-CM

## 2025-03-10 DIAGNOSIS — G47.33 SEVERE OBSTRUCTIVE SLEEP APNEA: ICD-10-CM

## 2025-03-10 DIAGNOSIS — Z45.018 BIVENTRICULAR PACEMAKER CHECK: ICD-10-CM

## 2025-03-10 DIAGNOSIS — E78.2 MIXED HYPERLIPIDEMIA: Chronic | ICD-10-CM

## 2025-03-10 DIAGNOSIS — I10 ESSENTIAL HYPERTENSION: Primary | ICD-10-CM

## 2025-03-10 DIAGNOSIS — I69.30 HISTORY OF HEMORRHAGIC CEREBROVASCULAR ACCIDENT (CVA) WITH RESIDUAL DEFICIT: ICD-10-CM

## 2025-03-10 DIAGNOSIS — I11.0 HYPERTENSIVE HEART DISEASE WITH HEART FAILURE: ICD-10-CM

## 2025-03-10 DIAGNOSIS — Z45.018 BIVENTRICULAR PACEMAKER CHECK: Primary | ICD-10-CM

## 2025-03-10 PROCEDURE — 1157F ADVNC CARE PLAN IN RCRD: CPT | Mod: CPTII,S$GLB,, | Performed by: NURSE PRACTITIONER

## 2025-03-10 PROCEDURE — 93281 PM DEVICE PROGR EVAL MULTI: CPT | Mod: 26,,, | Performed by: INTERNAL MEDICINE

## 2025-03-10 PROCEDURE — 1101F PT FALLS ASSESS-DOCD LE1/YR: CPT | Mod: CPTII,S$GLB,, | Performed by: NURSE PRACTITIONER

## 2025-03-10 PROCEDURE — 3288F FALL RISK ASSESSMENT DOCD: CPT | Mod: CPTII,S$GLB,, | Performed by: NURSE PRACTITIONER

## 2025-03-10 PROCEDURE — 99214 OFFICE O/P EST MOD 30 MIN: CPT | Mod: S$GLB,,, | Performed by: NURSE PRACTITIONER

## 2025-03-10 PROCEDURE — 99999 PR PBB SHADOW E&M-EST. PATIENT-LVL II: CPT | Mod: PBBFAC,,, | Performed by: NURSE PRACTITIONER

## 2025-03-10 PROCEDURE — 93281 PM DEVICE PROGR EVAL MULTI: CPT

## 2025-03-10 NOTE — PROGRESS NOTES
Subjective:   Patient ID:  Anthony Blair is a 89 y.o. male who presents for evaluation of No chief complaint on file.      HPI    Anthony Blair is a 88 year old female who presents to Cardiology for hospital follow up. He was recently admitted to [facility] due to chest pain, bilateral arm numbness, dizziness, weakness, SOB, headache.     He feels Almost back to normal today.   No recurrent chest pain episodes  Has regained strength in his legs since hospital discharge  Completed project at home  Does not sleep well nightly worsened about 3-4 weeks  Discussed sleep hygiene practices in office  Willing to try melatonin again to attempt to improve insomnia      BP at home 135/70, 154/85, 144/76, 146/70, 138/71, 109/61    Denies chest pain or anginal equivalents. No shortness of breath, OLIVARES or palpitations. Denies orthopnea, PND or abdominal bloating. Reports regular walking without any issues lately. NO leg swelling or claudications. No recent falls, syncope or near syncopal events. Reports compliance with medications and dietary restrictions. NO CNS complaints to suggest TIA or CVA today. No signs of abnormal bleeding.     8/16/2023    Anthony Blair returns for follow up with device check.     Device check- well functioning device, no arrhythmias. Decrease in TI over the last 9 days.     EKG today reveals AsVp    He has gained about 6 pounds since last visit. He just returned from a 9 day bus trip and has increased leg swelling and shortness of breath. He does endorse PND nightly. BP stable today in office.     No chest pain or anginal equivalents.   He does endorse dizziness with position changes and bendopnea.     Reports compliance with medications and dietary restrictions.     1/11/2024 update    Anthony Blair returns for 3 month follow up.     He is feeling much better from last office visit.   BP stable today in office. Feels back to normal today in office.     Main complaint is bilateral leg weakness, worse  with increased activities.     Denies chest pain or anginal equivalents. No shortness of breath, OLIVARES or palpitations. Denies orthopnea, PND or abdominal bloating. Reports regular walking without any issues lately. NO leg swelling or claudications. No recent falls, syncope or near syncopal events. Reports compliance with medications and dietary restrictions. NO CNS complaints to suggest TIA or CVA today. No signs of abnormal bleeding on ASA daily.     Weight stable at 180 lbs today, dry weight 81 kg.     Has trace edema to BLE today in office.     7/10/2024 update    Anthony LAWRENCE Blair returns for 6 month follow up.    Next CRT P device check due in Sept 2024    He does endorse episodes of dizziness when he first gets up.   He has had some issues with fluctuations in BP at home over the past month.   Some days with SBP <100. Does note notice an appreciable difference with dizziness with low BP episodes.     Does have some episodes of low blood sugar with associated weakness and fatigue with glucose <100 mg/dL.    EKG- V paced, HR 70, QTc 503 ms    Very mindful of dietary restrictions. Stays active throughout the day.     Denies chest pain or anginal equivalents. No shortness of breath, OLIVARES or palpitations. Denies orthopnea, PND or abdominal bloating. Reports regular walking without any issues lately. NO leg swelling or claudications. No recent falls, syncope or near syncopal events. Reports compliance with medications and dietary restrictions. NO CNS complaints to suggest TIA or CVA today. No signs of abnormal bleeding on ASA daily.      6/6/2024 6/12/2024 6/13/2024 6/14/2024 6/15/2024 6/22/2024   Recent Readings         SBP (mmHg) 111  107  104  126  129  96    SBP (mmHg) 86  107  141   98     SBP (mmHg)  106    85     SBP (mmHg)  89        SBP (mmHg)  92        DBP (mmHg) 53  55  57  61  66  49    DBP (mmHg) 50  61  68   53     DBP (mmHg)  59    50     DBP (mmHg)  47        DBP (mmHg)  50        Pulse 69  69  70  69   69  69    Pulse 70  75  69   70     Pulse  87    70     Pulse  69        Pulse  70        Patient Comments            6/23/2024 6/26/2024 6/27/2024 7/2/2024 7/7/2024   Recent Readings        SBP (mmHg) 145  109  127  118  139    DBP (mmHg) 71  55  64  62  66    Pulse 74  55  70  70  69    Patient Comments          3/10/2025 update    Serinazoolivia LAWRENCE Johnnie returns for 6 month follow up with device check.     He states he is doing ok.     Device check- well functioning CRT P, no arrhythmias noted.   Would like to transfer care to [facility] due to location from his home.     Denies chest pain or anginal equivalents. No shortness of breath, OLIVARES or palpitations. Denies orthopnea, PND or abdominal bloating. Reports regular walking without any issues lately. NO leg swelling or claudications. No recent falls, syncope or near syncopal events. Reports compliance with medications and dietary restrictions. NO CNS complaints to suggest TIA or CVA today. No signs of abnormal bleeding on ASA daily.       Past Medical History:   Diagnosis Date    A-fib     Anemia     AP (angina pectoris) 01/23/2014    Carotid artery disease without cerebral infarction 08/22/2014    Cataract     Cirrhosis of liver 09/10/2020    Coronary artery disease     Diabetes mellitus 1970     am 11/21/2022  Insulin x 6-7 years.    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    Hyperlipidemia     Hypertension     Hypertensive heart disease with heart failure 03/12/2019    Intracranial hemorrhage     Lumbosacral spondylosis 12/24/2014    Pacemaker 01/23/2014    Paroxysmal atrial fibrillation 01/23/2014    Peripheral vascular disease 08/22/2014    Pneumonia of right lung due to infectious organism 03/27/2019    Seizure, late effect of stroke     Severe obstructive sleep apnea 9/18/2024    Stage 3b chronic  kidney disease 10/18/2023    Stroke     Type 2 diabetes mellitus with ophthalmic manifestations        Past Surgical History:   Procedure Laterality Date    ANGIOPLASTY      ATRIAL ABLATION SURGERY N/A     CATARACT EXTRACTION Bilateral     [facility]    CATARACT EXTRACTION W/ INTRAOCULAR LENS IMPLANT Right     CATARACT EXTRACTION W/ INTRAOCULAR LENS IMPLANT Left     COLONOSCOPY N/A 10/16/2018    Procedure: COLONOSCOPY with biopsies;  Surgeon: Anabell Griggs MD;  Location: [facility];  Service: Endoscopy;  Laterality: N/A;    CORONARY ARTERY BYPASS GRAFT  07/2010    x5    EYE SURGERY      pace maker surgrey  10/2010    reposition in  (approx)    REPLACEMENT OF PACEMAKER GENERATOR Left 2020    Procedure: REPLACEMENT, PULSE GENERATOR, CARDIAC PACEMAKER;  Surgeon: Domenico Grajeda MD;  Location: [facility];  Service: Cardiology;  Laterality: Left;  [facility]    REVISION OF PROCEDURE INVOLVING PACEMAKER LEAD Bilateral 2020    Procedure: REVISION, ELECTRODE LEAD, CARDIAC PACEMAKER;  Surgeon: Domenico Grajeda MD;  Location: [facility];  Service: Cardiology;  Laterality: Bilateral;    VEIN BYPASS SURGERY         Social History     Tobacco Use    Smoking status: Former     Current packs/day: 0.00     Average packs/day: 2.0 packs/day for 13.0 years (26.1 ttl pk-yrs)     Types: Cigarettes     Start date: 1954     Quit date: 1967     Years since quittin.7    Smokeless tobacco: Never   Substance Use Topics    Alcohol use: No    Drug use: No       Family History   Problem Relation Name Age of Onset    Cataracts Mother      Arthritis Mother      Diabetes Mother      Kidney disease Mother      Heart disease Mother      Arthritis Father      Diabetes Father      Diabetes Sister      Cancer Sister          breast    Heart disease Sister      Diabetes Brother      Kidney disease Brother      Heart disease Brother      Alcohol abuse Paternal Uncle      Cancer Daughter           breast    Diabetes Daughter      Miscarriages / Stillbirths Daughter      Fibromyalgia Daughter      Diabetes Son      Diabetes Son      Stroke Neg Hx      Hypertension Neg Hx      Hyperlipidemia Neg Hx      COPD Neg Hx      Glaucoma Neg Hx      Macular degeneration Neg Hx       Wt Readings from Last 3 Encounters:   01/28/25 79.9 kg (176 lb 4.1 oz)   10/25/24 77.3 kg (170 lb 4.9 oz)   07/26/24 78.5 kg (173 lb 1 oz)     Temp Readings from Last 3 Encounters:   01/28/25 97.7 °F (36.5 °C) (Temporal)   03/10/23 97.6 °F (36.4 °C) (Oral)   12/21/22 97.7 °F (36.5 °C) (Temporal)     BP Readings from Last 3 Encounters:   03/10/25 (!) 160/80   01/28/25 (!) 150/62   10/25/24 120/70     Pulse Readings from Last 3 Encounters:   01/28/25 86   10/25/24 77   07/26/24 75       Review of Systems   Constitutional: Positive for malaise/fatigue. Negative for weight gain.   HENT:  Negative for hearing loss and hoarse voice.    Eyes:  Negative for blurred vision and visual disturbance.   Cardiovascular:  Negative for chest pain, claudication, dyspnea on exertion, irregular heartbeat, leg swelling, near-syncope, orthopnea, palpitations, paroxysmal nocturnal dyspnea and syncope.   Respiratory:  Negative for cough, hemoptysis, shortness of breath, sleep disturbances due to breathing, snoring and wheezing.    Endocrine: Negative for cold intolerance and heat intolerance.   Hematologic/Lymphatic: Does not bruise/bleed easily.   Skin:  Negative for color change, dry skin and nail changes.   Musculoskeletal:  Positive for arthritis. Negative for back pain, joint pain and myalgias.   Gastrointestinal:  Negative for bloating, abdominal pain, constipation, nausea and vomiting.   Genitourinary:  Negative for dysuria, flank pain, hematuria and hesitancy.   Neurological:  Positive for dizziness. Negative for headaches, light-headedness, loss of balance, numbness, paresthesias and weakness.   Psychiatric/Behavioral:  Negative for altered mental status.     Allergic/Immunologic: Negative for environmental allergies.   BP (!) 160/80 (BP Location: Left arm, Patient Position: Sitting)       Objective:   Physical Exam  Vitals and nursing note reviewed.   Constitutional:       General: He is not in acute distress.     Appearance: Normal appearance. He is well-developed. He is not ill-appearing.   HENT:      Head: Normocephalic and atraumatic.      Nose: Nose normal.      Mouth/Throat:      Mouth: Mucous membranes are moist.      Pharynx: Oropharynx is clear.   Eyes:      Extraocular Movements: Extraocular movements intact.      Conjunctiva/sclera: Conjunctivae normal.      Pupils: Pupils are equal, round, and reactive to light.   Neck:      Thyroid: No thyromegaly.      Vascular: No JVD.      Trachea: No tracheal deviation.   Cardiovascular:      Rate and Rhythm: Normal rate and regular rhythm.      Chest Wall: PMI is not displaced.      Pulses: Intact distal pulses.           Radial pulses are 2+ on the right side and 2+ on the left side.        Dorsalis pedis pulses are 2+ on the right side and 2+ on the left side.      Heart sounds: S1 normal and S2 normal. Heart sounds not distant. No murmur heard.  Pulmonary:      Effort: Pulmonary effort is normal. No respiratory distress.      Breath sounds: Normal breath sounds. No wheezing.   Abdominal:      General: Bowel sounds are normal. There is no distension.      Palpations: Abdomen is soft.      Tenderness: There is no abdominal tenderness.   Musculoskeletal:         General: Normal range of motion.      Cervical back: Full passive range of motion without pain, normal range of motion and neck supple.      Right lower leg: No edema.      Left lower leg: No edema.      Right ankle: No swelling.      Left ankle: No swelling.   Skin:     General: Skin is warm and dry.      Capillary Refill: Capillary refill takes less than 2 seconds.      Nails: There is no clubbing.   Neurological:      General: No focal deficit present.       Mental Status: He is alert and oriented to person, place, and time.   Psychiatric:         Mood and Affect: Mood normal.         Speech: Speech normal.         Behavior: Behavior normal.         Thought Content: Thought content normal.         Judgment: Judgment normal.         Lab Results   Component Value Date    CHOL 162 07/11/2024    CHOL 165 01/11/2024    CHOL 170 05/08/2022     Lab Results   Component Value Date    HDL 50 07/11/2024    HDL 39 (L) 01/11/2024    HDL 41 05/08/2022     Lab Results   Component Value Date    LDLCALC 94.4 07/11/2024    LDLCALC 107.6 01/11/2024    LDLCALC 106.6 05/08/2022     Lab Results   Component Value Date    TRIG 88 07/11/2024    TRIG 92 01/11/2024    TRIG 112 05/08/2022     Lab Results   Component Value Date    CHOLHDL 30.9 07/11/2024    CHOLHDL 23.6 01/11/2024    CHOLHDL 24.1 05/08/2022       Chemistry        Component Value Date/Time     01/28/2025 1008    K 4.2 01/28/2025 1008     01/28/2025 1008    CO2 27 01/28/2025 1008    BUN 30 01/28/2025 1008    CREATININE 1.1 01/28/2025 1008     (H) 01/28/2025 1008        Component Value Date/Time    CALCIUM 10.1 01/28/2025 1008    ALKPHOS 142 01/28/2025 1008    AST 42 (H) 01/28/2025 1008    ALT 18 01/28/2025 1008    BILITOT 0.5 01/28/2025 1008    ESTGFRAFRICA 84 03/12/2023 0436    ESTGFRAFRICA >60 05/09/2022 0741    EGFRNONAA >60 05/09/2022 0741          Lab Results   Component Value Date    TSH 1.454 01/28/2025     Lab Results   Component Value Date    INR 1.1 05/09/2022    INR 1.1 05/08/2022    INR 1.1 09/28/2021     Lab Results   Component Value Date    WBC 4.54 01/28/2025    HGB 12.0 (L) 01/28/2025    HCT 38.4 (L) 01/28/2025    MCV 99 (H) 01/28/2025     (L) 01/28/2025        Assessment:      1. Essential hypertension    2. Mixed hyperlipidemia    3. History of hemorrhagic cerebrovascular accident (CVA) with residual deficit    4. Hypertensive heart disease with heart failure    5. Pacemaker    6. Severe  obstructive sleep apnea              Plan:   CHF SYNOPSIS:    GDMT:  ACE/ARB/ARNI: Losartan 25 mg daily  Beta Blocker: Toprol  mg BID  MRA: none  SGLT2: none due to cost  Diuretic: Lasix 40 mg daily      Would like to transfer EP care to [facility] given proximity to home.     Nicole May, FNP-C [facility]

## 2025-03-12 ENCOUNTER — OFFICE VISIT (OUTPATIENT)
Dept: OTOLARYNGOLOGY | Facility: CLINIC | Age: OVER 89
End: 2025-03-12
Payer: MEDICARE

## 2025-03-12 VITALS — HEIGHT: 69 IN | BODY MASS INDEX: 26.77 KG/M2 | WEIGHT: 180.75 LBS

## 2025-03-12 DIAGNOSIS — Z97.4 WEARS HEARING AID IN BOTH EARS: ICD-10-CM

## 2025-03-12 DIAGNOSIS — H81.12 BENIGN PAROXYSMAL POSITIONAL VERTIGO, LEFT: Primary | ICD-10-CM

## 2025-03-12 DIAGNOSIS — H61.22 IMPACTED CERUMEN OF LEFT EAR: ICD-10-CM

## 2025-03-12 PROCEDURE — 99999 PR PBB SHADOW E&M-EST. PATIENT-LVL IV: CPT | Mod: PBBFAC,,, | Performed by: NURSE PRACTITIONER

## 2025-03-12 NOTE — PROGRESS NOTES
Subjective     Patient ID: Anthony Blair is a 89 y.o. male.    Chief Complaint: Dizziness    HPI  Patient is new to ENT, referred by DARÍO Alexander for consultation for dizziness. Patient has h/o BPPV in the past (2012; again in Aug 2014 with normal VNG, AMADO Mcdaniel). H/o CVA on 09/24/2017. Treated for left-sided BPPV on 11/07/2017 (AMADO Mcdaniel). Normal VNG again in July 2018 (STIVEN Gonzalez). Treated for left-sided PSCC BPPV again in Jan 2024 (AMADO Paulino).   Patient reports intermittent positional vertigo X 2.5 years. When he gets up in the morning, he has to sit on the side of the bed for a minute until vertigo stops. Every time he lays down or turns over in bed at night, he feels instant vertigo. He has been doing Gee-Daroff exercises at home.     Review of Systems   Constitutional: Negative.    HENT: Negative.     Eyes: Negative.    Respiratory: Negative.     Cardiovascular: Negative.    Gastrointestinal: Negative.    Musculoskeletal: Negative.    Integumentary:  Negative.   Neurological:  Positive for vertigo.   Hematological: Negative.    Psychiatric/Behavioral: Negative.            Objective     Physical Exam  Vitals and nursing note reviewed.   Constitutional:       General: He is not in acute distress.     Appearance: He is well-developed. He is not ill-appearing.   HENT:      Head: Normocephalic and atraumatic.      Right Ear: Hearing, tympanic membrane, ear canal and external ear normal. No middle ear effusion. Tympanic membrane is not erythematous.      Left Ear: Hearing, tympanic membrane, ear canal and external ear normal.  No middle ear effusion. Tympanic membrane is not erythematous.      Nose: Nose normal.   Eyes:      General: Lids are normal. No scleral icterus.        Right eye: No discharge.         Left eye: No discharge.   Neck:      Trachea: Trachea normal. No tracheal deviation.   Cardiovascular:      Rate and Rhythm: Normal rate.   Pulmonary:      Effort: Pulmonary effort is normal. No respiratory  distress.      Breath sounds: No stridor. No wheezing.   Musculoskeletal:         General: Normal range of motion.      Cervical back: Normal range of motion.   Skin:     General: Skin is warm and dry.   Neurological:      Mental Status: He is alert and oriented to person, place, and time.      Coordination: Coordination is intact.      Gait: Gait normal.   Psychiatric:         Attention and Perception: Attention normal.         Mood and Affect: Mood normal.         Speech: Speech normal.         Behavior: Behavior normal. Behavior is cooperative.     SEPARATE PROCEDURE IN OFFICE:   Procedure: Removal of impacted cerumen, left   Pre Procedure Diagnosis: Cerumen Impaction   Post Procedure Diagnosis: Cerumen Impaction   Verbal informed consent in regards to risk of trauma to ear canal, ear drum or hearing, discomfort during procedure and/or inability to remove cerumen impaction in one session or unforeseen events or complications.   No anesthesia.     Procedure in detail:   Ear canal visualized bilateral with appropriate size ear speculum utilizing Operating Head Binocular Otomicroscope   Utilizing the following: delicate alligator forceps used AS. The impacted cerumen of the ear canals was removed atraumatically. The TM and EAC were then inspected and found to be clear of wax. See description of TMs/EACs in PE above.   Complications: No   Condition: Improved/Good         Assessment and Plan     1. Benign paroxysmal positional vertigo, left  -     Ambulatory referral/consult to ENT    2. Wears hearing aid in both ears    3. Impacted cerumen of left ear      Canalith repositioning maneuvers done by me to correct patient's BPPV Left   Post procedure instructions to prevent recurrence of BPPV were given in writing and reviewed in detail by me  Follow up as needed with ENT, audiologist, or PT to ensure full resolution.         No follow-ups on file.

## 2025-03-13 ENCOUNTER — PATIENT MESSAGE (OUTPATIENT)
Dept: ADMINISTRATIVE | Facility: OTHER | Age: OVER 89
End: 2025-03-13
Payer: MEDICARE

## 2025-03-14 LAB
OHS CV BIV PACING PERCENT: 99 %
OHS CV DC REMOTE DEVICE TYPE: NORMAL

## 2025-04-17 ENCOUNTER — OFFICE VISIT (OUTPATIENT)
Dept: FAMILY MEDICINE | Facility: CLINIC | Age: OVER 89
End: 2025-04-17
Payer: MEDICARE

## 2025-04-17 ENCOUNTER — TELEPHONE (OUTPATIENT)
Dept: FAMILY MEDICINE | Facility: CLINIC | Age: OVER 89
End: 2025-04-17

## 2025-04-17 VITALS
WEIGHT: 176.56 LBS | DIASTOLIC BLOOD PRESSURE: 72 MMHG | BODY MASS INDEX: 26.15 KG/M2 | SYSTOLIC BLOOD PRESSURE: 138 MMHG | HEIGHT: 69 IN | RESPIRATION RATE: 18 BRPM | TEMPERATURE: 97 F | HEART RATE: 71 BPM | OXYGEN SATURATION: 98 %

## 2025-04-17 DIAGNOSIS — Z79.4 DIABETES MELLITUS DUE TO UNDERLYING CONDITION WITH DIABETIC PERIPHERAL ANGIOPATHY WITHOUT GANGRENE, WITH LONG-TERM CURRENT USE OF INSULIN: ICD-10-CM

## 2025-04-17 DIAGNOSIS — I77.9 CAROTID ARTERY DISEASE, UNSPECIFIED LATERALITY, UNSPECIFIED TYPE: ICD-10-CM

## 2025-04-17 DIAGNOSIS — K74.60 CIRRHOSIS OF LIVER WITHOUT ASCITES, UNSPECIFIED HEPATIC CIRRHOSIS TYPE: ICD-10-CM

## 2025-04-17 DIAGNOSIS — R42 VERTIGO: ICD-10-CM

## 2025-04-17 DIAGNOSIS — N18.32 STAGE 3B CHRONIC KIDNEY DISEASE: ICD-10-CM

## 2025-04-17 DIAGNOSIS — Z79.899 ENCOUNTER FOR LONG-TERM CURRENT USE OF MEDICATION: ICD-10-CM

## 2025-04-17 DIAGNOSIS — E08.51 DIABETES MELLITUS DUE TO UNDERLYING CONDITION WITH DIABETIC PERIPHERAL ANGIOPATHY WITHOUT GANGRENE, WITH LONG-TERM CURRENT USE OF INSULIN: ICD-10-CM

## 2025-04-17 DIAGNOSIS — I11.0 HYPERTENSIVE HEART DISEASE WITH HEART FAILURE: ICD-10-CM

## 2025-04-17 DIAGNOSIS — I48.0 PAF (PAROXYSMAL ATRIAL FIBRILLATION): ICD-10-CM

## 2025-04-17 DIAGNOSIS — I25.10 CORONARY ARTERY DISEASE, UNSPECIFIED VESSEL OR LESION TYPE, UNSPECIFIED WHETHER ANGINA PRESENT, UNSPECIFIED WHETHER NATIVE OR TRANSPLANTED HEART: Primary | ICD-10-CM

## 2025-04-17 LAB
ALBUMIN/CREAT UR: 62.5 UG/MG
CREAT UR-MCNC: 16 MG/DL (ref 23–375)
MICROALBUMIN UR-MCNC: 10 UG/ML (ref ?–5000)

## 2025-04-17 PROCEDURE — 1101F PT FALLS ASSESS-DOCD LE1/YR: CPT | Mod: CPTII,S$GLB,, | Performed by: NURSE PRACTITIONER

## 2025-04-17 PROCEDURE — 82043 UR ALBUMIN QUANTITATIVE: CPT | Performed by: NURSE PRACTITIONER

## 2025-04-17 PROCEDURE — G2211 COMPLEX E/M VISIT ADD ON: HCPCS | Mod: S$GLB,,, | Performed by: NURSE PRACTITIONER

## 2025-04-17 PROCEDURE — 1159F MED LIST DOCD IN RCRD: CPT | Mod: CPTII,S$GLB,, | Performed by: NURSE PRACTITIONER

## 2025-04-17 PROCEDURE — 1157F ADVNC CARE PLAN IN RCRD: CPT | Mod: CPTII,S$GLB,, | Performed by: NURSE PRACTITIONER

## 2025-04-17 PROCEDURE — 3288F FALL RISK ASSESSMENT DOCD: CPT | Mod: CPTII,S$GLB,, | Performed by: NURSE PRACTITIONER

## 2025-04-17 PROCEDURE — 99215 OFFICE O/P EST HI 40 MIN: CPT | Mod: S$GLB,,, | Performed by: NURSE PRACTITIONER

## 2025-04-17 PROCEDURE — 1126F AMNT PAIN NOTED NONE PRSNT: CPT | Mod: CPTII,S$GLB,, | Performed by: NURSE PRACTITIONER

## 2025-04-17 NOTE — TELEPHONE ENCOUNTER
----- Message from Maryanne Alexander NP sent at 4/17/2025 10:56 AM CDT -----  Can you book him for an echo prior to seeing cardiology

## 2025-04-17 NOTE — PROGRESS NOTES
Patient ID: Anthony Blair is a 89 y.o. male.     History of Present Illness    CHIEF COMPLAINT:  Patient presents today for a chronic care follow up    CARDIOVASCULAR:  He has a history of five cardiac ablations performed by an electrophysiologist and five coronary artery bypass grafts. He has a pacemaker. Carotid ultrasound shows partial blockage in the left carotid artery, not severe enough to warrant surgical intervention, while the right carotid artery is normal. On imdur 60 mg daily, lasix 40 mg daily, potassium and ASA 81 mg daily     Carotid Ultrasound 2/11/25  Impression:     50-69% stenosis of the left internal carotid artery.     No evidence of hemodynamically significant stenosis of the right ICA.      HTN  Controlled on losartan 25 mg daily, toprol 100mg daily     DIABETES:  His A1c is 5.9 with average blood sugar of 123. Current insulin regimen includes Lantus 16 units at bedtime, with SS with regular insulin coverage with meals.    ENT:  He has history of vertigo since the 1990s with intermittent episodes. When experiencing dizziness, he receives crystal repositioning maneuvers from ENT with symptom improvement typically after one visit.    GI:  Ultrasound shows gallstones and evidence of cirrhosis, consistent with previous diagnosis. GERD controlled on protonix       ROS:  General: -fever, -chills, -fatigue, -weight gain, -weight loss  Eyes: -vision changes, -redness, -discharge  ENT: -ear pain, -nasal congestion, -sore throat  Cardiovascular: -chest pain, -palpitations, -lower extremity edema  Respiratory: +cough, +shortness of breath  Gastrointestinal: -abdominal pain, -nausea, -vomiting, -diarrhea, -constipation, -blood in stool  Genitourinary: -dysuria, -hematuria, -frequency  Musculoskeletal: -joint pain, -muscle pain  Skin: -rash, -lesion  Neurological: -headache, -dizziness, +numbness, -tingling  Psychiatric: -anxiety, -depression, -sleep difficulty          Physical Exam    Vitals:     "04/17/25 0917 04/17/25 0938   BP: (!) 162/68 138/72   Pulse: 71    Resp: 18    Temp: 97.2 °F (36.2 °C)    SpO2: 98%    Weight: 80.1 kg (176 lb 9.4 oz)    Height: 5' 9" (1.753 m)    PainSc: 0-No pain      Body mass index is 26.08 kg/m².  Physical Exam    General: No acute distress. Well-developed. Well-nourished.  Eyes. Sclerae anicteric.  HENT: Normocephalic. Atraumatic. Nares patent. Moist oral mucosa.  Ears: Bilateral TMs clear. Bilateral EACs clear.  Cardiovascular: Regular rate. Regular rhythm. No murmurs. No rubs. No gallops. Normal S1, S2.  Respiratory: Normal respiratory effort. Clear to auscultation bilaterally.   Musculoskeletal: No  obvious deformity.  Extremities: No lower extremity edema.  Neurological: Alert & oriented x3. No slurred speech. Normal gait.  Psychiatric: Normal mood. Normal affect. Good insight. Good judgment.  Skin: Warm. Dry.              Assessment & Plan    Anthony was seen today for follow-up.    IMPRESSION:  - Reviewed imaging results showing mild left carotid artery blockage, not requiring surgical intervention at this time.  - Noted presence of gallstones on ultrasound.  - Confirmed previous diagnosis of liver cirrhosis.  - A1c of 5.9 and average glucose of 123 mg/dL appropriate for age (89 years old).  - Current insulin regimen, including Lantus and sliding scale, effective for glucose management.  - Average BP of 138/72 for March within acceptable range.    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  Continue current medications.    Stage 3b chronic kidney disease  Creatinine normal with a normal GFR at this time.  Patient is on Lasix for heart failure.    Cirrhosis of liver without ascites, unspecified hepatic cirrhosis type  AFP tumor marker neg  No significant changes  Normal LFTs    Encounter for long-term current use of medication  -     CBC Auto Differential; Future  -     Comprehensive Metabolic Panel; Future  -     Hemoglobin A1C; Future  -     Lipid Panel; Future  -     TSH; Future    Carotid artery disease, unspecified laterality, unspecified type  Check carotid ultrasound in 1 year.      I spent 40 minutes on this encounter, time includes face-to-face, chart review, documentation, test review and orders.             This note was generated with the assistance of ambient listening technology. Verbal consent was obtained by the patient and accompanying visitor(s) for the recording of patient appointment to facilitate this note. I attest to having reviewed and edited the generated note for accuracy, though some syntax or spelling errors may persist. Please contact the author of this note for any clarification.

## 2025-04-17 NOTE — TELEPHONE ENCOUNTER
Pt is scheduled to see Cardiology next week. First available appointment is not until the end of April or the beginning of May.

## 2025-04-22 ENCOUNTER — PATIENT MESSAGE (OUTPATIENT)
Dept: FAMILY MEDICINE | Facility: CLINIC | Age: OVER 89
End: 2025-04-22
Payer: MEDICARE

## 2025-04-22 NOTE — TELEPHONE ENCOUNTER
Spoke with pt's wife and informed her of message per Annie. She voices understanding. She is going to wait and see what the Cardiologist recommends in a few months. Echo appointment canceled.

## 2025-04-22 NOTE — TELEPHONE ENCOUNTER
I only ordered it because it has been since 2021 since he had 1 and he has seen Dr. Trimble in July to establish care.  If he would like to wait and discuss this with him, I am of with that

## 2025-04-23 ENCOUNTER — CLINICAL SUPPORT (OUTPATIENT)
Dept: CARDIOLOGY | Facility: HOSPITAL | Age: OVER 89
End: 2025-04-23
Payer: MEDICARE

## 2025-04-23 ENCOUNTER — CLINICAL SUPPORT (OUTPATIENT)
Dept: CARDIOLOGY | Facility: HOSPITAL | Age: OVER 89
End: 2025-04-23
Attending: INTERNAL MEDICINE
Payer: MEDICARE

## 2025-04-23 DIAGNOSIS — I50.42 CHRONIC COMBINED SYSTOLIC (CONGESTIVE) AND DIASTOLIC (CONGESTIVE) HEART FAILURE: ICD-10-CM

## 2025-04-23 DIAGNOSIS — I44.2 ATRIOVENTRICULAR BLOCK, COMPLETE: ICD-10-CM

## 2025-04-23 DIAGNOSIS — I48.0 PAF (PAROXYSMAL ATRIAL FIBRILLATION): ICD-10-CM

## 2025-04-23 DIAGNOSIS — Z95.0 PRESENCE OF CARDIAC PACEMAKER: ICD-10-CM

## 2025-04-23 PROCEDURE — 93296 REM INTERROG EVL PM/IDS: CPT | Performed by: INTERNAL MEDICINE

## 2025-04-23 PROCEDURE — 93294 REM INTERROG EVL PM/LDLS PM: CPT | Mod: S$GLB,,, | Performed by: INTERNAL MEDICINE

## 2025-04-23 RX ORDER — METOPROLOL SUCCINATE 100 MG/1
100 TABLET, EXTENDED RELEASE ORAL 2 TIMES DAILY
Qty: 180 TABLET | Refills: 3 | Status: SHIPPED | OUTPATIENT
Start: 2025-04-23

## 2025-04-23 NOTE — TELEPHONE ENCOUNTER
Refill Routing Note   Medication(s) are not appropriate for processing by [facility] for the following reason(s):        Non-participating provider  Responsible provider unclear    [facility] action(s):  Route             Appointments  past 12m or future 3m with PCP    Date Provider   Last Visit   4/17/2025 Maryanne Alexander NP   Next Visit   7/22/2025 Maryanne Alexander NP   ED visits in past 90 days: 0        Note composed:12:57 PM 04/23/2025

## 2025-04-29 LAB
OHS CV BIV PACING PERCENT: 98 %
OHS CV DC REMOTE DEVICE TYPE: NORMAL

## 2025-05-07 DIAGNOSIS — I10 ESSENTIAL HYPERTENSION: ICD-10-CM

## 2025-05-07 RX ORDER — BLOOD SUGAR DIAGNOSTIC
STRIP MISCELLANEOUS
Qty: 350 STRIP | Refills: 3 | Status: SHIPPED | OUTPATIENT
Start: 2025-05-07

## 2025-05-07 RX ORDER — LOSARTAN POTASSIUM 25 MG/1
25 TABLET ORAL
Qty: 90 TABLET | Refills: 3 | Status: SHIPPED | OUTPATIENT
Start: 2025-05-07

## 2025-05-07 NOTE — TELEPHONE ENCOUNTER
Refill Routing Note   Medication(s) are not appropriate for processing by [facility] for the following reason(s):        Non-participating provider    [facility] action(s):  Route               Appointments  past 12m or future 3m with PCP    Date Provider   Last Visit   4/17/2025 Maryanne Alexander NP   Next Visit   7/22/2025 Maryanne Alexander NP   ED visits in past 90 days: 0        Note composed:5:54 AM 05/07/2025

## 2025-05-08 ENCOUNTER — TELEPHONE (OUTPATIENT)
Dept: CARDIOLOGY | Facility: HOSPITAL | Age: OVER 89
End: 2025-05-08
Payer: MEDICARE

## 2025-05-08 DIAGNOSIS — Z45.018 BIVENTRICULAR PACEMAKER CHECK: Primary | ICD-10-CM

## 2025-07-15 ENCOUNTER — RESULTS FOLLOW-UP (OUTPATIENT)
Dept: FAMILY MEDICINE | Facility: CLINIC | Age: OVER 89
End: 2025-07-15
Payer: MEDICARE

## 2025-07-15 ENCOUNTER — LAB VISIT (OUTPATIENT)
Dept: LAB | Facility: HOSPITAL | Age: OVER 89
End: 2025-07-15
Attending: NURSE PRACTITIONER
Payer: MEDICARE

## 2025-07-15 DIAGNOSIS — E61.1 IRON DEFICIENCY: ICD-10-CM

## 2025-07-15 DIAGNOSIS — Z79.899 ENCOUNTER FOR LONG-TERM CURRENT USE OF MEDICATION: ICD-10-CM

## 2025-07-15 LAB
ABSOLUTE EOSINOPHIL (OHS): 0.24 K/UL
ABSOLUTE MONOCYTE (OHS): 0.6 K/UL (ref 0.3–1)
ABSOLUTE NEUTROPHIL COUNT (OHS): 4.93 K/UL (ref 1.8–7.7)
ALBUMIN SERPL BCP-MCNC: 3.4 G/DL (ref 3.5–5.2)
ALP SERPL-CCNC: 136 UNIT/L (ref 40–150)
ALT SERPL W/O P-5'-P-CCNC: 19 UNIT/L (ref 10–44)
ANION GAP (OHS): 8 MMOL/L (ref 8–16)
AST SERPL-CCNC: 38 UNIT/L (ref 11–45)
BASOPHILS # BLD AUTO: 0.02 K/UL
BASOPHILS NFR BLD AUTO: 0.3 %
BILIRUB SERPL-MCNC: 0.7 MG/DL (ref 0.1–1)
BUN SERPL-MCNC: 27 MG/DL (ref 10–30)
CALCIUM SERPL-MCNC: 9.6 MG/DL (ref 8.7–10.5)
CHLORIDE SERPL-SCNC: 105 MMOL/L (ref 95–110)
CHOLEST SERPL-MCNC: 148 MG/DL (ref 120–199)
CHOLEST/HDLC SERPL: 3.7 {RATIO} (ref 2–5)
CO2 SERPL-SCNC: 27 MMOL/L (ref 23–29)
CREAT SERPL-MCNC: 1 MG/DL (ref 0.5–1.4)
EAG (OHS): 114 MG/DL (ref 68–131)
ERYTHROCYTE [DISTWIDTH] IN BLOOD BY AUTOMATED COUNT: 13.9 % (ref 11.5–14.5)
GFR SERPLBLD CREATININE-BSD FMLA CKD-EPI: >60 ML/MIN/1.73/M2
GLUCOSE SERPL-MCNC: 118 MG/DL (ref 70–110)
HBA1C MFR BLD: 5.6 % (ref 4–5.6)
HCT VFR BLD AUTO: 35.2 % (ref 40–54)
HDLC SERPL-MCNC: 40 MG/DL (ref 40–75)
HDLC SERPL: 27 % (ref 20–50)
HGB BLD-MCNC: 11.5 GM/DL (ref 14–18)
IMM GRANULOCYTES # BLD AUTO: 0.02 K/UL (ref 0–0.04)
IMM GRANULOCYTES NFR BLD AUTO: 0.3 % (ref 0–0.5)
LDLC SERPL CALC-MCNC: 88.8 MG/DL (ref 63–159)
LYMPHOCYTES # BLD AUTO: 1.97 K/UL (ref 1–4.8)
MCH RBC QN AUTO: 31.8 PG (ref 27–31)
MCHC RBC AUTO-ENTMCNC: 32.7 G/DL (ref 32–36)
MCV RBC AUTO: 97 FL (ref 82–98)
NONHDLC SERPL-MCNC: 108 MG/DL
NUCLEATED RBC (/100WBC) (OHS): 0 /100 WBC
PLATELET # BLD AUTO: 159 K/UL (ref 150–450)
PMV BLD AUTO: 10.8 FL (ref 9.2–12.9)
POTASSIUM SERPL-SCNC: 4.4 MMOL/L (ref 3.5–5.1)
PROT SERPL-MCNC: 6.8 GM/DL (ref 6–8.4)
RBC # BLD AUTO: 3.62 M/UL (ref 4.6–6.2)
RELATIVE EOSINOPHIL (OHS): 3.1 %
RELATIVE LYMPHOCYTE (OHS): 25.3 % (ref 18–48)
RELATIVE MONOCYTE (OHS): 7.7 % (ref 4–15)
RELATIVE NEUTROPHIL (OHS): 63.3 % (ref 38–73)
SODIUM SERPL-SCNC: 140 MMOL/L (ref 136–145)
TRIGL SERPL-MCNC: 96 MG/DL (ref 30–150)
TSH SERPL-ACNC: 1.51 UIU/ML (ref 0.4–4)
WBC # BLD AUTO: 7.78 K/UL (ref 3.9–12.7)

## 2025-07-15 PROCEDURE — 83036 HEMOGLOBIN GLYCOSYLATED A1C: CPT

## 2025-07-15 PROCEDURE — 80053 COMPREHEN METABOLIC PANEL: CPT

## 2025-07-15 PROCEDURE — 85025 COMPLETE CBC W/AUTO DIFF WBC: CPT

## 2025-07-15 PROCEDURE — 80061 LIPID PANEL: CPT

## 2025-07-15 PROCEDURE — 36415 COLL VENOUS BLD VENIPUNCTURE: CPT | Mod: PO

## 2025-07-15 PROCEDURE — 84443 ASSAY THYROID STIM HORMONE: CPT

## 2025-07-15 RX ORDER — FERROUS GLUCONATE 324(38)MG
1 TABLET ORAL 2 TIMES DAILY WITH MEALS
Qty: 180 TABLET | Refills: 3 | Status: SHIPPED | OUTPATIENT
Start: 2025-07-15

## 2025-07-22 ENCOUNTER — OFFICE VISIT (OUTPATIENT)
Dept: FAMILY MEDICINE | Facility: CLINIC | Age: OVER 89
End: 2025-07-22
Payer: MEDICARE

## 2025-07-22 VITALS
BODY MASS INDEX: 25.83 KG/M2 | HEIGHT: 69 IN | TEMPERATURE: 98 F | DIASTOLIC BLOOD PRESSURE: 72 MMHG | WEIGHT: 174.38 LBS | HEART RATE: 70 BPM | RESPIRATION RATE: 14 BRPM | SYSTOLIC BLOOD PRESSURE: 136 MMHG | OXYGEN SATURATION: 99 %

## 2025-07-22 DIAGNOSIS — I25.10 CORONARY ARTERY DISEASE, UNSPECIFIED VESSEL OR LESION TYPE, UNSPECIFIED WHETHER ANGINA PRESENT, UNSPECIFIED WHETHER NATIVE OR TRANSPLANTED HEART: Primary | ICD-10-CM

## 2025-07-22 DIAGNOSIS — E08.51 DIABETES MELLITUS DUE TO UNDERLYING CONDITION WITH DIABETIC PERIPHERAL ANGIOPATHY WITHOUT GANGRENE, WITH LONG-TERM CURRENT USE OF INSULIN: ICD-10-CM

## 2025-07-22 DIAGNOSIS — I77.9 CAROTID ARTERY DISEASE, UNSPECIFIED LATERALITY, UNSPECIFIED TYPE: ICD-10-CM

## 2025-07-22 DIAGNOSIS — I11.0 HYPERTENSIVE HEART DISEASE WITH HEART FAILURE: ICD-10-CM

## 2025-07-22 DIAGNOSIS — K74.60 CIRRHOSIS OF LIVER WITHOUT ASCITES, UNSPECIFIED HEPATIC CIRRHOSIS TYPE: ICD-10-CM

## 2025-07-22 DIAGNOSIS — Z79.4 DIABETES MELLITUS DUE TO UNDERLYING CONDITION WITH DIABETIC PERIPHERAL ANGIOPATHY WITHOUT GANGRENE, WITH LONG-TERM CURRENT USE OF INSULIN: ICD-10-CM

## 2025-07-22 PROCEDURE — 3288F FALL RISK ASSESSMENT DOCD: CPT | Mod: CPTII,S$GLB,, | Performed by: NURSE PRACTITIONER

## 2025-07-22 PROCEDURE — 99214 OFFICE O/P EST MOD 30 MIN: CPT | Mod: S$GLB,,, | Performed by: NURSE PRACTITIONER

## 2025-07-22 PROCEDURE — 1101F PT FALLS ASSESS-DOCD LE1/YR: CPT | Mod: CPTII,S$GLB,, | Performed by: NURSE PRACTITIONER

## 2025-07-22 PROCEDURE — 1126F AMNT PAIN NOTED NONE PRSNT: CPT | Mod: CPTII,S$GLB,, | Performed by: NURSE PRACTITIONER

## 2025-07-22 PROCEDURE — 1157F ADVNC CARE PLAN IN RCRD: CPT | Mod: CPTII,S$GLB,, | Performed by: NURSE PRACTITIONER

## 2025-07-22 PROCEDURE — G2211 COMPLEX E/M VISIT ADD ON: HCPCS | Mod: S$GLB,,, | Performed by: NURSE PRACTITIONER

## 2025-07-22 PROCEDURE — 1159F MED LIST DOCD IN RCRD: CPT | Mod: CPTII,S$GLB,, | Performed by: NURSE PRACTITIONER

## 2025-07-22 NOTE — PROGRESS NOTES
Patient ID: Anthony Blair is a 89 y.o. male.     History of Present Illness    CHIEF COMPLAINT:  Patient presents today for follow up    DIABETES MANAGEMENT:  He reports improved glycemic control with current A1C of 5.6. He experiences some hypoglycemic episodes, particularly after lunch and during hot weather.  Currently takes Lantus insulin at night and Novolog insulin mealtime s/s.    RESPIRATORY:  He experiences daily shortness of breath and significant respiratory distress. He reports increased difficulty with physical exertion and walking. He denies associated chest pain or palpitations.      ENT:  He has significant hearing difficulties in the left ear with minimal auditory perception despite using a hearing aid. He requires speakers to be in very close proximity to comprehend conversation and reports essentially no hearing when removing his hearing aid.    TEMPERATURE REGULATION:  He reports increased sensitivity to cold, particularly indoors, feeling cold at 78 Fahrenheit. 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    We reviewed all of his most recent labs, carotid ultrasound, liver ultrasound.  His liver does show slight worsening of cirrhosis but the patient declined seeing hepatology.  AFP tumor marker negative      ROS:  General: -fever, -chills, -fatigue, -weight gain, -weight loss, +cold intolerance, +eating causes fatigue  Eyes: -vision changes, -redness, -discharge  ENT: -ear pain, -nasal congestion, -sore throat, +hearing loss, +difficulty hearing  Cardiovascular: -chest pain, -palpitations, -lower extremity edema  Respiratory: -cough, +shortness of breath  Gastrointestinal: -abdominal pain, -nausea, -vomiting, -diarrhea, -constipation, -blood in stool  Genitourinary: -dysuria, -hematuria, -frequency  Musculoskeletal: -joint pain, -muscle pain  Skin: -rash, -lesion  Neurological: -headache, -dizziness, +numbness,  "-tingling, +decreased sensation in extremities  Psychiatric: -anxiety, -depression, -sleep difficulty  Endocrine: +lightheaded if meals are missed          Physical Exam    Vitals:    07/22/25 0935   BP: 136/72   Pulse: 70   Resp: 14   Temp: 97.5 °F (36.4 °C)   TempSrc: Oral   SpO2: 99%   Weight: 79.1 kg (174 lb 6.1 oz)   Height: 5' 9" (1.753 m)   PainSc: 0-No pain     Body mass index is 25.75 kg/m².  Physical Exam    General: No acute distress. Well-developed. Well-nourished.  Eyes:. Sclerae anicteric.  HENT: Normocephalic.  Ears: Hearing Aids in place bilaterally  Cardiovascular: Regular rate. Regular rhythm. 2/6 systolic murmur.  Respiratory: Normal respiratory effort. Clear to auscultation bilaterally. No rales. No rhonchi. No wheezing.  Extremities: No lower extremity edema.  Neurological: Alert & oriented x3. No slurred speech. Normal gait.  Psychiatric: Normal mood. Normal affect. Good insight. Good judgment.  Skin: Warm. Dry. No rash.              Assessment & Plan    Anthony was seen today for follow-up.    Diagnoses and all orders for this visit:    Coronary artery disease, unspecified vessel or lesion type, unspecified whether angina present, unspecified whether native or transplanted heart  Stable without any complaints of chest pain, palpitations.    Diabetes mellitus due to underlying condition with diabetic peripheral angiopathy without gangrene, with long-term current use of insulin  We discuss possibly skipping sliding scale prior to lunch to see if this reduces his evening hypoglycemia episodes.    Hypertensive heart disease with heart   Stable on current medications.  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  Patient declines seeing hepatology at this time.    Carotid artery disease, unspecified laterality, unspecified type  Reviewed most recent carotid ultrasound  Continue " aspirin.    F/u 6 months as scheduled.    This note was generated with the assistance of ambient listening technology. Verbal consent was obtained by the patient and accompanying visitor(s) for the recording of patient appointment to facilitate this note. I attest to having reviewed and edited the generated note for accuracy, though some syntax or spelling errors may persist. Please contact the author of this note for any clarification.

## 2025-07-23 ENCOUNTER — CLINICAL SUPPORT (OUTPATIENT)
Dept: CARDIOLOGY | Facility: HOSPITAL | Age: OVER 89
End: 2025-07-23
Payer: MEDICARE

## 2025-07-23 ENCOUNTER — CLINICAL SUPPORT (OUTPATIENT)
Dept: CARDIOLOGY | Facility: HOSPITAL | Age: OVER 89
End: 2025-07-23
Attending: INTERNAL MEDICINE
Payer: MEDICARE

## 2025-07-23 DIAGNOSIS — I50.42 CHRONIC COMBINED SYSTOLIC (CONGESTIVE) AND DIASTOLIC (CONGESTIVE) HEART FAILURE: ICD-10-CM

## 2025-07-23 DIAGNOSIS — Z95.0 PRESENCE OF CARDIAC PACEMAKER: ICD-10-CM

## 2025-07-23 DIAGNOSIS — I44.2 ATRIOVENTRICULAR BLOCK, COMPLETE: ICD-10-CM

## 2025-07-23 PROCEDURE — 93296 REM INTERROG EVL PM/IDS: CPT | Performed by: INTERNAL MEDICINE

## 2025-07-23 PROCEDURE — 93294 REM INTERROG EVL PM/LDLS PM: CPT | Mod: S$GLB,,, | Performed by: INTERNAL MEDICINE

## 2025-07-28 LAB
OHS CV BIV PACING PERCENT: 99 %
OHS CV DC REMOTE DEVICE TYPE: NORMAL

## 2025-08-01 ENCOUNTER — HOSPITAL ENCOUNTER (OUTPATIENT)
Dept: CARDIOLOGY | Facility: HOSPITAL | Age: OVER 89
Discharge: HOME | End: 2025-08-01
Attending: INTERNAL MEDICINE
Payer: MEDICARE

## 2025-08-01 ENCOUNTER — OFFICE VISIT (OUTPATIENT)
Dept: CARDIOLOGY | Facility: CLINIC | Age: OVER 89
End: 2025-08-01
Payer: MEDICARE

## 2025-08-01 VITALS
HEART RATE: 70 BPM | SYSTOLIC BLOOD PRESSURE: 153 MMHG | HEIGHT: 69 IN | BODY MASS INDEX: 25.47 KG/M2 | WEIGHT: 171.94 LBS | DIASTOLIC BLOOD PRESSURE: 67 MMHG

## 2025-08-01 DIAGNOSIS — Z45.018 BIVENTRICULAR PACEMAKER CHECK: ICD-10-CM

## 2025-08-01 DIAGNOSIS — I11.0 HYPERTENSIVE HEART DISEASE WITH HEART FAILURE: ICD-10-CM

## 2025-08-01 DIAGNOSIS — Z95.0 PACEMAKER: ICD-10-CM

## 2025-08-01 LAB
OHS CV CPX PATIENT HEIGHT IN: 69
OHS QRS DURATION: 168 MS
OHS QTC CALCULATION: 496 MS

## 2025-08-01 PROCEDURE — 93281 PM DEVICE PROGR EVAL MULTI: CPT | Mod: PO

## 2025-08-01 PROCEDURE — 99999 PR PBB SHADOW E&M-EST. PATIENT-LVL V: CPT | Mod: PBBFAC,,, | Performed by: INTERNAL MEDICINE

## 2025-08-01 NOTE — PROGRESS NOTES
SUBJECTIVE:    Patient ID:  Anthony Blair is a 89 y.o. male who presents to the electrophysiology clinic for follow-up regarding CRT-P..    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 He was recently admitted to [facility] due to chest pain, bilateral arm numbness, dizziness, weakness, SOB, headache.      He feels Almost back to normal today.   No recurrent chest pain episodes  Has regained strength in his legs since hospital discharge  Completed project at home  Does not sleep well nightly worsened about 3-4 weeks  Discussed sleep hygiene practices in office  Willing to try melatonin again to attempt to improve insomnia        BP at home 135/70, 154/85, 144/76, 146/70, 138/71, 109/61     Denies chest pain or anginal equivalents. No shortness of breath, OLIVARES or palpitations. Denies orthopnea, PND or abdominal bloating. Reports regular walking without any issues lately. NO leg swelling or claudications. No recent falls, syncope or near syncopal events. Reports compliance with medications and dietary restrictions. NO CNS complaints to suggest TIA or CVA today. No signs of abnormal bleeding.      8/16/2023     Anthony Blair returns for follow up with device check.      Device check- well functioning device, no arrhythmias. Decrease in TI over the last 9 days.      EKG today reveals AsVp     He has gained about 6 pounds since last visit. He just returned from a 9 day bus trip and has increased leg swelling and shortness of breath. He does endorse PND nightly. BP stable today in office.      No chest pain or anginal equivalents.   He does endorse dizziness with position changes and bendopnea.      Reports compliance with medications and dietary restrictions.      1/11/2024 update     Anthony Blair returns for 3 month  follow up.      He is feeling much better from last office visit.   BP stable today in office. Feels back to normal today in office.      Main complaint is bilateral leg weakness, worse with increased activities.      Denies chest pain or anginal equivalents. No shortness of breath, OLIVARES or palpitations. Denies orthopnea, PND or abdominal bloating. Reports regular walking without any issues lately. NO leg swelling or claudications. No recent falls, syncope or near syncopal events. Reports compliance with medications and dietary restrictions. NO CNS complaints to suggest TIA or CVA today. No signs of abnormal bleeding on ASA daily.      Weight stable at 180 lbs today, dry weight 81 kg.      Has trace edema to BLE today in office.      7/10/2024 update     Anthony MELINDA Blair returns for 6 month follow up.     Next CRT P device check due in Sept 2024     He does endorse episodes of dizziness when he first gets up.   He has had some issues with fluctuations in BP at home over the past month.   Some days with SBP <100. Does note notice an appreciable difference with dizziness with low BP episodes.      Does have some episodes of low blood sugar with associated weakness and fatigue with glucose <100 mg/dL.     EKG- V paced, HR 70, QTc 503 ms     Very mindful of dietary restrictions. Stays active throughout the day.      Denies chest pain or anginal equivalents. No shortness of breath, OLIVARES or palpitations. Denies orthopnea, PND or abdominal bloating. Reports regular walking without any issues lately. NO leg swelling or claudications. No recent falls, syncope or near syncopal events. Reports compliance with medications and dietary restrictions. NO CNS complaints to suggest TIA or CVA today. No signs of abnormal bleeding on ASA daily.     08/01/2025  Reports feeling great.  Tolerating medication  Underlying rhythm with AFL    So device interrogation shows 99% RV pacing.  Lead parameters stable.    The ECG with rhythm strip  performed today in clinic was personally reviewed; my interpretation is ventricular pacing at 70 b.p.m.  milliseconds.      Past Medical History:   Diagnosis Date    A-fib     Anemia     AP (angina pectoris) 01/23/2014    Carotid artery disease without cerebral infarction 08/22/2014    Cataract     Cirrhosis of liver 09/10/2020    Coronary artery disease     Diabetes mellitus 1970     am 11/21/2022  Insulin x 6-7 years.    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    Hyperlipidemia     Hypertension     Hypertensive heart disease with heart failure 03/12/2019    Intracranial hemorrhage     Lumbosacral spondylosis 12/24/2014    Pacemaker 01/23/2014    Paroxysmal atrial fibrillation 01/23/2014    Peripheral vascular disease 08/22/2014    Pneumonia of right lung due to infectious organism 03/27/2019    Seizure, late effect of stroke     Severe obstructive sleep apnea 9/18/2024    Stage 3b chronic kidney disease 10/18/2023    Stroke     Type 2 diabetes mellitus with ophthalmic manifestations      Past Surgical History:   Procedure Laterality Date    ANGIOPLASTY      ATRIAL ABLATION SURGERY N/A     CATARACT EXTRACTION Bilateral     [facility]    CATARACT EXTRACTION W/ INTRAOCULAR LENS IMPLANT Right     CATARACT EXTRACTION W/ INTRAOCULAR LENS IMPLANT Left     COLONOSCOPY N/A 10/16/2018    Procedure: COLONOSCOPY with biopsies;  Surgeon: Anabell Griggs MD;  Location: [facility] ENDO;  Service: Endoscopy;  Laterality: N/A;    CORONARY ARTERY BYPASS GRAFT  07/2010    x5    EYE SURGERY      pace maker surgrey  10/2010    reposition in 2011/2012 (approx)    REPLACEMENT OF PACEMAKER GENERATOR Left 8/6/2020    Procedure: REPLACEMENT, PULSE GENERATOR, CARDIAC PACEMAKER;  Surgeon: Domenico Grajeda MD;  Location: [facility] CATH LAB;  Service: Cardiology;   Laterality: Left;  [facility]    REVISION OF PROCEDURE INVOLVING PACEMAKER LEAD Bilateral 2020    Procedure: REVISION, ELECTRODE LEAD, CARDIAC PACEMAKER;  Surgeon: Domenico Grajeda MD;  Location: [facility] CATH LAB;  Service: Cardiology;  Laterality: Bilateral;    VEIN BYPASS SURGERY       Family History   Problem Relation Name Age of Onset    Cataracts Mother      Arthritis Mother      Diabetes Mother      Kidney disease Mother      Heart disease Mother      Arthritis Father      Diabetes Father      Diabetes Sister      Cancer Sister          breast    Heart disease Sister      Diabetes Brother      Kidney disease Brother      Heart disease Brother      Alcohol abuse Paternal Uncle      Cancer Daughter          breast    Diabetes Daughter      Miscarriages / Stillbirths Daughter      Fibromyalgia Daughter      Diabetes Son      Diabetes Son      Stroke Neg Hx      Hypertension Neg Hx      Hyperlipidemia Neg Hx      COPD Neg Hx      Glaucoma Neg Hx      Macular degeneration Neg Hx       Social History     Socioeconomic History    Marital status:     Number of children: 3    Highest education level: GED or equivalent   Tobacco Use    Smoking status: Former     Current packs/day: 0.00     Average packs/day: 2.0 packs/day for 13.0 years (26.1 ttl pk-yrs)     Types: Cigarettes     Start date: 1954     Quit date: 1967     Years since quittin.1    Smokeless tobacco: Never   Substance and Sexual Activity    Alcohol use: No    Drug use: No    Sexual activity: Never     Partners: Female     Birth control/protection: None   Social History Narrative    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     Social Drivers of Health     Financial Resource Strain: Low Risk  (3/11/2025)    Overall Financial Resource Strain (CARDIA)     Difficulty of Paying Living Expenses: Not very hard   Food Insecurity: No Food Insecurity (3/11/2025)    Hunger Vital Sign     Worried About Running Out of Food in  "the Last Year: Never true     Ran Out of Food in the Last Year: Never true   Transportation Needs: No Transportation Needs (3/11/2025)    PRAPARE - Transportation     Lack of Transportation (Medical): No     Lack of Transportation (Non-Medical): No   Physical Activity: Inactive (3/11/2025)    Exercise Vital Sign     Days of Exercise per Week: 0 days     Minutes of Exercise per Session: 0 min   Stress: No Stress Concern Present (3/11/2025)    [employer] [place] [employer] - Occupational Stress Questionnaire     Feeling of Stress : Not at all   Housing Stability: Low Risk  (3/11/2025)    Housing Stability Vital Sign     Unable to Pay for Housing in the Last Year: No     Homeless in the Last Year: No     Review of patient's allergies indicates:   Allergen Reactions    Adhesive tape-silicones     Amlodipine Edema     Other Reaction(s): Edema    Atorvastatin      Other reaction(s): Muscle pain  Other reaction(s): Muscle weakness  Other reaction(s): Muscle pain    Codeine      Other reaction(s): Headache  Other reaction(s): Headache    Eliquis [apixaban]     Trulicity [dulaglutide] Nausea And Vomiting    Adhesive Rash     Other reaction(s): rash     Review of Systems   All other systems reviewed and are negative.         OBJECTIVE:     Vitals:    08/01/25 1049   BP: (!) 153/67   Pulse: 70   Height: 5' 9" (1.753 m)       BP Readings from Last 5 Encounters:   08/01/25 (!) 153/67   07/22/25 136/72   04/17/25 138/72   03/10/25 (!) 160/80   01/28/25 (!) 150/62        Physical Exam  Vitals reviewed.   Constitutional:       General: He is not in acute distress.     Appearance: Normal appearance. He is not ill-appearing.   HENT:      Head: Normocephalic and atraumatic.   Eyes:      Extraocular Movements: Extraocular movements intact.      Conjunctiva/sclera: Conjunctivae normal.   Cardiovascular:      Rate and Rhythm: Normal rate and regular rhythm.   Pulmonary:      Effort: Pulmonary effort is normal. No respiratory " "distress.   Musculoskeletal:         General: No swelling or deformity.   Skin:     Findings: No erythema or rash.   Neurological:      Mental Status: He is alert.           Current Outpatient Medications   Medication Instructions    ACCU-CHEK GUIDE TEST STRIPS Strp TEST BLOOD SUGAR FOUR TIMES DAILY    ACCU-CHEK MULTICLIX LANCET lancets TEST BLOOD SUGAR THREE TIMES DAILY    ascorbic acid (vitamin C) (VITAMIN C) 100 mg, Daily    aspirin (ECOTRIN) 81 mg, Every other day    blood-glucose meter Misc 1 Device, Misc.(Non-Drug; Combo Route), Once    CARBOXYMETHYLCELLULOSE SODIUM (REFRESH OPHT) Apply to eye.    DROPLET PEN NEEDLE 32 gauge x 5/32" Ndle USE TO INJECT INSULIN 4 TIMES A DAY    ferrous gluconate (FERGON) 324 mg, Oral, 2 times daily with meals    fluticasone propionate (FLONASE) 100 mcg, Each Nostril, Daily    furosemide (LASIX) 80 mg, Oral    insulin aspart U-100 (NOVOLOG FLEXPEN U-100 INSULIN) 100 unit/mL (3 mL) InPn pen INJECT EIGHT UNITS BEFORE BREAKFAST, SIX UNITS BEFORE LUNCH, AND EIGHT UNITS BEFORE DINNER. MAY TITRATE TO A MAX DOSE OF 50 UNITS PER DAY    isosorbide mononitrate (IMDUR) 60 mg, Oral, 2 times daily    LANTUS SOLOSTAR U-100 INSULIN 16 Units, Subcutaneous, Nightly    losartan (COZAAR) 25 mg, Oral    metoprolol succinate (TOPROL-XL) 100 mg, Oral, 2 times daily    multivitamin (ONE DAILY MULTIVITAMIN) per tablet 1 tablet, Daily    nitroGLYCERIN (NITROSTAT) 0.4 mg    pantoprazole (PROTONIX) 40 mg, Oral    potassium chloride SA (K-DUR,KLOR-CON) 20 MEQ tablet 1 tablet, Every morning    saw palmetto 500 mg, 2 times daily    turmeric 400 mg Cap 1 capsule, Daily    vitamin D (VITAMIN D3) 1,000 Units, Daily    vitamin E 100 Units, Daily       Lipid Panel:   Lab Results   Component Value Date    CHOL 148 07/15/2025    HDL 40 07/15/2025    LDLCALC 88.8 07/15/2025    TRIG 96 07/15/2025    CHOLHDL 27.0 07/15/2025       The ASCVD Risk score (Gardenia DK, et al., 2019) failed to calculate for the following " reasons:    The 2019 ASCVD risk score is only valid for ages 40 to 79    Risk score cannot be calculated because patient has a medical history suggesting prior/existing ASCVD    Most Recent EKG Results  Results for orders placed or performed during the hospital encounter of 07/11/24   EKG 12-lead    Collection Time: 07/11/24  9:51 AM   Result Value Ref Range    QRS Duration 172 ms    [facility] QTC Calculation 503 ms    Narrative    Test Reason : I10,    Vent. Rate : 070 BPM     Atrial Rate : 072 BPM     P-R Int : 000 ms          QRS Dur : 172 ms      QT Int : 466 ms       P-R-T Axes : 000 092 207 degrees     QTc Int : 503 ms    Ventricular-paced rhythm  Abnormal ECG  When compared with ECG of 16-AUG-2023 08:27,  No significant change was found  Confirmed by Saleem Gamble MD ([id number]) on 7/11/2024 8:46:54 PM    Referred By: KAYE HEATH           Confirmed By:Saleem Gamble MD       Most Recent Echocardiogram Results  Results for orders placed in visit on 08/24/23    Echo    Interpretation Summary    Left Ventricle: The left ventricle is normal in size. Normal wall thickness. There is concentric remodeling. Normal wall motion. There is normal systolic function with a visually estimated ejection fraction of 55 - 60%. There is diastolic dysfunction.    Left Atrium: Left atrium is moderately dilated. Likely patent foramen ovale visualized with predominant left to right shunting indicated by color flow Doppler.    Right Ventricle: Normal right ventricular cavity size. Wall thickness is normal. Right ventricle wall motion  is normal. Systolic function is normal.    Aortic Valve: There is mild aortic valve sclerosis without stenosis.    Tricuspid Valve: There is mild regurgitation.    Pulmonary Artery: The estimated pulmonary artery systolic pressure is 32 mmHg.    IVC/SVC: Normal venous pressure at 3 mmHg.      Most Recent Nuclear Stress Test Results  No results found for this or any previous visit.      Most Recent Cardiac  PET Stress Test Results  No results found for this or any previous visit.      Most Recent Cardiovascular Angiogram results  Results for orders placed during the hospital encounter of 08/06/20    Cardiac catheterization    Conclusion  · Estimated blood loss: none  · Life expectancy more or equal to 1 year  · Successful upgrade of existing dual-chamber pacemaker from simple DDD to triple chamber pacing his pacing  · Successful addition of his pacing lead  · Successful capping of malfunctioning RA lead  · Difficult mapping of right atrium due to poor sensing in most areas. Eventually septal area identified as adequate for lead positioning with successful positioning of new RA lead  · Successful device pocket revision  · Patient is in atrial tachycardia.    Conclusion:  See summary above    Recommendation:  Postop care, follow up in device Clinic.    I certify that I was present for catheter insertion, catheter manipulation, angiography, and angiographic interpretation of this patient.    Procedure Log documented by Documenter: Jackeline Velarde RN and verified by Domenico Grajeda MD.    Date: 8/15/2020  Time: 2:21 PM      Other Most Recent Cardiology Results  Results for orders placed in visit on 07/23/25    Cardiac device check - Remote          **All pertinent data including labs, imaging, EKGs, and studies listed above were reviewed.  **All of the patients ECG tracings since most recent visit were personally interpreted by this myself    ASSESSMENT & TREATMENT   Anthony Blair is a 89 y.o. male who presents for follow.    CRT-P  Routine device checks  - Ambulatory referral/consult to Electrophysiology    PermAF:  Rate controlled  Continue Toprol    Hypertensive heart disease with heart failure  Euvolemic  Continue Toprol   Continue Lasix   Continue Imdur  - Ambulatory referral/consult to Electrophysiology    F/u in 1 year     Yoel Galo MD  [facility]    This note was partially generated  using voice recognition and generative artificial intelligence software. While every effort has been made to ensure its accuracy, transcription errors may exist. Please reach out to me with any questions or if clarification is needed.

## 2025-08-07 LAB
OHS CV DC REMOTE DEVICE TYPE: NORMAL
OHS CV RV PACING PERCENT: 99 %

## 2025-08-11 RX ORDER — INSULIN GLARGINE 100 [IU]/ML
16 INJECTION, SOLUTION SUBCUTANEOUS NIGHTLY
Qty: 15 ML | Refills: 3 | Status: SHIPPED | OUTPATIENT
Start: 2025-08-11

## 2025-08-15 ENCOUNTER — OFFICE VISIT (OUTPATIENT)
Dept: URGENT CARE | Facility: CLINIC | Age: OVER 89
End: 2025-08-15
Payer: MEDICARE

## 2025-08-15 ENCOUNTER — HOSPITAL ENCOUNTER (OUTPATIENT)
Dept: CARDIOLOGY | Facility: HOSPITAL | Age: OVER 89
Discharge: HOME | End: 2025-08-15
Attending: INTERNAL MEDICINE
Payer: MEDICARE

## 2025-08-15 ENCOUNTER — CLINICAL SUPPORT (OUTPATIENT)
Dept: CARDIOLOGY | Facility: HOSPITAL | Age: OVER 89
End: 2025-08-15
Payer: MEDICARE

## 2025-08-15 VITALS
OXYGEN SATURATION: 96 % | HEIGHT: 69 IN | WEIGHT: 171 LBS | RESPIRATION RATE: 17 BRPM | SYSTOLIC BLOOD PRESSURE: 134 MMHG | TEMPERATURE: 98 F | HEART RATE: 98 BPM | DIASTOLIC BLOOD PRESSURE: 63 MMHG | BODY MASS INDEX: 25.33 KG/M2

## 2025-08-15 DIAGNOSIS — R05.9 COUGH, UNSPECIFIED TYPE: ICD-10-CM

## 2025-08-15 DIAGNOSIS — U07.1 COVID-19 VIRUS INFECTION: Primary | ICD-10-CM

## 2025-08-15 DIAGNOSIS — U07.1 COVID-19 VIRUS DETECTED: ICD-10-CM

## 2025-08-15 LAB
CTP QC/QA: YES
SARS-COV+SARS-COV-2 AG RESP QL IA.RAPID: POSITIVE

## 2025-08-15 PROCEDURE — 93296 REM INTERROG EVL PM/IDS: CPT | Mod: PO | Performed by: INTERNAL MEDICINE

## 2025-08-18 LAB
OHS CV BIV PACING PERCENT: 98 %
OHS CV DC REMOTE DEVICE TYPE: NORMAL

## (undated) DEVICE — NDL PERC ENTRY BSDN 18-7.0

## (undated) DEVICE — CONTRAST OMNIPAQUE 240 150ML

## (undated) DEVICE — CAUTERY BOVIE PENCIL

## (undated) DEVICE — CATH RESPONSE QPLR JSN 6F 120

## (undated) DEVICE — SCALPEL SZ 10 RETRACTABLE

## (undated) DEVICE — SHEATH INTRODUCER 7FR 11CM

## (undated) DEVICE — SHEATH INTRODUCER 6FR 11CM

## (undated) DEVICE — SHEATH SAFE 6FR

## (undated) DEVICE — SLITTER UNIVERSAL

## (undated) DEVICE — GUIDEWIRE WHOLEY HI TORQ 175CM

## (undated) DEVICE — PAD DEFIB CADENCE ADULT R2

## (undated) DEVICE — DRAPE PACEMAKER PROXIMA

## (undated) DEVICE — PAD GROUNDING DISPER ELECTRODE

## (undated) DEVICE — SHEATH PRELUDE 9X13CM SNAP

## (undated) DEVICE — SYR BULB 60CC IRRIGATION

## (undated) DEVICE — CATH ANGIO RIGHTSITE HIS 7X43

## (undated) DEVICE — BLADE PLASMA WIDE SPATULA TIP